# Patient Record
Sex: FEMALE | Race: WHITE | Employment: OTHER | URBAN - NONMETROPOLITAN AREA
[De-identification: names, ages, dates, MRNs, and addresses within clinical notes are randomized per-mention and may not be internally consistent; named-entity substitution may affect disease eponyms.]

---

## 2017-01-03 DIAGNOSIS — I10 ESSENTIAL HYPERTENSION: ICD-10-CM

## 2017-01-03 DIAGNOSIS — R10.13 EPIGASTRIC PAIN: ICD-10-CM

## 2017-01-03 DIAGNOSIS — K21.9 GASTROESOPHAGEAL REFLUX DISEASE WITHOUT ESOPHAGITIS: ICD-10-CM

## 2017-01-03 RX ORDER — FUROSEMIDE 20 MG/1
20 TABLET ORAL 2 TIMES DAILY
Qty: 180 TABLET | Refills: 3 | Status: SHIPPED | OUTPATIENT
Start: 2017-01-03 | End: 2017-12-15 | Stop reason: SDUPTHER

## 2017-01-03 RX ORDER — PANTOPRAZOLE SODIUM 40 MG/1
40 TABLET, DELAYED RELEASE ORAL DAILY
Qty: 90 TABLET | Refills: 3 | Status: SHIPPED | OUTPATIENT
Start: 2017-01-03 | End: 2017-11-14 | Stop reason: SDUPTHER

## 2017-01-03 RX ORDER — ISOSORBIDE DINITRATE 5 MG/1
5 TABLET ORAL 3 TIMES DAILY
Qty: 270 TABLET | Refills: 3 | Status: SHIPPED | OUTPATIENT
Start: 2017-01-03 | End: 2017-01-03

## 2017-01-03 RX ORDER — VENLAFAXINE HYDROCHLORIDE 75 MG/1
75 CAPSULE, EXTENDED RELEASE ORAL DAILY
Qty: 90 CAPSULE | Refills: 3 | Status: SHIPPED | OUTPATIENT
Start: 2017-01-03 | End: 2017-11-14 | Stop reason: SDUPTHER

## 2017-01-03 RX ORDER — LOSARTAN POTASSIUM 25 MG/1
25 TABLET ORAL DAILY
Qty: 90 TABLET | Refills: 3 | Status: SHIPPED | OUTPATIENT
Start: 2017-01-03 | End: 2017-11-14 | Stop reason: SDUPTHER

## 2017-01-03 RX ORDER — SUCRALFATE 1 G/1
1 TABLET ORAL 4 TIMES DAILY
Qty: 360 TABLET | Refills: 3 | Status: SHIPPED | OUTPATIENT
Start: 2017-01-03 | End: 2018-04-09 | Stop reason: SDUPTHER

## 2017-01-03 RX ORDER — ISOSORBIDE DINITRATE 5 MG/1
5 TABLET ORAL 3 TIMES DAILY
Qty: 90 TABLET | Refills: 3 | Status: SHIPPED | OUTPATIENT
Start: 2017-01-03 | End: 2017-11-14

## 2017-01-03 RX ORDER — DIGOXIN 125 MCG
125 TABLET ORAL DAILY
Qty: 90 TABLET | Refills: 3 | Status: SHIPPED | OUTPATIENT
Start: 2017-01-03 | End: 2017-11-14 | Stop reason: SDUPTHER

## 2017-01-09 ENCOUNTER — TELEPHONE (OUTPATIENT)
Dept: FAMILY MEDICINE CLINIC | Age: 82
End: 2017-01-09

## 2017-02-01 ENCOUNTER — OFFICE VISIT (OUTPATIENT)
Dept: CARDIOLOGY | Age: 82
End: 2017-02-01

## 2017-02-01 VITALS
HEIGHT: 62 IN | DIASTOLIC BLOOD PRESSURE: 78 MMHG | BODY MASS INDEX: 33.31 KG/M2 | SYSTOLIC BLOOD PRESSURE: 122 MMHG | WEIGHT: 181 LBS | HEART RATE: 60 BPM

## 2017-02-01 DIAGNOSIS — Z95.0 PACEMAKER: ICD-10-CM

## 2017-02-01 DIAGNOSIS — E55.9 VITAMIN D DEFICIENCY: ICD-10-CM

## 2017-02-01 DIAGNOSIS — I48.91 ATRIAL FIBRILLATION WITH RAPID VENTRICULAR RESPONSE (HCC): Primary | ICD-10-CM

## 2017-02-01 DIAGNOSIS — I48.20 CHRONIC ATRIAL FIBRILLATION (HCC): ICD-10-CM

## 2017-02-01 DIAGNOSIS — I10 ESSENTIAL HYPERTENSION: ICD-10-CM

## 2017-02-01 DIAGNOSIS — R53.83 FATIGUE, UNSPECIFIED TYPE: ICD-10-CM

## 2017-02-01 DIAGNOSIS — Z85.038 HISTORY OF COLON CANCER: ICD-10-CM

## 2017-02-01 PROCEDURE — 4040F PNEUMOC VAC/ADMIN/RCVD: CPT | Performed by: INTERNAL MEDICINE

## 2017-02-01 PROCEDURE — 1123F ACP DISCUSS/DSCN MKR DOCD: CPT | Performed by: INTERNAL MEDICINE

## 2017-02-01 PROCEDURE — 1036F TOBACCO NON-USER: CPT | Performed by: INTERNAL MEDICINE

## 2017-02-01 PROCEDURE — 99213 OFFICE O/P EST LOW 20 MIN: CPT | Performed by: INTERNAL MEDICINE

## 2017-02-01 PROCEDURE — G8484 FLU IMMUNIZE NO ADMIN: HCPCS | Performed by: INTERNAL MEDICINE

## 2017-02-01 PROCEDURE — G8427 DOCREV CUR MEDS BY ELIG CLIN: HCPCS | Performed by: INTERNAL MEDICINE

## 2017-02-01 PROCEDURE — G8419 CALC BMI OUT NRM PARAM NOF/U: HCPCS | Performed by: INTERNAL MEDICINE

## 2017-02-01 PROCEDURE — 1090F PRES/ABSN URINE INCON ASSESS: CPT | Performed by: INTERNAL MEDICINE

## 2017-03-21 ENCOUNTER — OFFICE VISIT (OUTPATIENT)
Dept: FAMILY MEDICINE CLINIC | Age: 82
End: 2017-03-21

## 2017-03-21 VITALS
WEIGHT: 181 LBS | RESPIRATION RATE: 12 BRPM | SYSTOLIC BLOOD PRESSURE: 138 MMHG | HEART RATE: 64 BPM | DIASTOLIC BLOOD PRESSURE: 78 MMHG | BODY MASS INDEX: 33.11 KG/M2

## 2017-03-21 DIAGNOSIS — M79.641 HAND PAIN, RIGHT: ICD-10-CM

## 2017-03-21 DIAGNOSIS — E53.8 VITAMIN B12 DEFICIENCY: ICD-10-CM

## 2017-03-21 DIAGNOSIS — I48.20 CHRONIC ATRIAL FIBRILLATION (HCC): ICD-10-CM

## 2017-03-21 DIAGNOSIS — K21.9 GASTROESOPHAGEAL REFLUX DISEASE WITHOUT ESOPHAGITIS: ICD-10-CM

## 2017-03-21 DIAGNOSIS — E78.00 PURE HYPERCHOLESTEROLEMIA: ICD-10-CM

## 2017-03-21 DIAGNOSIS — I10 ESSENTIAL HYPERTENSION: Primary | ICD-10-CM

## 2017-03-21 DIAGNOSIS — I49.5 SICK SINUS SYNDROME (HCC): ICD-10-CM

## 2017-03-21 DIAGNOSIS — M15.9 PRIMARY OSTEOARTHRITIS INVOLVING MULTIPLE JOINTS: ICD-10-CM

## 2017-03-21 PROCEDURE — 99214 OFFICE O/P EST MOD 30 MIN: CPT | Performed by: FAMILY MEDICINE

## 2017-03-21 PROCEDURE — 1123F ACP DISCUSS/DSCN MKR DOCD: CPT | Performed by: FAMILY MEDICINE

## 2017-03-21 PROCEDURE — G8427 DOCREV CUR MEDS BY ELIG CLIN: HCPCS | Performed by: FAMILY MEDICINE

## 2017-03-21 PROCEDURE — G8417 CALC BMI ABV UP PARAM F/U: HCPCS | Performed by: FAMILY MEDICINE

## 2017-03-21 PROCEDURE — 1090F PRES/ABSN URINE INCON ASSESS: CPT | Performed by: FAMILY MEDICINE

## 2017-03-21 PROCEDURE — 4040F PNEUMOC VAC/ADMIN/RCVD: CPT | Performed by: FAMILY MEDICINE

## 2017-03-21 PROCEDURE — 1036F TOBACCO NON-USER: CPT | Performed by: FAMILY MEDICINE

## 2017-03-21 PROCEDURE — G8484 FLU IMMUNIZE NO ADMIN: HCPCS | Performed by: FAMILY MEDICINE

## 2017-03-21 RX ORDER — CYCLOSPORINE 0.5 MG/ML
1 EMULSION OPHTHALMIC 2 TIMES DAILY
Status: ON HOLD | COMMUNITY
End: 2019-05-04 | Stop reason: ALTCHOICE

## 2017-03-21 ASSESSMENT — PATIENT HEALTH QUESTIONNAIRE - PHQ9
1. LITTLE INTEREST OR PLEASURE IN DOING THINGS: 0
SUM OF ALL RESPONSES TO PHQ9 QUESTIONS 1 & 2: 0
2. FEELING DOWN, DEPRESSED OR HOPELESS: 0
SUM OF ALL RESPONSES TO PHQ QUESTIONS 1-9: 0

## 2017-03-29 ENCOUNTER — TELEPHONE (OUTPATIENT)
Dept: FAMILY MEDICINE CLINIC | Age: 82
End: 2017-03-29

## 2017-03-29 DIAGNOSIS — M19.049 ARTHRITIS OF HAND: Primary | ICD-10-CM

## 2017-04-28 ENCOUNTER — NURSE ONLY (OUTPATIENT)
Dept: FAMILY MEDICINE CLINIC | Age: 82
End: 2017-04-28

## 2017-04-28 DIAGNOSIS — Z23 NEED FOR PNEUMOCOCCAL VACCINE: Primary | ICD-10-CM

## 2017-04-28 PROCEDURE — G0009 ADMIN PNEUMOCOCCAL VACCINE: HCPCS | Performed by: NURSE PRACTITIONER

## 2017-04-28 PROCEDURE — 90732 PPSV23 VACC 2 YRS+ SUBQ/IM: CPT | Performed by: NURSE PRACTITIONER

## 2017-05-31 ENCOUNTER — OFFICE VISIT (OUTPATIENT)
Dept: CARDIOLOGY | Age: 82
End: 2017-05-31

## 2017-05-31 VITALS
DIASTOLIC BLOOD PRESSURE: 60 MMHG | BODY MASS INDEX: 33.17 KG/M2 | HEIGHT: 62 IN | SYSTOLIC BLOOD PRESSURE: 128 MMHG | HEART RATE: 68 BPM | WEIGHT: 180.25 LBS

## 2017-05-31 DIAGNOSIS — I10 ESSENTIAL HYPERTENSION: Primary | ICD-10-CM

## 2017-05-31 DIAGNOSIS — Z95.0 PACEMAKER: ICD-10-CM

## 2017-05-31 DIAGNOSIS — I48.20 CHRONIC ATRIAL FIBRILLATION (HCC): ICD-10-CM

## 2017-05-31 DIAGNOSIS — I48.91 ATRIAL FIBRILLATION WITH RAPID VENTRICULAR RESPONSE (HCC): ICD-10-CM

## 2017-05-31 PROCEDURE — 1036F TOBACCO NON-USER: CPT | Performed by: INTERNAL MEDICINE

## 2017-05-31 PROCEDURE — 93000 ELECTROCARDIOGRAM COMPLETE: CPT | Performed by: INTERNAL MEDICINE

## 2017-05-31 PROCEDURE — G8427 DOCREV CUR MEDS BY ELIG CLIN: HCPCS | Performed by: INTERNAL MEDICINE

## 2017-05-31 PROCEDURE — G8417 CALC BMI ABV UP PARAM F/U: HCPCS | Performed by: INTERNAL MEDICINE

## 2017-05-31 PROCEDURE — 99213 OFFICE O/P EST LOW 20 MIN: CPT | Performed by: INTERNAL MEDICINE

## 2017-05-31 PROCEDURE — 4040F PNEUMOC VAC/ADMIN/RCVD: CPT | Performed by: INTERNAL MEDICINE

## 2017-05-31 PROCEDURE — 1123F ACP DISCUSS/DSCN MKR DOCD: CPT | Performed by: INTERNAL MEDICINE

## 2017-05-31 PROCEDURE — 1090F PRES/ABSN URINE INCON ASSESS: CPT | Performed by: INTERNAL MEDICINE

## 2017-08-17 ENCOUNTER — NURSE ONLY (OUTPATIENT)
Dept: CARDIOLOGY CLINIC | Age: 82
End: 2017-08-17
Payer: MEDICARE

## 2017-08-17 DIAGNOSIS — Z95.0 PACEMAKER: Primary | ICD-10-CM

## 2017-08-17 PROCEDURE — 93279 PRGRMG DEV EVAL PM/LDLS PM: CPT | Performed by: INTERNAL MEDICINE

## 2017-08-18 ENCOUNTER — TELEPHONE (OUTPATIENT)
Dept: FAMILY MEDICINE CLINIC | Age: 82
End: 2017-08-18

## 2017-09-14 ENCOUNTER — HOSPITAL ENCOUNTER (OUTPATIENT)
Age: 82
Discharge: HOME | End: 2017-09-14
Payer: MEDICARE

## 2017-09-14 DIAGNOSIS — I49.5 SICK SINUS SYNDROME (HCC): ICD-10-CM

## 2017-09-14 DIAGNOSIS — K21.9 GASTROESOPHAGEAL REFLUX DISEASE WITHOUT ESOPHAGITIS: ICD-10-CM

## 2017-09-14 DIAGNOSIS — I10 ESSENTIAL HYPERTENSION: ICD-10-CM

## 2017-09-14 DIAGNOSIS — E53.8 VITAMIN B12 DEFICIENCY: ICD-10-CM

## 2017-09-14 LAB
ALBUMIN SERPL-MCNC: 3.9 G/DL (ref 3.5–5.1)
ALP BLD-CCNC: 43 U/L (ref 38–126)
ALT SERPL-CCNC: 14 U/L (ref 11–66)
ANION GAP SERPL CALCULATED.3IONS-SCNC: 14 MEQ/L (ref 8–16)
AST SERPL-CCNC: 22 U/L (ref 5–40)
BILIRUB SERPL-MCNC: 1.9 MG/DL (ref 0.3–1.2)
BUN BLDV-MCNC: 12 MG/DL (ref 7–22)
CALCIUM SERPL-MCNC: 9.3 MG/DL (ref 8.5–10.5)
CHLORIDE BLD-SCNC: 99 MEQ/L (ref 98–111)
CO2: 29 MEQ/L (ref 23–33)
CREAT SERPL-MCNC: 0.6 MG/DL (ref 0.4–1.2)
FOLATE: > 20 NG/ML (ref 4.8–24.2)
GFR SERPL CREATININE-BSD FRML MDRD: > 90 ML/MIN/1.73M2
GLUCOSE BLD-MCNC: 121 MG/DL (ref 70–108)
HCT VFR BLD CALC: 38.9 % (ref 37–47)
HEMOGLOBIN: 12.8 GM/DL (ref 12–16)
MCH RBC QN AUTO: 31.1 PG (ref 27–31)
MCHC RBC AUTO-ENTMCNC: 32.9 GM/DL (ref 33–37)
MCV RBC AUTO: 94.4 FL (ref 81–99)
PDW BLD-RTO: 14.5 % (ref 11.5–14.5)
PLATELET # BLD: 148 THOU/MM3 (ref 130–400)
PMV BLD AUTO: 9.7 MCM (ref 7.4–10.4)
POTASSIUM SERPL-SCNC: 3.9 MEQ/L (ref 3.5–5.2)
RBC # BLD: 4.12 MILL/MM3 (ref 4.2–5.4)
SODIUM BLD-SCNC: 142 MEQ/L (ref 135–145)
TOTAL PROTEIN: 6.4 G/DL (ref 6.1–8)
TSH SERPL DL<=0.05 MIU/L-ACNC: 2.6 UIU/ML (ref 0.4–4.2)
VITAMIN B-12: 466 PG/ML (ref 211–911)
WBC # BLD: 4.7 THOU/MM3 (ref 4.8–10.8)

## 2017-09-14 PROCEDURE — 80053 COMPREHEN METABOLIC PANEL: CPT

## 2017-09-14 PROCEDURE — 85027 COMPLETE CBC AUTOMATED: CPT

## 2017-09-14 PROCEDURE — 82746 ASSAY OF FOLIC ACID SERUM: CPT

## 2017-09-14 PROCEDURE — 36415 COLL VENOUS BLD VENIPUNCTURE: CPT

## 2017-09-14 PROCEDURE — 84443 ASSAY THYROID STIM HORMONE: CPT

## 2017-09-14 PROCEDURE — 82607 VITAMIN B-12: CPT

## 2017-09-19 ENCOUNTER — OFFICE VISIT (OUTPATIENT)
Dept: FAMILY MEDICINE CLINIC | Age: 82
End: 2017-09-19
Payer: MEDICARE

## 2017-09-19 VITALS
TEMPERATURE: 97.6 F | DIASTOLIC BLOOD PRESSURE: 70 MMHG | BODY MASS INDEX: 33.91 KG/M2 | HEART RATE: 63 BPM | RESPIRATION RATE: 17 BRPM | SYSTOLIC BLOOD PRESSURE: 140 MMHG | WEIGHT: 185.4 LBS | OXYGEN SATURATION: 97 %

## 2017-09-19 DIAGNOSIS — E04.9 THYROID GOITER: Primary | ICD-10-CM

## 2017-09-19 DIAGNOSIS — E78.00 PURE HYPERCHOLESTEROLEMIA: ICD-10-CM

## 2017-09-19 DIAGNOSIS — Z23 NEED FOR INFLUENZA VACCINATION: ICD-10-CM

## 2017-09-19 DIAGNOSIS — K21.9 GASTROESOPHAGEAL REFLUX DISEASE WITHOUT ESOPHAGITIS: ICD-10-CM

## 2017-09-19 DIAGNOSIS — M15.9 PRIMARY OSTEOARTHRITIS INVOLVING MULTIPLE JOINTS: ICD-10-CM

## 2017-09-19 DIAGNOSIS — I48.91 ATRIAL FIBRILLATION WITH RAPID VENTRICULAR RESPONSE (HCC): ICD-10-CM

## 2017-09-19 DIAGNOSIS — I10 ESSENTIAL HYPERTENSION: ICD-10-CM

## 2017-09-19 PROCEDURE — G8427 DOCREV CUR MEDS BY ELIG CLIN: HCPCS | Performed by: FAMILY MEDICINE

## 2017-09-19 PROCEDURE — 99214 OFFICE O/P EST MOD 30 MIN: CPT | Performed by: FAMILY MEDICINE

## 2017-09-19 PROCEDURE — 1123F ACP DISCUSS/DSCN MKR DOCD: CPT | Performed by: FAMILY MEDICINE

## 2017-09-19 PROCEDURE — G0008 ADMIN INFLUENZA VIRUS VAC: HCPCS | Performed by: FAMILY MEDICINE

## 2017-09-19 PROCEDURE — 1090F PRES/ABSN URINE INCON ASSESS: CPT | Performed by: FAMILY MEDICINE

## 2017-09-19 PROCEDURE — 4040F PNEUMOC VAC/ADMIN/RCVD: CPT | Performed by: FAMILY MEDICINE

## 2017-09-19 PROCEDURE — G8417 CALC BMI ABV UP PARAM F/U: HCPCS | Performed by: FAMILY MEDICINE

## 2017-09-19 PROCEDURE — 1036F TOBACCO NON-USER: CPT | Performed by: FAMILY MEDICINE

## 2017-09-19 PROCEDURE — 90662 IIV NO PRSV INCREASED AG IM: CPT | Performed by: FAMILY MEDICINE

## 2017-10-04 ENCOUNTER — OFFICE VISIT (OUTPATIENT)
Dept: CARDIOLOGY CLINIC | Age: 82
End: 2017-10-04
Payer: MEDICARE

## 2017-10-04 VITALS
HEIGHT: 63 IN | BODY MASS INDEX: 32.25 KG/M2 | SYSTOLIC BLOOD PRESSURE: 146 MMHG | DIASTOLIC BLOOD PRESSURE: 78 MMHG | WEIGHT: 182 LBS | HEART RATE: 64 BPM

## 2017-10-04 DIAGNOSIS — I49.5 SICK SINUS SYNDROME (HCC): Primary | ICD-10-CM

## 2017-10-04 DIAGNOSIS — I48.91 ATRIAL FIBRILLATION WITH RAPID VENTRICULAR RESPONSE (HCC): ICD-10-CM

## 2017-10-04 DIAGNOSIS — I48.20 CHRONIC ATRIAL FIBRILLATION (HCC): ICD-10-CM

## 2017-10-04 DIAGNOSIS — E78.00 PURE HYPERCHOLESTEROLEMIA: ICD-10-CM

## 2017-10-04 DIAGNOSIS — I10 ESSENTIAL HYPERTENSION: ICD-10-CM

## 2017-10-04 PROCEDURE — 1036F TOBACCO NON-USER: CPT | Performed by: INTERNAL MEDICINE

## 2017-10-04 PROCEDURE — G8427 DOCREV CUR MEDS BY ELIG CLIN: HCPCS | Performed by: INTERNAL MEDICINE

## 2017-10-04 PROCEDURE — G8417 CALC BMI ABV UP PARAM F/U: HCPCS | Performed by: INTERNAL MEDICINE

## 2017-10-04 PROCEDURE — 1123F ACP DISCUSS/DSCN MKR DOCD: CPT | Performed by: INTERNAL MEDICINE

## 2017-10-04 PROCEDURE — 4040F PNEUMOC VAC/ADMIN/RCVD: CPT | Performed by: INTERNAL MEDICINE

## 2017-10-04 PROCEDURE — 1090F PRES/ABSN URINE INCON ASSESS: CPT | Performed by: INTERNAL MEDICINE

## 2017-10-04 PROCEDURE — 99213 OFFICE O/P EST LOW 20 MIN: CPT | Performed by: INTERNAL MEDICINE

## 2017-10-04 PROCEDURE — G8484 FLU IMMUNIZE NO ADMIN: HCPCS | Performed by: INTERNAL MEDICINE

## 2017-10-04 NOTE — PROGRESS NOTES
Pt here for 4 month check up     Denies chest pain, palpitations or MAK    Continues to have SOB with no change    C/O right arm \"quivering\" when she goes to bed at night

## 2017-10-04 NOTE — MR AVS SNAPSHOT
your BMI, the greater your risk of heart disease, high blood pressure, type 2 diabetes, stroke, gallstones, arthritis, sleep apnea, and certain cancers. BMI is not perfect. It may overestimate body fat in athletes and people who are more muscular. Even a small weight loss (between 5 and 10 percent of your current weight) by decreasing your calorie intake and becoming more physically active will help lower your risk of developing or worsening diseases associated with obesity.      Learn more at: [email or web address]             Medications and Orders      Your Current Medications Are              XARELTO 15 MG TABS tablet Take 1 tablet by mouth  daily with breakfast    cycloSPORINE (RESTASIS MULTIDOSE) 0.05 % ophthalmic emulsion Place 1 drop into both eyes 2 times daily    metoprolol tartrate (LOPRESSOR) 25 MG tablet Take 50 mg by mouth 2 times daily    furosemide (LASIX) 20 MG tablet Take 1 tablet by mouth 2 times daily    sucralfate (CARAFATE) 1 GM tablet Take 1 tablet by mouth 4 times daily    digoxin (LANOXIN) 125 MCG tablet Take 1 tablet by mouth daily    venlafaxine (EFFEXOR XR) 75 MG extended release capsule Take 1 capsule by mouth daily    pantoprazole (PROTONIX) 40 MG tablet Take 1 tablet by mouth daily    losartan (COZAAR) 25 MG tablet Take 1 tablet by mouth daily    isosorbide dinitrate (ISORDIL TITRADOSE) 5 MG tablet Take 1 tablet by mouth 3 times daily    NONFORMULARY Perils probiotic  1 daily    Cyanocobalamin (VITAMIN B 12 PO) Take by mouth    aspirin 81 MG chewable tablet Take 1 tablet by mouth daily    vitamin D (CHOLECALCIFEROL) 1000 UNIT TABS tablet Take 1,000 Units by mouth daily    Calcium-Vitamin D-Vitamin K 500-500-40 MG-UNT-MCG CHEW Take 1 tablet by mouth daily    acetaminophen (TYLENOL) 500 MG tablet   Take 1,000 mg by mouth 2 times daily     Biotin 1000 MCG TABS Take 1 tablet by mouth daily       Allergies              Methadone Shortness Of Breath For questions regarding your [employer] account call [phone]. If you have a clinical question, please call your doctor's office.

## 2017-10-04 NOTE — PROGRESS NOTES
Katherin Tomas is a 80 y.o. female is here for sick sinus and has pacer feels good and has  Some stable arthritis  and has had no chest pains . Intensity of the pain none , provocation of the pain none , what relieves the pain none  where does  the pain migrates to no where  and no nausea no fever and chills and no sob with and without activity.  No evidence of swelling in legs no palpitations and no syncopal episodes and no dizziness ,no bleeding ,or urination  Problems ,no double visions ,no speech problems and no bowel problems or diarrhea and tolerating medications     Patient Active Problem List   Diagnosis    Hypertension    Fatigue    Osteoarthritis    Stress incontinence    History of colon cancer    Hyperlipidemia    GERD (gastroesophageal reflux disease)    Vasovagal attack    Cardiac dysrhythmia    A-fib ([address]    Osteopenia    Atrial fibrillation with rapid ventricular response ([address]    Near syncope    Urinary tract infection without hematuria    Sick sinus syndrome ([address]    Bradycardia    [employer] single pacemaker 4/26/2016    Thyroid goiter     Past Medical History:   Diagnosis Date    Atrial fibrillation ([address]     Blood circulation, collateral     [employer] single pacemaker 4/26/2016 5/5/2016    Cancer ([address] 1954    HX  Colon     Carpal tunnel syndrome     Fracture dislocation of ankle 1980    GERD (gastroesophageal reflux disease)     Hyperlipidemia     Hypertension     Kidney lesion, native, right     found on 5/2016    Osteoarthritis     knees    Osteopenia     Prolonged emergence from general anesthesia     Thyroid goiter 9/6/2016    Yersiniosis 2016     Social History   Substance Use Topics    Smoking status: Never Smoker    Smokeless tobacco: Never Used    Alcohol use No     Allergies   Allergen Reactions    Methadone Shortness Of Breath    Gabapentin     Lyrica [Pregabalin]     Ultram [Tramadol]      Nausea, pedal edema, SOB 148/82 (!) 146/78   Pulse: 64    Weight: 182 lb (82.6 kg)    Height: 5' 3\" (1.6 m)        EXAMINATION   Gen.  Appearance is reflective of nutritional normal nutrition and well-groomed   Appears  stated age    Carotid the amplitude of  carotid artery  And up stroke are present or diminished and bruits are absent   Thyroid palpation appears to be  Normal    LEONOR  And evaluated and within normal limits  And size of pupils is normal  HEENT  teeth appears to be symmetrical without poor dentition and gums  and palate appears to be healthy and  head is normal cephalic  Without signs of trauma and eyes are without trauma no conjunctiva and Iids retracting and not retracted ptosis and no ptosis and without  erythematous changes and  Nose is symmetrical  without drainage  and ears are  without tinnitus  and throat is clear   JUGULAR VEINS  are not elevated and tongue is mid line no masses presence and no feldman A waves present   LYMPHATICS are without adenopathies at least on exam   CHEST  No biventricular heaves and thrills and no right ventricular heaves and examination without signs of trauma and lesions  HEART not distant and irregular rate and rhythm without   gallop signs and and no murmurs and systolic crescendo  Decrescendo  normal s1 and s2 sounds and no s3 and s4 split   Point of maximum intensity of the heartbeat  is at or displaced from the fifth intercostal space  LUNGS no   presence of intercostal retractions and no  use of the accessory muscles with a diaphragmatic movement present and not present pectus and pigeon chest and without wheezes and rhonchi and non diminished in all posterior lungs  and without rales  ABDOMEN abdominal bruit not present  And  No  Presency of  morbid obesity and with no    non distended without organomegaly and no rebound tenderness and bowel sound are present  all quadrants   NEUROLOGY all the cranial nerves are intact and no motor deficits  and no sensory deficits  MUSCULAR

## 2018-02-07 ENCOUNTER — OFFICE VISIT (OUTPATIENT)
Dept: CARDIOLOGY CLINIC | Age: 83
End: 2018-02-07
Payer: MEDICARE

## 2018-02-07 VITALS
HEIGHT: 62 IN | DIASTOLIC BLOOD PRESSURE: 70 MMHG | BODY MASS INDEX: 33.86 KG/M2 | HEART RATE: 72 BPM | WEIGHT: 184 LBS | SYSTOLIC BLOOD PRESSURE: 132 MMHG

## 2018-02-07 DIAGNOSIS — I48.91 ATRIAL FIBRILLATION WITH RAPID VENTRICULAR RESPONSE (HCC): Primary | ICD-10-CM

## 2018-02-07 DIAGNOSIS — E78.00 PURE HYPERCHOLESTEROLEMIA: ICD-10-CM

## 2018-02-07 DIAGNOSIS — I10 ESSENTIAL HYPERTENSION: ICD-10-CM

## 2018-02-07 PROCEDURE — 4040F PNEUMOC VAC/ADMIN/RCVD: CPT | Performed by: INTERNAL MEDICINE

## 2018-02-07 PROCEDURE — 99213 OFFICE O/P EST LOW 20 MIN: CPT | Performed by: INTERNAL MEDICINE

## 2018-02-07 PROCEDURE — 1123F ACP DISCUSS/DSCN MKR DOCD: CPT | Performed by: INTERNAL MEDICINE

## 2018-02-07 PROCEDURE — 1090F PRES/ABSN URINE INCON ASSESS: CPT | Performed by: INTERNAL MEDICINE

## 2018-02-07 PROCEDURE — G8417 CALC BMI ABV UP PARAM F/U: HCPCS | Performed by: INTERNAL MEDICINE

## 2018-02-07 PROCEDURE — 1036F TOBACCO NON-USER: CPT | Performed by: INTERNAL MEDICINE

## 2018-02-07 PROCEDURE — G8427 DOCREV CUR MEDS BY ELIG CLIN: HCPCS | Performed by: INTERNAL MEDICINE

## 2018-02-07 PROCEDURE — G8484 FLU IMMUNIZE NO ADMIN: HCPCS | Performed by: INTERNAL MEDICINE

## 2018-02-07 NOTE — PROGRESS NOTES
differently: TAKE 2 TABLETS BY MOUTH IN  THE MORNING AND 2 TABLETS  BY MOUTH IN THE EVENING) 450 tablet 3    furosemide (LASIX) 20 MG tablet TAKE 1 TABLET BY MOUTH TWO  TIMES DAILY 180 tablet 3    isosorbide dinitrate (ISORDIL) 5 MG tablet TAKE 1 TABLET BY MOUTH 3  TIMES DAILY 270 tablet 3    DIGOX 125 MCG tablet TAKE 1 TABLET BY MOUTH  DAILY 90 tablet 3    venlafaxine (EFFEXOR XR) 75 MG extended release capsule TAKE 1 CAPSULE BY MOUTH  DAILY 90 capsule 3    losartan (COZAAR) 25 MG tablet TAKE 1 TABLET BY MOUTH  DAILY 90 tablet 3    pantoprazole (PROTONIX) 40 MG tablet TAKE 1 TABLET BY MOUTH  DAILY 90 tablet 3    XARELTO 15 MG TABS tablet Take 1 tablet by mouth  daily with breakfast 90 tablet 3    cycloSPORINE (RESTASIS MULTIDOSE) 0.05 % ophthalmic emulsion Place 1 drop into both eyes 2 times daily      sucralfate (CARAFATE) 1 GM tablet Take 1 tablet by mouth 4 times daily 360 tablet 3    NONFORMULARY Perils probiotic  1 daily      Cyanocobalamin (VITAMIN B 12 PO) Take by mouth      aspirin 81 MG chewable tablet Take 1 tablet by mouth daily 30 tablet 3    vitamin D (CHOLECALCIFEROL) 1000 UNIT TABS tablet Take 1,000 Units by mouth daily      Calcium-Vitamin D-Vitamin K 500-500-40 MG-UNT-MCG CHEW Take 1 tablet by mouth daily      acetaminophen (TYLENOL) 500 MG tablet   Take 1,000 mg by mouth 2 times daily       Biotin 1000 MCG TABS Take 1 tablet by mouth daily        No current facility-administered medications for this visit.         REVIEW OF SYSTEM  Constitutional  symptoms such as fever chills and malaise and weakness  Are negative   HE ENT negative  HEART all negative  LUNGS all negative   ABDOMEN all negative  GENITAL URINARY all within normal limits  NEUROLOGY all negative  NECK all negative   SKIN all negative  MUSCULOSKELETAL negative  HEMATOLOGICAL negative  PSYCHIATRIC  negative    Vitals:    02/07/18 1256   BP: 132/70   Pulse: 72   Weight: 184 lb (83.5 kg)   Height: 5' 2\" (1.575 m) EXAMINATION   Gen.  Appearance is reflective of nutritional normal nutrition and well-groomed   Appears  stated age    Carotid the amplitude of  carotid artery  And up stroke are present or diminished and bruits are absent   Thyroid palpation appears to be  Normal    LEONOR  And evaluated and within normal limits  And size of pupils is normal  HEENT  teeth appears to be symmetrical without poor dentition and gums  and palate appears to be healthy and  head is normal cephalic  Without signs of trauma and eyes are without trauma no conjunctiva and Iids retracting and not retracted ptosis and no ptosis and without  erythematous changes and  Nose is symmetrical  without drainage  and ears are  without tinnitus  and throat is clear   JUGULAR VEINS  are not elevated and tongue is mid line no masses presence and no feldman A waves present   LYMPHATICS are without adenopathies at least on exam   CHEST  No biventricular heaves and thrills and no right ventricular heaves and examination without signs of trauma and lesions  HEART not distant and irregular rate and rhythm without   gallop signs and and no murmurs and systolic crescendo  Decrescendo  normal s1 and s2 sounds and no s3 and s4 split   Point of maximum intensity of the heartbeat  is at or displaced from the fifth intercostal space  LUNGS no   presence of intercostal retractions and no  use of the accessory muscles with a diaphragmatic movement present and not present pectus and pigeon chest and without wheezes and rhonchi and non diminished in all posterior lungs  and without rales  ABDOMEN abdominal bruit not present  And  No  Presency of  morbid obesity and with no    non distended without organomegaly and no rebound tenderness and bowel sound are present  all quadrants   NEUROLOGY all the cranial nerves are intact and no motor deficits  and no sensory deficits  MUSCULAR SKELETAL without signs of trauma and kyphosis and scoliosis and normal range of motion for

## 2018-04-09 ENCOUNTER — HOSPITAL ENCOUNTER (OUTPATIENT)
Dept: ULTRASOUND IMAGING | Age: 83
Discharge: HOME | End: 2018-04-09
Payer: MEDICARE

## 2018-04-09 ENCOUNTER — OFFICE VISIT (OUTPATIENT)
Dept: FAMILY MEDICINE CLINIC | Age: 83
End: 2018-04-09
Payer: MEDICARE

## 2018-04-09 VITALS
WEIGHT: 183 LBS | RESPIRATION RATE: 20 BRPM | BODY MASS INDEX: 33.68 KG/M2 | SYSTOLIC BLOOD PRESSURE: 128 MMHG | TEMPERATURE: 97.9 F | HEART RATE: 64 BPM | HEIGHT: 62 IN | DIASTOLIC BLOOD PRESSURE: 68 MMHG

## 2018-04-09 DIAGNOSIS — M85.89 OSTEOPENIA OF MULTIPLE SITES: ICD-10-CM

## 2018-04-09 DIAGNOSIS — I49.5 SICK SINUS SYNDROME (HCC): ICD-10-CM

## 2018-04-09 DIAGNOSIS — B96.89 ACUTE BACTERIAL SINUSITIS: ICD-10-CM

## 2018-04-09 DIAGNOSIS — E04.9 THYROID GOITER: ICD-10-CM

## 2018-04-09 DIAGNOSIS — M17.11 ARTHRITIS OF RIGHT KNEE: ICD-10-CM

## 2018-04-09 DIAGNOSIS — E78.00 PURE HYPERCHOLESTEROLEMIA: ICD-10-CM

## 2018-04-09 DIAGNOSIS — M15.9 PRIMARY OSTEOARTHRITIS INVOLVING MULTIPLE JOINTS: ICD-10-CM

## 2018-04-09 DIAGNOSIS — I48.91 ATRIAL FIBRILLATION WITH RAPID VENTRICULAR RESPONSE (HCC): ICD-10-CM

## 2018-04-09 DIAGNOSIS — R73.09 ELEVATED GLUCOSE: ICD-10-CM

## 2018-04-09 DIAGNOSIS — K21.9 GASTROESOPHAGEAL REFLUX DISEASE WITHOUT ESOPHAGITIS: ICD-10-CM

## 2018-04-09 DIAGNOSIS — J01.90 ACUTE BACTERIAL SINUSITIS: ICD-10-CM

## 2018-04-09 DIAGNOSIS — R61 NIGHT SWEATS: ICD-10-CM

## 2018-04-09 DIAGNOSIS — M21.061 ACQUIRED GENU VALGUM OF RIGHT KNEE: ICD-10-CM

## 2018-04-09 DIAGNOSIS — M71.30 SYNOVIAL CYST: Primary | ICD-10-CM

## 2018-04-09 DIAGNOSIS — I10 ESSENTIAL HYPERTENSION: ICD-10-CM

## 2018-04-09 DIAGNOSIS — R10.13 EPIGASTRIC PAIN: ICD-10-CM

## 2018-04-09 DIAGNOSIS — R23.8 DRY SCALP: ICD-10-CM

## 2018-04-09 PROCEDURE — G8417 CALC BMI ABV UP PARAM F/U: HCPCS | Performed by: FAMILY MEDICINE

## 2018-04-09 PROCEDURE — 99215 OFFICE O/P EST HI 40 MIN: CPT | Performed by: FAMILY MEDICINE

## 2018-04-09 PROCEDURE — 76536 US EXAM OF HEAD AND NECK: CPT

## 2018-04-09 PROCEDURE — G8427 DOCREV CUR MEDS BY ELIG CLIN: HCPCS | Performed by: FAMILY MEDICINE

## 2018-04-09 PROCEDURE — 1123F ACP DISCUSS/DSCN MKR DOCD: CPT | Performed by: FAMILY MEDICINE

## 2018-04-09 PROCEDURE — 1036F TOBACCO NON-USER: CPT | Performed by: FAMILY MEDICINE

## 2018-04-09 PROCEDURE — 4040F PNEUMOC VAC/ADMIN/RCVD: CPT | Performed by: FAMILY MEDICINE

## 2018-04-09 PROCEDURE — 1090F PRES/ABSN URINE INCON ASSESS: CPT | Performed by: FAMILY MEDICINE

## 2018-04-09 RX ORDER — SUCRALFATE 1 G/1
1 TABLET ORAL 4 TIMES DAILY
Qty: 360 TABLET | Refills: 3 | Status: SHIPPED | OUTPATIENT
Start: 2018-04-09 | End: 2019-02-04 | Stop reason: DRUGHIGH

## 2018-04-09 RX ORDER — CEPHALEXIN 500 MG/1
500 CAPSULE ORAL 3 TIMES DAILY
Qty: 21 CAPSULE | Refills: 0 | Status: SHIPPED | OUTPATIENT
Start: 2018-04-09 | End: 2018-04-16

## 2018-04-09 ASSESSMENT — PATIENT HEALTH QUESTIONNAIRE - PHQ9
SUM OF ALL RESPONSES TO PHQ9 QUESTIONS 1 & 2: 0
SUM OF ALL RESPONSES TO PHQ QUESTIONS 1-9: 0
1. LITTLE INTEREST OR PLEASURE IN DOING THINGS: 0
2. FEELING DOWN, DEPRESSED OR HOPELESS: 0

## 2018-04-11 ENCOUNTER — HOSPITAL ENCOUNTER (OUTPATIENT)
Age: 83
Discharge: HOME | End: 2018-04-11
Payer: MEDICARE

## 2018-04-11 DIAGNOSIS — E04.9 THYROID GOITER: ICD-10-CM

## 2018-04-11 DIAGNOSIS — R73.09 ELEVATED GLUCOSE: ICD-10-CM

## 2018-04-11 DIAGNOSIS — R61 NIGHT SWEATS: ICD-10-CM

## 2018-04-11 LAB
ANION GAP SERPL CALCULATED.3IONS-SCNC: 14 MEQ/L (ref 8–16)
AVERAGE GLUCOSE: 123 MG/DL (ref 70–126)
BUN BLDV-MCNC: 15 MG/DL (ref 7–22)
CALCIUM SERPL-MCNC: 9.4 MG/DL (ref 8.5–10.5)
CHLORIDE BLD-SCNC: 99 MEQ/L (ref 98–111)
CO2: 28 MEQ/L (ref 23–33)
CREAT SERPL-MCNC: 0.8 MG/DL (ref 0.4–1.2)
GFR SERPL CREATININE-BSD FRML MDRD: 67 ML/MIN/1.73M2
GLUCOSE BLD-MCNC: 143 MG/DL (ref 70–108)
HBA1C MFR BLD: 6.1 % (ref 4.4–6.4)
INSULIN, RANDOM OR FASTING: NORMAL
POTASSIUM SERPL-SCNC: 3.7 MEQ/L (ref 3.5–5.2)
SODIUM BLD-SCNC: 141 MEQ/L (ref 135–145)
TSH SERPL DL<=0.05 MIU/L-ACNC: 3.19 UIU/ML (ref 0.4–4.2)

## 2018-04-11 PROCEDURE — 83036 HEMOGLOBIN GLYCOSYLATED A1C: CPT

## 2018-04-11 PROCEDURE — 36415 COLL VENOUS BLD VENIPUNCTURE: CPT

## 2018-04-11 PROCEDURE — 84443 ASSAY THYROID STIM HORMONE: CPT

## 2018-04-11 PROCEDURE — 83525 ASSAY OF INSULIN: CPT

## 2018-04-11 PROCEDURE — 80048 BASIC METABOLIC PNL TOTAL CA: CPT

## 2018-04-12 ENCOUNTER — TELEPHONE (OUTPATIENT)
Dept: FAMILY MEDICINE CLINIC | Age: 83
End: 2018-04-12

## 2018-07-02 RX ORDER — RIVAROXABAN 15 MG/1
TABLET, FILM COATED ORAL
Qty: 90 TABLET | Refills: 3 | Status: ON HOLD | OUTPATIENT
Start: 2018-07-02 | End: 2019-01-16 | Stop reason: CLARIF

## 2018-08-29 ENCOUNTER — NURSE ONLY (OUTPATIENT)
Dept: CARDIOLOGY CLINIC | Age: 83
End: 2018-08-29
Payer: MEDICARE

## 2018-08-29 DIAGNOSIS — Z95.0 PACEMAKER: Primary | ICD-10-CM

## 2018-08-29 PROCEDURE — 93279 PRGRMG DEV EVAL PM/LDLS PM: CPT | Performed by: INTERNAL MEDICINE

## 2018-09-15 DIAGNOSIS — I10 ESSENTIAL HYPERTENSION: ICD-10-CM

## 2018-09-15 DIAGNOSIS — K21.9 GASTROESOPHAGEAL REFLUX DISEASE WITHOUT ESOPHAGITIS: ICD-10-CM

## 2018-09-17 RX ORDER — ISOSORBIDE DINITRATE 5 MG/1
5 TABLET ORAL 3 TIMES DAILY
Qty: 270 TABLET | Refills: 3 | Status: SHIPPED | OUTPATIENT
Start: 2018-09-17 | End: 2018-10-03 | Stop reason: ALTCHOICE

## 2018-09-17 RX ORDER — DIGOXIN 125 MCG
125 TABLET ORAL DAILY
Qty: 90 TABLET | Refills: 3 | Status: SHIPPED | OUTPATIENT
Start: 2018-09-17 | End: 2019-09-24 | Stop reason: SDUPTHER

## 2018-09-17 RX ORDER — PANTOPRAZOLE SODIUM 40 MG/1
40 TABLET, DELAYED RELEASE ORAL DAILY
Qty: 90 TABLET | Refills: 3 | Status: ON HOLD | OUTPATIENT
Start: 2018-09-17 | End: 2018-12-14 | Stop reason: HOSPADM

## 2018-09-17 RX ORDER — LOSARTAN POTASSIUM 25 MG/1
25 TABLET ORAL DAILY
Qty: 90 TABLET | Refills: 3 | Status: ON HOLD | OUTPATIENT
Start: 2018-09-17 | End: 2018-12-14 | Stop reason: HOSPADM

## 2018-09-17 RX ORDER — VENLAFAXINE HYDROCHLORIDE 75 MG/1
75 CAPSULE, EXTENDED RELEASE ORAL DAILY
Qty: 90 CAPSULE | Refills: 3 | Status: SHIPPED | OUTPATIENT
Start: 2018-09-17 | End: 2019-09-24 | Stop reason: SDUPTHER

## 2018-10-03 ENCOUNTER — OFFICE VISIT (OUTPATIENT)
Dept: CARDIOLOGY CLINIC | Age: 83
End: 2018-10-03
Payer: MEDICARE

## 2018-10-03 VITALS
SYSTOLIC BLOOD PRESSURE: 128 MMHG | HEART RATE: 60 BPM | WEIGHT: 183 LBS | BODY MASS INDEX: 33.68 KG/M2 | HEIGHT: 62 IN | DIASTOLIC BLOOD PRESSURE: 88 MMHG

## 2018-10-03 DIAGNOSIS — I10 ESSENTIAL HYPERTENSION: Primary | ICD-10-CM

## 2018-10-03 DIAGNOSIS — R06.02 SOB (SHORTNESS OF BREATH) ON EXERTION: ICD-10-CM

## 2018-10-03 PROCEDURE — 99214 OFFICE O/P EST MOD 30 MIN: CPT | Performed by: INTERNAL MEDICINE

## 2018-10-03 PROCEDURE — 4040F PNEUMOC VAC/ADMIN/RCVD: CPT | Performed by: INTERNAL MEDICINE

## 2018-10-03 PROCEDURE — 1101F PT FALLS ASSESS-DOCD LE1/YR: CPT | Performed by: INTERNAL MEDICINE

## 2018-10-03 PROCEDURE — 1090F PRES/ABSN URINE INCON ASSESS: CPT | Performed by: INTERNAL MEDICINE

## 2018-10-03 PROCEDURE — 1036F TOBACCO NON-USER: CPT | Performed by: INTERNAL MEDICINE

## 2018-10-03 PROCEDURE — 1123F ACP DISCUSS/DSCN MKR DOCD: CPT | Performed by: INTERNAL MEDICINE

## 2018-10-03 PROCEDURE — G8427 DOCREV CUR MEDS BY ELIG CLIN: HCPCS | Performed by: INTERNAL MEDICINE

## 2018-10-03 PROCEDURE — G8417 CALC BMI ABV UP PARAM F/U: HCPCS | Performed by: INTERNAL MEDICINE

## 2018-10-03 PROCEDURE — G8484 FLU IMMUNIZE NO ADMIN: HCPCS | Performed by: INTERNAL MEDICINE

## 2018-10-03 PROCEDURE — 93000 ELECTROCARDIOGRAM COMPLETE: CPT | Performed by: INTERNAL MEDICINE

## 2018-10-03 ASSESSMENT — ENCOUNTER SYMPTOMS
ORTHOPNEA: 0
CHANGE IN BOWEL HABIT: 0
EYE DISCHARGE: 0
HEMOPTYSIS: 0
SHORTNESS OF BREATH: 0
BACK PAIN: 0
COUGH: 0
SPUTUM PRODUCTION: 0
EYE PAIN: 0
BLURRED VISION: 0
BOWEL INCONTINENCE: 0
ABDOMINAL PAIN: 0
CONSTIPATION: 0
BLOATING: 0
HOARSE VOICE: 0
DIARRHEA: 0
DOUBLE VISION: 0

## 2018-10-03 NOTE — PROGRESS NOTES
Pt here for ov 6 mo check up     Pt concerned about night sweats,     Pt continues with fatigue, dizziness at times, swelling in bilateral legs and feet, wears compreshion hose, sob on  Any exertion ,    Pt denies heart palpitations,

## 2018-10-09 ENCOUNTER — OFFICE VISIT (OUTPATIENT)
Dept: FAMILY MEDICINE CLINIC | Age: 83
End: 2018-10-09
Payer: MEDICARE

## 2018-10-09 VITALS
BODY MASS INDEX: 33.65 KG/M2 | WEIGHT: 184 LBS | SYSTOLIC BLOOD PRESSURE: 118 MMHG | DIASTOLIC BLOOD PRESSURE: 72 MMHG | RESPIRATION RATE: 10 BRPM | HEART RATE: 72 BPM | TEMPERATURE: 97.8 F

## 2018-10-09 DIAGNOSIS — E78.00 PURE HYPERCHOLESTEROLEMIA: ICD-10-CM

## 2018-10-09 DIAGNOSIS — I10 ESSENTIAL HYPERTENSION: Primary | ICD-10-CM

## 2018-10-09 DIAGNOSIS — D17.20 LIPOMA OF AXILLA: ICD-10-CM

## 2018-10-09 DIAGNOSIS — M85.89 OSTEOPENIA OF MULTIPLE SITES: ICD-10-CM

## 2018-10-09 DIAGNOSIS — M15.9 PRIMARY OSTEOARTHRITIS INVOLVING MULTIPLE JOINTS: ICD-10-CM

## 2018-10-09 DIAGNOSIS — R53.83 FATIGUE, UNSPECIFIED TYPE: ICD-10-CM

## 2018-10-09 DIAGNOSIS — E88.81 INSULIN RESISTANCE: ICD-10-CM

## 2018-10-09 DIAGNOSIS — I48.91 ATRIAL FIBRILLATION WITH RAPID VENTRICULAR RESPONSE (HCC): ICD-10-CM

## 2018-10-09 DIAGNOSIS — I49.5 SICK SINUS SYNDROME (HCC): ICD-10-CM

## 2018-10-09 PROCEDURE — 1090F PRES/ABSN URINE INCON ASSESS: CPT | Performed by: FAMILY MEDICINE

## 2018-10-09 PROCEDURE — G8417 CALC BMI ABV UP PARAM F/U: HCPCS | Performed by: FAMILY MEDICINE

## 2018-10-09 PROCEDURE — 1123F ACP DISCUSS/DSCN MKR DOCD: CPT | Performed by: FAMILY MEDICINE

## 2018-10-09 PROCEDURE — G8484 FLU IMMUNIZE NO ADMIN: HCPCS | Performed by: FAMILY MEDICINE

## 2018-10-09 PROCEDURE — 1036F TOBACCO NON-USER: CPT | Performed by: FAMILY MEDICINE

## 2018-10-09 PROCEDURE — 99214 OFFICE O/P EST MOD 30 MIN: CPT | Performed by: FAMILY MEDICINE

## 2018-10-09 PROCEDURE — 1101F PT FALLS ASSESS-DOCD LE1/YR: CPT | Performed by: FAMILY MEDICINE

## 2018-10-09 PROCEDURE — 4040F PNEUMOC VAC/ADMIN/RCVD: CPT | Performed by: FAMILY MEDICINE

## 2018-10-09 PROCEDURE — G8427 DOCREV CUR MEDS BY ELIG CLIN: HCPCS | Performed by: FAMILY MEDICINE

## 2018-10-11 ENCOUNTER — HOSPITAL ENCOUNTER (OUTPATIENT)
Dept: ULTRASOUND IMAGING | Age: 83
Discharge: HOME | End: 2018-10-11
Payer: MEDICARE

## 2018-10-11 DIAGNOSIS — D17.20 LIPOMA OF AXILLA: ICD-10-CM

## 2018-10-11 PROCEDURE — 76604 US EXAM CHEST: CPT

## 2018-10-23 ENCOUNTER — NURSE ONLY (OUTPATIENT)
Dept: FAMILY MEDICINE CLINIC | Age: 83
End: 2018-10-23
Payer: MEDICARE

## 2018-10-23 DIAGNOSIS — Z23 FLU VACCINE NEED: Primary | ICD-10-CM

## 2018-10-23 PROCEDURE — G0008 ADMIN INFLUENZA VIRUS VAC: HCPCS | Performed by: FAMILY MEDICINE

## 2018-10-23 PROCEDURE — 90662 IIV NO PRSV INCREASED AG IM: CPT | Performed by: FAMILY MEDICINE

## 2018-11-01 ENCOUNTER — HOSPITAL ENCOUNTER (OUTPATIENT)
Age: 83
Discharge: HOME | End: 2018-11-01
Payer: MEDICARE

## 2018-11-01 ENCOUNTER — TELEPHONE (OUTPATIENT)
Dept: FAMILY MEDICINE CLINIC | Age: 83
End: 2018-11-01

## 2018-11-01 DIAGNOSIS — E78.00 PURE HYPERCHOLESTEROLEMIA: ICD-10-CM

## 2018-11-01 DIAGNOSIS — R53.83 FATIGUE, UNSPECIFIED TYPE: ICD-10-CM

## 2018-11-01 DIAGNOSIS — R73.09 ELEVATED GLUCOSE: ICD-10-CM

## 2018-11-01 DIAGNOSIS — R73.09 ELEVATED GLUCOSE: Primary | ICD-10-CM

## 2018-11-01 DIAGNOSIS — E88.81 INSULIN RESISTANCE: ICD-10-CM

## 2018-11-01 LAB
ALBUMIN SERPL-MCNC: 3.8 G/DL (ref 3.5–5.1)
ALP BLD-CCNC: 47 U/L (ref 38–126)
ALT SERPL-CCNC: 12 U/L (ref 11–66)
ANION GAP SERPL CALCULATED.3IONS-SCNC: 13 MEQ/L (ref 8–16)
AST SERPL-CCNC: 21 U/L (ref 5–40)
AVERAGE GLUCOSE: 132 MG/DL (ref 70–126)
BILIRUB SERPL-MCNC: 1.4 MG/DL (ref 0.3–1.2)
BUN BLDV-MCNC: 14 MG/DL (ref 7–22)
CALCIUM SERPL-MCNC: 9.5 MG/DL (ref 8.5–10.5)
CHLORIDE BLD-SCNC: 98 MEQ/L (ref 98–111)
CHOLESTEROL, TOTAL: 154 MG/DL (ref 100–199)
CO2: 28 MEQ/L (ref 23–33)
CREAT SERPL-MCNC: 0.7 MG/DL (ref 0.4–1.2)
CREATININE, URINE: 184.9 MG/DL
ERYTHROCYTE [DISTWIDTH] IN BLOOD BY AUTOMATED COUNT: 15.9 % (ref 11.5–14.5)
ERYTHROCYTE [DISTWIDTH] IN BLOOD BY AUTOMATED COUNT: 50.8 FL (ref 35–45)
GFR SERPL CREATININE-BSD FRML MDRD: 79 ML/MIN/1.73M2
GLUCOSE BLD-MCNC: 154 MG/DL (ref 70–108)
HBA1C MFR BLD: 6.4 % (ref 4.4–6.4)
HCT VFR BLD CALC: 37.1 % (ref 37–47)
HDLC SERPL-MCNC: 38 MG/DL
HEMOGLOBIN: 11.2 GM/DL (ref 12–16)
INSULIN, RANDOM OR FASTING: NORMAL
LDL CHOLESTEROL CALCULATED: 83 MG/DL
MCH RBC QN AUTO: 26.7 PG (ref 26–33)
MCHC RBC AUTO-ENTMCNC: 30.2 GM/DL (ref 32.2–35.5)
MCV RBC AUTO: 88.3 FL (ref 81–99)
MICROALBUMIN UR-MCNC: 1.24 MG/DL
MICROALBUMIN/CREAT UR-RTO: 7 MG/G (ref 0–30)
PLATELET # BLD: 175 THOU/MM3 (ref 130–400)
PMV BLD AUTO: 10.8 FL (ref 9.4–12.4)
POTASSIUM SERPL-SCNC: 3.8 MEQ/L (ref 3.5–5.2)
RBC # BLD: 4.2 MILL/MM3 (ref 4.2–5.4)
SODIUM BLD-SCNC: 139 MEQ/L (ref 135–145)
TOTAL PROTEIN: 6.7 G/DL (ref 6.1–8)
TRIGL SERPL-MCNC: 167 MG/DL (ref 0–199)
TSH SERPL DL<=0.05 MIU/L-ACNC: 3.3 UIU/ML (ref 0.4–4.2)
WBC # BLD: 4.5 THOU/MM3 (ref 4.8–10.8)

## 2018-11-01 PROCEDURE — 84443 ASSAY THYROID STIM HORMONE: CPT

## 2018-11-01 PROCEDURE — 36415 COLL VENOUS BLD VENIPUNCTURE: CPT

## 2018-11-01 PROCEDURE — 83525 ASSAY OF INSULIN: CPT

## 2018-11-01 PROCEDURE — 82043 UR ALBUMIN QUANTITATIVE: CPT

## 2018-11-01 PROCEDURE — 83036 HEMOGLOBIN GLYCOSYLATED A1C: CPT

## 2018-11-01 PROCEDURE — 80061 LIPID PANEL: CPT

## 2018-11-01 PROCEDURE — 85027 COMPLETE CBC AUTOMATED: CPT

## 2018-11-01 PROCEDURE — 80053 COMPREHEN METABOLIC PANEL: CPT

## 2018-11-02 ENCOUNTER — TELEPHONE (OUTPATIENT)
Dept: FAMILY MEDICINE CLINIC | Age: 83
End: 2018-11-02

## 2018-11-02 DIAGNOSIS — R53.83 FATIGUE, UNSPECIFIED TYPE: Primary | ICD-10-CM

## 2018-11-02 DIAGNOSIS — D64.9 ANEMIA, UNSPECIFIED TYPE: ICD-10-CM

## 2018-11-02 DIAGNOSIS — R55 NEAR SYNCOPE: ICD-10-CM

## 2018-11-26 ENCOUNTER — APPOINTMENT (OUTPATIENT)
Dept: CT IMAGING | Age: 83
DRG: 293 | End: 2018-11-26
Payer: MEDICARE

## 2018-11-26 ENCOUNTER — HOSPITAL ENCOUNTER (INPATIENT)
Age: 83
LOS: 2 days | Discharge: HOME | DRG: 293 | End: 2018-11-29
Attending: INTERNAL MEDICINE | Admitting: INTERNAL MEDICINE
Payer: MEDICARE

## 2018-11-26 DIAGNOSIS — R06.02 SOB (SHORTNESS OF BREATH): ICD-10-CM

## 2018-11-26 DIAGNOSIS — D50.8 OTHER IRON DEFICIENCY ANEMIA: ICD-10-CM

## 2018-11-26 DIAGNOSIS — R06.09 DOE (DYSPNEA ON EXERTION): Primary | ICD-10-CM

## 2018-11-26 DIAGNOSIS — D64.9 MILD ANEMIA: ICD-10-CM

## 2018-11-26 DIAGNOSIS — E87.6 HYPOKALEMIA: ICD-10-CM

## 2018-11-26 DIAGNOSIS — K92.2 UPPER GI BLEED: ICD-10-CM

## 2018-11-26 DIAGNOSIS — I50.9 CONGESTIVE HEART FAILURE, UNSPECIFIED HF CHRONICITY, UNSPECIFIED HEART FAILURE TYPE (HCC): ICD-10-CM

## 2018-11-26 DIAGNOSIS — J90 PLEURAL EFFUSION: ICD-10-CM

## 2018-11-26 DIAGNOSIS — I50.33 ACUTE ON CHRONIC DIASTOLIC HEART FAILURE (HCC): ICD-10-CM

## 2018-11-26 LAB
ALBUMIN SERPL-MCNC: 3.7 G/DL (ref 3.5–5.1)
ALP BLD-CCNC: 42 U/L (ref 38–126)
ALT SERPL-CCNC: 10 U/L (ref 11–66)
ANION GAP SERPL CALCULATED.3IONS-SCNC: 12 MEQ/L (ref 8–16)
APTT: 35.4 SECONDS (ref 22–38)
AST SERPL-CCNC: 18 U/L (ref 5–40)
BASOPHILS # BLD: 0.8 %
BASOPHILS ABSOLUTE: 0 THOU/MM3 (ref 0–0.1)
BILIRUB SERPL-MCNC: 2.3 MG/DL (ref 0.3–1.2)
BILIRUBIN DIRECT: 0.4 MG/DL (ref 0–0.3)
BILIRUBIN URINE: NEGATIVE
BLOOD, URINE: NEGATIVE
BUN BLDV-MCNC: 15 MG/DL (ref 7–22)
CALCIUM SERPL-MCNC: 9 MG/DL (ref 8.5–10.5)
CHARACTER, URINE: CLEAR
CHLORIDE BLD-SCNC: 100 MEQ/L (ref 98–111)
CO2: 27 MEQ/L (ref 23–33)
COLOR: YELLOW
CREAT SERPL-MCNC: 0.7 MG/DL (ref 0.4–1.2)
EKG ATRIAL RATE: 97 BPM
EKG Q-T INTERVAL: 382 MS
EKG QRS DURATION: 94 MS
EKG QTC CALCULATION (BAZETT): 397 MS
EKG R AXIS: 54 DEGREES
EKG T AXIS: 26 DEGREES
EKG VENTRICULAR RATE: 65 BPM
EOSINOPHIL # BLD: 2.3 %
EOSINOPHILS ABSOLUTE: 0.1 THOU/MM3 (ref 0–0.4)
ERYTHROCYTE [DISTWIDTH] IN BLOOD BY AUTOMATED COUNT: 15.8 % (ref 11.5–14.5)
ERYTHROCYTE [DISTWIDTH] IN BLOOD BY AUTOMATED COUNT: 48.9 FL (ref 35–45)
GFR SERPL CREATININE-BSD FRML MDRD: 79 ML/MIN/1.73M2
GLUCOSE BLD-MCNC: 137 MG/DL (ref 70–108)
GLUCOSE URINE: NEGATIVE MG/DL
HCT VFR BLD CALC: 35.4 % (ref 37–47)
HEMOGLOBIN: 10.7 GM/DL (ref 12–16)
IMMATURE GRANS (ABS): 0.02 THOU/MM3 (ref 0–0.07)
IMMATURE GRANULOCYTES: 0.4 %
INR BLD: 1.3 (ref 0.85–1.13)
KETONES, URINE: NEGATIVE
LACTIC ACID, SEPSIS: 1.8 MMOL/L (ref 0.5–1.9)
LACTIC ACID, SEPSIS: 2.6 MMOL/L (ref 0.5–1.9)
LEUKOCYTE ESTERASE, URINE: NEGATIVE
LIPASE: 15.1 U/L (ref 5.6–51.3)
LV EF: 58 %
LVEF MODALITY: NORMAL
LYMPHOCYTES # BLD: 24.1 %
LYMPHOCYTES ABSOLUTE: 1.2 THOU/MM3 (ref 1–4.8)
MAGNESIUM: 1.5 MG/DL (ref 1.6–2.4)
MCH RBC QN AUTO: 26 PG (ref 26–33)
MCHC RBC AUTO-ENTMCNC: 30.2 GM/DL (ref 32.2–35.5)
MCV RBC AUTO: 86.1 FL (ref 81–99)
MONOCYTES # BLD: 9.5 %
MONOCYTES ABSOLUTE: 0.5 THOU/MM3 (ref 0.4–1.3)
NITRITE, URINE: NEGATIVE
NUCLEATED RED BLOOD CELLS: 0 /100 WBC
OSMOLALITY CALCULATION: 280.5 MOSMOL/KG (ref 275–300)
PH UA: 6
PLATELET # BLD: 173 THOU/MM3 (ref 130–400)
PMV BLD AUTO: 10.7 FL (ref 9.4–12.4)
POTASSIUM SERPL-SCNC: 3.9 MEQ/L (ref 3.5–5.2)
PRO-BNP: 1991 PG/ML (ref 0–1800)
PROCALCITONIN: 0.08 NG/ML (ref 0.01–0.09)
PROTEIN UA: NEGATIVE
RBC # BLD: 4.11 MILL/MM3 (ref 4.2–5.4)
SEG NEUTROPHILS: 62.9 %
SEGMENTED NEUTROPHILS ABSOLUTE COUNT: 3.1 THOU/MM3 (ref 1.8–7.7)
SODIUM BLD-SCNC: 139 MEQ/L (ref 135–145)
SPECIFIC GRAVITY, URINE: 1.02 (ref 1–1.03)
TOTAL PROTEIN: 6 G/DL (ref 6.1–8)
TROPONIN T: < 0.01 NG/ML
TROPONIN T: < 0.01 NG/ML
UROBILINOGEN, URINE: 1 EU/DL
WBC # BLD: 4.9 THOU/MM3 (ref 4.8–10.8)

## 2018-11-26 PROCEDURE — G0378 HOSPITAL OBSERVATION PER HR: HCPCS

## 2018-11-26 PROCEDURE — 82248 BILIRUBIN DIRECT: CPT

## 2018-11-26 PROCEDURE — 83880 ASSAY OF NATRIURETIC PEPTIDE: CPT

## 2018-11-26 PROCEDURE — 94760 N-INVAS EAR/PLS OXIMETRY 1: CPT

## 2018-11-26 PROCEDURE — 81003 URINALYSIS AUTO W/O SCOPE: CPT

## 2018-11-26 PROCEDURE — 84145 PROCALCITONIN (PCT): CPT

## 2018-11-26 PROCEDURE — 85610 PROTHROMBIN TIME: CPT

## 2018-11-26 PROCEDURE — 93306 TTE W/DOPPLER COMPLETE: CPT

## 2018-11-26 PROCEDURE — 6360000004 HC RX CONTRAST MEDICATION: Performed by: PHYSICIAN ASSISTANT

## 2018-11-26 PROCEDURE — 80053 COMPREHEN METABOLIC PANEL: CPT

## 2018-11-26 PROCEDURE — 84484 ASSAY OF TROPONIN QUANT: CPT

## 2018-11-26 PROCEDURE — 99220 PR INITIAL OBSERVATION CARE/DAY 70 MINUTES: CPT | Performed by: INTERNAL MEDICINE

## 2018-11-26 PROCEDURE — 99285 EMERGENCY DEPT VISIT HI MDM: CPT

## 2018-11-26 PROCEDURE — 74177 CT ABD & PELVIS W/CONTRAST: CPT

## 2018-11-26 PROCEDURE — 71275 CT ANGIOGRAPHY CHEST: CPT

## 2018-11-26 PROCEDURE — 6360000002 HC RX W HCPCS: Performed by: PHYSICIAN ASSISTANT

## 2018-11-26 PROCEDURE — 83690 ASSAY OF LIPASE: CPT

## 2018-11-26 PROCEDURE — 36415 COLL VENOUS BLD VENIPUNCTURE: CPT

## 2018-11-26 PROCEDURE — 85730 THROMBOPLASTIN TIME PARTIAL: CPT

## 2018-11-26 PROCEDURE — 85025 COMPLETE CBC W/AUTO DIFF WBC: CPT

## 2018-11-26 PROCEDURE — 6370000000 HC RX 637 (ALT 250 FOR IP): Performed by: INTERNAL MEDICINE

## 2018-11-26 PROCEDURE — 83605 ASSAY OF LACTIC ACID: CPT

## 2018-11-26 PROCEDURE — 93005 ELECTROCARDIOGRAM TRACING: CPT | Performed by: PHYSICIAN ASSISTANT

## 2018-11-26 PROCEDURE — 96374 THER/PROPH/DIAG INJ IV PUSH: CPT

## 2018-11-26 PROCEDURE — 93010 ELECTROCARDIOGRAM REPORT: CPT | Performed by: INTERNAL MEDICINE

## 2018-11-26 PROCEDURE — 83735 ASSAY OF MAGNESIUM: CPT

## 2018-11-26 PROCEDURE — 2580000003 HC RX 258: Performed by: INTERNAL MEDICINE

## 2018-11-26 RX ORDER — BIOTIN 1 MG
1 TABLET ORAL DAILY
Status: DISCONTINUED | OUTPATIENT
Start: 2018-11-26 | End: 2018-11-26 | Stop reason: CLARIF

## 2018-11-26 RX ORDER — SODIUM CHLORIDE 0.9 % (FLUSH) 0.9 %
10 SYRINGE (ML) INJECTION PRN
Status: DISCONTINUED | OUTPATIENT
Start: 2018-11-26 | End: 2018-11-29 | Stop reason: HOSPADM

## 2018-11-26 RX ORDER — VENLAFAXINE HYDROCHLORIDE 75 MG/1
75 CAPSULE, EXTENDED RELEASE ORAL DAILY
Status: DISCONTINUED | OUTPATIENT
Start: 2018-11-26 | End: 2018-11-29 | Stop reason: HOSPADM

## 2018-11-26 RX ORDER — DIGOXIN 125 MCG
125 TABLET ORAL DAILY
Status: DISCONTINUED | OUTPATIENT
Start: 2018-11-26 | End: 2018-11-29 | Stop reason: HOSPADM

## 2018-11-26 RX ORDER — SUCRALFATE 1 G/1
1 TABLET ORAL
Status: DISCONTINUED | OUTPATIENT
Start: 2018-11-26 | End: 2018-11-29 | Stop reason: HOSPADM

## 2018-11-26 RX ORDER — OYSTER SHELL CALCIUM WITH VITAMIN D 500; 200 MG/1; [IU]/1
1 TABLET, FILM COATED ORAL DAILY
Status: DISCONTINUED | OUTPATIENT
Start: 2018-11-26 | End: 2018-11-29 | Stop reason: HOSPADM

## 2018-11-26 RX ORDER — MECLIZINE HCL 12.5 MG/1
12.5 TABLET ORAL 3 TIMES DAILY PRN
Status: DISCONTINUED | OUTPATIENT
Start: 2018-11-26 | End: 2018-11-29 | Stop reason: HOSPADM

## 2018-11-26 RX ORDER — POLYVINYL ALCOHOL 14 MG/ML
2 SOLUTION/ DROPS OPHTHALMIC PRN
Status: DISCONTINUED | OUTPATIENT
Start: 2018-11-26 | End: 2018-11-29 | Stop reason: HOSPADM

## 2018-11-26 RX ORDER — FUROSEMIDE 20 MG/1
20 TABLET ORAL 2 TIMES DAILY
Status: DISCONTINUED | OUTPATIENT
Start: 2018-11-26 | End: 2018-11-27

## 2018-11-26 RX ORDER — MORPHINE SULFATE 2 MG/ML
2 INJECTION, SOLUTION INTRAMUSCULAR; INTRAVENOUS ONCE
Status: DISCONTINUED | OUTPATIENT
Start: 2018-11-26 | End: 2018-11-26

## 2018-11-26 RX ORDER — ONDANSETRON 2 MG/ML
4 INJECTION INTRAMUSCULAR; INTRAVENOUS ONCE
Status: COMPLETED | OUTPATIENT
Start: 2018-11-26 | End: 2018-11-26

## 2018-11-26 RX ORDER — ACETAMINOPHEN 500 MG
1000 TABLET ORAL 2 TIMES DAILY
Status: DISCONTINUED | OUTPATIENT
Start: 2018-11-26 | End: 2018-11-29 | Stop reason: HOSPADM

## 2018-11-26 RX ORDER — POTASSIUM CHLORIDE 20MEQ/15ML
40 LIQUID (ML) ORAL PRN
Status: DISCONTINUED | OUTPATIENT
Start: 2018-11-26 | End: 2018-11-29 | Stop reason: HOSPADM

## 2018-11-26 RX ORDER — METOPROLOL TARTRATE 50 MG/1
50 TABLET, FILM COATED ORAL 2 TIMES DAILY
Status: DISCONTINUED | OUTPATIENT
Start: 2018-11-26 | End: 2018-11-29 | Stop reason: HOSPADM

## 2018-11-26 RX ORDER — POTASSIUM CHLORIDE 20 MEQ/1
40 TABLET, EXTENDED RELEASE ORAL PRN
Status: DISCONTINUED | OUTPATIENT
Start: 2018-11-26 | End: 2018-11-29 | Stop reason: HOSPADM

## 2018-11-26 RX ORDER — PANTOPRAZOLE SODIUM 40 MG/1
40 TABLET, DELAYED RELEASE ORAL DAILY
Status: DISCONTINUED | OUTPATIENT
Start: 2018-11-26 | End: 2018-11-29 | Stop reason: HOSPADM

## 2018-11-26 RX ORDER — POTASSIUM CHLORIDE 7.45 MG/ML
10 INJECTION INTRAVENOUS PRN
Status: DISCONTINUED | OUTPATIENT
Start: 2018-11-26 | End: 2018-11-29 | Stop reason: HOSPADM

## 2018-11-26 RX ORDER — SODIUM CHLORIDE 0.9 % (FLUSH) 0.9 %
10 SYRINGE (ML) INJECTION EVERY 12 HOURS SCHEDULED
Status: DISCONTINUED | OUTPATIENT
Start: 2018-11-26 | End: 2018-11-29 | Stop reason: HOSPADM

## 2018-11-26 RX ORDER — ASPIRIN 81 MG/1
81 TABLET, CHEWABLE ORAL DAILY
Status: DISCONTINUED | OUTPATIENT
Start: 2018-11-26 | End: 2018-11-29 | Stop reason: HOSPADM

## 2018-11-26 RX ORDER — LOSARTAN POTASSIUM 25 MG/1
25 TABLET ORAL DAILY
Status: DISCONTINUED | OUTPATIENT
Start: 2018-11-26 | End: 2018-11-29 | Stop reason: HOSPADM

## 2018-11-26 RX ORDER — ONDANSETRON 2 MG/ML
4 INJECTION INTRAMUSCULAR; INTRAVENOUS EVERY 6 HOURS PRN
Status: DISCONTINUED | OUTPATIENT
Start: 2018-11-26 | End: 2018-11-29 | Stop reason: HOSPADM

## 2018-11-26 RX ORDER — BIOTIN 1 MG
1 TABLET ORAL DAILY
Status: DISCONTINUED | OUTPATIENT
Start: 2018-11-26 | End: 2018-11-29 | Stop reason: HOSPADM

## 2018-11-26 RX ADMIN — FUROSEMIDE 20 MG: 20 TABLET ORAL at 14:13

## 2018-11-26 RX ADMIN — VENLAFAXINE HYDROCHLORIDE 75 MG: 75 CAPSULE, EXTENDED RELEASE ORAL at 14:13

## 2018-11-26 RX ADMIN — PANTOPRAZOLE SODIUM 40 MG: 40 TABLET, DELAYED RELEASE ORAL at 21:35

## 2018-11-26 RX ADMIN — IOPAMIDOL 80 ML: 755 INJECTION, SOLUTION INTRAVENOUS at 10:30

## 2018-11-26 RX ADMIN — DIGOXIN 125 MCG: 125 TABLET ORAL at 14:13

## 2018-11-26 RX ADMIN — Medication 10 ML: at 21:35

## 2018-11-26 RX ADMIN — ASPIRIN 81 MG: 81 TABLET, CHEWABLE ORAL at 21:33

## 2018-11-26 RX ADMIN — METOPROLOL TARTRATE 50 MG: 50 TABLET, FILM COATED ORAL at 14:13

## 2018-11-26 RX ADMIN — ONDANSETRON 4 MG: 2 INJECTION INTRAMUSCULAR; INTRAVENOUS at 09:32

## 2018-11-26 RX ADMIN — RIVAROXABAN 15 MG: 15 TABLET, FILM COATED ORAL at 14:13

## 2018-11-26 RX ADMIN — Medication 1000 MG: at 21:33

## 2018-11-26 RX ADMIN — VITAMIN D, TAB 1000IU (100/BT) 1000 UNITS: 25 TAB at 14:13

## 2018-11-26 RX ADMIN — Medication 1 TABLET: at 21:34

## 2018-11-26 RX ADMIN — CALCIUM CARBONATE-VITAMIN D TAB 500 MG-200 UNIT 1 TABLET: 500-200 TAB at 14:13

## 2018-11-26 RX ADMIN — LOSARTAN POTASSIUM 25 MG: 25 TABLET ORAL at 21:34

## 2018-11-26 ASSESSMENT — ENCOUNTER SYMPTOMS
WHEEZING: 0
RHINORRHEA: 0
SORE THROAT: 0
EYE DISCHARGE: 0
SHORTNESS OF BREATH: 1
ABDOMINAL PAIN: 0
BACK PAIN: 0
EYE PAIN: 0
COUGH: 0
DIARRHEA: 0
VOMITING: 0
NAUSEA: 0

## 2018-11-26 ASSESSMENT — PAIN SCALES - GENERAL
PAINLEVEL_OUTOF10: 0

## 2018-11-26 NOTE — ED PROVIDER NOTES
35.4 (*)     MCHC 30.2 (*)     RDW-CV 15.8 (*)     RDW-SD 48.9 (*)     All other components within normal limits   BASIC METABOLIC PANEL - Abnormal; Notable for the following:     Glucose 137 (*)     All other components within normal limits   BRAIN NATRIURETIC PEPTIDE - Abnormal; Notable for the following:     Pro-BNP 1991.0 (*)     All other components within normal limits   HEPATIC FUNCTION PANEL - Abnormal; Notable for the following: Total Bilirubin 2.3 (*)     Bilirubin, Direct 0.4 (*)     ALT 10 (*)     Total Protein 6.0 (*)     All other components within normal limits   MAGNESIUM - Abnormal; Notable for the following:     Magnesium 1.5 (*)     All other components within normal limits   LACTATE, SEPSIS - Abnormal; Notable for the following:     Lactic Acid, Sepsis 2.6 (*)     All other components within normal limits   GLOMERULAR FILTRATION RATE, ESTIMATED - Abnormal; Notable for the following:     Est, Glom Filt Rate 79 (*)     All other components within normal limits   APTT   LIPASE   TROPONIN   URINE RT REFLEX TO CULTURE   LACTATE, SEPSIS   PROCALCITONIN   ANION GAP   OSMOLALITY   TROPONIN       EMERGENCY DEPARTMENT COURSE:   Vitals:    Vitals:    11/26/18 0929 11/26/18 1014 11/26/18 1128 11/26/18 1243   BP: (!) 167/67 133/62 121/68 (!) 149/68   Pulse: 62 65 60 66   Resp: 20 22 20 18   Temp:    98 °F (36.7 °C)   TempSrc:    Oral   SpO2: 96% 96% 94% 94%   Weight:    181 lb 7 oz (82.3 kg)   Height:    5' 2\" (1.575 m)       9:18 AM: The patient was seen and evaluated. MDM:  The patient was seen and evaluated in a timely manner for shortness of breath and generalized malaise. The patient was hypertensive during their stay here in the ED. During the physical exam I noted the patient had epigastric tenderness. Her lungs were clear to auscultation. I ordered appropriate labs, a CT of the abdomen and pelvis and a CTA of the chest. The patient's BNP was elevated.  Her liver function tests were elevated. Her magnesium was decreased. Her lactate level was elevated. The CTA showed No evidence of a pulmonary embolus. Small, bilateral pleural effusions are present. Linear densities are noted within the right upper lobe which may correspond to atelectasis or scarring however, infectious infiltrate is not excluded. Groundglass opacities are noted throughout the lungs which may correspond to mild edema. The CT showed no evidence of an acute process in the abdomen or pelvis. Diffuse low-attenuation of the liver which likely corresponds to hepatic steatosis. Bilateral small pleural effusions are present. Please also refer to the separately dictated CT of the chest performed on the same date. Laboratory and imaging results were reviewed and discussed with the patient. Within the department, the patient was treated with zofran. Although the patient responded well to treatment, I decided that the patient could benefit from admission to the hospital for further observation and evaluation. I discussed the case with Dr. Sowmya Argueta, hospitalist, who graciously agreed to accept the patient. I explained my proposed course of admission to the patient and available family at bedside, and they verbalized understanding and agreement with my proposed plan. All their questions were addressed at bedside. The patient was admitted in stable condition under ana lilia Fortune.      The patient did drop  Her sats down to 88 when I went back to recheck the patient on room air    CRITICAL CARE:   None     CONSULTS:  11:58 AM: I discussed the case with Dr. Sowmya Argueta, hospitalist, who graciously agreed to accept the patient. PROCEDURES:  None    FINAL IMPRESSION      1.  Congestive heart failure, unspecified HF chronicity, unspecified heart failure type [facility])          DISPOSITION/PLAN   The patient was admitted in stable condition under andrea Fortuneist.     PATIENT REFERRED TO:  Hal Person MD  [phone], [address]  [place]

## 2018-11-27 ENCOUNTER — APPOINTMENT (OUTPATIENT)
Dept: ULTRASOUND IMAGING | Age: 83
DRG: 293 | End: 2018-11-27
Payer: MEDICARE

## 2018-11-27 PROBLEM — I50.33 ACUTE ON CHRONIC DIASTOLIC HEART FAILURE (HCC): Status: ACTIVE | Noted: 2018-11-27

## 2018-11-27 PROBLEM — J90 PLEURAL EFFUSION: Status: ACTIVE | Noted: 2018-11-27

## 2018-11-27 PROBLEM — D64.9 MILD ANEMIA: Status: ACTIVE | Noted: 2018-11-27

## 2018-11-27 PROBLEM — K76.0 STEATOSIS OF LIVER: Status: ACTIVE | Noted: 2018-11-27

## 2018-11-27 LAB
ANION GAP SERPL CALCULATED.3IONS-SCNC: 13 MEQ/L (ref 8–16)
BASOPHILS # BLD: 1 %
BASOPHILS ABSOLUTE: 0 THOU/MM3 (ref 0–0.1)
BUN BLDV-MCNC: 12 MG/DL (ref 7–22)
CALCIUM SERPL-MCNC: 9.2 MG/DL (ref 8.5–10.5)
CHLORIDE BLD-SCNC: 100 MEQ/L (ref 98–111)
CO2: 26 MEQ/L (ref 23–33)
CREAT SERPL-MCNC: 0.8 MG/DL (ref 0.4–1.2)
EOSINOPHIL # BLD: 2 %
EOSINOPHILS ABSOLUTE: 0.1 THOU/MM3 (ref 0–0.4)
ERYTHROCYTE [DISTWIDTH] IN BLOOD BY AUTOMATED COUNT: 16 % (ref 11.5–14.5)
ERYTHROCYTE [DISTWIDTH] IN BLOOD BY AUTOMATED COUNT: 50.4 FL (ref 35–45)
FERRITIN: 35 NG/ML (ref 10–291)
FOLATE: 7 NG/ML (ref 4.8–24.2)
GFR SERPL CREATININE-BSD FRML MDRD: 67 ML/MIN/1.73M2
GLUCOSE BLD-MCNC: 139 MG/DL (ref 70–108)
HCT VFR BLD CALC: 34.2 % (ref 37–47)
HEMOGLOBIN: 10.3 GM/DL (ref 12–16)
IMMATURE GRANS (ABS): 0.01 THOU/MM3 (ref 0–0.07)
IMMATURE GRANULOCYTES: 0.3 %
IRON SATURATION: 9 % (ref 20–50)
IRON: 32 UG/DL (ref 50–170)
LYMPHOCYTES # BLD: 28.4 %
LYMPHOCYTES ABSOLUTE: 1.1 THOU/MM3 (ref 1–4.8)
MAGNESIUM: 1.6 MG/DL (ref 1.6–2.4)
MCH RBC QN AUTO: 26.1 PG (ref 26–33)
MCHC RBC AUTO-ENTMCNC: 30.1 GM/DL (ref 32.2–35.5)
MCV RBC AUTO: 86.6 FL (ref 81–99)
MONOCYTES # BLD: 10.7 %
MONOCYTES ABSOLUTE: 0.4 THOU/MM3 (ref 0.4–1.3)
NUCLEATED RED BLOOD CELLS: 0 /100 WBC
PLATELET # BLD: 155 THOU/MM3 (ref 130–400)
PMV BLD AUTO: 11 FL (ref 9.4–12.4)
POTASSIUM REFLEX MAGNESIUM: 3.4 MEQ/L (ref 3.5–5.2)
RBC # BLD: 3.95 MILL/MM3 (ref 4.2–5.4)
SEG NEUTROPHILS: 57.6 %
SEGMENTED NEUTROPHILS ABSOLUTE COUNT: 2.2 THOU/MM3 (ref 1.8–7.7)
SODIUM BLD-SCNC: 139 MEQ/L (ref 135–145)
TOTAL IRON BINDING CAPACITY: 362 UG/DL (ref 171–450)
TSH SERPL DL<=0.05 MIU/L-ACNC: 2.31 UIU/ML (ref 0.4–4.2)
VITAMIN B-12: 377 PG/ML (ref 211–911)
WBC # BLD: 3.9 THOU/MM3 (ref 4.8–10.8)

## 2018-11-27 PROCEDURE — 84443 ASSAY THYROID STIM HORMONE: CPT

## 2018-11-27 PROCEDURE — 2580000003 HC RX 258: Performed by: INTERNAL MEDICINE

## 2018-11-27 PROCEDURE — 97165 OT EVAL LOW COMPLEX 30 MIN: CPT

## 2018-11-27 PROCEDURE — 97110 THERAPEUTIC EXERCISES: CPT

## 2018-11-27 PROCEDURE — 99226 PR SBSQ OBSERVATION CARE/DAY 35 MINUTES: CPT | Performed by: INTERNAL MEDICINE

## 2018-11-27 PROCEDURE — 83540 ASSAY OF IRON: CPT

## 2018-11-27 PROCEDURE — 1200000003 HC TELEMETRY R&B

## 2018-11-27 PROCEDURE — 83735 ASSAY OF MAGNESIUM: CPT

## 2018-11-27 PROCEDURE — G8979 MOBILITY GOAL STATUS: HCPCS

## 2018-11-27 PROCEDURE — 82272 OCCULT BLD FECES 1-3 TESTS: CPT

## 2018-11-27 PROCEDURE — 82746 ASSAY OF FOLIC ACID SERUM: CPT

## 2018-11-27 PROCEDURE — 80048 BASIC METABOLIC PNL TOTAL CA: CPT

## 2018-11-27 PROCEDURE — 96375 TX/PRO/DX INJ NEW DRUG ADDON: CPT

## 2018-11-27 PROCEDURE — 97161 PT EVAL LOW COMPLEX 20 MIN: CPT

## 2018-11-27 PROCEDURE — G0378 HOSPITAL OBSERVATION PER HR: HCPCS

## 2018-11-27 PROCEDURE — 82728 ASSAY OF FERRITIN: CPT

## 2018-11-27 PROCEDURE — 85025 COMPLETE CBC W/AUTO DIFF WBC: CPT

## 2018-11-27 PROCEDURE — 2709999900 HC NON-CHARGEABLE SUPPLY

## 2018-11-27 PROCEDURE — 6360000002 HC RX W HCPCS: Performed by: INTERNAL MEDICINE

## 2018-11-27 PROCEDURE — 97535 SELF CARE MNGMENT TRAINING: CPT

## 2018-11-27 PROCEDURE — G8978 MOBILITY CURRENT STATUS: HCPCS

## 2018-11-27 PROCEDURE — 83550 IRON BINDING TEST: CPT

## 2018-11-27 PROCEDURE — 6370000000 HC RX 637 (ALT 250 FOR IP): Performed by: INTERNAL MEDICINE

## 2018-11-27 PROCEDURE — 82607 VITAMIN B-12: CPT

## 2018-11-27 PROCEDURE — 76705 ECHO EXAM OF ABDOMEN: CPT

## 2018-11-27 PROCEDURE — 36415 COLL VENOUS BLD VENIPUNCTURE: CPT

## 2018-11-27 RX ORDER — SODIUM CHLORIDE 450 MG/100ML
INJECTION, SOLUTION INTRAVENOUS CONTINUOUS
Status: DISCONTINUED | OUTPATIENT
Start: 2018-11-27 | End: 2018-11-27

## 2018-11-27 RX ORDER — FUROSEMIDE 10 MG/ML
20 INJECTION INTRAMUSCULAR; INTRAVENOUS 2 TIMES DAILY
Status: DISCONTINUED | OUTPATIENT
Start: 2018-11-27 | End: 2018-11-28

## 2018-11-27 RX ORDER — SODIUM CHLORIDE, SODIUM LACTATE, POTASSIUM CHLORIDE, CALCIUM CHLORIDE 600; 310; 30; 20 MG/100ML; MG/100ML; MG/100ML; MG/100ML
INJECTION, SOLUTION INTRAVENOUS CONTINUOUS
Status: DISCONTINUED | OUTPATIENT
Start: 2018-11-27 | End: 2018-11-29 | Stop reason: HOSPADM

## 2018-11-27 RX ADMIN — SUCRALFATE 1 G: 1 TABLET ORAL at 12:47

## 2018-11-27 RX ADMIN — DIGOXIN 125 MCG: 125 TABLET ORAL at 09:05

## 2018-11-27 RX ADMIN — Medication 1000 MG: at 20:06

## 2018-11-27 RX ADMIN — MAGNESIUM GLUCONATE 500 MG ORAL TABLET 400 MG: 500 TABLET ORAL at 09:07

## 2018-11-27 RX ADMIN — Medication 1000 MG: at 09:01

## 2018-11-27 RX ADMIN — METOPROLOL TARTRATE 50 MG: 50 TABLET, FILM COATED ORAL at 09:03

## 2018-11-27 RX ADMIN — FUROSEMIDE 20 MG: 10 INJECTION, SOLUTION INTRAMUSCULAR; INTRAVENOUS at 17:48

## 2018-11-27 RX ADMIN — VENLAFAXINE HYDROCHLORIDE 75 MG: 75 CAPSULE, EXTENDED RELEASE ORAL at 09:07

## 2018-11-27 RX ADMIN — ASPIRIN 81 MG: 81 TABLET, CHEWABLE ORAL at 20:07

## 2018-11-27 RX ADMIN — FUROSEMIDE 20 MG: 10 INJECTION, SOLUTION INTRAMUSCULAR; INTRAVENOUS at 12:46

## 2018-11-27 RX ADMIN — METOPROLOL TARTRATE 50 MG: 50 TABLET, FILM COATED ORAL at 20:08

## 2018-11-27 RX ADMIN — SUCRALFATE 1 G: 1 TABLET ORAL at 09:09

## 2018-11-27 RX ADMIN — Medication 10 ML: at 20:05

## 2018-11-27 RX ADMIN — RIVAROXABAN 15 MG: 15 TABLET, FILM COATED ORAL at 09:06

## 2018-11-27 RX ADMIN — SUCRALFATE 1 G: 1 TABLET ORAL at 17:51

## 2018-11-27 RX ADMIN — Medication 1 TABLET: at 09:04

## 2018-11-27 RX ADMIN — PANTOPRAZOLE SODIUM 40 MG: 40 TABLET, DELAYED RELEASE ORAL at 20:08

## 2018-11-27 RX ADMIN — LOSARTAN POTASSIUM 25 MG: 25 TABLET ORAL at 20:07

## 2018-11-27 RX ADMIN — SUCRALFATE 1 G: 1 TABLET ORAL at 20:08

## 2018-11-27 RX ADMIN — VITAMIN D, TAB 1000IU (100/BT) 1000 UNITS: 25 TAB at 09:04

## 2018-11-27 ASSESSMENT — PAIN SCALES - GENERAL
PAINLEVEL_OUTOF10: 0
PAINLEVEL_OUTOF10: 0
PAINLEVEL_OUTOF10: 7
PAINLEVEL_OUTOF10: 0

## 2018-11-27 NOTE — H&P
History & Physical      PCP: Heide Patel MD    Date of Admission: 11/26/2018    Date of Service: 11/26/18    Chief Complaint:  SOB, VACA, not feeling well    History Of Present Illness:    History obtained from chart review and the patient. 80 y.o. female who presented to [facility] with worsening fatigue and SOB. She notes occasional nighttime palpitations and malaise, but this persisted when she awoke today so she came to the ED. She didn't feel any better after taking tylenol. She has cough, nausea, abd pain - no chest pain. She otherwise complains most of VACA. She feels her legs are no more swollen than is typical and has been compliant with her BID lasix. Past Medical History:          Diagnosis Date    Atrial fibrillation ([address]     Blood circulation, collateral     [employer] single pacemaker 4/26/2016 5/5/2016    Cancer ([address] 1954    HX  Colon     Carpal tunnel syndrome     Fracture dislocation of ankle 1980    GERD (gastroesophageal reflux disease)     Hyperlipidemia     Hypertension     Kidney lesion, native, right     found on 5/2016    Osteoarthritis     knees    Osteopenia     Prolonged emergence from general anesthesia     Thyroid goiter 9/6/2016    Yersiniosis 2016       Past Surgical History:          Procedure Laterality Date    ABDOMEN SURGERY      ANKLE FRACTURE SURGERY  [id number]    reconstruction in 2003 and 2007    APPENDECTOMY      BLADDER SURGERY      support bladder repair    CARPAL TUNNEL RELEASE  7/2013    CARPAL TUNNEL RELEASE Right 08/19/2017    Revision    CHOLECYSTECTOMY  1986    COLON SURGERY  1954    COLONOSCOPY      EYE SURGERY      cataract     FRACTURE SURGERY      HYSTERECTOMY  1971    JOINT REPLACEMENT  1998    L knee       Medications Prior to Admission:      Prior to Admission medications    Medication Sig Start Date End Date Taking?  Authorizing Provider   venlafaxine (EFFEXOR XR) 75 MG extended release capsule
at 11/27/18 1246    lactated ringers infusion, , Intravenous, Continuous, Amanuel Bergeron MD    vitamin D (CHOLECALCIFEROL) tablet 1,000 Units, 1,000 Units, Oral, Daily, Silvana Guo DO, 1,000 Units at 11/27/18 0904    venlafaxine (EFFEXOR XR) extended release capsule 75 mg, 75 mg, Oral, Daily, Silvana Guo DO, 75 mg at 11/27/18 0907    sucralfate (CARAFATE) tablet 1 g, 1 g, Oral, 4x Daily AC & HS, Silvana Guo, DO, 1 g at 11/27/18 1247    pantoprazole (PROTONIX) tablet 40 mg, 40 mg, Oral, Daily, Silvana Guo DO, 40 mg at 11/26/18 2135    metoprolol tartrate (LOPRESSOR) tablet 50 mg, 50 mg, Oral, BID, Silvana Guo DO, 50 mg at 11/27/18 6707    losartan (COZAAR) tablet 25 mg, 25 mg, Oral, Daily, Silvana Guo DO, 25 mg at 11/26/18 2134    digoxin (LANOXIN) tablet 125 mcg, 125 mcg, Oral, Daily, Silvana Guo DO, 125 mcg at 11/27/18 4345    polyvinyl alcohol (LIQUIFILM TEARS) 1.4 % ophthalmic solution 2 drop, 2 drop, Both Eyes, PRN, Silvana Guo DO    calcium-vitamin D (OSCAL-500) 500-200 MG-UNIT per tablet 1 tablet, 1 tablet, Oral, Daily, Silvana Guo DO, 1 tablet at 11/26/18 1413    aspirin chewable tablet 81 mg, 81 mg, Oral, Daily, Silvana Guo DO, 81 mg at 11/26/18 2133    acetaminophen (TYLENOL) tablet 1,000 mg, 1,000 mg, Oral, BID, Silvana Guo DO, 1,000 mg at 11/27/18 0901    sodium chloride flush 0.9 % injection 10 mL, 10 mL, Intravenous, 2 times per day, Silvana Guo DO, 10 mL at 11/26/18 2135    sodium chloride flush 0.9 % injection 10 mL, 10 mL, Intravenous, PRN, Silvana Guo DO    potassium chloride (KLOR-CON M) extended release tablet 40 mEq, 40 mEq, Oral, PRN **OR** potassium chloride 20 MEQ/15ML (10%) oral solution 40 mEq, 40 mEq, Oral, PRN **OR** potassium chloride 10 mEq/100 mL IVPB (Peripheral Line), 10 mEq, Intravenous, PRN, Silvana Guo DO    magnesium hydroxide (MILK OF MAGNESIA) 400 MG/5ML suspension 30 mL, 30 mL, Oral, Daily PRN, Xander Herring

## 2018-11-27 NOTE — CONSULTS
Heart Disease in her brother, father, mother, and son; High Blood Pressure in her brother, father, and mother. REVIEW OF SYSTEMS:    GENERAL:  No fever, chills or weight loss. EYES:  No  blurred vision, double vision, glaucoma, pain on exposure to light. CARDIOVASCULAR:  No chest pain or palpitations. RESPIRATORY:  +SOB or cough. GI:  See history. MUSCULOSKELETAL:  No new painful or swollen joints or myalgias. :   No dysuria or hematuria. SKIN:  No rashes or jaundice. NEUROLOGIC:   No headaches or  seizures,  numbness or tingling of arms, or legs. PSYCH:  No anxiety or depression. ENDOCRINE:   No polyuria or polydipsia. BLOOD: +Anemia,  No bleeding disorder, blood or blood product transfusion. PHYSICAL EXAMINATION:     height is 5' 2\" (1.575 m) and weight is 181 lb 14.4 oz (82.5 kg). Her oral temperature is 97.7 °F (36.5 °C). Her blood pressure is 125/77 and her pulse is 67. Her respiration is 16 and oxygen saturation is 95%. GEN: Well nourished, well developed. LUNGS:  Clear to auscultation bilaterally. Chest rises equally on inspiration. CARDIOVASCULAR:  Irregular rhythm without murmurs, rubs or gallops. Pacemaker Left chest wall. ABDOMEN:  Soft, nontender and nondistended with normal bowel sounds. HEAD:  Normal cephalic/atraumatic. Extraoccular motions intact bilaterally. NECK:  No lymphadenopathy or bruits. UPPER EXTREMITIES:  No cyanosis, clubbing, +1 edema. DERM:  No rash or jaundice. LOWER EXTREMITIES:  No cyanosis, clubbing, or edema. NEURO:  Alert and oriented x4. Patient moves all extremities and has gross sensation in all extremities. SKIN: +palor    REVIEW OF DIAGNOSTIC TESTING AND LABS:      RADIOLOGY:    CT ABDOMEN/PELVIS W IV CONTRAST  Impression       1. No evidence of an acute process in the abdomen or pelvis.       2.  Diffuse low-attenuation of the liver which likely corresponds to hepatic steatosis.       3. Bilateral small

## 2018-11-27 NOTE — PROCEDURES
[facility] Endoscopy     EGD Report    Patient: Jocelyn Gomez  : 1928  Acct#: [de-identified]     BRIEF HISTORY AND INDICATIONS:    The patient is a 80 y.o.,  female with significant past medical history of diffuse abdominal pain and iron deficiency anemia. She has a remote history of colon cancer in 12 at the age of 32 s/p right hemicolectomy. Pacemaker, on xarelto which has been held today. PREMEDICATION:  Lidocaine gargle to the oropharynx, Fentanyl 50 mcg IV,  Versed 3 mg IV. IVCS start time:   IVCS stop time:   Transfer to recovery: 1554     INSTRUMENT:  Olympus GIF H-180 gastroscope    The risks and benefits of upper endoscopy with biopsy and dilation were  described to the patient, including but not limited to bleeding, infection, poking a hole someplace requiring surgery to fix it, having reaction to medication, and death. The patient understood these risks and provided informed consent. The patient was placed in the left lateral decubitus position. Conscious sedation was administered . The patient was continuously monitored to ensure adequate sedation and patient safety. A forward-viewing Olympus endoscope was lubricated and inserted through the mouth into the oropharynx. Under direct visualization, the upper esophagus was intubated. The scope was advanced to the esophagus and stomach to second portion of duodenum. Scope was slowly withdrawn with good views of mucosal surfaces. The scope was retroflexed in the fundus. Findings and maneuvers are listed in impression below. The patient tolerated the procedure well. The scope was removed. The patient was removed to the recovery area. There were no immediate complications. See below for findings. IMPRESSION:      1. There were at least 4 gastric polyps which have signs of recent bleeding, small amount.   A Hemoclip was placed at the base of the biggest one, 1.2cm size, in order to prevent bleeding

## 2018-11-28 LAB
ANION GAP SERPL CALCULATED.3IONS-SCNC: 13 MEQ/L (ref 8–16)
BUN BLDV-MCNC: 13 MG/DL (ref 7–22)
CALCIUM SERPL-MCNC: 9.2 MG/DL (ref 8.5–10.5)
CHLORIDE BLD-SCNC: 98 MEQ/L (ref 98–111)
CO2: 27 MEQ/L (ref 23–33)
CREAT SERPL-MCNC: 0.7 MG/DL (ref 0.4–1.2)
ERYTHROCYTE [DISTWIDTH] IN BLOOD BY AUTOMATED COUNT: 16 % (ref 11.5–14.5)
ERYTHROCYTE [DISTWIDTH] IN BLOOD BY AUTOMATED COUNT: 50.4 FL (ref 35–45)
GFR SERPL CREATININE-BSD FRML MDRD: 78 ML/MIN/1.73M2
GLUCOSE BLD-MCNC: 125 MG/DL (ref 70–108)
HCT VFR BLD CALC: 35.3 % (ref 37–47)
HEMOCCULT STL QL: NEGATIVE
HEMOGLOBIN: 10.6 GM/DL (ref 12–16)
MAGNESIUM: 1.7 MG/DL (ref 1.6–2.4)
MCH RBC QN AUTO: 26.2 PG (ref 26–33)
MCHC RBC AUTO-ENTMCNC: 30 GM/DL (ref 32.2–35.5)
MCV RBC AUTO: 87.2 FL (ref 81–99)
PLATELET # BLD: 150 THOU/MM3 (ref 130–400)
PMV BLD AUTO: 10.8 FL (ref 9.4–12.4)
POTASSIUM SERPL-SCNC: 3.4 MEQ/L (ref 3.5–5.2)
RBC # BLD: 4.05 MILL/MM3 (ref 4.2–5.4)
SODIUM BLD-SCNC: 138 MEQ/L (ref 135–145)
WBC # BLD: 4.2 THOU/MM3 (ref 4.8–10.8)

## 2018-11-28 PROCEDURE — 2709999900 HC NON-CHARGEABLE SUPPLY

## 2018-11-28 PROCEDURE — 83735 ASSAY OF MAGNESIUM: CPT

## 2018-11-28 PROCEDURE — 85027 COMPLETE CBC AUTOMATED: CPT

## 2018-11-28 PROCEDURE — 2580000003 HC RX 258: Performed by: INTERNAL MEDICINE

## 2018-11-28 PROCEDURE — 97116 GAIT TRAINING THERAPY: CPT

## 2018-11-28 PROCEDURE — 80048 BASIC METABOLIC PNL TOTAL CA: CPT

## 2018-11-28 PROCEDURE — 99233 SBSQ HOSP IP/OBS HIGH 50: CPT | Performed by: INTERNAL MEDICINE

## 2018-11-28 PROCEDURE — 3609012400 HC EGD TRANSORAL BIOPSY SINGLE/MULTIPLE: Performed by: INTERNAL MEDICINE

## 2018-11-28 PROCEDURE — 0DB68ZX EXCISION OF STOMACH, VIA NATURAL OR ARTIFICIAL OPENING ENDOSCOPIC, DIAGNOSTIC: ICD-10-PCS | Performed by: INTERNAL MEDICINE

## 2018-11-28 PROCEDURE — 2709999900 HC NON-CHARGEABLE SUPPLY: Performed by: INTERNAL MEDICINE

## 2018-11-28 PROCEDURE — 6370000000 HC RX 637 (ALT 250 FOR IP): Performed by: INTERNAL MEDICINE

## 2018-11-28 PROCEDURE — 7100000000 HC PACU RECOVERY - FIRST 15 MIN: Performed by: INTERNAL MEDICINE

## 2018-11-28 PROCEDURE — 6360000002 HC RX W HCPCS: Performed by: INTERNAL MEDICINE

## 2018-11-28 PROCEDURE — 88305 TISSUE EXAM BY PATHOLOGIST: CPT

## 2018-11-28 PROCEDURE — 0W3P8ZZ CONTROL BLEEDING IN GASTROINTESTINAL TRACT, VIA NATURAL OR ARTIFICIAL OPENING ENDOSCOPIC: ICD-10-PCS | Performed by: INTERNAL MEDICINE

## 2018-11-28 PROCEDURE — 36415 COLL VENOUS BLD VENIPUNCTURE: CPT

## 2018-11-28 PROCEDURE — 6370000000 HC RX 637 (ALT 250 FOR IP)

## 2018-11-28 PROCEDURE — 1200000003 HC TELEMETRY R&B

## 2018-11-28 PROCEDURE — 7100000001 HC PACU RECOVERY - ADDTL 15 MIN: Performed by: INTERNAL MEDICINE

## 2018-11-28 PROCEDURE — 3609013000 HC EGD TRANSORAL CONTROL BLEEDING ANY METHOD: Performed by: INTERNAL MEDICINE

## 2018-11-28 PROCEDURE — 97110 THERAPEUTIC EXERCISES: CPT

## 2018-11-28 RX ORDER — GUAIFENESIN 600 MG/1
600 TABLET, EXTENDED RELEASE ORAL 2 TIMES DAILY
Status: DISCONTINUED | OUTPATIENT
Start: 2018-11-28 | End: 2018-11-29 | Stop reason: HOSPADM

## 2018-11-28 RX ORDER — FUROSEMIDE 20 MG/1
20 TABLET ORAL 2 TIMES DAILY
Status: DISCONTINUED | OUTPATIENT
Start: 2018-11-28 | End: 2018-11-29 | Stop reason: HOSPADM

## 2018-11-28 RX ORDER — ALBUTEROL SULFATE 2.5 MG/3ML
2.5 SOLUTION RESPIRATORY (INHALATION) EVERY 6 HOURS PRN
Status: DISCONTINUED | OUTPATIENT
Start: 2018-11-28 | End: 2018-11-29 | Stop reason: HOSPADM

## 2018-11-28 RX ORDER — MIDAZOLAM HYDROCHLORIDE 1 MG/ML
INJECTION INTRAMUSCULAR; INTRAVENOUS PRN
Status: DISCONTINUED | OUTPATIENT
Start: 2018-11-28 | End: 2018-11-28 | Stop reason: HOSPADM

## 2018-11-28 RX ORDER — FENTANYL CITRATE 50 UG/ML
INJECTION, SOLUTION INTRAMUSCULAR; INTRAVENOUS PRN
Status: DISCONTINUED | OUTPATIENT
Start: 2018-11-28 | End: 2018-11-28 | Stop reason: HOSPADM

## 2018-11-28 RX ADMIN — LOSARTAN POTASSIUM 25 MG: 25 TABLET ORAL at 21:48

## 2018-11-28 RX ADMIN — SUCRALFATE 1 G: 1 TABLET ORAL at 21:48

## 2018-11-28 RX ADMIN — VENLAFAXINE HYDROCHLORIDE 75 MG: 75 CAPSULE, EXTENDED RELEASE ORAL at 08:48

## 2018-11-28 RX ADMIN — SODIUM CHLORIDE, POTASSIUM CHLORIDE, SODIUM LACTATE AND CALCIUM CHLORIDE 75 ML/HR: 600; 310; 30; 20 INJECTION, SOLUTION INTRAVENOUS at 13:24

## 2018-11-28 RX ADMIN — FUROSEMIDE 20 MG: 20 TABLET ORAL at 18:01

## 2018-11-28 RX ADMIN — FUROSEMIDE 20 MG: 10 INJECTION, SOLUTION INTRAMUSCULAR; INTRAVENOUS at 08:48

## 2018-11-28 RX ADMIN — GUAIFENESIN 600 MG: 600 TABLET, EXTENDED RELEASE ORAL at 21:48

## 2018-11-28 RX ADMIN — Medication 10 ML: at 08:48

## 2018-11-28 RX ADMIN — METOPROLOL TARTRATE 50 MG: 50 TABLET, FILM COATED ORAL at 08:48

## 2018-11-28 RX ADMIN — Medication 10 ML: at 21:48

## 2018-11-28 RX ADMIN — DIGOXIN 125 MCG: 125 TABLET ORAL at 08:48

## 2018-11-28 ASSESSMENT — PAIN SCALES - GENERAL
PAINLEVEL_OUTOF10: 0

## 2018-11-28 ASSESSMENT — PAIN - FUNCTIONAL ASSESSMENT: PAIN_FUNCTIONAL_ASSESSMENT: 0-10

## 2018-11-29 ENCOUNTER — TELEPHONE (OUTPATIENT)
Dept: CARDIOLOGY CLINIC | Age: 83
End: 2018-11-29

## 2018-11-29 VITALS
HEIGHT: 62 IN | DIASTOLIC BLOOD PRESSURE: 62 MMHG | BODY MASS INDEX: 33.47 KG/M2 | SYSTOLIC BLOOD PRESSURE: 129 MMHG | OXYGEN SATURATION: 94 % | RESPIRATION RATE: 16 BRPM | HEART RATE: 75 BPM | WEIGHT: 181.9 LBS | TEMPERATURE: 98 F

## 2018-11-29 PROBLEM — K92.2 UPPER GI BLEED: Status: ACTIVE | Noted: 2018-11-29

## 2018-11-29 PROBLEM — R06.02 SOB (SHORTNESS OF BREATH): Status: RESOLVED | Noted: 2018-11-26 | Resolved: 2018-11-29

## 2018-11-29 PROBLEM — K31.7 GASTRIC POLYPS: Status: ACTIVE | Noted: 2018-11-29

## 2018-11-29 PROBLEM — I50.33 ACUTE ON CHRONIC DIASTOLIC HEART FAILURE (HCC): Status: RESOLVED | Noted: 2018-11-27 | Resolved: 2018-11-29

## 2018-11-29 PROBLEM — K92.2 UPPER GI BLEED: Status: RESOLVED | Noted: 2018-11-29 | Resolved: 2018-11-29

## 2018-11-29 LAB
ANION GAP SERPL CALCULATED.3IONS-SCNC: 13 MEQ/L (ref 8–16)
BUN BLDV-MCNC: 10 MG/DL (ref 7–22)
CALCIUM SERPL-MCNC: 9 MG/DL (ref 8.5–10.5)
CHLORIDE BLD-SCNC: 98 MEQ/L (ref 98–111)
CO2: 27 MEQ/L (ref 23–33)
CREAT SERPL-MCNC: 0.6 MG/DL (ref 0.4–1.2)
ERYTHROCYTE [DISTWIDTH] IN BLOOD BY AUTOMATED COUNT: 16 % (ref 11.5–14.5)
ERYTHROCYTE [DISTWIDTH] IN BLOOD BY AUTOMATED COUNT: 50.6 FL (ref 35–45)
GFR SERPL CREATININE-BSD FRML MDRD: > 90 ML/MIN/1.73M2
GLUCOSE BLD-MCNC: 112 MG/DL (ref 70–108)
HCT VFR BLD CALC: 34.7 % (ref 37–47)
HEMOGLOBIN: 10.6 GM/DL (ref 12–16)
MAGNESIUM: 1.6 MG/DL (ref 1.6–2.4)
MCH RBC QN AUTO: 26.8 PG (ref 26–33)
MCHC RBC AUTO-ENTMCNC: 30.5 GM/DL (ref 32.2–35.5)
MCV RBC AUTO: 87.6 FL (ref 81–99)
PLATELET # BLD: 141 THOU/MM3 (ref 130–400)
PMV BLD AUTO: 11 FL (ref 9.4–12.4)
POTASSIUM SERPL-SCNC: 3.4 MEQ/L (ref 3.5–5.2)
RBC # BLD: 3.96 MILL/MM3 (ref 4.2–5.4)
SODIUM BLD-SCNC: 138 MEQ/L (ref 135–145)
WBC # BLD: 4.7 THOU/MM3 (ref 4.8–10.8)

## 2018-11-29 PROCEDURE — 80048 BASIC METABOLIC PNL TOTAL CA: CPT

## 2018-11-29 PROCEDURE — 83735 ASSAY OF MAGNESIUM: CPT

## 2018-11-29 PROCEDURE — 85027 COMPLETE CBC AUTOMATED: CPT

## 2018-11-29 PROCEDURE — 2709999900 HC NON-CHARGEABLE SUPPLY

## 2018-11-29 PROCEDURE — 2580000003 HC RX 258: Performed by: INTERNAL MEDICINE

## 2018-11-29 PROCEDURE — 6370000000 HC RX 637 (ALT 250 FOR IP): Performed by: INTERNAL MEDICINE

## 2018-11-29 PROCEDURE — 6360000002 HC RX W HCPCS: Performed by: INTERNAL MEDICINE

## 2018-11-29 PROCEDURE — 36415 COLL VENOUS BLD VENIPUNCTURE: CPT

## 2018-11-29 PROCEDURE — 99239 HOSP IP/OBS DSCHRG MGMT >30: CPT | Performed by: INTERNAL MEDICINE

## 2018-11-29 PROCEDURE — 97116 GAIT TRAINING THERAPY: CPT

## 2018-11-29 PROCEDURE — 97110 THERAPEUTIC EXERCISES: CPT

## 2018-11-29 PROCEDURE — 94640 AIRWAY INHALATION TREATMENT: CPT

## 2018-11-29 RX ORDER — FERROUS SULFATE 325(65) MG
325 TABLET ORAL 2 TIMES DAILY
Qty: 180 TABLET | Refills: 1 | Status: SHIPPED | OUTPATIENT
Start: 2018-11-29 | End: 2019-04-09 | Stop reason: SDUPTHER

## 2018-11-29 RX ORDER — DOCUSATE SODIUM 100 MG/1
100 CAPSULE, LIQUID FILLED ORAL 2 TIMES DAILY
Qty: 60 CAPSULE | Refills: 0 | Status: SHIPPED | OUTPATIENT
Start: 2018-11-29 | End: 2018-11-29

## 2018-11-29 RX ORDER — DOCUSATE SODIUM 100 MG/1
100 CAPSULE, LIQUID FILLED ORAL 2 TIMES DAILY
Qty: 60 CAPSULE | Refills: 0 | Status: ON HOLD | OUTPATIENT
Start: 2018-11-29 | End: 2018-12-14 | Stop reason: HOSPADM

## 2018-11-29 RX ORDER — POTASSIUM CHLORIDE 20 MEQ/1
40 TABLET, EXTENDED RELEASE ORAL ONCE
Status: COMPLETED | OUTPATIENT
Start: 2018-11-29 | End: 2018-11-29

## 2018-11-29 RX ORDER — FERROUS SULFATE 325(65) MG
325 TABLET ORAL 2 TIMES DAILY
Qty: 180 TABLET | Refills: 1 | Status: SHIPPED | OUTPATIENT
Start: 2018-11-29 | End: 2018-11-29

## 2018-11-29 RX ORDER — ALBUTEROL SULFATE 90 UG/1
2 AEROSOL, METERED RESPIRATORY (INHALATION) 4 TIMES DAILY PRN
Qty: 1 INHALER | Refills: 5 | Status: SHIPPED | OUTPATIENT
Start: 2018-11-29 | End: 2018-11-29

## 2018-11-29 RX ORDER — ALBUTEROL SULFATE 90 UG/1
2 AEROSOL, METERED RESPIRATORY (INHALATION) 4 TIMES DAILY PRN
Qty: 1 INHALER | Refills: 5 | Status: SHIPPED | OUTPATIENT
Start: 2018-11-29 | End: 2018-12-20 | Stop reason: ALTCHOICE

## 2018-11-29 RX ORDER — ALBUTEROL SULFATE 2.5 MG/3ML
2.5 SOLUTION RESPIRATORY (INHALATION) 3 TIMES DAILY
Status: DISCONTINUED | OUTPATIENT
Start: 2018-11-29 | End: 2018-11-29 | Stop reason: HOSPADM

## 2018-11-29 RX ADMIN — ALBUTEROL SULFATE 2.5 MG: 2.5 SOLUTION RESPIRATORY (INHALATION) at 09:59

## 2018-11-29 RX ADMIN — VENLAFAXINE HYDROCHLORIDE 75 MG: 75 CAPSULE, EXTENDED RELEASE ORAL at 13:07

## 2018-11-29 RX ADMIN — ALBUTEROL SULFATE 2.5 MG: 2.5 SOLUTION RESPIRATORY (INHALATION) at 12:31

## 2018-11-29 RX ADMIN — GUAIFENESIN 600 MG: 600 TABLET, EXTENDED RELEASE ORAL at 11:09

## 2018-11-29 RX ADMIN — METOPROLOL TARTRATE 50 MG: 50 TABLET, FILM COATED ORAL at 13:07

## 2018-11-29 RX ADMIN — POTASSIUM CHLORIDE 40 MEQ: 20 TABLET, EXTENDED RELEASE ORAL at 14:23

## 2018-11-29 RX ADMIN — FUROSEMIDE 20 MG: 20 TABLET ORAL at 11:10

## 2018-11-29 RX ADMIN — CALCIUM CARBONATE-VITAMIN D TAB 500 MG-200 UNIT 1 TABLET: 500-200 TAB at 11:10

## 2018-11-29 RX ADMIN — SALINE NASAL SPRAY 1 SPRAY: 1.5 SOLUTION NASAL at 11:10

## 2018-11-29 RX ADMIN — VITAMIN D, TAB 1000IU (100/BT) 1000 UNITS: 25 TAB at 11:11

## 2018-11-29 RX ADMIN — SUCRALFATE 1 G: 1 TABLET ORAL at 11:30

## 2018-11-29 RX ADMIN — SODIUM CHLORIDE, POTASSIUM CHLORIDE, SODIUM LACTATE AND CALCIUM CHLORIDE: 600; 310; 30; 20 INJECTION, SOLUTION INTRAVENOUS at 04:06

## 2018-11-29 RX ADMIN — DIGOXIN 125 MCG: 125 TABLET ORAL at 13:07

## 2018-11-29 ASSESSMENT — PAIN SCALES - GENERAL: PAINLEVEL_OUTOF10: 0

## 2018-11-29 NOTE — DISCHARGE SUMMARY
Hospital Medicine Discharge Summary      Patient Identification:   Nadege Mendez   : 1928  MRN: [id number]   Account: [de-identified]      Patient's PCP: Christina Sorto MD    Admit Date: 2018     Discharge Date: 2018      Admitting Physician: Butch Tucker DO     Discharge Physician: Wyatt Mcallister DO     Discharge Diagnoses:    Present on Admission:   (Resolved) SOB (shortness of breath)   [employer] single pacemaker 2016   Fatigue   Sick sinus syndrome ([address]   VACA (dyspnea on exertion)   Mild anemia   Steatosis of liver   Pleural effusion   (Resolved) Acute on chronic diastolic heart failure (HCC)   Iron deficiency anemia   Gastric polyps   (Resolved) Upper GI bleed        The patient was seen and examined on day of discharge and this discharge summary is in conjunction with any daily progress note from day of discharge. Hospital Course:   Nadege Mendez is a 80 y.o. female admitted to [facility] on 2018 for worsening fatigue and SOB. She has PMHx of Afib/SSS s/p PPM/ICD on Xarelto, colon cancer at age 32 s/p right hemicolectomy, HLD, HTN.      Her symptoms started around  when she noted SOB, dizziness and abdominal discomfort/cramping. She had no diarrhea or constipation. Initial workup revealed elevated Pro BNP of 1900, troponin was negative. There was mildly low potassium and magnesium. CTA chest showed no PE, but did reveal small bilateral pleural effusions, with possible pulm edema and scarring of RUL (this has been noted previously on CXR). CT abdomen showed hepatic steatosis and no other acute process. She was treated for acute on chronic diastolic heart failure with IV Lasix 20 mg b.i.d., symptoms improved however she still had mild shortness of breath which had been chronic. Per further review of her outpatient studies, she had previously completed PFTs showing minimal obstruction and reduced DLCO.   She was provided MG tablet  Commonly known as:  LASIX  TAKE 1 TABLET BY MOUTH TWO  TIMES DAILY     losartan 25 MG tablet  Commonly known as:  COZAAR  TAKE 1 TABLET BY MOUTH  DAILY     NONFORMULARY     pantoprazole 40 MG tablet  Commonly known as:  PROTONIX  TAKE 1 TABLET BY MOUTH  DAILY     RESTASIS MULTIDOSE 0.05 % ophthalmic emulsion  Generic drug:  cycloSPORINE     sucralfate 1 GM tablet  Commonly known as:  CARAFATE  Take 1 tablet by mouth 4 times daily     TYLENOL 500 MG tablet  Generic drug:  acetaminophen     venlafaxine 75 MG extended release capsule  Commonly known as:  EFFEXOR XR  TAKE 1 CAPSULE BY MOUTH  DAILY     VITAMIN B 12 PO     vitamin D 1000 UNIT Tabs tablet  Commonly known as:  CHOLECALCIFEROL     XARELTO 15 MG Tabs tablet  Generic drug:  rivaroxaban  TAKE 1 TABLET BY MOUTH  DAILY WITH BREAKFAST           Where to Get Your Medications      These medications were sent to [place] [id number]  N [phone] - F [phone]  [address] [id number]    Phone:  [phone]   · albuterol sulfate  (90 Base) MCG/ACT inhaler  · docusate sodium 100 MG capsule  · ferrous sulfate 325 (65 Fe) MG tablet  · sodium chloride 0.65 % nasal spray                 Time Spent on discharge is roughly 45 minutes in the examination, evaluation, counseling and review of medications and discharge plan. Thank you Idalmis Montelongo MD for the opportunity to be involved in this patient's care.     Signed:    Electronically signed by Darryll Dakin, DO on 11/29/2018 at 6:40 PM

## 2018-11-29 NOTE — PROGRESS NOTES
Egd completed. Clip placed to gastric body polyp. Pt tolerated well. Photos taken. Biopsies obtained and sent to lab.
Report called to 8B RN. Pt vitals stable.
beginning of session    Pain:  Denies. Social/Functional:  Lives With: Alone  Type of Home: House  Home Layout: One level  Home Access: Stairs to enter with rails  Entrance Stairs - Number of Steps: 2  Entrance Stairs - Rails: Right  Home Equipment: 4 wheeled walker, Rolling walker, Cane     Objective:  Supine to Sit: Modified independent  Sit to Supine: Modified independent    Transfers  Sit to Stand: Modified independent  Stand to sit: Modified independent       Ambulation 1  Device: Rolling Walker  Assistance: Supervision  Quality of Gait: slow rishabh and velocity. Slight forward flexed posture. Steady with no LOB. Distance: 10' x 1 ~100' x 1       Exercises:  Exercises  Comments: Pt completed supine B LE ther ex 10x each consisting of quad/glute sets, ankle pumps, hip abd/add, short arc quads, bridges, and straight leg raises all to improve strength for function     Activity Tolerance:  Activity Tolerance: Patient limited by endurance; Patient Tolerated treatment well    Assessment: Body structures, Functions, Activity limitations: Decreased endurance  Assessment: Pt. tolerated session well. Pt. pleasant throughout session. Pt. steady on feet but fatigues easily and would benefit from continued skilled PT to improve endurance.    Prognosis: Good     REQUIRES PT FOLLOW UP: Yes    Discharge Recommendations:  Discharge Recommendations: Continue to assess pending progress, Patient would benefit from continued therapy after discharge, Home with assist PRN    Patient Education:  Patient Education: POC, ther ex    Equipment Recommendations:  Equipment Needed: No    Safety:  Type of devices: Gait belt, Call light within reach, Nurse notified, Left in bed, Bed alarm in place  Restraints  Initially in place: No    Plan:  Times per week: 3-5 X GM  Times per day: Daily  Specific instructions for Next Treatment: ther ex, mobility, gait, balance   Current Treatment Recommendations: Strengthening, Functional
non-ambulatory   []Paraplegic or bed rest, able to position self   []Quadriplegic or bed rest, unable to position self  Pulmonary Function Test   [x]None available   []Normal   []Obstructive  []Restrictive   []Diffusion defect        LAB  Hematology:   Lab Results   Component Value Date    WBC 4.2 11/28/2018    RBC 4.05 11/28/2018    RBC 4.17 08/30/2011    HGB 10.6 11/28/2018    HCT 35.3 11/28/2018     11/28/2018     Chemistry:   No results found for: PH, PO2, PCO2, HCO3, BE, O2SAT  Blood Culture: na  Sputum Culture: na    Home pulmonary medications: none  Home Bipap or CPAP No  Pulmonary Diagnosis no
& Exp. wheeze []  Absent or near absent   Dyspnea and level of distress   [x]  None, respiratory rate   12-20, regular pattern []  Only on exertion or increased respiratory rate 21-25 []  Mild dyspnea at rest, irregular breathing pattern respiratory rate 26-30 []  Moderate  use of accessory muscles, prolonged expiration respiratory rate 31-35 []  Severe    use of accessory muscles, respiratory rate   > 35   Peak flow []  > 80% []  70-80% [x]  60-69% []  50-59% []  <50%  or unable to perform   Total Score []  0-1 [x]  2-5 []  6-8 []  9-10 []  11-12   Frequency  Every 4 hours PRN for wheezing or increased respiratory distress Three times a day and Q4 PRN for wheezing or increased   respiratory distress Four times a day and Q 4 PRN  for wheezing or increased   respiratory distress Q 4 hours  Contact physician for a continuous treatment and  Iprotropium Bromide if indicated   Reassess Score No need  Every day Every day Every day  After treatment     ASSESSMENT OUTCOMES   Bronchial Hygiene   [] An increased (or decreased) volume of expectorated sputum    [] Improved chest x-ray  [] Patients subjective response (easier clearance of secretions)      [] Improved breath sounds  [] Improvement in gas exchange       [] Return of patient to home regime   [] Improvement in ventilator variables    [] Improvement in patient reported symptoms, such as dyspnea  [x] Other: na.    Volume Expansion  [] Decrease respiratory rate   [] Resolution of fever    [] Normal pulse rate    [] Improved breath sounds   [x] Improved chest x-ray     [] Improved arterial oxygenation  [] Return of IC to 75% of preop/predicted goal or an increase to 15 ml/kg of ideal body weight   [] Other:     Inhaled Medication  [x] Improved assessment score. Perfect served dr to see what to order. Peak flow is a little low   [] Return of patient to home regime  [] Other:     Oxygenation  [] SpO2 greater than or equal to 92%/SpO2 goal based on physician order.    []
Recommendations:  Equipment Needed: No    Safety:  Type of devices: Gait belt, Call light within reach, Nurse notified, Left in bed, Bed alarm in place  Restraints  Initially in place: No    Plan:  Times per week: 3-5 X GM  Times per day: Daily  Specific instructions for Next Treatment: ther ex, mobility, gait, balance   Current Treatment Recommendations: Strengthening, Functional Mobility Training, Balance Training, Transfer Training, Endurance Training, Stair training, Gait Training    Goals:  Patient goals : Go home    Short term goals  Time Frame for Short term goals: 1 week  Short term goal 1: supine to sit and return with Mod i to get in and out of bed   Short term goal 2: sit to stand with Mod i to get on and off various surfaces  Short term goal 3: ambulate with  feet with S to walk safely community distances     Long term goals  Time Frame for Long term goals : NA due to short ELOS
was negative. There was mildly low potassium and magnesium. CTA chest showed no PE, but did reveal small bilateral pleural effusions, with possible pulm edema and scarring of RUL (this has been noted previously on CXR). CT abdomen showed hepatic steatosis and no other acute process. 11/27 - Will diurese with IV lasix 20mg BID, and monitor strict I/O. With hepatic steatosis, will obtain RUQ US for further characterization. Noted DIMITRIS, and this is new. Will consult to GI for further evaluation and management. Check occult stool, if positive and hgb continues to drop, consider stopping Xarelto. 11/28 - Per GI, EGD +/- cscope today (patient hesitant for cscope due to prep) - holding Xarelto x24hrs for procedure. Hgb stable, chronic SOB at baseline with intermittent worsening with exertion. Further plans pending EGD. Will switch to PO lasix as patient now appears euvolemic. Subjective (past 24 hours): Patient seen at bedside with daughter in room. Went over plan of care and results of testing extensively. Patient currently feeling intermittent SOB with exertion, and still fatigued but overall improved from arrival.  Swelling improved in legs. Some upper respiratory symptoms remain, cough and congestion. On room air. No chest pain, palpitations, abdominal discomfort, fevers, or other symptoms.         Medications:  Reviewed    Infusion Medications    lactated ringers       Scheduled Medications    furosemide  20 mg Oral BID    vitamin D  1,000 Units Oral Daily    venlafaxine  75 mg Oral Daily    sucralfate  1 g Oral 4x Daily AC & HS    pantoprazole  40 mg Oral Daily    metoprolol tartrate  50 mg Oral BID    losartan  25 mg Oral Daily    digoxin  125 mcg Oral Daily    calcium-vitamin D  1 tablet Oral Daily    aspirin  81 mg Oral Daily    acetaminophen  1,000 mg Oral BID    sodium chloride flush  10 mL Intravenous 2 times per day    Biotin  1 tablet Oral Daily     PRN Meds: polyvinyl
not excluded. Groundglass opacities are noted throughout the lungs which may correspond to mild edema. **This report has been created using voice recognition software. It may contain minor errors which are inherent in voice recognition technology. **      Final report electronically signed by Dr. Rosaura Foreman on 11/26/2018 10:57 AM      US LIVER SPLEEN    (Results Pending)       DVT prophylaxis: [] Lovenox                                 [] SCDs                                 [] SQ Heparin                                 [] Encourage ambulation           [x] Already on Anticoagulation     Code Status: Full Code    PT/OT Eval Status: Ordered     Tele:   [x] yes             [] no      Electronically signed by Darryll Dakin, DO on 11/27/2018 at 12:12 PM

## 2018-11-30 ENCOUNTER — CARE COORDINATION (OUTPATIENT)
Dept: CASE MANAGEMENT | Age: 83
End: 2018-11-30

## 2018-12-02 ENCOUNTER — CARE COORDINATION (OUTPATIENT)
Dept: CASE MANAGEMENT | Age: 83
End: 2018-12-02

## 2018-12-02 ENCOUNTER — NURSE TRIAGE (OUTPATIENT)
Dept: ADMINISTRATIVE | Age: 83
End: 2018-12-02

## 2018-12-02 DIAGNOSIS — R06.09 DOE (DYSPNEA ON EXERTION): Primary | ICD-10-CM

## 2018-12-02 PROCEDURE — 1111F DSCHRG MED/CURRENT MED MERGE: CPT | Performed by: FAMILY MEDICINE

## 2018-12-02 NOTE — TELEPHONE ENCOUNTER
Reason for Disposition   [1] Symptoms of anxiety or panic AND [2] has not been evaluated for this by physician    Answer Assessment - Initial Assessment Questions  1. CONCERN: \"What happened that made you call today? \"      Mom is restless  2. ANXIETY SYMPTOM SCREENING: \"Can you describe how you have been feeling? \"  (e.g., tense, restless, panicky, anxious, keyed up, trouble sleeping, trouble concentrating)      anxious  3. ONSET: \"How long have you been feeling this way? \"      Today andlastevening  4. RECURRENT: \"Have you felt this way before? \"  If yes: \"What happened that time? \" \"What helped these feelings go away in the past?\"       no  5. RISK OF HARM - SUICIDAL IDEATION:  \"Do you ever have thoughts of hurting or killing yourself? \"  (e.g., yes, no, no but preoccupation with thoughts about death)    - INTENT:  \"Do you have thoughts of hurting or killing yourself right NOW? \" (e.g., yes, no, N/A)    - PLAN: \"Do you have a specific plan for how you would do this? \" (e.g., gun, knife, overdose, no plan, N/A)      no  6. RISK OF HARM - HOMICIDAL IDEATION:  \"Do you ever have thoughts of hurting or killing someone else? \"  (e.g., yes, no, no but preoccupation with thoughts about death)    - INTENT:  \"Do you have thoughts of hurting or killing someone right NOW? \" (e.g., yes, no, N/A)    - PLAN: \"Do you have a specific plan for how you would do this? \" (e.g., gun, knife, no plan, N/A)       no  7. FUNCTIONAL IMPAIRMENT: \"How have things been going for you overall in your life? Have you had any more difficulties than usual doing your normal daily activities? \"  (e.g., better, same, worse; self-care, school, work, interactions)      \same to bettr  8. SUPPORT: \"Who is with you now? \" \"Who do you live with?\" \"Do you have family or friends nearby who you can talk to?\"       Daughter Patience Coppola  9. THERAPIST: \"Do you have a counselor or therapist? Name? \"      no  10.  STRESSORS: \"Has there been any new stress or recent changes in your life? \"        health  11. CAFFEINE ABUSE: \"Do you drink caffeinated beverages, and how much each day? \" (e.g., coffee, tea, gloria)        no  12. SUBSTANCE ABUSE: \"Do you use any illegal drugs or alcohol? \"        no  13. OTHER SYMPTOMS: \"Do you have any other physical symptoms right now? \" (e.g., chest pain, palpitations, difficulty breathing, fever)        no  14. PREGNANCY: \"Is there any chance you are pregnant? \" \"When was your last menstrual period? \"        no    Protocols used: ANXIETY AND PANIC ATTACK-ADULT-

## 2018-12-03 ENCOUNTER — APPOINTMENT (OUTPATIENT)
Dept: GENERAL RADIOLOGY | Age: 83
DRG: 246 | End: 2018-12-03
Payer: MEDICARE

## 2018-12-03 ENCOUNTER — HOSPITAL ENCOUNTER (INPATIENT)
Age: 83
LOS: 11 days | Discharge: HOME HEALTH | DRG: 246 | End: 2018-12-14
Attending: EMERGENCY MEDICINE | Admitting: HOSPITALIST
Payer: MEDICARE

## 2018-12-03 DIAGNOSIS — J81.0: ICD-10-CM

## 2018-12-03 DIAGNOSIS — R77.8 ELEVATED TROPONIN: ICD-10-CM

## 2018-12-03 DIAGNOSIS — D50.8 OTHER IRON DEFICIENCY ANEMIA: ICD-10-CM

## 2018-12-03 DIAGNOSIS — E83.42 HYPOMAGNESEMIA: ICD-10-CM

## 2018-12-03 DIAGNOSIS — E87.6 HYPOKALEMIA: Primary | ICD-10-CM

## 2018-12-03 LAB
ALBUMIN SERPL-MCNC: 3.8 G/DL (ref 3.5–5.1)
ALP BLD-CCNC: 47 U/L (ref 38–126)
ALT SERPL-CCNC: 11 U/L (ref 11–66)
ANION GAP SERPL CALCULATED.3IONS-SCNC: 14 MEQ/L (ref 8–16)
AST SERPL-CCNC: 23 U/L (ref 5–40)
BASOPHILS # BLD: 1 %
BASOPHILS ABSOLUTE: 0.1 THOU/MM3 (ref 0–0.1)
BILIRUB SERPL-MCNC: 1.5 MG/DL (ref 0.3–1.2)
BILIRUBIN DIRECT: < 0.2 MG/DL (ref 0–0.3)
BUN BLDV-MCNC: 13 MG/DL (ref 7–22)
CALCIUM SERPL-MCNC: 9.6 MG/DL (ref 8.5–10.5)
CHLORIDE BLD-SCNC: 100 MEQ/L (ref 98–111)
CO2: 25 MEQ/L (ref 23–33)
CREAT SERPL-MCNC: 0.7 MG/DL (ref 0.4–1.2)
DIGOXIN LEVEL: 0.6 NG/ML (ref 0.5–2)
DIGOXIN LEVEL: 0.7 NG/ML (ref 0.5–2)
EKG ATRIAL RATE: 49 BPM
EKG Q-T INTERVAL: 424 MS
EKG QRS DURATION: 152 MS
EKG QTC CALCULATION (BAZETT): 424 MS
EKG R AXIS: -67 DEGREES
EKG T AXIS: 114 DEGREES
EKG VENTRICULAR RATE: 60 BPM
EOSINOPHIL # BLD: 3.1 %
EOSINOPHILS ABSOLUTE: 0.2 THOU/MM3 (ref 0–0.4)
ERYTHROCYTE [DISTWIDTH] IN BLOOD BY AUTOMATED COUNT: 17 % (ref 11.5–14.5)
ERYTHROCYTE [DISTWIDTH] IN BLOOD BY AUTOMATED COUNT: 51.6 FL (ref 35–45)
GFR SERPL CREATININE-BSD FRML MDRD: 78 ML/MIN/1.73M2
GLUCOSE BLD-MCNC: 182 MG/DL (ref 70–108)
HCT VFR BLD CALC: 37 % (ref 37–47)
HEMOGLOBIN: 11.2 GM/DL (ref 12–16)
IMMATURE GRANS (ABS): 0.02 THOU/MM3 (ref 0–0.07)
IMMATURE GRANULOCYTES: 0.4 %
LIPASE: 26 U/L (ref 5.6–51.3)
LYMPHOCYTES # BLD: 30.9 %
LYMPHOCYTES ABSOLUTE: 1.6 THOU/MM3 (ref 1–4.8)
MCH RBC QN AUTO: 26.2 PG (ref 26–33)
MCHC RBC AUTO-ENTMCNC: 30.3 GM/DL (ref 32.2–35.5)
MCV RBC AUTO: 86.7 FL (ref 81–99)
MONOCYTES # BLD: 9.6 %
MONOCYTES ABSOLUTE: 0.5 THOU/MM3 (ref 0.4–1.3)
NUCLEATED RED BLOOD CELLS: 0 /100 WBC
OSMOLALITY CALCULATION: 282.3 MOSMOL/KG (ref 275–300)
PLATELET # BLD: 174 THOU/MM3 (ref 130–400)
PMV BLD AUTO: 10.4 FL (ref 9.4–12.4)
POTASSIUM REFLEX MAGNESIUM: 4.4 MEQ/L (ref 3.5–5.2)
PRO-BNP: 2045 PG/ML (ref 0–1800)
PROCALCITONIN: 0.08 NG/ML (ref 0.01–0.09)
RBC # BLD: 4.27 MILL/MM3 (ref 4.2–5.4)
SEG NEUTROPHILS: 55 %
SEGMENTED NEUTROPHILS ABSOLUTE COUNT: 2.8 THOU/MM3 (ref 1.8–7.7)
SODIUM BLD-SCNC: 139 MEQ/L (ref 135–145)
TOTAL PROTEIN: 6.6 G/DL (ref 6.1–8)
TROPONIN T: 0.01 NG/ML
TROPONIN T: < 0.01 NG/ML
TROPONIN T: < 0.01 NG/ML
TSH SERPL DL<=0.05 MIU/L-ACNC: 7.97 UIU/ML (ref 0.4–4.2)
WBC # BLD: 5.1 THOU/MM3 (ref 4.8–10.8)

## 2018-12-03 PROCEDURE — 99232 SBSQ HOSP IP/OBS MODERATE 35: CPT | Performed by: FAMILY MEDICINE

## 2018-12-03 PROCEDURE — 85025 COMPLETE CBC W/AUTO DIFF WBC: CPT

## 2018-12-03 PROCEDURE — 6370000000 HC RX 637 (ALT 250 FOR IP): Performed by: HOSPITALIST

## 2018-12-03 PROCEDURE — 71045 X-RAY EXAM CHEST 1 VIEW: CPT

## 2018-12-03 PROCEDURE — 80076 HEPATIC FUNCTION PANEL: CPT

## 2018-12-03 PROCEDURE — 36415 COLL VENOUS BLD VENIPUNCTURE: CPT

## 2018-12-03 PROCEDURE — 83690 ASSAY OF LIPASE: CPT

## 2018-12-03 PROCEDURE — 99223 1ST HOSP IP/OBS HIGH 75: CPT | Performed by: HOSPITALIST

## 2018-12-03 PROCEDURE — 1200000003 HC TELEMETRY R&B

## 2018-12-03 PROCEDURE — 93010 ELECTROCARDIOGRAM REPORT: CPT | Performed by: NUCLEAR MEDICINE

## 2018-12-03 PROCEDURE — 2580000003 HC RX 258: Performed by: HOSPITALIST

## 2018-12-03 PROCEDURE — 84484 ASSAY OF TROPONIN QUANT: CPT

## 2018-12-03 PROCEDURE — 99223 1ST HOSP IP/OBS HIGH 75: CPT | Performed by: INTERNAL MEDICINE

## 2018-12-03 PROCEDURE — 80162 ASSAY OF DIGOXIN TOTAL: CPT

## 2018-12-03 PROCEDURE — 6370000000 HC RX 637 (ALT 250 FOR IP): Performed by: INTERNAL MEDICINE

## 2018-12-03 PROCEDURE — 83880 ASSAY OF NATRIURETIC PEPTIDE: CPT

## 2018-12-03 PROCEDURE — 93005 ELECTROCARDIOGRAM TRACING: CPT | Performed by: EMERGENCY MEDICINE

## 2018-12-03 PROCEDURE — 84443 ASSAY THYROID STIM HORMONE: CPT

## 2018-12-03 PROCEDURE — 99285 EMERGENCY DEPT VISIT HI MDM: CPT

## 2018-12-03 PROCEDURE — 1200000000 HC SEMI PRIVATE

## 2018-12-03 PROCEDURE — 6360000002 HC RX W HCPCS: Performed by: HOSPITALIST

## 2018-12-03 PROCEDURE — 80048 BASIC METABOLIC PNL TOTAL CA: CPT

## 2018-12-03 PROCEDURE — 96374 THER/PROPH/DIAG INJ IV PUSH: CPT

## 2018-12-03 PROCEDURE — 2500000003 HC RX 250 WO HCPCS: Performed by: EMERGENCY MEDICINE

## 2018-12-03 PROCEDURE — 84145 PROCALCITONIN (PCT): CPT

## 2018-12-03 RX ORDER — FERROUS SULFATE 325(65) MG
325 TABLET ORAL 2 TIMES DAILY
Status: DISCONTINUED | OUTPATIENT
Start: 2018-12-03 | End: 2018-12-14 | Stop reason: HOSPADM

## 2018-12-03 RX ORDER — ONDANSETRON 2 MG/ML
4 INJECTION INTRAMUSCULAR; INTRAVENOUS EVERY 6 HOURS PRN
Status: DISCONTINUED | OUTPATIENT
Start: 2018-12-03 | End: 2018-12-11 | Stop reason: SDUPTHER

## 2018-12-03 RX ORDER — ASPIRIN 81 MG/1
81 TABLET, CHEWABLE ORAL DAILY
Status: DISCONTINUED | OUTPATIENT
Start: 2018-12-03 | End: 2018-12-13

## 2018-12-03 RX ORDER — METOPROLOL TARTRATE 50 MG/1
50 TABLET, FILM COATED ORAL 2 TIMES DAILY
Status: DISCONTINUED | OUTPATIENT
Start: 2018-12-03 | End: 2018-12-14 | Stop reason: HOSPADM

## 2018-12-03 RX ORDER — NITROGLYCERIN 0.4 MG/1
0.4 TABLET SUBLINGUAL EVERY 5 MIN PRN
Status: DISCONTINUED | OUTPATIENT
Start: 2018-12-03 | End: 2018-12-10 | Stop reason: SDUPTHER

## 2018-12-03 RX ORDER — BIOTIN 1 MG
1 TABLET ORAL DAILY
Status: DISCONTINUED | OUTPATIENT
Start: 2018-12-03 | End: 2018-12-03 | Stop reason: RX

## 2018-12-03 RX ORDER — SPIRONOLACTONE 25 MG/1
25 TABLET ORAL DAILY
Status: DISCONTINUED | OUTPATIENT
Start: 2018-12-03 | End: 2018-12-14 | Stop reason: HOSPADM

## 2018-12-03 RX ORDER — SUCRALFATE 1 G/1
1 TABLET ORAL 4 TIMES DAILY
Status: DISCONTINUED | OUTPATIENT
Start: 2018-12-03 | End: 2018-12-14 | Stop reason: HOSPADM

## 2018-12-03 RX ORDER — SODIUM CHLORIDE 0.9 % (FLUSH) 0.9 %
10 SYRINGE (ML) INJECTION PRN
Status: DISCONTINUED | OUTPATIENT
Start: 2018-12-03 | End: 2018-12-10 | Stop reason: SDUPTHER

## 2018-12-03 RX ORDER — ACETAMINOPHEN 325 MG/1
650 TABLET ORAL EVERY 4 HOURS PRN
Status: DISCONTINUED | OUTPATIENT
Start: 2018-12-03 | End: 2018-12-11 | Stop reason: SDUPTHER

## 2018-12-03 RX ORDER — BUMETANIDE 0.25 MG/ML
1 INJECTION, SOLUTION INTRAMUSCULAR; INTRAVENOUS ONCE
Status: COMPLETED | OUTPATIENT
Start: 2018-12-03 | End: 2018-12-03

## 2018-12-03 RX ORDER — SODIUM CHLORIDE 0.9 % (FLUSH) 0.9 %
10 SYRINGE (ML) INJECTION EVERY 12 HOURS SCHEDULED
Status: DISCONTINUED | OUTPATIENT
Start: 2018-12-03 | End: 2018-12-10 | Stop reason: SDUPTHER

## 2018-12-03 RX ORDER — PANTOPRAZOLE SODIUM 40 MG/1
40 TABLET, DELAYED RELEASE ORAL DAILY
Status: DISCONTINUED | OUTPATIENT
Start: 2018-12-03 | End: 2018-12-13

## 2018-12-03 RX ORDER — LOSARTAN POTASSIUM 25 MG/1
25 TABLET ORAL DAILY
Status: DISCONTINUED | OUTPATIENT
Start: 2018-12-03 | End: 2018-12-12

## 2018-12-03 RX ORDER — DOCUSATE SODIUM 100 MG/1
100 CAPSULE, LIQUID FILLED ORAL 2 TIMES DAILY
Status: DISCONTINUED | OUTPATIENT
Start: 2018-12-03 | End: 2018-12-14 | Stop reason: HOSPADM

## 2018-12-03 RX ORDER — FUROSEMIDE 10 MG/ML
40 INJECTION INTRAMUSCULAR; INTRAVENOUS 2 TIMES DAILY
Status: DISCONTINUED | OUTPATIENT
Start: 2018-12-03 | End: 2018-12-04

## 2018-12-03 RX ORDER — ALBUTEROL SULFATE 90 UG/1
2 AEROSOL, METERED RESPIRATORY (INHALATION) 4 TIMES DAILY PRN
Status: DISCONTINUED | OUTPATIENT
Start: 2018-12-03 | End: 2018-12-14 | Stop reason: HOSPADM

## 2018-12-03 RX ORDER — VENLAFAXINE HYDROCHLORIDE 75 MG/1
75 CAPSULE, EXTENDED RELEASE ORAL DAILY
Status: DISCONTINUED | OUTPATIENT
Start: 2018-12-03 | End: 2018-12-14 | Stop reason: HOSPADM

## 2018-12-03 RX ADMIN — RIVAROXABAN 15 MG: 15 TABLET, FILM COATED ORAL at 18:06

## 2018-12-03 RX ADMIN — LOSARTAN POTASSIUM 25 MG: 25 TABLET, FILM COATED ORAL at 10:05

## 2018-12-03 RX ADMIN — SPIRONOLACTONE 25 MG: 25 TABLET ORAL at 10:05

## 2018-12-03 RX ADMIN — VITAMIN D, TAB 1000IU (100/BT) 1000 UNITS: 25 TAB at 10:05

## 2018-12-03 RX ADMIN — DOCUSATE SODIUM 100 MG: 100 CAPSULE, LIQUID FILLED ORAL at 10:06

## 2018-12-03 RX ADMIN — SUCRALFATE 1 G: 1 TABLET ORAL at 10:05

## 2018-12-03 RX ADMIN — PANTOPRAZOLE SODIUM 40 MG: 40 TABLET, DELAYED RELEASE ORAL at 10:05

## 2018-12-03 RX ADMIN — FUROSEMIDE 40 MG: 10 INJECTION, SOLUTION INTRAMUSCULAR; INTRAVENOUS at 10:05

## 2018-12-03 RX ADMIN — FUROSEMIDE 40 MG: 10 INJECTION, SOLUTION INTRAMUSCULAR; INTRAVENOUS at 18:18

## 2018-12-03 RX ADMIN — DOCUSATE SODIUM 100 MG: 100 CAPSULE, LIQUID FILLED ORAL at 19:56

## 2018-12-03 RX ADMIN — METOPROLOL TARTRATE 50 MG: 50 TABLET, FILM COATED ORAL at 19:56

## 2018-12-03 RX ADMIN — Medication 10 ML: at 19:56

## 2018-12-03 RX ADMIN — Medication 10 ML: at 10:21

## 2018-12-03 RX ADMIN — FERROUS SULFATE TAB 325 MG (65 MG ELEMENTAL FE) 325 MG: 325 (65 FE) TAB at 10:13

## 2018-12-03 RX ADMIN — VENLAFAXINE HYDROCHLORIDE 75 MG: 75 CAPSULE, EXTENDED RELEASE ORAL at 10:05

## 2018-12-03 RX ADMIN — SUCRALFATE 1 G: 1 TABLET ORAL at 12:22

## 2018-12-03 RX ADMIN — SUCRALFATE 1 G: 1 TABLET ORAL at 19:56

## 2018-12-03 RX ADMIN — BUMETANIDE 1 MG: 0.25 INJECTION INTRAMUSCULAR; INTRAVENOUS at 03:45

## 2018-12-03 RX ADMIN — METOPROLOL TARTRATE 50 MG: 50 TABLET, FILM COATED ORAL at 10:05

## 2018-12-03 RX ADMIN — SUCRALFATE 1 G: 1 TABLET ORAL at 18:06

## 2018-12-03 RX ADMIN — FERROUS SULFATE TAB 325 MG (65 MG ELEMENTAL FE) 325 MG: 325 (65 FE) TAB at 19:56

## 2018-12-03 RX ADMIN — ASPIRIN 81 MG 81 MG: 81 TABLET ORAL at 10:05

## 2018-12-03 ASSESSMENT — PAIN SCALES - GENERAL
PAINLEVEL_OUTOF10: 3
PAINLEVEL_OUTOF10: 0
PAINLEVEL_OUTOF10: 3
PAINLEVEL_OUTOF10: 0

## 2018-12-03 ASSESSMENT — ENCOUNTER SYMPTOMS
COUGH: 0
DIARRHEA: 0
VOMITING: 0
BLOOD IN STOOL: 0
NAUSEA: 0
SORE THROAT: 0
ABDOMINAL PAIN: 1
SHORTNESS OF BREATH: 1
WHEEZING: 0
BACK PAIN: 0

## 2018-12-03 ASSESSMENT — PAIN DESCRIPTION - LOCATION: LOCATION: ABDOMEN

## 2018-12-03 ASSESSMENT — PAIN DESCRIPTION - PAIN TYPE: TYPE: ACUTE PAIN

## 2018-12-03 NOTE — ED PROVIDER NOTES
Physician who either signs or Co-signs this chart intheabsence of a cardiologist.    Shiva Fu. Rate: 60 bpm  CT interval: na   QRS duration: 152 ms  QTc: 424 ms  P-R-T axes: na, -67, 114  Ventricular paced EKG. No STEMI     RADIOLOGY: non-plain film images(s) such as CT, Ultrasound andMRIare read by the radiologist.    XR CHEST PORTABLE   Final Result      Bilateral interstitial infiltrates consistent with mild pulmonary edema versus an atypical viral-type pneumonia. Old granulomatous disease. Mild cardiomegaly. **This report has been created using voice recognition software. It may contain minor errors which are inherent in voice recognition technology. **      Final report electronically signed by Dr. María Young on 12/3/2018 3:27 AM          [x] Visualized and interpreted by me   [x] Radiologist's Wet Read Report Reviewed   [] Discussed with Radiologist.    Argentina Kelly:   Results for orders placed or performed during the hospital encounter of 12/03/18   CBC Auto Differential   Result Value Ref Range    WBC 5.1 4.8 - 10.8 thou/mm3    RBC 4.27 4.20 - 5.40 mill/mm3    Hemoglobin 11.2 (L) 12.0 - 16.0 gm/dl    Hematocrit 37.0 37.0 - 47.0 %    MCV 86.7 81.0 - 99.0 fL    MCH 26.2 26.0 - 33.0 pg    MCHC 30.3 (L) 32.2 - 35.5 gm/dl    RDW-CV 17.0 (H) 11.5 - 14.5 %    RDW-SD 51.6 (H) 35.0 - 45.0 fL    Platelets 238 735 - 115 thou/mm3    MPV 10.4 9.4 - 12.4 fL    Seg Neutrophils 55.0 %    Lymphocytes 30.9 %    Monocytes 9.6 %    Eosinophils 3.1 %    Basophils 1.0 %    Immature Granulocytes 0.4 %    Segs Absolute 2.8 1.8 - 7.7 thou/mm3    Lymphocytes # 1.6 1.0 - 4.8 thou/mm3    Monocytes # 0.5 0.4 - 1.3 thou/mm3    Eosinophils # 0.2 0.0 - 0.4 thou/mm3    Basophils # 0.1 0.0 - 0.1 thou/mm3    Immature Grans (Abs) 0.02 0.00 - 0.07 thou/mm3    nRBC 0 /100 wbc   Basic Metabolic Panel w/ Reflex to MG   Result Value Ref Range    Sodium 139 135 - 145 meq/L    Potassium reflex Magnesium 4.4 3.5 - 5.2 meq/L    Chloride 100 98 - 111

## 2018-12-03 NOTE — H&P
EVENING 12/15/17   Ab East MD   furosemide (LASIX) 20 MG tablet TAKE 1 TABLET BY MOUTH TWO  TIMES DAILY 12/15/17   Ab East MD   cycloSPORINE (RESTASIS MULTIDOSE) 0.05 % ophthalmic emulsion Place 1 drop into both eyes 2 times daily    Historical Provider, MD   NONFORMULARY Perils probiotic  1 daily    Historical Provider, MD   Cyanocobalamin (VITAMIN B 12 PO) Take by mouth every other day     Historical Provider, MD   aspirin 81 MG chewable tablet Take 1 tablet by mouth daily 3/31/16   Balbir Sargent APRN - CNP   vitamin D (CHOLECALCIFEROL) 1000 UNIT TABS tablet Take 1,000 Units by mouth daily    Historical Provider, MD   Calcium-Vitamin D-Vitamin K 500-500-40 MG-UNT-MCG CHEW Take 1 tablet by mouth daily    Historical Provider, MD   acetaminophen (TYLENOL) 500 MG tablet   Take 1,000 mg by mouth 2 times daily     Historical Provider, MD   Biotin 1000 MCG TABS Take 1 tablet by mouth daily     Historical Provider, MD       Allergies:  Methadone; Gabapentin; Lyrica [pregabalin]; and Ultram [tramadol]    Social History:      The patient currently lives at [place]    TOBACCO:   reports that she has never smoked. She has never used smokeless tobacco.  ETOH:   reports that she does not drink alcohol. Family History:       Reviewed in detail and negative for DM, Cancer, CVA. Positive as follows:    Family History   Problem Relation Age of Onset    Heart Disease Mother     High Blood Pressure Mother     Heart Disease Father     High Blood Pressure Father     Heart Disease Brother     High Blood Pressure Brother     Heart Disease Son        Diet:       REVIEW OF SYSTEMS:   Pertinent positives as noted in the HPI. All other systems reviewed and negative.     PHYSICAL EXAM:    BP (!) 142/76   Pulse 60   Temp 97.9 °F (36.6 °C) (Axillary)   Resp 18   Ht 5' 2\" (1.575 m)   Wt 180 lb (81.6 kg)   SpO2 98%   BMI 32.92 kg/m²     General appearance:  Obese, alert, no apparent distress, appears stated 12/3/2018 at 3:41 AM

## 2018-12-03 NOTE — PLAN OF CARE
Problem: Discharge Planning:  Goal: Participates in care planning  Participates in care planning   Outcome: Ongoing  Patient plans to return home at discharge. Home alone. Goal: Discharged to appropriate level of care  Discharged to appropriate level of care   Outcome: Met This Shift      Problem: Airway Clearance - Ineffective:  Goal: Ability to maintain a clear airway will improve  Ability to maintain a clear airway will improve   Outcome: Ongoing  Patient on room air. States shortness of breath is the same. Problem: Cardiac Output - Decreased:  Goal: Hemodynamic stability will improve  Hemodynamic stability will improve   Outcome: Ongoing  Monitoring vital signs. Vital signs remains stable. Problem: Fluid Volume - Imbalance:  Goal: Absence of imbalanced fluid volume signs and symptoms  Absence of imbalanced fluid volume signs and symptoms   Outcome: Ongoing  Monitoring I's and O's. On IV lasix. Problem: Gas Exchange - Impaired:  Goal: Levels of oxygenation will improve  Levels of oxygenation will improve   Outcome: Met This Shift      Problem: Falls - Risk of:  Goal: Will remain free from falls  Will remain free from falls   Outcome: Ongoing  Bed alarm on, Falling star program in place. Call light within reach. Comments: Care plan reviewed with patient. Patient verbalize understanding of the plan of care and contribute to goal setting.

## 2018-12-04 LAB
ALBUMIN SERPL-MCNC: 3.9 G/DL (ref 3.5–5.1)
ALP BLD-CCNC: 48 U/L (ref 38–126)
ALT SERPL-CCNC: 11 U/L (ref 11–66)
ANION GAP SERPL CALCULATED.3IONS-SCNC: 17 MEQ/L (ref 8–16)
AST SERPL-CCNC: 22 U/L (ref 5–40)
BASOPHILS # BLD: 0.9 %
BASOPHILS ABSOLUTE: 0.1 THOU/MM3 (ref 0–0.1)
BILIRUB SERPL-MCNC: 2.1 MG/DL (ref 0.3–1.2)
BUN BLDV-MCNC: 14 MG/DL (ref 7–22)
CALCIUM SERPL-MCNC: 9.4 MG/DL (ref 8.5–10.5)
CHLORIDE BLD-SCNC: 95 MEQ/L (ref 98–111)
CHOLESTEROL, TOTAL: 127 MG/DL (ref 100–199)
CO2: 28 MEQ/L (ref 23–33)
CREAT SERPL-MCNC: 0.8 MG/DL (ref 0.4–1.2)
EOSINOPHIL # BLD: 2.6 %
EOSINOPHILS ABSOLUTE: 0.2 THOU/MM3 (ref 0–0.4)
ERYTHROCYTE [DISTWIDTH] IN BLOOD BY AUTOMATED COUNT: 17.5 % (ref 11.5–14.5)
ERYTHROCYTE [DISTWIDTH] IN BLOOD BY AUTOMATED COUNT: 51.6 FL (ref 35–45)
GFR SERPL CREATININE-BSD FRML MDRD: 67 ML/MIN/1.73M2
GLUCOSE BLD-MCNC: 134 MG/DL (ref 70–108)
HCT VFR BLD CALC: 38.5 % (ref 37–47)
HDLC SERPL-MCNC: 36 MG/DL
HEMOGLOBIN: 11.7 GM/DL (ref 12–16)
IMMATURE GRANS (ABS): 0.02 THOU/MM3 (ref 0–0.07)
IMMATURE GRANULOCYTES: 0.3 %
INR BLD: 2.11 (ref 0.85–1.13)
LDL CHOLESTEROL CALCULATED: 62 MG/DL
LYMPHOCYTES # BLD: 24.5 %
LYMPHOCYTES ABSOLUTE: 1.4 THOU/MM3 (ref 1–4.8)
MAGNESIUM: 1.7 MG/DL (ref 1.6–2.4)
MCH RBC QN AUTO: 26.2 PG (ref 26–33)
MCHC RBC AUTO-ENTMCNC: 30.4 GM/DL (ref 32.2–35.5)
MCV RBC AUTO: 86.3 FL (ref 81–99)
MONOCYTES # BLD: 12.6 %
MONOCYTES ABSOLUTE: 0.7 THOU/MM3 (ref 0.4–1.3)
NUCLEATED RED BLOOD CELLS: 0 /100 WBC
PLATELET # BLD: 187 THOU/MM3 (ref 130–400)
PMV BLD AUTO: 10.8 FL (ref 9.4–12.4)
POTASSIUM REFLEX MAGNESIUM: 3.4 MEQ/L (ref 3.5–5.2)
RBC # BLD: 4.46 MILL/MM3 (ref 4.2–5.4)
SEG NEUTROPHILS: 59.1 %
SEGMENTED NEUTROPHILS ABSOLUTE COUNT: 3.4 THOU/MM3 (ref 1.8–7.7)
SODIUM BLD-SCNC: 140 MEQ/L (ref 135–145)
TOTAL PROTEIN: 6.8 G/DL (ref 6.1–8)
TRIGL SERPL-MCNC: 145 MG/DL (ref 0–199)
WBC # BLD: 5.8 THOU/MM3 (ref 4.8–10.8)

## 2018-12-04 PROCEDURE — 6370000000 HC RX 637 (ALT 250 FOR IP): Performed by: HOSPITALIST

## 2018-12-04 PROCEDURE — 2580000003 HC RX 258: Performed by: HOSPITALIST

## 2018-12-04 PROCEDURE — 36415 COLL VENOUS BLD VENIPUNCTURE: CPT

## 2018-12-04 PROCEDURE — 1200000000 HC SEMI PRIVATE

## 2018-12-04 PROCEDURE — 97110 THERAPEUTIC EXERCISES: CPT

## 2018-12-04 PROCEDURE — 6360000002 HC RX W HCPCS: Performed by: HOSPITALIST

## 2018-12-04 PROCEDURE — 85025 COMPLETE CBC W/AUTO DIFF WBC: CPT

## 2018-12-04 PROCEDURE — 99231 SBSQ HOSP IP/OBS SF/LOW 25: CPT | Performed by: NURSE PRACTITIONER

## 2018-12-04 PROCEDURE — 97530 THERAPEUTIC ACTIVITIES: CPT

## 2018-12-04 PROCEDURE — 80061 LIPID PANEL: CPT

## 2018-12-04 PROCEDURE — G8978 MOBILITY CURRENT STATUS: HCPCS

## 2018-12-04 PROCEDURE — 80053 COMPREHEN METABOLIC PANEL: CPT

## 2018-12-04 PROCEDURE — 99232 SBSQ HOSP IP/OBS MODERATE 35: CPT | Performed by: INTERNAL MEDICINE

## 2018-12-04 PROCEDURE — 97161 PT EVAL LOW COMPLEX 20 MIN: CPT

## 2018-12-04 PROCEDURE — G8979 MOBILITY GOAL STATUS: HCPCS

## 2018-12-04 PROCEDURE — 83735 ASSAY OF MAGNESIUM: CPT

## 2018-12-04 PROCEDURE — 6370000000 HC RX 637 (ALT 250 FOR IP): Performed by: INTERNAL MEDICINE

## 2018-12-04 PROCEDURE — 85610 PROTHROMBIN TIME: CPT

## 2018-12-04 PROCEDURE — 1200000003 HC TELEMETRY R&B

## 2018-12-04 PROCEDURE — 94760 N-INVAS EAR/PLS OXIMETRY 1: CPT

## 2018-12-04 RX ORDER — POTASSIUM CHLORIDE 20 MEQ/1
40 TABLET, EXTENDED RELEASE ORAL ONCE
Status: COMPLETED | OUTPATIENT
Start: 2018-12-04 | End: 2018-12-04

## 2018-12-04 RX ADMIN — ASPIRIN 81 MG 81 MG: 81 TABLET ORAL at 11:00

## 2018-12-04 RX ADMIN — DOCUSATE SODIUM 100 MG: 100 CAPSULE, LIQUID FILLED ORAL at 20:56

## 2018-12-04 RX ADMIN — DOCUSATE SODIUM 100 MG: 100 CAPSULE, LIQUID FILLED ORAL at 11:01

## 2018-12-04 RX ADMIN — Medication 10 ML: at 10:55

## 2018-12-04 RX ADMIN — FUROSEMIDE 40 MG: 10 INJECTION, SOLUTION INTRAMUSCULAR; INTRAVENOUS at 11:01

## 2018-12-04 RX ADMIN — LOSARTAN POTASSIUM 25 MG: 25 TABLET, FILM COATED ORAL at 10:54

## 2018-12-04 RX ADMIN — SPIRONOLACTONE 25 MG: 25 TABLET ORAL at 10:54

## 2018-12-04 RX ADMIN — METOPROLOL TARTRATE 50 MG: 50 TABLET, FILM COATED ORAL at 10:54

## 2018-12-04 RX ADMIN — SUCRALFATE 1 G: 1 TABLET ORAL at 16:27

## 2018-12-04 RX ADMIN — VITAMIN D, TAB 1000IU (100/BT) 1000 UNITS: 25 TAB at 10:54

## 2018-12-04 RX ADMIN — SUCRALFATE 1 G: 1 TABLET ORAL at 20:56

## 2018-12-04 RX ADMIN — FERROUS SULFATE TAB 325 MG (65 MG ELEMENTAL FE) 325 MG: 325 (65 FE) TAB at 10:54

## 2018-12-04 RX ADMIN — METOPROLOL TARTRATE 50 MG: 50 TABLET, FILM COATED ORAL at 20:56

## 2018-12-04 RX ADMIN — FERROUS SULFATE TAB 325 MG (65 MG ELEMENTAL FE) 325 MG: 325 (65 FE) TAB at 20:56

## 2018-12-04 RX ADMIN — RIVAROXABAN 15 MG: 15 TABLET, FILM COATED ORAL at 18:25

## 2018-12-04 RX ADMIN — VENLAFAXINE HYDROCHLORIDE 75 MG: 75 CAPSULE, EXTENDED RELEASE ORAL at 10:55

## 2018-12-04 RX ADMIN — PANTOPRAZOLE SODIUM 40 MG: 40 TABLET, DELAYED RELEASE ORAL at 11:00

## 2018-12-04 RX ADMIN — POTASSIUM CHLORIDE 40 MEQ: 20 TABLET, EXTENDED RELEASE ORAL at 16:27

## 2018-12-04 RX ADMIN — SUCRALFATE 1 G: 1 TABLET ORAL at 10:54

## 2018-12-04 ASSESSMENT — PAIN SCALES - GENERAL
PAINLEVEL_OUTOF10: 0
PAINLEVEL_OUTOF10: 0

## 2018-12-04 NOTE — PROGRESS NOTES
1  Surface: level tile  Device: Rolling Walker  Assistance: Contact guard assistance  Quality of Gait: Pt amb with slow pace, decreased speed, decreased step length, overall steady, no LOB, slight antalgic pattern with decreased R LE stance time. Distance: 20 feet to bathroom, additional 70 feet in hallway              Exercises:  Comments: Pt performs supine B LE AROM: ankle pumps,heel slides and hip abd/add x 10 reps to increase strength for improved gait. Activity Tolerance:  Activity Tolerance: Patient Tolerated treatment well    Treatment Initiated: See above exercises, balance tasks, additional gait training. Assessment: Body structures, Functions, Activity limitations: Decreased functional mobility , Decreased balance, Decreased endurance, Decreased strength  Assessment: Pt tolerates session well, limited by generalized weakness and impaired endurance. Pt would benefit from continued PT after DC, would like to do OP PT. PT to continue to progress strength and functional mobility to ensure safe DC home. Prognosis: Good    Clinical Presentation: Low - Stable and Uncomplicated: Pt tolerates session well, limited by generalized weakness and impaired endurance. Pt would benefit from continued PT after DC, would like to do OP PT. PT to continue to progress strength and functional mobility to ensure safe DC home. Decision Making: High Complexitybased on patient assessment and decision making process of determining plan of care and establishing reasonable expectations for measurable functional outcomes    REQUIRES PT FOLLOW UP: Yes    Discharge Recommendations:  Discharge Recommendations: Home with assist PRN, Outpatient PT (Pt/daughter would like to do OP PT at [facility] ( currently resides there))    Patient Education:  Patient Education: POC    Equipment Recommendations:  Equipment Needed: No    Safety:  Type of devices:  All fall risk precautions in place, Bed alarm in place, Call light within reach, Left in bed, Patient at risk for falls, Gait belt  Restraints  Initially in place: No    Plan:  Times per week: 3-5 X GM  Times per day: Daily  Current Treatment Recommendations: Strengthening, Functional Mobility Training, Transfer Training, Balance Training, Endurance Training, Stair training, Gait Training, Equipment Evaluation, Education, & procurement, Safety Education & Training, Patient/Caregiver Education & Training, Home Exercise Program    Goals:  Patient goals : To go home  Short term goals  Time Frame for Short term goals: 1 week  Short term goal 1: Pt to transfer supine <--> sit S to enable pt to get in/out of bed. Short term goal 2: Pt to transfer sit <--> stand S for increased functional mobility. Short term goal 3: Pt to ambulate >100 feet with RW SBA for household ambulation. Short term goal 4: Pt to ascend/descend 2 steps with HR SBA for home entry. Long term goals  Time Frame for Long term goals : NA due to short length of stay. Evaluation Complexity: Based on the findings of patient history, examination, clinical presentation, and decision making during this evaluation, the evaluation of Dalton Riggins  is of low complexity. PT G-Codes  Functional Limitation: Mobility: Walking and moving around  Mobility: Walking and Moving Around Current Status (): At least 40 percent but less than 60 percent impaired, limited or restricted  Mobility: Walking and Moving Around Goal Status ():  At least 40 percent but less than 60 percent impaired, limited or restricted       AM-PAC Inpatient Mobility without Stair Climbing Raw Score : 15  AM-PAC Inpatient without Stair Climbing T-Scale Score : 43.03  Mobility Inpatient CMS 0-100% Score: 47.43  Mobility Inpatient without Stair CMS G-Code Modifier : CK

## 2018-12-04 NOTE — PROGRESS NOTES
hydroxide, ondansetron, acetaminophen, albuterol sulfate HFA      Intake/Output Summary (Last 24 hours) at 12/04/18 1831  Last data filed at 12/04/18 1441   Gross per 24 hour   Intake              600 ml   Output             1050 ml   Net             -450 ml       Diet:  DIET CARDIAC; Exam:  BP (!) 122/59   Pulse 59   Temp 98 °F (36.7 °C) (Oral)   Resp 16   Ht 5' 2\" (1.575 m)   Wt 169 lb 14.4 oz (77.1 kg)   SpO2 94%   BMI 31.08 kg/m²     General appearance: No apparent distress, appears stated age and cooperative. HEENT: Pupils equal, round, and reactive to light. Conjunctivae/corneas clear. Neck: Supple, with full range of motion. No jugular venous distention. Trachea midline. Respiratory:  Normal respiratory effort. Clear to auscultation, bilaterally without Rales/Wheezes/Rhonchi. Cardiovascular: Regular rate and rhythm with normal S1/S2 without murmurs, rubs. + s3 gallop  Abdomen: Soft, non-tender, non-distended with normal bowel sounds. Musculoskeletal: passive and active ROM x 4 extremities. Skin: Skin color, texture, turgor normal.  No rashes or lesions. Neurologic:  Neurovascularly intact without any focal sensory/motor deficits.  Cranial nerves: II-XII intact, grossly non-focal.  Psychiatric: Alert and oriented, thought content appropriate, normal insight  Capillary Refill: Brisk,< 3 seconds   Peripheral Pulses: +2 palpable, equal bilaterally       Labs:   Recent Labs      12/03/18 0224 12/04/18   0625   WBC  5.1  5.8   HGB  11.2*  11.7*   HCT  37.0  38.5   PLT  174  187     Recent Labs      12/03/18 0224 12/04/18   0625   NA  139  140   K  4.4  3.4*   CL  100  95*   CO2  25  28   BUN  13  14   CREATININE  0.7  0.8   CALCIUM  9.6  9.4     Recent Labs      12/03/18 0224 12/04/18   0625   AST  23  22   ALT  11  11   BILIDIR  <0.2   --    BILITOT  1.5*  2.1*   ALKPHOS  47  48     Recent Labs      12/04/18   0625   INR  2.11*     No results for input(s): Richard Finley in the last 72

## 2018-12-04 NOTE — FLOWSHEET NOTE
12/04/18 1812   Encounter Summary   Services provided to: Patient and family together   Referral/Consult From: [facility] Family members   Place of [address] Completed   Continue Visiting Yes  (12/4/18)   Complexity of Encounter Low   Length of Encounter 15 minutes   Routine   Type Follow up   Assessment Calm; Approachable   Intervention Active listening;Nurtured hope;Prayer   Outcome Acceptance;Comfort;Expressed gratitude;Engaged in conversation; Shared life review   Spiritual/Quaker   Type Spiritual support   Advance Directives (For Healthcare)   Healthcare Directive Yes, patient has an advance directive for healthcare treatment   S- During my contact with the patient and the family, I wanted to assess what their       spiritual and emotional needs were. And to verify if the pt had a Advance Directive. O-  The pt was in bed and no family was present. The pt stated that he finds comfort and           the will to go on because of his wife and daughter. The pt was very upbeat and shared some life stories. A- The pt was receptive when I offered emotional support   P-  Continued support would be helpful in order to meet the future Spiritual needs of the         patient.

## 2018-12-04 NOTE — PROGRESS NOTES
Cardiology Progress Note      Patient:  Lashanda Kilgore  YOB: 1928  MRN: [id number]   Acct: [de-identified]  Admit Date:  12/3/2018  Primary Cardiologist: Willa  Seen by Dr. Keith Mae     Per prior cardiology consult note-  CHIEF COMPLAINT: SOB        HPI: This is a pleasant 80 y.o. female presents with sob. Hx of Afib s/p PPM on Xarelto, HTN, HLD, diastolic heart failure. 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 She was recently admitted for CHF. CXR with pulmonary edema. BNP 2045. TSH 7.97  She was getting worse at home and was nauseated as well. She phoned an RN and was instructed to take Benadryl. Cr 0.7. Trop 0.011--<0.010. She was told to come in if she continued to be sob. No ches pain. WBC 5.  Hb 11.2. She also used inhalers but this did not help. She feels the same as she did at home. No left arm or jaw pain. No abdominal pain.      Subjective (Events in last 24 hours):     Diuresed well   She is tired today     No chest pains noted   Progressive SOB over the past few weeks per daughter    Looking good today   Daughter doesn't want her to go home today       Objective:   BP (!) 117/56   Pulse 62   Temp 98.3 °F (36.8 °C) (Oral)   Resp 16   Ht 5' 2\" (1.575 m)   Wt 169 lb 14.4 oz (77.1 kg)   SpO2 94%   BMI 31.08 kg/m²        TELEMETRY: Paced / AFB    Physical Exam:  General Appearance: alert and oriented to person, place and time, in no acute distress  Cardiovascular: irregular rhythm, normal S1 and S2, no murmurs, rubs, clicks, or gallops, distal pulses intact,   Pulmonary/Chest: clear to auscultation bilaterally- no wheezes, rales or rhonchi, normal air movement, no respiratory distress  Abdomen: soft, non-tender, non-distended, normal bowel sounds, no masses Extremities: no cyanosis, clubbing or edema, pulses present   Skin: warm and dry, no rash or erythema  Head: normocephalic and atraumatic  Musculoskeletal: normal range of motion, no joint swelling, deformity or thickness.   Ejection fraction is visually estimated at 55-60%.     Right Atrium   The right atrium is of normal size.      Right Ventricle   Normal right ventricular size and function.      Pericardial Effusion   No evidence of any pericardial effusion.      Aorta / Great Vessels   Aorta was not clearly visualized. Gris Saas is normal in size with normal respiratory phasic changes       Stress:   Summary   The LVEF is calculated to be 67 % with mild inferior wall hypokinesis %.   There were no significant perfusion abnormalities.   No evidence of ischemia is noted on this study.      Signatures      ----------------------------------------------------------------   Electronically signed by Ryder Atkinson DO (Interpreting   Cardiologist) on 03/29/2016          Lab Data:    Cardiac Enzymes:  No results for input(s): CKTOTAL, CKMB, CKMBINDEX, TROPONINI in the last 72 hours.     CBC:   Lab Results   Component Value Date    WBC 5.8 12/04/2018    RBC 4.46 12/04/2018    RBC 4.17 08/30/2011    HGB 11.7 12/04/2018    HCT 38.5 12/04/2018     12/04/2018       CMP:  Lab Results   Component Value Date     12/04/2018    K 3.4 12/04/2018    CL 95 12/04/2018    CO2 28 12/04/2018    BUN 14 12/04/2018    CREATININE 0.8 12/04/2018    LABGLOM 67 12/04/2018    GLUCOSE 134 12/04/2018    GLUCOSE 116 08/30/2011    CALCIUM 9.4 12/04/2018       Hepatic Function Panel:  Lab Results   Component Value Date    ALKPHOS 48 12/04/2018    ALT 11 12/04/2018    AST 22 12/04/2018    PROT 6.8 12/04/2018    BILITOT 2.1 12/04/2018    BILIDIR <0.2 12/03/2018    LABALBU 3.9 12/04/2018    LABALBU 4.2 08/30/2011       Magnesium:    Lab Results   Component Value Date    MG 1.7 12/04/2018       PT/INR:    Lab Results   Component Value Date    INR 2.11 12/04/2018       HgBA1c:    Lab Results   Component Value Date    LABA1C 6.4 11/01/2018       FLP:  Lab Results   Component Value Date    TRIG 145 12/04/2018    HDL 36 12/04/2018    LDLCALC 62 12/04/2018

## 2018-12-05 LAB
ANION GAP SERPL CALCULATED.3IONS-SCNC: 13 MEQ/L (ref 8–16)
BUN BLDV-MCNC: 14 MG/DL (ref 7–22)
CALCIUM SERPL-MCNC: 9.5 MG/DL (ref 8.5–10.5)
CHLORIDE BLD-SCNC: 97 MEQ/L (ref 98–111)
CO2: 30 MEQ/L (ref 23–33)
CREAT SERPL-MCNC: 0.8 MG/DL (ref 0.4–1.2)
GFR SERPL CREATININE-BSD FRML MDRD: 67 ML/MIN/1.73M2
GLUCOSE BLD-MCNC: 122 MG/DL (ref 70–108)
POTASSIUM SERPL-SCNC: 3.8 MEQ/L (ref 3.5–5.2)
SODIUM BLD-SCNC: 140 MEQ/L (ref 135–145)

## 2018-12-05 PROCEDURE — 80048 BASIC METABOLIC PNL TOTAL CA: CPT

## 2018-12-05 PROCEDURE — 6370000000 HC RX 637 (ALT 250 FOR IP): Performed by: INTERNAL MEDICINE

## 2018-12-05 PROCEDURE — 97165 OT EVAL LOW COMPLEX 30 MIN: CPT

## 2018-12-05 PROCEDURE — G8987 SELF CARE CURRENT STATUS: HCPCS

## 2018-12-05 PROCEDURE — 6370000000 HC RX 637 (ALT 250 FOR IP): Performed by: HOSPITALIST

## 2018-12-05 PROCEDURE — 36415 COLL VENOUS BLD VENIPUNCTURE: CPT

## 2018-12-05 PROCEDURE — G8988 SELF CARE GOAL STATUS: HCPCS

## 2018-12-05 PROCEDURE — 1200000000 HC SEMI PRIVATE

## 2018-12-05 PROCEDURE — 99232 SBSQ HOSP IP/OBS MODERATE 35: CPT | Performed by: INTERNAL MEDICINE

## 2018-12-05 PROCEDURE — 2580000003 HC RX 258: Performed by: HOSPITALIST

## 2018-12-05 PROCEDURE — 97110 THERAPEUTIC EXERCISES: CPT

## 2018-12-05 RX ORDER — BUMETANIDE 1 MG/1
1 TABLET ORAL DAILY
Status: DISCONTINUED | OUTPATIENT
Start: 2018-12-05 | End: 2018-12-05

## 2018-12-05 RX ORDER — BUMETANIDE 1 MG/1
1 TABLET ORAL DAILY
Status: DISCONTINUED | OUTPATIENT
Start: 2018-12-06 | End: 2018-12-09

## 2018-12-05 RX ADMIN — METOPROLOL TARTRATE 50 MG: 50 TABLET, FILM COATED ORAL at 08:47

## 2018-12-05 RX ADMIN — VENLAFAXINE HYDROCHLORIDE 75 MG: 75 CAPSULE, EXTENDED RELEASE ORAL at 08:47

## 2018-12-05 RX ADMIN — SUCRALFATE 1 G: 1 TABLET ORAL at 08:47

## 2018-12-05 RX ADMIN — DOCUSATE SODIUM 100 MG: 100 CAPSULE, LIQUID FILLED ORAL at 08:47

## 2018-12-05 RX ADMIN — SUCRALFATE 1 G: 1 TABLET ORAL at 22:28

## 2018-12-05 RX ADMIN — METOPROLOL TARTRATE 50 MG: 50 TABLET, FILM COATED ORAL at 22:27

## 2018-12-05 RX ADMIN — SUCRALFATE 1 G: 1 TABLET ORAL at 12:39

## 2018-12-05 RX ADMIN — RIVAROXABAN 15 MG: 15 TABLET, FILM COATED ORAL at 17:29

## 2018-12-05 RX ADMIN — DOCUSATE SODIUM 100 MG: 100 CAPSULE, LIQUID FILLED ORAL at 22:26

## 2018-12-05 RX ADMIN — LOSARTAN POTASSIUM 25 MG: 25 TABLET, FILM COATED ORAL at 08:47

## 2018-12-05 RX ADMIN — SPIRONOLACTONE 25 MG: 25 TABLET ORAL at 08:47

## 2018-12-05 RX ADMIN — SUCRALFATE 1 G: 1 TABLET ORAL at 17:29

## 2018-12-05 RX ADMIN — VITAMIN D, TAB 1000IU (100/BT) 1000 UNITS: 25 TAB at 08:47

## 2018-12-05 RX ADMIN — FERROUS SULFATE TAB 325 MG (65 MG ELEMENTAL FE) 325 MG: 325 (65 FE) TAB at 08:47

## 2018-12-05 RX ADMIN — FERROUS SULFATE TAB 325 MG (65 MG ELEMENTAL FE) 325 MG: 325 (65 FE) TAB at 22:27

## 2018-12-05 RX ADMIN — PANTOPRAZOLE SODIUM 40 MG: 40 TABLET, DELAYED RELEASE ORAL at 08:47

## 2018-12-05 RX ADMIN — Medication 10 ML: at 08:47

## 2018-12-05 RX ADMIN — ASPIRIN 81 MG 81 MG: 81 TABLET ORAL at 08:47

## 2018-12-05 RX ADMIN — Medication 10 ML: at 22:28

## 2018-12-05 ASSESSMENT — PAIN SCALES - GENERAL
PAINLEVEL_OUTOF10: 3
PAINLEVEL_OUTOF10: 0

## 2018-12-05 ASSESSMENT — PAIN DESCRIPTION - LOCATION: LOCATION: ARM

## 2018-12-05 ASSESSMENT — PAIN DESCRIPTION - PAIN TYPE: TYPE: ACUTE PAIN

## 2018-12-05 ASSESSMENT — PAIN DESCRIPTION - ORIENTATION: ORIENTATION: RIGHT;LEFT

## 2018-12-05 ASSESSMENT — PAIN DESCRIPTION - PROGRESSION: CLINICAL_PROGRESSION: NOT CHANGED

## 2018-12-05 ASSESSMENT — PAIN DESCRIPTION - DESCRIPTORS: DESCRIPTORS: SORE

## 2018-12-05 ASSESSMENT — PAIN DESCRIPTION - FREQUENCY: FREQUENCY: CONTINUOUS

## 2018-12-05 NOTE — CARE COORDINATION
IV Lasix discontinued, plan to transition to oral diuretics today. PT/OT continues. Plan is home with help from daughter at discharge.   Electronically signed by Janell Mceke RN on 12/5/2018 at 11:57 AM
Zone management tool for CHF Diagnosis given to the patient as an education reference. Patient verbalizes understanding that the care team will be referencing this tool throughout their hospital stay and again on discharge. Nurse Eula Moody notified of the reference tool being received by the patient.
Residence  Living Arrangements:  Alone   Support Systems:  Spouse/Significant Other, Children  Current Services PTA:     Potential Assistance Needed:  N/A  Potential Assistance Purchasing Medications:  No  Does patient want to participate in local refill/ meds to beds program?  No  Type of Home Care Services:  None  Patient expects to be discharged to:  home  Expected Discharge date:  12/06/18  Follow Up Appointment: Best Day/ Time: Tuesday PM  Discharge Plan: Met with Roberto Padron and her daughter [facility], present at bedside. They reside in a duplex. Roberto Padron states her  is now in an SNF. Roberto Padron lives on one side and [facility] lives on the other side of the duplex. Sulema manages Roberto Padron' medications as well as her medical needs. Roberto Padron and [facility] deny a need for services or DME at discharge.     Evaluation: no
 VACA (dyspnea on exertion)    Mild anemia    Steatosis of liver    Pleural effusion    Iron deficiency anemia    Gastric polyps    Acute lung edema (HCC)    Acute on chronic congestive heart failure with left ventricular diastolic dysfunction (HCC)    Elevated troponin       Inpatient Assessment  Care Transitions Summary    Care Transitions Inpatient Review  Medication Review  Do you have all of your prescriptions and are they filled?:  Yes   Are you able to afford your medications?:  Yes  How often do you have difficulty taking your medications?:  I always take them as prescribed.   Housing Review  Who do you live with?:  Alone  Social Support  Do you have a Home Health Coordinator?:  No  Durable Medical Equipment  Patient DME:  [employer]  Functional Review  Ability to seek help/take action for Emergent/Urgent situations i.e. fire, crime, inclement weather or health crisis.:  Needs Assistance  Ability handle personal hygiene needs (bathing/dressing/grooming):  Needs Assistance  Ability to manage medications:  Needs Assistance  Ability to prepare food:  Needs Assistance  Ability to maintain home (clean home, laundry):  Needs Assistance  Ability to drive and/or has transportation:  Needs Assistance  Ability to do shopping:  Needs Assistance  Ability to manage finances:  Needs Assistance  Is patient able to live independently?:  Yes  Hearing and Vision  Visual Impairment:  Visual impairment (Glasses/contacts)  Hearing Impairment:  Wears hearing aids  Care Transitions Interventions  No Identified Needs         Follow Up  Future Appointments  Date Time Provider [facility]   12/7/2018 1:00 PM NOELLE Guzman - CHERELLE Burch  [address]   12/10/2018 1:30 PM STR PULMONARY FUNCTION ROOM 1 STRZ PFT None   1/7/2019 9:00 AM Juan Diego Vora MD [facility] [address]   1/30/2019 11:00 AM Mishel Aguero   4/9/2019 1:00 PM [facility] [place] [address]   4/10/2019 2:00 PM

## 2018-12-06 PROCEDURE — 6370000000 HC RX 637 (ALT 250 FOR IP): Performed by: HOSPITALIST

## 2018-12-06 PROCEDURE — 6370000000 HC RX 637 (ALT 250 FOR IP): Performed by: INTERNAL MEDICINE

## 2018-12-06 PROCEDURE — 99232 SBSQ HOSP IP/OBS MODERATE 35: CPT | Performed by: PHYSICIAN ASSISTANT

## 2018-12-06 PROCEDURE — 2580000003 HC RX 258: Performed by: HOSPITALIST

## 2018-12-06 PROCEDURE — 97116 GAIT TRAINING THERAPY: CPT

## 2018-12-06 PROCEDURE — 97110 THERAPEUTIC EXERCISES: CPT

## 2018-12-06 PROCEDURE — 1200000000 HC SEMI PRIVATE

## 2018-12-06 PROCEDURE — 99233 SBSQ HOSP IP/OBS HIGH 50: CPT | Performed by: INTERNAL MEDICINE

## 2018-12-06 RX ADMIN — FERROUS SULFATE TAB 325 MG (65 MG ELEMENTAL FE) 325 MG: 325 (65 FE) TAB at 19:31

## 2018-12-06 RX ADMIN — VITAMIN D, TAB 1000IU (100/BT) 1000 UNITS: 25 TAB at 09:48

## 2018-12-06 RX ADMIN — SPIRONOLACTONE 25 MG: 25 TABLET ORAL at 09:48

## 2018-12-06 RX ADMIN — VENLAFAXINE HYDROCHLORIDE 75 MG: 75 CAPSULE, EXTENDED RELEASE ORAL at 09:48

## 2018-12-06 RX ADMIN — METOPROLOL TARTRATE 50 MG: 50 TABLET, FILM COATED ORAL at 09:48

## 2018-12-06 RX ADMIN — DOCUSATE SODIUM 100 MG: 100 CAPSULE, LIQUID FILLED ORAL at 09:48

## 2018-12-06 RX ADMIN — PANTOPRAZOLE SODIUM 40 MG: 40 TABLET, DELAYED RELEASE ORAL at 09:48

## 2018-12-06 RX ADMIN — METOPROLOL TARTRATE 50 MG: 50 TABLET, FILM COATED ORAL at 19:31

## 2018-12-06 RX ADMIN — FERROUS SULFATE TAB 325 MG (65 MG ELEMENTAL FE) 325 MG: 325 (65 FE) TAB at 09:48

## 2018-12-06 RX ADMIN — ASPIRIN 81 MG 81 MG: 81 TABLET ORAL at 09:48

## 2018-12-06 RX ADMIN — SUCRALFATE 1 G: 1 TABLET ORAL at 17:31

## 2018-12-06 RX ADMIN — BUMETANIDE 1 MG: 1 TABLET ORAL at 09:48

## 2018-12-06 RX ADMIN — SUCRALFATE 1 G: 1 TABLET ORAL at 19:31

## 2018-12-06 RX ADMIN — Medication 10 ML: at 14:44

## 2018-12-06 RX ADMIN — Medication 10 ML: at 19:32

## 2018-12-06 RX ADMIN — SUCRALFATE 1 G: 1 TABLET ORAL at 09:48

## 2018-12-06 RX ADMIN — LOSARTAN POTASSIUM 25 MG: 25 TABLET, FILM COATED ORAL at 09:48

## 2018-12-06 RX ADMIN — SUCRALFATE 1 G: 1 TABLET ORAL at 14:41

## 2018-12-06 ASSESSMENT — PAIN SCALES - GENERAL
PAINLEVEL_OUTOF10: 0

## 2018-12-06 ASSESSMENT — PAIN DESCRIPTION - PAIN TYPE: TYPE: ACUTE PAIN

## 2018-12-06 ASSESSMENT — PAIN DESCRIPTION - ORIENTATION: ORIENTATION: RIGHT

## 2018-12-06 ASSESSMENT — PAIN DESCRIPTION - LOCATION: LOCATION: ARM

## 2018-12-06 NOTE — PROGRESS NOTES
Component Value Date    TRIG 145 12/04/2018    HDL 36 12/04/2018    LDLCALC 62 12/04/2018       TSH:    Lab Results   Component Value Date    TSH 7.970 12/03/2018         Assessment:    Acute on chronic diastolic CHF, NYHA III-IV  Ef 55-60 per echo 11/26/18  Mild AI, mild MR  Chronic afib  S/p PPM for tachy-mariola syndrome  Hx multiple falls in past, hx upper GIB    Plan:  · Daily I/o and weights  · 2 liter fluid restriction and 2gm sodium diet  · Per dr. Lizzy Ramos note- \"Xarelto - consider discussion regarding continuing therapy with primary cardiologist due to falls in the past and upper GIB\"  · Cont bumex/aldactone/bb/arb/asa  · Pt and family agreeable for ischemic w/up - stress vs cath - will discuss with dr Blayne Santizo and dr priest and decide  · Npo after midnight  · Hold xarelto at this time         Electronically signed by Laurie Camilo PA-C on 12/6/2018 at 2:01 PM      Addendum -   Discussed with dr priest about left and right cath  - will schedule for Saturday and hold xarelto

## 2018-12-06 NOTE — PROGRESS NOTES
[address] RENAL TELEMETRY 6K - 6K-21/021-A    Time In: 5473  Time Out: 1240  Timed Code Treatment Minutes: 25 Minutes  Minutes: 25          Date: 2018  Patient Name: Chitra Craft,  Gender:  female        MRN: [id number]  : 1928  (80 y.o.)     Referring Practitioner: Jak De Oliveira MD  Diagnosis: Acute lung edema  Additional Pertinent Hx: Patient is a 80 y.o. female, with a hx of A-fib s/p PPM/ICD on xarelto, HLD, HTN, chronic diastolic heart failure, who presented to [facility] with SOB aggravated by activity that started a few days ago. She used home albuterol inhaler that was given during her last admission in the hospital, however no relief noted. She was discharged from here 2018 following mgt of acute on chronic diastolic heart failure and was sent home with her usual dose of PO furosemide. 2decho 2018 with LVEF of 55-60%. proBNP 2045, troponin 0.011, glucose 182, TSH 7.97 hemoglobin 11.2. CXR suggestive of bilateral interstitial pulmonary edema. Was given IV Bumex.      Past Medical History:   Diagnosis Date    Atrial fibrillation ([address]     Blood circulation, collateral     [employer] single pacemaker 2016    Cancer ([address]     HX  Colon     Carpal tunnel syndrome     Fracture dislocation of ankle     GERD (gastroesophageal reflux disease)     Hyperlipidemia     Hypertension     Kidney lesion, native, right     found on 2016    Osteoarthritis     knees    Osteopenia     Prolonged emergence from general anesthesia     Thyroid goiter 2016    Yersiniosis 2016     Past Surgical History:   Procedure Laterality Date    ABDOMEN SURGERY      ANKLE FRACTURE SURGERY  6097--1937    reconstruction in  and     APPENDECTOMY      BLADDER SURGERY      support bladder repair    CARPAL TUNNEL RELEASE  2013    CARPAL TUNNEL RELEASE Right 2017    Revision feet with RW SBA for household ambulation. Short term goal 4: Pt to ascend/descend 2 steps with HR SBA for home entry. Long term goals  Time Frame for Long term goals : NA due to short length of stay.

## 2018-12-06 NOTE — PROGRESS NOTES
diabetes mellitus. Monitor accucheck.      5. A-Fib on xarelto.         6. GERD. Resume PPI, sucralfate     7. Essential HTN. Resume losartan, metoprolol.     8. Anemia secondary to DIMITRIS. Based on Iron studies on previous admission. No overt bleed. Resume ferrous sulfate. Monitor Hgb.     9. Obesity. BMI 32.9.  Diet and lifestyle modification.              Electronically signed by Fadumo Brady MD on 12/5/2018 at 8:55 PM

## 2018-12-06 NOTE — PLAN OF CARE
Problem: Discharge Planning:  Goal: Participates in care planning  Participates in care planning   Outcome: Ongoing  Pt plans to return home    Problem: Airway Clearance - Ineffective:  Goal: Ability to maintain a clear airway will improve  Ability to maintain a clear airway will improve   Outcome: Ongoing  Pt off O2, Lungs clear to diminished    Problem: Cardiac Output - Decreased:  Goal: Hemodynamic stability will improve  Hemodynamic stability will improve   Outcome: Ongoing  Vitals:    12/05/18 2226 12/06/18 0456 12/06/18 0845 12/06/18 1120   BP: 131/60 134/67 (!) 119/56 126/60   Pulse: 60 62 61 64   Resp:  16 16 16   Temp:  97.6 °F (36.4 °C) 97.9 °F (36.6 °C) 97.9 °F (36.6 °C)   TempSrc:  Oral Oral Oral   SpO2:  95% 93% 94%   Weight:  173 lb 8 oz (78.7 kg)     Height:           Problem: Falls - Risk of:  Goal: Will remain free from falls  Will remain free from falls   Outcome: Ongoing  Pt free from falls, fall interventions in place    Problem: Pain:  Goal: Pain level will decrease  Pain level will decrease   Outcome: Ongoing  No c/o pain this shift    Comments: Care plan reviewed with patient and daugther. Patient and daughter verbalize understanding of the plan of care and contribute to goal setting.

## 2018-12-07 LAB
ANION GAP SERPL CALCULATED.3IONS-SCNC: 17 MEQ/L (ref 8–16)
BUN BLDV-MCNC: 13 MG/DL (ref 7–22)
CALCIUM SERPL-MCNC: 9.5 MG/DL (ref 8.5–10.5)
CHLORIDE BLD-SCNC: 95 MEQ/L (ref 98–111)
CO2: 26 MEQ/L (ref 23–33)
CREAT SERPL-MCNC: 0.8 MG/DL (ref 0.4–1.2)
ERYTHROCYTE [DISTWIDTH] IN BLOOD BY AUTOMATED COUNT: 18.9 % (ref 11.5–14.5)
ERYTHROCYTE [DISTWIDTH] IN BLOOD BY AUTOMATED COUNT: 57.7 FL (ref 35–45)
GFR SERPL CREATININE-BSD FRML MDRD: 67 ML/MIN/1.73M2
GLUCOSE BLD-MCNC: 129 MG/DL (ref 70–108)
HCT VFR BLD CALC: 41.8 % (ref 37–47)
HEMOGLOBIN: 12.5 GM/DL (ref 12–16)
MCH RBC QN AUTO: 26.7 PG (ref 26–33)
MCHC RBC AUTO-ENTMCNC: 29.9 GM/DL (ref 32.2–35.5)
MCV RBC AUTO: 89.3 FL (ref 81–99)
PLATELET # BLD: 210 THOU/MM3 (ref 130–400)
PMV BLD AUTO: 10.9 FL (ref 9.4–12.4)
POTASSIUM SERPL-SCNC: 3.8 MEQ/L (ref 3.5–5.2)
RBC # BLD: 4.68 MILL/MM3 (ref 4.2–5.4)
SODIUM BLD-SCNC: 138 MEQ/L (ref 135–145)
WBC # BLD: 5.8 THOU/MM3 (ref 4.8–10.8)

## 2018-12-07 PROCEDURE — 36415 COLL VENOUS BLD VENIPUNCTURE: CPT

## 2018-12-07 PROCEDURE — 99231 SBSQ HOSP IP/OBS SF/LOW 25: CPT | Performed by: PHYSICIAN ASSISTANT

## 2018-12-07 PROCEDURE — 2580000003 HC RX 258: Performed by: HOSPITALIST

## 2018-12-07 PROCEDURE — 6370000000 HC RX 637 (ALT 250 FOR IP): Performed by: HOSPITALIST

## 2018-12-07 PROCEDURE — 85027 COMPLETE CBC AUTOMATED: CPT

## 2018-12-07 PROCEDURE — 80048 BASIC METABOLIC PNL TOTAL CA: CPT

## 2018-12-07 PROCEDURE — 97110 THERAPEUTIC EXERCISES: CPT

## 2018-12-07 PROCEDURE — 97116 GAIT TRAINING THERAPY: CPT

## 2018-12-07 PROCEDURE — 99232 SBSQ HOSP IP/OBS MODERATE 35: CPT | Performed by: INTERNAL MEDICINE

## 2018-12-07 PROCEDURE — 6370000000 HC RX 637 (ALT 250 FOR IP): Performed by: INTERNAL MEDICINE

## 2018-12-07 PROCEDURE — 1200000000 HC SEMI PRIVATE

## 2018-12-07 RX ADMIN — DOCUSATE SODIUM 100 MG: 100 CAPSULE, LIQUID FILLED ORAL at 22:34

## 2018-12-07 RX ADMIN — Medication 10 ML: at 22:34

## 2018-12-07 RX ADMIN — LOSARTAN POTASSIUM 25 MG: 25 TABLET, FILM COATED ORAL at 09:37

## 2018-12-07 RX ADMIN — PANTOPRAZOLE SODIUM 40 MG: 40 TABLET, DELAYED RELEASE ORAL at 09:37

## 2018-12-07 RX ADMIN — Medication 10 ML: at 09:39

## 2018-12-07 RX ADMIN — VENLAFAXINE HYDROCHLORIDE 75 MG: 75 CAPSULE, EXTENDED RELEASE ORAL at 09:37

## 2018-12-07 RX ADMIN — SUCRALFATE 1 G: 1 TABLET ORAL at 22:34

## 2018-12-07 RX ADMIN — SPIRONOLACTONE 25 MG: 25 TABLET ORAL at 09:37

## 2018-12-07 RX ADMIN — FERROUS SULFATE TAB 325 MG (65 MG ELEMENTAL FE) 325 MG: 325 (65 FE) TAB at 09:37

## 2018-12-07 RX ADMIN — METOPROLOL TARTRATE 50 MG: 50 TABLET, FILM COATED ORAL at 22:34

## 2018-12-07 RX ADMIN — BUMETANIDE 1 MG: 1 TABLET ORAL at 09:37

## 2018-12-07 RX ADMIN — ASPIRIN 81 MG 81 MG: 81 TABLET ORAL at 09:37

## 2018-12-07 RX ADMIN — METOPROLOL TARTRATE 50 MG: 50 TABLET, FILM COATED ORAL at 09:37

## 2018-12-07 RX ADMIN — FERROUS SULFATE TAB 325 MG (65 MG ELEMENTAL FE) 325 MG: 325 (65 FE) TAB at 22:34

## 2018-12-07 RX ADMIN — DOCUSATE SODIUM 100 MG: 100 CAPSULE, LIQUID FILLED ORAL at 09:37

## 2018-12-07 RX ADMIN — SUCRALFATE 1 G: 1 TABLET ORAL at 09:38

## 2018-12-07 RX ADMIN — VITAMIN D, TAB 1000IU (100/BT) 1000 UNITS: 25 TAB at 09:38

## 2018-12-07 ASSESSMENT — PAIN SCALES - GENERAL
PAINLEVEL_OUTOF10: 0

## 2018-12-07 NOTE — CONSULTS
Inpatient consult to GI  Consult performed by: Mortimer Bakes ordered by: Grupo Barraza        Pt Name: Daniel Gomez  MRN: [id number]  [id number]  YOB: 1928  Admit Date: 12/3/2018  2:05 AM  Date of evaluation: 12/7/2018  Primary Care Physician: Sonia Castaneda MD   5T-70/215-H   Dictating for Dr Peng Araya    Requesting Provider:  Haritha Rabago PA  JESUS Consult    Indication:  need clearance for DAPT if stent needed - cath is tomorrow morning - pt also on xarelto so will need triple therapy for first month     History:  The patient is a 80 y.o.  female admitted 12-3-18 for SOB. She has a h/o a-fib s/p PPM/ICD on xarelto, HLD, HTN, chronic diastolic heart failure. She was recently admitted for fatigue and SOB. JESUS was consulted for DIMITRIS. She had recent EGD 11-28-18 with gastric polyps which had signs of recent bleeding and clip was placed in order to prevent bleeding after biopsy, biopsy showed inflamed HP polyp. She has a PMH of colon cancer in 1954, and her last colonoscopy in 1998 showed a polyp and hemorrhoidal tags. She presented to [facility] 12-3-18 due to SOB. She is being evaluated by cardiology and they want to do heart cath and consulted GI due to recent DIMITRIS and EGD. Pt reports SOB with activity and fatigue. She has a generalized abdominal discomfort intermittently that occurs with SOB episodes. She state it is not a pain. She denies GERD, nausea, vomiting, weight loss, poor appetite, constipation, diarrhea, melena, and hematochezia. Her hgb is currently 12.5 on ferrous sulfate BID. She is on ASA and xeralto. Last EGD:  11-28-18    IMPRESSION:      1. There were at least 4 gastric polyps which have signs of recent bleeding, small amount. A Hemoclip was placed at the base of the biggest one, 1.2cm size, in order to prevent bleeding after biopsy, as patient is on Xarelto. 2.  Biopsies taken of the gastric body, biggest polyp  .  Found to be inflamed HP, no mg, 100 mg, Oral, BID, Muriel Eisenmenger, MD, 100 mg at 12/07/18 8436    aspirin chewable tablet 81 mg, 81 mg, Oral, Daily, Muriel Eisenmenger, MD, 81 mg at 12/07/18 0937    albuterol sulfate  (90 Base) MCG/ACT inhaler 2 puff, 2 puff, Inhalation, 4x Daily PRN, Muriel Eisenmenger, MD    spironolactone (ALDACTONE) tablet 25 mg, 25 mg, Oral, Daily, Allyn Schmitt MD, 25 mg at 12/07/18 6782   Social History:   Social History     Social History    Marital status:      Spouse name: N/A    Number of children: N/A    Years of education: N/A     Occupational History    Not on file. Social History Main Topics    Smoking status: Never Smoker    Smokeless tobacco: Never Used    Alcohol use No    Drug use: No    Sexual activity: Not on file     Other Topics Concern    Not on file     Social History Narrative    No narrative on file     Family History:   No GI malignancies   Family History   Problem Relation Age of Onset    Heart Disease Mother     High Blood Pressure Mother     Heart Disease Father     High Blood Pressure Father     Heart Disease Brother     High Blood Pressure Brother     Heart Disease Son        ROS:  General : no fever chills or weight loss   Eyes: No loss of vision  ENT: No vocal hoarseness   Cardiovascular: No chest pain. Respiratory: No cough, +SOB  GI:+intermittnet abd \"discomfort\"  No melena, hematochezia, constipation, or diarrhea. : No dysuria   GYN: No abnormal menstrual bleeding    Musculoskeletal: No new painful or swollen joints. .   Skin: No rashes, jaundice  Neurologic: no frequent headaches  Emotional: No anxiety or depression  Endocrine:  No polyuria,polydipsia, polyphagia  Blood: +DIMITRIS improved on iron.   Allergic/Immunologic: No lupus or HIV  Cancer: +hx colon cancer    Physical Exam:  BP (!) 112/58   Pulse 65   Temp 97.4 °F (36.3 °C) (Oral)   Resp 16   Ht 5' 2\" (1.575 m)   Wt 172 lb 8 oz (78.2 kg)   SpO2 95%   BMI 31.55 kg/m²     The

## 2018-12-07 NOTE — PROGRESS NOTES
Hospitalist Progress Note    Patient:  Geremias Carter      Unit/Bed:6K-21/021-A    YOB: 1928    MRN: [id number]       Acct: [de-identified]     PCP: Hal Person MD    Date of Admission: 12/3/2018    Chief Complaint:   SOB    Hospital Course:   Patient is a 80 y.o. female, with a hx of A-fib s/p PPM/ICD on xarelto, HLD, HTN, chronic diastolic heart failure, who presented to [address] [place] [address] with SOB aggravated by activity that started a few days ago. She used home albuterol inhaler that was given during her last admission in the hospital, however no relief noted. She was discharged from here 11/29/2018 following mgt of acute on chronic diastolic heart failure and was sent home with her usual dose of PO furosemide. 2decho 11/26/2018 with LVEF of 55-60%. She denies chest pain, dizziness, diaphoresis, cough, wheezing, orthopnea, PND, pedal edema. No chills, fever, dysuria, diarrhea, nausea, vomiting. proBNP 2045, troponin 0.011, glucose 182, TSH 7.97 hemoglobin 11.2. CXR suggestive of bilateral interstitial pulmonary edema. Was given IV Bumex. Subjective:   Feels ok but develops significant VACA with ambulation over short distances. Has some light headedness as well with standing. Review of Systems    All other systems reviewed and are negative; except for the pertinent findings previously mentioned in the history of present illness.        Medications:  Reviewed    Infusion Medications   Scheduled Medications    bumetanide  1 mg Oral Daily    sodium chloride flush  10 mL Intravenous 2 times per day    vitamin D  1,000 Units Oral Daily    venlafaxine  75 mg Oral Daily    sucralfate  1 g Oral 4x Daily    pantoprazole  40 mg Oral Daily    metoprolol tartrate  50 mg Oral BID    losartan  25 mg Oral Daily    ferrous sulfate  325 mg Oral BID    docusate sodium  100 mg Oral BID    aspirin  81 mg Oral Daily    spironolactone  25 mg Oral Daily     PRN Meds: nitroGLYCERIN, NITRU NEGATIVE 11/26/2018    WBCUA 2-4 03/31/2016    BACTERIA NONE 03/31/2016    RBCUA 3-5 03/31/2016    BLOODU NEGATIVE 11/26/2018    SPECGRAV 1.020 04/08/2016    SPECGRAV 1.014 03/31/2016    GLUCOSEU NEGATIVE 11/26/2018       Radiology:  XR CHEST PORTABLE   Final Result      Bilateral interstitial infiltrates consistent with mild pulmonary edema versus an atypical viral-type pneumonia. Old granulomatous disease. Mild cardiomegaly. **This report has been created using voice recognition software. It may contain minor errors which are inherent in voice recognition technology. **      Final report electronically signed by Dr. Chas Zelaya on 12/3/2018 3:27 AM          Diet: DIET CARDIAC;    DVT prophylaxis: [] Lovenox                                 [] SCDs                                 [] SQ Heparin                                 [] Encourage ambulation           [x] Already on Anticoagulation     Disposition:    [x] Home       [] TCU       [] Rehab       [] Psych       [] SNF       [] [place]       [] Other-    Code Status: DNR-CCA      Assessment/Plan:    Anticipated Discharge in : pending    Active Hospital Problems    Diagnosis Date Noted    Acute lung edema ([facility] [J81.0] 12/03/2018    Acute on chronic congestive heart failure with left ventricular diastolic dysfunction (HCC) [I50.33]     Elevated troponin [R74.8]        1. Acute on Chronic diastolic CHF. BNP elevated. Furosemide 40mg IV BID, transition to bumex. Resume metoprolol. Normal digoxin level. Continue digoxin. Daily weight and I&O's. Spirinolactone added. Patients symptoms not improving. Still significant VACA. Suspect symptomatic CAD vs. PH. Structurally heart has good squeeze with mild valvulopathies. Last ischemic workup was years ago. - Considering stress test vs. L/RHC. 2. Acute pulmonary edema due to #1,stable. 3. Elevated troponin x 1. Likely due to demand of CHF. Trend. V-paced rhythm in EKG. No Chest pain.

## 2018-12-07 NOTE — PROGRESS NOTES
Physical Therapy   [address] [place] [address]  INPATIENT PHYSICAL THERAPY  DAILYNOTE  [facility] RENAL TELEMETRY 6K - 6K-21/021-A    Time In: 0830  Time Out: 9664  Timed Code Treatment Minutes: 26 Minutes  Minutes: 26          Date: 2018  Patient Name: Geremias Carter,  Gender:  female        MRN: [id number]  : 1928  (80 y.o.)     Referring Practitioner: Sue Saucedo MD  Diagnosis: Acute lung edema  Additional Pertinent Hx: Patient is a 80 y.o. female, with a hx of A-fib s/p PPM/ICD on xarelto, HLD, HTN, chronic diastolic heart failure, who presented to [address] [place] [address] with SOB aggravated by activity that started a few days ago. She used home albuterol inhaler that was given during her last admission in the hospital, however no relief noted. She was discharged from here 2018 following mgt of acute on chronic diastolic heart failure and was sent home with her usual dose of PO furosemide. 2decho 2018 with LVEF of 55-60%. proBNP 2045, troponin 0.011, glucose 182, TSH 7.97 hemoglobin 11.2. CXR suggestive of bilateral interstitial pulmonary edema. Was given IV Bumex.      Past Medical History:   Diagnosis Date    Atrial fibrillation ([address]     Blood circulation, collateral     [employer] single pacemaker 2016    Cancer ([address] 1954    HX  Colon     Carpal tunnel syndrome     Fracture dislocation of ankle     GERD (gastroesophageal reflux disease)     Hyperlipidemia     Hypertension     Kidney lesion, native, right     found on 2016    Osteoarthritis     knees    Osteopenia     Prolonged emergence from general anesthesia     Thyroid goiter 2016    Yersiniosis      Past Surgical History:   Procedure Laterality Date    ABDOMEN SURGERY      ANKLE FRACTURE SURGERY  [id number]    reconstruction in  and     APPENDECTOMY      BLADDER SURGERY      support bladder repair    CARPAL TUNNEL RELEASE  2013    CARPAL TUNNEL RELEASE Right

## 2018-12-07 NOTE — PROGRESS NOTES
Monitor accucheck.      5. A-Fib on xarelto. Holding for possible cath.      6. GERD. Resume PPI, sucralfate     7. Essential HTN. Resume losartan, metoprolol.     8. Anemia secondary to DIMITRIS. Based on Iron studies on previous admission. No overt bleed. Resume ferrous sulfate. Monitor Hgb.     9. Obesity. BMI 32.9.  Diet and lifestyle modification.              Electronically signed by Abbey Gee MD on 12/7/2018 at 10:28 AM

## 2018-12-08 LAB
ANION GAP SERPL CALCULATED.3IONS-SCNC: 15 MEQ/L (ref 8–16)
BUN BLDV-MCNC: 17 MG/DL (ref 7–22)
CALCIUM SERPL-MCNC: 9.3 MG/DL (ref 8.5–10.5)
CHLORIDE BLD-SCNC: 97 MEQ/L (ref 98–111)
CO2: 26 MEQ/L (ref 23–33)
CREAT SERPL-MCNC: 0.7 MG/DL (ref 0.4–1.2)
ERYTHROCYTE [DISTWIDTH] IN BLOOD BY AUTOMATED COUNT: 18.7 % (ref 11.5–14.5)
ERYTHROCYTE [DISTWIDTH] IN BLOOD BY AUTOMATED COUNT: 56.7 FL (ref 35–45)
GFR SERPL CREATININE-BSD FRML MDRD: 78 ML/MIN/1.73M2
GLUCOSE BLD-MCNC: 130 MG/DL (ref 70–108)
HCT VFR BLD CALC: 40.6 % (ref 37–47)
HEMOGLOBIN: 12.3 GM/DL (ref 12–16)
MCH RBC QN AUTO: 26.8 PG (ref 26–33)
MCHC RBC AUTO-ENTMCNC: 30.3 GM/DL (ref 32.2–35.5)
MCV RBC AUTO: 88.5 FL (ref 81–99)
PLATELET # BLD: 205 THOU/MM3 (ref 130–400)
PMV BLD AUTO: 11.2 FL (ref 9.4–12.4)
POTASSIUM SERPL-SCNC: 3.9 MEQ/L (ref 3.5–5.2)
RBC # BLD: 4.59 MILL/MM3 (ref 4.2–5.4)
SODIUM BLD-SCNC: 138 MEQ/L (ref 135–145)
WBC # BLD: 6.5 THOU/MM3 (ref 4.8–10.8)

## 2018-12-08 PROCEDURE — 6370000000 HC RX 637 (ALT 250 FOR IP): Performed by: INTERNAL MEDICINE

## 2018-12-08 PROCEDURE — 2580000003 HC RX 258: Performed by: HOSPITALIST

## 2018-12-08 PROCEDURE — 85027 COMPLETE CBC AUTOMATED: CPT

## 2018-12-08 PROCEDURE — 6360000002 HC RX W HCPCS

## 2018-12-08 PROCEDURE — 1200000000 HC SEMI PRIVATE

## 2018-12-08 PROCEDURE — 93017 CV STRESS TEST TRACING ONLY: CPT | Performed by: INTERNAL MEDICINE

## 2018-12-08 PROCEDURE — 6370000000 HC RX 637 (ALT 250 FOR IP): Performed by: HOSPITALIST

## 2018-12-08 PROCEDURE — 3430000000 HC RX DIAGNOSTIC RADIOPHARMACEUTICAL: Performed by: INTERNAL MEDICINE

## 2018-12-08 PROCEDURE — A9500 TC99M SESTAMIBI: HCPCS | Performed by: INTERNAL MEDICINE

## 2018-12-08 PROCEDURE — 99232 SBSQ HOSP IP/OBS MODERATE 35: CPT | Performed by: INTERNAL MEDICINE

## 2018-12-08 PROCEDURE — 80048 BASIC METABOLIC PNL TOTAL CA: CPT

## 2018-12-08 PROCEDURE — 36415 COLL VENOUS BLD VENIPUNCTURE: CPT

## 2018-12-08 PROCEDURE — 2709999900 HC NON-CHARGEABLE SUPPLY

## 2018-12-08 PROCEDURE — 78452 HT MUSCLE IMAGE SPECT MULT: CPT

## 2018-12-08 RX ADMIN — VITAMIN D, TAB 1000IU (100/BT) 1000 UNITS: 25 TAB at 13:52

## 2018-12-08 RX ADMIN — ASPIRIN 81 MG 81 MG: 81 TABLET ORAL at 13:57

## 2018-12-08 RX ADMIN — METOPROLOL TARTRATE 50 MG: 50 TABLET, FILM COATED ORAL at 19:47

## 2018-12-08 RX ADMIN — SUCRALFATE 1 G: 1 TABLET ORAL at 17:26

## 2018-12-08 RX ADMIN — PANTOPRAZOLE SODIUM 40 MG: 40 TABLET, DELAYED RELEASE ORAL at 13:57

## 2018-12-08 RX ADMIN — VENLAFAXINE HYDROCHLORIDE 75 MG: 75 CAPSULE, EXTENDED RELEASE ORAL at 13:52

## 2018-12-08 RX ADMIN — DOCUSATE SODIUM 100 MG: 100 CAPSULE, LIQUID FILLED ORAL at 19:47

## 2018-12-08 RX ADMIN — FERROUS SULFATE TAB 325 MG (65 MG ELEMENTAL FE) 325 MG: 325 (65 FE) TAB at 19:47

## 2018-12-08 RX ADMIN — BUMETANIDE 1 MG: 1 TABLET ORAL at 13:52

## 2018-12-08 RX ADMIN — ACETAMINOPHEN 650 MG: 325 TABLET ORAL at 19:47

## 2018-12-08 RX ADMIN — SPIRONOLACTONE 25 MG: 25 TABLET ORAL at 13:52

## 2018-12-08 RX ADMIN — Medication 10 ML: at 19:47

## 2018-12-08 RX ADMIN — LOSARTAN POTASSIUM 25 MG: 25 TABLET, FILM COATED ORAL at 13:52

## 2018-12-08 RX ADMIN — SUCRALFATE 1 G: 1 TABLET ORAL at 19:47

## 2018-12-08 RX ADMIN — Medication 33.3 MILLICURIE: at 12:34

## 2018-12-08 RX ADMIN — Medication 9.6 MILLICURIE: at 11:50

## 2018-12-08 RX ADMIN — Medication 10 ML: at 08:00

## 2018-12-08 ASSESSMENT — PAIN SCALES - GENERAL
PAINLEVEL_OUTOF10: 0
PAINLEVEL_OUTOF10: 3

## 2018-12-09 ENCOUNTER — PREP FOR PROCEDURE (OUTPATIENT)
Dept: CARDIOLOGY | Age: 83
End: 2018-12-09

## 2018-12-09 LAB
ANION GAP SERPL CALCULATED.3IONS-SCNC: 15 MEQ/L (ref 8–16)
BUN BLDV-MCNC: 16 MG/DL (ref 7–22)
CALCIUM SERPL-MCNC: 9.4 MG/DL (ref 8.5–10.5)
CHLORIDE BLD-SCNC: 95 MEQ/L (ref 98–111)
CO2: 26 MEQ/L (ref 23–33)
CREAT SERPL-MCNC: 1 MG/DL (ref 0.4–1.2)
ERYTHROCYTE [DISTWIDTH] IN BLOOD BY AUTOMATED COUNT: 18.9 % (ref 11.5–14.5)
ERYTHROCYTE [DISTWIDTH] IN BLOOD BY AUTOMATED COUNT: 57.8 FL (ref 35–45)
GFR SERPL CREATININE-BSD FRML MDRD: 52 ML/MIN/1.73M2
GLUCOSE BLD-MCNC: 119 MG/DL (ref 70–108)
HCT VFR BLD CALC: 41.8 % (ref 37–47)
HEMOGLOBIN: 12.7 GM/DL (ref 12–16)
MCH RBC QN AUTO: 27.2 PG (ref 26–33)
MCHC RBC AUTO-ENTMCNC: 30.4 GM/DL (ref 32.2–35.5)
MCV RBC AUTO: 89.5 FL (ref 81–99)
PLATELET # BLD: 173 THOU/MM3 (ref 130–400)
PMV BLD AUTO: 11.4 FL (ref 9.4–12.4)
POTASSIUM SERPL-SCNC: 4.1 MEQ/L (ref 3.5–5.2)
RBC # BLD: 4.67 MILL/MM3 (ref 4.2–5.4)
SODIUM BLD-SCNC: 136 MEQ/L (ref 135–145)
WBC # BLD: 5.8 THOU/MM3 (ref 4.8–10.8)

## 2018-12-09 PROCEDURE — 6370000000 HC RX 637 (ALT 250 FOR IP): Performed by: INTERNAL MEDICINE

## 2018-12-09 PROCEDURE — 2580000003 HC RX 258: Performed by: HOSPITALIST

## 2018-12-09 PROCEDURE — 99231 SBSQ HOSP IP/OBS SF/LOW 25: CPT | Performed by: NURSE PRACTITIONER

## 2018-12-09 PROCEDURE — 6370000000 HC RX 637 (ALT 250 FOR IP): Performed by: HOSPITALIST

## 2018-12-09 PROCEDURE — 85027 COMPLETE CBC AUTOMATED: CPT

## 2018-12-09 PROCEDURE — 80048 BASIC METABOLIC PNL TOTAL CA: CPT

## 2018-12-09 PROCEDURE — 36415 COLL VENOUS BLD VENIPUNCTURE: CPT

## 2018-12-09 PROCEDURE — 1200000000 HC SEMI PRIVATE

## 2018-12-09 PROCEDURE — 99232 SBSQ HOSP IP/OBS MODERATE 35: CPT | Performed by: INTERNAL MEDICINE

## 2018-12-09 RX ORDER — ASPIRIN 325 MG
325 TABLET ORAL ONCE
Status: CANCELLED | OUTPATIENT
Start: 2018-12-09 | End: 2018-12-09

## 2018-12-09 RX ORDER — SODIUM CHLORIDE 9 MG/ML
INJECTION, SOLUTION INTRAVENOUS CONTINUOUS
Status: CANCELLED | OUTPATIENT
Start: 2018-12-09

## 2018-12-09 RX ORDER — BUMETANIDE 1 MG/1
0.5 TABLET ORAL DAILY
Status: DISCONTINUED | OUTPATIENT
Start: 2018-12-09 | End: 2018-12-14 | Stop reason: HOSPADM

## 2018-12-09 RX ORDER — SODIUM CHLORIDE 0.9 % (FLUSH) 0.9 %
10 SYRINGE (ML) INJECTION EVERY 12 HOURS SCHEDULED
Status: CANCELLED | OUTPATIENT
Start: 2018-12-09

## 2018-12-09 RX ORDER — NITROGLYCERIN 0.4 MG/1
0.4 TABLET SUBLINGUAL EVERY 5 MIN PRN
Status: CANCELLED | OUTPATIENT
Start: 2018-12-09

## 2018-12-09 RX ORDER — SODIUM CHLORIDE 0.9 % (FLUSH) 0.9 %
10 SYRINGE (ML) INJECTION PRN
Status: CANCELLED | OUTPATIENT
Start: 2018-12-09

## 2018-12-09 RX ORDER — DIPHENHYDRAMINE HYDROCHLORIDE 50 MG/ML
50 INJECTION INTRAMUSCULAR; INTRAVENOUS ONCE
Status: CANCELLED | OUTPATIENT
Start: 2018-12-09 | End: 2018-12-09

## 2018-12-09 RX ADMIN — Medication 10 ML: at 08:36

## 2018-12-09 RX ADMIN — SUCRALFATE 1 G: 1 TABLET ORAL at 19:42

## 2018-12-09 RX ADMIN — METOPROLOL TARTRATE 50 MG: 50 TABLET, FILM COATED ORAL at 19:42

## 2018-12-09 RX ADMIN — DOCUSATE SODIUM 100 MG: 100 CAPSULE, LIQUID FILLED ORAL at 19:42

## 2018-12-09 RX ADMIN — FERROUS SULFATE TAB 325 MG (65 MG ELEMENTAL FE) 325 MG: 325 (65 FE) TAB at 19:42

## 2018-12-09 RX ADMIN — BUMETANIDE 0.5 MG: 1 TABLET ORAL at 08:35

## 2018-12-09 RX ADMIN — DOCUSATE SODIUM 100 MG: 100 CAPSULE, LIQUID FILLED ORAL at 08:34

## 2018-12-09 RX ADMIN — PANTOPRAZOLE SODIUM 40 MG: 40 TABLET, DELAYED RELEASE ORAL at 08:34

## 2018-12-09 RX ADMIN — FERROUS SULFATE TAB 325 MG (65 MG ELEMENTAL FE) 325 MG: 325 (65 FE) TAB at 08:35

## 2018-12-09 RX ADMIN — VITAMIN D, TAB 1000IU (100/BT) 1000 UNITS: 25 TAB at 08:35

## 2018-12-09 RX ADMIN — ACETAMINOPHEN 650 MG: 325 TABLET ORAL at 19:42

## 2018-12-09 RX ADMIN — METOPROLOL TARTRATE 50 MG: 50 TABLET, FILM COATED ORAL at 08:35

## 2018-12-09 RX ADMIN — LOSARTAN POTASSIUM 25 MG: 25 TABLET, FILM COATED ORAL at 08:35

## 2018-12-09 RX ADMIN — SUCRALFATE 1 G: 1 TABLET ORAL at 12:17

## 2018-12-09 RX ADMIN — SUCRALFATE 1 G: 1 TABLET ORAL at 16:57

## 2018-12-09 RX ADMIN — VENLAFAXINE HYDROCHLORIDE 75 MG: 75 CAPSULE, EXTENDED RELEASE ORAL at 08:35

## 2018-12-09 RX ADMIN — SUCRALFATE 1 G: 1 TABLET ORAL at 08:35

## 2018-12-09 RX ADMIN — SPIRONOLACTONE 25 MG: 25 TABLET ORAL at 08:35

## 2018-12-09 RX ADMIN — Medication 10 ML: at 19:51

## 2018-12-09 RX ADMIN — ASPIRIN 81 MG 81 MG: 81 TABLET ORAL at 08:34

## 2018-12-09 ASSESSMENT — PAIN SCALES - GENERAL
PAINLEVEL_OUTOF10: 3
PAINLEVEL_OUTOF10: 0

## 2018-12-09 NOTE — PLAN OF CARE
Problem: Discharge Planning:  Goal: Participates in care planning  Participates in care planning   Outcome: Ongoing  Pt kept up to date on plan of care  Goal: Discharged to appropriate level of care  Discharged to appropriate level of care   Outcome: Ongoing  Plans on returning home at discharge    Problem: Airway Clearance - Ineffective:  Goal: Ability to maintain a clear airway will improve  Ability to maintain a clear airway will improve   Outcome: Completed Date Met: 12/09/18      Problem: Cardiac Output - Decreased:  Goal: Hemodynamic stability will improve  Hemodynamic stability will improve   Outcome: Ongoing  Vital signs stable, telemetry monitor on. Heart cath planned for tomorrow    Problem: Falls - Risk of:  Goal: Will remain free from falls  Will remain free from falls   Outcome: Ongoing  No falls this shift, call light in reach. Bed alarm on. Up with 1 assist and walker    Problem: Pain:  Goal: Pain level will decrease  Pain level will decrease   Outcome: Ongoing  Denies any pain this shift    Comments: Care plan reviewed with patient and daughter. Patient and daughter verbalize understanding of the plan of care and contribute to goal setting.

## 2018-12-09 NOTE — PROGRESS NOTES
Cardiology Progress Note      Patient:  Marcus Bain  YOB: 1928  MRN: [id number]   Acct: [de-identified]  Admit Date:  12/3/2018  Primary Cardiologist: Claude Blanchard MD    Per Dr. Rozell Alpers note:     CHIEF COMPLAINT: SOB        HPI: This is a pleasant 80 y.o. female presents with sob. Hx of Afib s/p PPM on Xarelto, HTN, HLD, diastolic heart failure. 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 She was recently admitted for CHF. CXR with pulmonary edema. BNP 2045. TSH 7.97  She was getting worse at home and was nauseated as well. She phoned an RN and was instructed to take Benadryl. Cr 0.7. Trop 0.011--<0.010. She was told to come in if she continued to be sob. No ches pain. WBC 5.  Hb 11.2. She also used inhalers but this did not help. She feels the same as she did at home. No left arm or jaw pain. No abdominal pain.      Subjective (Events in last 24 hours): Had stress test negative for ischemia yesterday. Felt hot and lightheaded today while bathing. Denies chest pain but continues with severe dyspnea with exertion. Daughter, Morales Hylton states over the lat two weeks she has not been able to walk 10-20 feet without being totally winded and having to sit down. She also has noted her mom is sob at rest. She and Gisselle Brooks are requesting further testing to evaluate VACA. VSS.       Objective:   BP (!) 108/51   Pulse 60   Temp 97.8 °F (36.6 °C) (Oral)   Resp 18   Ht 5' 2\" (1.575 m)   Wt 172 lb 9.6 oz (78.3 kg)   SpO2 95%   BMI 31.57 kg/m²        TELEMETRY: Paced    Physical Exam:  General Appearance: alert and oriented to person, place and time, in no acute distress at rest  Cardiovascular: normal rate, regular rhythm, normal S1 and S2, no murmurs, rubs, clicks, or gallops, distal pulses intact, no carotid bruits, no JVD  Pulmonary/Chest: clear to auscultation bilaterally- no wheezes, rales or rhonchi, normal air movement, no respiratory distress  Abdomen: soft, non-tender, non-distended, normal bowel [facility] tomorrow.    · Continue to hold Xarelto     Electronically signed by NOELLE Werner CNP on 12/9/2018 at 12:49 PM

## 2018-12-09 NOTE — PROGRESS NOTES
17  16   CREATININE  0.8  0.7  1.0   CALCIUM  9.5  9.3  9.4     No results for input(s): AST, ALT, BILIDIR, BILITOT, ALKPHOS in the last 72 hours. No results for input(s): INR in the last 72 hours. No results for input(s): [place] Hosteller in the last 72 hours. Urinalysis:      Lab Results   Component Value Date    NITRU NEGATIVE 11/26/2018    WBCUA 2-4 03/31/2016    BACTERIA NONE 03/31/2016    RBCUA 3-5 03/31/2016    BLOODU NEGATIVE 11/26/2018    SPECGRAV 1.020 04/08/2016    SPECGRAV 1.014 03/31/2016    GLUCOSEU NEGATIVE 11/26/2018       Radiology:  XR CHEST PORTABLE   Final Result      Bilateral interstitial infiltrates consistent with mild pulmonary edema versus an atypical viral-type pneumonia. Old granulomatous disease. Mild cardiomegaly. **This report has been created using voice recognition software. It may contain minor errors which are inherent in voice recognition technology. **      Final report electronically signed by Dr. Fabrizio Fair on 12/3/2018 3:27 AM          Diet: DIET CARDIAC; Low Sodium (2 GM); Daily Fluid Restriction: 2000 ml    DVT prophylaxis: [] Lovenox                                 [] SCDs                                 [] SQ Heparin                                 [] Encourage ambulation           [x] Already on Anticoagulation     Disposition:    [x] Home       [] TCU       [] Rehab       [] Psych       [] SNF       [] [place]       [] Other-    Code Status: DNR-CCA      Assessment/Plan:    Anticipated Discharge in : pending    Active Hospital Problems    Diagnosis Date Noted    Acute lung edema ([facility] [J81.0] 12/03/2018    Acute on chronic congestive heart failure with left ventricular diastolic dysfunction (HCC) [I50.33]     Elevated troponin [R74.8]        1. Acute on Chronic diastolic CHF. BNP elevated. Furosemide 40mg IV BID, transition to bumex. Resume metoprolol. Normal digoxin level. Continue digoxin. Daily weight and I&O's.  Spirinolactone

## 2018-12-10 LAB
ABO: NORMAL
ANION GAP SERPL CALCULATED.3IONS-SCNC: 13 MEQ/L (ref 8–16)
ANTIBODY SCREEN: NORMAL
APTT: 29.9 SECONDS (ref 22–38)
BUN BLDV-MCNC: 18 MG/DL (ref 7–22)
CALCIUM SERPL-MCNC: 9.8 MG/DL (ref 8.5–10.5)
CHLORIDE BLD-SCNC: 99 MEQ/L (ref 98–111)
CO2: 27 MEQ/L (ref 23–33)
CREAT SERPL-MCNC: 0.9 MG/DL (ref 0.4–1.2)
EKG ATRIAL RATE: 70 BPM
EKG Q-T INTERVAL: 426 MS
EKG QRS DURATION: 100 MS
EKG QTC CALCULATION (BAZETT): 460 MS
EKG R AXIS: 32 DEGREES
EKG T AXIS: -79 DEGREES
EKG VENTRICULAR RATE: 70 BPM
ERYTHROCYTE [DISTWIDTH] IN BLOOD BY AUTOMATED COUNT: 19.4 % (ref 11.5–14.5)
ERYTHROCYTE [DISTWIDTH] IN BLOOD BY AUTOMATED COUNT: 59.7 FL (ref 35–45)
GFR SERPL CREATININE-BSD FRML MDRD: 59 ML/MIN/1.73M2
GLUCOSE BLD-MCNC: 129 MG/DL (ref 70–108)
HCT VFR BLD CALC: 41.9 % (ref 37–47)
HEMOGLOBIN: 12.9 GM/DL (ref 12–16)
INR BLD: 1.04 (ref 0.85–1.13)
MCH RBC QN AUTO: 27.4 PG (ref 26–33)
MCHC RBC AUTO-ENTMCNC: 30.8 GM/DL (ref 32.2–35.5)
MCV RBC AUTO: 89 FL (ref 81–99)
PLATELET # BLD: 192 THOU/MM3 (ref 130–400)
PMV BLD AUTO: 11.1 FL (ref 9.4–12.4)
POTASSIUM REFLEX MAGNESIUM: 4.2 MEQ/L (ref 3.5–5.2)
RBC # BLD: 4.71 MILL/MM3 (ref 4.2–5.4)
RH FACTOR: NORMAL
SODIUM BLD-SCNC: 139 MEQ/L (ref 135–145)
WBC # BLD: 5.9 THOU/MM3 (ref 4.8–10.8)

## 2018-12-10 PROCEDURE — 86901 BLOOD TYPING SEROLOGIC RH(D): CPT

## 2018-12-10 PROCEDURE — 85027 COMPLETE CBC AUTOMATED: CPT

## 2018-12-10 PROCEDURE — 6370000000 HC RX 637 (ALT 250 FOR IP): Performed by: HOSPITALIST

## 2018-12-10 PROCEDURE — 97116 GAIT TRAINING THERAPY: CPT

## 2018-12-10 PROCEDURE — 99232 SBSQ HOSP IP/OBS MODERATE 35: CPT | Performed by: INTERNAL MEDICINE

## 2018-12-10 PROCEDURE — 2500000003 HC RX 250 WO HCPCS: Performed by: NURSE PRACTITIONER

## 2018-12-10 PROCEDURE — 6360000002 HC RX W HCPCS: Performed by: NURSE PRACTITIONER

## 2018-12-10 PROCEDURE — 86900 BLOOD TYPING SEROLOGIC ABO: CPT

## 2018-12-10 PROCEDURE — 6370000000 HC RX 637 (ALT 250 FOR IP): Performed by: INTERNAL MEDICINE

## 2018-12-10 PROCEDURE — 1200000000 HC SEMI PRIVATE

## 2018-12-10 PROCEDURE — 85730 THROMBOPLASTIN TIME PARTIAL: CPT

## 2018-12-10 PROCEDURE — 99231 SBSQ HOSP IP/OBS SF/LOW 25: CPT | Performed by: PHYSICIAN ASSISTANT

## 2018-12-10 PROCEDURE — 85610 PROTHROMBIN TIME: CPT

## 2018-12-10 PROCEDURE — 97110 THERAPEUTIC EXERCISES: CPT

## 2018-12-10 PROCEDURE — 93005 ELECTROCARDIOGRAM TRACING: CPT | Performed by: NURSE PRACTITIONER

## 2018-12-10 PROCEDURE — S0028 INJECTION, FAMOTIDINE, 20 MG: HCPCS | Performed by: NURSE PRACTITIONER

## 2018-12-10 PROCEDURE — 6370000000 HC RX 637 (ALT 250 FOR IP): Performed by: NURSE PRACTITIONER

## 2018-12-10 PROCEDURE — 36415 COLL VENOUS BLD VENIPUNCTURE: CPT

## 2018-12-10 PROCEDURE — 2580000003 HC RX 258: Performed by: NURSE PRACTITIONER

## 2018-12-10 PROCEDURE — 93010 ELECTROCARDIOGRAM REPORT: CPT | Performed by: INTERNAL MEDICINE

## 2018-12-10 PROCEDURE — 86850 RBC ANTIBODY SCREEN: CPT

## 2018-12-10 PROCEDURE — 80048 BASIC METABOLIC PNL TOTAL CA: CPT

## 2018-12-10 RX ORDER — ASPIRIN 325 MG
325 TABLET ORAL ONCE
Status: COMPLETED | OUTPATIENT
Start: 2018-12-10 | End: 2018-12-10

## 2018-12-10 RX ORDER — DIPHENHYDRAMINE HYDROCHLORIDE 50 MG/ML
50 INJECTION INTRAMUSCULAR; INTRAVENOUS ONCE
Status: COMPLETED | OUTPATIENT
Start: 2018-12-10 | End: 2018-12-10

## 2018-12-10 RX ORDER — SODIUM CHLORIDE 0.9 % (FLUSH) 0.9 %
10 SYRINGE (ML) INJECTION PRN
Status: DISCONTINUED | OUTPATIENT
Start: 2018-12-10 | End: 2018-12-11 | Stop reason: SDUPTHER

## 2018-12-10 RX ORDER — NITROGLYCERIN 0.4 MG/1
0.4 TABLET SUBLINGUAL EVERY 5 MIN PRN
Status: DISCONTINUED | OUTPATIENT
Start: 2018-12-10 | End: 2018-12-14 | Stop reason: HOSPADM

## 2018-12-10 RX ORDER — SODIUM CHLORIDE 9 MG/ML
INJECTION, SOLUTION INTRAVENOUS CONTINUOUS
Status: DISCONTINUED | OUTPATIENT
Start: 2018-12-10 | End: 2018-12-10

## 2018-12-10 RX ORDER — SODIUM CHLORIDE 0.9 % (FLUSH) 0.9 %
10 SYRINGE (ML) INJECTION EVERY 12 HOURS SCHEDULED
Status: DISCONTINUED | OUTPATIENT
Start: 2018-12-10 | End: 2018-12-11 | Stop reason: SDUPTHER

## 2018-12-10 RX ADMIN — DOCUSATE SODIUM 100 MG: 100 CAPSULE, LIQUID FILLED ORAL at 08:59

## 2018-12-10 RX ADMIN — HYDROCORTISONE SODIUM SUCCINATE 200 MG: 100 INJECTION, POWDER, FOR SOLUTION INTRAMUSCULAR; INTRAVENOUS at 06:39

## 2018-12-10 RX ADMIN — Medication 10 ML: at 20:05

## 2018-12-10 RX ADMIN — VENLAFAXINE HYDROCHLORIDE 75 MG: 75 CAPSULE, EXTENDED RELEASE ORAL at 08:59

## 2018-12-10 RX ADMIN — ASPIRIN 325 MG: 325 TABLET ORAL at 05:30

## 2018-12-10 RX ADMIN — FAMOTIDINE 20 MG: 10 INJECTION, SOLUTION INTRAVENOUS at 05:30

## 2018-12-10 RX ADMIN — METOPROLOL TARTRATE 50 MG: 50 TABLET, FILM COATED ORAL at 08:59

## 2018-12-10 RX ADMIN — PANTOPRAZOLE SODIUM 40 MG: 40 TABLET, DELAYED RELEASE ORAL at 08:59

## 2018-12-10 RX ADMIN — BUMETANIDE 0.5 MG: 1 TABLET ORAL at 08:59

## 2018-12-10 RX ADMIN — SUCRALFATE 1 G: 1 TABLET ORAL at 16:39

## 2018-12-10 RX ADMIN — SUCRALFATE 1 G: 1 TABLET ORAL at 08:59

## 2018-12-10 RX ADMIN — FERROUS SULFATE TAB 325 MG (65 MG ELEMENTAL FE) 325 MG: 325 (65 FE) TAB at 20:04

## 2018-12-10 RX ADMIN — SPIRONOLACTONE 25 MG: 25 TABLET ORAL at 08:59

## 2018-12-10 RX ADMIN — DIPHENHYDRAMINE HYDROCHLORIDE 50 MG: 50 INJECTION, SOLUTION INTRAMUSCULAR; INTRAVENOUS at 05:29

## 2018-12-10 RX ADMIN — SODIUM CHLORIDE: 9 INJECTION, SOLUTION INTRAVENOUS at 05:29

## 2018-12-10 RX ADMIN — SUCRALFATE 1 G: 1 TABLET ORAL at 12:22

## 2018-12-10 RX ADMIN — ASPIRIN 81 MG 81 MG: 81 TABLET ORAL at 08:59

## 2018-12-10 RX ADMIN — SUCRALFATE 1 G: 1 TABLET ORAL at 20:04

## 2018-12-10 RX ADMIN — VITAMIN D, TAB 1000IU (100/BT) 1000 UNITS: 25 TAB at 08:59

## 2018-12-10 RX ADMIN — METOPROLOL TARTRATE 50 MG: 50 TABLET, FILM COATED ORAL at 20:04

## 2018-12-10 RX ADMIN — FERROUS SULFATE TAB 325 MG (65 MG ELEMENTAL FE) 325 MG: 325 (65 FE) TAB at 08:59

## 2018-12-10 RX ADMIN — LOSARTAN POTASSIUM 25 MG: 25 TABLET, FILM COATED ORAL at 08:59

## 2018-12-10 ASSESSMENT — PAIN SCALES - GENERAL
PAINLEVEL_OUTOF10: 0
PAINLEVEL_OUTOF10: 0

## 2018-12-10 NOTE — FLOWSHEET NOTE
Patient is a 25-year-old wife and mother of two children who is experiencing acute lung edema. She was approachable and freely engaged in open conversation during the encounter sharing her story. She shared being admitted, then going home, only to return again after 3 days due to not feeling well. They have done a stress test, and today postponed a heart cath. She is waiting on some answers and ability to feel better. She is sustained through support from her 2 children (son and daughter), as well as her  (in an ECF at [place]). There are other family members to assist as needed. She is woman of homer in God and prayer. She grew up in and is a member of [employer]. She is coping well at this time, but desires to experience progress toward home and recovery.  provided an empathic listening presence for the patient and daughter to share their story during the encounter.  had prayer for her healing and recovery, as well as for her . Chaplains remain available for further emotional and spiritual support as needed. 12/10/18 1905   Encounter Summary   Services provided to: Patient and family together   Referral/Consult From: [facility];Family members   Place of [place] [address] Visiting Yes  (12/10/2018)   Complexity of Encounter Moderate   Length of Encounter 30 minutes   Spiritual Assessment Completed Yes   Spiritual/Zoroastrianism   Type Spiritual support   Assessment Approachable;Coping; Hopeful   Intervention Active listening;Explored feelings, thoughts, concerns; Discussed illness/injury and it's impact; Discussed belief system/Gnosticism practices/homer;Sustaining presence/ Ministry of presence;Nurtured hope;Prayer   Outcome Receptive; Expressed feelings/needs/concerns;Engaged in conversation;Expressed gratitude

## 2018-12-10 NOTE — PROGRESS NOTES
Cardiology Progress Note      Patient:  Radha Paez  YOB: 1928  MRN: [id number]   Acct: [de-identified]  Admit Date:  12/3/2018  Primary Cardiologist: Tere Caraballo MD  Seen by dr Hilaria Peralta: sob  hpi per dr Thomas Safer \"This is a pleasant 80 y.o. female presents with sob. Hx of Afib s/p PPM on Xarelto, HTN, HLD, diastolic heart failure. 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 She was recently admitted for CHF. CXR with pulmonary edema. BNP 2045. TSH 7.97  She was getting worse at home and was nauseated as well. She phoned an RN and was instructed to take Benadryl. Cr 0.7. Trop 0.011--<0.010. She was told to come in if she continued to be sob. No ches pain. WBC 5.  Hb 11.2. She also used inhalers but this did not help. She feels the same as she did at home. No left arm or jaw pain. No abdominal pain. \"    Subjective (Events in last 24 hours): pt awake and alert. NAD. No cp or sob today.   Pt admits to weakness and VACA    Objective:   BP (!) 112/56   Pulse 60   Temp 96.8 °F (36 °C) (Axillary)   Resp 17   Ht 5' 2\" (1.575 m)   Wt 171 lb 4.8 oz (77.7 kg)   SpO2 95%   BMI 31.33 kg/m²        TELEMETRY: paced    Physical Exam:  General Appearance: alert and oriented to person, place and time, in no acute distress  Cardiovascular: normal rate, regular rhythm, normal S1 and S2, no murmurs, rubs, clicks, or gallops, distal pulses intact, no carotid bruits, no JVD  Pulmonary/Chest: clear to auscultation bilaterally- no wheezes, rales or rhonchi, normal air movement, no respiratory distress  Abdomen: soft, non-tender, non-distended, normal bowel sounds, no masses Extremities: no cyanosis, clubbing or edema, pulse   Skin: warm and dry, no rash or erythema  Head: normocephalic and atraumatic  Eyes: pupils equal, round, and reactive to light  Neck: supple and non-tender without mass, no thyromegaly   Musculoskeletal: normal range of motion, no joint swelling, deformity or

## 2018-12-11 ENCOUNTER — TELEPHONE (OUTPATIENT)
Dept: CARDIOLOGY CLINIC | Age: 83
End: 2018-12-11

## 2018-12-11 ENCOUNTER — APPOINTMENT (OUTPATIENT)
Dept: CARDIAC CATH/INVASIVE PROCEDURES | Age: 83
DRG: 246 | End: 2018-12-11
Payer: MEDICARE

## 2018-12-11 LAB
ACTIVATED CLOTTING TIME: 252 SECONDS (ref 1–150)
ANION GAP SERPL CALCULATED.3IONS-SCNC: 12 MEQ/L (ref 8–16)
APTT: 31 SECONDS (ref 22–38)
BUN BLDV-MCNC: 18 MG/DL (ref 7–22)
CALCIUM SERPL-MCNC: 9.4 MG/DL (ref 8.5–10.5)
CHLORIDE BLD-SCNC: 97 MEQ/L (ref 98–111)
CO2: 26 MEQ/L (ref 23–33)
COLLECTED BY:: ABNORMAL
COLLECTED BY:: NORMAL
CREAT SERPL-MCNC: 0.9 MG/DL (ref 0.4–1.2)
EKG ATRIAL RATE: 326 BPM
EKG Q-T INTERVAL: 490 MS
EKG QRS DURATION: 156 MS
EKG QTC CALCULATION (BAZETT): 490 MS
EKG R AXIS: -75 DEGREES
EKG T AXIS: 96 DEGREES
EKG VENTRICULAR RATE: 60 BPM
ERYTHROCYTE [DISTWIDTH] IN BLOOD BY AUTOMATED COUNT: 19.4 % (ref 11.5–14.5)
ERYTHROCYTE [DISTWIDTH] IN BLOOD BY AUTOMATED COUNT: 19.6 % (ref 11.5–14.5)
ERYTHROCYTE [DISTWIDTH] IN BLOOD BY AUTOMATED COUNT: 61.6 FL (ref 35–45)
ERYTHROCYTE [DISTWIDTH] IN BLOOD BY AUTOMATED COUNT: 62 FL (ref 35–45)
GFR SERPL CREATININE-BSD FRML MDRD: 59 ML/MIN/1.73M2
GLUCOSE BLD-MCNC: 109 MG/DL (ref 70–108)
HCT VFR BLD CALC: 37.2 % (ref 37–47)
HCT VFR BLD CALC: 39.4 % (ref 37–47)
HEMOGLOBIN: 11.2 GM/DL (ref 12–16)
HEMOGLOBIN: 12 GM/DL (ref 12–16)
INR BLD: 1.06 (ref 0.85–1.13)
MCH RBC QN AUTO: 27.2 PG (ref 26–33)
MCH RBC QN AUTO: 27.2 PG (ref 26–33)
MCHC RBC AUTO-ENTMCNC: 30.1 GM/DL (ref 32.2–35.5)
MCHC RBC AUTO-ENTMCNC: 30.5 GM/DL (ref 32.2–35.5)
MCV RBC AUTO: 89.3 FL (ref 81–99)
MCV RBC AUTO: 90.3 FL (ref 81–99)
PLATELET # BLD: 172 THOU/MM3 (ref 130–400)
PLATELET # BLD: 173 THOU/MM3 (ref 130–400)
PMV BLD AUTO: 11.3 FL (ref 9.4–12.4)
PMV BLD AUTO: 11.4 FL (ref 9.4–12.4)
POC ACTIVATED CLOTTING TIME KAOLIN: 158 SECONDS (ref 1–150)
POC ACTIVATED CLOTTING TIME KAOLIN: 175 SECONDS (ref 1–150)
POC O2 SATURATION: 66 % (ref 94–97)
POC O2 SATURATION: 97 % (ref 94–97)
POTASSIUM SERPL-SCNC: 3.8 MEQ/L (ref 3.5–5.2)
RBC # BLD: 4.12 MILL/MM3 (ref 4.2–5.4)
RBC # BLD: 4.41 MILL/MM3 (ref 4.2–5.4)
SODIUM BLD-SCNC: 135 MEQ/L (ref 135–145)
SOURCE, BLOOD GAS: ABNORMAL
SOURCE, BLOOD GAS: NORMAL
WBC # BLD: 5 THOU/MM3 (ref 4.8–10.8)
WBC # BLD: 5.7 THOU/MM3 (ref 4.8–10.8)

## 2018-12-11 PROCEDURE — 2709999900 HC NON-CHARGEABLE SUPPLY

## 2018-12-11 PROCEDURE — 4A023N7 MEASUREMENT OF CARDIAC SAMPLING AND PRESSURE, LEFT HEART, PERCUTANEOUS APPROACH: ICD-10-PCS | Performed by: INTERNAL MEDICINE

## 2018-12-11 PROCEDURE — 2500000003 HC RX 250 WO HCPCS

## 2018-12-11 PROCEDURE — 93460 R&L HRT ART/VENTRICLE ANGIO: CPT | Performed by: INTERNAL MEDICINE

## 2018-12-11 PROCEDURE — 80048 BASIC METABOLIC PNL TOTAL CA: CPT

## 2018-12-11 PROCEDURE — 6360000004 HC RX CONTRAST MEDICATION: Performed by: INTERNAL MEDICINE

## 2018-12-11 PROCEDURE — 85730 THROMBOPLASTIN TIME PARTIAL: CPT

## 2018-12-11 PROCEDURE — 6370000000 HC RX 637 (ALT 250 FOR IP): Performed by: HOSPITALIST

## 2018-12-11 PROCEDURE — C1874 STENT, COATED/COV W/DEL SYS: HCPCS

## 2018-12-11 PROCEDURE — 2500000003 HC RX 250 WO HCPCS: Performed by: INTERNAL MEDICINE

## 2018-12-11 PROCEDURE — B2111ZZ FLUOROSCOPY OF MULTIPLE CORONARY ARTERIES USING LOW OSMOLAR CONTRAST: ICD-10-PCS | Performed by: INTERNAL MEDICINE

## 2018-12-11 PROCEDURE — C1894 INTRO/SHEATH, NON-LASER: HCPCS

## 2018-12-11 PROCEDURE — 93460 R&L HRT ART/VENTRICLE ANGIO: CPT

## 2018-12-11 PROCEDURE — 36415 COLL VENOUS BLD VENIPUNCTURE: CPT

## 2018-12-11 PROCEDURE — 6360000002 HC RX W HCPCS

## 2018-12-11 PROCEDURE — 6360000002 HC RX W HCPCS: Performed by: HOSPITALIST

## 2018-12-11 PROCEDURE — C9600 PERC DRUG-EL COR STENT SING: HCPCS

## 2018-12-11 PROCEDURE — 85347 COAGULATION TIME ACTIVATED: CPT

## 2018-12-11 PROCEDURE — 6370000000 HC RX 637 (ALT 250 FOR IP): Performed by: INTERNAL MEDICINE

## 2018-12-11 PROCEDURE — 82810 BLOOD GASES O2 SAT ONLY: CPT

## 2018-12-11 PROCEDURE — 2580000003 HC RX 258: Performed by: PHYSICIAN ASSISTANT

## 2018-12-11 PROCEDURE — 6370000000 HC RX 637 (ALT 250 FOR IP)

## 2018-12-11 PROCEDURE — 92928 PRQ TCAT PLMT NTRAC ST 1 LES: CPT | Performed by: INTERNAL MEDICINE

## 2018-12-11 PROCEDURE — C1769 GUIDE WIRE: HCPCS

## 2018-12-11 PROCEDURE — 85610 PROTHROMBIN TIME: CPT

## 2018-12-11 PROCEDURE — C1725 CATH, TRANSLUMIN NON-LASER: HCPCS

## 2018-12-11 PROCEDURE — 2140000000 HC CCU INTERMEDIATE R&B

## 2018-12-11 PROCEDURE — 027034Z DILATION OF CORONARY ARTERY, ONE ARTERY WITH DRUG-ELUTING INTRALUMINAL DEVICE, PERCUTANEOUS APPROACH: ICD-10-PCS | Performed by: INTERNAL MEDICINE

## 2018-12-11 PROCEDURE — 93010 ELECTROCARDIOGRAM REPORT: CPT | Performed by: INTERNAL MEDICINE

## 2018-12-11 PROCEDURE — 85027 COMPLETE CBC AUTOMATED: CPT

## 2018-12-11 PROCEDURE — C1887 CATHETER, GUIDING: HCPCS

## 2018-12-11 PROCEDURE — 99232 SBSQ HOSP IP/OBS MODERATE 35: CPT | Performed by: FAMILY MEDICINE

## 2018-12-11 PROCEDURE — C1760 CLOSURE DEV, VASC: HCPCS

## 2018-12-11 PROCEDURE — 2580000003 HC RX 258: Performed by: INTERNAL MEDICINE

## 2018-12-11 PROCEDURE — 93005 ELECTROCARDIOGRAM TRACING: CPT | Performed by: INTERNAL MEDICINE

## 2018-12-11 RX ORDER — SODIUM CHLORIDE 9 MG/ML
75 INJECTION, SOLUTION INTRAVENOUS CONTINUOUS
Status: ACTIVE | OUTPATIENT
Start: 2018-12-11 | End: 2018-12-12

## 2018-12-11 RX ORDER — SODIUM CHLORIDE 9 MG/ML
INJECTION, SOLUTION INTRAVENOUS CONTINUOUS
Status: DISCONTINUED | OUTPATIENT
Start: 2018-12-11 | End: 2018-12-11 | Stop reason: ALTCHOICE

## 2018-12-11 RX ORDER — PHENYLEPHRINE HCL IN 0.9% NACL 0.5 MG/5ML
50 SYRINGE (ML) INTRAVENOUS ONCE
Status: COMPLETED | OUTPATIENT
Start: 2018-12-11 | End: 2018-12-11

## 2018-12-11 RX ORDER — SODIUM CHLORIDE 0.9 % (FLUSH) 0.9 %
10 SYRINGE (ML) INJECTION PRN
Status: DISCONTINUED | OUTPATIENT
Start: 2018-12-11 | End: 2018-12-14 | Stop reason: HOSPADM

## 2018-12-11 RX ORDER — PHENYLEPHRINE HCL IN 0.9% NACL 0.5 MG/5ML
50 SYRINGE (ML) INTRAVENOUS
Status: COMPLETED | OUTPATIENT
Start: 2018-12-11 | End: 2018-12-11

## 2018-12-11 RX ORDER — CLOPIDOGREL BISULFATE 75 MG/1
75 TABLET ORAL DAILY
Status: DISCONTINUED | OUTPATIENT
Start: 2018-12-12 | End: 2018-12-14 | Stop reason: HOSPADM

## 2018-12-11 RX ORDER — SODIUM CHLORIDE 0.9 % (FLUSH) 0.9 %
10 SYRINGE (ML) INJECTION EVERY 12 HOURS SCHEDULED
Status: DISCONTINUED | OUTPATIENT
Start: 2018-12-11 | End: 2018-12-14 | Stop reason: HOSPADM

## 2018-12-11 RX ORDER — SODIUM CHLORIDE 0.9 % (FLUSH) 0.9 %
10 SYRINGE (ML) INJECTION PRN
Status: DISCONTINUED | OUTPATIENT
Start: 2018-12-11 | End: 2018-12-11 | Stop reason: ALTCHOICE

## 2018-12-11 RX ORDER — SODIUM CHLORIDE 0.9 % (FLUSH) 0.9 %
10 SYRINGE (ML) INJECTION EVERY 12 HOURS SCHEDULED
Status: DISCONTINUED | OUTPATIENT
Start: 2018-12-11 | End: 2018-12-11 | Stop reason: ALTCHOICE

## 2018-12-11 RX ORDER — ACETAMINOPHEN 325 MG/1
650 TABLET ORAL EVERY 4 HOURS PRN
Status: DISCONTINUED | OUTPATIENT
Start: 2018-12-11 | End: 2018-12-14 | Stop reason: HOSPADM

## 2018-12-11 RX ORDER — ONDANSETRON 2 MG/ML
4 INJECTION INTRAMUSCULAR; INTRAVENOUS EVERY 6 HOURS PRN
Status: DISCONTINUED | OUTPATIENT
Start: 2018-12-11 | End: 2018-12-14 | Stop reason: HOSPADM

## 2018-12-11 RX ADMIN — LOSARTAN POTASSIUM 25 MG: 25 TABLET, FILM COATED ORAL at 08:39

## 2018-12-11 RX ADMIN — ACETAMINOPHEN 650 MG: 325 TABLET ORAL at 20:27

## 2018-12-11 RX ADMIN — Medication 50 MCG: at 17:20

## 2018-12-11 RX ADMIN — SODIUM CHLORIDE: 9 INJECTION, SOLUTION INTRAVENOUS at 08:39

## 2018-12-11 RX ADMIN — Medication 10 ML: at 08:45

## 2018-12-11 RX ADMIN — ONDANSETRON 4 MG: 2 INJECTION INTRAMUSCULAR; INTRAVENOUS at 13:10

## 2018-12-11 RX ADMIN — SUCRALFATE 1 G: 1 TABLET ORAL at 22:31

## 2018-12-11 RX ADMIN — IOPAMIDOL 150 ML: 755 INJECTION, SOLUTION INTRAVENOUS at 12:45

## 2018-12-11 RX ADMIN — FERROUS SULFATE TAB 325 MG (65 MG ELEMENTAL FE) 325 MG: 325 (65 FE) TAB at 22:31

## 2018-12-11 RX ADMIN — ASPIRIN 81 MG 81 MG: 81 TABLET ORAL at 08:39

## 2018-12-11 RX ADMIN — Medication 10 ML: at 22:31

## 2018-12-11 RX ADMIN — METOPROLOL TARTRATE 50 MG: 50 TABLET, FILM COATED ORAL at 08:39

## 2018-12-11 RX ADMIN — Medication 50 MCG: at 17:30

## 2018-12-11 ASSESSMENT — PAIN DESCRIPTION - LOCATION: LOCATION: GROIN

## 2018-12-11 ASSESSMENT — PAIN DESCRIPTION - ORIENTATION: ORIENTATION: RIGHT

## 2018-12-11 ASSESSMENT — PAIN DESCRIPTION - DESCRIPTORS: DESCRIPTORS: BURNING

## 2018-12-11 ASSESSMENT — PAIN SCALES - GENERAL
PAINLEVEL_OUTOF10: 3
PAINLEVEL_OUTOF10: 0
PAINLEVEL_OUTOF10: 0

## 2018-12-11 ASSESSMENT — PAIN DESCRIPTION - PAIN TYPE: TYPE: SURGICAL PAIN

## 2018-12-11 ASSESSMENT — PAIN DESCRIPTION - ONSET: ONSET: ON-GOING

## 2018-12-11 ASSESSMENT — PAIN DESCRIPTION - FREQUENCY: FREQUENCY: CONTINUOUS

## 2018-12-11 ASSESSMENT — PAIN DESCRIPTION - PROGRESSION: CLINICAL_PROGRESSION: NOT CHANGED

## 2018-12-11 NOTE — PROGRESS NOTES
TABLET BY MOUTH  DAILY 90 tablet 3    pantoprazole (PROTONIX) 40 MG tablet TAKE 1 TABLET BY MOUTH  DAILY 90 tablet 3    XARELTO 15 MG TABS tablet TAKE 1 TABLET BY MOUTH  DAILY WITH BREAKFAST 90 tablet 3    sucralfate (CARAFATE) 1 GM tablet Take 1 tablet by mouth 4 times daily 360 tablet 3    metoprolol tartrate (LOPRESSOR) 25 MG tablet TAKE 2 TABLETS BY MOUTH IN  THE MORNING AND 3 TABLETS  BY MOUTH IN THE EVENING (Patient taking differently: TAKE 2 TABLETS BY MOUTH IN  THE MORNING AND 2 TABLETS  BY MOUTH IN THE EVENING) 450 tablet 3    furosemide (LASIX) 20 MG tablet TAKE 1 TABLET BY MOUTH TWO  TIMES DAILY 180 tablet 3    cycloSPORINE (RESTASIS MULTIDOSE) 0.05 % ophthalmic emulsion Place 1 drop into both eyes 2 times daily      NONFORMULARY Perils probiotic  1 daily      Cyanocobalamin (VITAMIN B 12 PO) Take by mouth every other day       aspirin 81 MG chewable tablet Take 1 tablet by mouth daily 30 tablet 3    vitamin D (CHOLECALCIFEROL) 1000 UNIT TABS tablet Take 1,000 Units by mouth daily      Calcium-Vitamin D-Vitamin K 500-500-40 MG-UNT-MCG CHEW Take 1 tablet by mouth daily      acetaminophen (TYLENOL) 500 MG tablet   Take 1,000 mg by mouth 2 times daily       Biotin 1000 MCG TABS Take 1 tablet by mouth daily          Current Facility-Administered Medications   Medication Dose Route Frequency Provider Last Rate Last Dose    0.9 % sodium chloride infusion   Intravenous Continuous Isabela Mtz PA-C 75 mL/hr at 12/11/18 0839      sodium chloride flush 0.9 % injection 10 mL  10 mL Intravenous 2 times per day Isabela Mtz PA-C   10 mL at 12/11/18 0845    sodium chloride flush 0.9 % injection 10 mL  10 mL Intravenous PRN Isabela Mtz PA-C        nitroGLYCERIN (NITROSTAT) SL tablet 0.4 mg  0.4 mg Sublingual Q5 Min PRN Joellen Hernandez APRN - CNP        bumetanide (BUMEX) tablet 0.5 mg  0.5 mg Oral Daily Jerry Morin MD   0.5 mg at 12/10/18 0859    magnesium hydroxide (MILK OF MAGNESIA) 400 MG/5ML suspension 30 mL  30 mL Oral Daily PRN Jono Taveras MD        ondansetron [facility]) injection 4 mg  4 mg Intravenous Q6H PRN Jono Taveras MD        acetaminophen (TYLENOL) tablet 650 mg  650 mg Oral Q4H PRN Jono Taveras MD   650 mg at 12/09/18 1942    vitamin D (CHOLECALCIFEROL) tablet 1,000 Units  1,000 Units Oral Daily Jono Taveras MD   1,000 Units at 12/10/18 0859    venlafaxine (EFFEXOR XR) extended release capsule 75 mg  75 mg Oral Daily Jono Taveras MD   75 mg at 12/10/18 0859    sucralfate (CARAFATE) tablet 1 g  1 g Oral 4x Daily Jono Taveras MD   1 g at 12/10/18 2004    pantoprazole (PROTONIX) tablet 40 mg  40 mg Oral Daily Jono Taveras MD   40 mg at 12/10/18 0859    metoprolol tartrate (LOPRESSOR) tablet 50 mg  50 mg Oral BID Jono Taveras MD   50 mg at 12/11/18 3039    losartan (COZAAR) tablet 25 mg  25 mg Oral Daily Jono Taveras MD   25 mg at 12/11/18 9915    ferrous sulfate tablet 325 mg  325 mg Oral BID Jono Taveras MD   325 mg at 12/10/18 2004    docusate sodium (COLACE) capsule 100 mg  100 mg Oral BID Jono Taveras MD   100 mg at 12/10/18 5349    aspirin chewable tablet 81 mg  81 mg Oral Daily Jono Taveras MD   81 mg at 12/11/18 0839    albuterol sulfate  (90 Base) MCG/ACT inhaler 2 puff  2 puff Inhalation 4x Daily PRN Jono Taveras MD        spironolactone (ALDACTONE) tablet 25 mg  25 mg Oral Daily Cheo Nunez MD   25 mg at 12/10/18 3406             PHYSICAL:     BP (!) 142/62   Pulse 65   Temp 97.8 °F (36.6 °C) (Oral)   Resp 18   Ht 5' 2\" (1.575 m)   Wt 171 lb 3.2 oz (77.7 kg)   SpO2 95%   BMI 31.31 kg/m²     Heart:  [x]Regular rate and rhythm  []Other:    Lungs:  [x]Clear    []Other:    Abdomen: [x]Soft    []Other:    Mental Status: [x]Alert & Oriented  []Other:   Ext:                [x]No edema       []Other:         No results for input(s): CKTOTAL, CKMB, CKMBINDEX, TROPONINI in the last 72 hours. Lab Results   Component Value Date    WBC 5.7 12/11/2018    RBC 4.41 12/11/2018    RBC 4.17 08/30/2011    HGB 12.0 12/11/2018    HCT 39.4 12/11/2018    MCV 89.3 12/11/2018    MCH 27.2 12/11/2018    MCHC 30.5 12/11/2018    RDW 14.5 09/14/2017     12/11/2018    MPV 11.4 12/11/2018       Lab Results   Component Value Date     12/11/2018    K 3.8 12/11/2018    K 4.2 12/10/2018    CL 97 12/11/2018    CO2 26 12/11/2018    BUN 18 12/11/2018    LABALBU 3.9 12/04/2018    LABALBU 4.2 08/30/2011    CREATININE 0.9 12/11/2018    CALCIUM 9.4 12/11/2018    LABGLOM 59 12/11/2018    GLUCOSE 109 12/11/2018    GLUCOSE 116 08/30/2011       Lab Results   Component Value Date    ALKPHOS 48 12/04/2018    ALT 11 12/04/2018    AST 22 12/04/2018    PROT 6.8 12/04/2018    BILITOT 2.1 12/04/2018    BILIDIR <0.2 12/03/2018    LABALBU 3.9 12/04/2018    LABALBU 4.2 08/30/2011       Lab Results   Component Value Date    MG 1.7 12/04/2018       No components found for: Mango Rivera    Lab Results   Component Value Date    LABA1C 6.4 11/01/2018       Lab Results   Component Value Date    TRIG 145 12/04/2018    HDL 36 12/04/2018    LDLCALC 62 12/04/2018       Lab Results   Component Value Date    TSH 7.970 12/03/2018            SEDATION  Planned agent:[x]Midazolam []Meperidine [x]Sublimaze []Morphine []Diazepam  []Other:         ASA Classification:  []1 [x]2 []3 []4 []5  Class 1: A normal healthy patient  Class 2: Pt with mild to moderate systemic disease  Class 3: Severe systemic disease or disturbance  Class 4: Severe systemic disorders that are already life threatening. Class 5: Moribund pt with little chances of survival, for more than 24 hours. Mallampati I Airway Classification:   []1 [x]2 []3 []4      [x]Pre-procedure diagnostic studies complete and results available.    Comment:    [x]Previous sedation/anesthesia experiences

## 2018-12-11 NOTE — PROGRESS NOTES
Hospitalist Progress Note    Patient:  Dalton Riggins      Unit/Bed:3B-24/024-A    YOB: 1928    MRN: [id number]       Acct: [de-identified]     PCP: Cuauhtemoc Ortiz MD    Date of Admission: 12/3/2018    Chief Complaint: SOB    Hospital Course:   Patient is a 80 y. o. female, with a hx of A-fib s/p PPM/ICD on xarelto, HLD, HTN, chronic diastolic heart failure, who presented to [address] [place] [address] with SOB aggravated by activity that started a few days ago. She used home albuterol inhaler that was given during her last admission in the hospital, however no relief noted. She was discharged from here 11/29/2018 following mgt of acute on chronic diastolic heart failure and was sent home with her usual dose of PO furosemide. 2decho 11/26/2018 with LVEF of 55-60%.    She denies chest pain, dizziness, diaphoresis, cough, wheezing, orthopnea, PND, pedal edema.  No chills, fever, dysuria, diarrhea, nausea, vomiting.     proBNP 2045, troponin 0.011, glucose 182, TSH 7.97 hemoglobin 11.2. CXR suggestive of bilateral interstitial pulmonary edema.  She was diuresed for a few days and was had good output, however, remain dyspneic on exertion, and felt like she was going to pass out. Underwent stress test on 12/8/18, normal. Cardiac cath done in 12/11, Lesions in the RPDA and LAD noted, s/p PCI of RPDA. She will need FFR guided staged PCI of LAD scheduled on 12/20 per Cardiology. Subjective:   Patient feels that her breathing has improved after the cath/PCI, although still feels tired. Patient denies chest pain, palpitations, or groin pain. Review of Systems    All other systems reviewed and are negative; except for the pertinent findings previously mentioned in the history of present illness.     Medications:  Reviewed    Infusion Medications    sodium chloride       Scheduled Medications    sodium chloride flush  10 mL Intravenous 2 times per day    [START ON 12/12/2018] clopidogrel  75 mg Oral

## 2018-12-11 NOTE — PROGRESS NOTES
1310- Attempted to call report to 3B, nurse busy in patient's room and will call back later for report. 1335- Attempted to call report, nurse still busy with patient and will call back. 1352- Janeen from 3B called to get report on patient.

## 2018-12-11 NOTE — PROGRESS NOTES
Nutrition Education    Type and Reason for Visit: Reassess, Consult, Patient Education    Patient stated stated appetite is good, had a cardiac cath today. CAD noted. Pt. educated on Low sodium 2000ml fluid restriction diet on 12/6/18 & has not further questions. She declines diet review. BMI=34. Intake previously % prior to NPO for procedure. Wt. Loss most likely related to edema/fluid shifts. On Bumex. · Written educational materials provided on 12/6/18  · Contact name and number provided. · Refer to Patient Education activity for more details.     Electronically signed by Felecia Leary RD, LD on 12/11/18 at 1:50 PM    Contact Number:     [phone]

## 2018-12-11 NOTE — OP NOTE
[address] [place] [address]  Sedation/Analgesia Post Sedation Record        Pt Name: Ivone Crump  MRN: [id number]  YOB: 1928  Procedure Performed By: Hayley Faria MD, [facility]  Primary Care Physician: Emerita Parker MD        POST-PROCEDURE    Physicians/Assistants: Hayley Faria MD, [facility]    Procedure Performed:  Left heart cath and PCI to RPDA                                  Sedation/Anesthesia:  Local Anesthesia and IV Conscious Sedation with continuous O2 monitoring    Estimated Blood Loss: 10 cc     Specimens Removed:  [x]None []Other:      Disposition of Specimen:  []Pathology []Other        Complications:   [x]None Immediate []Other:       Post Procedure Diagnosis/Findings:  Coronary Artery Disease            Recommendations:  Medical treatment and review films.    Normal Right heart pressure  Normal LVEDP  Lesions in the RPDA and LAD  S/p PCI of RPDA after discussing with family  DAPT for 1 year  Aggressive risk factor modification  Lipid lowering therapy  Needs FFR guided staged PCI of LAD            Hayley Faria MD, [facility]  Electronically signed 12/11/2018 at 12:45 PM

## 2018-12-12 ENCOUNTER — CARE COORDINATION (OUTPATIENT)
Dept: CASE MANAGEMENT | Age: 83
End: 2018-12-12

## 2018-12-12 LAB
ANION GAP SERPL CALCULATED.3IONS-SCNC: 14 MEQ/L (ref 8–16)
BUN BLDV-MCNC: 15 MG/DL (ref 7–22)
CALCIUM SERPL-MCNC: 8.3 MG/DL (ref 8.5–10.5)
CHLORIDE BLD-SCNC: 101 MEQ/L (ref 98–111)
CO2: 18 MEQ/L (ref 23–33)
CREAT SERPL-MCNC: 0.7 MG/DL (ref 0.4–1.2)
ERYTHROCYTE [DISTWIDTH] IN BLOOD BY AUTOMATED COUNT: 19.7 % (ref 11.5–14.5)
ERYTHROCYTE [DISTWIDTH] IN BLOOD BY AUTOMATED COUNT: 64.9 FL (ref 35–45)
GFR SERPL CREATININE-BSD FRML MDRD: 78 ML/MIN/1.73M2
GLUCOSE BLD-MCNC: 122 MG/DL (ref 70–108)
HCT VFR BLD CALC: 32.9 % (ref 37–47)
HCT VFR BLD CALC: 36.2 % (ref 37–47)
HEMOGLOBIN: 10 GM/DL (ref 12–16)
HEMOGLOBIN: 10.7 GM/DL (ref 12–16)
MCH RBC QN AUTO: 27.2 PG (ref 26–33)
MCHC RBC AUTO-ENTMCNC: 29.6 GM/DL (ref 32.2–35.5)
MCV RBC AUTO: 92.1 FL (ref 81–99)
PLATELET # BLD: 151 THOU/MM3 (ref 130–400)
PMV BLD AUTO: 11.1 FL (ref 9.4–12.4)
POTASSIUM SERPL-SCNC: 3.8 MEQ/L (ref 3.5–5.2)
RBC # BLD: 3.93 MILL/MM3 (ref 4.2–5.4)
SODIUM BLD-SCNC: 133 MEQ/L (ref 135–145)
WBC # BLD: 7.4 THOU/MM3 (ref 4.8–10.8)

## 2018-12-12 PROCEDURE — 6370000000 HC RX 637 (ALT 250 FOR IP): Performed by: HOSPITALIST

## 2018-12-12 PROCEDURE — 2709999900 HC NON-CHARGEABLE SUPPLY

## 2018-12-12 PROCEDURE — 2580000003 HC RX 258: Performed by: FAMILY MEDICINE

## 2018-12-12 PROCEDURE — 85014 HEMATOCRIT: CPT

## 2018-12-12 PROCEDURE — 85018 HEMOGLOBIN: CPT

## 2018-12-12 PROCEDURE — 80048 BASIC METABOLIC PNL TOTAL CA: CPT

## 2018-12-12 PROCEDURE — 36415 COLL VENOUS BLD VENIPUNCTURE: CPT

## 2018-12-12 PROCEDURE — 6370000000 HC RX 637 (ALT 250 FOR IP): Performed by: INTERNAL MEDICINE

## 2018-12-12 PROCEDURE — 6370000000 HC RX 637 (ALT 250 FOR IP): Performed by: PHYSICIAN ASSISTANT

## 2018-12-12 PROCEDURE — 99232 SBSQ HOSP IP/OBS MODERATE 35: CPT | Performed by: FAMILY MEDICINE

## 2018-12-12 PROCEDURE — 99231 SBSQ HOSP IP/OBS SF/LOW 25: CPT | Performed by: NURSE PRACTITIONER

## 2018-12-12 PROCEDURE — 2140000000 HC CCU INTERMEDIATE R&B

## 2018-12-12 PROCEDURE — 85027 COMPLETE CBC AUTOMATED: CPT

## 2018-12-12 PROCEDURE — 2580000003 HC RX 258: Performed by: INTERNAL MEDICINE

## 2018-12-12 RX ORDER — LOSARTAN POTASSIUM 25 MG/1
12.5 TABLET ORAL DAILY
Status: DISCONTINUED | OUTPATIENT
Start: 2018-12-13 | End: 2018-12-14 | Stop reason: HOSPADM

## 2018-12-12 RX ORDER — 0.9 % SODIUM CHLORIDE 0.9 %
500 INTRAVENOUS SOLUTION INTRAVENOUS ONCE
Status: COMPLETED | OUTPATIENT
Start: 2018-12-12 | End: 2018-12-12

## 2018-12-12 RX ADMIN — METOPROLOL TARTRATE 50 MG: 50 TABLET, FILM COATED ORAL at 22:33

## 2018-12-12 RX ADMIN — LOSARTAN POTASSIUM 25 MG: 25 TABLET, FILM COATED ORAL at 09:29

## 2018-12-12 RX ADMIN — DOCUSATE SODIUM 100 MG: 100 CAPSULE, LIQUID FILLED ORAL at 22:33

## 2018-12-12 RX ADMIN — CLOPIDOGREL BISULFATE 75 MG: 75 TABLET ORAL at 09:29

## 2018-12-12 RX ADMIN — VITAMIN D, TAB 1000IU (100/BT) 1000 UNITS: 25 TAB at 09:29

## 2018-12-12 RX ADMIN — PANTOPRAZOLE SODIUM 40 MG: 40 TABLET, DELAYED RELEASE ORAL at 09:29

## 2018-12-12 RX ADMIN — RIVAROXABAN 15 MG: 15 TABLET, FILM COATED ORAL at 18:00

## 2018-12-12 RX ADMIN — FERROUS SULFATE TAB 325 MG (65 MG ELEMENTAL FE) 325 MG: 325 (65 FE) TAB at 22:33

## 2018-12-12 RX ADMIN — DOCUSATE SODIUM 100 MG: 100 CAPSULE, LIQUID FILLED ORAL at 09:29

## 2018-12-12 RX ADMIN — METOPROLOL TARTRATE 50 MG: 50 TABLET, FILM COATED ORAL at 13:34

## 2018-12-12 RX ADMIN — SUCRALFATE 1 G: 1 TABLET ORAL at 22:32

## 2018-12-12 RX ADMIN — SODIUM CHLORIDE 75 ML/HR: 9 INJECTION, SOLUTION INTRAVENOUS at 00:43

## 2018-12-12 RX ADMIN — SPIRONOLACTONE 25 MG: 25 TABLET ORAL at 09:29

## 2018-12-12 RX ADMIN — BUMETANIDE 0.5 MG: 1 TABLET ORAL at 09:29

## 2018-12-12 RX ADMIN — SUCRALFATE 1 G: 1 TABLET ORAL at 18:00

## 2018-12-12 RX ADMIN — Medication 10 ML: at 22:33

## 2018-12-12 RX ADMIN — SUCRALFATE 1 G: 1 TABLET ORAL at 13:34

## 2018-12-12 RX ADMIN — FERROUS SULFATE TAB 325 MG (65 MG ELEMENTAL FE) 325 MG: 325 (65 FE) TAB at 09:29

## 2018-12-12 RX ADMIN — SODIUM CHLORIDE 500 ML: 9 INJECTION, SOLUTION INTRAVENOUS at 00:20

## 2018-12-12 RX ADMIN — SUCRALFATE 1 G: 1 TABLET ORAL at 09:29

## 2018-12-12 RX ADMIN — ASPIRIN 81 MG 81 MG: 81 TABLET ORAL at 09:29

## 2018-12-12 RX ADMIN — VENLAFAXINE HYDROCHLORIDE 75 MG: 75 CAPSULE, EXTENDED RELEASE ORAL at 09:29

## 2018-12-12 ASSESSMENT — PAIN SCALES - GENERAL: PAINLEVEL_OUTOF10: 0

## 2018-12-12 NOTE — PROGRESS NOTES
Hospitalist Progress Note    Patient:  Chris Hoff      Unit/Bed:3B-24/024-A    YOB: 1928    MRN: [id number]       Acct: [de-identified]     PCP: Darilyn Saint, MD    Date of Admission: 12/3/2018    Chief Complaint: SOB    Hospital Course:   Patient is a 80 y. o. female, with a hx of A-fib s/p PPM/ICD on xarelto, HLD, HTN, chronic diastolic heart failure, who presented to [facility] with SOB aggravated by activity that started a few days ago. She used home albuterol inhaler that was given during her last admission in the hospital, however no relief noted. She was discharged from here 11/29/2018 following mgt of acute on chronic diastolic heart failure and was sent home with her usual dose of PO furosemide. 2decho 11/26/2018 with LVEF of 55-60%.    She denies chest pain, dizziness, diaphoresis, cough, wheezing, orthopnea, PND, pedal edema.  No chills, fever, dysuria, diarrhea, nausea, vomiting.     proBNP 2045, troponin 0.011, glucose 182, TSH 7.97 hemoglobin 11.2. CXR suggestive of bilateral interstitial pulmonary edema.  She was diuresed for a few days and was had good output, however, remain dyspneic on exertion, and felt like she was going to pass out. Underwent stress test on 12/8/18, normal. Cardiac cath done in 12/11, Lesions in the RPDA and LAD noted, s/p PCI of RPDA. She will need FFR guided staged PCI of LAD scheduled on 12/20 per Cardiology. 12/12  Patient was having low Bps. Responded with a bolus last night. Today BP is at 90/50 after receiving metoprolol. Subjective:   Patient feels tired. She wants to go home, but BP was on the low side. Patient denies chest pain, palpitations, or groin pain. Review of Systems    All other systems reviewed and are negative; except for the pertinent findings previously mentioned in the history of present illness.     Medications:  Reviewed    Infusion Medications     Scheduled Medications    sodium chloride flush  10 mL insight  Capillary Refill: Brisk,< 3 seconds   Peripheral Pulses: +2 palpable, equal bilaterally       Labs:   Recent Labs      12/11/18   0445  12/11/18   1715  12/12/18   0048  12/12/18   0337   WBC  5.7  5.0   --   7.4   HGB  12.0  11.2*  10.0*  10.7*   HCT  39.4  37.2  32.9*  36.2*   PLT  173  172   --   151     Recent Labs      12/10/18   0532  12/11/18   0445  12/12/18   0337   NA  139  135  133*   K  4.2  3.8  3.8   CL  99  97*  101   CO2  27  26  18*   BUN  18  18  15   CREATININE  0.9  0.9  0.7   CALCIUM  9.8  9.4  8.3*     No results for input(s): AST, ALT, BILIDIR, BILITOT, ALKPHOS in the last 72 hours. Recent Labs      12/10/18   0532  12/11/18   0720   INR  1.04  1.06     No results for input(s): Pecolia Cruel in the last 72 hours. Urinalysis:      Lab Results   Component Value Date    NITRU NEGATIVE 11/26/2018    WBCUA 2-4 03/31/2016    BACTERIA NONE 03/31/2016    RBCUA 3-5 03/31/2016    BLOODU NEGATIVE 11/26/2018    SPECGRAV 1.020 04/08/2016    SPECGRAV 1.014 03/31/2016    GLUCOSEU NEGATIVE 11/26/2018       Radiology:  XR CHEST PORTABLE   Final Result      Bilateral interstitial infiltrates consistent with mild pulmonary edema versus an atypical viral-type pneumonia. Old granulomatous disease. Mild cardiomegaly. **This report has been created using voice recognition software. It may contain minor errors which are inherent in voice recognition technology. **      Final report electronically signed by Dr. Leeann Clifton on 12/3/2018 3:27 AM          Diet: DIET CARDIAC; Low Sodium (2 GM);  Daily Fluid Restriction: 2000 ml    DVT prophylaxis: [] Lovenox                                 [] SCDs                                 [] SQ Heparin                                 [] Encourage ambulation           [x] Already on Anticoagulation     Disposition:    [x] Home       [] TCU       [] Rehab       [] Psych       [] SNF       [] [place]       [] Other-    Code Status: Full

## 2018-12-12 NOTE — PROGRESS NOTES
Inpatient Cardiac Rehabilitation Consult    Received consult for Phase II Cardiac Rehabilitation. Cardiac Rehab education completed with patient. Pt is scheduled for another PCI on the 20th. We will see her then too. Brochure given.

## 2018-12-12 NOTE — PROGRESS NOTES
Date    TRIG 145 12/04/2018    HDL 36 12/04/2018    LDLCALC 62 12/04/2018       TSH:    Lab Results   Component Value Date    TSH 7.970 12/03/2018         Assessment:  · Acute on Chronic diastolic CHF: resolved  · EF 55-60 per echo 11/26/18  · Mild MR, Mild AI per echo  · CAD: Stress test negative for ischemia 12/8/18   LHC 12/11/18 PCI / NIKKO to mid PDA  · Chronic AFB: cvr  · HTN  · Obesity  · S/p PPM for tachy mariola syndrome  · H/o multiple falls in past, hx upper GIB       Plan:  · Continue asa/plavix/BB/spironolactone  · Decrease losartan to 12.5 mg daily  · Daily weight/I&O  · 2 gm sodium restriction, 2 L fluid restriction  · Per dr. Urrutia Bloodgood note- \"Xarelto - consider discussion regarding continuing therapy with primary cardiologist due to falls in the past and upper GIB\"   · Up out of bed, ambulate  · Plan FFR guided staged PCI of LAD 12/20/18    Electronically signed by NOELLE Wheat CNP on 12/12/2018 at 1:00 PM

## 2018-12-13 LAB
ANION GAP SERPL CALCULATED.3IONS-SCNC: 11 MEQ/L (ref 8–16)
BUN BLDV-MCNC: 10 MG/DL (ref 7–22)
CALCIUM SERPL-MCNC: 8.4 MG/DL (ref 8.5–10.5)
CHLORIDE BLD-SCNC: 103 MEQ/L (ref 98–111)
CO2: 23 MEQ/L (ref 23–33)
CREAT SERPL-MCNC: 0.7 MG/DL (ref 0.4–1.2)
ERYTHROCYTE [DISTWIDTH] IN BLOOD BY AUTOMATED COUNT: 20.1 % (ref 11.5–14.5)
ERYTHROCYTE [DISTWIDTH] IN BLOOD BY AUTOMATED COUNT: 67.2 FL (ref 35–45)
GFR SERPL CREATININE-BSD FRML MDRD: 78 ML/MIN/1.73M2
GLUCOSE BLD-MCNC: 117 MG/DL (ref 70–108)
HCT VFR BLD CALC: 31.3 % (ref 37–47)
HEMOGLOBIN: 9.4 GM/DL (ref 12–16)
MCH RBC QN AUTO: 27.7 PG (ref 26–33)
MCHC RBC AUTO-ENTMCNC: 30 GM/DL (ref 32.2–35.5)
MCV RBC AUTO: 92.3 FL (ref 81–99)
PLATELET # BLD: 126 THOU/MM3 (ref 130–400)
PMV BLD AUTO: 11.4 FL (ref 9.4–12.4)
POTASSIUM SERPL-SCNC: 3.8 MEQ/L (ref 3.5–5.2)
RBC # BLD: 3.39 MILL/MM3 (ref 4.2–5.4)
SODIUM BLD-SCNC: 137 MEQ/L (ref 135–145)
WBC # BLD: 6.1 THOU/MM3 (ref 4.8–10.8)

## 2018-12-13 PROCEDURE — 6370000000 HC RX 637 (ALT 250 FOR IP): Performed by: NURSE PRACTITIONER

## 2018-12-13 PROCEDURE — 80048 BASIC METABOLIC PNL TOTAL CA: CPT

## 2018-12-13 PROCEDURE — 6370000000 HC RX 637 (ALT 250 FOR IP): Performed by: HOSPITALIST

## 2018-12-13 PROCEDURE — 85027 COMPLETE CBC AUTOMATED: CPT

## 2018-12-13 PROCEDURE — 36415 COLL VENOUS BLD VENIPUNCTURE: CPT

## 2018-12-13 PROCEDURE — 2140000000 HC CCU INTERMEDIATE R&B

## 2018-12-13 PROCEDURE — 99232 SBSQ HOSP IP/OBS MODERATE 35: CPT | Performed by: FAMILY MEDICINE

## 2018-12-13 PROCEDURE — 6370000000 HC RX 637 (ALT 250 FOR IP): Performed by: INTERNAL MEDICINE

## 2018-12-13 PROCEDURE — 6370000000 HC RX 637 (ALT 250 FOR IP): Performed by: PHYSICIAN ASSISTANT

## 2018-12-13 PROCEDURE — 2580000003 HC RX 258: Performed by: INTERNAL MEDICINE

## 2018-12-13 PROCEDURE — 99231 SBSQ HOSP IP/OBS SF/LOW 25: CPT | Performed by: PHYSICIAN ASSISTANT

## 2018-12-13 RX ORDER — PANTOPRAZOLE SODIUM 40 MG/1
40 TABLET, DELAYED RELEASE ORAL
Status: DISCONTINUED | OUTPATIENT
Start: 2018-12-13 | End: 2018-12-14 | Stop reason: HOSPADM

## 2018-12-13 RX ORDER — ATORVASTATIN CALCIUM 40 MG/1
40 TABLET, FILM COATED ORAL NIGHTLY
Status: DISCONTINUED | OUTPATIENT
Start: 2018-12-13 | End: 2018-12-14 | Stop reason: HOSPADM

## 2018-12-13 RX ADMIN — RIVAROXABAN 15 MG: 15 TABLET, FILM COATED ORAL at 18:11

## 2018-12-13 RX ADMIN — LOSARTAN POTASSIUM 12.5 MG: 25 TABLET, FILM COATED ORAL at 08:45

## 2018-12-13 RX ADMIN — ASPIRIN 81 MG 81 MG: 81 TABLET ORAL at 08:45

## 2018-12-13 RX ADMIN — VITAMIN D, TAB 1000IU (100/BT) 1000 UNITS: 25 TAB at 08:45

## 2018-12-13 RX ADMIN — METOPROLOL TARTRATE 50 MG: 50 TABLET, FILM COATED ORAL at 21:36

## 2018-12-13 RX ADMIN — PANTOPRAZOLE SODIUM 40 MG: 40 TABLET, DELAYED RELEASE ORAL at 21:37

## 2018-12-13 RX ADMIN — VENLAFAXINE HYDROCHLORIDE 75 MG: 75 CAPSULE, EXTENDED RELEASE ORAL at 08:45

## 2018-12-13 RX ADMIN — SUCRALFATE 1 G: 1 TABLET ORAL at 18:11

## 2018-12-13 RX ADMIN — SUCRALFATE 1 G: 1 TABLET ORAL at 08:46

## 2018-12-13 RX ADMIN — SPIRONOLACTONE 25 MG: 25 TABLET ORAL at 08:45

## 2018-12-13 RX ADMIN — BUMETANIDE 0.5 MG: 1 TABLET ORAL at 08:45

## 2018-12-13 RX ADMIN — METOPROLOL TARTRATE 50 MG: 50 TABLET, FILM COATED ORAL at 08:45

## 2018-12-13 RX ADMIN — FERROUS SULFATE TAB 325 MG (65 MG ELEMENTAL FE) 325 MG: 325 (65 FE) TAB at 08:45

## 2018-12-13 RX ADMIN — SUCRALFATE 1 G: 1 TABLET ORAL at 21:36

## 2018-12-13 RX ADMIN — DOCUSATE SODIUM 100 MG: 100 CAPSULE, LIQUID FILLED ORAL at 08:45

## 2018-12-13 RX ADMIN — FERROUS SULFATE TAB 325 MG (65 MG ELEMENTAL FE) 325 MG: 325 (65 FE) TAB at 21:37

## 2018-12-13 RX ADMIN — Medication 10 ML: at 21:37

## 2018-12-13 RX ADMIN — SUCRALFATE 1 G: 1 TABLET ORAL at 14:44

## 2018-12-13 RX ADMIN — ATORVASTATIN CALCIUM 40 MG: 40 TABLET, FILM COATED ORAL at 21:36

## 2018-12-13 RX ADMIN — Medication 10 ML: at 08:46

## 2018-12-13 RX ADMIN — PANTOPRAZOLE SODIUM 40 MG: 40 TABLET, DELAYED RELEASE ORAL at 08:46

## 2018-12-13 RX ADMIN — CLOPIDOGREL BISULFATE 75 MG: 75 TABLET ORAL at 08:46

## 2018-12-13 RX ADMIN — DOCUSATE SODIUM 100 MG: 100 CAPSULE, LIQUID FILLED ORAL at 21:37

## 2018-12-13 NOTE — PROGRESS NOTES
12/04/2018    LDLCALC 62 12/04/2018       TSH:    Lab Results   Component Value Date    TSH 7.970 12/03/2018         Assessment:    S/p LHC/RHC 12/11/18 - PDA 95-99% , LAD 80% - s/p NIKKO mid PDA, normal Right pressures  Acute on chronic diastolic CHF, NYHA III-IV - resolved  Ef 55-60 per echo 11/26/18  Negative stress test 12/8/18  Mild AI, mild MR  Chronic afib cvr   On xarelto  S/p PPM for tachy-mariola syndrome  Hx multiple falls in past, hx upper GIB    Discussed with dr priest  Plan:  · Daily I/o and weights  · 2 liter fluid restriction and 2gm sodium diet  · Per dr. Stanley Ramirez note- \"Xarelto - consider discussion regarding continuing therapy with primary cardiologist due to falls in the past and upper GIB\"  · Cont plavix/BB/arb/aldactone/bumex  · Stop asa  · Add statin  · FFR guided staged PCI of LAD 12/20/18 -   · Repeat cbc in am - monitor H/H, staged PCI may need to be delayed           Electronically signed by Angelica Archibald PA-C on 12/13/2018 at 9:43 AM

## 2018-12-14 ENCOUNTER — TELEPHONE (OUTPATIENT)
Dept: FAMILY MEDICINE CLINIC | Age: 83
End: 2018-12-14

## 2018-12-14 VITALS
WEIGHT: 176.8 LBS | HEIGHT: 62 IN | SYSTOLIC BLOOD PRESSURE: 115 MMHG | DIASTOLIC BLOOD PRESSURE: 53 MMHG | BODY MASS INDEX: 32.54 KG/M2 | OXYGEN SATURATION: 98 % | HEART RATE: 61 BPM | RESPIRATION RATE: 16 BRPM | TEMPERATURE: 97.8 F

## 2018-12-14 LAB
ANION GAP SERPL CALCULATED.3IONS-SCNC: 12 MEQ/L (ref 8–16)
BUN BLDV-MCNC: 8 MG/DL (ref 7–22)
CALCIUM SERPL-MCNC: 8.7 MG/DL (ref 8.5–10.5)
CHLORIDE BLD-SCNC: 102 MEQ/L (ref 98–111)
CO2: 23 MEQ/L (ref 23–33)
CREAT SERPL-MCNC: 0.7 MG/DL (ref 0.4–1.2)
ERYTHROCYTE [DISTWIDTH] IN BLOOD BY AUTOMATED COUNT: 20.5 % (ref 11.5–14.5)
ERYTHROCYTE [DISTWIDTH] IN BLOOD BY AUTOMATED COUNT: 66.8 FL (ref 35–45)
GFR SERPL CREATININE-BSD FRML MDRD: 78 ML/MIN/1.73M2
GLUCOSE BLD-MCNC: 116 MG/DL (ref 70–108)
HCT VFR BLD CALC: 31 % (ref 37–47)
HEMOGLOBIN: 9.5 GM/DL (ref 12–16)
MAGNESIUM: 1.6 MG/DL (ref 1.6–2.4)
MCH RBC QN AUTO: 28.1 PG (ref 26–33)
MCHC RBC AUTO-ENTMCNC: 30.6 GM/DL (ref 32.2–35.5)
MCV RBC AUTO: 91.7 FL (ref 81–99)
PLATELET # BLD: 121 THOU/MM3 (ref 130–400)
PMV BLD AUTO: 11.4 FL (ref 9.4–12.4)
POTASSIUM SERPL-SCNC: 3.4 MEQ/L (ref 3.5–5.2)
RBC # BLD: 3.38 MILL/MM3 (ref 4.2–5.4)
SODIUM BLD-SCNC: 137 MEQ/L (ref 135–145)
WBC # BLD: 5.9 THOU/MM3 (ref 4.8–10.8)

## 2018-12-14 PROCEDURE — 6370000000 HC RX 637 (ALT 250 FOR IP): Performed by: NURSE PRACTITIONER

## 2018-12-14 PROCEDURE — 99238 HOSP IP/OBS DSCHRG MGMT 30/<: CPT | Performed by: FAMILY MEDICINE

## 2018-12-14 PROCEDURE — 36415 COLL VENOUS BLD VENIPUNCTURE: CPT

## 2018-12-14 PROCEDURE — 6370000000 HC RX 637 (ALT 250 FOR IP): Performed by: PHYSICIAN ASSISTANT

## 2018-12-14 PROCEDURE — 6370000000 HC RX 637 (ALT 250 FOR IP): Performed by: INTERNAL MEDICINE

## 2018-12-14 PROCEDURE — 6360000002 HC RX W HCPCS: Performed by: FAMILY MEDICINE

## 2018-12-14 PROCEDURE — 99231 SBSQ HOSP IP/OBS SF/LOW 25: CPT | Performed by: PHYSICIAN ASSISTANT

## 2018-12-14 PROCEDURE — 85027 COMPLETE CBC AUTOMATED: CPT

## 2018-12-14 PROCEDURE — 2709999900 HC NON-CHARGEABLE SUPPLY

## 2018-12-14 PROCEDURE — 80048 BASIC METABOLIC PNL TOTAL CA: CPT

## 2018-12-14 PROCEDURE — 6370000000 HC RX 637 (ALT 250 FOR IP): Performed by: FAMILY MEDICINE

## 2018-12-14 PROCEDURE — 83735 ASSAY OF MAGNESIUM: CPT

## 2018-12-14 PROCEDURE — 6370000000 HC RX 637 (ALT 250 FOR IP): Performed by: HOSPITALIST

## 2018-12-14 PROCEDURE — 2580000003 HC RX 258: Performed by: INTERNAL MEDICINE

## 2018-12-14 RX ORDER — ATORVASTATIN CALCIUM 40 MG/1
40 TABLET, FILM COATED ORAL NIGHTLY
Qty: 30 TABLET | Refills: 3 | Status: SHIPPED | OUTPATIENT
Start: 2018-12-14 | End: 2019-02-04 | Stop reason: SDUPTHER

## 2018-12-14 RX ORDER — METOPROLOL TARTRATE 50 MG/1
50 TABLET, FILM COATED ORAL 2 TIMES DAILY
Qty: 60 TABLET | Refills: 3 | Status: SHIPPED | OUTPATIENT
Start: 2018-12-14 | End: 2019-05-14 | Stop reason: SDUPTHER

## 2018-12-14 RX ORDER — POTASSIUM CHLORIDE 750 MG/1
40 TABLET, FILM COATED, EXTENDED RELEASE ORAL ONCE
Status: COMPLETED | OUTPATIENT
Start: 2018-12-14 | End: 2018-12-14

## 2018-12-14 RX ORDER — CLOPIDOGREL BISULFATE 75 MG/1
75 TABLET ORAL DAILY
Qty: 30 TABLET | Refills: 3 | Status: SHIPPED | OUTPATIENT
Start: 2018-12-15 | End: 2019-02-04 | Stop reason: SDUPTHER

## 2018-12-14 RX ORDER — BUMETANIDE 0.5 MG/1
0.5 TABLET ORAL DAILY
Qty: 30 TABLET | Refills: 3 | Status: SHIPPED | OUTPATIENT
Start: 2018-12-15 | End: 2019-02-04 | Stop reason: SDUPTHER

## 2018-12-14 RX ORDER — SPIRONOLACTONE 25 MG/1
25 TABLET ORAL DAILY
Qty: 30 TABLET | Refills: 3 | Status: ON HOLD | OUTPATIENT
Start: 2018-12-15 | End: 2018-12-22 | Stop reason: HOSPADM

## 2018-12-14 RX ORDER — PANTOPRAZOLE SODIUM 40 MG/1
40 TABLET, DELAYED RELEASE ORAL
Qty: 30 TABLET | Refills: 0 | Status: ON HOLD | OUTPATIENT
Start: 2018-12-14 | End: 2018-12-22 | Stop reason: HOSPADM

## 2018-12-14 RX ORDER — NITROGLYCERIN 0.4 MG/1
TABLET SUBLINGUAL
Qty: 25 TABLET | Refills: 3 | Status: ON HOLD | OUTPATIENT
Start: 2018-12-14 | End: 2021-01-29 | Stop reason: HOSPADM

## 2018-12-14 RX ORDER — PSEUDOEPHEDRINE HCL 30 MG
100 TABLET ORAL 2 TIMES DAILY PRN
Qty: 30 CAPSULE | Refills: 2 | Status: SHIPPED | OUTPATIENT
Start: 2018-12-14 | End: 2019-04-09 | Stop reason: SDUPTHER

## 2018-12-14 RX ORDER — POTASSIUM CHLORIDE 20 MEQ/1
20 TABLET, EXTENDED RELEASE ORAL DAILY
Qty: 180 TABLET | Refills: 1 | Status: SHIPPED | OUTPATIENT
Start: 2018-12-14 | End: 2019-04-09 | Stop reason: SDUPTHER

## 2018-12-14 RX ORDER — LOSARTAN POTASSIUM 25 MG/1
12.5 TABLET ORAL DAILY
Qty: 30 TABLET | Refills: 3 | Status: ON HOLD | OUTPATIENT
Start: 2018-12-15 | End: 2018-12-22 | Stop reason: HOSPADM

## 2018-12-14 RX ORDER — MULTIVITAMIN/IRON/FOLIC ACID 18MG-0.4MG
250 TABLET ORAL DAILY
Qty: 30 TABLET | Refills: 1 | Status: ON HOLD | OUTPATIENT
Start: 2018-12-14 | End: 2021-01-29 | Stop reason: HOSPADM

## 2018-12-14 RX ORDER — MAGNESIUM SULFATE IN WATER 40 MG/ML
2 INJECTION, SOLUTION INTRAVENOUS ONCE
Status: COMPLETED | OUTPATIENT
Start: 2018-12-14 | End: 2018-12-14

## 2018-12-14 RX ADMIN — FERROUS SULFATE TAB 325 MG (65 MG ELEMENTAL FE) 325 MG: 325 (65 FE) TAB at 10:08

## 2018-12-14 RX ADMIN — SPIRONOLACTONE 25 MG: 25 TABLET ORAL at 10:06

## 2018-12-14 RX ADMIN — VITAMIN D, TAB 1000IU (100/BT) 1000 UNITS: 25 TAB at 10:07

## 2018-12-14 RX ADMIN — METOPROLOL TARTRATE 50 MG: 50 TABLET, FILM COATED ORAL at 10:07

## 2018-12-14 RX ADMIN — CLOPIDOGREL BISULFATE 75 MG: 75 TABLET ORAL at 10:09

## 2018-12-14 RX ADMIN — SUCRALFATE 1 G: 1 TABLET ORAL at 10:06

## 2018-12-14 RX ADMIN — DOCUSATE SODIUM 100 MG: 100 CAPSULE, LIQUID FILLED ORAL at 10:08

## 2018-12-14 RX ADMIN — LOSARTAN POTASSIUM 12.5 MG: 25 TABLET, FILM COATED ORAL at 10:07

## 2018-12-14 RX ADMIN — BUMETANIDE 0.5 MG: 1 TABLET ORAL at 10:06

## 2018-12-14 RX ADMIN — Medication 10 ML: at 10:08

## 2018-12-14 RX ADMIN — SUCRALFATE 1 G: 1 TABLET ORAL at 14:38

## 2018-12-14 RX ADMIN — VENLAFAXINE HYDROCHLORIDE 75 MG: 75 CAPSULE, EXTENDED RELEASE ORAL at 10:06

## 2018-12-14 RX ADMIN — MAGNESIUM SULFATE HEPTAHYDRATE 2 G: 40 INJECTION, SOLUTION INTRAVENOUS at 10:43

## 2018-12-14 RX ADMIN — POTASSIUM CHLORIDE 40 MEQ: 750 TABLET, EXTENDED RELEASE ORAL at 10:13

## 2018-12-14 RX ADMIN — PANTOPRAZOLE SODIUM 40 MG: 40 TABLET, DELAYED RELEASE ORAL at 06:40

## 2018-12-14 NOTE — PROGRESS NOTES
caliber, with mild diffuse disease, gives rise to LAD and  LCX  Left Circumflex: Mild diffuse disease proximally, distal segment tapers down  to a small caliber vessel. Left Anterior Descending: Proximal segment with calcified focal stenosis of  80%, mid and distal segment has moderate diffuse disease and it is a small  caliber vessel. LVEDP was measured at 5 mmHg. Procedure Summary   Successful PCI / Drug Eluting Stent of the mid Posterior Descending   Coronary Artery.      Recommendations   -DAPT for atleast one year   -Lipid lowering therapy   -Aggressive risk factor and lifestyle modifications.   -May consider FFR guided PCI of LAD if symptoms persist   -Follow up with cardiology in 1-2 weeks after discharge.      Estimated Blood FZVP:77 ml.      Complications:No complications.      Signatures      ----------------------------------------------------------------   Electronically signed by Dexter Barbour MD (Performing   Physician) on 12/11/2018 at 20:09    Cardiac Enzymes:  No results for input(s): CKTOTAL, CKMB, CKMBINDEX, TROPONINI in the last 72 hours.     CBC:   Lab Results   Component Value Date    WBC 5.9 12/14/2018    RBC 3.38 12/14/2018    RBC 4.17 08/30/2011    HGB 9.5 12/14/2018    HCT 31.0 12/14/2018     12/14/2018       CMP:    Lab Results   Component Value Date     12/14/2018    K 3.4 12/14/2018    K 4.2 12/10/2018     12/14/2018    CO2 23 12/14/2018    BUN 8 12/14/2018    CREATININE 0.7 12/14/2018    LABGLOM 78 12/14/2018    GLUCOSE 116 12/14/2018    GLUCOSE 116 08/30/2011    CALCIUM 8.7 12/14/2018       Hepatic Function Panel:    Lab Results   Component Value Date    ALKPHOS 48 12/04/2018    ALT 11 12/04/2018    AST 22 12/04/2018    PROT 6.8 12/04/2018    BILITOT 2.1 12/04/2018    BILIDIR <0.2 12/03/2018    LABALBU 3.9 12/04/2018    LABALBU 4.2 08/30/2011       Magnesium:    Lab Results   Component Value Date    MG 1.6 12/14/2018       PT/INR:    Lab Results   Component Value Date    INR 1.06 12/11/2018       HgBA1c:    Lab Results   Component Value Date    LABA1C 6.4 11/01/2018       FLP:    Lab Results   Component Value Date    TRIG 145 12/04/2018    HDL 36 12/04/2018    LDLCALC 62 12/04/2018       TSH:    Lab Results   Component Value Date    TSH 7.970 12/03/2018         Assessment:    S/p LHC/RHC 12/11/18 - PDA 95-99% , LAD 80% - s/p NIKKO mid PDA, normal Right pressures  Acute on chronic diastolic CHF, NYHA III-IV - resolved  Ef 55-60 per echo 11/26/18  Negative stress test 12/8/18  Mild AI, mild MR  Chronic afib cvr   On xarelto  S/p PPM for tachy-mariola syndrome  Hx multiple falls in past, hx upper GIB      Plan:  · Daily I/o and weights  · 2 liter fluid restriction and 2gm sodium diet  · Per dr. Josr Lawson note- \"Xarelto - consider discussion regarding continuing therapy with primary cardiologist due to falls in the past and upper GIB\"  Discussed with primary cardiologist dr priest - wants to continue and pt agrees and accepts risk of bleeding to reduce risk of embolic phenomenon  · Cont statin/plavix/BB/arb/aldactone/bumex  · Will need staged PCI, but will see in office first and then reschedule as long as anemia stable and GI ok to proceed  · Discharge when ok with primary and GI  · Will see prn           Electronically signed by Dhruv Corcoran PA-C on 12/14/2018 at 9:53 AM

## 2018-12-14 NOTE — PROGRESS NOTES
Hospitalist Progress Note    Patient:  Jocelyn Gomez      Unit/Bed:3B-24/024-A    YOB: 1928    MRN: [id number]       Acct: [de-identified]     PCP: Idalmis Montelongo MD    Date of Admission: 12/3/2018    Chief Complaint: SOB    Hospital Course:   Patient is a 80 y. o. female, with a hx of A-fib s/p PPM/ICD on xarelto, HLD, HTN, chronic diastolic heart failure, who presented to [facility] with SOB aggravated by activity that started a few days ago. She used home albuterol inhaler that was given during her last admission in the hospital, however no relief noted. She was discharged from here 11/29/2018 following mgt of acute on chronic diastolic heart failure and was sent home with her usual dose of PO furosemide. 2decho 11/26/2018 with LVEF of 55-60%.    She denies chest pain, dizziness, diaphoresis, cough, wheezing, orthopnea, PND, pedal edema.  No chills, fever, dysuria, diarrhea, nausea, vomiting.     proBNP 2045, troponin 0.011, glucose 182, TSH 7.97 hemoglobin 11.2. CXR suggestive of bilateral interstitial pulmonary edema.  She was diuresed for a few days and was had good output, however, remain dyspneic on exertion, and felt like she was going to pass out. Underwent stress test on 12/8/18, normal. Cardiac cath done in 12/11, Lesions in the RPDA and LAD noted, s/p PCI of RPDA. She will need FFR guided staged PCI of LAD scheduled on 12/20 per Cardiology. 12/12  Patient was having low Bps. Responded with a bolus last night. Today BP is at 90/50 after receiving metoprolol. 12/13  Hgb lower today. 12 prior to Cath, 9.4 today. She received fluid bolus for low BP 2 nights ago. FOBT ordered. Subjective:   Patient feels better, was able to ambulate to the bathroom. She wants to go home, VS stable. Patient denies chest pain, palpitations, or groin pain.     Review of Systems    All other systems reviewed and are negative; except for the pertinent findings previously mentioned in inguinal area non tender, covered with dressing, no obvious hematoma noted  Neurologic:  Neurovascularly intact without any focal sensory/motor deficits. Cranial nerves: II-XII intact, grossly non-focal.  Psychiatric: Alert and oriented, thought content appropriate, normal insight  Capillary Refill: Brisk,< 3 seconds   Peripheral Pulses: +2 palpable, equal bilaterally       Labs:   Recent Labs      12/12/18 0337  12/13/18 0410  12/14/18 0413   WBC  7.4  6.1  5.9   HGB  10.7*  9.4*  9.5*   HCT  36.2*  31.3*  31.0*   PLT  151  126*  121*     Recent Labs      12/12/18 0337 12/13/18   0410  12/14/18   0413   NA  133*  137  137   K  3.8  3.8  3.4*   CL  101  103  102   CO2  18*  23  23   BUN  15  10  8   CREATININE  0.7  0.7  0.7   CALCIUM  8.3*  8.4*  8.7     No results for input(s): AST, ALT, BILIDIR, BILITOT, ALKPHOS in the last 72 hours. No results for input(s): INR in the last 72 hours. No results for input(s): Placer Gu in the last 72 hours. Urinalysis:      Lab Results   Component Value Date    NITRU NEGATIVE 11/26/2018    WBCUA 2-4 03/31/2016    BACTERIA NONE 03/31/2016    RBCUA 3-5 03/31/2016    BLOODU NEGATIVE 11/26/2018    SPECGRAV 1.020 04/08/2016    SPECGRAV 1.014 03/31/2016    GLUCOSEU NEGATIVE 11/26/2018       Radiology:  XR CHEST PORTABLE   Final Result      Bilateral interstitial infiltrates consistent with mild pulmonary edema versus an atypical viral-type pneumonia. Old granulomatous disease. Mild cardiomegaly. **This report has been created using voice recognition software. It may contain minor errors which are inherent in voice recognition technology. **      Final report electronically signed by Dr. Fredy Martini on 12/3/2018 3:27 AM          Diet: DIET CARDIAC; Low Sodium (2 GM);  Daily Fluid Restriction: 2000 ml    DVT prophylaxis: [] Lovenox                                 [] SCDs                                 [] SQ Heparin                                 []

## 2018-12-14 NOTE — DISCHARGE INSTR - COC
Continuity of Care Form    Patient Name: Gui Choe   :  1928  MRN:  [id number]    Admit date:  12/3/2018  Discharge date:  ***    Code Status Order: DNR-CCA   Advance Directives:   Advance Care Flowsheet Documentation     Date/Time Healthcare Directive Type of Healthcare Directive Copy in [address] Agent's Name Healthcare Agent's Phone Number    18 1340  Yes, patient has an advance directive for healthcare treatment  --  --  --  --  --    18 1812  Yes, patient has an advance directive for healthcare treatment  --  --  --  --  --    18 0520  Yes, patient has an advance directive for healthcare treatment  --  --  --  --  --    18 0454  --  Durable power of  for health care;Living will  Yes, copy in chart  Healthcare power of   Stephanie Holly  [phone] Physician:  Gladys Chavez MD  PCP: Varinder Feliz MD    Discharging Nurse: [facility] Unit/Room#: 3B-24/024-A  Discharging Unit Phone Number: ***    Emergency Contact:   Extended Emergency Contact Information  Primary Emergency Contact: Anastasiya Nelson  Address: [address] [place] [address] Phone: [phone]  Relation: Child  Secondary Emergency Contact: [address] Phone: [phone]  Mobile Phone: [phone]  Relation: Child    Past Surgical History:  Past Surgical History:   Procedure Laterality Date    ABDOMEN SURGERY      ANKLE FRACTURE SURGERY  4531--8022    reconstruction in  and     APPENDECTOMY      BLADDER SURGERY      support bladder repair    CARPAL TUNNEL RELEASE  2013    CARPAL TUNNEL RELEASE Right 2017    Revision    [address]    COLONOSCOPY      EYE SURGERY      cataract    [address] HYSTERECTOMY  1971   

## 2018-12-14 NOTE — TELEPHONE ENCOUNTER
Per doug,  cancel heart cath scheduled 12-20-18 at this time  Cath lab notified  3B  Janeen notified cath has been cancelled  Cancelled from dr priest schedule

## 2018-12-14 NOTE — PROGRESS NOTES
Pt Name: Laura Ellison  MRN: [id number]  [id number]  YOB: 1928  Admit Date: 12/3/2018  2:05 AM  Date of evaluation: 12/14/2018  Primary Care Physician: Shelby Dill MD   3B-24/024-A   Dictating for Dr Hong Siu denies abd pain nausea, and vomiting. No BM today or yesterday. Tolerating diet. O  BP (!) 115/53   Pulse 61   Temp 97.8 °F (36.6 °C) (Axillary)   Resp 16   Ht 5' 2\" (1.575 m)   Wt 176 lb 12.8 oz (80.2 kg)   SpO2 98%   BMI 32.34 kg/m²   VSS, afebrile  Alert and oriented  Resp:CTAB  Chest; RRR  Abd:  Soft, non-tender, non-distended with active BS's  Ext: No edema       Assessment:  1. DIMITRIS, hgb 9.5 was 9.4 yesterday. Was 12.0 prior to heart cath. Iron sat was 9% 11-27-18. Pt has a dark stool that may have been black 2 days ago, however no BM since then. .  But stools have been darker since started on iron 2 weeks ago. 2. CAD, S/p heart cath 12-11-18, PDA 95-99% , LAD 80% - s/p NIKKO mid PDA. Pt still needs another stent. 3. Gastric body polyps noted on EGD 11-28-18 that had signs of recent bleeding, clip was placed on largest one so it would not bleed when biopsied. Biopsy showed inflamed HP polyp. 4. Fatigue  5. SOB, improving  6. Pacemaker   7. Chronic A-fib, on xeralto  8. Hx colon cancer in 12, at age 32, s/p right hemicolectomy  9. CHF  10. ASA, plavix, and xeralto use. ASA stopped 12-13-18      Plan:  1. PPI  BID. Script provided  2. Carafate 4 times daily. 3. Ferrous sulfate BID  4. Avoid all NSAIDS since on ASA/xeralto/plavix. [place] was stopped 12-13-18  5. Per cardiology note \"Xarelto - consider discussion regarding continuing therapy with primary cardiologist due to falls in the past and upper GIB. \"  6. Per cardiology note. \"Will need staged PCI, but will see in office first and then reschedule as long as anemia stable and GI ok to proceed\"  7. Pt still needs another stent. Timing per cardiology  8. Signing off. Call if needed. CBC on Monday.   Keep follow

## 2018-12-15 ENCOUNTER — CARE COORDINATION (OUTPATIENT)
Dept: CASE MANAGEMENT | Age: 83
End: 2018-12-15

## 2018-12-17 ENCOUNTER — TELEPHONE (OUTPATIENT)
Dept: PHARMACY | Facility: CLINIC | Age: 83
End: 2018-12-17

## 2018-12-17 ENCOUNTER — HOSPITAL ENCOUNTER (OUTPATIENT)
Age: 83
Discharge: HOME | DRG: 312 | End: 2018-12-17
Payer: MEDICARE

## 2018-12-17 DIAGNOSIS — E83.42 HYPOMAGNESEMIA: ICD-10-CM

## 2018-12-17 DIAGNOSIS — D50.8 OTHER IRON DEFICIENCY ANEMIA: ICD-10-CM

## 2018-12-17 DIAGNOSIS — E87.6 HYPOKALEMIA: ICD-10-CM

## 2018-12-17 LAB
ANION GAP SERPL CALCULATED.3IONS-SCNC: 13 MEQ/L (ref 8–16)
BUN BLDV-MCNC: 15 MG/DL (ref 7–22)
CALCIUM SERPL-MCNC: 9.8 MG/DL (ref 8.5–10.5)
CHLORIDE BLD-SCNC: 95 MEQ/L (ref 98–111)
CO2: 29 MEQ/L (ref 23–33)
CREAT SERPL-MCNC: 0.9 MG/DL (ref 0.4–1.2)
ERYTHROCYTE [DISTWIDTH] IN BLOOD BY AUTOMATED COUNT: 21.5 % (ref 11.5–14.5)
ERYTHROCYTE [DISTWIDTH] IN BLOOD BY AUTOMATED COUNT: 72.2 FL (ref 35–45)
GFR SERPL CREATININE-BSD FRML MDRD: 59 ML/MIN/1.73M2
GLUCOSE BLD-MCNC: 148 MG/DL (ref 70–108)
HCT VFR BLD CALC: 36.4 % (ref 37–47)
HEMOGLOBIN: 11 GM/DL (ref 12–16)
MCH RBC QN AUTO: 28.5 PG (ref 26–33)
MCHC RBC AUTO-ENTMCNC: 30.2 GM/DL (ref 32.2–35.5)
MCV RBC AUTO: 94.3 FL (ref 81–99)
PLATELET # BLD: 240 THOU/MM3 (ref 130–400)
PMV BLD AUTO: 11.6 FL (ref 9.4–12.4)
POTASSIUM SERPL-SCNC: 4.4 MEQ/L (ref 3.5–5.2)
RBC # BLD: 3.86 MILL/MM3 (ref 4.2–5.4)
SODIUM BLD-SCNC: 137 MEQ/L (ref 135–145)
WBC # BLD: 6 THOU/MM3 (ref 4.8–10.8)

## 2018-12-17 PROCEDURE — 36415 COLL VENOUS BLD VENIPUNCTURE: CPT

## 2018-12-17 PROCEDURE — 85027 COMPLETE CBC AUTOMATED: CPT

## 2018-12-17 PROCEDURE — 80048 BASIC METABOLIC PNL TOTAL CA: CPT

## 2018-12-17 NOTE — TELEPHONE ENCOUNTER
CLINICAL PHARMACY NOTE  Post-Discharge Transitions of Care (CUBA)    Subjective/Objective:  Chitra Craft is a 80 y.o. female. Patient was discharged from [address] [place] [address] on 12/14/18 with a diagnosis of acute lung edema. Patient outreach to review discharge medications and provide medication review and management. Spoke with daughter, Ty Jasso. Sulema declines medication review. She states that she takes care of Oscar Zoraida' medications for her and has no questions at this time. No further outreach planned by PharmD at this time.     Jewell Neville, PharmD, [address] Pharmacist  C: [phone]  O: [phone]  Department, toll free: [phone], option 7   =========================================  For Pharmacy Admin Tracking Only  [facility] Select Patient?: Yes  Outreach Status: Patient Refused  Care Coordinator Outreach to Patient?: Yes  Provider Contacted?: No  Waiting on response from: n/a  Time Spent (min): 5

## 2018-12-20 ENCOUNTER — HOSPITAL ENCOUNTER (INPATIENT)
Age: 83
LOS: 2 days | Discharge: HOME | DRG: 312 | End: 2018-12-22
Attending: EMERGENCY MEDICINE | Admitting: FAMILY MEDICINE
Payer: MEDICARE

## 2018-12-20 ENCOUNTER — APPOINTMENT (OUTPATIENT)
Dept: GENERAL RADIOLOGY | Age: 83
DRG: 312 | End: 2018-12-20
Payer: MEDICARE

## 2018-12-20 ENCOUNTER — CARE COORDINATION (OUTPATIENT)
Dept: CASE MANAGEMENT | Age: 83
End: 2018-12-20

## 2018-12-20 DIAGNOSIS — K31.7 GASTRIC POLYPS: ICD-10-CM

## 2018-12-20 DIAGNOSIS — R55 SYNCOPE AND COLLAPSE: Primary | ICD-10-CM

## 2018-12-20 DIAGNOSIS — D50.0 IRON DEFICIENCY ANEMIA DUE TO CHRONIC BLOOD LOSS: ICD-10-CM

## 2018-12-20 DIAGNOSIS — I25.119 CORONARY ARTERY DISEASE INVOLVING NATIVE HEART WITH ANGINA PECTORIS, UNSPECIFIED VESSEL OR LESION TYPE (HCC): ICD-10-CM

## 2018-12-20 LAB
ABO: NORMAL
ALBUMIN SERPL-MCNC: 3.7 G/DL (ref 3.5–5.1)
ALP BLD-CCNC: 60 U/L (ref 38–126)
ALT SERPL-CCNC: 17 U/L (ref 11–66)
ANION GAP SERPL CALCULATED.3IONS-SCNC: 15 MEQ/L (ref 8–16)
ANISOCYTOSIS: PRESENT
ANTIBODY SCREEN: NORMAL
APTT: 33.5 SECONDS (ref 22–38)
AST SERPL-CCNC: 36 U/L (ref 5–40)
ATYPICAL LYMPHOCYTES: ABNORMAL %
BASOPHILS # BLD: 0.7 %
BASOPHILS ABSOLUTE: 0.1 THOU/MM3 (ref 0–0.1)
BILIRUB SERPL-MCNC: 3.4 MG/DL (ref 0.3–1.2)
BILIRUBIN DIRECT: < 0.2 MG/DL (ref 0–0.3)
BUN BLDV-MCNC: 17 MG/DL (ref 7–22)
CALCIUM SERPL-MCNC: 9.7 MG/DL (ref 8.5–10.5)
CHLORIDE BLD-SCNC: 94 MEQ/L (ref 98–111)
CO2: 26 MEQ/L (ref 23–33)
CREAT SERPL-MCNC: 0.7 MG/DL (ref 0.4–1.2)
EKG ATRIAL RATE: 35 BPM
EKG Q-T INTERVAL: 392 MS
EKG QRS DURATION: 86 MS
EKG QTC CALCULATION (BAZETT): 394 MS
EKG R AXIS: 37 DEGREES
EKG T AXIS: 78 DEGREES
EKG VENTRICULAR RATE: 61 BPM
EOSINOPHIL # BLD: 2.2 %
EOSINOPHILS ABSOLUTE: 0.2 THOU/MM3 (ref 0–0.4)
ERYTHROCYTE [DISTWIDTH] IN BLOOD BY AUTOMATED COUNT: 22.5 % (ref 11.5–14.5)
ERYTHROCYTE [DISTWIDTH] IN BLOOD BY AUTOMATED COUNT: 73.1 FL (ref 35–45)
GFR SERPL CREATININE-BSD FRML MDRD: 78 ML/MIN/1.73M2
GLUCOSE BLD-MCNC: 142 MG/DL (ref 70–108)
HCT VFR BLD CALC: 39.3 % (ref 37–47)
HEMOGLOBIN: 12.2 GM/DL (ref 12–16)
IMMATURE GRANS (ABS): 0.07 THOU/MM3 (ref 0–0.07)
IMMATURE GRANULOCYTES: 0.9 %
INR BLD: 1.15 (ref 0.85–1.13)
LIPASE: 23.8 U/L (ref 5.6–51.3)
LYMPHOCYTES # BLD: 24.9 %
LYMPHOCYTES ABSOLUTE: 1.9 THOU/MM3 (ref 1–4.8)
MAGNESIUM: 1.9 MG/DL (ref 1.6–2.4)
MCH RBC QN AUTO: 29.1 PG (ref 26–33)
MCHC RBC AUTO-ENTMCNC: 31 GM/DL (ref 32.2–35.5)
MCV RBC AUTO: 93.8 FL (ref 81–99)
MONOCYTES # BLD: 10.2 %
MONOCYTES ABSOLUTE: 0.8 THOU/MM3 (ref 0.4–1.3)
NUCLEATED RED BLOOD CELLS: 0 /100 WBC
OSMOLALITY CALCULATION: 274.1 MOSMOL/KG (ref 275–300)
PLATELET # BLD: 253 THOU/MM3 (ref 130–400)
PMV BLD AUTO: 11.1 FL (ref 9.4–12.4)
POTASSIUM SERPL-SCNC: 4.9 MEQ/L (ref 3.5–5.2)
PRO-BNP: 532.8 PG/ML (ref 0–1800)
RBC # BLD: 4.19 MILL/MM3 (ref 4.2–5.4)
RH FACTOR: NORMAL
SCAN OF BLOOD SMEAR: NORMAL
SEG NEUTROPHILS: 61.1 %
SEGMENTED NEUTROPHILS ABSOLUTE COUNT: 4.6 THOU/MM3 (ref 1.8–7.7)
SODIUM BLD-SCNC: 135 MEQ/L (ref 135–145)
TOTAL PROTEIN: 6.5 G/DL (ref 6.1–8)
TROPONIN T: < 0.01 NG/ML
WBC # BLD: 7.6 THOU/MM3 (ref 4.8–10.8)

## 2018-12-20 PROCEDURE — 85025 COMPLETE CBC W/AUTO DIFF WBC: CPT

## 2018-12-20 PROCEDURE — 85730 THROMBOPLASTIN TIME PARTIAL: CPT

## 2018-12-20 PROCEDURE — 83690 ASSAY OF LIPASE: CPT

## 2018-12-20 PROCEDURE — 2580000003 HC RX 258: Performed by: EMERGENCY MEDICINE

## 2018-12-20 PROCEDURE — 93005 ELECTROCARDIOGRAM TRACING: CPT

## 2018-12-20 PROCEDURE — 2140000000 HC CCU INTERMEDIATE R&B

## 2018-12-20 PROCEDURE — 99285 EMERGENCY DEPT VISIT HI MDM: CPT

## 2018-12-20 PROCEDURE — 36415 COLL VENOUS BLD VENIPUNCTURE: CPT

## 2018-12-20 PROCEDURE — 80053 COMPREHEN METABOLIC PANEL: CPT

## 2018-12-20 PROCEDURE — 71045 X-RAY EXAM CHEST 1 VIEW: CPT

## 2018-12-20 PROCEDURE — 84484 ASSAY OF TROPONIN QUANT: CPT

## 2018-12-20 PROCEDURE — 2580000003 HC RX 258: Performed by: FAMILY MEDICINE

## 2018-12-20 PROCEDURE — 83735 ASSAY OF MAGNESIUM: CPT

## 2018-12-20 PROCEDURE — 83880 ASSAY OF NATRIURETIC PEPTIDE: CPT

## 2018-12-20 PROCEDURE — 99223 1ST HOSP IP/OBS HIGH 75: CPT | Performed by: FAMILY MEDICINE

## 2018-12-20 PROCEDURE — 86901 BLOOD TYPING SEROLOGIC RH(D): CPT

## 2018-12-20 PROCEDURE — 86900 BLOOD TYPING SEROLOGIC ABO: CPT

## 2018-12-20 PROCEDURE — 86850 RBC ANTIBODY SCREEN: CPT

## 2018-12-20 PROCEDURE — 6370000000 HC RX 637 (ALT 250 FOR IP): Performed by: FAMILY MEDICINE

## 2018-12-20 PROCEDURE — 85610 PROTHROMBIN TIME: CPT

## 2018-12-20 PROCEDURE — 82248 BILIRUBIN DIRECT: CPT

## 2018-12-20 RX ORDER — POLYVINYL ALCOHOL 14 MG/ML
1 SOLUTION/ DROPS OPHTHALMIC EVERY EVENING
Status: DISCONTINUED | OUTPATIENT
Start: 2018-12-20 | End: 2018-12-22 | Stop reason: HOSPADM

## 2018-12-20 RX ORDER — BUMETANIDE 1 MG/1
0.5 TABLET ORAL DAILY
Status: DISCONTINUED | OUTPATIENT
Start: 2018-12-20 | End: 2018-12-22 | Stop reason: HOSPADM

## 2018-12-20 RX ORDER — SPIRONOLACTONE 25 MG/1
25 TABLET ORAL DAILY
Status: DISCONTINUED | OUTPATIENT
Start: 2018-12-20 | End: 2018-12-21

## 2018-12-20 RX ORDER — POTASSIUM CHLORIDE 20 MEQ/1
20 TABLET, EXTENDED RELEASE ORAL DAILY
Status: DISCONTINUED | OUTPATIENT
Start: 2018-12-21 | End: 2018-12-22 | Stop reason: HOSPADM

## 2018-12-20 RX ORDER — SUCRALFATE 1 G/1
1 TABLET ORAL
Status: DISCONTINUED | OUTPATIENT
Start: 2018-12-20 | End: 2018-12-22 | Stop reason: HOSPADM

## 2018-12-20 RX ORDER — PANTOPRAZOLE SODIUM 40 MG/1
40 TABLET, DELAYED RELEASE ORAL
Status: DISCONTINUED | OUTPATIENT
Start: 2018-12-20 | End: 2018-12-22 | Stop reason: HOSPADM

## 2018-12-20 RX ORDER — SODIUM CHLORIDE 0.9 % (FLUSH) 0.9 %
10 SYRINGE (ML) INJECTION EVERY 12 HOURS SCHEDULED
Status: DISCONTINUED | OUTPATIENT
Start: 2018-12-20 | End: 2018-12-22 | Stop reason: HOSPADM

## 2018-12-20 RX ORDER — ASPIRIN 81 MG/1
81 TABLET, CHEWABLE ORAL DAILY
Status: DISCONTINUED | OUTPATIENT
Start: 2018-12-20 | End: 2018-12-22 | Stop reason: HOSPADM

## 2018-12-20 RX ORDER — LOSARTAN POTASSIUM 25 MG/1
12.5 TABLET ORAL DAILY
Status: DISCONTINUED | OUTPATIENT
Start: 2018-12-20 | End: 2018-12-21

## 2018-12-20 RX ORDER — METOPROLOL TARTRATE 50 MG/1
50 TABLET, FILM COATED ORAL 2 TIMES DAILY
Status: DISCONTINUED | OUTPATIENT
Start: 2018-12-20 | End: 2018-12-22 | Stop reason: HOSPADM

## 2018-12-20 RX ORDER — ACETAMINOPHEN 500 MG
1000 TABLET ORAL 2 TIMES DAILY
Status: DISCONTINUED | OUTPATIENT
Start: 2018-12-20 | End: 2018-12-22 | Stop reason: HOSPADM

## 2018-12-20 RX ORDER — FERROUS SULFATE 325(65) MG
325 TABLET ORAL 2 TIMES DAILY WITH MEALS
Status: DISCONTINUED | OUTPATIENT
Start: 2018-12-20 | End: 2018-12-22 | Stop reason: HOSPADM

## 2018-12-20 RX ORDER — VENLAFAXINE HYDROCHLORIDE 75 MG/1
75 CAPSULE, EXTENDED RELEASE ORAL DAILY
Status: DISCONTINUED | OUTPATIENT
Start: 2018-12-20 | End: 2018-12-22 | Stop reason: HOSPADM

## 2018-12-20 RX ORDER — ATORVASTATIN CALCIUM 40 MG/1
40 TABLET, FILM COATED ORAL NIGHTLY
Status: DISCONTINUED | OUTPATIENT
Start: 2018-12-20 | End: 2018-12-22 | Stop reason: HOSPADM

## 2018-12-20 RX ORDER — 0.9 % SODIUM CHLORIDE 0.9 %
1000 INTRAVENOUS SOLUTION INTRAVENOUS ONCE
Status: COMPLETED | OUTPATIENT
Start: 2018-12-20 | End: 2018-12-20

## 2018-12-20 RX ORDER — NITROGLYCERIN 0.4 MG/1
0.4 TABLET SUBLINGUAL EVERY 5 MIN PRN
Status: DISCONTINUED | OUTPATIENT
Start: 2018-12-20 | End: 2018-12-22 | Stop reason: HOSPADM

## 2018-12-20 RX ORDER — CLOPIDOGREL BISULFATE 75 MG/1
75 TABLET ORAL DAILY
Status: DISCONTINUED | OUTPATIENT
Start: 2018-12-20 | End: 2018-12-22 | Stop reason: HOSPADM

## 2018-12-20 RX ORDER — ONDANSETRON 2 MG/ML
4 INJECTION INTRAMUSCULAR; INTRAVENOUS EVERY 6 HOURS PRN
Status: DISCONTINUED | OUTPATIENT
Start: 2018-12-20 | End: 2018-12-22 | Stop reason: HOSPADM

## 2018-12-20 RX ORDER — DIGOXIN 125 MCG
125 TABLET ORAL DAILY
Status: DISCONTINUED | OUTPATIENT
Start: 2018-12-20 | End: 2018-12-22 | Stop reason: HOSPADM

## 2018-12-20 RX ORDER — SODIUM CHLORIDE 0.9 % (FLUSH) 0.9 %
10 SYRINGE (ML) INJECTION PRN
Status: DISCONTINUED | OUTPATIENT
Start: 2018-12-20 | End: 2018-12-22 | Stop reason: HOSPADM

## 2018-12-20 RX ADMIN — SODIUM CHLORIDE 1000 ML: 9 INJECTION, SOLUTION INTRAVENOUS at 12:15

## 2018-12-20 RX ADMIN — BUMETANIDE 0.5 MG: 1 TABLET ORAL at 17:09

## 2018-12-20 RX ADMIN — VENLAFAXINE HYDROCHLORIDE 75 MG: 75 CAPSULE, EXTENDED RELEASE ORAL at 17:10

## 2018-12-20 RX ADMIN — RIVAROXABAN 15 MG: 15 TABLET, FILM COATED ORAL at 17:10

## 2018-12-20 RX ADMIN — SPIRONOLACTONE 25 MG: 25 TABLET ORAL at 20:13

## 2018-12-20 RX ADMIN — ASPIRIN 81 MG 81 MG: 81 TABLET ORAL at 17:09

## 2018-12-20 RX ADMIN — PANTOPRAZOLE SODIUM 40 MG: 40 TABLET, DELAYED RELEASE ORAL at 17:40

## 2018-12-20 RX ADMIN — SUCRALFATE 1 G: 1 TABLET ORAL at 17:10

## 2018-12-20 RX ADMIN — DIGOXIN 125 MCG: 125 TABLET ORAL at 17:09

## 2018-12-20 RX ADMIN — ATORVASTATIN CALCIUM 40 MG: 40 TABLET, FILM COATED ORAL at 20:13

## 2018-12-20 RX ADMIN — Medication 10 ML: at 20:18

## 2018-12-20 RX ADMIN — FERROUS SULFATE TAB 325 MG (65 MG ELEMENTAL FE) 325 MG: 325 (65 FE) TAB at 17:10

## 2018-12-20 RX ADMIN — ACETAMINOPHEN 1000 MG: 500 TABLET, FILM COATED ORAL at 20:13

## 2018-12-20 RX ADMIN — SUCRALFATE 1 G: 1 TABLET ORAL at 20:13

## 2018-12-20 RX ADMIN — LOSARTAN POTASSIUM 12.5 MG: 25 TABLET, FILM COATED ORAL at 20:13

## 2018-12-20 RX ADMIN — CLOPIDOGREL BISULFATE 75 MG: 75 TABLET ORAL at 17:10

## 2018-12-20 ASSESSMENT — ENCOUNTER SYMPTOMS
SORE THROAT: 0
EYE REDNESS: 0
RHINORRHEA: 0
ABDOMINAL PAIN: 0
TROUBLE SWALLOWING: 0
VOICE CHANGE: 0
ABDOMINAL DISTENTION: 0
VOMITING: 0
NAUSEA: 0
EYE DISCHARGE: 0
SINUS PRESSURE: 0
CHOKING: 0
EYE PAIN: 0
SHORTNESS OF BREATH: 0
DIARRHEA: 0
CONSTIPATION: 0
COUGH: 1
EYE ITCHING: 0
PHOTOPHOBIA: 0
CHEST TIGHTNESS: 0
BACK PAIN: 0
BLOOD IN STOOL: 0
WHEEZING: 0

## 2018-12-20 ASSESSMENT — PAIN SCALES - GENERAL
PAINLEVEL_OUTOF10: 0

## 2018-12-21 LAB
ANION GAP SERPL CALCULATED.3IONS-SCNC: 13 MEQ/L (ref 8–16)
BUN BLDV-MCNC: 17 MG/DL (ref 7–22)
CALCIUM SERPL-MCNC: 8.8 MG/DL (ref 8.5–10.5)
CHLORIDE BLD-SCNC: 97 MEQ/L (ref 98–111)
CO2: 25 MEQ/L (ref 23–33)
CREAT SERPL-MCNC: 0.7 MG/DL (ref 0.4–1.2)
GFR SERPL CREATININE-BSD FRML MDRD: 78 ML/MIN/1.73M2
GLUCOSE BLD-MCNC: 117 MG/DL (ref 70–108)
GLUCOSE BLD-MCNC: 124 MG/DL (ref 70–108)
GLUCOSE BLD-MCNC: 150 MG/DL (ref 70–108)
MAGNESIUM: 1.7 MG/DL (ref 1.6–2.4)
POTASSIUM REFLEX MAGNESIUM: 3.6 MEQ/L (ref 3.5–5.2)
SODIUM BLD-SCNC: 135 MEQ/L (ref 135–145)

## 2018-12-21 PROCEDURE — G8978 MOBILITY CURRENT STATUS: HCPCS

## 2018-12-21 PROCEDURE — 2580000003 HC RX 258: Performed by: INTERNAL MEDICINE

## 2018-12-21 PROCEDURE — 83735 ASSAY OF MAGNESIUM: CPT

## 2018-12-21 PROCEDURE — 2580000003 HC RX 258: Performed by: FAMILY MEDICINE

## 2018-12-21 PROCEDURE — 80048 BASIC METABOLIC PNL TOTAL CA: CPT

## 2018-12-21 PROCEDURE — 6370000000 HC RX 637 (ALT 250 FOR IP): Performed by: FAMILY MEDICINE

## 2018-12-21 PROCEDURE — 99232 SBSQ HOSP IP/OBS MODERATE 35: CPT | Performed by: FAMILY MEDICINE

## 2018-12-21 PROCEDURE — 2709999900 HC NON-CHARGEABLE SUPPLY

## 2018-12-21 PROCEDURE — 97110 THERAPEUTIC EXERCISES: CPT

## 2018-12-21 PROCEDURE — 2140000000 HC CCU INTERMEDIATE R&B

## 2018-12-21 PROCEDURE — 82948 REAGENT STRIP/BLOOD GLUCOSE: CPT

## 2018-12-21 PROCEDURE — 99223 1ST HOSP IP/OBS HIGH 75: CPT | Performed by: INTERNAL MEDICINE

## 2018-12-21 PROCEDURE — 97162 PT EVAL MOD COMPLEX 30 MIN: CPT

## 2018-12-21 PROCEDURE — G8979 MOBILITY GOAL STATUS: HCPCS

## 2018-12-21 PROCEDURE — 36415 COLL VENOUS BLD VENIPUNCTURE: CPT

## 2018-12-21 RX ORDER — 0.9 % SODIUM CHLORIDE 0.9 %
250 INTRAVENOUS SOLUTION INTRAVENOUS ONCE
Status: COMPLETED | OUTPATIENT
Start: 2018-12-21 | End: 2018-12-21

## 2018-12-21 RX ADMIN — RIVAROXABAN 15 MG: 15 TABLET, FILM COATED ORAL at 18:42

## 2018-12-21 RX ADMIN — ATORVASTATIN CALCIUM 40 MG: 40 TABLET, FILM COATED ORAL at 21:38

## 2018-12-21 RX ADMIN — LOSARTAN POTASSIUM 12.5 MG: 25 TABLET, FILM COATED ORAL at 08:44

## 2018-12-21 RX ADMIN — FERROUS SULFATE TAB 325 MG (65 MG ELEMENTAL FE) 325 MG: 325 (65 FE) TAB at 08:51

## 2018-12-21 RX ADMIN — ACETAMINOPHEN 500 MG: 500 TABLET, FILM COATED ORAL at 08:50

## 2018-12-21 RX ADMIN — SPIRONOLACTONE 25 MG: 25 TABLET ORAL at 08:50

## 2018-12-21 RX ADMIN — PANTOPRAZOLE SODIUM 40 MG: 40 TABLET, DELAYED RELEASE ORAL at 06:14

## 2018-12-21 RX ADMIN — FERROUS SULFATE TAB 325 MG (65 MG ELEMENTAL FE) 325 MG: 325 (65 FE) TAB at 18:07

## 2018-12-21 RX ADMIN — SUCRALFATE 1 G: 1 TABLET ORAL at 21:38

## 2018-12-21 RX ADMIN — VENLAFAXINE HYDROCHLORIDE 75 MG: 75 CAPSULE, EXTENDED RELEASE ORAL at 08:50

## 2018-12-21 RX ADMIN — CLOPIDOGREL BISULFATE 75 MG: 75 TABLET ORAL at 08:51

## 2018-12-21 RX ADMIN — ASPIRIN 81 MG 81 MG: 81 TABLET ORAL at 08:51

## 2018-12-21 RX ADMIN — POTASSIUM CHLORIDE 20 MEQ: 1500 TABLET, EXTENDED RELEASE ORAL at 08:51

## 2018-12-21 RX ADMIN — DIGOXIN 125 MCG: 125 TABLET ORAL at 08:51

## 2018-12-21 RX ADMIN — SODIUM CHLORIDE 250 ML: 9 INJECTION, SOLUTION INTRAVENOUS at 10:50

## 2018-12-21 RX ADMIN — SUCRALFATE 1 G: 1 TABLET ORAL at 18:08

## 2018-12-21 RX ADMIN — SUCRALFATE 1 G: 1 TABLET ORAL at 06:14

## 2018-12-21 RX ADMIN — Medication 10 ML: at 21:42

## 2018-12-21 RX ADMIN — PANTOPRAZOLE SODIUM 40 MG: 40 TABLET, DELAYED RELEASE ORAL at 17:18

## 2018-12-21 RX ADMIN — Medication 10 ML: at 08:50

## 2018-12-21 RX ADMIN — SUCRALFATE 1 G: 1 TABLET ORAL at 12:24

## 2018-12-21 RX ADMIN — METOPROLOL TARTRATE 50 MG: 50 TABLET, FILM COATED ORAL at 21:38

## 2018-12-21 ASSESSMENT — PAIN SCALES - GENERAL
PAINLEVEL_OUTOF10: 0

## 2018-12-22 VITALS
DIASTOLIC BLOOD PRESSURE: 65 MMHG | TEMPERATURE: 97.6 F | HEIGHT: 62 IN | OXYGEN SATURATION: 97 % | HEART RATE: 67 BPM | WEIGHT: 170.3 LBS | RESPIRATION RATE: 14 BRPM | BODY MASS INDEX: 31.34 KG/M2 | SYSTOLIC BLOOD PRESSURE: 140 MMHG

## 2018-12-22 LAB — GLUCOSE BLD-MCNC: 123 MG/DL (ref 70–108)

## 2018-12-22 PROCEDURE — 6370000000 HC RX 637 (ALT 250 FOR IP): Performed by: FAMILY MEDICINE

## 2018-12-22 PROCEDURE — 97165 OT EVAL LOW COMPLEX 30 MIN: CPT

## 2018-12-22 PROCEDURE — 2580000003 HC RX 258: Performed by: FAMILY MEDICINE

## 2018-12-22 PROCEDURE — G8987 SELF CARE CURRENT STATUS: HCPCS

## 2018-12-22 PROCEDURE — 97535 SELF CARE MNGMENT TRAINING: CPT

## 2018-12-22 PROCEDURE — G8988 SELF CARE GOAL STATUS: HCPCS

## 2018-12-22 PROCEDURE — 99239 HOSP IP/OBS DSCHRG MGMT >30: CPT | Performed by: FAMILY MEDICINE

## 2018-12-22 PROCEDURE — 82948 REAGENT STRIP/BLOOD GLUCOSE: CPT

## 2018-12-22 PROCEDURE — 99232 SBSQ HOSP IP/OBS MODERATE 35: CPT | Performed by: PHYSICIAN ASSISTANT

## 2018-12-22 RX ADMIN — FERROUS SULFATE TAB 325 MG (65 MG ELEMENTAL FE) 325 MG: 325 (65 FE) TAB at 09:05

## 2018-12-22 RX ADMIN — POTASSIUM CHLORIDE 20 MEQ: 1500 TABLET, EXTENDED RELEASE ORAL at 09:06

## 2018-12-22 RX ADMIN — CLOPIDOGREL BISULFATE 75 MG: 75 TABLET ORAL at 09:06

## 2018-12-22 RX ADMIN — BUMETANIDE 0.5 MG: 1 TABLET ORAL at 09:05

## 2018-12-22 RX ADMIN — PANTOPRAZOLE SODIUM 40 MG: 40 TABLET, DELAYED RELEASE ORAL at 06:26

## 2018-12-22 RX ADMIN — SUCRALFATE 1 G: 1 TABLET ORAL at 12:20

## 2018-12-22 RX ADMIN — ASPIRIN 81 MG 81 MG: 81 TABLET ORAL at 09:06

## 2018-12-22 RX ADMIN — DIGOXIN 125 MCG: 125 TABLET ORAL at 09:05

## 2018-12-22 RX ADMIN — VENLAFAXINE HYDROCHLORIDE 75 MG: 75 CAPSULE, EXTENDED RELEASE ORAL at 09:05

## 2018-12-22 RX ADMIN — Medication 10 ML: at 09:07

## 2018-12-22 RX ADMIN — ACETAMINOPHEN 1000 MG: 500 TABLET, FILM COATED ORAL at 09:06

## 2018-12-22 RX ADMIN — SUCRALFATE 1 G: 1 TABLET ORAL at 06:26

## 2018-12-22 ASSESSMENT — PAIN SCALES - GENERAL
PAINLEVEL_OUTOF10: 0

## 2018-12-23 ENCOUNTER — CARE COORDINATION (OUTPATIENT)
Dept: CASE MANAGEMENT | Age: 83
End: 2018-12-23

## 2018-12-24 ENCOUNTER — TELEPHONE (OUTPATIENT)
Dept: FAMILY MEDICINE CLINIC | Age: 83
End: 2018-12-24

## 2018-12-24 ENCOUNTER — CARE COORDINATION (OUTPATIENT)
Dept: CASE MANAGEMENT | Age: 83
End: 2018-12-24

## 2018-12-24 NOTE — CARE COORDINATION
[facility] Transitions Follow Up Call    2018    Patient: Dominique Gaming  Patient : 1928   MRN: [id number]  Reason for Admission: There are no discharge diagnoses documented for the most recent discharge. Discharge Date: 18 RARS: Readmission Risk Score: 25       Spoke with: Shi Hurtado, patient's daughter, for sub Care Transition follow up. Identified self/role. Daughter states patient is doing better than yesterday. Daughter reports one episode of patient feeling light headed when returning from the restroom. Daughter reports no falls. Daughter states her brother has been with the patient and assists patient to make slow position changes and monitor blood pressure. Daughter states pressures have been \"better\" today but can not recall the reading. Instructed daughter to continue monitoring blood pressure, paying close attention to blood pressure during position changes, daughter verbalized understanding. Daughter states she is ensuring the patient is drinking adequate amount of fluid. Reviewed upcoming appointments, transition of care appointment scheduled 1.5 weeks after discharge. Contacted Pre-Service to schedule earlier appointment according to daughter's availability for transport. Daughter states she would feel \"at ease\" with a sooner appointment due to patient's blood pressure and light-headed episodes. Daughter denies additional needs or concerns at this time. Daughter instructed to notify Pre-Service or CTC for any new or worsening symptoms, contact information and hours provided. Daughter verbalized understanding. CTC will continue to follow.       Care Transitions Subsequent and Final Call    Subsequent and Final Calls  Do you have any ongoing symptoms?:  Yes  Patient-reported symptoms:  Other  Interventions for patient-reported symptoms:  Other  Have your medications changed?:  No  Do you have any questions related to your medications?:  No  Do you currently have any active

## 2018-12-26 ENCOUNTER — CARE COORDINATION (OUTPATIENT)
Dept: CASE MANAGEMENT | Age: 83
End: 2018-12-26

## 2018-12-26 ENCOUNTER — TELEPHONE (OUTPATIENT)
Dept: FAMILY MEDICINE CLINIC | Age: 83
End: 2018-12-26

## 2018-12-31 ENCOUNTER — CARE COORDINATION (OUTPATIENT)
Dept: CASE MANAGEMENT | Age: 83
End: 2018-12-31

## 2018-12-31 NOTE — CARE COORDINATION
1/2/2019 1:30 PM MD Marcin Sierra [facility] [place]   1/7/2019 9:00 AM Rosa Ayon MD [address] [facility] [place] [address]   4/9/2019 1:00 PM MD Marcin Sierra [facility] [place]   4/10/2019 2:00 PM Rosa Ayon MD [address] [facility]  Floor   9/4/2019 10:30 AM SCHEDULE, SRPS PACER NURSE SRPX PACER [facility] Toby Sahu RN  Care Transition Coordinator  (T) [phone]  21

## 2019-01-02 ENCOUNTER — OFFICE VISIT (OUTPATIENT)
Dept: CARDIOLOGY CLINIC | Age: 84
End: 2019-01-02
Payer: MEDICARE

## 2019-01-02 ENCOUNTER — TELEPHONE (OUTPATIENT)
Dept: CARDIOLOGY CLINIC | Age: 84
End: 2019-01-02

## 2019-01-02 ENCOUNTER — OFFICE VISIT (OUTPATIENT)
Dept: FAMILY MEDICINE CLINIC | Age: 84
End: 2019-01-02
Payer: MEDICARE

## 2019-01-02 VITALS
HEART RATE: 66 BPM | DIASTOLIC BLOOD PRESSURE: 60 MMHG | WEIGHT: 173 LBS | HEIGHT: 62 IN | OXYGEN SATURATION: 97 % | BODY MASS INDEX: 31.83 KG/M2 | SYSTOLIC BLOOD PRESSURE: 122 MMHG

## 2019-01-02 VITALS
DIASTOLIC BLOOD PRESSURE: 78 MMHG | SYSTOLIC BLOOD PRESSURE: 128 MMHG | HEART RATE: 60 BPM | BODY MASS INDEX: 31.64 KG/M2 | RESPIRATION RATE: 12 BRPM | TEMPERATURE: 97.5 F | WEIGHT: 173 LBS

## 2019-01-02 DIAGNOSIS — I50.33 ACUTE ON CHRONIC CONGESTIVE HEART FAILURE WITH LEFT VENTRICULAR DIASTOLIC DYSFUNCTION (HCC): ICD-10-CM

## 2019-01-02 DIAGNOSIS — I49.5 SICK SINUS SYNDROME (HCC): Primary | ICD-10-CM

## 2019-01-02 DIAGNOSIS — I50.32 CHF (CONGESTIVE HEART FAILURE), NYHA CLASS III, CHRONIC, DIASTOLIC (HCC): Primary | ICD-10-CM

## 2019-01-02 DIAGNOSIS — I48.91 ATRIAL FIBRILLATION, UNSPECIFIED TYPE (HCC): ICD-10-CM

## 2019-01-02 DIAGNOSIS — K21.9 GASTROESOPHAGEAL REFLUX DISEASE WITHOUT ESOPHAGITIS: ICD-10-CM

## 2019-01-02 DIAGNOSIS — D50.0 IRON DEFICIENCY ANEMIA DUE TO CHRONIC BLOOD LOSS: ICD-10-CM

## 2019-01-02 DIAGNOSIS — I10 ESSENTIAL HYPERTENSION: ICD-10-CM

## 2019-01-02 DIAGNOSIS — E78.00 PURE HYPERCHOLESTEROLEMIA: ICD-10-CM

## 2019-01-02 DIAGNOSIS — I25.10 CORONARY ARTERY DISEASE INVOLVING NATIVE CORONARY ARTERY OF NATIVE HEART WITHOUT ANGINA PECTORIS: ICD-10-CM

## 2019-01-02 LAB
ANION GAP SERPL CALCULATED.3IONS-SCNC: 15 MEQ/L (ref 8–16)
BUN BLDV-MCNC: 12 MG/DL (ref 7–22)
CALCIUM SERPL-MCNC: 9.6 MG/DL (ref 8.5–10.5)
CHLORIDE BLD-SCNC: 96 MEQ/L (ref 98–111)
CO2: 27 MEQ/L (ref 23–33)
CREAT SERPL-MCNC: 0.7 MG/DL (ref 0.4–1.2)
GFR SERPL CREATININE-BSD FRML MDRD: 78 ML/MIN/1.73M2
GLUCOSE BLD-MCNC: 153 MG/DL (ref 70–108)
HCT VFR BLD CALC: 45.4 % (ref 37–47)
HEMOGLOBIN: 14.3 GM/DL (ref 12–16)
POTASSIUM SERPL-SCNC: 4.1 MEQ/L (ref 3.5–5.2)
SODIUM BLD-SCNC: 138 MEQ/L (ref 135–145)
TSH SERPL DL<=0.05 MIU/L-ACNC: 2.02 UIU/ML (ref 0.4–4.2)

## 2019-01-02 PROCEDURE — G8417 CALC BMI ABV UP PARAM F/U: HCPCS | Performed by: NURSE PRACTITIONER

## 2019-01-02 PROCEDURE — 4040F PNEUMOC VAC/ADMIN/RCVD: CPT | Performed by: NURSE PRACTITIONER

## 2019-01-02 PROCEDURE — 1111F DSCHRG MED/CURRENT MED MERGE: CPT | Performed by: NURSE PRACTITIONER

## 2019-01-02 PROCEDURE — 36415 COLL VENOUS BLD VENIPUNCTURE: CPT | Performed by: NURSE PRACTITIONER

## 2019-01-02 PROCEDURE — G8598 ASA/ANTIPLAT THER USED: HCPCS | Performed by: NURSE PRACTITIONER

## 2019-01-02 PROCEDURE — 1090F PRES/ABSN URINE INCON ASSESS: CPT | Performed by: NURSE PRACTITIONER

## 2019-01-02 PROCEDURE — 1101F PT FALLS ASSESS-DOCD LE1/YR: CPT | Performed by: NURSE PRACTITIONER

## 2019-01-02 PROCEDURE — 1123F ACP DISCUSS/DSCN MKR DOCD: CPT | Performed by: NURSE PRACTITIONER

## 2019-01-02 PROCEDURE — 1036F TOBACCO NON-USER: CPT | Performed by: NURSE PRACTITIONER

## 2019-01-02 PROCEDURE — 99213 OFFICE O/P EST LOW 20 MIN: CPT | Performed by: NURSE PRACTITIONER

## 2019-01-02 PROCEDURE — G8427 DOCREV CUR MEDS BY ELIG CLIN: HCPCS | Performed by: NURSE PRACTITIONER

## 2019-01-02 PROCEDURE — G8482 FLU IMMUNIZE ORDER/ADMIN: HCPCS | Performed by: NURSE PRACTITIONER

## 2019-01-02 PROCEDURE — 99495 TRANSJ CARE MGMT MOD F2F 14D: CPT | Performed by: FAMILY MEDICINE

## 2019-01-02 RX ORDER — PANTOPRAZOLE SODIUM 40 MG/1
40 TABLET, DELAYED RELEASE ORAL 2 TIMES DAILY
Qty: 180 TABLET | Refills: 3 | Status: SHIPPED | OUTPATIENT
Start: 2019-01-02 | End: 2019-11-21

## 2019-01-02 ASSESSMENT — ENCOUNTER SYMPTOMS
COLOR CHANGE: 0
CHEST TIGHTNESS: 0
WHEEZING: 0
NAUSEA: 0
COUGH: 0
ABDOMINAL DISTENTION: 0
SHORTNESS OF BREATH: 1
ABDOMINAL PAIN: 0
APNEA: 0

## 2019-01-04 ENCOUNTER — CARE COORDINATION (OUTPATIENT)
Dept: CASE MANAGEMENT | Age: 84
End: 2019-01-04

## 2019-01-07 ENCOUNTER — OFFICE VISIT (OUTPATIENT)
Dept: CARDIOLOGY CLINIC | Age: 84
End: 2019-01-07
Payer: MEDICARE

## 2019-01-07 VITALS
WEIGHT: 173.06 LBS | DIASTOLIC BLOOD PRESSURE: 63 MMHG | HEIGHT: 62 IN | HEART RATE: 59 BPM | SYSTOLIC BLOOD PRESSURE: 119 MMHG | BODY MASS INDEX: 31.85 KG/M2

## 2019-01-07 DIAGNOSIS — I25.83 CORONARY ARTERY DISEASE DUE TO LIPID RICH PLAQUE: Primary | ICD-10-CM

## 2019-01-07 DIAGNOSIS — I25.10 CORONARY ARTERY DISEASE DUE TO LIPID RICH PLAQUE: Primary | ICD-10-CM

## 2019-01-07 PROCEDURE — 1101F PT FALLS ASSESS-DOCD LE1/YR: CPT | Performed by: INTERNAL MEDICINE

## 2019-01-07 PROCEDURE — 1036F TOBACCO NON-USER: CPT | Performed by: INTERNAL MEDICINE

## 2019-01-07 PROCEDURE — G8427 DOCREV CUR MEDS BY ELIG CLIN: HCPCS | Performed by: INTERNAL MEDICINE

## 2019-01-07 PROCEDURE — G8598 ASA/ANTIPLAT THER USED: HCPCS | Performed by: INTERNAL MEDICINE

## 2019-01-07 PROCEDURE — 1123F ACP DISCUSS/DSCN MKR DOCD: CPT | Performed by: INTERNAL MEDICINE

## 2019-01-07 PROCEDURE — 1111F DSCHRG MED/CURRENT MED MERGE: CPT | Performed by: INTERNAL MEDICINE

## 2019-01-07 PROCEDURE — G8482 FLU IMMUNIZE ORDER/ADMIN: HCPCS | Performed by: INTERNAL MEDICINE

## 2019-01-07 PROCEDURE — 99214 OFFICE O/P EST MOD 30 MIN: CPT | Performed by: INTERNAL MEDICINE

## 2019-01-07 PROCEDURE — 4040F PNEUMOC VAC/ADMIN/RCVD: CPT | Performed by: INTERNAL MEDICINE

## 2019-01-07 PROCEDURE — 1090F PRES/ABSN URINE INCON ASSESS: CPT | Performed by: INTERNAL MEDICINE

## 2019-01-07 PROCEDURE — G8417 CALC BMI ABV UP PARAM F/U: HCPCS | Performed by: INTERNAL MEDICINE

## 2019-01-07 ASSESSMENT — ENCOUNTER SYMPTOMS
SPUTUM PRODUCTION: 0
ORTHOPNEA: 0
COUGH: 0
HOARSE VOICE: 0
DIARRHEA: 0
DOUBLE VISION: 0
EYE PAIN: 0
BACK PAIN: 0
CONSTIPATION: 0
CHANGE IN BOWEL HABIT: 0
EYE DISCHARGE: 0
SHORTNESS OF BREATH: 0
HEMOPTYSIS: 0
BLOATING: 0
BOWEL INCONTINENCE: 0
ABDOMINAL PAIN: 0
BLURRED VISION: 0

## 2019-01-08 ENCOUNTER — CARE COORDINATION (OUTPATIENT)
Dept: CASE MANAGEMENT | Age: 84
End: 2019-01-08

## 2019-01-14 ENCOUNTER — PREP FOR PROCEDURE (OUTPATIENT)
Dept: CARDIOLOGY | Age: 84
End: 2019-01-14

## 2019-01-14 RX ORDER — NITROGLYCERIN 0.4 MG/1
0.4 TABLET SUBLINGUAL EVERY 5 MIN PRN
Status: CANCELLED | OUTPATIENT
Start: 2019-01-14

## 2019-01-14 RX ORDER — SODIUM CHLORIDE 0.9 % (FLUSH) 0.9 %
10 SYRINGE (ML) INJECTION PRN
Status: CANCELLED | OUTPATIENT
Start: 2019-01-14

## 2019-01-14 RX ORDER — ASPIRIN 325 MG
325 TABLET ORAL ONCE
Status: CANCELLED | OUTPATIENT
Start: 2019-01-14 | End: 2019-01-14

## 2019-01-14 RX ORDER — SODIUM CHLORIDE 0.9 % (FLUSH) 0.9 %
10 SYRINGE (ML) INJECTION EVERY 12 HOURS SCHEDULED
Status: CANCELLED | OUTPATIENT
Start: 2019-01-14

## 2019-01-14 RX ORDER — SODIUM CHLORIDE 9 MG/ML
INJECTION, SOLUTION INTRAVENOUS CONTINUOUS
Status: CANCELLED | OUTPATIENT
Start: 2019-01-14

## 2019-01-15 ENCOUNTER — HOSPITAL ENCOUNTER (OUTPATIENT)
Dept: INPATIENT UNIT | Age: 84
Discharge: HOME | End: 2019-01-16
Attending: INTERNAL MEDICINE | Admitting: INTERNAL MEDICINE
Payer: MEDICARE

## 2019-01-15 ENCOUNTER — APPOINTMENT (OUTPATIENT)
Dept: CARDIAC CATH/INVASIVE PROCEDURES | Age: 84
End: 2019-01-15
Attending: INTERNAL MEDICINE
Payer: MEDICARE

## 2019-01-15 LAB
ABO: NORMAL
ACTIVATED CLOTTING TIME: 263 SECONDS (ref 1–150)
ACTIVATED CLOTTING TIME: 268 SECONDS (ref 1–150)
ANION GAP SERPL CALCULATED.3IONS-SCNC: 11 MEQ/L (ref 8–16)
ANTIBODY SCREEN: NORMAL
APTT: 31.9 SECONDS (ref 22–38)
BUN BLDV-MCNC: 14 MG/DL (ref 7–22)
CALCIUM SERPL-MCNC: 9.8 MG/DL (ref 8.5–10.5)
CHLORIDE BLD-SCNC: 97 MEQ/L (ref 98–111)
CO2: 30 MEQ/L (ref 23–33)
CREAT SERPL-MCNC: 0.8 MG/DL (ref 0.4–1.2)
EKG ATRIAL RATE: 60 BPM
EKG Q-T INTERVAL: 462 MS
EKG QRS DURATION: 146 MS
EKG QTC CALCULATION (BAZETT): 465 MS
EKG R AXIS: -77 DEGREES
EKG T AXIS: 101 DEGREES
EKG VENTRICULAR RATE: 61 BPM
ERYTHROCYTE [DISTWIDTH] IN BLOOD BY AUTOMATED COUNT: 20.2 % (ref 11.5–14.5)
ERYTHROCYTE [DISTWIDTH] IN BLOOD BY AUTOMATED COUNT: 73.5 FL (ref 35–45)
GFR SERPL CREATININE-BSD FRML MDRD: 67 ML/MIN/1.73M2
GLUCOSE BLD-MCNC: 161 MG/DL (ref 70–108)
HCT VFR BLD CALC: 43.7 % (ref 37–47)
HEMOGLOBIN: 14.3 GM/DL (ref 12–16)
INR BLD: 0.98 (ref 0.85–1.13)
MCH RBC QN AUTO: 32.6 PG (ref 26–33)
MCHC RBC AUTO-ENTMCNC: 32.7 GM/DL (ref 32.2–35.5)
MCV RBC AUTO: 99.8 FL (ref 81–99)
PLATELET # BLD: 165 THOU/MM3 (ref 130–400)
PMV BLD AUTO: 10.7 FL (ref 9.4–12.4)
POTASSIUM REFLEX MAGNESIUM: 4.3 MEQ/L (ref 3.5–5.2)
RBC # BLD: 4.38 MILL/MM3 (ref 4.2–5.4)
RH FACTOR: NORMAL
SODIUM BLD-SCNC: 138 MEQ/L (ref 135–145)
WBC # BLD: 6.2 THOU/MM3 (ref 4.8–10.8)

## 2019-01-15 PROCEDURE — C1769 GUIDE WIRE: HCPCS

## 2019-01-15 PROCEDURE — 93005 ELECTROCARDIOGRAM TRACING: CPT | Performed by: PHYSICIAN ASSISTANT

## 2019-01-15 PROCEDURE — 86900 BLOOD TYPING SEROLOGIC ABO: CPT

## 2019-01-15 PROCEDURE — 6370000000 HC RX 637 (ALT 250 FOR IP): Performed by: INTERNAL MEDICINE

## 2019-01-15 PROCEDURE — 2709999900 HC NON-CHARGEABLE SUPPLY

## 2019-01-15 PROCEDURE — 86850 RBC ANTIBODY SCREEN: CPT

## 2019-01-15 PROCEDURE — C9600 PERC DRUG-EL COR STENT SING: HCPCS

## 2019-01-15 PROCEDURE — C1887 CATHETER, GUIDING: HCPCS

## 2019-01-15 PROCEDURE — 6360000002 HC RX W HCPCS

## 2019-01-15 PROCEDURE — C1874 STENT, COATED/COV W/DEL SYS: HCPCS

## 2019-01-15 PROCEDURE — 86901 BLOOD TYPING SEROLOGIC RH(D): CPT

## 2019-01-15 PROCEDURE — 36415 COLL VENOUS BLD VENIPUNCTURE: CPT

## 2019-01-15 PROCEDURE — 80048 BASIC METABOLIC PNL TOTAL CA: CPT

## 2019-01-15 PROCEDURE — C1725 CATH, TRANSLUMIN NON-LASER: HCPCS

## 2019-01-15 PROCEDURE — 85610 PROTHROMBIN TIME: CPT

## 2019-01-15 PROCEDURE — 2500000003 HC RX 250 WO HCPCS

## 2019-01-15 PROCEDURE — 85347 COAGULATION TIME ACTIVATED: CPT

## 2019-01-15 PROCEDURE — 93571 IV DOP VEL&/PRESS C FLO 1ST: CPT

## 2019-01-15 PROCEDURE — 93010 ELECTROCARDIOGRAM REPORT: CPT | Performed by: INTERNAL MEDICINE

## 2019-01-15 PROCEDURE — 92928 PRQ TCAT PLMT NTRAC ST 1 LES: CPT | Performed by: INTERNAL MEDICINE

## 2019-01-15 PROCEDURE — 6360000004 HC RX CONTRAST MEDICATION: Performed by: INTERNAL MEDICINE

## 2019-01-15 PROCEDURE — C1894 INTRO/SHEATH, NON-LASER: HCPCS

## 2019-01-15 PROCEDURE — 85730 THROMBOPLASTIN TIME PARTIAL: CPT

## 2019-01-15 PROCEDURE — C1760 CLOSURE DEV, VASC: HCPCS

## 2019-01-15 PROCEDURE — 2580000003 HC RX 258: Performed by: PHYSICIAN ASSISTANT

## 2019-01-15 PROCEDURE — 93571 IV DOP VEL&/PRESS C FLO 1ST: CPT | Performed by: INTERNAL MEDICINE

## 2019-01-15 PROCEDURE — 85027 COMPLETE CBC AUTOMATED: CPT

## 2019-01-15 RX ORDER — SODIUM CHLORIDE 9 MG/ML
INJECTION, SOLUTION INTRAVENOUS CONTINUOUS
Status: DISCONTINUED | OUTPATIENT
Start: 2019-01-15 | End: 2019-01-15 | Stop reason: SDUPTHER

## 2019-01-15 RX ORDER — SODIUM CHLORIDE 9 MG/ML
75 INJECTION, SOLUTION INTRAVENOUS CONTINUOUS
Status: ACTIVE | OUTPATIENT
Start: 2019-01-15 | End: 2019-01-15

## 2019-01-15 RX ORDER — SODIUM CHLORIDE 0.9 % (FLUSH) 0.9 %
10 SYRINGE (ML) INJECTION PRN
Status: DISCONTINUED | OUTPATIENT
Start: 2019-01-15 | End: 2019-01-16 | Stop reason: HOSPADM

## 2019-01-15 RX ORDER — ASPIRIN 325 MG
325 TABLET ORAL ONCE
Status: DISCONTINUED | OUTPATIENT
Start: 2019-01-15 | End: 2019-01-16 | Stop reason: HOSPADM

## 2019-01-15 RX ORDER — SODIUM CHLORIDE 0.9 % (FLUSH) 0.9 %
10 SYRINGE (ML) INJECTION EVERY 12 HOURS SCHEDULED
Status: DISCONTINUED | OUTPATIENT
Start: 2019-01-15 | End: 2019-01-16 | Stop reason: HOSPADM

## 2019-01-15 RX ORDER — ATORVASTATIN CALCIUM 40 MG/1
40 TABLET, FILM COATED ORAL NIGHTLY
Status: DISCONTINUED | OUTPATIENT
Start: 2019-01-15 | End: 2019-01-16 | Stop reason: HOSPADM

## 2019-01-15 RX ORDER — CLOPIDOGREL BISULFATE 75 MG/1
75 TABLET ORAL DAILY
Status: DISCONTINUED | OUTPATIENT
Start: 2019-01-16 | End: 2019-01-16 | Stop reason: HOSPADM

## 2019-01-15 RX ORDER — METOPROLOL TARTRATE 50 MG/1
50 TABLET, FILM COATED ORAL 2 TIMES DAILY
Status: DISCONTINUED | OUTPATIENT
Start: 2019-01-15 | End: 2019-01-16 | Stop reason: HOSPADM

## 2019-01-15 RX ORDER — ASPIRIN 81 MG/1
81 TABLET, CHEWABLE ORAL DAILY
Status: DISCONTINUED | OUTPATIENT
Start: 2019-01-16 | End: 2019-01-16 | Stop reason: HOSPADM

## 2019-01-15 RX ORDER — ONDANSETRON 2 MG/ML
4 INJECTION INTRAMUSCULAR; INTRAVENOUS EVERY 6 HOURS PRN
Status: DISCONTINUED | OUTPATIENT
Start: 2019-01-15 | End: 2019-01-16 | Stop reason: HOSPADM

## 2019-01-15 RX ORDER — ACETAMINOPHEN 325 MG/1
650 TABLET ORAL EVERY 4 HOURS PRN
Status: DISCONTINUED | OUTPATIENT
Start: 2019-01-15 | End: 2019-01-16 | Stop reason: HOSPADM

## 2019-01-15 RX ORDER — BUMETANIDE 1 MG/1
0.5 TABLET ORAL DAILY
Status: DISCONTINUED | OUTPATIENT
Start: 2019-01-15 | End: 2019-01-16 | Stop reason: HOSPADM

## 2019-01-15 RX ORDER — NITROGLYCERIN 0.4 MG/1
0.4 TABLET SUBLINGUAL EVERY 5 MIN PRN
Status: DISCONTINUED | OUTPATIENT
Start: 2019-01-15 | End: 2019-01-16 | Stop reason: HOSPADM

## 2019-01-15 RX ORDER — SODIUM CHLORIDE 0.9 % (FLUSH) 0.9 %
10 SYRINGE (ML) INJECTION PRN
Status: DISCONTINUED | OUTPATIENT
Start: 2019-01-15 | End: 2019-01-15 | Stop reason: SDUPTHER

## 2019-01-15 RX ADMIN — SODIUM CHLORIDE: 9 INJECTION, SOLUTION INTRAVENOUS at 06:56

## 2019-01-15 RX ADMIN — IOPAMIDOL 100 ML: 755 INJECTION, SOLUTION INTRAVENOUS at 09:38

## 2019-01-15 RX ADMIN — METOPROLOL TARTRATE 50 MG: 50 TABLET, FILM COATED ORAL at 21:20

## 2019-01-15 RX ADMIN — ATORVASTATIN CALCIUM 40 MG: 40 TABLET, FILM COATED ORAL at 21:19

## 2019-01-15 RX ADMIN — BUMETANIDE 0.5 MG: 1 TABLET ORAL at 18:19

## 2019-01-15 RX ADMIN — ACETAMINOPHEN 500 MG: 325 TABLET ORAL at 15:28

## 2019-01-15 ASSESSMENT — PAIN SCALES - GENERAL
PAINLEVEL_OUTOF10: 5
PAINLEVEL_OUTOF10: 0
PAINLEVEL_OUTOF10: 0

## 2019-01-16 VITALS
WEIGHT: 172.7 LBS | OXYGEN SATURATION: 97 % | TEMPERATURE: 97.4 F | HEART RATE: 61 BPM | SYSTOLIC BLOOD PRESSURE: 144 MMHG | DIASTOLIC BLOOD PRESSURE: 51 MMHG | BODY MASS INDEX: 31.78 KG/M2 | RESPIRATION RATE: 16 BRPM | HEIGHT: 62 IN

## 2019-01-16 PROCEDURE — 99212 OFFICE O/P EST SF 10 MIN: CPT | Performed by: NURSE PRACTITIONER

## 2019-01-16 RX ADMIN — BUMETANIDE 0.5 MG: 1 TABLET ORAL at 10:07

## 2019-01-16 RX ADMIN — CLOPIDOGREL BISULFATE 75 MG: 75 TABLET ORAL at 10:06

## 2019-01-16 RX ADMIN — ASPIRIN 81 MG: 81 TABLET, CHEWABLE ORAL at 10:04

## 2019-01-16 RX ADMIN — METOPROLOL TARTRATE 50 MG: 50 TABLET, FILM COATED ORAL at 10:05

## 2019-01-16 ASSESSMENT — PAIN SCALES - GENERAL
PAINLEVEL_OUTOF10: 0
PAINLEVEL_OUTOF10: 0

## 2019-01-30 ENCOUNTER — OFFICE VISIT (OUTPATIENT)
Dept: CARDIOLOGY CLINIC | Age: 84
End: 2019-01-30
Payer: MEDICARE

## 2019-01-30 VITALS
HEIGHT: 62 IN | HEART RATE: 72 BPM | DIASTOLIC BLOOD PRESSURE: 68 MMHG | WEIGHT: 174 LBS | SYSTOLIC BLOOD PRESSURE: 138 MMHG | BODY MASS INDEX: 32.02 KG/M2

## 2019-01-30 DIAGNOSIS — I25.10 CORONARY ARTERY DISEASE DUE TO LIPID RICH PLAQUE: Primary | ICD-10-CM

## 2019-01-30 DIAGNOSIS — I25.83 CORONARY ARTERY DISEASE DUE TO LIPID RICH PLAQUE: Primary | ICD-10-CM

## 2019-01-30 PROCEDURE — 1036F TOBACCO NON-USER: CPT | Performed by: INTERNAL MEDICINE

## 2019-01-30 PROCEDURE — G8417 CALC BMI ABV UP PARAM F/U: HCPCS | Performed by: INTERNAL MEDICINE

## 2019-01-30 PROCEDURE — 4040F PNEUMOC VAC/ADMIN/RCVD: CPT | Performed by: INTERNAL MEDICINE

## 2019-01-30 PROCEDURE — G8482 FLU IMMUNIZE ORDER/ADMIN: HCPCS | Performed by: INTERNAL MEDICINE

## 2019-01-30 PROCEDURE — G8598 ASA/ANTIPLAT THER USED: HCPCS | Performed by: INTERNAL MEDICINE

## 2019-01-30 PROCEDURE — 99213 OFFICE O/P EST LOW 20 MIN: CPT | Performed by: INTERNAL MEDICINE

## 2019-01-30 PROCEDURE — 1123F ACP DISCUSS/DSCN MKR DOCD: CPT | Performed by: INTERNAL MEDICINE

## 2019-01-30 PROCEDURE — 1090F PRES/ABSN URINE INCON ASSESS: CPT | Performed by: INTERNAL MEDICINE

## 2019-01-30 PROCEDURE — G8428 CUR MEDS NOT DOCUMENT: HCPCS | Performed by: INTERNAL MEDICINE

## 2019-01-30 PROCEDURE — 1101F PT FALLS ASSESS-DOCD LE1/YR: CPT | Performed by: INTERNAL MEDICINE

## 2019-01-30 ASSESSMENT — ENCOUNTER SYMPTOMS
EYE PAIN: 0
SPUTUM PRODUCTION: 0
BOWEL INCONTINENCE: 0
ORTHOPNEA: 0
BLOATING: 0
BACK PAIN: 0
DOUBLE VISION: 0
BLURRED VISION: 0
COUGH: 0
EYE DISCHARGE: 0
HOARSE VOICE: 0
CHANGE IN BOWEL HABIT: 0
SHORTNESS OF BREATH: 0
ABDOMINAL PAIN: 0
HEMOPTYSIS: 0
DIARRHEA: 0
CONSTIPATION: 0

## 2019-02-04 ENCOUNTER — OFFICE VISIT (OUTPATIENT)
Dept: FAMILY MEDICINE CLINIC | Age: 84
End: 2019-02-04
Payer: MEDICARE

## 2019-02-04 VITALS
RESPIRATION RATE: 10 BRPM | HEART RATE: 58 BPM | SYSTOLIC BLOOD PRESSURE: 128 MMHG | DIASTOLIC BLOOD PRESSURE: 68 MMHG | WEIGHT: 171.8 LBS | BODY MASS INDEX: 31.62 KG/M2 | HEIGHT: 62 IN

## 2019-02-04 DIAGNOSIS — E04.2 MULTIPLE THYROID NODULES: ICD-10-CM

## 2019-02-04 DIAGNOSIS — I25.119 CORONARY ARTERY DISEASE INVOLVING NATIVE HEART WITH ANGINA PECTORIS, UNSPECIFIED VESSEL OR LESION TYPE (HCC): ICD-10-CM

## 2019-02-04 DIAGNOSIS — M15.9 PRIMARY OSTEOARTHRITIS INVOLVING MULTIPLE JOINTS: ICD-10-CM

## 2019-02-04 DIAGNOSIS — R53.1 GENERAL WEAKNESS: ICD-10-CM

## 2019-02-04 DIAGNOSIS — E78.00 PURE HYPERCHOLESTEROLEMIA: ICD-10-CM

## 2019-02-04 DIAGNOSIS — K21.9 GASTROESOPHAGEAL REFLUX DISEASE WITHOUT ESOPHAGITIS: ICD-10-CM

## 2019-02-04 DIAGNOSIS — M85.89 OSTEOPENIA OF MULTIPLE SITES: ICD-10-CM

## 2019-02-04 DIAGNOSIS — R10.13 EPIGASTRIC PAIN: ICD-10-CM

## 2019-02-04 DIAGNOSIS — I10 ESSENTIAL HYPERTENSION: ICD-10-CM

## 2019-02-04 DIAGNOSIS — I49.5 SICK SINUS SYNDROME (HCC): ICD-10-CM

## 2019-02-04 DIAGNOSIS — I50.33 ACUTE ON CHRONIC CONGESTIVE HEART FAILURE WITH LEFT VENTRICULAR DIASTOLIC DYSFUNCTION (HCC): ICD-10-CM

## 2019-02-04 DIAGNOSIS — I48.91 ATRIAL FIBRILLATION WITH RAPID VENTRICULAR RESPONSE (HCC): Primary | ICD-10-CM

## 2019-02-04 PROBLEM — J81.0 ACUTE LUNG EDEMA (HCC): Status: RESOLVED | Noted: 2018-12-03 | Resolved: 2019-02-04

## 2019-02-04 PROCEDURE — 1101F PT FALLS ASSESS-DOCD LE1/YR: CPT | Performed by: FAMILY MEDICINE

## 2019-02-04 PROCEDURE — 1036F TOBACCO NON-USER: CPT | Performed by: FAMILY MEDICINE

## 2019-02-04 PROCEDURE — 4040F PNEUMOC VAC/ADMIN/RCVD: CPT | Performed by: FAMILY MEDICINE

## 2019-02-04 PROCEDURE — G8427 DOCREV CUR MEDS BY ELIG CLIN: HCPCS | Performed by: FAMILY MEDICINE

## 2019-02-04 PROCEDURE — G8482 FLU IMMUNIZE ORDER/ADMIN: HCPCS | Performed by: FAMILY MEDICINE

## 2019-02-04 PROCEDURE — 1123F ACP DISCUSS/DSCN MKR DOCD: CPT | Performed by: FAMILY MEDICINE

## 2019-02-04 PROCEDURE — 99214 OFFICE O/P EST MOD 30 MIN: CPT | Performed by: FAMILY MEDICINE

## 2019-02-04 PROCEDURE — G8598 ASA/ANTIPLAT THER USED: HCPCS | Performed by: FAMILY MEDICINE

## 2019-02-04 PROCEDURE — 1090F PRES/ABSN URINE INCON ASSESS: CPT | Performed by: FAMILY MEDICINE

## 2019-02-04 PROCEDURE — G8417 CALC BMI ABV UP PARAM F/U: HCPCS | Performed by: FAMILY MEDICINE

## 2019-02-04 RX ORDER — CLOPIDOGREL BISULFATE 75 MG/1
75 TABLET ORAL DAILY
Qty: 90 TABLET | Refills: 3 | Status: SHIPPED | OUTPATIENT
Start: 2019-02-04 | End: 2019-08-14 | Stop reason: ALTCHOICE

## 2019-02-04 RX ORDER — ATORVASTATIN CALCIUM 40 MG/1
40 TABLET, FILM COATED ORAL NIGHTLY
Qty: 90 TABLET | Refills: 3 | Status: SHIPPED | OUTPATIENT
Start: 2019-02-04 | End: 2019-11-12 | Stop reason: SDUPTHER

## 2019-02-04 RX ORDER — SUCRALFATE 1 G/1
1 TABLET ORAL 2 TIMES DAILY
Qty: 360 TABLET | Refills: 3 | Status: SHIPPED | OUTPATIENT
Start: 2019-02-04 | End: 2019-05-14 | Stop reason: SDUPTHER

## 2019-02-04 RX ORDER — BUMETANIDE 0.5 MG/1
0.5 TABLET ORAL DAILY
Qty: 90 TABLET | Refills: 3 | Status: SHIPPED | OUTPATIENT
Start: 2019-02-04 | End: 2019-11-12 | Stop reason: SDUPTHER

## 2019-02-09 ENCOUNTER — HOSPITAL ENCOUNTER (OUTPATIENT)
Age: 84
Discharge: HOME | End: 2019-02-09
Payer: MEDICARE

## 2019-02-09 LAB
CREAT SERPL-MCNC: 0.6 MG/DL (ref 0.4–1.2)
FERRITIN: 53 NG/ML (ref 10–291)
GFR SERPL CREATININE-BSD FRML MDRD: > 90 ML/MIN/1.73M2
IRON: 49 UG/DL (ref 50–170)
MAGNESIUM: 1.7 MG/DL (ref 1.6–2.4)
TOTAL IRON BINDING CAPACITY: 351 UG/DL (ref 171–450)

## 2019-02-09 PROCEDURE — 83540 ASSAY OF IRON: CPT

## 2019-02-09 PROCEDURE — 82565 ASSAY OF CREATININE: CPT

## 2019-02-09 PROCEDURE — 83735 ASSAY OF MAGNESIUM: CPT

## 2019-02-09 PROCEDURE — 82728 ASSAY OF FERRITIN: CPT

## 2019-02-09 PROCEDURE — 36415 COLL VENOUS BLD VENIPUNCTURE: CPT

## 2019-02-09 PROCEDURE — 83550 IRON BINDING TEST: CPT

## 2019-02-09 PROCEDURE — 84238 ASSAY NONENDOCRINE RECEPTOR: CPT

## 2019-02-11 ENCOUNTER — HOSPITAL ENCOUNTER (OUTPATIENT)
Age: 84
Discharge: HOME | End: 2019-02-11
Payer: MEDICARE

## 2019-02-11 LAB
HEMOCCULT STL QL: POSITIVE
VITAMIN D 25-HYDROXY: 73 NG/ML (ref 30–100)

## 2019-02-11 PROCEDURE — 82306 VITAMIN D 25 HYDROXY: CPT

## 2019-02-11 PROCEDURE — 82272 OCCULT BLD FECES 1-3 TESTS: CPT

## 2019-02-11 PROCEDURE — 36415 COLL VENOUS BLD VENIPUNCTURE: CPT

## 2019-02-13 LAB — SOLUBLE TRANSFERRIN RECEPT: 4 MG/L (ref 1.9–4.4)

## 2019-02-19 ENCOUNTER — HOSPITAL ENCOUNTER (OUTPATIENT)
Age: 84
Discharge: HOME | End: 2019-02-19
Payer: MEDICARE

## 2019-02-19 LAB
HCT VFR BLD CALC: 41.6 % (ref 37–47)
HEMOGLOBIN: 13.5 GM/DL (ref 12–16)

## 2019-02-19 PROCEDURE — 85014 HEMATOCRIT: CPT

## 2019-02-19 PROCEDURE — 36415 COLL VENOUS BLD VENIPUNCTURE: CPT

## 2019-02-19 PROCEDURE — 85018 HEMOGLOBIN: CPT

## 2019-03-25 ENCOUNTER — OFFICE VISIT (OUTPATIENT)
Dept: CARDIOLOGY CLINIC | Age: 84
End: 2019-03-25
Payer: MEDICARE

## 2019-03-25 VITALS
HEART RATE: 62 BPM | OXYGEN SATURATION: 96 % | BODY MASS INDEX: 32.13 KG/M2 | SYSTOLIC BLOOD PRESSURE: 136 MMHG | WEIGHT: 174.6 LBS | HEIGHT: 62 IN | DIASTOLIC BLOOD PRESSURE: 74 MMHG

## 2019-03-25 DIAGNOSIS — I50.32 CHF (CONGESTIVE HEART FAILURE), NYHA CLASS II, CHRONIC, DIASTOLIC (HCC): Primary | ICD-10-CM

## 2019-03-25 PROCEDURE — 1036F TOBACCO NON-USER: CPT | Performed by: NURSE PRACTITIONER

## 2019-03-25 PROCEDURE — 99213 OFFICE O/P EST LOW 20 MIN: CPT | Performed by: NURSE PRACTITIONER

## 2019-03-25 PROCEDURE — G8482 FLU IMMUNIZE ORDER/ADMIN: HCPCS | Performed by: NURSE PRACTITIONER

## 2019-03-25 PROCEDURE — 4040F PNEUMOC VAC/ADMIN/RCVD: CPT | Performed by: NURSE PRACTITIONER

## 2019-03-25 PROCEDURE — 1090F PRES/ABSN URINE INCON ASSESS: CPT | Performed by: NURSE PRACTITIONER

## 2019-03-25 PROCEDURE — G8417 CALC BMI ABV UP PARAM F/U: HCPCS | Performed by: NURSE PRACTITIONER

## 2019-03-25 PROCEDURE — 1123F ACP DISCUSS/DSCN MKR DOCD: CPT | Performed by: NURSE PRACTITIONER

## 2019-03-25 PROCEDURE — G8427 DOCREV CUR MEDS BY ELIG CLIN: HCPCS | Performed by: NURSE PRACTITIONER

## 2019-03-25 PROCEDURE — 1101F PT FALLS ASSESS-DOCD LE1/YR: CPT | Performed by: NURSE PRACTITIONER

## 2019-03-25 PROCEDURE — G8598 ASA/ANTIPLAT THER USED: HCPCS | Performed by: NURSE PRACTITIONER

## 2019-03-25 ASSESSMENT — ENCOUNTER SYMPTOMS
COLOR CHANGE: 0
ABDOMINAL DISTENTION: 0
SHORTNESS OF BREATH: 0
CHEST TIGHTNESS: 0
APNEA: 0
WHEEZING: 0
ABDOMINAL PAIN: 0
COUGH: 0
NAUSEA: 0

## 2019-04-03 ENCOUNTER — HOSPITAL ENCOUNTER (OUTPATIENT)
Dept: ULTRASOUND IMAGING | Age: 84
Discharge: HOME | End: 2019-04-03
Payer: MEDICARE

## 2019-04-03 DIAGNOSIS — E04.2 MULTIPLE THYROID NODULES: ICD-10-CM

## 2019-04-03 PROCEDURE — 76536 US EXAM OF HEAD AND NECK: CPT

## 2019-04-09 ENCOUNTER — OFFICE VISIT (OUTPATIENT)
Dept: FAMILY MEDICINE CLINIC | Age: 84
End: 2019-04-09
Payer: MEDICARE

## 2019-04-09 VITALS
DIASTOLIC BLOOD PRESSURE: 56 MMHG | BODY MASS INDEX: 31.28 KG/M2 | TEMPERATURE: 97.3 F | RESPIRATION RATE: 10 BRPM | WEIGHT: 171 LBS | SYSTOLIC BLOOD PRESSURE: 128 MMHG | HEART RATE: 84 BPM

## 2019-04-09 DIAGNOSIS — K59.04 CHRONIC IDIOPATHIC CONSTIPATION: ICD-10-CM

## 2019-04-09 DIAGNOSIS — E04.2 MULTIPLE THYROID NODULES: Primary | ICD-10-CM

## 2019-04-09 DIAGNOSIS — K21.9 GASTROESOPHAGEAL REFLUX DISEASE WITHOUT ESOPHAGITIS: ICD-10-CM

## 2019-04-09 DIAGNOSIS — E78.00 PURE HYPERCHOLESTEROLEMIA: ICD-10-CM

## 2019-04-09 DIAGNOSIS — J01.90 ACUTE BACTERIAL SINUSITIS: ICD-10-CM

## 2019-04-09 DIAGNOSIS — I48.91 ATRIAL FIBRILLATION WITH RAPID VENTRICULAR RESPONSE (HCC): ICD-10-CM

## 2019-04-09 DIAGNOSIS — D50.0 IRON DEFICIENCY ANEMIA DUE TO CHRONIC BLOOD LOSS: ICD-10-CM

## 2019-04-09 DIAGNOSIS — B96.89 ACUTE BACTERIAL SINUSITIS: ICD-10-CM

## 2019-04-09 DIAGNOSIS — I10 ESSENTIAL HYPERTENSION: ICD-10-CM

## 2019-04-09 DIAGNOSIS — M15.9 PRIMARY OSTEOARTHRITIS INVOLVING MULTIPLE JOINTS: ICD-10-CM

## 2019-04-09 DIAGNOSIS — I49.5 SICK SINUS SYNDROME (HCC): ICD-10-CM

## 2019-04-09 PROCEDURE — 99215 OFFICE O/P EST HI 40 MIN: CPT | Performed by: FAMILY MEDICINE

## 2019-04-09 PROCEDURE — 1090F PRES/ABSN URINE INCON ASSESS: CPT | Performed by: FAMILY MEDICINE

## 2019-04-09 PROCEDURE — G8427 DOCREV CUR MEDS BY ELIG CLIN: HCPCS | Performed by: FAMILY MEDICINE

## 2019-04-09 PROCEDURE — G8598 ASA/ANTIPLAT THER USED: HCPCS | Performed by: FAMILY MEDICINE

## 2019-04-09 PROCEDURE — 4040F PNEUMOC VAC/ADMIN/RCVD: CPT | Performed by: FAMILY MEDICINE

## 2019-04-09 PROCEDURE — 1036F TOBACCO NON-USER: CPT | Performed by: FAMILY MEDICINE

## 2019-04-09 PROCEDURE — G8417 CALC BMI ABV UP PARAM F/U: HCPCS | Performed by: FAMILY MEDICINE

## 2019-04-09 PROCEDURE — 1123F ACP DISCUSS/DSCN MKR DOCD: CPT | Performed by: FAMILY MEDICINE

## 2019-04-09 RX ORDER — FERROUS SULFATE 325(65) MG
325 TABLET ORAL 2 TIMES DAILY
Qty: 180 TABLET | Refills: 3 | Status: SHIPPED | OUTPATIENT
Start: 2019-04-09 | End: 2019-11-21

## 2019-04-09 RX ORDER — PSEUDOEPHEDRINE HCL 30 MG
100 TABLET ORAL 2 TIMES DAILY PRN
Qty: 180 CAPSULE | Refills: 3 | Status: SHIPPED | OUTPATIENT
Start: 2019-04-09 | End: 2019-11-21

## 2019-04-09 RX ORDER — CEFDINIR 300 MG/1
300 CAPSULE ORAL 2 TIMES DAILY
Qty: 20 CAPSULE | Refills: 0 | Status: SHIPPED | OUTPATIENT
Start: 2019-04-09 | End: 2019-04-19

## 2019-04-09 RX ORDER — POTASSIUM CHLORIDE 20 MEQ/1
20 TABLET, EXTENDED RELEASE ORAL DAILY
Qty: 90 TABLET | Refills: 3 | Status: SHIPPED | OUTPATIENT
Start: 2019-04-09 | End: 2020-05-26

## 2019-04-09 ASSESSMENT — PATIENT HEALTH QUESTIONNAIRE - PHQ9
1. LITTLE INTEREST OR PLEASURE IN DOING THINGS: 0
SUM OF ALL RESPONSES TO PHQ9 QUESTIONS 1 & 2: 0
2. FEELING DOWN, DEPRESSED OR HOPELESS: 0
SUM OF ALL RESPONSES TO PHQ QUESTIONS 1-9: 0
SUM OF ALL RESPONSES TO PHQ QUESTIONS 1-9: 0

## 2019-04-10 NOTE — PROGRESS NOTES
melena, hematochezia, hematemesis, and coffee ground emesis. Medical therapy in the past has included proton pump inhibitors. Depression: Patient complains of depression. She complains of depressed mood, difficulty concentrating, psychomotor retardation and recurrent thoughts of death. Onset was approximately 5 years ago, gradually worsening since that time. She denies current suicidal and homicidal plan or intent. Family history significant for no psychiatric illness. Possible organic causes contributing are: none. Risk factors: negative life event aging and becoming less active and previous episode of depression Previous treatment includes no medication and none and psych therapy. She complains of the following side effects from the treatment: none. Osteoarthritis primary in multiple joints is controlled on current medications. A fib is rate controlled and taking xarelto. She had recent stent placement and is attending PT at the [facility] where her  lives. Vitamin b12 deficiency needs rechecked. Night sweats are unchanged. thyroid goiter in the past needs recheck of ultrasound last done in 4/2019 shows stable nodules and 2 new. One is recommended to have a biopsy. Cough for the past 2-3 weeks got better then worse again. mucinex is some help. Associated with runny nose. Reviewed chart forpast medical history , surgical history , allergies, social history , family history and medications. Health Maintenance   Topic Date Due    Shingles Vaccine (1 of 2) 04/04/1978    DTaP/Tdap/Td vaccine (1 - Tdap) 09/06/2021 (Originally 4/4/1947)    Potassium monitoring  01/15/2020    Creatinine monitoring  02/09/2020    Flu vaccine  Completed    Pneumococcal 65+ years Vaccine  Completed       Subjective:      Constitutional:Negative for fever, chills, diaphoresis, activity change, appetite change and fatigue.    HENT: Negative for hearing loss, ear pain, congestion, sore throat, rhinorrhea, postnasal drip and ear discharge. Eyes: Negative for photophobia and visual disturbance. Respiratory: Negative for cough, chest tightness, shortness of breath and wheezing. Cardiovascular: Negative for chest pain and leg swelling. Gastrointestinal: Negative for nausea, vomiting, abdominal pain, diarrhea and constipation. Genitourinary: Negative for dysuria, urgency and frequency. Neurological: Negative for weakness, light-headedness and headaches. Psychiatric/Behavioral: Negative for sleep disturbance. Objective:     Vitals:    04/09/19 1305   BP: (!) 128/56   Site: Right Upper Arm   Position: Sitting   Cuff Size: Large Adult   Pulse: 84   Resp: 10   Temp: 97.3 °F (36.3 °C)   TempSrc: Temporal   Weight: 171 lb (77.6 kg)     Wt Readings from Last 3 Encounters:   04/09/19 171 lb (77.6 kg)   03/25/19 174 lb 9.6 oz (79.2 kg)   02/04/19 171 lb 12.8 oz (77.9 kg)       Physical Exam  Constitutional: Vital signs are normal. She appears well-developed and well-nourished. She is active. HENT:   Head: Normocephalic and atraumatic. Right Ear: Tympanic membrane, external ear and ear canal normal. No drainage or tenderness. Left Ear: Tympanic membrane, external ear and ear canal normal. No drainage or tenderness. Nose: Nose normal. No mucosal edema or rhinorrhea. Mouth/Throat: Uvula is midline, oropharynx is clear and moist and mucous membranes are normal. Mucous membranes are not pale. Normal dentition. No posterior oropharyngeal edema or posterior oropharyngeal erythema. Eyes: Lids are normal. Right eye exhibits no chemosis and no discharge. Left eye exhibits no chemosis and no drainage. Right conjunctiva has no hemorrhage. Left conjunctiva has no hemorrhage. Right eye exhibits normal extraocular motion. Left eye exhibits normal extraocular motion. Right pupil is round and reactive. Left pupil is round and reactive.  Pupils are equal.   Cardiovascular: Normal rate, regular rhythm, S1 normal, S2 normal and normal heart sounds. Exam reveals no gallop. No murmur heard. Pulmonary/Chest: Effort normal and breath sounds normal. No respiratory distress. She has no wheezes. She has no rhonchi. She has no rales. Abdominal: Soft. Normal appearance and bowel sounds are normal. She exhibits no distension and no mass. There is no hepatosplenomegaly. No tenderness. She has no rigidity, no rebound and no guarding. No hernia. Musculoskeletal:        Right lower leg: She exhibits no edema. Left lower leg: She exhibits no edema. Neurological: She is alert. Assessment/Plan   Rigo Hooper was seen today for 6 month follow-up and fall. Diagnoses and all orders for this visit:    Multiple thyroid nodules  -     CT SONO GUIDE NEEDLE BIOPSY    Atrial fibrillation with rapid ventricular response (HCC)    Sick sinus syndrome (HCC)    Essential hypertension  -     potassium chloride (KLOR-CON M) 20 MEQ extended release tablet; Take 1 tablet by mouth daily    Primary osteoarthritis involving multiple joints    Pure hypercholesterolemia    Gastroesophageal reflux disease without esophagitis    Iron deficiency anemia due to chronic blood loss  -     ferrous sulfate 325 (65 Fe) MG tablet; Take 1 tablet by mouth 2 times daily    Chronic idiopathic constipation  -     docusate (COLACE, DULCOLAX) 100 MG CAPS; Take 100 mg by mouth 2 times daily as needed (PRN)    Acute bacterial sinusitis  -     cefdinir (OMNICEF) 300 MG capsule; Take 1 capsule by mouth 2 times daily for 10 days      Continue healthy diet and exercise  Aspirin daily    Push fluids  Tylenol or ibuprofen prn fever  Cool mist Humidifier in the bedroom  Follow up if not better in 1 week or if symptoms get worse. Discussed use, benefit, and side effectsof prescribed medications. All patient questions answered. Pt voiced understanding. Reviewed health maintenance. Instructed to continue current medications, diet and exercise. Patient agreed with treatment plan. Followup as directed.      Over 45 minutes spent with patient with >50% spent in counseling and coordination of care    Electronically signed by Jalyn Silva MD

## 2019-04-16 ENCOUNTER — HOSPITAL ENCOUNTER (OUTPATIENT)
Dept: ULTRASOUND IMAGING | Age: 84
Discharge: HOME | End: 2019-04-16
Payer: MEDICARE

## 2019-04-16 DIAGNOSIS — E04.2 MULTIPLE THYROID NODULES: ICD-10-CM

## 2019-05-03 ENCOUNTER — HOSPITAL ENCOUNTER (OUTPATIENT)
Age: 84
Discharge: HOME | End: 2019-05-03
Payer: MEDICARE

## 2019-05-03 LAB
CREAT SERPL-MCNC: 0.7 MG/DL (ref 0.4–1.2)
FERRITIN: 71 NG/ML (ref 10–291)
GFR SERPL CREATININE-BSD FRML MDRD: 78 ML/MIN/1.73M2
IRON: 82 UG/DL (ref 50–170)
MAGNESIUM: 1.7 MG/DL (ref 1.6–2.4)
TOTAL IRON BINDING CAPACITY: 307 UG/DL (ref 171–450)
VITAMIN D 25-HYDROXY: 70 NG/ML (ref 30–100)

## 2019-05-03 PROCEDURE — 84238 ASSAY NONENDOCRINE RECEPTOR: CPT

## 2019-05-03 PROCEDURE — 83540 ASSAY OF IRON: CPT

## 2019-05-03 PROCEDURE — 83550 IRON BINDING TEST: CPT

## 2019-05-03 PROCEDURE — 82306 VITAMIN D 25 HYDROXY: CPT

## 2019-05-03 PROCEDURE — 83735 ASSAY OF MAGNESIUM: CPT

## 2019-05-03 PROCEDURE — 82728 ASSAY OF FERRITIN: CPT

## 2019-05-03 PROCEDURE — 82565 ASSAY OF CREATININE: CPT

## 2019-05-03 PROCEDURE — 36415 COLL VENOUS BLD VENIPUNCTURE: CPT

## 2019-05-04 ENCOUNTER — HOSPITAL ENCOUNTER (OUTPATIENT)
Age: 84
Setting detail: OBSERVATION
Discharge: HOME | End: 2019-05-06
Attending: EMERGENCY MEDICINE | Admitting: OPHTHALMOLOGY
Payer: MEDICARE

## 2019-05-04 DIAGNOSIS — R19.7 NAUSEA VOMITING AND DIARRHEA: Primary | ICD-10-CM

## 2019-05-04 DIAGNOSIS — E87.6 HYPOKALEMIA: ICD-10-CM

## 2019-05-04 DIAGNOSIS — R11.2 NAUSEA VOMITING AND DIARRHEA: Primary | ICD-10-CM

## 2019-05-04 DIAGNOSIS — K92.2 LOWER GI BLEEDING: ICD-10-CM

## 2019-05-04 PROBLEM — R53.81 PHYSICAL DECONDITIONING: Status: ACTIVE | Noted: 2019-05-04

## 2019-05-04 PROBLEM — R11.14 BILIOUS VOMITING WITH NAUSEA: Status: ACTIVE | Noted: 2019-05-04

## 2019-05-04 PROBLEM — R19.5 OCCULT BLOOD IN STOOLS: Status: ACTIVE | Noted: 2019-05-04

## 2019-05-04 PROBLEM — K52.9 GASTROENTERITIS: Status: ACTIVE | Noted: 2019-05-04

## 2019-05-04 LAB
ALBUMIN SERPL-MCNC: 3.4 G/DL (ref 3.5–5.1)
ALP BLD-CCNC: 45 U/L (ref 38–126)
ALT SERPL-CCNC: 12 U/L (ref 11–66)
ANION GAP SERPL CALCULATED.3IONS-SCNC: 16 MEQ/L (ref 8–16)
AST SERPL-CCNC: 18 U/L (ref 5–40)
BILIRUB SERPL-MCNC: 3.7 MG/DL (ref 0.3–1.2)
BILIRUBIN DIRECT: 0.3 MG/DL (ref 0–0.3)
BUN BLDV-MCNC: 19 MG/DL (ref 7–22)
CALCIUM SERPL-MCNC: 9 MG/DL (ref 8.5–10.5)
CHLORIDE BLD-SCNC: 99 MEQ/L (ref 98–111)
CO2: 25 MEQ/L (ref 23–33)
CREAT SERPL-MCNC: 0.5 MG/DL (ref 0.4–1.2)
DIGOXIN LEVEL: 0.6 NG/ML (ref 0.5–2)
EKG ATRIAL RATE: 277 BPM
EKG Q-T INTERVAL: 550 MS
EKG QRS DURATION: 150 MS
EKG QTC CALCULATION (BAZETT): 550 MS
EKG R AXIS: -77 DEGREES
EKG T AXIS: 97 DEGREES
EKG VENTRICULAR RATE: 60 BPM
ERYTHROCYTE [DISTWIDTH] IN BLOOD BY AUTOMATED COUNT: 14.3 % (ref 11.5–14.5)
ERYTHROCYTE [DISTWIDTH] IN BLOOD BY AUTOMATED COUNT: 55.1 FL (ref 35–45)
FLU A ANTIGEN: NEGATIVE
FLU B ANTIGEN: NEGATIVE
GFR SERPL CREATININE-BSD FRML MDRD: > 90 ML/MIN/1.73M2
GLUCOSE BLD-MCNC: 175 MG/DL (ref 70–108)
HCT VFR BLD CALC: 41.7 % (ref 37–47)
HEMOCCULT STL QL: POSITIVE
HEMOGLOBIN: 14 GM/DL (ref 12–16)
LIPASE: 7.6 U/L (ref 5.6–51.3)
MCH RBC QN AUTO: 35.4 PG (ref 26–33)
MCHC RBC AUTO-ENTMCNC: 33.6 GM/DL (ref 32.2–35.5)
MCV RBC AUTO: 105.3 FL (ref 81–99)
OSMOLALITY CALCULATION: 285.9 MOSMOL/KG (ref 275–300)
PLATELET # BLD: 140 THOU/MM3 (ref 130–400)
PMV BLD AUTO: 10.4 FL (ref 9.4–12.4)
POTASSIUM REFLEX MAGNESIUM: 3.7 MEQ/L (ref 3.5–5.2)
PRO-BNP: 1482 PG/ML (ref 0–1800)
RBC # BLD: 3.96 MILL/MM3 (ref 4.2–5.4)
SODIUM BLD-SCNC: 140 MEQ/L (ref 135–145)
TOTAL PROTEIN: 6.2 G/DL (ref 6.1–8)
TROPONIN T: < 0.01 NG/ML
WBC # BLD: 6.4 THOU/MM3 (ref 4.8–10.8)

## 2019-05-04 PROCEDURE — 99285 EMERGENCY DEPT VISIT HI MDM: CPT

## 2019-05-04 PROCEDURE — 2580000003 HC RX 258: Performed by: OPHTHALMOLOGY

## 2019-05-04 PROCEDURE — 2580000003 HC RX 258: Performed by: EMERGENCY MEDICINE

## 2019-05-04 PROCEDURE — 80162 ASSAY OF DIGOXIN TOTAL: CPT

## 2019-05-04 PROCEDURE — 93005 ELECTROCARDIOGRAM TRACING: CPT | Performed by: EMERGENCY MEDICINE

## 2019-05-04 PROCEDURE — 80048 BASIC METABOLIC PNL TOTAL CA: CPT

## 2019-05-04 PROCEDURE — 96374 THER/PROPH/DIAG INJ IV PUSH: CPT

## 2019-05-04 PROCEDURE — 2709999900 HC NON-CHARGEABLE SUPPLY

## 2019-05-04 PROCEDURE — 99219 PR INITIAL OBSERVATION CARE/DAY 50 MINUTES: CPT | Performed by: OPHTHALMOLOGY

## 2019-05-04 PROCEDURE — 83690 ASSAY OF LIPASE: CPT

## 2019-05-04 PROCEDURE — 96361 HYDRATE IV INFUSION ADD-ON: CPT

## 2019-05-04 PROCEDURE — 6360000002 HC RX W HCPCS: Performed by: EMERGENCY MEDICINE

## 2019-05-04 PROCEDURE — G0378 HOSPITAL OBSERVATION PER HR: HCPCS

## 2019-05-04 PROCEDURE — 36415 COLL VENOUS BLD VENIPUNCTURE: CPT

## 2019-05-04 PROCEDURE — 82272 OCCULT BLD FECES 1-3 TESTS: CPT

## 2019-05-04 PROCEDURE — 83880 ASSAY OF NATRIURETIC PEPTIDE: CPT

## 2019-05-04 PROCEDURE — 85027 COMPLETE CBC AUTOMATED: CPT

## 2019-05-04 PROCEDURE — 87804 INFLUENZA ASSAY W/OPTIC: CPT

## 2019-05-04 PROCEDURE — 84484 ASSAY OF TROPONIN QUANT: CPT

## 2019-05-04 PROCEDURE — 80076 HEPATIC FUNCTION PANEL: CPT

## 2019-05-04 RX ORDER — SPIRONOLACTONE 25 MG/1
25 TABLET ORAL DAILY
COMMUNITY
End: 2019-05-14

## 2019-05-04 RX ORDER — BIOTIN 1 MG
1 TABLET ORAL DAILY
Status: DISCONTINUED | OUTPATIENT
Start: 2019-05-05 | End: 2019-05-06 | Stop reason: HOSPADM

## 2019-05-04 RX ORDER — ONDANSETRON 2 MG/ML
4 INJECTION INTRAMUSCULAR; INTRAVENOUS ONCE
Status: COMPLETED | OUTPATIENT
Start: 2019-05-04 | End: 2019-05-04

## 2019-05-04 RX ORDER — LOSARTAN POTASSIUM 25 MG/1
12.5 TABLET ORAL DAILY
Status: DISCONTINUED | OUTPATIENT
Start: 2019-05-05 | End: 2019-05-06 | Stop reason: HOSPADM

## 2019-05-04 RX ORDER — SODIUM CHLORIDE 9 MG/ML
INJECTION, SOLUTION INTRAVENOUS CONTINUOUS
Status: DISCONTINUED | OUTPATIENT
Start: 2019-05-04 | End: 2019-05-06 | Stop reason: HOSPADM

## 2019-05-04 RX ORDER — POTASSIUM CHLORIDE 20 MEQ/1
20 TABLET, EXTENDED RELEASE ORAL DAILY
Status: DISCONTINUED | OUTPATIENT
Start: 2019-05-04 | End: 2019-05-06 | Stop reason: HOSPADM

## 2019-05-04 RX ORDER — DIGOXIN 125 MCG
125 TABLET ORAL DAILY
Status: DISCONTINUED | OUTPATIENT
Start: 2019-05-04 | End: 2019-05-06 | Stop reason: HOSPADM

## 2019-05-04 RX ORDER — PANTOPRAZOLE SODIUM 40 MG/10ML
40 INJECTION, POWDER, LYOPHILIZED, FOR SOLUTION INTRAVENOUS DAILY
Status: DISCONTINUED | OUTPATIENT
Start: 2019-05-04 | End: 2019-05-06

## 2019-05-04 RX ORDER — ACETAMINOPHEN 325 MG/1
650 TABLET ORAL EVERY 4 HOURS PRN
Status: DISCONTINUED | OUTPATIENT
Start: 2019-05-04 | End: 2019-05-06 | Stop reason: HOSPADM

## 2019-05-04 RX ORDER — SPIRONOLACTONE 25 MG/1
25 TABLET ORAL DAILY
Status: DISCONTINUED | OUTPATIENT
Start: 2019-05-05 | End: 2019-05-06 | Stop reason: HOSPADM

## 2019-05-04 RX ORDER — CLOPIDOGREL BISULFATE 75 MG/1
75 TABLET ORAL DAILY
Status: DISCONTINUED | OUTPATIENT
Start: 2019-05-04 | End: 2019-05-06 | Stop reason: HOSPADM

## 2019-05-04 RX ORDER — ASPIRIN 81 MG/1
81 TABLET, CHEWABLE ORAL DAILY
Status: DISCONTINUED | OUTPATIENT
Start: 2019-05-04 | End: 2019-05-06 | Stop reason: HOSPADM

## 2019-05-04 RX ORDER — METOPROLOL TARTRATE 50 MG/1
50 TABLET, FILM COATED ORAL 2 TIMES DAILY
Status: DISCONTINUED | OUTPATIENT
Start: 2019-05-04 | End: 2019-05-06 | Stop reason: HOSPADM

## 2019-05-04 RX ORDER — SUCRALFATE 1 G/1
1 TABLET ORAL 2 TIMES DAILY
Status: DISCONTINUED | OUTPATIENT
Start: 2019-05-04 | End: 2019-05-06 | Stop reason: HOSPADM

## 2019-05-04 RX ORDER — ONDANSETRON 2 MG/ML
4 INJECTION INTRAMUSCULAR; INTRAVENOUS EVERY 6 HOURS PRN
Status: DISCONTINUED | OUTPATIENT
Start: 2019-05-04 | End: 2019-05-06 | Stop reason: HOSPADM

## 2019-05-04 RX ORDER — ATORVASTATIN CALCIUM 40 MG/1
40 TABLET, FILM COATED ORAL NIGHTLY
Status: DISCONTINUED | OUTPATIENT
Start: 2019-05-04 | End: 2019-05-06 | Stop reason: HOSPADM

## 2019-05-04 RX ORDER — LOSARTAN POTASSIUM 25 MG/1
12.5 TABLET ORAL DAILY
COMMUNITY
End: 2019-05-14 | Stop reason: ALTCHOICE

## 2019-05-04 RX ORDER — BIOTIN 1 MG
1 TABLET ORAL DAILY
Status: DISCONTINUED | OUTPATIENT
Start: 2019-05-04 | End: 2019-05-04 | Stop reason: RX

## 2019-05-04 RX ORDER — ACETAMINOPHEN 500 MG
1000 TABLET ORAL 2 TIMES DAILY
Status: DISCONTINUED | OUTPATIENT
Start: 2019-05-04 | End: 2019-05-06 | Stop reason: HOSPADM

## 2019-05-04 RX ORDER — 0.9 % SODIUM CHLORIDE 0.9 %
1000 INTRAVENOUS SOLUTION INTRAVENOUS ONCE
Status: COMPLETED | OUTPATIENT
Start: 2019-05-04 | End: 2019-05-04

## 2019-05-04 RX ORDER — VENLAFAXINE HYDROCHLORIDE 75 MG/1
75 CAPSULE, EXTENDED RELEASE ORAL DAILY
Status: DISCONTINUED | OUTPATIENT
Start: 2019-05-04 | End: 2019-05-06 | Stop reason: HOSPADM

## 2019-05-04 RX ADMIN — ONDANSETRON 4 MG: 2 INJECTION INTRAMUSCULAR; INTRAVENOUS at 16:16

## 2019-05-04 RX ADMIN — SODIUM CHLORIDE: 9 INJECTION, SOLUTION INTRAVENOUS at 21:46

## 2019-05-04 RX ADMIN — SODIUM CHLORIDE 1000 ML: 9 INJECTION, SOLUTION INTRAVENOUS at 16:10

## 2019-05-04 ASSESSMENT — PAIN SCALES - GENERAL: PAINLEVEL_OUTOF10: 0

## 2019-05-04 NOTE — H&P
History & Physical        Patient:  Irene Cannon  YOB: 1928    MRN: [id number]   Acct:  [de-identified]   Primary Care Physician: Angelica Brown MD       Chief Complaint:  N/V/D     History of Present Illness:   History obtained from chart review and the patient. The patient is a 80 y.o. female who presented to [facility] with history of Afib and CAD S/P  PCI to LAD by Dr. Sue Osei 1/19. She is on plavix and Xarelto. She lives alone. Daughter is her neighbor. Patient had cold last month and was treated with abx for 10 days: off 4/19/19. She has been doing well except some fatigue. She had outpatient physical therapy which was stopped 2 weeks ago. She was on iron supp which was stopped last week. She was doing fairly well until last night. She had [place] food yesterday and some carrot/dip in the evening. Around 1:30 am she started to have bilious vomiting/watery diarrhea. She called her daughter who witnessed multiple episodes. No melena, no bleeding. Patient was getting very weak so EMS was called.    + sick contact:  in NH: one of the resident has the \"flu\"      Past Medical History:        Diagnosis Date    Atrial fibrillation ([facility] [address]     Blood circulation, collateral     [employer] single pacemaker 4/26/2016 5/5/2016    CAD (coronary artery disease)     Cancer ([facility] [address] 1954    HX  Colon     Carpal tunnel syndrome     Fracture dislocation of ankle 1980    GERD (gastroesophageal reflux disease)     Hyperlipidemia     Hypertension     Kidney lesion, native, right     found on 5/2016    Osteoarthritis     knees    Osteopenia     Prolonged emergence from general anesthesia     Thyroid goiter 9/6/2016    Yersiniosis 2016       Past Surgical History:        Procedure Laterality Date    ABDOMEN SURGERY      ANKLE FRACTURE SURGERY  [id number]    reconstruction in 2003 and 2007    APPENDECTOMY      BLADDER SURGERY      support bladder repair   [facility] CARDIAC SURGERY      heart stent 12-11-18, [place]    CARPAL TUNNEL RELEASE  7/2013    CARPAL TUNNEL RELEASE Right 08/19/2017    Revision    CHOLECYSTECTOMY  1986    COLON SURGERY  1954    COLONOSCOPY      ENDOSCOPY, COLON, DIAGNOSTIC      EYE SURGERY      cataract     FRACTURE SURGERY      HYSTERECTOMY  1971    JOINT REPLACEMENT  1998    L knee    PACEMAKER PLACEMENT      PTCA  01/15/2019    Successful PCI / Drug Eluting Stent of the proximal Left Anterior Descending Coronary Artery.  UPPER GASTROINTESTINAL ENDOSCOPY Left 11/28/2018    EGD BIOPSY performed by Joanna Fields MD at [facility] ENDOSCOPY  11/28/2018    EGD CONTROL HEMORRHAGE performed by Joanna Fields MD at [facility]       Home Medications:    Prior to Admission medications    Medication Sig Start Date End Date Taking? Authorizing Provider   clopidogrel (PLAVIX) 75 MG tablet Take 1 tablet by mouth daily 2/4/19  Yes Eugene Schmidt MD   rivaroxaban (XARELTO) 15 MG TABS tablet Take 15 mg by mouth Daily with supper   Yes Historical Provider, MD   ferrous sulfate 325 (65 Fe) MG tablet Take 1 tablet by mouth 2 times daily 4/9/19   Eugene Schmidt MD   docusate (COLACE, DULCOLAX) 100 MG CAPS Take 100 mg by mouth 2 times daily as needed (PRN) 4/9/19   Eugene Schmidt MD   potassium chloride (KLOR-CON M) 20 MEQ extended release tablet Take 1 tablet by mouth daily 4/9/19   Eugene Schmidt MD   bumetanide (BUMEX) 0.5 MG tablet Take 1 tablet by mouth daily 2/4/19   Eugene Schmidt MD   atorvastatin (LIPITOR) 40 MG tablet Take 1 tablet by mouth nightly 2/4/19   Eugene Schmidt MD   sucralfate (CARAFATE) 1 GM tablet Take 1 tablet by mouth 2 times daily 2/4/19   Eugene Schmidt MD   pantoprazole (PROTONIX) 40 MG tablet Take 1 tablet by mouth 2 times daily  Patient taking differently: Take 40 mg by mouth daily  1/2/19   Eugene Schmidt MD   nitroGLYCERIN (NITROSTAT) 0.4 MG SL tablet up to max of 3 total doses.  If no relief after 1 dose, call 911. 12/14/18   Neema Box MD   metoprolol tartrate (LOPRESSOR) 50 MG tablet Take 1 tablet by mouth 2 times daily 12/14/18   Neema Box MD   Magnesium Oxide 250 MG TABS Take 1 tablet by mouth daily 12/14/18   Neema Box MD   sodium chloride (OCEAN, BABY AYR) 0.65 % nasal spray 1 spray by Nasal route every 4 hours 11/29/18   Lyn Palma DO   venlafaxine (EFFEXOR XR) 75 MG extended release capsule TAKE 1 CAPSULE BY MOUTH  DAILY 9/17/18   Abhinav Prasad MD   digoxin (LANOXIN) 125 MCG tablet TAKE 1 TABLET BY MOUTH  DAILY 9/17/18   Abhinav Prasad MD   cycloSPORINE (RESTASIS MULTIDOSE) 0.05 % ophthalmic emulsion Place 1 drop into both eyes 2 times daily    Historical Provider, MD   NONFORMULARY Perils probiotic  1 daily    Historical Provider, MD   Cyanocobalamin (VITAMIN B 12 PO) Take by mouth every other day     Historical Provider, MD   aspirin 81 MG chewable tablet Take 1 tablet by mouth daily 3/31/16   NOELLE Underwood - CNP   vitamin D (CHOLECALCIFEROL) 1000 UNIT TABS tablet Take 1,000 Units by mouth daily    Historical Provider, MD   Calcium-Vitamin D-Vitamin K 500-500-40 MG-UNT-MCG CHEW Take 1 tablet by mouth daily    Historical Provider, MD   acetaminophen (TYLENOL) 500 MG tablet   Take 1,000 mg by mouth 2 times daily     Historical Provider, MD   Biotin 1000 MCG TABS Take 1 tablet by mouth daily     Historical Provider, MD     Allergies:  Methadone; Gabapentin; Lyrica [pregabalin]; and Ultram [tramadol]    Social History:    reports that she has never smoked. She has never used smokeless tobacco. She reports that she does not drink alcohol or use drugs.     Family History:   Reviewed:       Problem Relation Age of Onset    Heart Disease Mother     High Blood Pressure Mother     Heart Disease Father     High Blood Pressure Father     Heart Disease Brother     High Blood Pressure Brother     Heart Disease Son        Review of Systems:    Pertinent items are noted in HPI. Comprehensive review of systems was obtained . Review of Systems   Constitutional: Negative for fever and chills. + Fatigue. HENT: Negative for ear pain, sore throat, rhinorrhea and neck pain. Eyes: Negative for pain, discharge and visual disturbance. Respiratory: Negative for cough, shortness of breath. no  wheezing. Cardiovascular: Negative for chest pain, palpitations and leg swelling. Gastrointestinal: Negative forabdominal pain. Genitourinary: Negative for dysuria,  difficulty urinating and pelvic pain. Musculoskeletal: . Negative for back pain, joint swelling and arthralgias. Skin: Negative for rash. Neurological: Negative for dizziness, seizures, syncope and headaches. Hematological: Negative for adenopathy. Psychiatric/Behavioral: Negative for suicidal ideas and dysphoric mood. The patient is not nervous/anxious. Physical Exam:  BP (!) 152/53   Pulse 61   Temp 98 °F (36.7 °C) (Oral)   Resp 22   Wt 170 lb (77.1 kg)   SpO2 92%   BMI 31.09 kg/m²   General appearance:  No apparent distress, appears stated age and cooperative. HEENT:  Normal cephalic, atraumatic without obvious deformity. Pupils equal, round, and reactive to light. Extra ocular muscles intact. Conjunctivae/corneas clear. Neck: Supple, with full range of motion. No jugular venous distention. Trachea midline. Respiratory:  Normal respiratory effort. Decreased BS at bases bilaterally  without Rales/Wheezes/Rhonchi. Cardiovascular:  irregular rate and regular regular rhythm with normal S1/S2 without murmurs, rubs or gallops. Abdomen: Soft, non-tender, non-distended with normal bowel sounds. Musculoskeletal:  No clubbing, cyanosis. No edema bilaterally. Full range of motion without deformity. Skin: Skin color, texture, turgor normal.  No rashes or lesions. Neurologic:  Neurovascularly intact without any focal sensory/motor deficits.   grossly non-focal.  Psychiatric:  Alert and oriented, thought content appropriate, normal insight  Capillary Refill: Brisk,< 3 seconds   Peripheral Pulses: +2 palpable, equal bilaterally     Review of Labs and Diagnostic Testing:  Labs:     Recent Labs     05/04/19  1602   WBC 6.4   HGB 14.0   HCT 41.7        Recent Labs     05/03/19  1517 05/04/19  1602   NA  --  140   K  --  3.7   CL  --  99   CO2  --  25   BUN  --  19   CREATININE 0.7 0.5   CALCIUM  --  9.0     Recent Labs     05/04/19  1602   AST 18   ALT 12   BILIDIR 0.3   BILITOT 3.7*   ALKPHOS 45     Lab Results   Component Value Date    AMYLASE 44 05/17/2016     No results for input(s): INR in the last 72 hours. Recent Labs     05/04/19  1602   TROPONINT < 0.010     No results for input(s): BNP in the last 72 hours. No results for input(s): LACTA in the last 72 hours. Lab Results   Component Value Date    PROCAL 0.08 12/03/2018    PROCAL 0.08 11/26/2018     Lab Results   Component Value Date    LABA1C 6.4 11/01/2018     Lab Results   Component Value Date    TSH 2.020 01/02/2019       Urinalysis:   Lab Results   Component Value Date    NITRU NEGATIVE 11/26/2018    WBCUA 2-4 03/31/2016    BACTERIA NONE 03/31/2016    RBCUA 3-5 03/31/2016    BLOODU NEGATIVE 11/26/2018    SPECGRAV 1.020 04/08/2016    SPECGRAV 1.014 03/31/2016    GLUCOSEU NEGATIVE 11/26/2018     Radiology:   Reviewed: see report for details  CXR: I have reviewed the report  EKG:  No acute changes:   No orders to display       Code Status: Prior    Assessment/Plan:  Principal Problem:    Gastroenteritis  Active Problems:    Diarrhea of presumed infectious origin    Bilious vomiting with nausea    Physical deconditioning    Occult blood in stools  Resolved Problems:    * No resolved hospital problems. *     will admit patient and start IVF: will hold Bumex today and put parameters on metoprolol. Will do protonix IV for now and consult GI. Will check stool studies to r/o infectious agents/c.diff (recent abx exposure).   Continue plavix/ASA given her recent PCI. Check H/H frequently but will hold Xarelto for now. Get PT/OT.   Check influenza A/B, digoxin level  Patient is DNR ARREST    PT/OT Eval Status: will get consult  DVT prophylaxis: [] Lovenox                                 [] SCDs                                 [] SQ Heparin                                 [] Encourage ambulation           [] Already on Anticoagulation    Amarilis Walker MD on 5/4/2019 at 6:47 PM  Admitting Hospitalist

## 2019-05-04 NOTE — ED PROVIDER NOTES
[facility]  eMERGENCY dEPARTMENT eNCOUnter          [address] [facility]       Chief Complaint   Patient presents with    Nausea    Emesis    Diarrhea       Nurses Notes reviewed and I agree except as noted in the HPI. HISTORY OF PRESENT ILLNESS    Bobby Guajardo is a 80 y.o. female with a past medical history of CAD, GERD, HTN, HLD, OA, and atrial fibrillation. Patient presents to the ED via EMS for evaluation of nausea, vomiting, and diarrhea. The patient states that her symptoms started this morning around 0130 and have been persistent since onset. Patient denies any abdominal pain associated with the vomiting and diarrhea. No known fevers. Patient has a history of anemia and rectal bleeding. She is followed by Dr. Myrna Maurer (GI). Patient is on Xarelto and Plavix. No additional complaints or concerns at the time of initial evaluation. REVIEW OF SYSTEMS     Constitutional: no fever or chills  Eyes: no visual changes  ENT: no sore throat  Respiratory: no dyspnea  Cardiovascular: no chest pain  Gastrointestinal : no abdominal pain. Positive for nausea, vomiting, diarrhea   : no dysuria  Integument: no rash  Musculoskeletal: no body aches  Neurological: no focal weakness or numbness  Psychiatric: No depression    Remainder of review of systems is otherwise reviewed as negative. PAST MEDICAL HISTORY    has a past medical history of Atrial fibrillation ([address] Blood circulation, collateral, [employer] single pacemaker 4/26/2016, CAD (coronary artery disease), Cancer ([address] Carpal tunnel syndrome, Fracture dislocation of ankle, GERD (gastroesophageal reflux disease), Hyperlipidemia, Hypertension, Kidney lesion, native, right, Osteoarthritis, Osteopenia, Prolonged emergence from general anesthesia, Thyroid goiter, and Yersiniosis. SURGICAL HISTORY      has a past surgical history that includes Cholecystectomy (1986); Ankle fracture surgery (3257--6988);  Hysterectomy (1971); Colon surgery (1954); Bladder surgery; eye surgery; Abdomen surgery; fracture surgery; Appendectomy; Colonoscopy; Carpal tunnel release (7/2013); joint replacement (1998); Carpal tunnel release (Right, 08/19/2017); Upper gastrointestinal endoscopy (Left, 11/28/2018); Upper gastrointestinal endoscopy (11/28/2018); Endoscopy, colon, diagnostic; Cardiac surgery; pacemaker placement; and Percutaneous Transluminal Coronary Angio (01/15/2019). CURRENT MEDICATIONS       Previous Medications    ACETAMINOPHEN (TYLENOL) 500 MG TABLET      Take 1,000 mg by mouth 2 times daily     ASPIRIN 81 MG CHEWABLE TABLET    Take 1 tablet by mouth daily    ATORVASTATIN (LIPITOR) 40 MG TABLET    Take 1 tablet by mouth nightly    BIOTIN 1000 MCG TABS    Take 1 tablet by mouth daily     BUMETANIDE (BUMEX) 0.5 MG TABLET    Take 1 tablet by mouth daily    CALCIUM-VITAMIN D-VITAMIN K 500-500-40 MG-UNT-MCG CHEW    Take 1 tablet by mouth daily    CLOPIDOGREL (PLAVIX) 75 MG TABLET    Take 1 tablet by mouth daily    CYANOCOBALAMIN (VITAMIN B 12 PO)    Take by mouth every other day     CYCLOSPORINE (RESTASIS MULTIDOSE) 0.05 % OPHTHALMIC EMULSION    Place 1 drop into both eyes 2 times daily    DIGOXIN (LANOXIN) 125 MCG TABLET    TAKE 1 TABLET BY MOUTH  DAILY    DOCUSATE (COLACE, DULCOLAX) 100 MG CAPS    Take 100 mg by mouth 2 times daily as needed (PRN)    FERROUS SULFATE 325 (65 FE) MG TABLET    Take 1 tablet by mouth 2 times daily    MAGNESIUM OXIDE 250 MG TABS    Take 1 tablet by mouth daily    METOPROLOL TARTRATE (LOPRESSOR) 50 MG TABLET    Take 1 tablet by mouth 2 times daily    NITROGLYCERIN (NITROSTAT) 0.4 MG SL TABLET    up to max of 3 total doses. If no relief after 1 dose, call 911.     NONFORMULARY    Perils probiotic  1 daily    PANTOPRAZOLE (PROTONIX) 40 MG TABLET    Take 1 tablet by mouth 2 times daily    POTASSIUM CHLORIDE (KLOR-CON M) 20 MEQ EXTENDED RELEASE TABLET    Take 1 tablet by mouth daily    RIVAROXABAN (XARELTO) 15 MG TABS TABLET    Take 15 mg by mouth Daily with supper    SODIUM CHLORIDE (OCEAN, BABY AYR) 0.65 % NASAL SPRAY    1 spray by Nasal route every 4 hours    SUCRALFATE (CARAFATE) 1 GM TABLET    Take 1 tablet by mouth 2 times daily    VENLAFAXINE (EFFEXOR XR) 75 MG EXTENDED RELEASE CAPSULE    TAKE 1 CAPSULE BY MOUTH  DAILY    VITAMIN D (CHOLECALCIFEROL) 1000 UNIT TABS TABLET    Take 1,000 Units by mouth daily       ALLERGIES     is allergic to methadone; gabapentin; lyrica [pregabalin]; and ultram [tramadol]. FAMILY HISTORY     indicated that her mother is . She indicated that her father is . She indicated that both of her sisters are . She indicated that her brother is . She indicated that her son is alive. family history includes Heart Disease in her brother, father, mother, and son; High Blood Pressure in her brother, father, and mother. SOCIAL HISTORY      reports that she has never smoked. She has never used smokeless tobacco. She reports that she does not drink alcohol or use drugs. PHYSICAL EXAM     INITIAL VITALS:  weight is 170 lb (77.1 kg). Her oral temperature is 98 °F (36.7 °C). Her blood pressure is 133/51 (abnormal) and her pulse is 64. Her respiration is 18 and oxygen saturation is 95%.     Constitutional: ill appearing  Eyes:  Pupils are equal and reactive, extraocular muscles intact   HENT:  Atraumatic appearing  oropharynx - dry mucous membranes  Neck- normal range of motion,  supple   Respiratory:  No wheezing, rhonchi or rales  Cardiovascular: regular   GI:  Non tender, no rigidity, rebound or guarding  Musculoskeletal:  2/4 distal pulses, no pitting edema  Integument: warm and dry  Neurologic:  Alert & oriented x 3  Psychiatric:  Speech and behavior appropriate        DIAGNOSTIC RESULTS     EKG: All EKG's are interpreted by the Emergency Department Physician who either signs or Co-signs this chart in the absence of a cardiologist.   EKG interpreted by me showing paced rhythm at a rate of 60, QRS of 150, QTC of 550, axis of -77. LABS:   Labs Reviewed   CBC - Abnormal; Notable for the following components:       Result Value    RBC 3.96 (*)     .3 (*)     MCH 35.4 (*)     RDW-SD 55.1 (*)     All other components within normal limits   BASIC METABOLIC PANEL W/ REFLEX TO MG FOR LOW K - Abnormal; Notable for the following components:    Glucose 175 (*)     All other components within normal limits   HEPATIC FUNCTION PANEL - Abnormal; Notable for the following components:    Alb 3.4 (*)     Total Bilirubin 3.7 (*)     All other components within normal limits   CULTURE STOOL   LIPASE   TROPONIN   BRAIN NATRIURETIC PEPTIDE   BLOOD OCCULT STOOL SCREEN #1   ANION GAP   GLOMERULAR FILTRATION RATE, ESTIMATED   OSMOLALITY       EMERGENCY DEPARTMENT COURSE:   Vitals:    Vitals:    05/04/19 1544 05/04/19 1710 05/04/19 1755 05/04/19 1840   BP:  (!) 158/56 (!) 152/53 (!) 133/51   Pulse:  62 61 64   Resp:  22 22 18   Temp: 98 °F (36.7 °C)      TempSrc: Oral      SpO2:  96% 92% 95%   Weight: This patient is somewhat ill-appearing and has been too weak to get up. She is likely somewhat dehydrated. From the looks of her eye thought she was could have a low hemoglobin but actually she does not. But did prompt me to check the stool which is Hemoccult positive. I discussed case with the hospitalist to arrange for admission    CRITICAL CARE:    5 min    CONSULTS:  Hospitalist    PROCEDURES:  None    FINAL IMPRESSION      1. Nausea vomiting and diarrhea    2.  Lower GI bleeding          DISPOSITION/PLAN   Admitted    DISCHARGE MEDICATIONS:  New Prescriptions    No medications on file       (Please note that portions of this note were completed with a voice recognition program.  Efforts were made to edit the dictations but occasionally words are mis-transcribed.)    This document serves as a record of the services and decisions personally performed and made by Royal Wilson

## 2019-05-04 NOTE — ED TRIAGE NOTES
Pt presents to ER via squad with nausea, vomiting, and diarrhea since 0130 this morning. Pt resided alone. She has history of pacemaker, cardiac stents, and afib, therefore takes coumadin and plavix.

## 2019-05-04 NOTE — ED NOTES
Pt transported to [facility] on cart in stable condition. Floor contacted before transport. Spoke with [place].      Mando Carpio  05/04/19 4168

## 2019-05-05 LAB
ADENOVIRUS F 40 41 PCR: NOT DETECTED
ANION GAP SERPL CALCULATED.3IONS-SCNC: 12 MEQ/L (ref 8–16)
ASTROVIRUS PCR: NOT DETECTED
BUN BLDV-MCNC: 22 MG/DL (ref 7–22)
CALCIUM SERPL-MCNC: 8.1 MG/DL (ref 8.5–10.5)
CAMPYLOBACTER PCR: NOT DETECTED
CHLORIDE BLD-SCNC: 103 MEQ/L (ref 98–111)
CLOSTRIDIUM DIFFICILE, PCR: NOT DETECTED
CO2: 24 MEQ/L (ref 23–33)
CREAT SERPL-MCNC: 0.6 MG/DL (ref 0.4–1.2)
CRYPTOSPORIDIUM PCR: NOT DETECTED
CYCLOSPORA CAYETANENSIS PCR: NOT DETECTED
E COLI 0157 PCR: ABNORMAL
E COLI ENTEROAGGREGATIVE PCR: NOT DETECTED
E COLI ENTEROPATHOGENIC PCR: NOT DETECTED
E COLI ENTEROTOXIGENIC PCR: NOT DETECTED
E COLI SHIGA LIKE TOXIN PCR: NOT DETECTED
E COLI SHIGELLA/ENTEROINVASIVE PCR: NOT DETECTED
E HISTOLYTICA GI FILM ARRAY: NOT DETECTED
ERYTHROCYTE [DISTWIDTH] IN BLOOD BY AUTOMATED COUNT: 14.7 % (ref 11.5–14.5)
ERYTHROCYTE [DISTWIDTH] IN BLOOD BY AUTOMATED COUNT: 57.6 FL (ref 35–45)
FECAL LEUKOCYTES: NORMAL
GFR SERPL CREATININE-BSD FRML MDRD: > 90 ML/MIN/1.73M2
GIARDIA LAMBLIA PCR: NOT DETECTED
GLUCOSE BLD-MCNC: 113 MG/DL (ref 70–108)
HCT VFR BLD CALC: 34.5 % (ref 37–47)
HCT VFR BLD CALC: 36 % (ref 37–47)
HCT VFR BLD CALC: 36.1 % (ref 37–47)
HEMOGLOBIN: 11.4 GM/DL (ref 12–16)
HEMOGLOBIN: 12 GM/DL (ref 12–16)
HEMOGLOBIN: 12 GM/DL (ref 12–16)
LIPASE: 8.1 U/L (ref 5.6–51.3)
MCH RBC QN AUTO: 35.5 PG (ref 26–33)
MCHC RBC AUTO-ENTMCNC: 33.2 GM/DL (ref 32.2–35.5)
MCV RBC AUTO: 106.8 FL (ref 81–99)
NOROVIRUS GI GII PCR: DETECTED
PLATELET # BLD: 112 THOU/MM3 (ref 130–400)
PLESIOMONAS SHIGELLOIDES PCR: NOT DETECTED
PMV BLD AUTO: 10.6 FL (ref 9.4–12.4)
POTASSIUM SERPL-SCNC: 3.4 MEQ/L (ref 3.5–5.2)
RBC # BLD: 3.38 MILL/MM3 (ref 4.2–5.4)
ROTAVIRUS A PCR: NOT DETECTED
SALMONELLA PCR: NOT DETECTED
SAPOVIRUS PCR: NOT DETECTED
SODIUM BLD-SCNC: 139 MEQ/L (ref 135–145)
SOLUBLE TRANSFERRIN RECEPT: 3.6 MG/L (ref 1.9–4.4)
VIBRIO CHOLERAE PCR: NOT DETECTED
VIBRIO PCR: NOT DETECTED
WBC # BLD: 4.7 THOU/MM3 (ref 4.8–10.8)
YERSINIA ENTEROCOLITICA PCR: NOT DETECTED

## 2019-05-05 PROCEDURE — 87045 FECES CULTURE AEROBIC BACT: CPT

## 2019-05-05 PROCEDURE — 85014 HEMATOCRIT: CPT

## 2019-05-05 PROCEDURE — 80048 BASIC METABOLIC PNL TOTAL CA: CPT

## 2019-05-05 PROCEDURE — C9113 INJ PANTOPRAZOLE SODIUM, VIA: HCPCS | Performed by: OPHTHALMOLOGY

## 2019-05-05 PROCEDURE — 99225 PR SBSQ OBSERVATION CARE/DAY 25 MINUTES: CPT | Performed by: OPHTHALMOLOGY

## 2019-05-05 PROCEDURE — 6360000002 HC RX W HCPCS: Performed by: OPHTHALMOLOGY

## 2019-05-05 PROCEDURE — 89055 LEUKOCYTE ASSESSMENT FECAL: CPT

## 2019-05-05 PROCEDURE — 85027 COMPLETE CBC AUTOMATED: CPT

## 2019-05-05 PROCEDURE — 6370000000 HC RX 637 (ALT 250 FOR IP): Performed by: PHYSICIAN ASSISTANT

## 2019-05-05 PROCEDURE — 87507 IADNA-DNA/RNA PROBE TQ 12-25: CPT

## 2019-05-05 PROCEDURE — 87427 SHIGA-LIKE TOXIN AG IA: CPT

## 2019-05-05 PROCEDURE — 93010 ELECTROCARDIOGRAM REPORT: CPT | Performed by: NUCLEAR MEDICINE

## 2019-05-05 PROCEDURE — G0378 HOSPITAL OBSERVATION PER HR: HCPCS

## 2019-05-05 PROCEDURE — 2580000003 HC RX 258: Performed by: OPHTHALMOLOGY

## 2019-05-05 PROCEDURE — 83690 ASSAY OF LIPASE: CPT

## 2019-05-05 PROCEDURE — 96375 TX/PRO/DX INJ NEW DRUG ADDON: CPT

## 2019-05-05 PROCEDURE — 96361 HYDRATE IV INFUSION ADD-ON: CPT

## 2019-05-05 PROCEDURE — 6370000000 HC RX 637 (ALT 250 FOR IP): Performed by: OPHTHALMOLOGY

## 2019-05-05 PROCEDURE — 36415 COLL VENOUS BLD VENIPUNCTURE: CPT

## 2019-05-05 PROCEDURE — 85018 HEMOGLOBIN: CPT

## 2019-05-05 PROCEDURE — 2709999900 HC NON-CHARGEABLE SUPPLY

## 2019-05-05 RX ORDER — POTASSIUM CHLORIDE 7.45 MG/ML
10 INJECTION INTRAVENOUS PRN
Status: DISCONTINUED | OUTPATIENT
Start: 2019-05-05 | End: 2019-05-06 | Stop reason: HOSPADM

## 2019-05-05 RX ORDER — POTASSIUM CHLORIDE 20 MEQ/1
40 TABLET, EXTENDED RELEASE ORAL ONCE
Status: COMPLETED | OUTPATIENT
Start: 2019-05-05 | End: 2019-05-05

## 2019-05-05 RX ADMIN — ASPIRIN 81 MG: 81 TABLET, CHEWABLE ORAL at 10:27

## 2019-05-05 RX ADMIN — RIVAROXABAN 15 MG: 15 TABLET, FILM COATED ORAL at 20:14

## 2019-05-05 RX ADMIN — VITAMIN D, TAB 1000IU (100/BT) 1000 UNITS: 25 TAB at 10:27

## 2019-05-05 RX ADMIN — METOPROLOL TARTRATE 50 MG: 50 TABLET, FILM COATED ORAL at 21:04

## 2019-05-05 RX ADMIN — ATORVASTATIN CALCIUM 40 MG: 40 TABLET, FILM COATED ORAL at 21:04

## 2019-05-05 RX ADMIN — Medication 1 TABLET: at 10:27

## 2019-05-05 RX ADMIN — VENLAFAXINE HYDROCHLORIDE 75 MG: 75 CAPSULE, EXTENDED RELEASE ORAL at 10:27

## 2019-05-05 RX ADMIN — SUCRALFATE 1 G: 1 TABLET ORAL at 21:04

## 2019-05-05 RX ADMIN — Medication 125 MCG: at 10:27

## 2019-05-05 RX ADMIN — CLOPIDOGREL BISULFATE 75 MG: 75 TABLET, FILM COATED ORAL at 10:27

## 2019-05-05 RX ADMIN — PANTOPRAZOLE SODIUM 40 MG: 40 INJECTION, POWDER, FOR SOLUTION INTRAVENOUS at 05:41

## 2019-05-05 RX ADMIN — SODIUM CHLORIDE: 9 INJECTION, SOLUTION INTRAVENOUS at 10:06

## 2019-05-05 RX ADMIN — POTASSIUM CHLORIDE 40 MEQ: 20 TABLET, EXTENDED RELEASE ORAL at 20:14

## 2019-05-05 RX ADMIN — SUCRALFATE 1 G: 1 TABLET ORAL at 10:27

## 2019-05-05 RX ADMIN — POTASSIUM CHLORIDE 20 MEQ: 20 TABLET, EXTENDED RELEASE ORAL at 10:27

## 2019-05-05 RX ADMIN — Medication 1000 MG: at 10:27

## 2019-05-05 ASSESSMENT — PAIN SCALES - GENERAL
PAINLEVEL_OUTOF10: 0
PAINLEVEL_OUTOF10: 0
PAINLEVEL_OUTOF10: 2
PAINLEVEL_OUTOF10: 0

## 2019-05-05 NOTE — FLOWSHEET NOTE
05/04/19 2143   Provider Notification   Reason for Communication Evaluate   Provider Name [facility]   Provider Notification Physician Assistant   Method of Communication Secure Message   Response Waiting for response     8b31 Patient's home med list was not correct in computer, it is complete now. New medications that were not on there are Aldactone  25 mg tab daily and Losartan Potassium 12.5 tab daily. Thank you.

## 2019-05-05 NOTE — CONSULTS
[address] [place] [address]                                  CONSULTATION    PATIENT NAME: Fidel Pride                 :        1928  MED REC NO:   [id number]                           ROOM:       0031  ACCOUNT NO:   [de-identified]                           ADMIT DATE: 2019  PROVIDER:     Mahsa Mclaughlin M.D.    CONSULT DATE:  2019    REASON FOR CONSULTATION:  Nausea, vomiting, diarrhea. HISTORY OF PRESENT ILLNESS:  The patient is a 63-year-old pleasant white  female known to our GI service, follows up Dr. Duane La. The patient had  history of upper GI bleeding from peptic ulcer disease, iron deficiency  secondary to that and had been on PPI and Colace and iron replacement  with improvement in symptoms over the time. The patient unfortunately  got sick yesterday. The patient had eaten some food at [id number] places and soon after that she started having abdominal cramps and  profuse vomiting and watery diarrhea and multiple episodes and started  feeling fainting and came to the hospital.  The patient recently is  feeling better, still drained out, but symptoms are resolving and the  patient had no fever. Daughter present during meeting and she provided  most of the history. Some blood was noticed at the time of diarrhea,  but it was very scanty and the patient had no hematemesis, melena and  the patient is having no fever. PAST MEDICAL HISTORY:  Positive for atrial fibrillation, carpal tunnel  syndrome, coronary artery disease, GERD, hypertension, hyperlipidemia,  kidney lesions, osteoporosis, thyroid goiter, _____ in the past also. PAST SURGICAL HISTORY:  Positive for abdominal surgery, ankle surgery,  appendectomy, bladder surgery, cardiac surgery, carpal tunnel release,  cholecystectomy, colon surgery, and had eye surgery, fracture,  hysterectomy, joint replacements.     HOME MEDICATIONS:  List chloride 103, bicarb is 24, BUN is 22, creatinine 0.6. Liver  panel is normal with AST and ALT 12 and 18 respectively. Blood sugar is  113. White cell is 6.4, repeat one is 4.7; hemoglobin is around 11 to  12 area; hematocrit 35; platelet count is 432, and iron is fine. Stool  test shows norovirus detected. ASSESSMENT AND PLAN:  1. The patient with history of acute gastroenteritis from acute viral  syndrome, norovirus. The patient is going to be getting supportive care  and there is no need for any intervention at the moment and we will  follow the patient. I had a chance to the patient and daughter in  detail. 2.  Previous history of peptic ulcer disease and iron deficiency anemia. After the discharge, we will setup an office visit with Dr. Jud Andrade. Hopefully, the patient will have a short hospitalization. Tristian Calderon M.D.    D: 05/05/2019 14:03:51       T: 05/05/2019 18:54:47     [id number]  Job#: [id number]     Doc#: [id number]    CC:   Hospitalist Service

## 2019-05-05 NOTE — PROGRESS NOTES
Pt admitted to  8b room 32  Transported by cart and ED staff  Complaints at arrival to room: nausea/vomiting    IV site free of s/s of infection or infiltration  Vital signs obtained  Assessment and data collection initiated   Oriented to room  Policies and procedures for 8b explained   All questions answered with no further questions at this time  Fall prevention and safety brochure given and discussed with patient

## 2019-05-05 NOTE — PROGRESS NOTES
deformity or swelling   Extremities - peripheral pulses normal, no pedal edema, no clubbing or cyanosis  24 hour intake/output:    Intake/Output Summary (Last 24 hours) at 5/5/2019 1574  Last data filed at 5/5/2019 4613  Gross per 24 hour   Intake 849.9 ml   Output --   Net 849.9 ml     Assessment/Plan:    Principal Problem:    Gastroenteritis  Active Problems:    Diarrhea of presumed infectious origin    Bilious vomiting with nausea    Physical deconditioning    Occult blood in stools  Resolved Problems:    * No resolved hospital problems. *    Appears that gastroenteritis is resolving  Stable H/H around 12 (first one was 14 but probably sec to hemoconcentration): resume  xarelto  Very weak:    Will advance diet: may consider TCU: (lives alone but with daughter very close)  Replace K     PT/OT Eval Status: ?TCU  DVT prophylaxis: [] Lovenox                                 [] SCDs                                 [] SQ Heparin                                 [] Encourage ambulation           [x] Already on Anticoagulation  Code Status: DNR-CCA    Pantera Whitlock MD on 5/5/2019 at 8:33 AM  Rounding Hospitalist

## 2019-05-06 VITALS
HEART RATE: 62 BPM | RESPIRATION RATE: 16 BRPM | OXYGEN SATURATION: 95 % | SYSTOLIC BLOOD PRESSURE: 157 MMHG | TEMPERATURE: 98 F | HEIGHT: 62 IN | DIASTOLIC BLOOD PRESSURE: 70 MMHG | WEIGHT: 172.9 LBS | BODY MASS INDEX: 31.82 KG/M2

## 2019-05-06 PROCEDURE — 6360000002 HC RX W HCPCS: Performed by: OPHTHALMOLOGY

## 2019-05-06 PROCEDURE — 96376 TX/PRO/DX INJ SAME DRUG ADON: CPT

## 2019-05-06 PROCEDURE — G0378 HOSPITAL OBSERVATION PER HR: HCPCS

## 2019-05-06 PROCEDURE — 6370000000 HC RX 637 (ALT 250 FOR IP): Performed by: OPHTHALMOLOGY

## 2019-05-06 PROCEDURE — 96366 THER/PROPH/DIAG IV INF ADDON: CPT

## 2019-05-06 PROCEDURE — 99217 PR OBSERVATION CARE DISCHARGE MANAGEMENT: CPT | Performed by: FAMILY MEDICINE

## 2019-05-06 PROCEDURE — C9113 INJ PANTOPRAZOLE SODIUM, VIA: HCPCS | Performed by: OPHTHALMOLOGY

## 2019-05-06 RX ORDER — PANTOPRAZOLE SODIUM 40 MG/1
40 TABLET, DELAYED RELEASE ORAL
Status: DISCONTINUED | OUTPATIENT
Start: 2019-05-07 | End: 2019-05-06 | Stop reason: HOSPADM

## 2019-05-06 RX ADMIN — ASPIRIN 81 MG: 81 TABLET, CHEWABLE ORAL at 09:28

## 2019-05-06 RX ADMIN — Medication 1000 MG: at 09:27

## 2019-05-06 RX ADMIN — Medication 1 TABLET: at 09:29

## 2019-05-06 RX ADMIN — SUCRALFATE 1 G: 1 TABLET ORAL at 09:35

## 2019-05-06 RX ADMIN — POTASSIUM CHLORIDE 20 MEQ: 20 TABLET, EXTENDED RELEASE ORAL at 09:34

## 2019-05-06 RX ADMIN — Medication 125 MCG: at 09:31

## 2019-05-06 RX ADMIN — CLOPIDOGREL BISULFATE 75 MG: 75 TABLET, FILM COATED ORAL at 09:30

## 2019-05-06 RX ADMIN — VITAMIN D, TAB 1000IU (100/BT) 1000 UNITS: 25 TAB at 09:36

## 2019-05-06 RX ADMIN — ONDANSETRON 4 MG: 2 INJECTION INTRAMUSCULAR; INTRAVENOUS at 03:28

## 2019-05-06 RX ADMIN — PANTOPRAZOLE SODIUM 40 MG: 40 INJECTION, POWDER, FOR SOLUTION INTRAVENOUS at 09:33

## 2019-05-06 RX ADMIN — METOPROLOL TARTRATE 50 MG: 50 TABLET, FILM COATED ORAL at 09:32

## 2019-05-06 RX ADMIN — VENLAFAXINE HYDROCHLORIDE 75 MG: 75 CAPSULE, EXTENDED RELEASE ORAL at 09:36

## 2019-05-06 ASSESSMENT — PAIN SCALES - GENERAL: PAINLEVEL_OUTOF10: 2

## 2019-05-06 NOTE — DISCHARGE SUMMARY
Hospital Medicine Discharge Summary      Patient Identification:   Haroldo Zaldivar   : 1928  MRN: [id number]   Account: [de-identified]      Patient's PCP: Chuy Quan MD    Admit Date: 2019     Discharge Date:   2019     Admitting Physician: Franck Mancuso MD     Discharge Physician: Jenna Giron MD     Discharge Diagnoses:    1. Acute gastroenteritis due to norovirus, improving   2. FOBT positive   3. Physical deconditioning likely from problem # 1   4. CAD, s/p PCI  5. Hx of afib, now in sinus rhythm and rate controlled    6. Chronic Diastolic CHF, Ejection fraction 55-60% on echo 2018   7. Mild hypokalemia, likely from diarrhea on top of on bumex   8. Mild macrocytic anemia, suspect component of hemodilution due to IVF        The patient was seen and examined on day of discharge and this discharge summary is in conjunction with any daily progress note from day of discharge. Hospital Course:     Please see H/P for details. In summary, Haroldo Zaldivar is a 80 y.o. Female, with PMH CAD, s/p PCI 2018, afib, HTN, HLD, who presented to [facility] on 2019 due to diarrhea, N/V. Per H/P note, Patient had cold last month and was treated with abx for 10 days: off 19. She has been doing well except some fatigue. She had outpatient physical therapy which was stopped 2 weeks ago. She was on iron supp which was stopped last week. She was doing fairly well until last night. She had [place] food yesterday and some carrot/dip in the evening. Around 1:30 am she started to have bilious vomiting/watery diarrhea. She called her daughter who witnessed multiple episodes. No melena, no bleeding.    Patient was getting very weak so EMS was called. + sick contact:  in NH: one of the resident has the \"flu\"     Patient was placed under observation under Hospital Medicine service. GI was consulted. GI panel PCR came back (+) norovirus.  Stool culture (-)    Please see below for details of hospital course:       1. Acute gastroenteritis due to norovirus, improving        -GI PCR panel (+) norovirus  -N/V resolved. Diarrhea looks like improving as well, now has soft stool from watery stool 2 days ago. -GI on-board: recommend supportive measures     2. FOBT positive     -GI on-board. Recommend f/u with Dr. Ragini Vu after DC. Per daughter, they have appointment with Dr. Ragini Vu already     3. Physical deconditioning likely from problem # 1     -PT/OT consulted, however, missed due to patient was sleeping  -per daughter, she lives next door to her Mom, both patient and daughter agreeable for DC on 5/6/19. Follow-up with PCP in 1 week. May need PT/OT in OP.      4. CAD, s/p PCI     - cont asa, plavix, BB, losartan     5. Hx of afib, now in sinus rhythm and rate controlled      -cont BB, digoxin and xarelto      6. Chronic Diastolic CHF, Ejection fraction 55-60% on echo 11/2018     -cont BB, losartan, aldactone      7. Mild hypokalemia, likely from diarrhea on top of on bumex     -potassium 3.4  -potassium replacement protocol  -pt already on K-dur  -BMP in 3 days in OP and f/u with PCP. BMP ordered to be done in OP     8. Mild macrocytic anemia, suspect component of hemodilution due to IVF     -baseline Hgb nl, now 11.4  -suspect due to IVF. Denies symptoms of GI bleeding, although has (+) FOBT. Patient has f/u appointment with GI.        Patient was discharged home in stable condition on 5/6/2019. Patient was advised to follow-up with PCP in 1 week and GI as scheduled.            Exam:     Vitals:  Vitals:    05/06/19 0320 05/06/19 0923 05/06/19 1142 05/06/19 1644   BP: 139/73 (!) 110/98 (!) 107/54 (!) 157/70   Pulse: 68 87 86 62   Resp: 17 18 18 16   Temp: 98 °F (36.7 °C)  98.7 °F (37.1 °C) 98 °F (36.7 °C)   TempSrc: Oral  Oral    SpO2: 94% 96% 95% 95%   Weight:       Height:         Weight: Weight: 172 lb 14.4 oz (78.4 kg)     24 hour intake/output:    Intake/Output Summary (Last 24 hours) at 5/6/2019 1908  Last data filed at 5/6/2019 1043  Gross per 24 hour   Intake 1254.02 ml   Output --   Net 1254.02 ml       General appearance: alert, not in acute distress. HEENT: clear oral mucosa. Neck: Supple, with full range of motion. Respiratory:  Normal respiratory effort. Clear to auscultation, bilaterally without Rales/Wheezes/Rhonchi. Cardiovascular: normal rate, regular rhythm with normal S1/S2 without murmurs, rubs or gallops. Abdomen: Soft, non-tender, non-distended with normal bowel sounds. Musculoskeletal: passive and active ROM x 4 extremities. Exam of extremities: no pedal or leg edema, no clubbing or cyanosis          Labs: For convenience and continuity at follow-up the following most recent labs are provided:      CBC:    Lab Results   Component Value Date    WBC 4.7 05/05/2019    HGB 11.4 05/05/2019    HCT 34.5 05/05/2019     05/05/2019       Renal:    Lab Results   Component Value Date     05/05/2019    K 3.4 05/05/2019    K 3.7 05/04/2019     05/05/2019    CO2 24 05/05/2019    BUN 22 05/05/2019    CREATININE 0.6 05/05/2019    CALCIUM 8.1 05/05/2019         Significant Diagnostic Studies    Radiology:   No orders to display          Consults:     IP CONSULT TO GI  IP CONSULT TO REHAB/TCU ADMISSION COORDINATOR    Disposition:    [x] Home       [] TCU       [] Rehab       [] Psych       [] SNF       [] [place]       [] Other-    Condition at Discharge: Stable    Code Status:  DNR-CCA     Patient Instructions:    Discharge lab work: BMP in 3 days   Activity: activity as tolerated  Diet: DIET CARDIAC;       Follow-up visits:   Franklin Hunt MD  [address] [place] [address], [address] [place] [address]  [phone]               Discharge Medications:      Lenore Jones   Home Medication Instructions [id number]    Printed on:05/06/19 1908   Medication Information                      acetaminophen (TYLENOL) 500 MG tablet    Take 1,000 mg by mouth 2 times daily aspirin 81 MG chewable tablet  Take 1 tablet by mouth daily             atorvastatin (LIPITOR) 40 MG tablet  Take 1 tablet by mouth nightly             Biotin 1000 MCG TABS  Take 1 tablet by mouth daily              bumetanide (BUMEX) 0.5 MG tablet  Take 1 tablet by mouth daily             Calcium-Vitamin D-Vitamin K 500-500-40 MG-UNT-MCG CHEW  Take 1 tablet by mouth daily             clopidogrel (PLAVIX) 75 MG tablet  Take 1 tablet by mouth daily             Cyanocobalamin (VITAMIN B 12 PO)  Take by mouth every other day              digoxin (LANOXIN) 125 MCG tablet  TAKE 1 TABLET BY MOUTH  DAILY             docusate (COLACE, DULCOLAX) 100 MG CAPS  Take 100 mg by mouth 2 times daily as needed (PRN)             ferrous sulfate 325 (65 Fe) MG tablet  Take 1 tablet by mouth 2 times daily             losartan (COZAAR) 25 MG tablet  Take 12.5 mg by mouth daily             Magnesium Oxide 250 MG TABS  Take 1 tablet by mouth daily             metoprolol tartrate (LOPRESSOR) 50 MG tablet  Take 1 tablet by mouth 2 times daily             nitroGLYCERIN (NITROSTAT) 0.4 MG SL tablet  up to max of 3 total doses. If no relief after 1 dose, call 911.              NONFORMULARY  Perils probiotic  1 daily             pantoprazole (PROTONIX) 40 MG tablet  Take 1 tablet by mouth 2 times daily             potassium chloride (KLOR-CON M) 20 MEQ extended release tablet  Take 1 tablet by mouth daily             rivaroxaban (XARELTO) 15 MG TABS tablet  Take 15 mg by mouth Daily with supper             spironolactone (ALDACTONE) 25 MG tablet  Take 25 mg by mouth daily             sucralfate (CARAFATE) 1 GM tablet  Take 1 tablet by mouth 2 times daily             venlafaxine (EFFEXOR XR) 75 MG extended release capsule  TAKE 1 CAPSULE BY MOUTH  DAILY             vitamin D (CHOLECALCIFEROL) 1000 UNIT TABS tablet  Take 1,000 Units by mouth daily                 Time Spent on discharge is more than 30 minutes in the examination,

## 2019-05-06 NOTE — FLOWSHEET NOTE
[facility]  PHYSICAL THERAPY MISSED TREATMENT NOTE  ACUTE CARE  [facility] SURG 8B              Missed Treatment  Pt politely declined therapy at this time as she currently is in observation status. Pt is concerned regarding paying for the therapy evaluation as she is not considered an inpatient status. PT will re-attempt as time allows and pt is agreeable.

## 2019-05-06 NOTE — PROGRESS NOTES
Hospitalist Progress Note    Patient:  Dennis Baker      Unit/Bed:8B-31/031-A    YOB: 1928    MRN: [id number]       Acct: [de-identified]     PCP: Claudene Sis, MD    Date of Admission: 5/4/2019    Chief Complaint:   Chief Complaint   Patient presents with    Nausea    Emesis    Diarrhea       Hospital Course:     Please see H/P for details. In summary, this is a 80 y.o. Female, with PMH CAD, s/p PCI 12/2018, afib, HTN, HLD, who presented to [facility] on 5/4/2019 due to diarrhea, N/V. Per H/P note, Patient had cold last month and was treated with abx for 10 days: off 4/19/19. She has been doing well except some fatigue. She had outpatient physical therapy which was stopped 2 weeks ago. She was on iron supp which was stopped last week. She was doing fairly well until last night. She had [place] food yesterday and some carrot/dip in the evening. Around 1:30 am she started to have bilious vomiting/watery diarrhea. She called her daughter who witnessed multiple episodes. No melena, no bleeding.    Patient was getting very weak so EMS was called. + sick contact:  in NH: one of the resident has the \"flu\"    Patient was placed under observation under Hospital Medicine service. GI was consulted. GI panel PCR came back (+) norovirus. Stool culture (-)      Subjective:     Patient seen and examined. Daughter at bedside. Pt  states that N/V resolved. Last BM per patient was around 5pm today, already has soft stool, no blood. 2 days ago had watery stools. She denies abd pain, fever and chills. Tolerating oral intake well.          Medications:  Reviewed    Infusion Medications    sodium chloride 75 mL/hr at 05/05/19 1006     Scheduled Medications    [START ON 5/7/2019] pantoprazole  40 mg Oral QAM AC    rivaroxaban  15 mg Oral Daily    acetaminophen  1,000 mg Oral BID    vitamin D  1,000 Units Oral Daily    aspirin  81 mg Oral Daily    venlafaxine  75 mg Oral Daily    digoxin  125 mcg Oral Daily    metoprolol tartrate  50 mg Oral BID    clopidogrel  75 mg Oral Daily    atorvastatin  40 mg Oral Nightly    sucralfate  1 g Oral BID    potassium chloride  20 mEq Oral Daily    spironolactone  25 mg Oral Daily    losartan  12.5 mg Oral Daily    Biotin  1 tablet Oral Daily     PRN Meds: potassium chloride, ondansetron, acetaminophen      Intake/Output Summary (Last 24 hours) at 5/6/2019 1904  Last data filed at 5/6/2019 1043  Gross per 24 hour   Intake 1254.02 ml   Output --   Net 1254.02 ml       Diet:  DIET CARDIAC; Exam:  BP (!) 157/70   Pulse 62   Temp 98 °F (36.7 °C)   Resp 16   Ht 5' 2\" (1.575 m)   Wt 172 lb 14.4 oz (78.4 kg)   SpO2 95%   BMI 31.62 kg/m²     General appearance: alert, not in acute distress. HEENT: clear oral mucosa. Neck: Supple, with full range of motion. Respiratory:  Normal respiratory effort. Clear to auscultation, bilaterally without Rales/Wheezes/Rhonchi. Cardiovascular: normal rate, regular rhythm with normal S1/S2 without murmurs, rubs or gallops. Abdomen: Soft, non-tender, non-distended with normal bowel sounds. Musculoskeletal: passive and active ROM x 4 extremities. Exam of extremities: no pedal or leg edema, no clubbing or cyanosis        Labs:   Recent Labs     05/04/19  1602 05/05/19  0001 05/05/19  0526 05/05/19  1206   WBC 6.4  --  4.7*  --    HGB 14.0 12.0 12.0 11.4*   HCT 41.7 36.0* 36.1* 34.5*     --  112*  --      Recent Labs     05/04/19  1602 05/05/19  0526    139   K 3.7 3.4*   CL 99 103   CO2 25 24   BUN 19 22   CREATININE 0.5 0.6   CALCIUM 9.0 8.1*     Recent Labs     05/04/19  1602   AST 18   ALT 12   BILIDIR 0.3   BILITOT 3.7*   ALKPHOS 45     No results for input(s): INR in the last 72 hours. No results for input(s): Tamara Marianna in the last 72 hours.     Urinalysis:      Lab Results   Component Value Date    NITRU NEGATIVE 11/26/2018    WBCUA 2-4 03/31/2016    BACTERIA NONE 03/31/2016    RBCUA 3-5 03/31/2016    BLOODU NEGATIVE 11/26/2018    SPECGRAV 1.020 04/08/2016    SPECGRAV 1.014 03/31/2016    GLUCOSEU NEGATIVE 11/26/2018       Radiology:  No orders to display           Assessment/Plan: This is a 80 y.o. Female      1. Acute gastroenteritis due to norovirus, improving      -GI PCR panel (+) norovirus  -N/V resolved. Diarrhea looks like improving as well, now has soft stool from watery stool 2 days ago. -GI on-board: recommend supportive measures    2. FOBT positive    -GI on-board. Recommend f/u with Dr. Myrna Maurer after DC. Per daughter, they have appointment with Dr. Myrna Maurer already    3. Physical deconditioning likely from problem # 1    -PT/OT consulted, however, missed due to patient was sleeping  -per daughter, she lives next door to her Mom, both patient and daughter agreeable for DC today. Follow-up with PCP in 1 week. May need PT/OT in OP. 4. CAD, s/p PCI    - cont asa, plavix, BB, losartan    5. Hx of afib, now in sinus rhythm and rate controlled     -cont BB, digoxin and xarelto     6. Chronic Diastolic CHF, Ejection fraction 55-60% on echo 11/2018    -cont BB, losartan, aldactone     7. Mild hypokalemia, likely from diarrhea on top of on bumex    -potassium 3.4  -potassium replacement protocol  -pt already on K-dur  -BMP in 1 week in OP and f/u with PCP     8. Mild macrocytic anemia, suspect component of hemodilution due to IVF    -baseline Hgb nl, now 11.4  -suspect due to IVF. Denies symptoms of GI bleeding, although has (+) FOBT. Patient has f/u appointment with GI.          Diet: DIET CARDIAC;    DVT prophylaxis: [] Lovenox                                 [] SCDs                                 [] SQ Heparin                                 [] Encourage ambulation           [] Already on Anticoagulation     Disposition:    [x] Home       [] TCU       [] Rehab       [] Psych       [] SNF       [] [place]       [x] Other-Plan as above       Code Status:

## 2019-05-06 NOTE — PLAN OF CARE
Nisa [facility] 60  OCCUPATIONAL THERAPY MISSED TREATMENT NOTE  STR MED SURG 8B  8B-31/031-A      Date: 2019  Patient Name: Antonia French        CSN: [id number]   : 1928  (80 y.o.)  Gender: female   Referring Practitioner: Dr. Georgina Augustin  Diagnosis: gastroenteritis         REASON FOR MISSED TREATMENT:  Pt sound asleep this pm with RN reporting pt did not sleep much last night.  Will check back on 19 to complete OT eval.

## 2019-05-06 NOTE — FLOWSHEET NOTE
[address] [id number]  PHYSICAL THERAPY MISSED TREATMENT NOTE  ACUTE CARE  STRZ MED SURG 8B              Missed Treatment  PT attempted evaluation 2x this afternoon, pt is soundly sleeping on each attempt. RN noted that pt hasn't had much sleep last night. PT will re-attempt 5/7, if pt is agreeable.

## 2019-05-06 NOTE — PLAN OF CARE
Problem: Falls - Risk of:  Goal: Will remain free from falls  Description  Will remain free from falls  5/6/2019 0040 by Jen Fu RN  Outcome: Ongoing  Note:   Patient absent of falls this shift, fall band intact, bed alarm set, falling star magnet in place. 5/5/2019 1224 by Reyes Raker, RN  Outcome: Met This Shift  Note:   No falls this shift. Bed alarm in use. Family at bedside for support. Enc pt to get up to chair and plan for TCU admission if able. Goal: Absence of physical injury  Description  Absence of physical injury  5/6/2019 0040 by Jen Fu RN  Outcome: Ongoing  Note:   Patient up with walker and assistance. 5/5/2019 1224 by Reyes Raker, RN  Outcome: Met This Shift  Note:   Wheels on bed locked. Nancy Tutu available for pt to use. Problem: Risk for Impaired Skin Integrity  Goal: Tissue integrity - skin and mucous membranes  Description  Structural intactness and normal physiological function of skin and  mucous membranes. 5/6/2019 0040 by Jen Fu RN  Outcome: Ongoing  Note:   Skin integrity intact. Patient educated to frequently turn in bed to reduce pressure ulcers. 5/5/2019 1224 by Reyes Raker, RN  Outcome: Met This Shift  Note:   Skin assessment this shift. No new skin issues. Problem: Pain:  Goal: Pain level will decrease  Description  Pain level will decrease  5/6/2019 0040 by Jen Fu RN  Outcome: Ongoing  Note:   Patient voices pain 0/10. Patients pain goal is 0/10. PRN pain medications given as ordered. 5/5/2019 1224 by Reyes Raker, RN  Outcome: Met This Shift  Note:   Robert any pain. Goal: Control of acute pain  Description  Control of acute pain  5/6/2019 0040 by Jen Fu RN  Outcome: Ongoing  5/5/2019 1224 by Reyes Raker, RN  Outcome: Met This Shift  Note:   Missael Venegas.   Goal: Control of chronic pain  Description  Control of chronic pain  5/6/2019 0040 by Jen Fu RN  Outcome: Ongoing  5/5/2019 1224 by Jose James

## 2019-05-07 ENCOUNTER — TELEPHONE (OUTPATIENT)
Dept: FAMILY MEDICINE CLINIC | Age: 84
End: 2019-05-07

## 2019-05-07 NOTE — TELEPHONE ENCOUNTER
[facility] Transitions Initial Follow Up Call    Outreach made within 2 business days of discharge: Yes    Patient: Zhanna Lyons Patient : 1928   MRN: [id number]  Reason for Admission: There are no discharge diagnoses documented for the most recent discharge. Discharge Date: 19       Spoke with: DenishaSulema    Discharge department/facility: [facility]    TCM Interactive Patient Contact:  Was patient able to fill all prescriptions: Yes  Was patient instructed to bring all medications to the follow-up visit: Yes  Is patient taking all medications as directed in the discharge summary?  Yes  Does patient understand their discharge instructions: Yes  Does patient have questions or concerns that need addressed prior to 7-14 day follow up office visit: No    Scheduled appointment with PCP within 7-14 days    Follow Up  Future Appointments   Date Time Provider Radha Hicks   2019  1:30 PM MD Marcin Sales  [facility]   2019  1:30 PM MD Marcin Sales [facility]   2019 11:00 AM NOELLE Gould - CNP [facility]PX CHF [facility]   2019 10:30 AM SCHEDULE, CAROLS PACER NURSE SRPX PACER [facility] - [address] [facility]

## 2019-05-07 NOTE — CARE COORDINATION
5/7/19, 2:13 PM    Discharge plan discussed by  and . Discharge plan reviewed with patient/ family. Patient/ family verbalize understanding of discharge plan and are in agreement with plan. Understanding was demonstrated using the teach back method. Pt discharged home on 5-7-19 with daughter living next door. No new services.
Yes  Type of Home Care Services:  None  Patient expects to be discharged to:  home with family  Expected Discharge date:  05/06/19  Follow Up Appointment: Best Day/ Time: Monday AM(up to children)    Discharge Plan: Met with pt and daughter today. Pt is from home where she lives in a duplex with her daughter on the other side. Daughter is in and out of pt home all day assisting with pt needs. No current HH services. Pt does has a walker. She has a PCP, transportation and no issues obtaining medications. Daughter and pt state that pt just recently completed PT as an OP. They do not feel further services are needed at this time.       Evaluation: no

## 2019-05-08 LAB — CULTURE, STOOL: NORMAL

## 2019-05-11 ENCOUNTER — HOSPITAL ENCOUNTER (OUTPATIENT)
Age: 84
Discharge: HOME | End: 2019-05-11
Payer: MEDICARE

## 2019-05-11 LAB
ANION GAP SERPL CALCULATED.3IONS-SCNC: 15 MEQ/L (ref 8–16)
BUN BLDV-MCNC: 10 MG/DL (ref 7–22)
CALCIUM SERPL-MCNC: 9.5 MG/DL (ref 8.5–10.5)
CHLORIDE BLD-SCNC: 99 MEQ/L (ref 98–111)
CO2: 30 MEQ/L (ref 23–33)
CREAT SERPL-MCNC: 0.6 MG/DL (ref 0.4–1.2)
GFR SERPL CREATININE-BSD FRML MDRD: > 90 ML/MIN/1.73M2
GLUCOSE BLD-MCNC: 142 MG/DL (ref 70–108)
POTASSIUM SERPL-SCNC: 3.7 MEQ/L (ref 3.5–5.2)
SODIUM BLD-SCNC: 144 MEQ/L (ref 135–145)

## 2019-05-11 PROCEDURE — 80048 BASIC METABOLIC PNL TOTAL CA: CPT

## 2019-05-11 PROCEDURE — 36415 COLL VENOUS BLD VENIPUNCTURE: CPT

## 2019-05-14 ENCOUNTER — OFFICE VISIT (OUTPATIENT)
Dept: FAMILY MEDICINE CLINIC | Age: 84
End: 2019-05-14
Payer: MEDICARE

## 2019-05-14 ENCOUNTER — CARE COORDINATION (OUTPATIENT)
Dept: CARE COORDINATION | Age: 84
End: 2019-05-14

## 2019-05-14 VITALS
WEIGHT: 169 LBS | BODY MASS INDEX: 30.91 KG/M2 | TEMPERATURE: 98.4 F | HEART RATE: 76 BPM | DIASTOLIC BLOOD PRESSURE: 62 MMHG | RESPIRATION RATE: 12 BRPM | SYSTOLIC BLOOD PRESSURE: 122 MMHG

## 2019-05-14 DIAGNOSIS — R10.13 EPIGASTRIC PAIN: ICD-10-CM

## 2019-05-14 DIAGNOSIS — I49.5 SICK SINUS SYNDROME (HCC): ICD-10-CM

## 2019-05-14 DIAGNOSIS — K52.9 ACUTE GASTROENTERITIS: Primary | ICD-10-CM

## 2019-05-14 DIAGNOSIS — M85.89 OSTEOPENIA OF MULTIPLE SITES: ICD-10-CM

## 2019-05-14 DIAGNOSIS — E78.00 PURE HYPERCHOLESTEROLEMIA: ICD-10-CM

## 2019-05-14 DIAGNOSIS — I10 ESSENTIAL HYPERTENSION: ICD-10-CM

## 2019-05-14 DIAGNOSIS — D50.0 IRON DEFICIENCY ANEMIA DUE TO CHRONIC BLOOD LOSS: ICD-10-CM

## 2019-05-14 DIAGNOSIS — M15.9 PRIMARY OSTEOARTHRITIS INVOLVING MULTIPLE JOINTS: ICD-10-CM

## 2019-05-14 DIAGNOSIS — I25.119 CORONARY ARTERY DISEASE INVOLVING NATIVE HEART WITH ANGINA PECTORIS, UNSPECIFIED VESSEL OR LESION TYPE (HCC): ICD-10-CM

## 2019-05-14 DIAGNOSIS — K21.9 GASTROESOPHAGEAL REFLUX DISEASE WITHOUT ESOPHAGITIS: ICD-10-CM

## 2019-05-14 DIAGNOSIS — E04.2 MULTIPLE THYROID NODULES: ICD-10-CM

## 2019-05-14 DIAGNOSIS — I48.91 ATRIAL FIBRILLATION WITH RAPID VENTRICULAR RESPONSE (HCC): ICD-10-CM

## 2019-05-14 PROCEDURE — 99496 TRANSJ CARE MGMT HIGH F2F 7D: CPT | Performed by: FAMILY MEDICINE

## 2019-05-14 RX ORDER — METOPROLOL TARTRATE 50 MG/1
50 TABLET, FILM COATED ORAL 2 TIMES DAILY
Qty: 180 TABLET | Refills: 3 | Status: SHIPPED | OUTPATIENT
Start: 2019-05-14 | End: 2020-05-15

## 2019-05-14 RX ORDER — SUCRALFATE 1 G/1
1 TABLET ORAL 2 TIMES DAILY
Qty: 180 TABLET | Refills: 3 | Status: SHIPPED | OUTPATIENT
Start: 2019-05-14 | End: 2020-05-15

## 2019-05-14 NOTE — CARE COORDINATION
RNCC met with patient and her daughter/HIPPA contact, Sulema. Care Coordination program was reviewed in detail with both and patient wishes to decline service at this time. Patient feels all needs are currently being met and daughter is very involved in her care. Patient also follows with CHF clinic. Daughter did accept [facility] contact information and will call if they change their minds or have any further questions.       Will not enroll in Care Coordination program at this time per patient's request.    Divya Davis, RN Care Coordinator

## 2019-05-14 NOTE — PROGRESS NOTES
[address] [facility] FAMILY MEDICINE  [address]  Dept: [phone]  Dept Fax: [phone]  [facility] is a 80 y.o. female who presents today for:  Chief Complaint   Patient presents with    Follow-Up from Hospital     hypokalemia, GI bleed    Other     need for handicap placard           HPI:     HPI      Reviewed chart forpast medical history , surgical history , allergies, social history , family history and medications. Health Maintenance   Topic Date Due    Shingles Vaccine (1 of 2) 04/04/1978    DTaP/Tdap/Td vaccine (1 - Tdap) 09/06/2021 (Originally 4/4/1947)    Potassium monitoring  05/11/2020    Creatinine monitoring  05/11/2020    Flu vaccine  Completed    Pneumococcal 65+ years Vaccine  Completed       Subjective:      Constitutional:Negative for fever, chills, diaphoresis, activity change, appetite change and fatigue. HENT: Negative for hearing loss, ear pain, congestion, sore throat, rhinorrhea, postnasal drip and ear discharge. Eyes: Negative for photophobia and visual disturbance. Respiratory: Negative for cough, chest tightness, shortness of breath and wheezing. Cardiovascular: Negative for chest pain and leg swelling. Gastrointestinal: Negative for nausea, vomiting, abdominal pain, diarrhea and constipation. Genitourinary: Negative for dysuria, urgency and frequency. Neurological: Negative for weakness, light-headedness and headaches. Psychiatric/Behavioral: Negative for sleep disturbance.       Objective:     Vitals:    05/14/19 1319   BP: 122/62   Site: Left Upper Arm   Position: Sitting   Cuff Size: Large Adult   Pulse: 76   Resp: 12   Temp: 98.4 °F (36.9 °C)   TempSrc: Temporal   Weight: 169 lb (76.7 kg)     Wt Readings from Last 3 Encounters:   05/14/19 169 lb (76.7 kg)   05/04/19 172 lb 14.4 oz (78.4 kg)   04/09/19 171 lb (77.6 kg)       Physical Exam        Assessment/Plan   Manoj Colorado was seen today for follow-up from hospital and other. Diagnoses and all orders for this visit:    Epigastric pain          Reccommended tobacco cessation options including pharmacologicmethods, counseled great than 3 minutes during this visit:  Yes  []  No  []    Patient given educational materials - see patient instructions. Discussed use, benefit, and side effectsof prescribed medications. All patient questions answered. Pt voiced understanding. Reviewed health maintenance. Instructed to continue current medications, diet and exercise. Patient agreed with treatment plan. Followup as directed.      Electronically signed by Abhinav Prasad MD

## 2019-05-15 NOTE — PROGRESS NOTES
Post-Discharge Transitional Care Management [address] [place] [address]   YOB: 1928    Date of Visit:  2019    HPI  Here for recheck after hospital discharge for dehydration after gastroenteritis from norovirus. She is slowly regaining strength and appetite. However, she arrived home 19 and her   early the next morning in the nursing home on hospice. Allergies   Allergen Reactions    Methadone Shortness Of Breath    Gabapentin     Lyrica [Pregabalin]     Ultram [Tramadol]      Nausea, pedal edema, SOB     Outpatient Medications Marked as Taking for the 19 encounter (Office Visit) with Juan Forman MD   Medication Sig Dispense Refill    sucralfate (CARAFATE) 1 GM tablet Take 1 tablet by mouth 2 times daily 180 tablet 3    metoprolol tartrate (LOPRESSOR) 50 MG tablet Take 1 tablet by mouth 2 times daily 180 tablet 3    ferrous sulfate 325 (65 Fe) MG tablet Take 1 tablet by mouth 2 times daily 180 tablet 3    docusate (COLACE, DULCOLAX) 100 MG CAPS Take 100 mg by mouth 2 times daily as needed (PRN) 180 capsule 3    potassium chloride (KLOR-CON M) 20 MEQ extended release tablet Take 1 tablet by mouth daily 90 tablet 3    bumetanide (BUMEX) 0.5 MG tablet Take 1 tablet by mouth daily 90 tablet 3    clopidogrel (PLAVIX) 75 MG tablet Take 1 tablet by mouth daily 90 tablet 3    atorvastatin (LIPITOR) 40 MG tablet Take 1 tablet by mouth nightly 90 tablet 3    rivaroxaban (XARELTO) 15 MG TABS tablet Take 15 mg by mouth Daily with supper      pantoprazole (PROTONIX) 40 MG tablet Take 1 tablet by mouth 2 times daily (Patient taking differently: Take 40 mg by mouth daily ) 180 tablet 3    nitroGLYCERIN (NITROSTAT) 0.4 MG SL tablet up to max of 3 total doses.  If no relief after 1 dose, call 911. 25 tablet 3    Magnesium Oxide 250 MG TABS Take 1 tablet by mouth daily 30 tablet 1    venlafaxine (EFFEXOR XR) 75 MG extended release capsule TAKE 1 CAPSULE BY MOUTH DAILY 90 capsule 3    digoxin (LANOXIN) 125 MCG tablet TAKE 1 TABLET BY MOUTH  DAILY 90 tablet 3    NONFORMULARY Perils probiotic  1 daily      Cyanocobalamin (VITAMIN B 12 PO) Take by mouth every other day       aspirin 81 MG chewable tablet Take 1 tablet by mouth daily 30 tablet 3    vitamin D (CHOLECALCIFEROL) 1000 UNIT TABS tablet Take 1,000 Units by mouth daily      Calcium-Vitamin D-Vitamin K 500-500-40 MG-UNT-MCG CHEW Take 1 tablet by mouth daily      acetaminophen (TYLENOL) 500 MG tablet   Take 1,000 mg by mouth 2 times daily       Biotin 1000 MCG TABS Take 1 tablet by mouth daily            Vitals:    05/14/19 1319   BP: 122/62   Site: Left Upper Arm   Position: Sitting   Cuff Size: Large Adult   Pulse: 76   Resp: 12   Temp: 98.4 °F (36.9 °C)   TempSrc: Temporal   Weight: 169 lb (76.7 kg)     Body mass index is 30.91 kg/m². Wt Readings from Last 3 Encounters:   05/14/19 169 lb (76.7 kg)   05/04/19 172 lb 14.4 oz (78.4 kg)   04/09/19 171 lb (77.6 kg)     BP Readings from Last 3 Encounters:   05/14/19 122/62   05/06/19 (!) 157/70   04/09/19 (!) 128/56        Patient was admitted to [facility] from 5-4-19 to 5-6-19 for dehydration and GE. Inpatient course: Discharge summary reviewed- see chart. Current status: good    Review of Systems:  Review of Systems  Constitutional: Negative for fever, chills, diaphoresis, activity change, appetite change and fatigue. HENT: Negative for hearing loss, ear pain, congestion, sore throat, rhinorrhea, postnasal drip and ear discharge. Eyes: Negative for photophobia and visual disturbance. Respiratory: Negative for cough, chest tightness, shortness of breath and wheezing. Cardiovascular: Negative for chest pain and leg swelling. Gastrointestinal: Negative for nausea, vomiting, abdominal pain, diarrhea and constipation. Genitourinary: Negative for dysuria, urgency and frequency.    Neurological: Negative for weakness, light-headedness and hospital and other. Diagnoses and all orders for this visit:    Acute gastroenteritis    Epigastric pain  -     sucralfate (CARAFATE) 1 GM tablet; Take 1 tablet by mouth 2 times daily    Multiple thyroid nodules    Atrial fibrillation with rapid ventricular response (HCC)  -     metoprolol tartrate (LOPRESSOR) 50 MG tablet; Take 1 tablet by mouth 2 times daily    Sick sinus syndrome (HCC)    Essential hypertension  -     metoprolol tartrate (LOPRESSOR) 50 MG tablet;  Take 1 tablet by mouth 2 times daily    Primary osteoarthritis involving multiple joints  -     Handicap placard    Pure hypercholesterolemia    Gastroesophageal reflux disease without esophagitis    Iron deficiency anemia due to chronic blood loss    Coronary artery disease involving native heart with angina pectoris, unspecified vessel or lesion type (HCC)    Osteopenia of multiple sites    No change to medications   Continue healthy diet and exercise  Aspirin daily        Diagnostic testresults reviewed: inpatient labs    Patient risk of morbidity and mortality: high    Medical Decision Making:high complexity

## 2019-06-18 ENCOUNTER — CARE COORDINATION (OUTPATIENT)
Dept: CASE MANAGEMENT | Age: 84
End: 2019-06-18

## 2019-06-25 ENCOUNTER — CARE COORDINATION (OUTPATIENT)
Dept: CASE MANAGEMENT | Age: 84
End: 2019-06-25

## 2019-06-25 NOTE — CARE COORDINATION
Name: Ric Lange    ### Patient Details  YOB: 1928  MRN: [id number]    ### Encounter Details  Encounter ID: [id number]  Arrival Date: N/A  Discharge Date: N/A    ### Related interaction  CHF High Touch UA (Welcome Call) ([email or web address])    ### Questions     Question 1   Consent   If you are interested in starting this program today, please press 1.. If you have questions or would like to talk to our , please press 2 and we will call you right back. If youd like to opt out of the program, please press 3   Opt Out (Issue Panel: Opt Out)    ### Required Interventions and Feedback     CarePATH Update         *Patient Status changed in CarePATH to[de-identified]     Patient Declined (selected by W on 06/25/2019 02:26 PM EDT)     Call Status         *Call Status:     Patient Reached (edited by W on 06/25/2019 02:25 PM EDT)    Additional Call Status Details[de-identified]     Spoke with patient regarding the [facility].  Patient did not have any questions about the program.  Patient declined program at this time.   Phone calls will be discontinued and patient removed from the program.   (edited by W on 06/25/2019 02:26 PM EDT)    Héctor Miller RN  Tele-Health Coordinator

## 2019-08-01 ENCOUNTER — HOSPITAL ENCOUNTER (OUTPATIENT)
Age: 84
Discharge: HOME | End: 2019-08-01
Payer: MEDICARE

## 2019-08-01 LAB
BASOPHILS # BLD: 0.6 %
BASOPHILS ABSOLUTE: 0 THOU/MM3 (ref 0–0.1)
EOSINOPHIL # BLD: 2.2 %
EOSINOPHILS ABSOLUTE: 0.1 THOU/MM3 (ref 0–0.4)
ERYTHROCYTE [DISTWIDTH] IN BLOOD BY AUTOMATED COUNT: 13.2 % (ref 11.5–14.5)
ERYTHROCYTE [DISTWIDTH] IN BLOOD BY AUTOMATED COUNT: 49.2 FL (ref 35–45)
HCT VFR BLD CALC: 37.7 % (ref 37–47)
HEMOGLOBIN: 11.6 GM/DL (ref 12–16)
IMMATURE GRANS (ABS): 0.02 THOU/MM3 (ref 0–0.07)
IMMATURE GRANULOCYTES: 0.3 %
IRON: 51 UG/DL (ref 50–170)
LYMPHOCYTES # BLD: 25.5 %
LYMPHOCYTES ABSOLUTE: 1.6 THOU/MM3 (ref 1–4.8)
MCH RBC QN AUTO: 31.2 PG (ref 26–33)
MCHC RBC AUTO-ENTMCNC: 30.8 GM/DL (ref 32.2–35.5)
MCV RBC AUTO: 101.3 FL (ref 81–99)
MONOCYTES # BLD: 10.1 %
MONOCYTES ABSOLUTE: 0.6 THOU/MM3 (ref 0.4–1.3)
NUCLEATED RED BLOOD CELLS: 0 /100 WBC
PLATELET # BLD: 176 THOU/MM3 (ref 130–400)
PMV BLD AUTO: 10.9 FL (ref 9.4–12.4)
RBC # BLD: 3.72 MILL/MM3 (ref 4.2–5.4)
SEG NEUTROPHILS: 61.3 %
SEGMENTED NEUTROPHILS ABSOLUTE COUNT: 3.9 THOU/MM3 (ref 1.8–7.7)
TOTAL IRON BINDING CAPACITY: 344 UG/DL (ref 171–450)
WBC # BLD: 6.3 THOU/MM3 (ref 4.8–10.8)

## 2019-08-01 PROCEDURE — 84238 ASSAY NONENDOCRINE RECEPTOR: CPT

## 2019-08-01 PROCEDURE — 83540 ASSAY OF IRON: CPT

## 2019-08-01 PROCEDURE — 85025 COMPLETE CBC W/AUTO DIFF WBC: CPT

## 2019-08-01 PROCEDURE — 36415 COLL VENOUS BLD VENIPUNCTURE: CPT

## 2019-08-01 PROCEDURE — 83550 IRON BINDING TEST: CPT

## 2019-08-04 LAB — SOLUBLE TRANSFERRIN RECEPT: 5.8 MG/L (ref 1.9–4.4)

## 2019-08-06 ENCOUNTER — OFFICE VISIT (OUTPATIENT)
Dept: FAMILY MEDICINE CLINIC | Age: 84
End: 2019-08-06
Payer: MEDICARE

## 2019-08-06 VITALS
WEIGHT: 174.6 LBS | HEIGHT: 62 IN | BODY MASS INDEX: 32.13 KG/M2 | SYSTOLIC BLOOD PRESSURE: 126 MMHG | DIASTOLIC BLOOD PRESSURE: 64 MMHG | HEART RATE: 64 BPM | TEMPERATURE: 98.2 F | RESPIRATION RATE: 14 BRPM

## 2019-08-06 DIAGNOSIS — F33.42 RECURRENT MAJOR DEPRESSIVE DISORDER, IN FULL REMISSION (HCC): ICD-10-CM

## 2019-08-06 DIAGNOSIS — I25.119 CORONARY ARTERY DISEASE INVOLVING NATIVE HEART WITH ANGINA PECTORIS, UNSPECIFIED VESSEL OR LESION TYPE (HCC): ICD-10-CM

## 2019-08-06 DIAGNOSIS — E04.2 MULTIPLE THYROID NODULES: Primary | ICD-10-CM

## 2019-08-06 DIAGNOSIS — I49.5 SICK SINUS SYNDROME (HCC): ICD-10-CM

## 2019-08-06 DIAGNOSIS — I10 ESSENTIAL HYPERTENSION: ICD-10-CM

## 2019-08-06 DIAGNOSIS — D50.0 IRON DEFICIENCY ANEMIA DUE TO CHRONIC BLOOD LOSS: ICD-10-CM

## 2019-08-06 DIAGNOSIS — K21.9 GASTROESOPHAGEAL REFLUX DISEASE WITHOUT ESOPHAGITIS: ICD-10-CM

## 2019-08-06 DIAGNOSIS — E78.00 PURE HYPERCHOLESTEROLEMIA: ICD-10-CM

## 2019-08-06 DIAGNOSIS — I48.91 ATRIAL FIBRILLATION WITH RAPID VENTRICULAR RESPONSE (HCC): ICD-10-CM

## 2019-08-06 PROCEDURE — G8427 DOCREV CUR MEDS BY ELIG CLIN: HCPCS | Performed by: FAMILY MEDICINE

## 2019-08-06 PROCEDURE — G8417 CALC BMI ABV UP PARAM F/U: HCPCS | Performed by: FAMILY MEDICINE

## 2019-08-06 PROCEDURE — 4040F PNEUMOC VAC/ADMIN/RCVD: CPT | Performed by: FAMILY MEDICINE

## 2019-08-06 PROCEDURE — 1123F ACP DISCUSS/DSCN MKR DOCD: CPT | Performed by: FAMILY MEDICINE

## 2019-08-06 PROCEDURE — 1090F PRES/ABSN URINE INCON ASSESS: CPT | Performed by: FAMILY MEDICINE

## 2019-08-06 PROCEDURE — G8598 ASA/ANTIPLAT THER USED: HCPCS | Performed by: FAMILY MEDICINE

## 2019-08-06 PROCEDURE — 1036F TOBACCO NON-USER: CPT | Performed by: FAMILY MEDICINE

## 2019-08-06 PROCEDURE — 99214 OFFICE O/P EST MOD 30 MIN: CPT | Performed by: FAMILY MEDICINE

## 2019-08-07 NOTE — PROGRESS NOTES
Hernan De will talk to patient---patient will likely decline but she will call our office if she decides she wants to

## 2019-08-14 ENCOUNTER — OFFICE VISIT (OUTPATIENT)
Dept: CARDIOLOGY CLINIC | Age: 84
End: 2019-08-14
Payer: MEDICARE

## 2019-08-14 VITALS
SYSTOLIC BLOOD PRESSURE: 122 MMHG | HEIGHT: 62 IN | DIASTOLIC BLOOD PRESSURE: 62 MMHG | BODY MASS INDEX: 31.74 KG/M2 | HEART RATE: 68 BPM | WEIGHT: 172.5 LBS

## 2019-08-14 DIAGNOSIS — I25.10 CAD IN NATIVE ARTERY: Primary | ICD-10-CM

## 2019-08-14 PROCEDURE — 1090F PRES/ABSN URINE INCON ASSESS: CPT | Performed by: INTERNAL MEDICINE

## 2019-08-14 PROCEDURE — 4040F PNEUMOC VAC/ADMIN/RCVD: CPT | Performed by: INTERNAL MEDICINE

## 2019-08-14 PROCEDURE — 1123F ACP DISCUSS/DSCN MKR DOCD: CPT | Performed by: INTERNAL MEDICINE

## 2019-08-14 PROCEDURE — G8427 DOCREV CUR MEDS BY ELIG CLIN: HCPCS | Performed by: INTERNAL MEDICINE

## 2019-08-14 PROCEDURE — G8598 ASA/ANTIPLAT THER USED: HCPCS | Performed by: INTERNAL MEDICINE

## 2019-08-14 PROCEDURE — 99214 OFFICE O/P EST MOD 30 MIN: CPT | Performed by: INTERNAL MEDICINE

## 2019-08-14 PROCEDURE — G8417 CALC BMI ABV UP PARAM F/U: HCPCS | Performed by: INTERNAL MEDICINE

## 2019-08-14 PROCEDURE — 1036F TOBACCO NON-USER: CPT | Performed by: INTERNAL MEDICINE

## 2019-08-14 NOTE — PROGRESS NOTES
Pt here for 6 mo check up     Pt did not bring list or bottles states list is correct from 8/6/19 appt       Pt denies chest pain, dizziness, peripheral edema, heart palpitations     Pt continues with sob and no energy

## 2019-08-27 ENCOUNTER — OFFICE VISIT (OUTPATIENT)
Dept: CARDIOLOGY CLINIC | Age: 84
End: 2019-08-27
Payer: MEDICARE

## 2019-08-27 VITALS
HEIGHT: 62 IN | OXYGEN SATURATION: 97 % | SYSTOLIC BLOOD PRESSURE: 124 MMHG | BODY MASS INDEX: 32.54 KG/M2 | DIASTOLIC BLOOD PRESSURE: 68 MMHG | WEIGHT: 176.8 LBS | HEART RATE: 66 BPM

## 2019-08-27 DIAGNOSIS — I50.32 CHF (CONGESTIVE HEART FAILURE), NYHA CLASS II, CHRONIC, DIASTOLIC (HCC): Primary | ICD-10-CM

## 2019-08-27 PROCEDURE — 99213 OFFICE O/P EST LOW 20 MIN: CPT | Performed by: NURSE PRACTITIONER

## 2019-08-27 PROCEDURE — 1090F PRES/ABSN URINE INCON ASSESS: CPT | Performed by: NURSE PRACTITIONER

## 2019-08-27 PROCEDURE — 4040F PNEUMOC VAC/ADMIN/RCVD: CPT | Performed by: NURSE PRACTITIONER

## 2019-08-27 PROCEDURE — G8427 DOCREV CUR MEDS BY ELIG CLIN: HCPCS | Performed by: NURSE PRACTITIONER

## 2019-08-27 PROCEDURE — 1036F TOBACCO NON-USER: CPT | Performed by: NURSE PRACTITIONER

## 2019-08-27 PROCEDURE — G8598 ASA/ANTIPLAT THER USED: HCPCS | Performed by: NURSE PRACTITIONER

## 2019-08-27 PROCEDURE — G8417 CALC BMI ABV UP PARAM F/U: HCPCS | Performed by: NURSE PRACTITIONER

## 2019-08-27 PROCEDURE — 1123F ACP DISCUSS/DSCN MKR DOCD: CPT | Performed by: NURSE PRACTITIONER

## 2019-08-27 ASSESSMENT — ENCOUNTER SYMPTOMS
COLOR CHANGE: 0
NAUSEA: 0
ABDOMINAL PAIN: 0
SHORTNESS OF BREATH: 1
CHEST TIGHTNESS: 0
COUGH: 0
WHEEZING: 0
APNEA: 0
ABDOMINAL DISTENTION: 0

## 2019-08-27 NOTE — PROGRESS NOTES
ALT 12 05/04/2019    AST 18 05/04/2019    PROT 6.2 05/04/2019    BILITOT 3.7 05/04/2019    BILIDIR 0.3 05/04/2019    LABALBU 3.4 05/04/2019    LABALBU 4.2 08/30/2011     Magnesium:    Lab Results   Component Value Date    MG 1.7 05/03/2019     PT/INR:    Lab Results   Component Value Date    INR 0.98 01/15/2019     Lipids:    Lab Results   Component Value Date    TRIG 145 12/04/2018    HDL 36 12/04/2018    LDLCALC 62 12/04/2018       ASSESSMENT AND PLAN:   The patient's condition/symptoms are Stable: No clinical evidence of fluid overload today. Continue current medical regimen without changes at present time.      Diagnosis Orders   1. CHF (congestive heart failure), NYHA class II, chronic, diastolic (HCC)         Plan:  · Continue Metoprolol 50 mg bid, Potassium 20 mEq daily, Bumex 0.5 mg daily, Digoxin 125 mcg daily, Xarelto off Plavix per Dr Maciel Espino. HF Zones reviewed. · Daily weights  · Fluid restriction of 2 Liters per day  · Limit sodium in diet to around 4241-8682 mg/day  · Monitor BP  · Activity as tolerated     Patient was instructed to call the [employer] for changes in the following symptoms:   Weight gain of 3 pounds in 1 day or 5 pounds in 1 week  Increased shortness of breath  Shortness of breath while laying down  Cough  Chest pain  Swelling in feet, ankles or legs  Tenderness or bloating in the abdomen  Fatigue   Decreased appetite or nausea   Confusion      Return in about 3 months (around 11/27/2019). or sooner if needed     Patient given educational materials - see patient instructions. We discussed the importance of weighing oneself and recording daily. We also discussed the importance of a lowsodium diet, higher sodium foods to avoid and better low sodium food options. Discussed use, benefit, and side effects of prescribed medications. All patient questions answered.   Patient verbalizes understanding of plan of care using teach back method, and is agreeable to the treatment

## 2019-09-04 ENCOUNTER — NURSE ONLY (OUTPATIENT)
Dept: CARDIOLOGY CLINIC | Age: 84
End: 2019-09-04
Payer: MEDICARE

## 2019-09-04 DIAGNOSIS — Z95.0 PACEMAKER: Primary | ICD-10-CM

## 2019-09-04 PROCEDURE — 93279 PRGRMG DEV EVAL PM/LDLS PM: CPT | Performed by: INTERNAL MEDICINE

## 2019-09-24 RX ORDER — DIGOXIN 125 MCG
125 TABLET ORAL DAILY
Qty: 90 TABLET | Refills: 3 | Status: SHIPPED | OUTPATIENT
Start: 2019-09-24 | End: 2020-06-17 | Stop reason: SDUPTHER

## 2019-09-24 RX ORDER — VENLAFAXINE HYDROCHLORIDE 75 MG/1
75 CAPSULE, EXTENDED RELEASE ORAL DAILY
Qty: 90 CAPSULE | Refills: 3 | Status: SHIPPED | OUTPATIENT
Start: 2019-09-24 | End: 2020-06-17 | Stop reason: SDUPTHER

## 2019-11-12 DIAGNOSIS — I50.33 ACUTE ON CHRONIC CONGESTIVE HEART FAILURE WITH LEFT VENTRICULAR DIASTOLIC DYSFUNCTION (HCC): ICD-10-CM

## 2019-11-12 DIAGNOSIS — I48.91 ATRIAL FIBRILLATION WITH RAPID VENTRICULAR RESPONSE (HCC): ICD-10-CM

## 2019-11-12 DIAGNOSIS — E78.00 PURE HYPERCHOLESTEROLEMIA: ICD-10-CM

## 2019-11-12 RX ORDER — CLOPIDOGREL BISULFATE 75 MG/1
75 TABLET ORAL DAILY
Qty: 90 TABLET | Refills: 3 | Status: SHIPPED | OUTPATIENT
Start: 2019-11-12 | End: 2019-11-21

## 2019-11-12 RX ORDER — BUMETANIDE 0.5 MG/1
0.5 TABLET ORAL DAILY
Qty: 90 TABLET | Refills: 3 | Status: SHIPPED | OUTPATIENT
Start: 2019-11-12 | End: 2020-06-17 | Stop reason: SDUPTHER

## 2019-11-12 RX ORDER — ATORVASTATIN CALCIUM 40 MG/1
40 TABLET, FILM COATED ORAL NIGHTLY
Qty: 90 TABLET | Refills: 3 | Status: SHIPPED | OUTPATIENT
Start: 2019-11-12 | End: 2020-06-17 | Stop reason: SDUPTHER

## 2019-11-21 ENCOUNTER — OFFICE VISIT (OUTPATIENT)
Dept: CARDIOLOGY CLINIC | Age: 84
End: 2019-11-21
Payer: MEDICARE

## 2019-11-21 VITALS
SYSTOLIC BLOOD PRESSURE: 118 MMHG | OXYGEN SATURATION: 96 % | DIASTOLIC BLOOD PRESSURE: 64 MMHG | HEART RATE: 63 BPM | BODY MASS INDEX: 32.39 KG/M2 | WEIGHT: 176 LBS | HEIGHT: 62 IN

## 2019-11-21 DIAGNOSIS — I48.20 CHRONIC ATRIAL FIBRILLATION (HCC): ICD-10-CM

## 2019-11-21 DIAGNOSIS — I50.32 CHF (CONGESTIVE HEART FAILURE), NYHA CLASS II, CHRONIC, DIASTOLIC (HCC): Primary | ICD-10-CM

## 2019-11-21 DIAGNOSIS — Z95.0 PACEMAKER: ICD-10-CM

## 2019-11-21 PROCEDURE — G8484 FLU IMMUNIZE NO ADMIN: HCPCS | Performed by: NURSE PRACTITIONER

## 2019-11-21 PROCEDURE — G8417 CALC BMI ABV UP PARAM F/U: HCPCS | Performed by: NURSE PRACTITIONER

## 2019-11-21 PROCEDURE — 99213 OFFICE O/P EST LOW 20 MIN: CPT | Performed by: NURSE PRACTITIONER

## 2019-11-21 PROCEDURE — 1036F TOBACCO NON-USER: CPT | Performed by: NURSE PRACTITIONER

## 2019-11-21 PROCEDURE — G8598 ASA/ANTIPLAT THER USED: HCPCS | Performed by: NURSE PRACTITIONER

## 2019-11-21 PROCEDURE — 4040F PNEUMOC VAC/ADMIN/RCVD: CPT | Performed by: NURSE PRACTITIONER

## 2019-11-21 PROCEDURE — G8427 DOCREV CUR MEDS BY ELIG CLIN: HCPCS | Performed by: NURSE PRACTITIONER

## 2019-11-21 PROCEDURE — 1123F ACP DISCUSS/DSCN MKR DOCD: CPT | Performed by: NURSE PRACTITIONER

## 2019-11-21 PROCEDURE — 1090F PRES/ABSN URINE INCON ASSESS: CPT | Performed by: NURSE PRACTITIONER

## 2019-11-21 RX ORDER — FERROUS SULFATE 325(65) MG
325 TABLET ORAL EVERY OTHER DAY
COMMUNITY
End: 2020-10-12 | Stop reason: SDUPTHER

## 2019-11-21 RX ORDER — PANTOPRAZOLE SODIUM 40 MG/1
40 TABLET, DELAYED RELEASE ORAL DAILY
COMMUNITY
End: 2020-01-22

## 2019-11-21 RX ORDER — DOCUSATE SODIUM 100 MG/1
100 CAPSULE, LIQUID FILLED ORAL DAILY
COMMUNITY
End: 2020-10-12 | Stop reason: SDUPTHER

## 2019-12-02 ENCOUNTER — NURSE ONLY (OUTPATIENT)
Dept: LAB | Age: 84
End: 2019-12-02

## 2019-12-02 DIAGNOSIS — D50.0 IRON DEFICIENCY ANEMIA DUE TO CHRONIC BLOOD LOSS: ICD-10-CM

## 2019-12-02 DIAGNOSIS — I25.119 CORONARY ARTERY DISEASE INVOLVING NATIVE HEART WITH ANGINA PECTORIS, UNSPECIFIED VESSEL OR LESION TYPE (HCC): ICD-10-CM

## 2019-12-02 DIAGNOSIS — I50.32 CHF (CONGESTIVE HEART FAILURE), NYHA CLASS II, CHRONIC, DIASTOLIC (HCC): ICD-10-CM

## 2019-12-02 DIAGNOSIS — E04.2 MULTIPLE THYROID NODULES: ICD-10-CM

## 2019-12-02 DIAGNOSIS — I48.91 ATRIAL FIBRILLATION WITH RAPID VENTRICULAR RESPONSE (HCC): ICD-10-CM

## 2019-12-02 LAB
ALBUMIN SERPL-MCNC: 4.1 G/DL (ref 3.5–5.1)
ALP BLD-CCNC: 49 U/L (ref 38–126)
ALT SERPL-CCNC: 15 U/L (ref 11–66)
ANION GAP SERPL CALCULATED.3IONS-SCNC: 17 MEQ/L (ref 8–16)
AST SERPL-CCNC: 21 U/L (ref 5–40)
BILIRUB SERPL-MCNC: 3.2 MG/DL (ref 0.3–1.2)
BUN BLDV-MCNC: 14 MG/DL (ref 7–22)
CALCIUM SERPL-MCNC: 9.7 MG/DL (ref 8.5–10.5)
CHLORIDE BLD-SCNC: 97 MEQ/L (ref 98–111)
CO2: 28 MEQ/L (ref 23–33)
CREAT SERPL-MCNC: 0.6 MG/DL (ref 0.4–1.2)
DIGOXIN LEVEL: 0.9 NG/ML (ref 0.5–2)
ERYTHROCYTE [DISTWIDTH] IN BLOOD BY AUTOMATED COUNT: 13.8 % (ref 11.5–14.5)
ERYTHROCYTE [DISTWIDTH] IN BLOOD BY AUTOMATED COUNT: 51.8 FL (ref 35–45)
FERRITIN: 94 NG/ML (ref 10–291)
GFR SERPL CREATININE-BSD FRML MDRD: > 90 ML/MIN/1.73M2
GLUCOSE BLD-MCNC: 169 MG/DL (ref 70–108)
HCT VFR BLD CALC: 44.4 % (ref 37–47)
HEMOGLOBIN: 14.8 GM/DL (ref 12–16)
IRON: 100 UG/DL (ref 50–170)
MCH RBC QN AUTO: 34.7 PG (ref 26–33)
MCHC RBC AUTO-ENTMCNC: 33.3 GM/DL (ref 32.2–35.5)
MCV RBC AUTO: 104.2 FL (ref 81–99)
PLATELET # BLD: 149 THOU/MM3 (ref 130–400)
PMV BLD AUTO: 11.2 FL (ref 9.4–12.4)
POTASSIUM SERPL-SCNC: 4 MEQ/L (ref 3.5–5.2)
RBC # BLD: 4.26 MILL/MM3 (ref 4.2–5.4)
SODIUM BLD-SCNC: 142 MEQ/L (ref 135–145)
TOTAL IRON BINDING CAPACITY: 319 UG/DL (ref 171–450)
TOTAL PROTEIN: 6.9 G/DL (ref 6.1–8)
TSH SERPL DL<=0.05 MIU/L-ACNC: 3.52 UIU/ML (ref 0.4–4.2)
WBC # BLD: 5.7 THOU/MM3 (ref 4.8–10.8)

## 2019-12-10 ENCOUNTER — OFFICE VISIT (OUTPATIENT)
Dept: FAMILY MEDICINE CLINIC | Age: 84
End: 2019-12-10
Payer: MEDICARE

## 2019-12-10 VITALS
RESPIRATION RATE: 16 BRPM | HEART RATE: 63 BPM | DIASTOLIC BLOOD PRESSURE: 74 MMHG | WEIGHT: 175 LBS | BODY MASS INDEX: 32.01 KG/M2 | TEMPERATURE: 98.1 F | SYSTOLIC BLOOD PRESSURE: 128 MMHG

## 2019-12-10 DIAGNOSIS — K21.9 GASTROESOPHAGEAL REFLUX DISEASE WITHOUT ESOPHAGITIS: ICD-10-CM

## 2019-12-10 DIAGNOSIS — I48.91 ATRIAL FIBRILLATION WITH RAPID VENTRICULAR RESPONSE (HCC): ICD-10-CM

## 2019-12-10 DIAGNOSIS — I25.119 CORONARY ARTERY DISEASE INVOLVING NATIVE HEART WITH ANGINA PECTORIS, UNSPECIFIED VESSEL OR LESION TYPE (HCC): ICD-10-CM

## 2019-12-10 DIAGNOSIS — I50.33 ACUTE ON CHRONIC CONGESTIVE HEART FAILURE WITH LEFT VENTRICULAR DIASTOLIC DYSFUNCTION (HCC): ICD-10-CM

## 2019-12-10 DIAGNOSIS — I49.5 SICK SINUS SYNDROME (HCC): ICD-10-CM

## 2019-12-10 DIAGNOSIS — F33.42 RECURRENT MAJOR DEPRESSIVE DISORDER, IN FULL REMISSION (HCC): ICD-10-CM

## 2019-12-10 DIAGNOSIS — R20.2 PARESTHESIA OF BOTH LOWER EXTREMITIES: ICD-10-CM

## 2019-12-10 DIAGNOSIS — E04.2 MULTIPLE THYROID NODULES: ICD-10-CM

## 2019-12-10 DIAGNOSIS — E78.00 PURE HYPERCHOLESTEROLEMIA: ICD-10-CM

## 2019-12-10 DIAGNOSIS — R53.1 GENERAL WEAKNESS: ICD-10-CM

## 2019-12-10 DIAGNOSIS — J31.0 CHRONIC RHINITIS: ICD-10-CM

## 2019-12-10 DIAGNOSIS — I73.9 PVD (PERIPHERAL VASCULAR DISEASE) (HCC): ICD-10-CM

## 2019-12-10 DIAGNOSIS — D50.0 IRON DEFICIENCY ANEMIA DUE TO CHRONIC BLOOD LOSS: ICD-10-CM

## 2019-12-10 DIAGNOSIS — I10 ESSENTIAL HYPERTENSION: Primary | ICD-10-CM

## 2019-12-10 DIAGNOSIS — M15.9 PRIMARY OSTEOARTHRITIS INVOLVING MULTIPLE JOINTS: ICD-10-CM

## 2019-12-10 PROCEDURE — 1123F ACP DISCUSS/DSCN MKR DOCD: CPT | Performed by: FAMILY MEDICINE

## 2019-12-10 PROCEDURE — 4040F PNEUMOC VAC/ADMIN/RCVD: CPT | Performed by: FAMILY MEDICINE

## 2019-12-10 PROCEDURE — 99214 OFFICE O/P EST MOD 30 MIN: CPT | Performed by: FAMILY MEDICINE

## 2019-12-10 PROCEDURE — G8427 DOCREV CUR MEDS BY ELIG CLIN: HCPCS | Performed by: FAMILY MEDICINE

## 2019-12-10 PROCEDURE — G8598 ASA/ANTIPLAT THER USED: HCPCS | Performed by: FAMILY MEDICINE

## 2019-12-10 PROCEDURE — G8417 CALC BMI ABV UP PARAM F/U: HCPCS | Performed by: FAMILY MEDICINE

## 2019-12-10 PROCEDURE — G8484 FLU IMMUNIZE NO ADMIN: HCPCS | Performed by: FAMILY MEDICINE

## 2019-12-10 PROCEDURE — 1090F PRES/ABSN URINE INCON ASSESS: CPT | Performed by: FAMILY MEDICINE

## 2019-12-10 PROCEDURE — 1036F TOBACCO NON-USER: CPT | Performed by: FAMILY MEDICINE

## 2020-01-02 ENCOUNTER — NURSE ONLY (OUTPATIENT)
Dept: LAB | Age: 85
End: 2020-01-02

## 2020-01-02 LAB
FOLATE: 9.3 NG/ML (ref 4.8–24.2)
VITAMIN B-12: 514 PG/ML (ref 211–911)

## 2020-02-05 ENCOUNTER — OFFICE VISIT (OUTPATIENT)
Dept: CARDIOLOGY CLINIC | Age: 85
End: 2020-02-05
Payer: MEDICARE

## 2020-02-05 VITALS
DIASTOLIC BLOOD PRESSURE: 78 MMHG | HEART RATE: 64 BPM | BODY MASS INDEX: 32.46 KG/M2 | HEIGHT: 62 IN | SYSTOLIC BLOOD PRESSURE: 138 MMHG | WEIGHT: 176.4 LBS

## 2020-02-05 PROCEDURE — 4040F PNEUMOC VAC/ADMIN/RCVD: CPT | Performed by: INTERNAL MEDICINE

## 2020-02-05 PROCEDURE — 1036F TOBACCO NON-USER: CPT | Performed by: INTERNAL MEDICINE

## 2020-02-05 PROCEDURE — G8427 DOCREV CUR MEDS BY ELIG CLIN: HCPCS | Performed by: INTERNAL MEDICINE

## 2020-02-05 PROCEDURE — 99213 OFFICE O/P EST LOW 20 MIN: CPT | Performed by: INTERNAL MEDICINE

## 2020-02-05 PROCEDURE — G8482 FLU IMMUNIZE ORDER/ADMIN: HCPCS | Performed by: INTERNAL MEDICINE

## 2020-02-05 PROCEDURE — G8417 CALC BMI ABV UP PARAM F/U: HCPCS | Performed by: INTERNAL MEDICINE

## 2020-02-05 PROCEDURE — 1123F ACP DISCUSS/DSCN MKR DOCD: CPT | Performed by: INTERNAL MEDICINE

## 2020-02-05 PROCEDURE — 1090F PRES/ABSN URINE INCON ASSESS: CPT | Performed by: INTERNAL MEDICINE

## 2020-02-05 NOTE — PROGRESS NOTES
Pt here for 6 month follow up. Pt c/o SOB. Pt had a nosebleed on her way to today's appointment. Pt denies chest pain, lightheadedness, dizziness, palpitations.

## 2020-02-05 NOTE — PROGRESS NOTES
[address] [place] [address] [facility] [address]  Dept: [phone]  Dept Fax: [phone]  Loc: [phone]    Visit Date: 2/5/2020    Ms. Danielle Tapia is a 80 y.o. female  who presented for:  Chief Complaint   Patient presents with    6 Month Follow-Up       HPI:   79 yo F c hx of Afib on Xarelto, HTN, HLD, CAD s/p PCI is here for a follow up.  She underwent staged PCI of LAD and is here for a follow up.   Denies any chest pain, sob, palpitations, lightheadedness, dizziness, orthopnea, PND or pedal edema. Current Outpatient Medications:     pantoprazole (PROTONIX) 40 MG tablet, TAKE 1 TABLET BY MOUTH TWO  TIMES DAILY (Patient taking differently: daily Do not crush or break.), Disp: 180 tablet, Rfl: 3    rivaroxaban (XARELTO) 15 MG TABS tablet, Take 1 tablet by mouth Daily with supper, Disp: 90 tablet, Rfl: 3    ferrous sulfate 325 (65 Fe) MG tablet, Take 325 mg by mouth daily (with breakfast), Disp: , Rfl:     docusate sodium (COLACE) 100 MG capsule, Take 100 mg by mouth daily, Disp: , Rfl:     bumetanide (BUMEX) 0.5 MG tablet, TAKE 1 TABLET BY MOUTH  DAILY, Disp: 90 tablet, Rfl: 3    atorvastatin (LIPITOR) 40 MG tablet, TAKE 1 TABLET BY MOUTH  NIGHTLY, Disp: 90 tablet, Rfl: 3    venlafaxine (EFFEXOR XR) 75 MG extended release capsule, TAKE 1 CAPSULE BY MOUTH  DAILY, Disp: 90 capsule, Rfl: 3    digoxin (LANOXIN) 125 MCG tablet, TAKE 1 TABLET BY MOUTH  DAILY, Disp: 90 tablet, Rfl: 3    sucralfate (CARAFATE) 1 GM tablet, Take 1 tablet by mouth 2 times daily, Disp: 180 tablet, Rfl: 3    metoprolol tartrate (LOPRESSOR) 50 MG tablet, Take 1 tablet by mouth 2 times daily, Disp: 180 tablet, Rfl: 3    potassium chloride (KLOR-CON M) 20 MEQ extended release tablet, Take 1 tablet by mouth daily, Disp: 90 tablet, Rfl: 3    nitroGLYCERIN (NITROSTAT) 0.4 MG SL tablet, up to max of 3 total doses.  If no relief after 1 dose, call 911., Disp: 25 tablet, Rfl: 3    Magnesium Oxide 250 MG TABS, Take 1 tablet by mouth daily, Disp: 30 tablet, Rfl: 1    NONFORMULARY, Perils probiotic  1 daily, Disp: , Rfl:     Cyanocobalamin (VITAMIN B 12 PO), Take by mouth every other day , Disp: , Rfl:     aspirin 81 MG chewable tablet, Take 1 tablet by mouth daily, Disp: 30 tablet, Rfl: 3    vitamin D (CHOLECALCIFEROL) 1000 UNIT TABS tablet, Take 1,000 Units by mouth daily, Disp: , Rfl:     Calcium-Vitamin D-Vitamin K 500-500-40 MG-UNT-MCG CHEW, Take 1 tablet by mouth daily, Disp: , Rfl:     acetaminophen (TYLENOL) 500 MG tablet,  Take 1,000 mg by mouth 2 times daily , Disp: , Rfl:     Biotin 1000 MCG TABS, Take 1 tablet by mouth daily , Disp: , Rfl:     Past Medical History  Erick Naidu  has a past medical history of Atrial fibrillation ([address] Blood circulation, collateral, [employer] single pacemaker 4/26/2016, CAD (coronary artery disease), Cancer ([facility] [address] Carpal tunnel syndrome, Fracture dislocation of ankle, GERD (gastroesophageal reflux disease), Hyperlipidemia, Hypertension, Kidney lesion, native, right, Osteoarthritis, Osteopenia, Prolonged emergence from general anesthesia, Thyroid goiter, and Yersiniosis. Social History  Erick Naidu  reports that she has never smoked. She has never used smokeless tobacco. She reports that she does not drink alcohol or use drugs. Family History  Erick Naidu family history includes Heart Disease in her brother, father, mother, and son; High Blood Pressure in her brother, father, and mother.     Past Surgical History   Past Surgical History:   Procedure Laterality Date    ABDOMEN SURGERY      ANKLE FRACTURE SURGERY  9115--1657    reconstruction in 2003 and 2007    APPENDECTOMY      BLADDER SURGERY      support bladder repair    CARDIAC SURGERY      heart stent 12-11-18, [place]    CARPAL TUNNEL RELEASE  7/2013    CARPAL TUNNEL RELEASE Right 08/19/2017    Revision    CATARACT REMOVAL Bilateral     CHOLECYSTECTOMY  1986    COLON SURGERY  1954    COLONOSCOPY      ENDOSCOPY, COLON, DIAGNOSTIC      EYE SURGERY      cataract     FRACTURE SURGERY      HYSTERECTOMY  1971    JOINT REPLACEMENT  1998    L knee    KNEE SURGERY Left     total replacement    PACEMAKER PLACEMENT      PTCA  01/15/2019    Successful PCI / Drug Eluting Stent of the proximal Left Anterior Descending Coronary Artery.  UPPER GASTROINTESTINAL ENDOSCOPY Left 11/28/2018    EGD BIOPSY performed by Vitaly Hackett MD at [facility] ENDOSCOPY  11/28/2018    EGD CONTROL HEMORRHAGE performed by Vitaly Hackett MD at [facility]       Subjective:     REVIEW OF SYSTEMS  Constitutional: denies sweats, chills and fever  HENT: denies  congestion, sinus pressure, sneezing and sore throat. Eyes: denies  pain, discharge, redness and itching. Respiratory: denies apnea, cough  Gastrointestinal: denies blood in stool, constipation, diarrhea   Endocrine: denies cold intolerance, heat intolerance, polydipsia. Genitourinary: denies dysuria, enuresis, flank pain and hematuria. Musculoskeletal: denies arthralgias, joint swelling and neck pain. Neurological: denies numbness and headaches. Psychiatric/Behavioral: denies agitation, confusion, decreased concentration and dysphoric mood    All others reviewed and are negative. Objective:     BP (!) 152/78   Pulse 64   Ht 5' 2\" (1.575 m)   Wt 176 lb 6.4 oz (80 kg)   BMI 32.26 kg/m²     Wt Readings from Last 3 Encounters:   02/05/20 176 lb 6.4 oz (80 kg)   12/10/19 175 lb (79.4 kg)   11/21/19 176 lb (79.8 kg)     BP Readings from Last 3 Encounters:   02/05/20 (!) 152/78   12/10/19 128/74   11/21/19 118/64       PHYSICAL EXAM  Constitutional: Oriented to person, place, and time. Appears well-developed and well-nourished. HENT:   Head: Normocephalic and atraumatic. Eyes: EOM are normal. Pupils are equal, round, and reactive to light. Neck: Normal range of motion. Neck supple.  No JVD present. Cardiovascular: Normal rate , normal heart sounds and intact distal pulses. Pulmonary/Chest: Effort normal and breath sounds normal. No respiratory distress. No wheezes. No rales. Abdominal: Soft. Bowel sounds are normal. No distension. There is no tenderness. Musculoskeletal: Normal range of motion. No edema. Neurological: Alert and oriented to person, place, and time. No cranial nerve deficit. Coordination normal.   Skin: Skin is warm and dry. Psychiatric: Normal mood and affect.        No results found for: CKTOTAL, CKMB, CKMBINDEX    Lab Results   Component Value Date    WBC 5.7 12/02/2019    RBC 4.26 12/02/2019    RBC 4.17 08/30/2011    HGB 14.8 12/02/2019    HCT 44.4 12/02/2019    .2 12/02/2019    MCH 34.7 12/02/2019    MCHC 33.3 12/02/2019    RDW 14.5 09/14/2017     12/02/2019    MPV 11.2 12/02/2019       Lab Results   Component Value Date     12/02/2019    K 4.0 12/02/2019    K 3.7 05/04/2019    CL 97 12/02/2019    CO2 28 12/02/2019    BUN 14 12/02/2019    LABALBU 4.1 12/02/2019    LABALBU 4.2 08/30/2011    CREATININE 0.6 12/02/2019    CALCIUM 9.7 12/02/2019    LABGLOM >90 12/02/2019    GLUCOSE 169 12/02/2019    GLUCOSE 116 08/30/2011       Lab Results   Component Value Date    ALKPHOS 49 12/02/2019    ALT 15 12/02/2019    AST 21 12/02/2019    PROT 6.9 12/02/2019    BILITOT 3.2 12/02/2019    BILIDIR 0.3 05/04/2019    LABALBU 4.1 12/02/2019    LABALBU 4.2 08/30/2011       Lab Results   Component Value Date    MG 1.7 05/03/2019       Lab Results   Component Value Date    INR 0.98 01/15/2019    INR 1.15 (H) 12/20/2018    INR 1.06 12/11/2018         Lab Results   Component Value Date    LABA1C 6.4 11/01/2018       Lab Results   Component Value Date    TRIG 145 12/04/2018    HDL 36 12/04/2018    LDLCALC 62 12/04/2018       Lab Results   Component Value Date    TSH 3.520 12/02/2019         Testing Reviewed:      I haveindividually reviewed the below cardiac Shady Rubin MD (Interpreting   physician) on 11/26/2018 at 04:17 PM   ----------------------------------------------------------------      Findings      Mitral Valve   Mild calcification of the posterior leaflet of the mitral valve. Mild mitral regurgitation is present. Aortic Valve   The aortic valve appears to be trileaflet with good leaflet separation. Aortic valve leaflets are mildly calcified. Mild aortic regurgitation is noted. Tricuspid Valve   Tricuspid valve is structurally normal.   Mild tricuspid regurgitation. Pulmonic Valve   The pulmonic valve was not well visualized . Left Atrium   Mildly dilated left atrium. Left Ventricle   Left ventricle size and systolic function is normal.   Normal left ventricular wall thickness. Ejection fraction is visually estimated at 55-60%. Right Atrium   The right atrium is of normal size. Right Ventricle   Normal right ventricular size and function. Pericardial Effusion   No evidence of any pericardial effusion. Aorta / Great Vessels   Aorta was not clearly visualized.    IVC is normal in size with normal respiratory phasic changes     M-Mode/2D Measurements & Calculations      LV Diastolic    LV Systolic Dimension: 3  AV Cusp Separation: 2.1 cmLA   Dimension: 4.2  cm                        Dimension: 3 cmAO Root   cm              LV Volume Diastolic: 05.2 Dimension: 3.3 cmLA Area: 20.7   LV FS:28.6 %    ml                        cm^2   LV PW           LV Volume Systolic: 35 ml   Diastolic: 0.8  LV EDV/LV EDV Index: 78.6   cm              ml/41 m^2LV ESV/LV ESV   Septum          Index: 35 ml/18 m^2       RV Diastolic Dimension: 2.2 cm   Diastolic: 0.9  EF Calculated: 55.5 %   cm                                        LA/Aorta: 0.91                                                LA volume/Index: 57.9 ml /30m^2     Doppler Measurements & Calculations      MV Peak E-Wave: 116 cm/s AV Peak Velocity: 163   LVOT Peak

## 2020-03-30 ENCOUNTER — VIRTUAL VISIT (OUTPATIENT)
Dept: FAMILY MEDICINE CLINIC | Age: 85
End: 2020-03-30
Payer: MEDICARE

## 2020-03-30 VITALS — DIASTOLIC BLOOD PRESSURE: 64 MMHG | SYSTOLIC BLOOD PRESSURE: 121 MMHG | HEART RATE: 61 BPM

## 2020-03-30 PROCEDURE — 1123F ACP DISCUSS/DSCN MKR DOCD: CPT | Performed by: FAMILY MEDICINE

## 2020-03-30 PROCEDURE — 4040F PNEUMOC VAC/ADMIN/RCVD: CPT | Performed by: FAMILY MEDICINE

## 2020-03-30 PROCEDURE — G0438 PPPS, INITIAL VISIT: HCPCS | Performed by: FAMILY MEDICINE

## 2020-03-30 RX ORDER — ROPINIROLE 0.5 MG/1
0.5 TABLET, FILM COATED ORAL NIGHTLY
Qty: 30 TABLET | Refills: 3 | Status: SHIPPED | OUTPATIENT
Start: 2020-03-30 | End: 2020-05-21

## 2020-03-30 ASSESSMENT — LIFESTYLE VARIABLES: HOW OFTEN DO YOU HAVE A DRINK CONTAINING ALCOHOL: 0

## 2020-03-30 ASSESSMENT — PATIENT HEALTH QUESTIONNAIRE - PHQ9
SUM OF ALL RESPONSES TO PHQ QUESTIONS 1-9: 0
SUM OF ALL RESPONSES TO PHQ QUESTIONS 1-9: 0

## 2020-03-30 NOTE — PROGRESS NOTES
Kierra Tello) 12/30/2014    Pneumococcal Polysaccharide ([id number]) 04/28/2017    Td vaccine (adult) 10/27/2005    Zoster Live (Zostavax) 08/06/2019    Zoster Recombinant (Shingrix) 05/22/2019, 08/06/2019        Health Maintenance   Topic Date Due    Annual Wellness Visit (AWV)  05/29/2019    Lipid screen  12/04/2019    Shingles Vaccine (2 of 2) 12/10/2020 (Originally 10/1/2019)    DTaP/Tdap/Td vaccine (1 - Tdap) 09/06/2021 (Originally 4/4/1947)    Potassium monitoring  12/02/2020    Creatinine monitoring  12/02/2020    Flu vaccine  Completed    Pneumococcal 65+ years Vaccine  Completed    Hepatitis A vaccine  Aged Out    Hepatitis B vaccine  Aged Out    Hib vaccine  Aged Out    Meningococcal (ACWY) vaccine  Aged Out     Recommendations for [place] Due: see orders and patient instructions/AVS.  . Recommended screening schedule for the next 5-10 years is provided to the patient in written form: see Patient Instructions/AVS.    Kristyn Arreaga was seen today for medicare awv. Diagnoses and all orders for this visit:    Essential hypertension    PVD (peripheral vascular disease) ([address]    Paresthesia of both lower extremities    Pure hypercholesterolemia    Atrial fibrillation with rapid ventricular response (HCC)    Multiple thyroid nodules    Sick sinus syndrome (HCC)    Iron deficiency anemia due to chronic blood loss    Gastroesophageal reflux disease without esophagitis    Night sweats  -     rOPINIRole (REQUIP) 0.5 MG tablet; Take 1 tablet by mouth nightly    Coronary artery disease involving native heart with angina pectoris, unspecified vessel or lesion type (HCC)    Recurrent major depressive disorder, in full remission ([address]    Primary osteoarthritis involving multiple joints    Routine general medical examination at a health care facility          Kristyn Arreaga was seen today for medicare awv.     Diagnoses and all orders for this visit:    Essential hypertension    PVD (peripheral vascular

## 2020-04-06 ENCOUNTER — TELEPHONE (OUTPATIENT)
Dept: ADMINISTRATIVE | Age: 85
End: 2020-04-06

## 2020-04-06 NOTE — TELEPHONE ENCOUNTER
Pts daughter [facility] called and she refilled her moms scripts and when she got them she put pills together in one bottle. She does not have the Xarelto. She threw it away with the bottles and trash was picked up last week. She cannot refill this until May. Please advise her.

## 2020-05-15 RX ORDER — SUCRALFATE 1 G/1
TABLET ORAL
Qty: 180 TABLET | Refills: 3 | Status: ON HOLD | OUTPATIENT
Start: 2020-05-15 | End: 2021-01-29 | Stop reason: HOSPADM

## 2020-05-15 RX ORDER — METOPROLOL TARTRATE 50 MG/1
TABLET, FILM COATED ORAL
Qty: 180 TABLET | Refills: 3 | Status: SHIPPED | OUTPATIENT
Start: 2020-05-15 | End: 2020-06-17 | Stop reason: SDUPTHER

## 2020-05-21 ENCOUNTER — OFFICE VISIT (OUTPATIENT)
Dept: CARDIOLOGY CLINIC | Age: 85
End: 2020-05-21
Payer: MEDICARE

## 2020-05-21 VITALS
OXYGEN SATURATION: 95 % | BODY MASS INDEX: 32.2 KG/M2 | HEART RATE: 66 BPM | WEIGHT: 175 LBS | SYSTOLIC BLOOD PRESSURE: 118 MMHG | DIASTOLIC BLOOD PRESSURE: 72 MMHG | HEIGHT: 62 IN

## 2020-05-21 PROCEDURE — G8417 CALC BMI ABV UP PARAM F/U: HCPCS | Performed by: NURSE PRACTITIONER

## 2020-05-21 PROCEDURE — 1123F ACP DISCUSS/DSCN MKR DOCD: CPT | Performed by: NURSE PRACTITIONER

## 2020-05-21 PROCEDURE — 1036F TOBACCO NON-USER: CPT | Performed by: NURSE PRACTITIONER

## 2020-05-21 PROCEDURE — 99213 OFFICE O/P EST LOW 20 MIN: CPT | Performed by: NURSE PRACTITIONER

## 2020-05-21 PROCEDURE — G8427 DOCREV CUR MEDS BY ELIG CLIN: HCPCS | Performed by: NURSE PRACTITIONER

## 2020-05-21 PROCEDURE — 4040F PNEUMOC VAC/ADMIN/RCVD: CPT | Performed by: NURSE PRACTITIONER

## 2020-05-21 PROCEDURE — 1090F PRES/ABSN URINE INCON ASSESS: CPT | Performed by: NURSE PRACTITIONER

## 2020-05-21 ASSESSMENT — ENCOUNTER SYMPTOMS
NAUSEA: 0
CHEST TIGHTNESS: 0
COUGH: 0
COLOR CHANGE: 0
WHEEZING: 0
SHORTNESS OF BREATH: 1
ABDOMINAL DISTENTION: 0
ABDOMINAL PAIN: 0
APNEA: 0

## 2020-05-21 NOTE — PROGRESS NOTES
[place]       Visit Date: 5/21/2020  Cardiologist:  Dr. Neelima Herring  Primary Care Physician: Dr. Michelle Fam MD    Bijal Diaz is a 80 y.o. female who presents today for:  Chief Complaint   Patient presents with    Congestive Heart Failure       HPI: Obtained from patient and chart    Bijal Diaz is a 80 y.o. female who presents to the office for a follow up visit in the heart failure clinic. Hx A fib, PPM, CAD PCI stent, HTN, HFpEF 55-60%. She denies any swelling, bloating. Her main compliant is worsening SOB with ADLs, any activity although her daughter states Joyce Sharma does not do much. \"        Accompanied by: daughter  Last hospital admission related to Heart Failure:  Dec 2018  Chest Pain: no  Worsening SOB: yes  Worsening Orthopnea/PND: no  Edema: no  Any extra diuretic use: no  Weight gain: no  Fatigue: yes  Abdominal bloating: no  Appetite: good  PIPPA: no  Cough: no  Compliant checking home weight: yes  Compliant checking blood pressure: yes      Past Medical History:   Diagnosis Date    Atrial fibrillation ([address]     Blood circulation, collateral     [employer] single pacemaker 4/26/2016 5/5/2016    CAD (coronary artery disease)     cath and stent in right artery    Cancer [facility]) 1954    HX  Colon     Carpal tunnel syndrome     Fracture dislocation of ankle 1980    GERD (gastroesophageal reflux disease)     Hyperlipidemia     Hypertension     Kidney lesion, native, right     found on 5/2016    Osteoarthritis     knees    Osteopenia     Prolonged emergence from general anesthesia     Thyroid goiter 9/6/2016    Yersiniosis 2016     Past Surgical History:   Procedure Laterality Date    ABDOMEN SURGERY      ANKLE FRACTURE SURGERY  8745--0194    reconstruction in 2003 and 2007    APPENDECTOMY      BLADDER SURGERY      support bladder repair    CARDIAC SURGERY      heart stent 12-11-18, [place]    CARPAL TUNNEL RELEASE  7/2013    CARPAL TUNNEL present. Mild tricuspid regurgitation. Signature      ----------------------------------------------------------------   Electronically signed by Kaitlin Chase MD (Interpreting   physician) on 11/26/2018 at 04:17 PM   ----------------------------------------------------------------      Findings      Mitral Valve   Mild calcification of the posterior leaflet of the mitral valve. Mild mitral regurgitation is present. Aortic Valve   The aortic valve appears to be trileaflet with good leaflet separation. Aortic valve leaflets are mildly calcified. Mild aortic regurgitation is noted. Tricuspid Valve   Tricuspid valve is structurally normal.   Mild tricuspid regurgitation. Pulmonic Valve   The pulmonic valve was not well visualized . Left Atrium   Mildly dilated left atrium. Left Ventricle   Left ventricle size and systolic function is normal.   Normal left ventricular wall thickness. Ejection fraction is visually estimated at 55-60%. Right Atrium   The right atrium is of normal size. Right Ventricle   Normal right ventricular size and function. Pericardial Effusion   No evidence of any pericardial effusion. Aorta / Great Vessels   Aorta was not clearly visualized.    IVC is normal in size with normal respiratory phasic changes         Results reviewed:  BNP:   Lab Results   Component Value Date    PROBNP 1482.0 05/04/2019     CBC:   Lab Results   Component Value Date    WBC 5.7 12/02/2019    RBC 4.26 12/02/2019    RBC 4.17 08/30/2011    HGB 14.8 12/02/2019    HCT 44.4 12/02/2019     12/02/2019     CMP:    Lab Results   Component Value Date     12/02/2019    K 4.0 12/02/2019    K 3.7 05/04/2019    CL 97 12/02/2019    CO2 28 12/02/2019    BUN 14 12/02/2019    CREATININE 0.6 12/02/2019    LABGLOM >90 12/02/2019    GLUCOSE 169 12/02/2019    GLUCOSE 116 08/30/2011    CALCIUM 9.7 12/02/2019     Hepatic Function Panel:    Lab Results   Component Value abdomen   Fatigue    Decreased appetite or feeling \"full\"   Nausea    Confusion      Return in about 6 months (around 11/21/2020). or sooner if needed     Patient given educational materials - see patient instructions. We discussed the importance of weighing oneself and recording daily. We also discussed the importance of a low sodium diet, higher sodium foods to avoid and appropriate low sodium food choices. Discussed use, benefit, and side effects of prescribed medications. All patient questions answered. Patient verbalizes understanding of plan of care using teach back method, and is agreeable to the treatment plan.        Electronicallysigned by NOELLE Weaver CNP on 5/21/2020 at 1:24 PM

## 2020-06-10 ENCOUNTER — HOSPITAL ENCOUNTER (OUTPATIENT)
Age: 85
Discharge: HOME | End: 2020-06-10
Payer: MEDICARE

## 2020-06-10 DIAGNOSIS — D50.0 IRON DEFICIENCY ANEMIA DUE TO CHRONIC BLOOD LOSS: ICD-10-CM

## 2020-06-10 DIAGNOSIS — I50.32 CHF (CONGESTIVE HEART FAILURE), NYHA CLASS II, CHRONIC, DIASTOLIC (HCC): ICD-10-CM

## 2020-06-10 LAB
ANION GAP SERPL CALCULATED.3IONS-SCNC: 10 MEQ/L (ref 8–16)
BUN BLDV-MCNC: 16 MG/DL (ref 7–22)
CALCIUM SERPL-MCNC: 9.6 MG/DL (ref 8.5–10.5)
CHLORIDE BLD-SCNC: 100 MEQ/L (ref 98–111)
CO2: 30 MEQ/L (ref 23–33)
CREAT SERPL-MCNC: 0.7 MG/DL (ref 0.4–1.2)
ERYTHROCYTE [DISTWIDTH] IN BLOOD BY AUTOMATED COUNT: 13.2 % (ref 11.5–14.5)
ERYTHROCYTE [DISTWIDTH] IN BLOOD BY AUTOMATED COUNT: 53.1 FL (ref 35–45)
GFR SERPL CREATININE-BSD FRML MDRD: 78 ML/MIN/1.73M2
GLUCOSE BLD-MCNC: 208 MG/DL (ref 70–108)
HCT VFR BLD CALC: 43.8 % (ref 37–47)
HEMOGLOBIN: 14.3 GM/DL (ref 12–16)
MCH RBC QN AUTO: 35.6 PG (ref 26–33)
MCHC RBC AUTO-ENTMCNC: 32.6 GM/DL (ref 32.2–35.5)
MCV RBC AUTO: 109 FL (ref 81–99)
PLATELET # BLD: 136 THOU/MM3 (ref 130–400)
PMV BLD AUTO: 11.5 FL (ref 9.4–12.4)
POTASSIUM SERPL-SCNC: 4.2 MEQ/L (ref 3.5–5.2)
RBC # BLD: 4.02 MILL/MM3 (ref 4.2–5.4)
SODIUM BLD-SCNC: 140 MEQ/L (ref 135–145)
WBC # BLD: 5.9 THOU/MM3 (ref 4.8–10.8)

## 2020-06-10 PROCEDURE — 36415 COLL VENOUS BLD VENIPUNCTURE: CPT

## 2020-06-10 PROCEDURE — 83550 IRON BINDING TEST: CPT

## 2020-06-10 PROCEDURE — 85027 COMPLETE CBC AUTOMATED: CPT

## 2020-06-10 PROCEDURE — 83540 ASSAY OF IRON: CPT

## 2020-06-10 PROCEDURE — 82728 ASSAY OF FERRITIN: CPT

## 2020-06-10 PROCEDURE — 80048 BASIC METABOLIC PNL TOTAL CA: CPT

## 2020-06-11 ENCOUNTER — TELEPHONE (OUTPATIENT)
Dept: CARDIOLOGY CLINIC | Age: 85
End: 2020-06-11

## 2020-06-11 LAB
FERRITIN: 93 NG/ML (ref 10–291)
IRON: 151 UG/DL (ref 50–170)
TOTAL IRON BINDING CAPACITY: 304 UG/DL (ref 171–450)

## 2020-06-17 ENCOUNTER — OFFICE VISIT (OUTPATIENT)
Dept: FAMILY MEDICINE CLINIC | Age: 85
End: 2020-06-17
Payer: MEDICARE

## 2020-06-17 VITALS
DIASTOLIC BLOOD PRESSURE: 56 MMHG | SYSTOLIC BLOOD PRESSURE: 110 MMHG | BODY MASS INDEX: 31.46 KG/M2 | HEART RATE: 88 BPM | RESPIRATION RATE: 14 BRPM | WEIGHT: 172 LBS | TEMPERATURE: 97.7 F

## 2020-06-17 PROCEDURE — 1036F TOBACCO NON-USER: CPT | Performed by: FAMILY MEDICINE

## 2020-06-17 PROCEDURE — 1123F ACP DISCUSS/DSCN MKR DOCD: CPT | Performed by: FAMILY MEDICINE

## 2020-06-17 PROCEDURE — G8417 CALC BMI ABV UP PARAM F/U: HCPCS | Performed by: FAMILY MEDICINE

## 2020-06-17 PROCEDURE — 4040F PNEUMOC VAC/ADMIN/RCVD: CPT | Performed by: FAMILY MEDICINE

## 2020-06-17 PROCEDURE — G8427 DOCREV CUR MEDS BY ELIG CLIN: HCPCS | Performed by: FAMILY MEDICINE

## 2020-06-17 PROCEDURE — 99214 OFFICE O/P EST MOD 30 MIN: CPT | Performed by: FAMILY MEDICINE

## 2020-06-17 PROCEDURE — 1090F PRES/ABSN URINE INCON ASSESS: CPT | Performed by: FAMILY MEDICINE

## 2020-06-17 RX ORDER — ATORVASTATIN CALCIUM 40 MG/1
40 TABLET, FILM COATED ORAL NIGHTLY
Qty: 90 TABLET | Refills: 3 | Status: SHIPPED | OUTPATIENT
Start: 2020-06-17 | End: 2021-06-02

## 2020-06-17 RX ORDER — DIGOXIN 125 MCG
125 TABLET ORAL DAILY
Qty: 90 TABLET | Refills: 3 | Status: SHIPPED | OUTPATIENT
Start: 2020-06-17 | End: 2021-03-11 | Stop reason: SDUPTHER

## 2020-06-17 RX ORDER — VENLAFAXINE HYDROCHLORIDE 75 MG/1
75 CAPSULE, EXTENDED RELEASE ORAL DAILY
Qty: 90 CAPSULE | Refills: 3 | Status: SHIPPED | OUTPATIENT
Start: 2020-06-17 | End: 2021-06-16 | Stop reason: SDUPTHER

## 2020-06-17 RX ORDER — BUMETANIDE 0.5 MG/1
0.5 TABLET ORAL DAILY
Qty: 90 TABLET | Refills: 3 | Status: SHIPPED | OUTPATIENT
Start: 2020-06-17 | End: 2021-06-16 | Stop reason: SDUPTHER

## 2020-06-17 NOTE — PROGRESS NOTES
hematochezia, hematemesis, and coffee ground emesis. Medical therapy in the past has included proton pump inhibitors. Depression: Patient complains of depression. She complains of depressed mood, difficulty concentrating, psychomotor retardation and recurrent thoughts of death. Onset was approximately 5 years ago, gradually worsening since that time. She denies current suicidal and homicidal plan or intent. Family history significant for no psychiatric illness. Possible organic causes contributing are: none. Risk factors: negative life event aging and becoming less active and previous episode of depression Previous treatment includes no medication and none and psych therapy. She complains of the following side effects from the treatment: none. Osteoarthritis primary in multiple joints is not controlled on current medications. However, she does very little exercise at home and refuses PT. This is leading to unsteady gait and slowly limiting her ability to do ADLs. She had SOB with exertion. She is talking to her family about AL living but her daughter is helping and lives in the same [facility] building. A fib is rate controlled and taking xarelto. Vitamin b12 deficiency needs rechecked. Night sweats are unchanged. thyroid goiter in the past needs recheck of ultrasound last done in 4/2019 shows stable nodules and 2 new. One is recommended to have a biopsy. The night sweats are leading to her not feeling rested in the morning. Constipation comes and goes. Better off of iron but still needing colace. Not exercising or moving much at home. She is having right arm pain that radiates to the right neck and shoulder on and off for the past year. She is ging to the chiropractor and having massages and using tylenol but she is afraid to take this daily. Reviewed chart forpast medical history , surgical history , allergies, social history , family history and medications.     Health Maintenance   Topic Date Due    Lipid screen  12/04/2019    Shingles Vaccine (2 of 2) 12/10/2020 (Originally 10/1/2019)    DTaP/Tdap/Td vaccine (1 - Tdap) 09/06/2021 (Originally 4/4/1947)    Annual Wellness Visit (AWV)  04/09/2021    Potassium monitoring  06/10/2021    Creatinine monitoring  06/10/2021    Flu vaccine  Completed    Pneumococcal 65+ years Vaccine  Completed    Hepatitis A vaccine  Aged Out    Hepatitis B vaccine  Aged Out    Hib vaccine  Aged Out    Meningococcal (ACWY) vaccine  Aged Out       Subjective:      Constitutional:Negative for fever, chills, diaphoresis, activity change, appetite change and fatigue. HENT: Negative for hearing loss, ear pain, congestion, sore throat, rhinorrhea, postnasal drip and ear discharge. Eyes: Negative for photophobia and visual disturbance. Respiratory: Negative for cough, chest tightness, shortness of breath and wheezing. Cardiovascular: Negative for chest pain and leg swelling. Gastrointestinal: Negative for nausea, vomiting, abdominal pain, diarrhea and constipation. Genitourinary: Negative for dysuria, urgency and frequency. Neurological: Negative for weakness, light-headedness and headaches. Psychiatric/Behavioral: Negative for sleep disturbance. Objective:     Vitals:    06/17/20 1307   BP: (!) 110/56   Site: Left Lower Arm   Position: Sitting   Cuff Size: Large Adult   Pulse: 88   Resp: 14   Temp: 97.7 °F (36.5 °C)   TempSrc: Temporal   Weight: 172 lb (78 kg)     Wt Readings from Last 3 Encounters:   06/17/20 172 lb (78 kg)   05/21/20 175 lb (79.4 kg)   02/05/20 176 lb 6.4 oz (80 kg)       Physical Exam  Physical Exam   Constitutional: Vital signs are normal. She appears well-developed and well-nourished. She is active. HENT:   Head: Normocephalic and atraumatic. Right Ear: Tympanic membrane, external ear and ear canal normal. No drainage or tenderness.    Left Ear: Tympanic membrane, external ear and ear canal normal. No drainage or tenderness. Nose: Nose normal. No mucosal edema or rhinorrhea. Mouth/Throat: Uvula is midline, oropharynx is clear and moist and mucous membranes are normal. Mucous membranes are not pale. Normal dentition. No posterior oropharyngeal edema or posterior oropharyngeal erythema. Eyes: Lids are normal. Right eye exhibits no chemosis and no discharge. Left eye exhibits no chemosis and no drainage. Right conjunctiva has no hemorrhage. Left conjunctiva has no hemorrhage. Right eye exhibits normal extraocular motion. Left eye exhibits normal extraocular motion. Right pupil is round and reactive. Left pupil is round and reactive. Pupils are equal.   Cardiovascular: Normal rate, regular rhythm, S1 normal, S2 normal and normal heart sounds. Exam reveals no gallop. No murmur heard. Pulmonary/Chest: Effort normal and breath sounds normal. No respiratory distress. She has no wheezes. She has no rhonchi. She has no rales. Abdominal: Soft. Normal appearance and bowel sounds are normal. She exhibits no distension and no mass. There is no hepatosplenomegaly. No tenderness. She has no rigidity, no rebound and no guarding. No hernia. Musculoskeletal:        Right lower leg: She exhibits no edema. Left lower leg: She exhibits no edema. Neurological: She is alert. Assessment/Plan   Charity Garcia was seen today for follow-up and arm pain. Diagnoses and all orders for this visit:    Unsteady gait  -     Amb External Referral To Home Health    Pure hypercholesterolemia  -     atorvastatin (LIPITOR) 40 MG tablet; Take 1 tablet by mouth nightly    Acute on chronic congestive heart failure with left ventricular diastolic dysfunction (HCC)  -     bumetanide (BUMEX) 0.5 MG tablet; Take 1 tablet by mouth daily  -     Comprehensive Metabolic Panel; Future    Atrial fibrillation with rapid ventricular response (HCC)  -     digoxin (LANOXIN) 125 MCG tablet;  Take 1 tablet by mouth daily  -     rivaroxaban

## 2020-06-18 NOTE — PROGRESS NOTES
Daughter Giovanni Naranjo returned call and would like to hold off on rechecking thyroid ultrasound at this time

## 2020-08-05 ENCOUNTER — OFFICE VISIT (OUTPATIENT)
Dept: CARDIOLOGY CLINIC | Age: 85
End: 2020-08-05
Payer: MEDICARE

## 2020-08-05 VITALS — HEART RATE: 60 BPM | DIASTOLIC BLOOD PRESSURE: 71 MMHG | SYSTOLIC BLOOD PRESSURE: 145 MMHG

## 2020-08-05 PROCEDURE — G8427 DOCREV CUR MEDS BY ELIG CLIN: HCPCS | Performed by: INTERNAL MEDICINE

## 2020-08-05 PROCEDURE — 99214 OFFICE O/P EST MOD 30 MIN: CPT | Performed by: INTERNAL MEDICINE

## 2020-08-05 PROCEDURE — 1036F TOBACCO NON-USER: CPT | Performed by: INTERNAL MEDICINE

## 2020-08-05 PROCEDURE — 1090F PRES/ABSN URINE INCON ASSESS: CPT | Performed by: INTERNAL MEDICINE

## 2020-08-05 PROCEDURE — 4040F PNEUMOC VAC/ADMIN/RCVD: CPT | Performed by: INTERNAL MEDICINE

## 2020-08-05 PROCEDURE — 93000 ELECTROCARDIOGRAM COMPLETE: CPT | Performed by: INTERNAL MEDICINE

## 2020-08-05 PROCEDURE — 1123F ACP DISCUSS/DSCN MKR DOCD: CPT | Performed by: INTERNAL MEDICINE

## 2020-08-05 PROCEDURE — G8417 CALC BMI ABV UP PARAM F/U: HCPCS | Performed by: INTERNAL MEDICINE

## 2020-08-05 RX ORDER — PANTOPRAZOLE SODIUM 40 MG/1
40 TABLET, DELAYED RELEASE ORAL DAILY
COMMUNITY
End: 2021-06-16 | Stop reason: SDUPTHER

## 2020-08-05 NOTE — PROGRESS NOTES
[address] [place] [address] [facility] [address]  Dept: [phone]  Dept Fax: [phone]  Loc: [phone]    Visit Date: 8/5/2020    Ms. Bonnie Ellison is a 80 y.o. female  who presented for:  Chief Complaint   Patient presents with    Follow-up     6 month CAD due to lipid rich plaque       HPI:   79 yo F c hx of Afib on Xarelto, HTN, HLD, CAD s/p PCI is here for a follow up.    Denies any chest pain, sob, palpitations, lightheadedness, dizziness, orthopnea, PND or pedal edema. Current Outpatient Medications:     pantoprazole (PROTONIX) 40 MG tablet, Take 40 mg by mouth daily, Disp: , Rfl:     digoxin (LANOXIN) 125 MCG tablet, Take 1 tablet by mouth daily, Disp: 90 tablet, Rfl: 3    venlafaxine (EFFEXOR XR) 75 MG extended release capsule, Take 1 capsule by mouth daily, Disp: 90 capsule, Rfl: 3    atorvastatin (LIPITOR) 40 MG tablet, Take 1 tablet by mouth nightly, Disp: 90 tablet, Rfl: 3    bumetanide (BUMEX) 0.5 MG tablet, Take 1 tablet by mouth daily, Disp: 90 tablet, Rfl: 3    rivaroxaban (XARELTO) 15 MG TABS tablet, Take 1 tablet by mouth Daily with supper, Disp: 90 tablet, Rfl: 3    potassium chloride (KLOR-CON M) 20 MEQ extended release tablet, Take 1 tablet by mouth once daily, Disp: 90 tablet, Rfl: 3    sucralfate (CARAFATE) 1 GM tablet, TAKE 1 TABLET BY MOUTH TWO  TIMES DAILY, Disp: 180 tablet, Rfl: 3    ferrous sulfate 325 (65 Fe) MG tablet, Take 325 mg by mouth every other day , Disp: , Rfl:     docusate sodium (COLACE) 100 MG capsule, Take 100 mg by mouth daily, Disp: , Rfl:     nitroGLYCERIN (NITROSTAT) 0.4 MG SL tablet, up to max of 3 total doses.  If no relief after 1 dose, call 911., Disp: 25 tablet, Rfl: 3    Magnesium Oxide 250 MG TABS, Take 1 tablet by mouth daily, Disp: 30 tablet, Rfl: 1    NONFORMULARY, Perils probiotic  1 daily, Disp: , Rfl:     Cyanocobalamin (VITAMIN B 12 PO), Take by mouth every other day , Disp: , Rfl:     aspirin 81 MG chewable tablet, Take 1 tablet by mouth daily, Disp: 30 tablet, Rfl: 3    vitamin D (CHOLECALCIFEROL) 1000 UNIT TABS tablet, Take 1,000 Units by mouth daily, Disp: , Rfl:     Calcium-Vitamin D-Vitamin K 500-500-40 MG-UNT-MCG CHEW, Take 1 tablet by mouth daily, Disp: , Rfl:     acetaminophen (TYLENOL) 500 MG tablet, Take 1,000 mg by mouth 3 times daily , Disp: , Rfl:     Biotin 1000 MCG TABS, Take 1 tablet by mouth daily , Disp: , Rfl:     Past Medical History  Maik Butler  has a past medical history of Atrial fibrillation ([facility] [address] Blood circulation, collateral, [employer] single pacemaker 4/26/2016, CAD (coronary artery disease), Cancer ([facility] [address] Carpal tunnel syndrome, Fracture dislocation of ankle, GERD (gastroesophageal reflux disease), Hyperlipidemia, Hypertension, Kidney lesion, native, right, Osteoarthritis, Osteopenia, Prolonged emergence from general anesthesia, Thyroid goiter, and Yersiniosis. Social History  Maik Butler  reports that she has never smoked. She has never used smokeless tobacco. She reports that she does not drink alcohol or use drugs. Family History  Maik Butler family history includes Heart Disease in her brother, father, mother, and son; High Blood Pressure in her brother, father, and mother.     Past Surgical History   Past Surgical History:   Procedure Laterality Date    ABDOMEN SURGERY      ANKLE FRACTURE SURGERY  [id number]    reconstruction in 2003 and 2007    APPENDECTOMY      BLADDER SURGERY      support bladder repair    CARDIAC SURGERY      heart stent 12-11-18, [place]    CARPAL TUNNEL RELEASE  7/2013    CARPAL TUNNEL RELEASE Right 08/19/2017    Revision    CATARACT REMOVAL Bilateral     CHOLECYSTECTOMY  1986    COLON SURGERY  1954    COLONOSCOPY      ENDOSCOPY, COLON, DIAGNOSTIC      EYE SURGERY      cataract     FRACTURE SURGERY      HYSTERECTOMY  1971    JOINT REPLACEMENT  1998    L knee    KNEE SURGERY Left     total replacement    MR #           [id number]        Race                                                       Ethnicity      Account #      [de-identified]        Room Number          8104      Accession      [id number]        Date of Study        11/26/2018   Number      Date of Birth  04/04/1928       Referring Physician  Gracie Chin MD      Age            80 year(s)       Sonographer          [facility]                                      Interpreting         Rosaline Chin MD                                   Physician     Procedure    Type of Study      TTE procedure:ECHOCARDIOGRAM COMPLETE 2D W DOPPLER W COLOR. Procedure Date  Date: 11/26/2018 Start: 02:58 PM    Study Location: Bedside  Technical Quality: Adequate visualization    Indications:Shortness of breath. Additional Medical History:Pedal edema, hypertension, colon cancer,  hyperlipidemia, atrial fibrillation, GERD, pacemaker    Patient Status: Routine    Height: 66.14 inches Weight: 180.78 pounds BSA: 1.92 m^2 BMI: 29.05 kg/m^2    BP: 149/68 mmHg    Allergies    - Other allergy:(Methadone, Gabapentin, lyrica, ultram). Conclusions      Summary   Left ventricle size and systolic function is normal.   Normal left ventricular wall thickness. Ejection fraction is visually estimated at 55-60%. Mildly dilated left atrium. Aortic valve leaflets are mildly calcified. Mild aortic regurgitation is noted. Mild mitral regurgitation is present. Mild tricuspid regurgitation. Signature      ----------------------------------------------------------------   Electronically signed by Rosaline Chin MD (Interpreting   physician) on 11/26/2018 at 04:17 PM   ----------------------------------------------------------------      Findings      Mitral Valve   Mild calcification of the posterior leaflet of the mitral valve. Mild mitral regurgitation is present.       Aortic Valve The aortic valve appears to be trileaflet with good leaflet separation. Aortic valve leaflets are mildly calcified. Mild aortic regurgitation is noted. Tricuspid Valve   Tricuspid valve is structurally normal.   Mild tricuspid regurgitation. Pulmonic Valve   The pulmonic valve was not well visualized . Left Atrium   Mildly dilated left atrium. Left Ventricle   Left ventricle size and systolic function is normal.   Normal left ventricular wall thickness. Ejection fraction is visually estimated at 55-60%. Right Atrium   The right atrium is of normal size. Right Ventricle   Normal right ventricular size and function. Pericardial Effusion   No evidence of any pericardial effusion. Aorta / Great Vessels   Aorta was not clearly visualized.    IVC is normal in size with normal respiratory phasic changes     M-Mode/2D Measurements & Calculations      LV Diastolic    LV Systolic Dimension: 3  AV Cusp Separation: 2.1 cmLA   Dimension: 4.2  cm                        Dimension: 3 cmAO Root   cm              LV Volume Diastolic: 92.4 Dimension: 3.3 cmLA Area: 20.7   LV FS:28.6 %    ml                        cm^2   LV PW           LV Volume Systolic: 35 ml   Diastolic: 0.8  LV EDV/LV EDV Index: 78.6   cm              ml/41 m^2LV ESV/LV ESV   Septum          Index: 35 ml/18 m^2       RV Diastolic Dimension: 2.2 cm   Diastolic: 0.9  EF Calculated: 55.5 %   cm                                        LA/Aorta: 0.91                                                LA volume/Index: 57.9 ml /30m^2     Doppler Measurements & Calculations      MV Peak E-Wave: 116 cm/s AV Peak Velocity: 163   LVOT Peak Velocity: 134                            cm/s                    cm/s   MV Peak Gradient: 5.38   AV Peak Gradient: 10.63 LVOT Peak Gradient: 7   mmHg                     mmHg                    mmHg      MV Deceleration Time:                            TV Peak E-Wave: 73.7 cm/s   254 msec

## 2020-09-10 ENCOUNTER — NURSE ONLY (OUTPATIENT)
Dept: CARDIOLOGY CLINIC | Age: 85
End: 2020-09-10
Payer: MEDICARE

## 2020-09-10 PROCEDURE — 93279 PRGRMG DEV EVAL PM/LDLS PM: CPT | Performed by: INTERNAL MEDICINE

## 2020-09-10 NOTE — PROGRESS NOTES
[employer] single pacemaker    6.5 years on device   RV imped 572  R waves 6.5  Threshold 0.7 @ 0.4  90% paced

## 2020-10-13 RX ORDER — FERROUS SULFATE 325(65) MG
325 TABLET ORAL EVERY OTHER DAY
Qty: 30 TABLET | Refills: 11 | Status: ON HOLD | OUTPATIENT
Start: 2020-10-13 | End: 2021-01-29 | Stop reason: HOSPADM

## 2020-10-13 RX ORDER — DOCUSATE SODIUM 100 MG/1
100 CAPSULE, LIQUID FILLED ORAL DAILY
Qty: 90 CAPSULE | Refills: 3 | Status: ON HOLD | OUTPATIENT
Start: 2020-10-13 | End: 2021-01-29 | Stop reason: HOSPADM

## 2020-10-15 ENCOUNTER — OFFICE VISIT (OUTPATIENT)
Dept: FAMILY MEDICINE CLINIC | Age: 85
End: 2020-10-15
Payer: MEDICARE

## 2020-10-15 VITALS
WEIGHT: 171 LBS | BODY MASS INDEX: 31.47 KG/M2 | DIASTOLIC BLOOD PRESSURE: 68 MMHG | OXYGEN SATURATION: 98 % | RESPIRATION RATE: 16 BRPM | TEMPERATURE: 97.2 F | HEIGHT: 62 IN | SYSTOLIC BLOOD PRESSURE: 138 MMHG | HEART RATE: 64 BPM

## 2020-10-15 LAB
BILIRUBIN URINE: NEGATIVE
BLOOD URINE, POC: ABNORMAL
CHARACTER, URINE: ABNORMAL
COLOR, URINE: ABNORMAL
GLUCOSE URINE: NEGATIVE MG/DL
KETONES, URINE: NEGATIVE
LEUKOCYTE CLUMPS, URINE: ABNORMAL
NITRITE, URINE: NEGATIVE
PH, URINE: 5.5 (ref 5–9)
PROTEIN, URINE: NEGATIVE MG/DL
SPECIFIC GRAVITY, URINE: 1.02 (ref 1–1.03)
UROBILINOGEN, URINE: 0.2 EU/DL (ref 0–1)

## 2020-10-15 PROCEDURE — 4040F PNEUMOC VAC/ADMIN/RCVD: CPT | Performed by: FAMILY MEDICINE

## 2020-10-15 PROCEDURE — G8427 DOCREV CUR MEDS BY ELIG CLIN: HCPCS | Performed by: FAMILY MEDICINE

## 2020-10-15 PROCEDURE — 93000 ELECTROCARDIOGRAM COMPLETE: CPT | Performed by: FAMILY MEDICINE

## 2020-10-15 PROCEDURE — 1123F ACP DISCUSS/DSCN MKR DOCD: CPT | Performed by: FAMILY MEDICINE

## 2020-10-15 PROCEDURE — 99215 OFFICE O/P EST HI 40 MIN: CPT | Performed by: FAMILY MEDICINE

## 2020-10-15 PROCEDURE — 1090F PRES/ABSN URINE INCON ASSESS: CPT | Performed by: FAMILY MEDICINE

## 2020-10-15 PROCEDURE — 1036F TOBACCO NON-USER: CPT | Performed by: FAMILY MEDICINE

## 2020-10-15 PROCEDURE — 81003 URINALYSIS AUTO W/O SCOPE: CPT | Performed by: FAMILY MEDICINE

## 2020-10-15 PROCEDURE — G8417 CALC BMI ABV UP PARAM F/U: HCPCS | Performed by: FAMILY MEDICINE

## 2020-10-15 PROCEDURE — 36415 COLL VENOUS BLD VENIPUNCTURE: CPT | Performed by: FAMILY MEDICINE

## 2020-10-15 PROCEDURE — G8482 FLU IMMUNIZE ORDER/ADMIN: HCPCS | Performed by: FAMILY MEDICINE

## 2020-10-15 NOTE — PROGRESS NOTES
photophobia and visual disturbance. Respiratory: Negative for cough, chest tightness, shortness of breath and wheezing. Cardiovascular: Negative for chest pain and leg swelling. Gastrointestinal: Negative for nausea, vomiting, abdominal pain, diarrhea and constipation. Genitourinary: Negative for dysuria, urgency and frequency. Neurological: Negative for weakness, light-headedness and headaches. Psychiatric/Behavioral: Negative for sleep disturbance.      :     Vitals:    10/15/20 1623   BP: 138/68   Site: Left Lower Arm   Position: Sitting   Cuff Size: Large Adult   Pulse: 64   Resp: 16   Temp: 97.2 °F (36.2 °C)   TempSrc: Temporal   SpO2: 98%   Weight: 171 lb (77.6 kg)   Height: 5' 2.01\" (1.575 m)     Wt Readings from Last 3 Encounters:   10/15/20 171 lb (77.6 kg)   06/17/20 172 lb (78 kg)   05/21/20 175 lb (79.4 kg)       Physical Exam  Physical Exam   Constitutional: Vital signs are normal. She appears well-developed and well-nourished. She is active. HENT:   Head: Normocephalic and atraumatic. Right Ear: Tympanic membrane, external ear and ear canal normal. No drainage or tenderness. Left Ear: Tympanic membrane, external ear and ear canal normal. No drainage or tenderness. Nose: Nose normal. No mucosal edema or rhinorrhea. Mouth/Throat: Uvula is midline, oropharynx is clear and moist and mucous membranes are normal. Mucous membranes are not pale. Normal dentition. No posterior oropharyngeal edema or posterior oropharyngeal erythema. Eyes: Lids are normal. Right eye exhibits no chemosis and no discharge. Left eye exhibits no chemosis and no drainage. Right conjunctiva has no hemorrhage. Left conjunctiva has no hemorrhage. Right eye exhibits normal extraocular motion. Left eye exhibits normal extraocular motion. Right pupil is round and reactive. Left pupil is round and reactive. Pupils are equal.   Cardiovascular: Normal rate, regular rhythm, S1 normal, S2 normal and normal heart sounds. Panel, Fasting  -     TSH With Reflex Ft4    Tremor  -     EKG 12 Lead  -     CBC; Future  -     Comprehensive Metabolic Panel, Fasting; Future  -     TSH With Reflex Ft4; Future  -     XR CHEST STANDARD (2 VW); Future  -     CT HEAD WO CONTRAST; Future  -     POCT Urinalysis No Micro (Auto)  -     CBC  -     Comprehensive Metabolic Panel, Fasting  -     TSH With Reflex Ft4    Urinary tract infection with hematuria, site unspecified  -     Culture, Urine    Other orders  -     Anion Gap  -     Glomerular Filtration Rate, Estimated    push water and attempt to get on the stationary bike 10-20 minutes daily    Also had a discussion about if her weakness gets worse, her daughter will no longer be able to care for her at home. Discussed use, benefit, and side effectsof prescribed medications. All patient questions answered. Pt voiced understanding. Reviewed health maintenance. Instructed to continue current medications, diet and exercise. Patient agreed with treatment plan. Followup as directed.      Over 50 minutes spent with patient with >50% spent in counseling and coordination of care    Electronically signed by Nimisha Guaman MD

## 2020-10-16 ENCOUNTER — HOSPITAL ENCOUNTER (OUTPATIENT)
Dept: CT IMAGING | Age: 85
Discharge: HOME | End: 2020-10-16
Payer: MEDICARE

## 2020-10-16 ENCOUNTER — HOSPITAL ENCOUNTER (OUTPATIENT)
Age: 85
Discharge: HOME | End: 2020-10-16
Payer: MEDICARE

## 2020-10-16 ENCOUNTER — HOSPITAL ENCOUNTER (OUTPATIENT)
Dept: GENERAL RADIOLOGY | Age: 85
Discharge: HOME | End: 2020-10-16
Payer: MEDICARE

## 2020-10-16 LAB
ALBUMIN SERPL-MCNC: 4.6 G/DL (ref 3.5–5.1)
ALP BLD-CCNC: 65 U/L (ref 38–126)
ALT SERPL-CCNC: 16 U/L (ref 11–66)
ANION GAP SERPL CALCULATED.3IONS-SCNC: 15 MEQ/L (ref 8–16)
AST SERPL-CCNC: 20 U/L (ref 5–40)
BILIRUB SERPL-MCNC: 3 MG/DL (ref 0.3–1.2)
BUN BLDV-MCNC: 14 MG/DL (ref 7–22)
CALCIUM SERPL-MCNC: 9.8 MG/DL (ref 8.5–10.5)
CHLORIDE BLD-SCNC: 99 MEQ/L (ref 98–111)
CO2: 27 MEQ/L (ref 23–33)
CREAT SERPL-MCNC: 0.6 MG/DL (ref 0.4–1.2)
ERYTHROCYTE [DISTWIDTH] IN BLOOD BY AUTOMATED COUNT: 13.2 % (ref 11.5–14.5)
ERYTHROCYTE [DISTWIDTH] IN BLOOD BY AUTOMATED COUNT: 52.2 FL (ref 35–45)
GFR SERPL CREATININE-BSD FRML MDRD: > 90 ML/MIN/1.73M2
GLUCOSE FASTING: 183 MG/DL (ref 70–108)
HCT VFR BLD CALC: 48.1 % (ref 37–47)
HEMOGLOBIN: 15.8 GM/DL (ref 12–16)
MCH RBC QN AUTO: 35 PG (ref 26–33)
MCHC RBC AUTO-ENTMCNC: 32.8 GM/DL (ref 32.2–35.5)
MCV RBC AUTO: 106.4 FL (ref 81–99)
PLATELET # BLD: 180 THOU/MM3 (ref 130–400)
PMV BLD AUTO: 12 FL (ref 9.4–12.4)
POTASSIUM SERPL-SCNC: 3.9 MEQ/L (ref 3.5–5.2)
RBC # BLD: 4.52 MILL/MM3 (ref 4.2–5.4)
SODIUM BLD-SCNC: 141 MEQ/L (ref 135–145)
TOTAL PROTEIN: 7.3 G/DL (ref 6.1–8)
TSH SERPL DL<=0.05 MIU/L-ACNC: 2.49 UIU/ML (ref 0.4–4.2)
WBC # BLD: 8.4 THOU/MM3 (ref 4.8–10.8)

## 2020-10-16 PROCEDURE — 70450 CT HEAD/BRAIN W/O DYE: CPT

## 2020-10-16 PROCEDURE — 71046 X-RAY EXAM CHEST 2 VIEWS: CPT

## 2020-10-17 LAB
ORGANISM: ABNORMAL
URINE CULTURE, ROUTINE: ABNORMAL

## 2020-11-02 ENCOUNTER — NURSE ONLY (OUTPATIENT)
Dept: FAMILY MEDICINE CLINIC | Age: 85
End: 2020-11-02
Payer: MEDICARE

## 2020-11-02 LAB
BILIRUBIN URINE: NEGATIVE
BLOOD URINE, POC: ABNORMAL
CHARACTER, URINE: CLEAR
COLOR, URINE: ABNORMAL
GLUCOSE URINE: NEGATIVE MG/DL
KETONES, URINE: NEGATIVE
LEUKOCYTE CLUMPS, URINE: ABNORMAL
NITRITE, URINE: NEGATIVE
PH, URINE: 6.5 (ref 5–9)
PROTEIN, URINE: NEGATIVE MG/DL
SPECIFIC GRAVITY, URINE: 1.02 (ref 1–1.03)
UROBILINOGEN, URINE: 0.2 EU/DL (ref 0–1)

## 2020-11-02 PROCEDURE — 81003 URINALYSIS AUTO W/O SCOPE: CPT | Performed by: FAMILY MEDICINE

## 2020-11-02 NOTE — PROGRESS NOTES
Chief Complaint   Patient presents with    Urinary Tract Infection       Results for POC orders placed in visit on 11/02/20   POCT Urinalysis No Micro (Auto)   Result Value Ref Range    Glucose, Ur Negative NEGATIVE mg/dl    Bilirubin Urine Negative     Ketones, Urine Negative NEGATIVE    Specific Gravity, Urine 1.020 1.002 - 1.030    Blood, UA POC Trace-intact NEGATIVE    pH, Urine 6.50 5.0 - 9.0    Protein, Urine Negative NEGATIVE mg/dl    Urobilinogen, Urine 0.20 0.0 - 1.0 eu/dl    Nitrite, Urine Negative NEGATIVE    Leukocyte Clumps, Urine Trace (A) NEGATIVE    Color, Urine Dark yellow (A) YELLOW-STRAW    Character, Urine Clear [email or web address]       Send for culture  Will treat based on results    Call patient

## 2020-11-03 PROBLEM — R55 SYNCOPE AND COLLAPSE: Status: RESOLVED | Noted: 2018-12-20 | Resolved: 2020-11-03

## 2020-11-03 PROBLEM — I48.91 ATRIAL FIBRILLATION (HCC): Status: RESOLVED | Noted: 2020-11-03 | Resolved: 2020-11-03

## 2020-11-04 LAB
ORGANISM: ABNORMAL
URINE CULTURE, ROUTINE: ABNORMAL

## 2020-11-23 ENCOUNTER — OFFICE VISIT (OUTPATIENT)
Dept: CARDIOLOGY CLINIC | Age: 85
End: 2020-11-23
Payer: MEDICARE

## 2020-11-23 VITALS
OXYGEN SATURATION: 95 % | HEIGHT: 62 IN | BODY MASS INDEX: 31.47 KG/M2 | HEART RATE: 61 BPM | WEIGHT: 171 LBS | DIASTOLIC BLOOD PRESSURE: 84 MMHG | SYSTOLIC BLOOD PRESSURE: 132 MMHG

## 2020-11-23 PROCEDURE — G8417 CALC BMI ABV UP PARAM F/U: HCPCS | Performed by: NURSE PRACTITIONER

## 2020-11-23 PROCEDURE — G8427 DOCREV CUR MEDS BY ELIG CLIN: HCPCS | Performed by: NURSE PRACTITIONER

## 2020-11-23 PROCEDURE — 4040F PNEUMOC VAC/ADMIN/RCVD: CPT | Performed by: NURSE PRACTITIONER

## 2020-11-23 PROCEDURE — 1036F TOBACCO NON-USER: CPT | Performed by: NURSE PRACTITIONER

## 2020-11-23 PROCEDURE — G8482 FLU IMMUNIZE ORDER/ADMIN: HCPCS | Performed by: NURSE PRACTITIONER

## 2020-11-23 PROCEDURE — 1123F ACP DISCUSS/DSCN MKR DOCD: CPT | Performed by: NURSE PRACTITIONER

## 2020-11-23 PROCEDURE — 99213 OFFICE O/P EST LOW 20 MIN: CPT | Performed by: NURSE PRACTITIONER

## 2020-11-23 PROCEDURE — 1090F PRES/ABSN URINE INCON ASSESS: CPT | Performed by: NURSE PRACTITIONER

## 2020-11-23 ASSESSMENT — ENCOUNTER SYMPTOMS
ABDOMINAL DISTENTION: 0
WHEEZING: 0
ABDOMINAL PAIN: 0
COLOR CHANGE: 0
CHEST TIGHTNESS: 0
SHORTNESS OF BREATH: 0
NAUSEA: 0
COUGH: 0
APNEA: 0

## 2020-11-23 NOTE — PATIENT INSTRUCTIONS
You may receive a survey regarding the care you received during your visit. Your input is valuable to us. We encourage you to complete and return your survey. We hope you will choose us in the future for your healthcare needs. NEW ORDERS FROM TODAY:      Continue:  · Take all medications as prescribed   · Daily weights and record - please try to weigh yourself upon awakening before eating or drinking   · Fluid restriction of 2 Liters per day (64 oz)  · Limit sodium in diet to around 3987-9875 mg/day  · Monitor BP - take BP 1 hour after meds  · Increase activity as tolerated     Call the [facility] for any of the following symptoms: [phone]   Weight gain of 3 pounds in 1 day or 5 pounds in 1 week   Increased shortness of breath   Shortness of breath while laying down   Cough   Chest pain   Swelling in feet, ankles or legs   Tenderness or bloating in the abdomen   Fatigue    Decreased appetite or feeling \"full\"    Nausea    Confusion     **PLEASE bring all medications in original bottles with you to your next appointment**    Educational websites:    http://www.HacemeUnRegalo.com.Alltuition/. org (American Heart Association)    [employer].nl. com ([employer])    Whyd HF.com ([employer])    Too much sodium causes your body to hold on to extra water. This can raise your blood pressure and force your heart and kidneys to work harder. In very serious cases, this could cause you to be put in the hospital. It might even be life-threatening. By limiting sodium, you will feel better and lower your risk of serious problems. The most common source of sodium is salt. People get most of the salt in their diet from canned, prepared, and packaged foods. Fast food and restaurant meals also are very high in sodium. Your doctor will probably limit your sodium to less than 2,000 milligrams (mg) a day.  This limit counts all the sodium in prepared and packaged foods and any salt you add to your salt.  · Use less salt (or none) when recipes call for it. You can often use half the salt a recipe calls for without losing flavor. Other foods such as rice, pasta, and grains do not need added salt. · Rinse canned vegetables, and cook them in fresh water. This removes some--but not all--of the salt. · Avoid water that is naturally high in sodium or that has been treated with water softeners, which add sodium. Call your local water company to find out the sodium content of your water supply. If you buy bottled water, read the label and choose a sodium-free brand. Avoid high-sodium foods  · Avoid eating:  ? Smoked, cured, salted, and canned meat, fish, and poultry. ? Ham, foster, hot dogs, and luncheon meats. ? Regular, hard, and processed cheese and regular peanut butter. ? Crackers with salted tops, and other salted snack foods such as pretzels, chips, and salted popcorn. ? Frozen prepared meals, unless labeled low-sodium. ? Canned and dried soups, broths, and bouillon, unless labeled sodium-free or low-sodium. ? Canned vegetables, unless labeled sodium-free or low-sodium. ? [employer] fries, pizza, tacos, and other fast foods. ? Pickles, olives, ketchup, and other condiments, especially soy sauce, unless labeled sodium-free or low-sodium.

## 2020-11-23 NOTE — PROGRESS NOTES
[place]       Visit Date: 11/23/2020  Cardiologist:  Dr. Kelly Sampson  Primary Care Physician: Dr. Bella Penn MD    Kuldeep Desai is a 80 y.o. female who presents today for:  Chief Complaint   Patient presents with    Congestive Heart Failure       HPI: Obtained from patient and chart    Kuldeep Desai is a 80 y.o. female who presents to the office for a follow up visit in the heart failure clinic. Hx A fib, PPM, CAD PCI stent, HTN, HFpEF 55-60%. She  lives alone, daughter is next door. She does not do much in the way of activity. She can perform ADLs without SOB or fatigue. She sleeps in a bed. No swelling or bloating. No CP.       Accompanied by: daughter  Last hospital admission related to Heart Failure:  Dec 2018  Chest Pain: no  Worsening SOB: no  Worsening Orthopnea/PND: no  Edema: no  Any extra diuretic use: no  Weight gain: no  Fatigue: yes  Abdominal bloating: no  Appetite: good  PIPPA: no  Cough: no  Compliant checking home weight: yes   Compliant checking blood pressure: yes      Past Medical History:   Diagnosis Date    Atrial fibrillation ([address]     Blood circulation, collateral     [employer] single pacemaker 4/26/2016 5/5/2016    CAD (coronary artery disease)     cath and stent in right artery    [facility]    HX  Colon     Carpal tunnel syndrome     Fracture dislocation of ankle 1980    GERD (gastroesophageal reflux disease)     Hyperlipidemia     Hypertension     Kidney lesion, native, right     found on 5/2016    Osteoarthritis     knees    Osteopenia     Prolonged emergence from general anesthesia     Thyroid goiter 9/6/2016    Yersiniosis 2016     Past Surgical History:   Procedure Laterality Date    ABDOMEN SURGERY      ANKLE FRACTURE SURGERY  [id number]    reconstruction in 2003 and 2007    APPENDECTOMY      BLADDER SURGERY      support bladder repair    CARDIAC SURGERY      heart stent 12-11-18, [place]    CARPAL TUNNEL RELEASE  7/2013    CARPAL TUNNEL RELEASE Right 08/19/2017    Revision    CATARACT REMOVAL Bilateral     CHOLECYSTECTOMY  1986    COLON SURGERY  1954    COLONOSCOPY      ENDOSCOPY, COLON, DIAGNOSTIC      EYE SURGERY      cataract     FRACTURE SURGERY      HYSTERECTOMY  1971    JOINT REPLACEMENT  1998    L knee    KNEE SURGERY Left     total replacement    PACEMAKER PLACEMENT      PTCA  01/15/2019    Successful PCI / Drug Eluting Stent of the proximal Left Anterior Descending Coronary Artery.     UPPER GASTROINTESTINAL ENDOSCOPY Left 11/28/2018    EGD BIOPSY performed by Tamiko Mane MD [facility]    UPPER GASTROINTESTINAL ENDOSCOPY  11/28/2018    EGD CONTROL HEMORRHAGE performed by Tamiko Mane MD at [facility]     Family History   Problem Relation Age of Onset    Heart Disease Mother     High Blood Pressure Mother     Heart Disease Father     High Blood Pressure Father     Heart Disease Brother     High Blood Pressure Brother     Heart Disease Son      Social History     Tobacco Use    Smoking status: Never Smoker    Smokeless tobacco: Never Used   Substance Use Topics    Alcohol use: No     Current Outpatient Medications   Medication Sig Dispense Refill    docusate sodium (COLACE) 100 MG capsule Take 1 capsule by mouth daily (Patient taking differently: Take 100 mg by mouth every other day ) 90 capsule 3    ferrous sulfate (IRON 325) 325 (65 Fe) MG tablet Take 1 tablet by mouth every other day 30 tablet 11    pantoprazole (PROTONIX) 40 MG tablet Take 40 mg by mouth daily      digoxin (LANOXIN) 125 MCG tablet Take 1 tablet by mouth daily 90 tablet 3    venlafaxine (EFFEXOR XR) 75 MG extended release capsule Take 1 capsule by mouth daily 90 capsule 3    atorvastatin (LIPITOR) 40 MG tablet Take 1 tablet by mouth nightly 90 tablet 3    bumetanide (BUMEX) 0.5 MG tablet Take 1 tablet by mouth daily 90 tablet 3    rivaroxaban (XARELTO) 15 MG TABS tablet Take 1 tablet by mouth Daily with supper 90 tablet 3    potassium chloride (KLOR-CON M) 20 MEQ extended release tablet Take 1 tablet by mouth once daily 90 tablet 3    sucralfate (CARAFATE) 1 GM tablet TAKE 1 TABLET BY MOUTH TWO  TIMES DAILY 180 tablet 3    nitroGLYCERIN (NITROSTAT) 0.4 MG SL tablet up to max of 3 total doses. If no relief after 1 dose, call 911. 25 tablet 3    Magnesium Oxide 250 MG TABS Take 1 tablet by mouth daily 30 tablet 1    NONFORMULARY Perils probiotic  1 daily      Cyanocobalamin (VITAMIN B 12 PO) Take by mouth every other day       aspirin 81 MG chewable tablet Take 1 tablet by mouth daily 30 tablet 3    vitamin D (CHOLECALCIFEROL) 1000 UNIT TABS tablet Take 1,000 Units by mouth daily      Calcium-Vitamin D-Vitamin K 500-500-40 MG-UNT-MCG CHEW Take 1 tablet by mouth daily      acetaminophen (TYLENOL) 500 MG tablet Take 1,000 mg by mouth 3 times daily       Biotin 1000 MCG TABS Take 1 tablet by mouth daily        No current facility-administered medications for this visit. Allergies   Allergen Reactions    Methadone Shortness Of Breath     Shakes and nausea    Gabapentin     Lyrica [Pregabalin]     Ultram [Tramadol]      Nausea, pedal edema, SOB       SUBJECTIVE:   Review of Systems   Constitutional: Negative for activity change, appetite change, fatigue and fever. HENT: Negative for congestion. Respiratory: Negative for apnea, cough, chest tightness, shortness of breath and wheezing. Cardiovascular: Negative for chest pain, palpitations and leg swelling. Gastrointestinal: Negative for abdominal distention, abdominal pain and nausea. Genitourinary: Negative for difficulty urinating and dysuria. Musculoskeletal: Positive for gait problem (WC). Negative for arthralgias. Skin: Negative for color change. Neurological: Negative for dizziness, numbness and headaches. Psychiatric/Behavioral: Negative for agitation, confusion and sleep disturbance.  The patient is not nervous/anxious. OBJECTIVE:   Today's Vitals:  /84   Pulse 61   Ht 5' 2\" (1.575 m)   Wt 171 lb (77.6 kg)   LMP  (LMP Unknown)   SpO2 95%   BMI 31.28 kg/m²     Physical Exam  Vitals signs reviewed. Constitutional:       Appearance: She is well-developed. HENT:      Head: Normocephalic and atraumatic. Eyes:      Pupils: Pupils are equal, round, and reactive to light. Neck:      Musculoskeletal: Normal range of motion. Vascular: No JVD. Cardiovascular:      Rate and Rhythm: Normal rate and regular rhythm. Heart sounds: Normal heart sounds. No murmur. Comments: PPM  Pulmonary:      Effort: Pulmonary effort is normal. No respiratory distress. Breath sounds: No rales. Abdominal:      General: There is no distension. Palpations: Abdomen is soft. Tenderness: There is no abdominal tenderness. Musculoskeletal:         General: No tenderness. Right lower leg: No edema. Left lower leg: No edema. Skin:     General: Skin is warm and dry. Capillary Refill: Capillary refill takes less than 2 seconds. Neurological:      Mental Status: She is alert and oriented to person, place, and time. Psychiatric:         Mood and Affect: Mood normal.         Behavior: Behavior normal.         Wt Readings from Last 3 Encounters:   11/23/20 171 lb (77.6 kg)   10/15/20 171 lb (77.6 kg)   06/17/20 172 lb (78 kg)     BP Readings from Last 3 Encounters:   11/23/20 132/84   10/15/20 138/68   08/05/20 (!) 145/71     Pulse Readings from Last 3 Encounters:   11/23/20 61   10/15/20 64   08/05/20 60     Body mass index is 31.28 kg/m². ECHO:   11/26/18  Summary   Left ventricle size and systolic function is normal.   Normal left ventricular wall thickness. Ejection fraction is visually estimated at 55-60%. Mildly dilated left atrium. Aortic valve leaflets are mildly calcified. Mild aortic regurgitation is noted. Mild mitral regurgitation is present.    Mild 10/15/2020    ALT 16 10/15/2020    AST 20 10/15/2020    PROT 7.3 10/15/2020    BILITOT 3.0 10/15/2020    BILIDIR 0.3 05/04/2019    LABALBU 4.6 10/15/2020    LABALBU 4.2 08/30/2011     Magnesium:    Lab Results   Component Value Date    MG 1.7 05/03/2019     PT/INR:    Lab Results   Component Value Date    INR 0.98 01/15/2019     Lipids:    Lab Results   Component Value Date    TRIG 145 12/04/2018    HDL 36 12/04/2018    LDLCALC 62 12/04/2018       ASSESSMENT AND PLAN:   The patient's condition/symptoms are Stable      Diagnosis Orders   1. CHF (congestive heart failure), NYHA class II, chronic, diastolic ([facility] [address]     2. Chronic atrial fibrillation (HCC)     3. CAD in native artery     4. [employer] single pacemaker 4/26/2016         Plan:  · GDMT   · ACE/ARB/ARNi: no  · Beta Blocker: no  · Aldosterone antagonist: no  · Diuretic/Potassium: Bumex 0.5 mg daily, Potassium 20 mEq daily  · Hydralazine/Nitrate: no  · Other: ASA 81 mg, Xarelto, Digoxin, Lipitor, Mag ox  · No signs of fluid overload. She was taken off the Metoprolol by Dr Giovany Looney. She takes low dose Bumex daily, labs are monitored by her PCP. We discussed HF Zones in detail. I will see prn. · HF Zones reviewed. · Daily weights and record  · Fluid restriction of 2 Liters per day (64 oz)   · Limit sodium in diet to 5378-4257 mg/day  · Healthier food options were discussed   · Monitor BP daily 1 hour after meds are taken  · Increase activity as tolerated     Patient was instructed to call the [facility] for changes in the following symptoms:    Weight gain of 3 pounds in 1 day or 5 pounds in 1 week   Increased shortness of breath   Shortness of breath while laying down   Cough   Chest pain   Swelling in feet, ankles or legs   Tenderness or bloating in the abdomen   Fatigue    Decreased appetite or feeling \"full\"   Nausea    Confusion      Return if symptoms worsen or fail to improve.  or sooner if needed     Patient given educational

## 2020-12-10 ENCOUNTER — NURSE ONLY (OUTPATIENT)
Dept: LAB | Age: 85
End: 2020-12-10

## 2020-12-10 LAB
ALBUMIN SERPL-MCNC: 4 G/DL (ref 3.5–5.1)
ALP BLD-CCNC: 50 U/L (ref 38–126)
ALT SERPL-CCNC: 12 U/L (ref 11–66)
ANION GAP SERPL CALCULATED.3IONS-SCNC: 15 MEQ/L (ref 8–16)
AST SERPL-CCNC: 19 U/L (ref 5–40)
BILIRUB SERPL-MCNC: 2.7 MG/DL (ref 0.3–1.2)
BUN BLDV-MCNC: 15 MG/DL (ref 7–22)
CALCIUM SERPL-MCNC: 9.8 MG/DL (ref 8.5–10.5)
CHLORIDE BLD-SCNC: 99 MEQ/L (ref 98–111)
CO2: 26 MEQ/L (ref 23–33)
CREAT SERPL-MCNC: 0.6 MG/DL (ref 0.4–1.2)
DIGOXIN LEVEL: 0.9 NG/ML (ref 0.5–2)
ERYTHROCYTE [DISTWIDTH] IN BLOOD BY AUTOMATED COUNT: 13.2 % (ref 11.5–14.5)
ERYTHROCYTE [DISTWIDTH] IN BLOOD BY AUTOMATED COUNT: 51.1 FL (ref 35–45)
FERRITIN: 80 NG/ML (ref 10–291)
GFR SERPL CREATININE-BSD FRML MDRD: > 90 ML/MIN/1.73M2
GLUCOSE BLD-MCNC: 185 MG/DL (ref 70–108)
HCT VFR BLD CALC: 42 % (ref 37–47)
HEMOGLOBIN: 13.9 GM/DL (ref 12–16)
IRON: 101 UG/DL (ref 50–170)
MCH RBC QN AUTO: 34.6 PG (ref 26–33)
MCHC RBC AUTO-ENTMCNC: 33.1 GM/DL (ref 32.2–35.5)
MCV RBC AUTO: 104.5 FL (ref 81–99)
PLATELET # BLD: 150 THOU/MM3 (ref 130–400)
PMV BLD AUTO: 11.7 FL (ref 9.4–12.4)
POTASSIUM SERPL-SCNC: 3.9 MEQ/L (ref 3.5–5.2)
RBC # BLD: 4.02 MILL/MM3 (ref 4.2–5.4)
SODIUM BLD-SCNC: 140 MEQ/L (ref 135–145)
TOTAL IRON BINDING CAPACITY: 306 UG/DL (ref 171–450)
TOTAL PROTEIN: 6.5 G/DL (ref 6.1–8)
TSH SERPL DL<=0.05 MIU/L-ACNC: 2.97 UIU/ML (ref 0.4–4.2)
WBC # BLD: 5.8 THOU/MM3 (ref 4.8–10.8)

## 2020-12-16 ENCOUNTER — OFFICE VISIT (OUTPATIENT)
Dept: FAMILY MEDICINE CLINIC | Age: 85
End: 2020-12-16
Payer: MEDICARE

## 2020-12-16 VITALS
DIASTOLIC BLOOD PRESSURE: 76 MMHG | SYSTOLIC BLOOD PRESSURE: 132 MMHG | TEMPERATURE: 96.7 F | HEART RATE: 63 BPM | BODY MASS INDEX: 31.47 KG/M2 | RESPIRATION RATE: 14 BRPM | WEIGHT: 171 LBS | OXYGEN SATURATION: 95 % | HEIGHT: 62 IN

## 2020-12-16 PROCEDURE — 1123F ACP DISCUSS/DSCN MKR DOCD: CPT | Performed by: FAMILY MEDICINE

## 2020-12-16 PROCEDURE — 99214 OFFICE O/P EST MOD 30 MIN: CPT | Performed by: FAMILY MEDICINE

## 2020-12-16 PROCEDURE — G8417 CALC BMI ABV UP PARAM F/U: HCPCS | Performed by: FAMILY MEDICINE

## 2020-12-16 PROCEDURE — 4040F PNEUMOC VAC/ADMIN/RCVD: CPT | Performed by: FAMILY MEDICINE

## 2020-12-16 PROCEDURE — 1036F TOBACCO NON-USER: CPT | Performed by: FAMILY MEDICINE

## 2020-12-16 PROCEDURE — 1090F PRES/ABSN URINE INCON ASSESS: CPT | Performed by: FAMILY MEDICINE

## 2020-12-16 PROCEDURE — G8427 DOCREV CUR MEDS BY ELIG CLIN: HCPCS | Performed by: FAMILY MEDICINE

## 2020-12-16 PROCEDURE — G8482 FLU IMMUNIZE ORDER/ADMIN: HCPCS | Performed by: FAMILY MEDICINE

## 2020-12-16 RX ORDER — CEPHALEXIN 500 MG/1
500 CAPSULE ORAL 3 TIMES DAILY
Qty: 30 CAPSULE | Refills: 0 | Status: SHIPPED | OUTPATIENT
Start: 2020-12-16 | End: 2020-12-26

## 2020-12-16 NOTE — PROGRESS NOTES
colace. Not exercising or moving much at home. She has noticed a lump in the right axilla. First noticed this 3 weeks ago. Not painful but not getting any smaller and she thinks it may be bigger. Reviewed chart forpast medical history , surgical history , allergies, social history , family history and medications. Health Maintenance   Topic Date Due    Shingles Vaccine (2 of 2) 10/01/2019    Lipid screen  12/04/2019    DTaP/Tdap/Td vaccine (1 - Tdap) 09/06/2021 (Originally 4/4/1947)    Annual Wellness Visit (AWV)  04/09/2021    Potassium monitoring  12/10/2021    Creatinine monitoring  12/10/2021    Flu vaccine  Completed    Pneumococcal 65+ years Vaccine  Completed    Hepatitis A vaccine  Aged Out    Hepatitis B vaccine  Aged Out    Hib vaccine  Aged Out    Meningococcal (ACWY) vaccine  Aged Out       Subjective:      Constitutional:Negative for fever, chills, diaphoresis, activity change, appetite change and fatigue. HENT: Negative for hearing loss, ear pain, congestion, sore throat, rhinorrhea, postnasal drip and ear discharge. Eyes: Negative for photophobia and visual disturbance. Respiratory: Negative for cough, chest tightness, shortness of breath and wheezing. Cardiovascular: Negative for chest pain and leg swelling. Gastrointestinal: Negative for nausea, vomiting, abdominal pain, diarrhea and constipation. Genitourinary: Negative for dysuria, urgency and frequency. Neurological: Negative for weakness, light-headedness and headaches.    Psychiatric/Behavioral: Negative for sleep disturbance.      :     Vitals:    12/16/20 1354   BP: 132/76   Site: Left Lower Arm   Position: Sitting   Cuff Size: Small Adult   Pulse: 63   Resp: 14   Temp: 96.7 °F (35.9 °C)   TempSrc: Temporal   SpO2: 95%   Weight: 171 lb (77.6 kg)   Height: 5' 2\" (1.575 m)     Wt Readings from Last 3 Encounters:   12/16/20 171 lb (77.6 kg)   11/23/20 171 lb (77.6 kg)   10/15/20 171 lb (77.6 kg) Physical Exam  Physical Exam   Constitutional: Vital signs are normal. She appears well-developed and well-nourished. She is active. HENT:   Head: Normocephalic and atraumatic. Right Ear: Tympanic membrane, external ear and ear canal normal. No drainage or tenderness. Left Ear: Tympanic membrane, external ear and ear canal normal. No drainage or tenderness. Nose: Nose normal. No mucosal edema or rhinorrhea. Mouth/Throat: Uvula is midline, oropharynx is clear and moist and mucous membranes are normal. Mucous membranes are not pale. Normal dentition. No posterior oropharyngeal edema or posterior oropharyngeal erythema. Eyes: Lids are normal. Right eye exhibits no chemosis and no discharge. Left eye exhibits no chemosis and no drainage. Right conjunctiva has no hemorrhage. Left conjunctiva has no hemorrhage. Right eye exhibits normal extraocular motion. Left eye exhibits normal extraocular motion. Right pupil is round and reactive. Left pupil is round and reactive. Pupils are equal.   Cardiovascular: Normal rate, regular rhythm, S1 normal, S2 normal and normal heart sounds. Exam reveals no gallop. No murmur heard. Pulmonary/Chest: Effort normal and breath sounds normal. No respiratory distress. She has no wheezes. She has no rhonchi. She has no rales. Abdominal: Soft. Normal appearance and bowel sounds are normal. She exhibits no distension and no mass. There is no hepatosplenomegaly. No tenderness. She has no rigidity, no rebound and no guarding. No hernia. Musculoskeletal:        Right lower leg: She exhibits no edema. Left lower leg: She exhibits no edema. Neurological: She is alert. Skin:  Right axilla with lump that is 1x2 cm.  nontender and mobile        Assessment/Plan   De Manzanares was seen today for 6 month follow-up. Diagnoses and all orders for this visit:    Pure hypercholesterolemia    Elevated fasting glucose  -     Glucose, Fasting; Future  -     Insulin, Fasting;  Future  - Hemoglobin A1C; Future    Enlarged lymph nodes in armpit  -     cephALEXin (KEFLEX) 500 MG capsule; Take 1 capsule by mouth 3 times daily for 10 days    Essential hypertension    Atrial fibrillation with rapid ventricular response (HCC)    Sick sinus syndrome (HCC)    Coronary artery disease involving native heart with angina pectoris, unspecified vessel or lesion type (HCC)    Gastroesophageal reflux disease without esophagitis    Recurrent major depressive disorder, in full remission ([facility] [address]    Primary osteoarthritis involving multiple joints    Night sweats      Continue healthy diet and exercise  Aspirin daily    If the axillary lump is not better in 3-4 weeks then will need referral for excisional biopsy. Discussed use, benefit, and side effectsof prescribed medications. All patient questions answered. Pt voiced understanding. Reviewed health maintenance. Instructed to continue current medications, diet and exercise. Patient agreed with treatment plan. Followup as directed.      Electronically signed by Bella Penn MD

## 2021-01-19 ENCOUNTER — HOSPITAL ENCOUNTER (EMERGENCY)
Age: 86
Discharge: HOME | End: 2021-01-19
Payer: MEDICARE

## 2021-01-19 ENCOUNTER — APPOINTMENT (OUTPATIENT)
Dept: CT IMAGING | Age: 86
End: 2021-01-19
Payer: MEDICARE

## 2021-01-19 ENCOUNTER — APPOINTMENT (OUTPATIENT)
Dept: ULTRASOUND IMAGING | Age: 86
End: 2021-01-19
Payer: MEDICARE

## 2021-01-19 VITALS
OXYGEN SATURATION: 98 % | HEART RATE: 64 BPM | WEIGHT: 171 LBS | RESPIRATION RATE: 16 BRPM | TEMPERATURE: 98 F | DIASTOLIC BLOOD PRESSURE: 70 MMHG | BODY MASS INDEX: 31.28 KG/M2 | SYSTOLIC BLOOD PRESSURE: 150 MMHG

## 2021-01-19 DIAGNOSIS — R10.11 RIGHT UPPER QUADRANT ABDOMINAL PAIN: Primary | ICD-10-CM

## 2021-01-19 DIAGNOSIS — N28.1 RENAL CYST, RIGHT: ICD-10-CM

## 2021-01-19 LAB
ALBUMIN SERPL-MCNC: 4.1 G/DL (ref 3.5–5.1)
ALP BLD-CCNC: 56 U/L (ref 38–126)
ALT SERPL-CCNC: 11 U/L (ref 11–66)
ANION GAP SERPL CALCULATED.3IONS-SCNC: 11 MEQ/L (ref 8–16)
APTT: 36.1 SECONDS (ref 22–38)
AST SERPL-CCNC: 17 U/L (ref 5–40)
BASOPHILS # BLD: 0.5 %
BASOPHILS ABSOLUTE: 0 THOU/MM3 (ref 0–0.1)
BILIRUB SERPL-MCNC: 2.4 MG/DL (ref 0.3–1.2)
BILIRUBIN DIRECT: < 0.2 MG/DL (ref 0–0.3)
BILIRUBIN URINE: NEGATIVE
BLOOD, URINE: NEGATIVE
BUN BLDV-MCNC: 16 MG/DL (ref 7–22)
C-REACTIVE PROTEIN: 0.04 MG/DL (ref 0–1)
CALCIUM SERPL-MCNC: 9.7 MG/DL (ref 8.5–10.5)
CHARACTER, URINE: CLEAR
CHLORIDE BLD-SCNC: 99 MEQ/L (ref 98–111)
CO2: 29 MEQ/L (ref 23–33)
COLOR: YELLOW
CREAT SERPL-MCNC: 0.4 MG/DL (ref 0.4–1.2)
D-DIMER QUANTITATIVE: 351 NG/ML FEU (ref 0–500)
DIGOXIN LEVEL: 0.5 NG/ML (ref 0.5–2)
EKG ATRIAL RATE: 43 BPM
EKG Q-T INTERVAL: 384 MS
EKG QRS DURATION: 94 MS
EKG QTC CALCULATION (BAZETT): 389 MS
EKG R AXIS: 25 DEGREES
EKG T AXIS: -52 DEGREES
EKG VENTRICULAR RATE: 62 BPM
EOSINOPHIL # BLD: 2 %
EOSINOPHILS ABSOLUTE: 0.1 THOU/MM3 (ref 0–0.4)
ERYTHROCYTE [DISTWIDTH] IN BLOOD BY AUTOMATED COUNT: 13.2 % (ref 11.5–14.5)
ERYTHROCYTE [DISTWIDTH] IN BLOOD BY AUTOMATED COUNT: 50.1 FL (ref 35–45)
GFR SERPL CREATININE-BSD FRML MDRD: > 90 ML/MIN/1.73M2
GLUCOSE BLD-MCNC: 166 MG/DL (ref 70–108)
GLUCOSE URINE: NEGATIVE MG/DL
HCT VFR BLD CALC: 40.8 % (ref 37–47)
HEMOGLOBIN: 13.5 GM/DL (ref 12–16)
IMMATURE GRANS (ABS): 0.03 THOU/MM3 (ref 0–0.07)
IMMATURE GRANULOCYTES: 0.5 %
INR BLD: 1.49 (ref 0.85–1.13)
KETONES, URINE: NEGATIVE
LEUKOCYTE ESTERASE, URINE: NEGATIVE
LIPASE: 17.4 U/L (ref 5.6–51.3)
LYMPHOCYTES # BLD: 25.1 %
LYMPHOCYTES ABSOLUTE: 1.4 THOU/MM3 (ref 1–4.8)
MCH RBC QN AUTO: 33.9 PG (ref 26–33)
MCHC RBC AUTO-ENTMCNC: 33.1 GM/DL (ref 32.2–35.5)
MCV RBC AUTO: 102.5 FL (ref 81–99)
MONOCYTES # BLD: 8 %
MONOCYTES ABSOLUTE: 0.4 THOU/MM3 (ref 0.4–1.3)
NITRITE, URINE: NEGATIVE
NUCLEATED RED BLOOD CELLS: 0 /100 WBC
OSMOLALITY CALCULATION: 282.5 MOSMOL/KG (ref 275–300)
PH UA: 6.5 (ref 5–9)
PLATELET # BLD: 126 THOU/MM3 (ref 130–400)
PMV BLD AUTO: 10.3 FL (ref 9.4–12.4)
POTASSIUM REFLEX MAGNESIUM: 3.9 MEQ/L (ref 3.5–5.2)
PRO-BNP: 200.3 PG/ML (ref 0–1800)
PROCALCITONIN: 0.08 NG/ML (ref 0.01–0.09)
PROTEIN UA: NEGATIVE
RBC # BLD: 3.98 MILL/MM3 (ref 4.2–5.4)
SEG NEUTROPHILS: 63.9 %
SEGMENTED NEUTROPHILS ABSOLUTE COUNT: 3.5 THOU/MM3 (ref 1.8–7.7)
SODIUM BLD-SCNC: 139 MEQ/L (ref 135–145)
SPECIFIC GRAVITY, URINE: > 1.03 (ref 1–1.03)
TOTAL PROTEIN: 6.7 G/DL (ref 6.1–8)
TROPONIN T: < 0.01 NG/ML
UROBILINOGEN, URINE: 0.2 EU/DL (ref 0–1)
WBC # BLD: 5.5 THOU/MM3 (ref 4.8–10.8)

## 2021-01-19 PROCEDURE — 85025 COMPLETE CBC W/AUTO DIFF WBC: CPT

## 2021-01-19 PROCEDURE — 93005 ELECTROCARDIOGRAM TRACING: CPT | Performed by: NURSE PRACTITIONER

## 2021-01-19 PROCEDURE — 85379 FIBRIN DEGRADATION QUANT: CPT

## 2021-01-19 PROCEDURE — 81003 URINALYSIS AUTO W/O SCOPE: CPT

## 2021-01-19 PROCEDURE — 74177 CT ABD & PELVIS W/CONTRAST: CPT

## 2021-01-19 PROCEDURE — 85730 THROMBOPLASTIN TIME PARTIAL: CPT

## 2021-01-19 PROCEDURE — 76770 US EXAM ABDO BACK WALL COMP: CPT

## 2021-01-19 PROCEDURE — 84145 PROCALCITONIN (PCT): CPT

## 2021-01-19 PROCEDURE — 99284 EMERGENCY DEPT VISIT MOD MDM: CPT

## 2021-01-19 PROCEDURE — 6370000000 HC RX 637 (ALT 250 FOR IP): Performed by: NURSE PRACTITIONER

## 2021-01-19 PROCEDURE — 80162 ASSAY OF DIGOXIN TOTAL: CPT

## 2021-01-19 PROCEDURE — 85610 PROTHROMBIN TIME: CPT

## 2021-01-19 PROCEDURE — 86140 C-REACTIVE PROTEIN: CPT

## 2021-01-19 PROCEDURE — 6360000004 HC RX CONTRAST MEDICATION: Performed by: NURSE PRACTITIONER

## 2021-01-19 PROCEDURE — 83690 ASSAY OF LIPASE: CPT

## 2021-01-19 PROCEDURE — 36415 COLL VENOUS BLD VENIPUNCTURE: CPT

## 2021-01-19 PROCEDURE — 80048 BASIC METABOLIC PNL TOTAL CA: CPT

## 2021-01-19 PROCEDURE — 2580000003 HC RX 258: Performed by: NURSE PRACTITIONER

## 2021-01-19 PROCEDURE — 80076 HEPATIC FUNCTION PANEL: CPT

## 2021-01-19 PROCEDURE — 84484 ASSAY OF TROPONIN QUANT: CPT

## 2021-01-19 PROCEDURE — 96374 THER/PROPH/DIAG INJ IV PUSH: CPT

## 2021-01-19 PROCEDURE — 6360000002 HC RX W HCPCS: Performed by: NURSE PRACTITIONER

## 2021-01-19 PROCEDURE — 83880 ASSAY OF NATRIURETIC PEPTIDE: CPT

## 2021-01-19 RX ORDER — KETOROLAC TROMETHAMINE 30 MG/ML
15 INJECTION, SOLUTION INTRAMUSCULAR; INTRAVENOUS ONCE
Status: COMPLETED | OUTPATIENT
Start: 2021-01-19 | End: 2021-01-19

## 2021-01-19 RX ORDER — ETODOLAC 200 MG/1
200 CAPSULE ORAL EVERY 8 HOURS
Qty: 90 CAPSULE | Refills: 3 | Status: SHIPPED | OUTPATIENT
Start: 2021-01-19 | End: 2021-01-25

## 2021-01-19 RX ORDER — SODIUM CHLORIDE 9 MG/ML
1000 INJECTION, SOLUTION INTRAVENOUS CONTINUOUS
Status: DISCONTINUED | OUTPATIENT
Start: 2021-01-19 | End: 2021-01-19 | Stop reason: HOSPADM

## 2021-01-19 RX ORDER — HYDROCODONE BITARTRATE AND ACETAMINOPHEN 5; 325 MG/1; MG/1
0.5 TABLET ORAL ONCE
Status: COMPLETED | OUTPATIENT
Start: 2021-01-19 | End: 2021-01-19

## 2021-01-19 RX ADMIN — KETOROLAC TROMETHAMINE 15 MG: 30 INJECTION, SOLUTION INTRAMUSCULAR at 11:56

## 2021-01-19 RX ADMIN — HYDROCODONE BITARTRATE AND ACETAMINOPHEN 0.5 TABLET: 5; 325 TABLET ORAL at 12:52

## 2021-01-19 RX ADMIN — IOPAMIDOL 80 ML: 755 INJECTION, SOLUTION INTRAVENOUS at 09:08

## 2021-01-19 RX ADMIN — SODIUM CHLORIDE 1000 ML: 9 INJECTION, SOLUTION INTRAVENOUS at 08:04

## 2021-01-19 ASSESSMENT — PAIN DESCRIPTION - LOCATION: LOCATION: ABDOMEN

## 2021-01-19 ASSESSMENT — PAIN SCALES - GENERAL: PAINLEVEL_OUTOF10: 6

## 2021-01-19 ASSESSMENT — PAIN DESCRIPTION - PAIN TYPE: TYPE: ACUTE PAIN

## 2021-01-19 ASSESSMENT — PAIN DESCRIPTION - DESCRIPTORS: DESCRIPTORS: ACHING

## 2021-01-19 NOTE — ED NOTES
Presents with complaints of right sided abdominal pain that started during the night. Denies any nausea, vomiting, or diarrhea. States that she has not had this pain before. States that the pain is better.      Cassandra Guerrero RN  01/19/21 [id number]

## 2021-01-19 NOTE — ED PROVIDER NOTES
Claire [facility] [address] COMPLAINT       Chief Complaint   Patient presents with    Abdominal Pain       Nurses Notes reviewed and I agree except as noted in the HPI. HISTORY OF PRESENT ILLNESS    Dat Leyva is a 80 y.o. female who presents to the Emergency Department for the evaluation of right upper quadrant abdominal pain radiating through to her back. Patient was woken with pain at approximately 2 AM.  Reported pain as severe, radiating to her upper abdomen. States the pain has significantly improved since onset however still present. Has significant cardiac history for multiple stents with pacemaker. History of gallbladder removal, unsure of appendix. She denies exacerbating or alleviating factors, reports nausea without emesis. The HPI was provided by the patient. REVIEW OF SYSTEMS     Review of Systems   Constitutional: Positive for appetite change. HENT: Negative for congestion, rhinorrhea, sinus pressure, sinus pain, sore throat and trouble swallowing. Respiratory: Negative for cough and shortness of breath. Cardiovascular: Negative for chest pain, palpitations and leg swelling. Gastrointestinal: Positive for abdominal pain and nausea. Negative for diarrhea and vomiting. Endocrine: Negative for polydipsia, polyphagia and polyuria. Genitourinary: Negative for dysuria and flank pain. Musculoskeletal: Positive for back pain. Negative for arthralgias, myalgias, neck pain and neck stiffness. Skin: Negative for pallor, rash and wound. Neurological: Negative for dizziness, seizures, weakness, light-headedness, numbness and headaches.        PAST MEDICAL HISTORY has a past medical history of Atrial fibrillation ([facility] [address] Blood circulation, collateral, [employer] single pacemaker 4/26/2016, CAD (coronary artery disease), Cancer ([facility] [address] Carpal tunnel syndrome, Fracture dislocation of ankle, GERD (gastroesophageal reflux disease), Hyperlipidemia, Hypertension, Kidney lesion, native, right, Osteoarthritis, Osteopenia, Prolonged emergence from general anesthesia, Thyroid goiter, and Yersiniosis. SURGICAL HISTORY      has a past surgical history that includes Cholecystectomy (1986); Ankle fracture surgery (3381--2403); Hysterectomy (1971); Colon surgery (1954); Bladder surgery; eye surgery; Abdomen surgery; fracture surgery; Appendectomy; Colonoscopy; Carpal tunnel release (7/2013); joint replacement (1998); Carpal tunnel release (Right, 08/19/2017); Upper gastrointestinal endoscopy (Left, 11/28/2018); Upper gastrointestinal endoscopy (11/28/2018); Endoscopy, colon, diagnostic; Cardiac surgery; pacemaker placement; Percutaneous Transluminal Coronary Angio (01/15/2019); knee surgery (Left); and Cataract removal (Bilateral).     CURRENT MEDICATIONS       Discharge Medication List as of 1/19/2021 12:15 PM      CONTINUE these medications which have NOT CHANGED    Details   docusate sodium (COLACE) 100 MG capsule Take 1 capsule by mouth daily, Disp-90 capsule,R-3Normal      ferrous sulfate (IRON 325) 325 (65 Fe) MG tablet Take 1 tablet by mouth every other day, Disp-30 tablet,R-11Normal      pantoprazole (PROTONIX) 40 MG tablet Take 40 mg by mouth dailyHistorical Med      digoxin (LANOXIN) 125 MCG tablet Take 1 tablet by mouth daily, Disp-90 tablet, R-3Normal      venlafaxine (EFFEXOR XR) 75 MG extended release capsule Take 1 capsule by mouth daily, Disp-90 capsule, R-3Normal      atorvastatin (LIPITOR) 40 MG tablet Take 1 tablet by mouth nightly, Disp-90 tablet, R-3Normal      bumetanide (BUMEX) 0.5 MG tablet Take 1 tablet by mouth daily, Disp-90 tablet, R-3Normal rivaroxaban (XARELTO) 15 MG TABS tablet Take 1 tablet by mouth Daily with supper, Disp-90 tablet, R-3Normal      potassium chloride (KLOR-CON M) 20 MEQ extended release tablet Take 1 tablet by mouth once daily, Disp-90 tablet, R-3Normal      sucralfate (CARAFATE) 1 GM tablet TAKE 1 TABLET BY MOUTH TWO  TIMES DAILY, Disp-180 tablet, R-3Normal      Magnesium Oxide 250 MG TABS Take 1 tablet by mouth daily, Disp-30 tablet, R-1Print      NONFORMULARY Perils probiotic  1 daily      Cyanocobalamin (VITAMIN B 12 PO) Take by mouth every other day Historical Med      aspirin 81 MG chewable tablet Take 1 tablet by mouth daily, Disp-30 tablet, R-3      vitamin D (CHOLECALCIFEROL) 1000 UNIT TABS tablet Take 1,000 Units by mouth daily      Calcium-Vitamin D-Vitamin K 500-500-40 MG-UNT-MCG CHEW Take 1 tablet by mouth daily      acetaminophen (TYLENOL) 500 MG tablet Take 1,000 mg by mouth 3 times daily Historical Med      Biotin 1000 MCG TABS Take 1 tablet by mouth daily       nitroGLYCERIN (NITROSTAT) 0.4 MG SL tablet up to max of 3 total doses. If no relief after 1 dose, call 911., Disp-25 tablet, R-3Print             ALLERGIES     is allergic to methadone; gabapentin; lyrica [pregabalin]; and ultram [tramadol]. FAMILY HISTORY     She indicated that her mother is . She indicated that her father is . She indicated that both of her sisters are . She indicated that her brother is . She indicated that her son is alive. family history includes Heart Disease in her brother, father, mother, and son; High Blood Pressure in her brother, father, and mother. SOCIAL HISTORY      reports that she has never smoked. She has never used smokeless tobacco. She reports that she does not drink alcohol or use drugs.     PHYSICAL EXAM General: No swelling, tenderness, deformity or signs of injury. Right lower leg: No edema. Left lower leg: No edema. Lymphadenopathy:      Cervical: No cervical adenopathy. Skin:     General: Skin is warm and dry. Capillary Refill: Capillary refill takes less than 2 seconds. Neurological:      General: No focal deficit present. Mental Status: She is alert and oriented to person, place, and time. Mental status is at baseline. GCS: GCS eye subscore is 4. GCS verbal subscore is 5. GCS motor subscore is 6. Cranial Nerves: Cranial nerves are intact. Psychiatric:         Attention and Perception: Attention normal.         Mood and Affect: Mood normal.         Speech: Speech normal.         Behavior: Behavior normal. Behavior is cooperative. Thought Content: Thought content normal.         Cognition and Memory: Cognition and memory normal.         Judgment: Judgment normal.          DIFFERENTIAL DIAGNOSIS:   Kidney stone, pyelonephritis, PE    DIAGNOSTIC RESULTS     EKG: All EKG's are interpreted by the Emergency Department Physician who either signs or Co-signs this chart in the absence of a cardiologist.    Ventricular paced rhythm with rate of 62, QRS of 94, QTc of 389. Compared with EKG from May 4, 2019 with no significant changes    EKG interpreted by ED physician    RADIOLOGY: non-plainfilm images(s) such as CT, Ultrasound and MRI are read by the radiologist.    US RENAL COMPLETE   Final Result   1. The right renal lesion of concern is a 1.8 cm Bosniak 2 cyst.   2. There is a Bosniak 1 cyst involving the right kidney as well. 3. Bilateral elevated resistive indices may indicate medical renal disease. **This report has been created using voice recognition software. It may contain minor errors which are inherent in voice recognition technology. **      Final report electronically signed by Dr. Andrea Slaughter on 1/19/2021 11:17 AM CT ABDOMEN PELVIS W IV CONTRAST Additional Contrast? None   Final Result   1. Stable low-density mass lower pole right kidney. Differential would include a hemorrhagic cyst proteinaceous cyst or solid mass. Ultrasound may be beneficial for differentiation. 2. Uncomplicated colonic diverticulosis with mild constipation. 3. No acute abdominal or pelvic abnormalities. **This report has been created using voice recognition software. It may contain minor errors which are inherent in voice recognition technology. **      Final report electronically signed by Dr. Evita Bran on 1/19/2021 9:25 AM          LABS:     Labs Reviewed   CBC WITH AUTO DIFFERENTIAL - Abnormal; Notable for the following components:       Result Value    RBC 3.98 (*)     .5 (*)     MCH 33.9 (*)     RDW-SD 50.1 (*)     Platelets 669 (*)     All other components within normal limits   BASIC METABOLIC PANEL W/ REFLEX TO MG FOR LOW K - Abnormal; Notable for the following components:    Glucose 166 (*)     All other components within normal limits   HEPATIC FUNCTION PANEL - Abnormal; Notable for the following components:     Total Bilirubin 2.4 (*)     All other components within normal limits   PROTIME-INR - Abnormal; Notable for the following components:    INR 1.49 (*)     All other components within normal limits   URINE RT REFLEX TO CULTURE - Abnormal; Notable for the following components:    Specific Gravity, Urine > 1.030 (*)     All other components within normal limits   LIPASE   TROPONIN   BRAIN NATRIURETIC PEPTIDE   APTT   DIGOXIN LEVEL   PROCALCITONIN   C-REACTIVE PROTEIN   D-DIMER, QUANTITATIVE   ANION GAP   GLOMERULAR FILTRATION RATE, ESTIMATED   OSMOLALITY       EMERGENCY DEPARTMENT COURSE:   Vitals:    Vitals:    01/19/21 0841 01/19/21 0941 01/19/21 1120 01/19/21 1245   BP: (!) 186/63 (!) 160/67 (!) 156/65 (!) 150/70   Pulse: 68 67 65 64   Resp: 16 16 16 16   Temp:       TempSrc:       SpO2: 100% 98% 98% 98% Weight:           7:42 AM EST: The patient was seen and evaluated. MDM:  Patient seen and evaluated today for right upper quadrant abdominal pain radiating through to her back. Pain woke her up from sleeping in the night, on exam she was in no acute distress, dates that pain has improved significantly since onset. She was hydrated with IV fluids, appropriate labs and imaging as well as EKG were completed. Patient has significant cardiac history. No changes on EKG, troponin found to be negative. Initial CT of the abdomen suggestive of possible renal mass versus cyst.  Radiologist recommended ultrasound. Ultrasound was completed showing Bosniak cyst.  Patient's pain was refractory to NSAIDs. She was treated with a half of Norco.  Daughter at bedside states patient has very low tolerance for pain medications. I discussed all findings with patient and daughter. Recommended follow-up with PCP in 1 week for reevaluation return to the emergency department if symptoms become more severe. I discussed my work-up, findings, plan with my attending Dr. Fabrizio Sparks and she agreed with my plan. Patient's condition observed remained stable throughout ED stay vital signs in acceptable limits. Patient part of the ED in stable condition    CRITICAL CARE:   None    CONSULTS:  Dr. Fabrizio Sparks: ED attending    PROCEDURES:  none    FINAL IMPRESSION      1. Right upper quadrant abdominal pain    2.  Renal cyst, right          DISPOSITION/PLAN   Discharge    PATIENT REFERRED TO:  L[address], [address]  [address] [place] [address]  [phone]    Schedule an appointment as soon as possible for a visit in 1 week      [facility] [address] [address] EMERGENCY DEPT  [address]  Go to   If symptoms worsen      DISCHARGE MEDICATIONS:  Discharge Medication List as of 1/19/2021 12:15 PM      START taking these medications    Details etodolac (LODINE) 200 MG capsule Take 1 capsule by mouth every 8 hours, Disp-90 capsule, R-3Print             (Please note that portions of this note were completed with a voice recognition program.  Efforts were made to edit the dictations but occasionally words are mis-transcribed.)    The patient was given an opportunity to see the Emergency Attending. The patient voiced understanding that I was a Mid-LevelProvider and was in agreement with being seen independently by myself.      NOELLE Mayo CNP, 1/19/21, 9:28 PM       NOELLE Mayo CNP  01/20/21 6305

## 2021-01-20 ASSESSMENT — ENCOUNTER SYMPTOMS
DIARRHEA: 0
SORE THROAT: 0
NAUSEA: 1
SINUS PRESSURE: 0
COUGH: 0
SINUS PAIN: 0
SHORTNESS OF BREATH: 0
VOMITING: 0
ABDOMINAL PAIN: 1
BACK PAIN: 1
RHINORRHEA: 0
TROUBLE SWALLOWING: 0

## 2021-01-25 ENCOUNTER — TELEPHONE (OUTPATIENT)
Dept: FAMILY MEDICINE CLINIC | Age: 86
End: 2021-01-25

## 2021-01-25 ENCOUNTER — APPOINTMENT (OUTPATIENT)
Dept: GENERAL RADIOLOGY | Age: 86
DRG: 866 | End: 2021-01-25
Payer: MEDICARE

## 2021-01-25 ENCOUNTER — OFFICE VISIT (OUTPATIENT)
Dept: FAMILY MEDICINE CLINIC | Age: 86
End: 2021-01-25
Payer: MEDICARE

## 2021-01-25 ENCOUNTER — HOSPITAL ENCOUNTER (INPATIENT)
Age: 86
LOS: 4 days | Discharge: NURSING, REHAB OR LONG-TERM CARE | DRG: 866 | End: 2021-01-29
Attending: EMERGENCY MEDICINE | Admitting: INTERNAL MEDICINE
Payer: MEDICARE

## 2021-01-25 VITALS
DIASTOLIC BLOOD PRESSURE: 82 MMHG | SYSTOLIC BLOOD PRESSURE: 128 MMHG | TEMPERATURE: 97.6 F | RESPIRATION RATE: 14 BRPM | HEART RATE: 75 BPM | OXYGEN SATURATION: 96 %

## 2021-01-25 DIAGNOSIS — R53.1 WEAKNESS GENERALIZED: Primary | ICD-10-CM

## 2021-01-25 DIAGNOSIS — N28.1 RENAL CYST, RIGHT: ICD-10-CM

## 2021-01-25 DIAGNOSIS — R79.89 ELEVATED LACTIC ACID LEVEL: ICD-10-CM

## 2021-01-25 DIAGNOSIS — Z78.9 DEFICIT IN ACTIVITIES OF DAILY LIVING (ADL): ICD-10-CM

## 2021-01-25 DIAGNOSIS — R53.1 GENERALIZED WEAKNESS: Primary | ICD-10-CM

## 2021-01-25 DIAGNOSIS — R23.1 PALE: ICD-10-CM

## 2021-01-25 DIAGNOSIS — E80.6 HYPERBILIRUBINEMIA: ICD-10-CM

## 2021-01-25 LAB
ALBUMIN SERPL-MCNC: 3.7 GM/DL (ref 3.4–5)
ALP BLD-CCNC: 61 U/L (ref 46–116)
ALT SERPL-CCNC: 15 U/L (ref 14–63)
AMORPHOUS: ABNORMAL
ANION GAP: 11 MEQ/L (ref 8–16)
AST SERPL-CCNC: 18 U/L (ref 15–37)
BACTERIA: ABNORMAL
BASOPHILS # BLD: 0.4 % (ref 0–3)
BILIRUB SERPL-MCNC: 2.8 MG/DL (ref 0.2–1)
BILIRUBIN URINE: NEGATIVE
BLOOD, URINE: ABNORMAL
BUN BLDV-MCNC: 14 MG/DL (ref 7–18)
CASTS UA: ABNORMAL /LPF
CHARACTER, URINE: CLEAR
CHLORIDE BLD-SCNC: 96 MEQ/L (ref 98–107)
CO2: 31 MEQ/L (ref 21–32)
COLOR: ABNORMAL
CREAT SERPL-MCNC: 0.8 MG/DL (ref 0.6–1.3)
CRYSTALS, UA: ABNORMAL
DATE LAST DOSE: NORMAL
DIGOXIN LEVEL: 1 NG/ML (ref 0.9–2)
DOSE TIME: NORMAL
EKG ATRIAL RATE: 78 BPM
EKG Q-T INTERVAL: 410 MS
EKG QRS DURATION: 92 MS
EKG QTC CALCULATION (BAZETT): 457 MS
EKG R AXIS: 13 DEGREES
EKG T AXIS: -67 DEGREES
EKG VENTRICULAR RATE: 75 BPM
EOSINOPHILS RELATIVE PERCENT: 1.3 % (ref 0–4)
EPITHELIAL CELLS, UA: ABNORMAL /HPF
GFR, ESTIMATED: 71 ML/MIN/1.73M2
GLUCOSE BLD-MCNC: 168 MG/DL (ref 74–106)
GLUCOSE, URINE: NEGATIVE MG/DL
HCT VFR BLD CALC: 42.7 % (ref 37–47)
HEMOGLOBIN: 14.1 GM/DL (ref 12–16)
KETONES, URINE: NEGATIVE
LACTATE: 2.2 MMOL/L (ref 0.9–1.7)
LEUKOCYTE ESTERASE, URINE: NEGATIVE
LIPASE: 39 U/L (ref 73–393)
LYMPHOCYTES # BLD: 14.7 % (ref 15–47)
MAGNESIUM: 1.7 MG/DL (ref 1.8–2.4)
MCH RBC QN AUTO: 34.2 PG (ref 27–31)
MCHC RBC AUTO-ENTMCNC: 33 GM/DL (ref 33–37)
MCV RBC AUTO: 103.8 FL (ref 81–99)
MONOCYTES: 8.6 % (ref 0–12)
MUCUS: ABNORMAL
NITRITE, URINE: NEGATIVE
NT PRO BNP: 718 PG/ML (ref 0–1800)
PDW BLD-RTO: 12 % (ref 11.5–14.5)
PH UA: 6 (ref 5–9)
PLATELET # BLD: 148 THOU/MM3 (ref 130–400)
PMV BLD AUTO: 7.9 FL (ref 7.4–10.4)
POC CALCIUM: 9.4 MG/DL (ref 8.5–10.1)
POTASSIUM SERPL-SCNC: 3.5 MEQ/L (ref 3.5–5.1)
PROTEIN UA: NEGATIVE MG/DL
RBC # BLD: 4.11 MILL/MM3 (ref 4.2–5.4)
RBC UA: ABNORMAL /HPF
REFLEX TO URINE C & S: ABNORMAL
SARS-COV-2, NAAT: NOT DETECTED
SEGS: 75 % (ref 43–75)
SODIUM BLD-SCNC: 138 MEQ/L (ref 136–145)
SPECIFIC GRAVITY UA: 1.01 (ref 1–1.03)
TOTAL PROTEIN: 7.3 GM/DL (ref 6.4–8.2)
TROPONIN I: 0.06 NG/ML (ref 0.02–0.06)
UROBILINOGEN, URINE: 0.2 EU/DL (ref 0–1)
WBC # BLD: 6.9 THOU/MM3 (ref 4.8–10.8)
WBC UA: ABNORMAL /HPF

## 2021-01-25 PROCEDURE — 4040F PNEUMOC VAC/ADMIN/RCVD: CPT | Performed by: FAMILY MEDICINE

## 2021-01-25 PROCEDURE — 2580000003 HC RX 258: Performed by: EMERGENCY MEDICINE

## 2021-01-25 PROCEDURE — 6370000000 HC RX 637 (ALT 250 FOR IP): Performed by: PHYSICIAN ASSISTANT

## 2021-01-25 PROCEDURE — 81001 URINALYSIS AUTO W/SCOPE: CPT

## 2021-01-25 PROCEDURE — 1200000000 HC SEMI PRIVATE

## 2021-01-25 PROCEDURE — 96374 THER/PROPH/DIAG INJ IV PUSH: CPT

## 2021-01-25 PROCEDURE — 1123F ACP DISCUSS/DSCN MKR DOCD: CPT | Performed by: FAMILY MEDICINE

## 2021-01-25 PROCEDURE — 1090F PRES/ABSN URINE INCON ASSESS: CPT | Performed by: FAMILY MEDICINE

## 2021-01-25 PROCEDURE — 74022 RADEX COMPL AQT ABD SERIES: CPT

## 2021-01-25 PROCEDURE — 83735 ASSAY OF MAGNESIUM: CPT

## 2021-01-25 PROCEDURE — 93005 ELECTROCARDIOGRAM TRACING: CPT | Performed by: EMERGENCY MEDICINE

## 2021-01-25 PROCEDURE — 99222 1ST HOSP IP/OBS MODERATE 55: CPT | Performed by: PHYSICIAN ASSISTANT

## 2021-01-25 PROCEDURE — U0002 COVID-19 LAB TEST NON-CDC: HCPCS

## 2021-01-25 PROCEDURE — 2709999900 HC NON-CHARGEABLE SUPPLY

## 2021-01-25 PROCEDURE — 83690 ASSAY OF LIPASE: CPT

## 2021-01-25 PROCEDURE — G8417 CALC BMI ABV UP PARAM F/U: HCPCS | Performed by: FAMILY MEDICINE

## 2021-01-25 PROCEDURE — 80053 COMPREHEN METABOLIC PANEL: CPT

## 2021-01-25 PROCEDURE — 80162 ASSAY OF DIGOXIN TOTAL: CPT

## 2021-01-25 PROCEDURE — 84484 ASSAY OF TROPONIN QUANT: CPT

## 2021-01-25 PROCEDURE — 83880 ASSAY OF NATRIURETIC PEPTIDE: CPT

## 2021-01-25 PROCEDURE — 6360000002 HC RX W HCPCS: Performed by: EMERGENCY MEDICINE

## 2021-01-25 PROCEDURE — 99214 OFFICE O/P EST MOD 30 MIN: CPT | Performed by: FAMILY MEDICINE

## 2021-01-25 PROCEDURE — G8482 FLU IMMUNIZE ORDER/ADMIN: HCPCS | Performed by: FAMILY MEDICINE

## 2021-01-25 PROCEDURE — 36415 COLL VENOUS BLD VENIPUNCTURE: CPT

## 2021-01-25 PROCEDURE — 96361 HYDRATE IV INFUSION ADD-ON: CPT

## 2021-01-25 PROCEDURE — 99283 EMERGENCY DEPT VISIT LOW MDM: CPT

## 2021-01-25 PROCEDURE — 1036F TOBACCO NON-USER: CPT | Performed by: FAMILY MEDICINE

## 2021-01-25 PROCEDURE — G8427 DOCREV CUR MEDS BY ELIG CLIN: HCPCS | Performed by: FAMILY MEDICINE

## 2021-01-25 PROCEDURE — 83605 ASSAY OF LACTIC ACID: CPT

## 2021-01-25 PROCEDURE — 85025 COMPLETE CBC W/AUTO DIFF WBC: CPT

## 2021-01-25 RX ORDER — SODIUM CHLORIDE 0.9 % (FLUSH) 0.9 %
10 SYRINGE (ML) INJECTION PRN
Status: DISCONTINUED | OUTPATIENT
Start: 2021-01-25 | End: 2021-01-29 | Stop reason: HOSPADM

## 2021-01-25 RX ORDER — SODIUM CHLORIDE 9 MG/ML
INJECTION, SOLUTION INTRAVENOUS CONTINUOUS
Status: DISCONTINUED | OUTPATIENT
Start: 2021-01-25 | End: 2021-01-26

## 2021-01-25 RX ORDER — MULTIVIT WITH MINERALS/LUTEIN
250 TABLET ORAL DAILY
Status: ON HOLD | COMMUNITY
End: 2021-01-29 | Stop reason: HOSPADM

## 2021-01-25 RX ORDER — SODIUM CHLORIDE 0.9 % (FLUSH) 0.9 %
10 SYRINGE (ML) INJECTION EVERY 12 HOURS SCHEDULED
Status: DISCONTINUED | OUTPATIENT
Start: 2021-01-25 | End: 2021-01-29 | Stop reason: HOSPADM

## 2021-01-25 RX ORDER — GUAIFENESIN 400 MG/1
400 TABLET ORAL 4 TIMES DAILY PRN
Status: ON HOLD | COMMUNITY
End: 2021-01-29 | Stop reason: HOSPADM

## 2021-01-25 RX ORDER — BUMETANIDE 1 MG/1
0.5 TABLET ORAL DAILY
Status: DISCONTINUED | OUTPATIENT
Start: 2021-01-26 | End: 2021-01-27

## 2021-01-25 RX ORDER — ATORVASTATIN CALCIUM 40 MG/1
40 TABLET, FILM COATED ORAL NIGHTLY
Status: DISCONTINUED | OUTPATIENT
Start: 2021-01-25 | End: 2021-01-29 | Stop reason: HOSPADM

## 2021-01-25 RX ORDER — ASPIRIN 81 MG/1
81 TABLET, CHEWABLE ORAL DAILY
Status: DISCONTINUED | OUTPATIENT
Start: 2021-01-26 | End: 2021-01-29 | Stop reason: HOSPADM

## 2021-01-25 RX ORDER — VENLAFAXINE HYDROCHLORIDE 75 MG/1
75 CAPSULE, EXTENDED RELEASE ORAL DAILY
Status: DISCONTINUED | OUTPATIENT
Start: 2021-01-26 | End: 2021-01-29 | Stop reason: HOSPADM

## 2021-01-25 RX ORDER — 0.9 % SODIUM CHLORIDE 0.9 %
500 INTRAVENOUS SOLUTION INTRAVENOUS ONCE
Status: COMPLETED | OUTPATIENT
Start: 2021-01-25 | End: 2021-01-25

## 2021-01-25 RX ORDER — ONDANSETRON 2 MG/ML
4 INJECTION INTRAMUSCULAR; INTRAVENOUS ONCE
Status: COMPLETED | OUTPATIENT
Start: 2021-01-25 | End: 2021-01-25

## 2021-01-25 RX ORDER — ACETAMINOPHEN 325 MG/1
650 TABLET ORAL EVERY 6 HOURS PRN
Status: DISCONTINUED | OUTPATIENT
Start: 2021-01-25 | End: 2021-01-29 | Stop reason: HOSPADM

## 2021-01-25 RX ORDER — PROMETHAZINE HYDROCHLORIDE 25 MG/1
12.5 TABLET ORAL EVERY 6 HOURS PRN
Status: DISCONTINUED | OUTPATIENT
Start: 2021-01-25 | End: 2021-01-29 | Stop reason: HOSPADM

## 2021-01-25 RX ORDER — PANTOPRAZOLE SODIUM 40 MG/1
40 TABLET, DELAYED RELEASE ORAL DAILY
Status: DISCONTINUED | OUTPATIENT
Start: 2021-01-26 | End: 2021-01-29 | Stop reason: HOSPADM

## 2021-01-25 RX ORDER — DIGOXIN 125 MCG
125 TABLET ORAL DAILY
Status: DISCONTINUED | OUTPATIENT
Start: 2021-01-26 | End: 2021-01-29 | Stop reason: HOSPADM

## 2021-01-25 RX ORDER — POTASSIUM CHLORIDE 7.45 MG/ML
10 INJECTION INTRAVENOUS PRN
Status: DISCONTINUED | OUTPATIENT
Start: 2021-01-25 | End: 2021-01-29 | Stop reason: HOSPADM

## 2021-01-25 RX ORDER — MAGNESIUM SULFATE IN WATER 40 MG/ML
2000 INJECTION, SOLUTION INTRAVENOUS PRN
Status: DISCONTINUED | OUTPATIENT
Start: 2021-01-25 | End: 2021-01-29 | Stop reason: HOSPADM

## 2021-01-25 RX ORDER — ACETAMINOPHEN 650 MG/1
650 SUPPOSITORY RECTAL EVERY 6 HOURS PRN
Status: DISCONTINUED | OUTPATIENT
Start: 2021-01-25 | End: 2021-01-29 | Stop reason: HOSPADM

## 2021-01-25 RX ORDER — POLYETHYLENE GLYCOL 3350 17 G/17G
17 POWDER, FOR SOLUTION ORAL DAILY PRN
Status: DISCONTINUED | OUTPATIENT
Start: 2021-01-25 | End: 2021-01-29 | Stop reason: HOSPADM

## 2021-01-25 RX ORDER — ONDANSETRON 2 MG/ML
4 INJECTION INTRAMUSCULAR; INTRAVENOUS EVERY 6 HOURS PRN
Status: DISCONTINUED | OUTPATIENT
Start: 2021-01-25 | End: 2021-01-29 | Stop reason: HOSPADM

## 2021-01-25 RX ORDER — ZINC SULFATE 50(220)MG
50 CAPSULE ORAL DAILY
Status: ON HOLD | COMMUNITY
End: 2021-01-29 | Stop reason: HOSPADM

## 2021-01-25 RX ORDER — POTASSIUM CHLORIDE 20 MEQ/1
40 TABLET, EXTENDED RELEASE ORAL PRN
Status: DISCONTINUED | OUTPATIENT
Start: 2021-01-25 | End: 2021-01-29 | Stop reason: HOSPADM

## 2021-01-25 RX ADMIN — RIVAROXABAN 15 MG: 15 TABLET, FILM COATED ORAL at 23:49

## 2021-01-25 RX ADMIN — ONDANSETRON 4 MG: 2 INJECTION INTRAMUSCULAR; INTRAVENOUS at 17:27

## 2021-01-25 RX ADMIN — ATORVASTATIN CALCIUM 40 MG: 40 TABLET, FILM COATED ORAL at 23:49

## 2021-01-25 RX ADMIN — SODIUM CHLORIDE 500 ML: 9 INJECTION, SOLUTION INTRAVENOUS at 17:27

## 2021-01-25 ASSESSMENT — ENCOUNTER SYMPTOMS
ALLERGIC/IMMUNOLOGIC NEGATIVE: 1
NAUSEA: 1
COUGH: 1
VOMITING: 0
SORE THROAT: 0
DIARRHEA: 0
ABDOMINAL PAIN: 1
WHEEZING: 0
SHORTNESS OF BREATH: 1
EYES NEGATIVE: 1

## 2021-01-25 ASSESSMENT — PATIENT HEALTH QUESTIONNAIRE - PHQ9
SUM OF ALL RESPONSES TO PHQ QUESTIONS 1-9: 1
SUM OF ALL RESPONSES TO PHQ9 QUESTIONS 1 & 2: 1
1. LITTLE INTEREST OR PLEASURE IN DOING THINGS: 1

## 2021-01-25 NOTE — PROGRESS NOTES
Post-Discharge Transitional Care Management [address] [place] [address]   YOB: 1928    Date of Visit:  1/25/2021     HPI  Here for follow up from the ER on 1-19-21. She was having right upper quadrant and flank pain that radiated to the right lower abdomen. In the ER they only found a right renal cyst.  She was sent home with lodine which made her hot and shaky and spacy so she switched to tylenol alone 2 days ago. She is now weaker, more shaky, but not spacy. This is associated with headache, earache, chills, sweats and lower appetite. She is getting so weak that her daughter Xochitl Groves who has been staying with her is no longer able to help her walk to the bathroom or to do any other activities.       Allergies   Allergen Reactions    Methadone Shortness Of Breath     Shakes and nausea    Gabapentin     Lyrica [Pregabalin]     Ultram [Tramadol]      Nausea, pedal edema, SOB     Outpatient Medications Marked as Taking for the 1/25/21 encounter (Office Visit) with Kyle Velasco MD   Medication Sig Dispense Refill    Ascorbic Acid (VITAMIN C) 250 MG tablet Take 250 mg by mouth daily      zinc sulfate (ZINCATE) 220 (50 Zn) MG capsule Take 50 mg by mouth daily      guaiFENesin 400 MG tablet Take 400 mg by mouth 4 times daily as needed for Cough      docusate sodium (COLACE) 100 MG capsule Take 1 capsule by mouth daily (Patient taking differently: Take 100 mg by mouth every other day ) 90 capsule 3    ferrous sulfate (IRON 325) 325 (65 Fe) MG tablet Take 1 tablet by mouth every other day 30 tablet 11    pantoprazole (PROTONIX) 40 MG tablet Take 40 mg by mouth daily      digoxin (LANOXIN) 125 MCG tablet Take 1 tablet by mouth daily 90 tablet 3    venlafaxine (EFFEXOR XR) 75 MG extended release capsule Take 1 capsule by mouth daily 90 capsule 3    atorvastatin (LIPITOR) 40 MG tablet Take 1 tablet by mouth nightly 90 tablet 3    bumetanide (BUMEX) 0.5 MG tablet Take 1 tablet by mouth daily 90 tablet 3    rivaroxaban (XARELTO) 15 MG TABS tablet Take 1 tablet by mouth Daily with supper 90 tablet 3    potassium chloride (KLOR-CON M) 20 MEQ extended release tablet Take 1 tablet by mouth once daily 90 tablet 3    sucralfate (CARAFATE) 1 GM tablet TAKE 1 TABLET BY MOUTH TWO  TIMES DAILY 180 tablet 3    nitroGLYCERIN (NITROSTAT) 0.4 MG SL tablet up to max of 3 total doses. If no relief after 1 dose, call 911. 25 tablet 3    Magnesium Oxide 250 MG TABS Take 1 tablet by mouth daily 30 tablet 1    NONFORMULARY Perils probiotic  1 daily      Cyanocobalamin (VITAMIN B 12 PO) Take by mouth every other day       aspirin 81 MG chewable tablet Take 1 tablet by mouth daily 30 tablet 3    vitamin D (CHOLECALCIFEROL) 1000 UNIT TABS tablet Take 1,000 Units by mouth daily      Calcium-Vitamin D-Vitamin K 500-500-40 MG-UNT-MCG CHEW Take 1 tablet by mouth daily      acetaminophen (TYLENOL) 500 MG tablet Take 1,000 mg by mouth 4 times daily       Biotin 1000 MCG TABS Take 1 tablet by mouth daily            Vitals:    01/25/21 1527   BP: 128/82   Site: Left Lower Arm   Position: Sitting   Cuff Size: Medium Adult   Pulse: 75   Resp: 14   Temp: 97.6 °F (36.4 °C)   TempSrc: Temporal   SpO2: 96%     There is no height or weight on file to calculate BMI. Wt Readings from Last 3 Encounters:   01/25/21 171 lb (77.6 kg)   01/19/21 171 lb (77.6 kg)   12/16/20 171 lb (77.6 kg)     BP Readings from Last 3 Encounters:   01/25/21 (!) 144/72   01/25/21 128/82   01/19/21 (!) 150/70        was admitted to the ER for 1 day from 1-19-21 to 1-19-21 for right renal cyst.    course: Discharge summary reviewed- see chart. Current status: poor    Review of Systems:  Review of Systems  Constitutional: Negative for fever, chills, diaphoresis, activity change, appetite change and fatigue. HENT: Negative for hearing loss, ear pain, congestion, sore throat, rhinorrhea, postnasal drip and ear discharge.     Eyes: Negative for photophobia and visual disturbance. Respiratory: Negative for cough, chest tightness, shortness of breath and wheezing. Cardiovascular: Negative for chest pain and leg swelling. Gastrointestinal: Negative for nausea, vomiting, abdominal pain, diarrhea and constipation. Genitourinary: Negative for dysuria, urgency and frequency. Neurological: Positive for weakness, light-headedness and headaches. Psychiatric/Behavioral: Negative for sleep disturbance. Physical Exam:  Physical Exam   Constitutional: Vital signs are normal. She appears well-developed and well-nourished. She is pale and very frail. Dependent on a wheelchair for mobility and can not stand up on her own. When I lean her forward to listen to her lungs she can hardly sit back again in the chair. HENT:   Head: Normocephalic and atraumatic. Right Ear: Tympanic membrane, external ear and ear canal normal. No drainage or tenderness. Left Ear: Tympanic membrane, external ear and ear canal normal. No drainage or tenderness. Nose: Nose normal. No mucosal edema or rhinorrhea. Mouth/Throat: Uvula is midline, oropharynx is clear and moist and mucous membranes are normal. Mucous membranes are not pale. Normal dentition. No posterior oropharyngeal edema or posterior oropharyngeal erythema. Eyes: Lids are normal. Right eye exhibits no chemosis and no discharge. Left eye exhibits no chemosis and no drainage. Right conjunctiva has no hemorrhage. Left conjunctiva has no hemorrhage. Right eye exhibits normal extraocular motion. Left eye exhibits normal extraocular motion. Right pupil is round and reactive. Left pupil is round and reactive. Pupils are equal.   Cardiovascular: Normal rate, regular rhythm, S1 normal, S2 normal and normal heart sounds. Exam reveals no gallop. No murmur heard. Pulmonary/Chest: Effort normal and breath sounds normal. No respiratory distress. She has no wheezes. She has no rhonchi. She has no rales.    Abdominal: Soft. Normal appearance and bowel sounds are normal. She exhibits no distension and no mass. There is no hepatosplenomegaly. No tenderness. She has no rigidity, no rebound and no guarding. No hernia. Musculoskeletal:        Right lower leg: She exhibits no edema. Left lower leg: She exhibits no edema. Neurological: She is alert. Initial post-discharge communication did not occur. Assessment/Plan:  Gomez Alcantar was seen today for follow-up. Diagnoses and all orders for this visit:    Weakness generalized    Renal cyst, right    Pale        Go To ER for eval and treat.     Diagnostic testresults reviewed: inpatient labs, EKG, CT-abdomen and US-kidney    Patient risk of morbidity and mortality: high    Medical Decision Making:high complexity

## 2021-01-25 NOTE — ED PROVIDER NOTES
[facility]  eMERGENCY dEPARTMENT eNCOUnter             Dm Hope 19 COMPLAINT    Chief Complaint   Patient presents with    Fatigue       Nurses Notes reviewed and I agree except as noted in the HPI. HPI    Marilyn Hernandez is a 80 y.o. female who presents with a 1-2-week history of generalized malaise, much worse in the last week. She was originally having some right upper quadrant pain. She went to the main emergency department at [place] [address] on 1/19/2021. No specific cause for her pain was found, but she was found to have some renal cysts. She was sent home on symptomatic care, but is continuing to feel worse. She feels short of breath with exertion. She still has occasional pain in her right side of her abdomen, but this seems to be improved with Tylenol. The main complaint is that she feels so tired and so weak. Her daughter, who lives next door to her, states that she pretty much has to do everything for her now. Prior to onset of this illness, the patient could do simple things like take herself to the bathroom using a walker, dress herself, feed herself. Now, she is pretty totally dependent upon the daughter for all activities of daily living. No known exposure to COVID. REVIEW OF SYSTEMS      Review of Systems   Constitutional: Positive for chills and malaise/fatigue. Negative for diaphoresis and fever. HENT: Negative for congestion and sore throat. Respiratory: Positive for cough (Nonproductive) and shortness of breath (With any exertion). Negative for wheezing. Cardiovascular: Positive for leg swelling (chronic). Negative for chest pain and palpitations. Gastrointestinal: Positive for abdominal pain (Improving) and nausea. Negative for diarrhea and vomiting. Genitourinary: Negative for dysuria. Musculoskeletal: Positive for myalgias. Negative for falls. DIGOXIN (LANOXIN) 125 MCG TABLET    Take 1 tablet by mouth daily    DOCUSATE SODIUM (COLACE) 100 MG CAPSULE    Take 1 capsule by mouth daily    FERROUS SULFATE (IRON 325) 325 (65 FE) MG TABLET    Take 1 tablet by mouth every other day    GUAIFENESIN 400 MG TABLET    Take 400 mg by mouth 4 times daily as needed for Cough    MAGNESIUM OXIDE 250 MG TABS    Take 1 tablet by mouth daily    NITROGLYCERIN (NITROSTAT) 0.4 MG SL TABLET    up to max of 3 total doses. If no relief after 1 dose, call 911. NONFORMULARY    Perils probiotic  1 daily    PANTOPRAZOLE (PROTONIX) 40 MG TABLET    Take 40 mg by mouth daily    POTASSIUM CHLORIDE (KLOR-CON M) 20 MEQ EXTENDED RELEASE TABLET    Take 1 tablet by mouth once daily    RIVAROXABAN (XARELTO) 15 MG TABS TABLET    Take 1 tablet by mouth Daily with supper    SUCRALFATE (CARAFATE) 1 GM TABLET    TAKE 1 TABLET BY MOUTH TWO  TIMES DAILY    VENLAFAXINE (EFFEXOR XR) 75 MG EXTENDED RELEASE CAPSULE    Take 1 capsule by mouth daily    VITAMIN D (CHOLECALCIFEROL) 1000 UNIT TABS TABLET    Take 1,000 Units by mouth daily    ZINC SULFATE (ZINCATE) 220 (50 ZN) MG CAPSULE    Take 50 mg by mouth daily       ALLERGIES    is allergic to methadone; gabapentin; lyrica [pregabalin]; and ultram [tramadol]. FAMILY HISTORY    She indicated that her mother is . She indicated that her father is . She indicated that both of her sisters are . She indicated that her brother is . She indicated that her son is alive. family history includes Heart Disease in her brother, father, mother, and son; High Blood Pressure in her brother, father, and mother. SOCIAL HISTORY     reports that she has never smoked. She has never used smokeless tobacco. She reports that she does not drink alcohol or use drugs.     PHYSICAL EXAM INITIAL VITALS: BP (!) 155/77   Pulse 78   Temp 98.6 °F (37 °C) (Tympanic)   Resp 24   Ht 5' 2\" (1.575 m)   Wt 171 lb (77.6 kg)   LMP  (LMP Unknown)   SpO2 96%   BMI 31.28 kg/m²    Physical Exam  Vitals signs and nursing note reviewed. Exam conducted with a chaperone present. Constitutional:       Appearance: She is ill-appearing. HENT:      Right Ear: Tympanic membrane and ear canal normal.      Left Ear: Tympanic membrane and ear canal normal.      Nose: No congestion. Mouth/Throat:      Comments: Decreased oral moisture  Eyes:      Pupils: Pupils are equal, round, and reactive to light. Comments: Conjunctivae pale   Neck:      Musculoskeletal: Neck supple. Cardiovascular:      Rate and Rhythm: Normal rate and regular rhythm. Heart sounds: Murmur present. Pulmonary:      Comments: Decreased breath sounds, no distress, no wheezing noted. She coughs with forced exhalation. Abdominal:      General: Bowel sounds are normal.      Palpations: Abdomen is soft. Tenderness: There is abdominal tenderness (Lower quadrants, no rebound, guarding, mass. ). There is no right CVA tenderness or left CVA tenderness. Musculoskeletal:         General: Swelling (1+ edema in both lower legs. No calf tenderness.) present. Lymphadenopathy:      Cervical: No cervical adenopathy. Skin:     General: Skin is warm and dry. Coloration: Skin is pale. Neurological:      General: No focal deficit present. Mental Status: She is alert. Comments: Oriented to person, place, interacting with her daughter. Psychiatric:         Behavior: Behavior normal.       DIAGNOSTIC RESULTS    EKG     Junctional rhythm, diffuse nonspecific T wave changes, similar to 1/19/2021, except previous EKG showed some paced beats, not seen on this current EKG. RADIOLOGY:    XR ACUTE ABD SERIES CHEST 1 VW   Final Result   No discrete lobar consolidation. Nonspecific bowel gas pattern. **This report has been created using voice recognition software. It may contain minor errors which are inherent in voice recognition technology. **      Final report electronically signed by Dr. Merlin Aures on 1/25/2021 6:56 PM         LABS:     Labs Reviewed   CBC WITH AUTO DIFFERENTIAL - Abnormal; Notable for the following components:       Result Value    RBC 4.11 (*)     .8 (*)     MCH 34.2 (*)     Lymphocytes 14.7 (*)     All other components within normal limits   COMPREHENSIVE METABOLIC PANEL - Abnormal; Notable for the following components:    Glucose 168 (*)     Chloride 96 (*)     Total Bilirubin 2.8 (*)     All other components within normal limits   MAGNESIUM - Abnormal; Notable for the following components:    Magnesium 1.7 (*)     All other components within normal limits   LACTIC ACID - Abnormal; Notable for the following components:    Lactate 2.20 (*)     All other components within normal limits   URINE RT REFLEX TO CULTURE - Abnormal; Notable for the following components:    Blood, Urine TRACE (*)     All other components within normal limits   LIPASE - Abnormal; Notable for the following components:    Lipase 39.0 (*)     All other components within normal limits   GLOMERULAR FILTRATION RATE, ESTIMATED - Abnormal; Notable for the following components:    GFR, Estimated 71 (*)     All other components within normal limits   COVID-19   DIGOXIN   BRAIN NATRIURETIC PEPTIDE   TROPONIN   ANION GAP       Vitals:    Vitals:    01/25/21 1714 01/25/21 1730 01/25/21 1800 01/25/21 1921   BP: (!) 182/68   (!) 155/77   Pulse: 74 76 74 78   Resp: 25 23 19 24   Temp:       TempSrc:       SpO2: 96% 97% 96%    Weight:       Height:           EMERGENCY DEPARTMENT COURSE: IV hydration, IV Zofran given. She requires 3 person assist to get up from the bed to get into a wheelchair. She is noted to be very weak. She remains in junctional rhythm. She denies any chest pain or resting dyspnea. She continues to say \"I just feel so terrible \". I contacted the [place] at [facility], who arranged for admission to Dr. Ame Sanford. FINAL IMPRESSION      1. Generalized weakness    2. Deficit in activities of daily living (ADL)    3. Elevated lactic acid level    4. Hyperbilirubinemia        DISPOSITION/PLAN    DISPOSITION Admitted 01/25/2021 07:21:01 PM To [facility], ambulance transport.        (Please note that portions of this note were completed with a voice recognition program.  Efforts were made to edit the dictations but occasionally words are mis-transcribed.)      Ai Pruett MD  01/25/21 1936

## 2021-01-25 NOTE — ED NOTES
Pt minicathed for clear, yellow urine, urine given to lab, pt tolerated well.      Syed Krishnan RN  01/25/21 0789

## 2021-01-25 NOTE — TELEPHONE ENCOUNTER
[facility] left message asking for appt sooner then Thursday for ER follow up    Patient was seen in ER 1 week ago for a cyst on kidney. [facility] states patient is weak and shaky. Only same day appointments available.       Ok to use a same day

## 2021-01-25 NOTE — ED NOTES
Pt complains of feeling fatigue and weak for the last couple days. Pt also complains of feeling SOB, especially with exertion. Pt denies fever, nausea, vomiting or diarrhea. Pt alert, resp even and unlabored, skin pale, warm and dry. COVID swab obtained and given to lab.      Lord Neftali RN  01/25/21 [id number]

## 2021-01-26 ENCOUNTER — APPOINTMENT (OUTPATIENT)
Dept: GENERAL RADIOLOGY | Age: 86
DRG: 866 | End: 2021-01-26
Payer: MEDICARE

## 2021-01-26 ENCOUNTER — APPOINTMENT (OUTPATIENT)
Dept: ULTRASOUND IMAGING | Age: 86
DRG: 866 | End: 2021-01-26
Payer: MEDICARE

## 2021-01-26 PROBLEM — I48.20 CHRONIC ATRIAL FIBRILLATION WITH RAPID VENTRICULAR RESPONSE (HCC): Status: ACTIVE | Noted: 2020-11-03

## 2021-01-26 PROBLEM — I50.32 CHRONIC DIASTOLIC CHF (CONGESTIVE HEART FAILURE) (HCC): Status: ACTIVE | Noted: 2018-11-27

## 2021-01-26 LAB
ANION GAP SERPL CALCULATED.3IONS-SCNC: 13 MEQ/L (ref 8–16)
AVERAGE GLUCOSE: 123 MG/DL (ref 70–126)
BASOPHILS # BLD: 0.6 %
BASOPHILS ABSOLUTE: 0 THOU/MM3 (ref 0–0.1)
BUN BLDV-MCNC: 16 MG/DL (ref 7–22)
CALCIUM SERPL-MCNC: 8.7 MG/DL (ref 8.5–10.5)
CHLORIDE BLD-SCNC: 100 MEQ/L (ref 98–111)
CO2: 24 MEQ/L (ref 23–33)
CREAT SERPL-MCNC: 0.4 MG/DL (ref 0.4–1.2)
EOSINOPHIL # BLD: 0.4 %
EOSINOPHILS ABSOLUTE: 0 THOU/MM3 (ref 0–0.4)
ERYTHROCYTE [DISTWIDTH] IN BLOOD BY AUTOMATED COUNT: 12.8 % (ref 11.5–14.5)
ERYTHROCYTE [DISTWIDTH] IN BLOOD BY AUTOMATED COUNT: 49.3 FL (ref 35–45)
GFR SERPL CREATININE-BSD FRML MDRD: > 90 ML/MIN/1.73M2
GLUCOSE BLD-MCNC: 134 MG/DL (ref 70–108)
HBA1C MFR BLD: 6.1 % (ref 4.4–6.4)
HCT VFR BLD CALC: 39.4 % (ref 37–47)
HEMOGLOBIN: 12.5 GM/DL (ref 12–16)
IMMATURE GRANS (ABS): 0.03 THOU/MM3 (ref 0–0.07)
IMMATURE GRANULOCYTES: 0.4 %
LACTIC ACID: 1.5 MMOL/L (ref 0.5–2.2)
LV EF: 58 %
LVEF MODALITY: NORMAL
LYMPHOCYTES # BLD: 18.1 %
LYMPHOCYTES ABSOLUTE: 1.3 THOU/MM3 (ref 1–4.8)
MCH RBC QN AUTO: 32.8 PG (ref 26–33)
MCHC RBC AUTO-ENTMCNC: 31.7 GM/DL (ref 32.2–35.5)
MCV RBC AUTO: 103.4 FL (ref 81–99)
MONOCYTES # BLD: 10.1 %
MONOCYTES ABSOLUTE: 0.7 THOU/MM3 (ref 0.4–1.3)
NUCLEATED RED BLOOD CELLS: 0 /100 WBC
PLATELET # BLD: 147 THOU/MM3 (ref 130–400)
PMV BLD AUTO: 10.5 FL (ref 9.4–12.4)
POTASSIUM REFLEX MAGNESIUM: 3.8 MEQ/L (ref 3.5–5.2)
RBC # BLD: 3.81 MILL/MM3 (ref 4.2–5.4)
SEG NEUTROPHILS: 70.4 %
SEGMENTED NEUTROPHILS ABSOLUTE COUNT: 5 THOU/MM3 (ref 1.8–7.7)
SODIUM BLD-SCNC: 137 MEQ/L (ref 135–145)
TROPONIN T: 0.01 NG/ML
TROPONIN T: 0.01 NG/ML
TROPONIN T: 0.02 NG/ML
TSH SERPL DL<=0.05 MIU/L-ACNC: 1 UIU/ML (ref 0.4–4.2)
WBC # BLD: 7.1 THOU/MM3 (ref 4.8–10.8)

## 2021-01-26 PROCEDURE — 80048 BASIC METABOLIC PNL TOTAL CA: CPT

## 2021-01-26 PROCEDURE — 83605 ASSAY OF LACTIC ACID: CPT

## 2021-01-26 PROCEDURE — 83036 HEMOGLOBIN GLYCOSYLATED A1C: CPT

## 2021-01-26 PROCEDURE — 97535 SELF CARE MNGMENT TRAINING: CPT

## 2021-01-26 PROCEDURE — 99232 SBSQ HOSP IP/OBS MODERATE 35: CPT | Performed by: INTERNAL MEDICINE

## 2021-01-26 PROCEDURE — 6370000000 HC RX 637 (ALT 250 FOR IP): Performed by: INTERNAL MEDICINE

## 2021-01-26 PROCEDURE — 36415 COLL VENOUS BLD VENIPUNCTURE: CPT

## 2021-01-26 PROCEDURE — 97166 OT EVAL MOD COMPLEX 45 MIN: CPT

## 2021-01-26 PROCEDURE — 84443 ASSAY THYROID STIM HORMONE: CPT

## 2021-01-26 PROCEDURE — 2580000003 HC RX 258: Performed by: PHYSICIAN ASSISTANT

## 2021-01-26 PROCEDURE — 71045 X-RAY EXAM CHEST 1 VIEW: CPT

## 2021-01-26 PROCEDURE — 93306 TTE W/DOPPLER COMPLETE: CPT

## 2021-01-26 PROCEDURE — 97530 THERAPEUTIC ACTIVITIES: CPT

## 2021-01-26 PROCEDURE — 1200000000 HC SEMI PRIVATE

## 2021-01-26 PROCEDURE — 97162 PT EVAL MOD COMPLEX 30 MIN: CPT

## 2021-01-26 PROCEDURE — 85025 COMPLETE CBC W/AUTO DIFF WBC: CPT

## 2021-01-26 PROCEDURE — 6370000000 HC RX 637 (ALT 250 FOR IP): Performed by: PHYSICIAN ASSISTANT

## 2021-01-26 PROCEDURE — 84484 ASSAY OF TROPONIN QUANT: CPT

## 2021-01-26 RX ORDER — BISACODYL 10 MG
10 SUPPOSITORY, RECTAL RECTAL ONCE
Status: COMPLETED | OUTPATIENT
Start: 2021-01-26 | End: 2021-01-26

## 2021-01-26 RX ADMIN — BISACODYL 10 MG: 10 SUPPOSITORY RECTAL at 10:03

## 2021-01-26 RX ADMIN — MAGNESIUM GLUCONATE 500 MG ORAL TABLET 400 MG: 500 TABLET ORAL at 20:19

## 2021-01-26 RX ADMIN — DIGOXIN 125 MCG: 125 TABLET ORAL at 10:04

## 2021-01-26 RX ADMIN — MAGNESIUM GLUCONATE 500 MG ORAL TABLET 400 MG: 500 TABLET ORAL at 10:51

## 2021-01-26 RX ADMIN — PANTOPRAZOLE SODIUM 40 MG: 40 TABLET, DELAYED RELEASE ORAL at 10:07

## 2021-01-26 RX ADMIN — BUMETANIDE 0.5 MG: 1 TABLET ORAL at 10:10

## 2021-01-26 RX ADMIN — SODIUM CHLORIDE: 9 INJECTION, SOLUTION INTRAVENOUS at 04:20

## 2021-01-26 RX ADMIN — ASPIRIN 81 MG: 81 TABLET, CHEWABLE ORAL at 10:01

## 2021-01-26 RX ADMIN — RIVAROXABAN 15 MG: 15 TABLET, FILM COATED ORAL at 17:15

## 2021-01-26 RX ADMIN — ATORVASTATIN CALCIUM 40 MG: 40 TABLET, FILM COATED ORAL at 20:19

## 2021-01-26 RX ADMIN — VENLAFAXINE HYDROCHLORIDE 75 MG: 75 CAPSULE, EXTENDED RELEASE ORAL at 10:06

## 2021-01-26 ASSESSMENT — PAIN SCALES - GENERAL
PAINLEVEL_OUTOF10: 0

## 2021-01-26 ASSESSMENT — ENCOUNTER SYMPTOMS
EYES NEGATIVE: 1
RESPIRATORY NEGATIVE: 1
ALLERGIC/IMMUNOLOGIC NEGATIVE: 1
ABDOMINAL PAIN: 1

## 2021-01-26 NOTE — ED NOTES
Pt is pink, warm and dry, breathing with ease, denies pain at this time. No edema. IV continues without redness or edema. Pt transported to [facility] per EMS in stable condition.       Steven Santana RN  01/25/21 [id number]

## 2021-01-26 NOTE — PROGRESS NOTES
Hospitalist Progress Note    Patient:  Rolan Yost  YOB: 1928  MRN: [id number]   PCP: Ru Orellana MD                 Acct: [de-identified]  Unit/Bed: [facility]21/021-    Date of Admission: 1/25/2021    ASSESSMENT/ PLAN   1. Generalized weakness: Unclear etiology. Patient received the COVID-19 vaccine on 1/20 and this may be contributing to her symptoms, however her symptoms seem to have started on 1/19. Low grade fevers likely as a result of the vaccine as well. No evidence for UTI. Patient does not appear to be dehydrated on examination. She does have evidence of macrocytic anemia so will check Vitamin B12, folate, TSH. Troponin unremarkable. EKG concerning for accelerated junctional rhythm. Will check Echo and consult Cardiology. 2. Chronic diastolic CHF: Question of pulmonary edema on CXR- but I believed that this is a chronic findings as patient appears euvolemic on examination. Continue Bumex. Fluids discontinued. 3. Elevated troponin, possibly atypical presentation of ACS: Slightly elevated troponin previously unremarkable. EKG shows accelerated junctional  rhythm. Cardiology to be consulted. Will check Echo  4. RUQ abdominal pain: LFTs unremarkable. CT scan of abdomen/pelvis reviewed with no evidence for liver pathology. Dulcolax suppository ordered for possible constipation  5. Hyperglycemia: Check HgbA1C  6. Hypomagnesemia: Replace prn  7. Chronic atrial fibrillation with rapid ventricular response on digoxin, rivaroxaban  8. GERD: Protonix  9. S/p pacemaker  10. Incidental kidney cysts  11. Chronic depression: Venlafaxine  12.  LIMITED CODE: Patient seen by Palliative Care and opted for LIMITED CODE on 1/26/2021 Anticipated Discharge in : 1-2 days. I attempted to contact both POAs at approximately 1:48 p.m. to provide them with an update on the patient's plan. There was no answer and no message was left. Eventually got in contact with Kori Kevin and updated her about the patient's plan. She states that she will be coming into the hospital at about 7 am so that she would be able to meet with the doctor tomorrow. Code Status: Limited    Electronically signed by Yojana Rojas MD on 1/26/2021 at 12:47 PM      Chief Complaint     Generalized weakness    SUBJECTIVE     The patient is a 80 y.o. female w/ PMH of chronic atrial fibrillation who presented to the ED on 1/25/2021  with symptoms of RUQ pain and generalized weakness. Since admission her abdominal pain is resolved. She was recently seen in the ED on 01/19 for similar complaints of RUQ abdominal pain and a CT scan of the abdomen/pelvis showed mild constipation and colonic diverticulosis with a right kidney mass that was later identified via US as a cyst.   Patient states she has been feeling weak since her ED visit on 1/19 and that she is needing assistance with her DLS which is unusual. She otherwise denied CP, SOB. In the ED her troponin was elevated to 0.015 and EKG showed an accelerated junctional rhyth. Magnesium was slightly low at 1.7. Hgb was 12.5. Hyperglycemia was noted. 1/26/2021: Patient still feel generally weak. Denies CP or SOB. Received the COVID-19 vaccine on 1/20.       OBJECTIVE     Medications:  Reviewed    Infusion Medications   Scheduled Medications    magnesium oxide  400 mg Oral BID    aspirin  81 mg Oral Daily    atorvastatin  40 mg Oral Nightly    bumetanide  0.5 mg Oral Daily    digoxin  125 mcg Oral Daily    pantoprazole  40 mg Oral Daily    rivaroxaban  15 mg Oral Dinner    venlafaxine  75 mg Oral Daily    sodium chloride flush  10 mL Intravenous 2 times per day PRN Meds: sodium chloride flush, promethazine **OR** ondansetron, polyethylene glycol, acetaminophen **OR** acetaminophen, potassium chloride **OR** potassium alternative oral replacement **OR** potassium chloride, magnesium sulfate    Ins and outs:      Intake/Output Summary (Last 24 hours) at 1/26/2021 1247  Last data filed at 1/26/2021 0617  Gross per 24 hour   Intake 815.73 ml   Output 250 ml   Net 565.73 ml       Physical Examination     BP (!) 140/64   Pulse 67   Temp 98.1 °F (36.7 °C) (Oral)   Resp 16   Ht 5' 2\" (1.575 m)   Wt 171 lb (77.6 kg)   LMP  (LMP Unknown)   SpO2 93%   BMI 31.28 kg/m²     General appearance: No apparent distress. HEENT: Extraocular motion intact. Trachea midline. Neck: Supple. Respiratory:  CTA bilaterally without rales/wheezes/rhonchi. Cardiovascular: RRR with normal S1/S2 without murmurs, rubs or gallops. Abdomen: Soft, non-tender, non-distended with normal bowel sounds. Musculoskeletal: Patient is moving extremities x 4 spontaneously  Neurologic: Grossly non focal. CN: II-XII intact  Psychiatric: Alert and oriented  Vascular: Dorsalis pedis palpable bilaterally. Radial pulses palpable bilaterally. Skin:  No visible rashes or lesions. Labs     Recent Labs     01/25/21  1728 01/26/21  0459   WBC 6.9 7.1   HGB 14.1 12.5   HCT 42.7 39.4    147     Recent Labs     01/25/21  1728 01/26/21  0459    137   K 3.5 3.8   CL 96* 100   CO2 31 24   BUN 14 16   CREATININE 0.8 0.4   CALCIUM  --  8.7     Recent Labs     01/25/21  1728   AST 18   ALT 15   BILITOT 2.8*   ALKPHOS 61     No results for input(s): INR in the last 72 hours.   Recent Labs     01/25/21  1728   TROPONINI 0.055       Urinalysis:      Lab Results   Component Value Date    NITRU NEGATIVE 01/25/2021    WBCUA NONE 01/25/2021    BACTERIA FEW 01/25/2021    RBCUA 0-2 01/25/2021    RBCUA 3-5 03/31/2016    BLOODU TRACE 01/25/2021    SPECGRAV 1.015 01/25/2021    GLUCOSEU NEGATIVE 01/19/2021 Diagnostic imaging/procedures           Xr Acute Abd Series Chest 1 Vw    Result Date: 1/25/2021  PROCEDURE: XR ACUTE ABD SERIES CHEST 1 VW CLINICAL INFORMATION: abd pain, dyspnea on exertion . COMPARISON: October 16, 2020 TECHNIQUE: A PA upright view of the chest was obtained. AP and supine views of the abdomen were obtained. FINDINGS: Calcified right hilar lymph node. Permanent left chest wall pacer. Atherosclerotic changes aortic arch. Ectatic thoracic aorta. Elevated right hemidiaphragm. No lobar consolidation. Costophrenic recesses are preserved. No acute osseous findings. Few gas-filled bowel loops in the midabdomen. Phleboliths in the pelvis. Stool in the visualized colon. Degenerative changes thoracolumbar spine and pelvis. No discrete lobar consolidation. Nonspecific bowel gas pattern. **This report has been created using voice recognition software. It may contain minor errors which are inherent in voice recognition technology. ** Final report electronically signed by Dr. Ni Del Cid on 1/25/2021 6:56 PM    Us Renal Complete    Result Date: 1/19/2021 PROCEDURE: US RENAL COMPLETE CLINICAL INFORMATION: Further evaluation of CT findings: Renal mass . COMPARISON: No prior study. TECHNIQUE: Grayscale and color Doppler ultrasound FINDINGS: Right: Right kidney is normal in size and echogenicity. There is no hydronephrosis. There is no solid mass identified. There is a 1.4 cm cyst interpolar portion. This is a Bosniak 1 cyst. There is a 1.7 x 1.8 cm cyst lower pole. Contour is slightly irregular along the inferior edge of the kidney. Consistent with a Bosniak 2 cyst. Resistive index is elevated. Left: Left kidney is normal in size and echogenicity. There is no solid or cystic mass identified. There is no hydronephrosis. There is no renal cortical thinning. Resistive index is elevated. RIGHT KIDNEY - 9.9 x 4.0 x 5.1 cm Resistive Index - 0.86 Cortical Thickness - 1.3 cm LEFT KIDNEY - 10.6 x 5.2 x 4.8 cm Resistive Index - 0.84 Cortical Thickness - 1.0 cm URINARY BLADDER Pre-Void - 85 mL     1. The right renal lesion of concern is a 1.8 cm Bosniak 2 cyst. 2. There is a Bosniak 1 cyst involving the right kidney as well. 3. Bilateral elevated resistive indices may indicate medical renal disease. **This report has been created using voice recognition software. It may contain minor errors which are inherent in voice recognition technology. ** Final report electronically signed by Dr. Rizwana Ley on 1/19/2021 11:17 AM    Ct Abdomen Pelvis W Iv Contrast Additional Contrast? None    Result Date: 1/19/2021 PROCEDURE: CT ABDOMEN PELVIS W IV CONTRAST CLINICAL INFORMATION: RUQ abdominal pain. . COMPARISON: 11/26/2018 TECHNIQUE: 2-D multiplanar postcontrast images of the abdomen and pelvis Isovue-370 IV contrast. All CT scans at this facility use dose modulation, iterative reconstruction, and/or weight-based dosing when appropriate to reduce radiation dose to as low as reasonably achievable. FINDINGS: Lung bases The aorta is ectatic. No infiltrates or effusions seen. Moderate hiatal hernia. Cardiomegaly. Coronary artery atherosclerosis. AICD with leads in the ventricle and atrium. Abdomen pelvis Liver and spleen are within normal limits. Prior cholecystectomy. The pancreas is normal. Adrenal glands are normal. Kidneys enhance symmetrically. The left kidney is unremarkable. Right kidney demonstrates a 1.6 x 1.7 cm low-density lesion inferior pole this is stable in size compared to prior exam. Hounsfield units are not cystic. There is also a small stable exophytic cyst interpolar portion right kidney. Aorta is heavily atherosclerotic. Pelvis There is no bowel obstruction. There is mild constipation. Urinary bladder is distended. Uterus is surgically absent. No abnormal fluid collections are seen. Right-sided uncomplicated diverticulosis is noted. Bones There are no suspicious bone lesions. 1. Stable low-density mass lower pole right kidney. Differential would include a hemorrhagic cyst proteinaceous cyst or solid mass. Ultrasound may be beneficial for differentiation. 2. Uncomplicated colonic diverticulosis with mild constipation. 3. No acute abdominal or pelvic abnormalities. **This report has been created using voice recognition software. It may contain minor errors which are inherent in voice recognition technology. ** Final report electronically signed by Dr. Rizwana Ley on 1/19/2021 9:25 AM    Xr Chest Portable    Result Date: 1/26/2021 PROCEDURE: XR CHEST PORTABLE CLINICAL INFORMATION: Fever. COMPARISON: 1/25/2021. TECHNIQUE: AP portable chest radiograph performed. FINDINGS: POSTSURGICAL CHANGES: None. LINES/TUBES/MECHANICAL DEVICES: 1. The pacemaker is unchanged in position with the single lead overlying the right ventricle. TRACHEA/HEART/MEDIASTINUM/HILUM: 1. The cardiac silhouette is upper limits normal size and stable. 2. There is a stable calcified right azygos lymph node related to old granulomatous disease. LUNG FIELDS: 1. There is prominence of the pulmonary vasculature with cephalization suggesting pulmonary vascular congestion. There is stable mild elevation of the right hemidiaphragm. There is no focal consolidation. Given the clinical history of fever clinical correlation recommended to exclude a superimposed infectious etiology. There is no pleural effusion. Clinical management is recommended. Follow-up chest radiographs are also recommended to confirm complete resolution. 2. There is a stable calcified granuloma within the right mid chest. OTHER: None. PNEUMOTHORAX:  None. OSSEOUS STRUCTURES: 1. No acute osseous abnormality. 1. There is prominence of the pulmonary vasculature with cephalization suggesting pulmonary vascular congestion. There is stable mild elevation of the right hemidiaphragm. There is no focal consolidation. Given the clinical history of fever clinical correlation recommended to exclude a superimposed infectious etiology. There is no pleural effusion. Clinical management is recommended. Follow-up chest radiographs are also recommended to confirm complete resolution. **This report has been created using voice recognition software. It may contain minor errors which are inherent in voice recognition technology. ** Final report electronically signed by Dr. Juan Mitchell on 1/26/2021 8:03 AM      DVT prophylaxis: [x] Xarelto                         Disposition:    [x] Home

## 2021-01-26 NOTE — PROGRESS NOTES
Pt admitted to  5K21 via via cart/stretcher from direct admit: [address]. Complaints: weakness and fatigue. IV normal saline infusing into the antecubital left, condition patent and no redness at a rate of 100 mls/ hour with about 400 mls in the bag still. IV site free of s/s of infection or infiltration. Vital signs obtained. Assessment and data collection initiated. Two nurse skin assessment performed by Jason Hamilton and [facility] RN. Oriented to room. Policies and procedures for 5K explained. All questions answered with no further questions at this time. Fall prevention and safety brochure discussed with patient. Bed alarm on. Call light in reach. Oriented to room. Sarah Quintero RN 1/25/2021 11:58 PM     Explained patients right to have family, representative or physician notified of their admission. Patient has Declined for physician to be notified. Patient has Declined for family/representative to be notified.

## 2021-01-26 NOTE — H&P
Hospitalist - History & Physical      Patient: Corona Mayfield    Unit/Bed:5K-21/021-A  YOB: 1928  MRN: [id number]   Acct: [de-identified]   PCP: Stephanie Quiroz MD      Assessment and Plan:        1. Generalized weakness: PT/OT, palliative care consult for discharge planning  2. Physical deconditioning: PT/OT  3. Hypertension: continue bumetanide  4. Atrial fibrillation with rapid ventricular response: continue rivaroxaban and digoxin for rate control    CC:  Generalized weakness  HPI: \"Patient is a 80year old female who presented to the ED on 1/19 with a chief complaint of right upper quadrant pain and is now presenting to the ED with a chief complaint of generalized weakness and states that her abdominal pain has since resolved. Patient states she has been feeling weak since her ED visit on 1/19 and her symptoms were greatly exacerbated today. Patient also notes that she usually lives at home but is currently requiring care for all of her activities by her daughter who lives next door. Patient does note that her abdominal pain is aggravated by a cough, but seemed to be resting comfortably during the interview. Patient denies dizziness or LOC. \"     I was present during the collection of this HPI and agree with all elements. ROS: Review of Systems   Constitutional: Positive for fatigue. HENT: Negative. Eyes: Negative. Respiratory: Negative. Cardiovascular: Negative. Gastrointestinal: Positive for abdominal pain. Endocrine: Negative. Genitourinary: Negative. Musculoskeletal: Positive for arthralgias and myalgias. Skin: Negative. Allergic/Immunologic: Negative. Neurological: Positive for weakness. Hematological: Negative. Psychiatric/Behavioral: Negative.       PMH:    Past Medical History:   Diagnosis Date    Atrial fibrillation ([address]     Blood circulation, [facility] single pacemaker 4/26/2016 5/5/2016  CAD (coronary artery disease)     cath and stent in right artery    Cancer [facility]) 1954    HX  Colon     Carpal tunnel syndrome     Fracture dislocation of ankle 1980    GERD (gastroesophageal reflux disease)     Hyperlipidemia     Hypertension     Kidney lesion, native, right     found on 5/2016    Osteoarthritis     knees    Osteopenia     Prolonged emergence from general anesthesia     Thyroid goiter 9/6/2016    Yersiniosis 2016     SHX:    Social History     Socioeconomic History    Marital status:       Spouse name: Lu Bell Number of children: 2    Years of education: Not on file    Highest education level: Not on file   Occupational History    Not on file   Social Needs    Financial resource strain: Not on file    Food insecurity     Worry: Not on file     Inability: Not on file    Transportation needs     Medical: Not on file     Non-medical: Not on file   Tobacco Use    Smoking status: Never Smoker    Smokeless tobacco: Never Used   Substance and Sexual Activity    Alcohol use: No    Drug use: No    Sexual activity: Not on file   Lifestyle    Physical activity     Days per week: Not on file     Minutes per session: Not on file    Stress: Not on file   Relationships    Social connections     Talks on phone: Not on file     Gets together: Not on file     Attends Mandaeism service: Not on file     Active member of club or organization: Not on file     Attends meetings of clubs or organizations: Not on file     Relationship status: Not on file    Intimate partner violence     Fear of current or ex partner: Not on file     Emotionally abused: Not on file     Physically abused: Not on file     Forced sexual activity: Not on file   Other Topics Concern    Not on file   Social History Narrative    Not on file     FHX:   Family History   Problem Relation Age of Onset    Heart Disease Mother     High Blood Pressure Mother     Heart Disease Father Eosinophils % 1.3 0.0 - 4.0 %    Basophils 0.4 0.0 - 3.0 %   Comprehensive metabolic panel    Collection Time: 01/25/21  5:28 PM   Result Value Ref Range    Glucose 168 (H) 74 - 106 mg/dl    CREATININE 0.8 0.6 - 1.3 mg/dl    BUN 14 7 - 18 mg/dl    Sodium 138 136 - 145 meq/l    Potassium 3.5 3.5 - 5.1 meq/l    Chloride 96 (L) 98 - 107 meq/l    CO2 31 21 - 32 meq/l    POC CALCIUM 9.4 8.5 - 10.1 mg/dl    AST 18 15 - 37 U/L    Alkaline Phosphatase 61 46 - 116 U/L    Total Protein 7.3 6.4 - 8.2 gm/dl    Alb 3.7 3.4 - 5.0 gm/dl    Total Bilirubin 2.8 (H) 0.2 - 1.0 mg/dl    ALT 15 14 - 63 U/L   Magnesium    Collection Time: 01/25/21  5:28 PM   Result Value Ref Range    Magnesium 1.7 (L) 1.8 - 2.4 mg/dl   Digoxin ([facility] Only)    Collection Time: 01/25/21  5:28 PM   Result Value Ref Range    Digoxin Lvl 1.0 0.9 - 2.0 ng/ml    DOSE TIME UNK     Date Last Dose UNK    Brain Natriuretic Peptide    Collection Time: 01/25/21  5:28 PM   Result Value Ref Range    NT Pro-.0 0.0 - 1800.0 pg/mL   Troponin    Collection Time: 01/25/21  5:28 PM   Result Value Ref Range    Troponin I 0.055 0.017 - 0.056 ng/ml   Lactic Acid [facility] Only)    Collection Time: 01/25/21  5:28 PM   Result Value Ref Range    Lactate 2.20 (H) 0.90 - 1.70 mmol/L   Lipase    Collection Time: 01/25/21  5:28 PM   Result Value Ref Range    Lipase 39.0 (L) 73.0 - 393.0 U/L   Glomerular Filtration Rate, Estimated    Collection Time: 01/25/21  5:28 PM   Result Value Ref Range    GFR, Estimated 71 (A) ml/min/1.73m2   ANION GAP    Collection Time: 01/25/21  5:28 PM   Result Value Ref Range    Anion Gap 11.0 8.0 - 16.0 meq/l   Urine reflex to culture    Collection Time: 01/25/21  5:40 PM    Specimen: Urine, clean catch   Result Value Ref Range    Glucose, Urine NEGATIVE NEGATIVE mg/dl    Bilirubin Urine NEGATIVE     Ketones, Urine NEGATIVE NEGATIVE    Specific Gravity, UA 1.015 1.002 - 1.030    Blood, Urine TRACE (A) NEGATIVE    pH, UA 6.0 5.0 - 9.0 Protein, UA NEGATIVE NEGATIVE mg/dl    Urobilinogen, Urine 0.2 0.0 - 1.0 eu/dl    Nitrite, Urine NEGATIVE NEGATIVE    Leukocyte Esterase, Urine NEGATIVE NEGATIVE    Color, UA STRAW STRAW-YELLOW    Character, Urine CLEAR CLEAR-SL CLOUD    RBC, UA 0-2 0-2/hpf /hpf    WBC, UA NONE 0-4/hpf /hpf    Epithelial Cells, UA NONE 3-5/hpf /hpf    Amorphous, UA NONE SEEN none seen    Mucus, UA NONE SEEN none seen    Bacteria, UA FEW few/none seen    Casts UA NONE SEEN none seen /lpf    Crystals, UA NONE SEEN none seen    REFLEX TO URINE C & S NOT INDICATED    EKG Altered Mental Status    Collection Time: 01/25/21  5:56 PM   Result Value Ref Range    Ventricular Rate 75 BPM    Atrial Rate 78 BPM    QRS Duration 92 ms    Q-T Interval 410 ms    QTc Calculation (Bazett) 457 ms    R Axis 13 degrees    T Axis -67 degrees         Vital Signs: T: 99F P: 62 RR: 18 B/P: 171/72: FiO2: RA: O2 Sat:92%: I/O:     Intake/Output Summary (Last 24 hours) at 1/25/2021 2306  Last data filed at 1/25/2021 2302  Gross per 24 hour   Intake    Output 100 ml   Net -100 ml         General:   Well appearing, non toxic  HEENT:  normocephalic and atraumatic. No scleral icterus. PEARLA, mucous membranes moist  Neck: supple. Trachea midline. No JVD. Full ROM, no meningismus. Lungs: clear to auscultation. No retractions, no accessory muscle use. Cardiac: RRR, no murmur, 2+ pulses  Abdomen: soft. Nontender. Bowel sounds active  Extremities:  No clubbing, cyanosis x 4, no edema    Vasculature: capillary refill < 3 seconds. Skin:  warm and dry. no visible rashes  Psych:  Alert and oriented x3. Affect appropriate  Lymph:  No supraclavicular adenopathy. Neurologic:  CN II-XII grossly intact. No focal deficit. Data: (All radiographs, tracings, PFTs, and imaging are personally viewed and interpreted unless otherwise noted).    ? EKG: rhythm: junctional rhythm, rate=75 bpm, pr=. ms, qrs=92 ms, jp=744 ms, axis=13 degrees Electronically signed by  Alison Zendejas PA-C     Patients clinical record, labs and radiological imaging reviewed. I agree with clinical findings , provisional diagnosis and management.

## 2021-01-26 NOTE — CARE COORDINATION
DISASTER CHARTING    1/26/21, 11:56 AM EST    DISCHARGE ONGOING EVALUATION:     Kevan Gary day: 1  Location: [facility]21/021-A Reason for admit: Weakness generalized [R53.1]   Barriers to Discharge: Presented to [facility] for right upper quad pain and weakness. Patient is normally independent by requiring daughter to care for her. PT/OT. PCP: Tan Morel MD  Readmission Risk Score: 14%  Patient Goals/Plan/Treatment Preferences: Spoke with patient and daughter. Plan ideally would be for patient to return home with daughter living next door if she is able to safely ambulate. They are open to [facility] and have used [facility] in past. She has been to [facility] in past and open to this again if needed; prefers to avoid ecf if possible r/t covid and isolation/visitor policy. Will consult SW and ask to follow-up with patient and daughter tomorrow. Await therapy evals. Patient does use a walker at home.

## 2021-01-26 NOTE — PROGRESS NOTES
[address] [address]  INPATIENT PHYSICAL THERAPY  EVALUATION  [facility] ONC MED 5K - 5K-21/021-A    Time In: 8591  Time Out: 6960  Timed Code Treatment Minutes: 12 Minutes  Minutes: 23          Date: 2021  Patient Name: Kodi Busch,  Gender:  female        MRN: [id number]  : 1928  (80 y.o.)      Referring Practitioner: Sana Quiñones PA-C  Diagnosis: weakness generalized  Additional Pertinent Hx: Patient is a 80year old female who presented to the ED on  with a chief complaint of right upper quadrant pain and is now presenting to the ED with a chief complaint of generalized weakness and states that her abdominal pain has since resolved. Patient states she has been feeling weak since her ED visit on  and her symptoms were greatly exacerbated today. Patient also notes that she usually lives at home but is currently requiring care for all of her activities by her daughter who lives next door. Patient does note that her abdominal pain is aggravated by a cough, but seemed to be resting comfortably during the interview. Patient denies dizziness or LOC. Restrictions/Precautions:  Restrictions/Precautions: General Precautions, Fall Risk    Subjective:  Chart Reviewed: Yes  Patient assessed for rehabilitation services?: Yes  Family / Caregiver Present: No  Subjective: RN approved therapy evaluation. Pt up in chair upon PT arrival, appearing very fatigued. Pt is agreeable to evaluation and requests to use commode and transfer to bed. Pt attempted to transfer to commode, but stated she was urinating in her depends before making it to the commode. Pleasant throughout session, but very fatigued. Her daughter is present and supportive.     General:  Follows Commands: Within Functional Limits    Vision: Impaired  Vision Exceptions: Wears glasses at all times    Hearing: Exceptions to [facility]  Hearing Exceptions: Hard of hearing/hearing concerns    Pain: none stated    Social/Functional History: Lives With: (daughter lives in other side of duplex and has been staying on pt's side for the past week to help her with transfers and ADLs)  Type of Home: (Duplex, pt's daughter lives in other side)  Home Layout: One level  Home Equipment: (two wheeled walker)     Bathroom Shower/Tub: Tub/Shower unit, Shower chair with back  Bathroom Toilet: Handicap height  Bathroom Equipment: Grab bars in shower, Grab bars around toilet  Bathroom Accessibility: Accessible    Receives Help From: Family  ADL Assistance: Needs assistance  [address]: (daughter has been doing almost all homemaking & ADLs for the pt)  Ambulation Assistance: Independent  Transfer Assistance: Independent    Active : No    OBJECTIVE:  Range of Motion:  Bilateral Lower Extremity: WFL    Strength:  Bilateral Lower Extremity: Impaired - functionally weak for transfers, though pt demonstrates 4+/5 with GMT    Balance:  Static Sitting Balance:  Stand By Assistance  Dynamic Sitting Balance: Stand By Assistance  Static Standing Balance: Min x 1 + CGA x 1 -- nursing student changing depends  Dynamic Standing Balance: Min x 1 + CGA x 1 -- nursing student changing depends   Pt stood x 4 minutes for doffing/donning of depends    Bed Mobility:  Sit to Supine: Minimal Assistance, X 1, with head of bed raised, with verbal cues , with increased time for completion   Scooting: SBA to scoot forward while seated; Min A to scoot laterally while supine in bed; hercules used to scoot pt towards HOB    Transfers:  Sit to Stand: Minimal Assistance, Moderate Assistance, X 1, with increased time for completion, cues for hand placement, with verbal cues  Stand to Sit:Minimal Assistance, X 1, with increased time for completion, cues for hand placement, with verbal cues, several cues to continue walking until she feels the bed behind her    Ambulation:  Min A x 1 + CGA x 1  Distance: Steps from chair to commode, then commode to bed ~2-3' total  Surface: Level Tile Device:2 wheeled walker  Gait Deviations: Very Forward Flexed Posture, Slow Lauryn, Decreased Step Length Bilaterally and Decreased Gait Speed    Exercise:  Exercises deferred due to high level of fatigue with transfer. Functional Outcome Measures: Completed  -PAC Inpatient Mobility Raw Score : 14  AM-PAC Inpatient T-Scale Score : 38.1    ASSESSMENT:  Activity Tolerance:  Patient tolerance of  treatment: poor. Pt very fatigued. O2 saturation 92% on room air. Treatment Initiated: Treatment and education initiated within context of evaluation. Evaluation time included review of current medical information, gathering information related to past medical, social and functional history, completion of standardized testing, formal and informal observation of tasks, assessment of data and development of plan of care and goals. Treatment time included skilled education and facilitation of tasks to increase safety and independence with functional mobility for improved independence and quality of life. Assessment: Body structures, Functions, Activity limitations: Decreased functional mobility , Decreased strength, Decreased endurance, Decreased posture, Decreased balance, Decreased ADL status  Assessment: Pt is below PLOF by way of bed mobility, transfers, and ambulation. Her daughter is unable to provide level of assist that pt requires at this time. Pt will benefit from continued physical therapy to reach goals and return to PLOF.   Prognosis: Fair    REQUIRES PT FOLLOW UP: Yes    Discharge Recommendations:  Discharge Recommendations: (pt will likely require SNF based on functional deficits noted on date of initial evaluation; her daughter states she is hopeful pt can improve enough in hospital to return home with [facility] PT, as she does not want to be unable to visit her mom in the SNF.)    Patient Education:

## 2021-01-26 NOTE — H&P
**This is a Medical/ PA/ APRN Student Note and is charted for educational purposes. The non-physician staff attested note is not to be used for billing purposes or to guide patient care. Please see the physician modifications/ attestation for treatment plan/suggestions. This note has been reviewed and feedback has been provided to the student. *    Hospitalist - History & Physical      Patient: Daniel Lawrence    Unit[facility]Bed[facility]21/021-A  YOB: 1928  MRN: [id number]   Acct: [de-identified]   PCP: Parisa Cobb MD      Assessment and Plan:        1. Generalized weakness: PT/OT, palliative care consult for discharge planning  2. Physical deconditioning: PT/OT  3. Hypertension: continue bumetanide  4. Atrial fibrillation with rapid ventricular response: continue rivaroxaban and digoxin for rate control    CC:  Generalized weakness  HPI: Patient is a 80year old female who presented to the ED on 1/19 with a chief complaint of right upper quadrant pain and is now presenting to the ED with a chief complaint of generalized weakness and states that her abdominal pain has since resolved. Patient states she has been feeling weak since her ED visit on 1/19 and her symptoms were greatly exacerbated today. Patient also notes that she usually lives at home but is currently requiring care for all of her activities by her daughter who lives next door. Patient does note that her abdominal pain is aggravated by a cough, but seemed to be resting comfortably during the interview. Patient denies dizziness or LOC. ROS: Review of Systems   Constitutional: Positive for activity change and fatigue. Negative for chills. HENT: Negative. Eyes: Negative. Respiratory: Positive for cough. Cardiovascular: Negative. Gastrointestinal: Positive for abdominal pain. Accompanied with cough   Endocrine: Negative. Genitourinary: Negative. Musculoskeletal: Negative. Skin: Negative. Hematocrit 42.7 37.0 - 47.0 %    .8 (H) 81.0 - 99.0 fL    MCH 34.2 (H) 27.0 - 31.0 pg    MCHC 33.0 33.0 - 37.0 gm/dl    RDW 12.0 11.5 - 14.5 %    Platelets 014 568 - 602 thou/mm3    MPV 7.9 7.4 - 10.4 fL    SEGS 75.0 43.0 - 75.0 %    Lymphocytes 14.7 (L) 15.0 - 47.0 %    Monocytes 8.6 0.0 - 12.0 %    Eosinophils % 1.3 0.0 - 4.0 %    Basophils 0.4 0.0 - 3.0 %   Comprehensive metabolic panel    Collection Time: 01/25/21  5:28 PM   Result Value Ref Range    Glucose 168 (H) 74 - 106 mg/dl    CREATININE 0.8 0.6 - 1.3 mg/dl    BUN 14 7 - 18 mg/dl    Sodium 138 136 - 145 meq/l    Potassium 3.5 3.5 - 5.1 meq/l    Chloride 96 (L) 98 - 107 meq/l    CO2 31 21 - 32 meq/l    POC CALCIUM 9.4 8.5 - 10.1 mg/dl    AST 18 15 - 37 U/L    Alkaline Phosphatase 61 46 - 116 U/L    Total Protein 7.3 6.4 - 8.2 gm/dl    Alb 3.7 3.4 - 5.0 gm/dl    Total Bilirubin 2.8 (H) 0.2 - 1.0 mg/dl    ALT 15 14 - 63 U/L   Magnesium    Collection Time: 01/25/21  5:28 PM   Result Value Ref Range    Magnesium 1.7 (L) 1.8 - 2.4 mg/dl   Digoxin ([facility] Only)    Collection Time: 01/25/21  5:28 PM   Result Value Ref Range    Digoxin Lvl 1.0 0.9 - 2.0 ng/ml    DOSE TIME UNK     Date Last Dose UNK    Brain Natriuretic Peptide    Collection Time: 01/25/21  5:28 PM   Result Value Ref Range    NT Pro-.0 0.0 - 1800.0 pg/mL   Troponin    Collection Time: 01/25/21  5:28 PM   Result Value Ref Range    Troponin I 0.055 0.017 - 0.056 ng/ml   Lactic Acid [facility] Only)    Collection Time: 01/25/21  5:28 PM   Result Value Ref Range    Lactate 2.20 (H) 0.90 - 1.70 mmol/L   Lipase    Collection Time: 01/25/21  5:28 PM   Result Value Ref Range    Lipase 39.0 (L) 73.0 - 393.0 U/L   Glomerular Filtration Rate, Estimated    Collection Time: 01/25/21  5:28 PM   Result Value Ref Range    GFR, Estimated 71 (A) ml/min/1.73m2   ANION GAP    Collection Time: 01/25/21  5:28 PM   Result Value Ref Range    Anion Gap 11.0 8.0 - 16.0 meq/l   Urine reflex to culture Collection Time: 01/25/21  5:40 PM    Specimen: Urine, clean catch   Result Value Ref Range    Glucose, Urine NEGATIVE NEGATIVE mg/dl    Bilirubin Urine NEGATIVE     Ketones, Urine NEGATIVE NEGATIVE    Specific Gravity, UA 1.015 1.002 - 1.030    Blood, Urine TRACE (A) NEGATIVE    pH, UA 6.0 5.0 - 9.0    Protein, UA NEGATIVE NEGATIVE mg/dl    Urobilinogen, Urine 0.2 0.0 - 1.0 eu/dl    Nitrite, Urine NEGATIVE NEGATIVE    Leukocyte Esterase, Urine NEGATIVE NEGATIVE    Color, UA STRAW STRAW-YELLOW    Character, Urine CLEAR CLEAR-SL CLOUD    RBC, UA 0-2 0-2/hpf /hpf    WBC, UA NONE 0-4/hpf /hpf    Epithelial Cells, UA NONE 3-5/hpf /hpf    Amorphous, UA NONE SEEN none seen    Mucus, UA NONE SEEN none seen    Bacteria, UA FEW few/none seen    Casts UA NONE SEEN none seen /lpf    Crystals, UA NONE SEEN none seen    REFLEX TO URINE C & S NOT INDICATED    EKG Altered Mental Status    Collection Time: 01/25/21  5:56 PM   Result Value Ref Range    Ventricular Rate 75 BPM    Atrial Rate 78 BPM    QRS Duration 92 ms    Q-T Interval 410 ms    QTc Calculation (Bazett) 457 ms    R Axis 13 degrees    T Axis -67 degrees         Vital Signs: T: 99F P: 62 RR: 18 B/P: 171/72: FiO2: Ra: O2 Sat:92%: I/O:     Intake/Output Summary (Last 24 hours) at 1/25/2021 2311  Last data filed at 1/25/2021 2302  Gross per 24 hour   Intake    Output 100 ml   Net -100 ml         General:   Patient appears to be in mild distress, appears her stated age, generalized weakness noted. HEENT:  normocephalic and atraumatic. No scleral icterus. PEARLA, mucous membranes dry  Neck: supple. Trachea midline. No JVD. Full ROM, no meningismus. Lungs: clear to auscultation. No retractions, no accessory muscle use. Cardiac: RRR, no murmur, 2+ pulses  Abdomen: soft. Nontender. Bowel sounds active  Extremities:  No clubbing, cyanosis x 4, no edema    Vasculature: capillary refill < 3 seconds. Skin:  warm and dry.  no visible rashes Psych:  Alert and oriented x3. Affect appropriate  Lymph:  No supraclavicular adenopathy. Neurologic:  CN II-XII grossly intact. No focal deficit. Data: (All radiographs, tracings, PFTs, and imaging are personally viewed and interpreted unless otherwise noted). ? EKG: rhythm: normal sinus rhythm, rate=75 bpm, pr=. ms, qrs=92 ms, sq=253 ms, axis=13 degrees    Electronically signed by  Timi Shafer                                       **This is a Medical/ PA/ APRN Student Note and is charted for educational purposes. The non-physician staff attested note is not to be used for billing purposes or to guide patient care. Please see the physician modifications/ attestation for treatment plan/suggestions. This note has been reviewed and feedback has been provided to the student.  *

## 2021-01-26 NOTE — PROGRESS NOTES
Nisa Whitehead 60  INPATIENT OCCUPATIONAL THERAPY  [facility] ONC MED 5K  EVALUATION    Time In: 2273  Time Out: 1216  Timed Code Treatment Minutes: 20 Minutes  Minutes: 31          Date: 2021  Patient Name: Angel Hernandez,   Gender: female      MRN: [id number]  : 1928  (80 y.o.)  Referring Practitioner: Tomas Haji PA-C  Diagnosis: Weakness Generalized  Additional Pertinent Hx: Patient is a 80year old female who presented to the ED on  with a chief complaint of right upper quadrant pain and is now presenting to the ED with a chief complaint of generalized weakness and states that her abdominal pain has since resolved. Patient states she has been feeling weak since her ED visit on  and her symptoms were greatly exacerbated today. Patient also notes that she usually lives at home but is currently requiring care for all of her activities by her daughter who lives next door. Patient does note that her abdominal pain is aggravated by a cough, but seemed to be resting comfortably during the interview. Patient denies dizziness or LOC. \"    Restrictions/Precautions:  Restrictions/Precautions: General Precautions, Fall Risk    Subjective  Chart Reviewed: Yes, Orders, Progress Notes  Patient assessed for rehabilitation services?: Yes  Family / Caregiver Present: Yes(daughter Sulema)    Subjective: RN okayed session.  Pt was supine in bed upon arrival. Pt stated she had to urinate, agreeable to getting up to [facility]    Pain:  Pain Assessment  Patient Currently in Pain: Denies    Social/Functional History:  Lives With: Alone  Type of Home: (Duplex, pt's daughter lives in other side)  Home Layout: One level  Home Equipment: Rolling walker   Bathroom Shower/Tub: Tub/Shower unit, Shower chair with back  Bathroom Toilet: Handicap height  Bathroom Equipment: Grab bars in shower, Grab bars around toilet  Bathroom Accessibility: Accessible    Receives Help From: Family  ADL Assistance: Needs assistance Homemaking Assistance: Needs assistance  Ambulation Assistance: Independent  Transfer Assistance: Independent    Active : No          Cognition/Orientation:  Overall Orientation Status: Within Functional Limits  Overall Cognitive Status: Exceptions  Cognition Comment: Decreased problem solving    ADL's:  LE Dressing: Dependent/Total(for donning new brief)  Toileting: Moderate assistance(for thoroughness with cleaning bottom)       Functional Mobility:  Bed mobility  Supine to Sit: Moderate assistance  Scooting: Minimal assistance    Functional Mobility  Functional - Mobility Device: Rolling Walker  Activity: Other(EOB -> few steps to BSC -> few steps to recliner)  Assist Level: Minimal assistance  Functional Mobility Comments: Pt became very fatigued very quickly. Pt required consistent VC for safety. Pt required 2 lengthy standing rest breaks during mobility. Slight posterior lean. No LOB     Balance:  Balance  Sitting Balance: Stand by assistance  Standing Balance: Minimal assistance(with RW for support)  Standing Balance  Time: 1 min  Activity: toileting hygiene  Comment: Pt fatigued very quickly during standing    Transfers:  Sit to stand: Minimal assistance, Moderate assistance(from EOB)  Stand to sit: Minimal assistance(to recliner)  Toilet Transfers  Toilet - Technique: Ambulating  Equipment Used: Standard bedside commode  Toilet Transfer:  Moderate assistance  Toilet Transfers Comments: Pt required moderate VC for safe hand placement    Upper Extremity Assessment:   LUE AROM : WFL  RUE AROM : WFL    LUE Strength  Gross LUE Strength: Exceptions to [facility]  LUE Strength Comment: gross deconditioning  RUE Strength  Gross RUE Strength: Exceptions to [facility]  RUE Strength Comment: gross deconditioning    Sensation  Overall Sensation Status: WFL  RUE Tone: Normotonic  LUE Tone: Normotonic       Activity Tolerance: Patient limited by fatigue       Assessment: Assessment: Pt presented with the listed deficits. Pt with significantly increased weakness leading to a decrease in indep with ADLs and IADLs. Pt would benefit from continued OT to further increase strength and endurance with ADLs and IADLs in prep for safe return home. Performance deficits / Impairments: Decreased functional mobility , Decreased endurance, Decreased posture, Decreased balance, Decreased strength, Decreased high-level IADLs  Prognosis: Good  REQUIRES OT FOLLOW UP: Yes  Decision Making: Medium Complexity  Safety Devices in place: Yes    Treatment Initiated: Treatment and education initiated within context of evaluation. Evaluation time included review of current medical information, gathering information related to past medical, social and functional history, completion of standardized testing, formal and informal observation of tasks, assessment of data and development of plan of care and goals. Treatment time included skilled education and facilitation of tasks to increase safety and independence with ADL's for improved functional independence and quality of life.     Discharge Recommendations:  Continue to assess pending progress, Patient would benefit from continued therapy after discharge    Patient Education:  OT Education: OT Role, Plan of Care, ADL Adaptive Strategies, Transfer Training, Energy Conservation    Equipment Recommendations:  Equipment Needed: No    Plan:  Times per week: 5x  Current Treatment Recommendations: Functional Mobility Training, Endurance Training, Patient/Caregiver Education & Training, Safety Education & Training, Self-Care / ADL    Goals:  Patient goals : get stronger, go home  Short term goals  Time Frame for Short term goals: by d/c  Short term goal 1: Pt will complete functional mobility short distances with no > than CGA and min VC for safety to increase indep with ADLs Short term goal 2: Pt collin complete various t/fs with CGA and min VC for safety to increase indep with toileting  Short term goal 3: Pt will tolerate dynamic standing x3 min with unilateral hand release to increase indep with grooming  Short term goal 4: Pt will complete BADL task with no > than min A to increase indep with dressing  Short term goal 5: Pt will demo Indep with BUE strengthening HEP to increase indep with ADLs  Long term goals  Time Frame for Long term goals : no LTG s/t short ELOS  See long-term goal time frame for expected duration of plan of care. If no long-term goals established, a short length of stay is anticipated. Following session, patient left in safe position with all fall risk precautions in place.

## 2021-01-26 NOTE — PROGRESS NOTES
[facility]           Progress Note    Patient Name:  Onel Ross  Medical Record Number:  [id number]  [place]:  4/4/1928    Date:  1/26/2021  Time:  12:29 PM  Completed By:  Adan Clifton RN    Reason for Palliative Care Evaluation:  Goals of care/code status    Current Issues:  []  Pain  []  Fatigue  []  Nausea  []  Anxiety  []  Depression  []  Shortness of Breath  []  Constipation  []  Appetite  [x]  Other:weakness    Advance Directives  [x] [place] DNR Form  [x] Living Will  [x] Medical POA    Current Code Status:changed to limited no x 4  [] Full Resuscitation  [x] DNR-Comfort Care-Arrest  [] DNR-Comfort Care  [] Limited   [] No CPR   [] No shock   [] No ET intubation/reintubation   [] No resuscitative medications   [] Other limitation:    Performance Status: 60    Family/Patient Discussion:  Patient sitting up in the chair. Daughter Dwaine Ayala at the bedside. Palliative care introduced. Discussed code status with the patient/daughter and they confirm that they would not want any resuscitative measures performed. Discussed intubation with respiratory failure and patient would not want this. Patient's daughter, Dwaine Ayala does indicate that the patient had her first covid vaccine last Wednesday and she is wondering if this would be enough to make the patient feel so weak. If the patient is able to return home, Dwaine Ayala would like for her to return home but if she is unable to ambulate and be self sufficient, they are open to an ECF. Emotional support provided. Plan/Follow-Up:  [facility] DNR form/prescription to indicate DO NOT INTUBATE completed and placed in [facility] for MD signature. Text message sent to Dr. Ana Rodriguez and updated her and order received to change code status to reflect the patient's wishes. Please call palliative care if further needs arise.     Adan Clifton RN  1/26/2021,  12:29 PM

## 2021-01-26 NOTE — PROGRESS NOTES
Physician Progress Note      PATIENT:               Samy Lopez  CSN #:                  [id number]  :                       1928  ADMIT DATE:       2021 4:44 PM  DISCH DATE:  RESPONDING  PROVIDER #:        Mireya Granados MD          QUERY TEXT:    Pt admitted with Weakness and noted (documented functional decline, reported   reduced physical activity/mobility). If possible, please respond below and   document in your progress notes and discharge summary if you are evaluating   and /or treating any of the following: The medical record reflects the following:  Risk Factors: advanced age  Clinical Indicators:  80 Yr Old Fatigue and weakness, she usually lives at   home but is currently requiring care for all of her activities by her daughter   who lives next door. Musculoskeletal: Positive for arthralgias and myalgias. Treatment: PT/OT, palliative care consult for discharge planning    Thank you. Please call if you have any questions. P) [phone]. Signed   by Nya Marrufo RN Clinical , CRCR  Options provided:  -- Age Related Physical Debility  -- Frailty  -- weakness/fatigue? reason for admission) due to other, Please document other   cause. -- Other - I will add my own diagnosis  -- Disagree - Not applicable / Not valid  -- Disagree - Clinically unable to determine / Unknown  -- Refer to Clinical Documentation Reviewer    PROVIDER RESPONSE TEXT:    weakness/fatigue? reason for admission) due to possibly secondary to vaccine   administration    Query created by: Leo Schroeder on 2021 8:17 AM      Electronically signed by:  Mireya Granados MD 2021 12:47 PM

## 2021-01-26 NOTE — PROGRESS NOTES
Patient accepts all routine medications without difficulty at this time. Patient denies pain and voices no concerns at present. Patient resting quietly in bed. Call light and water pitcher remain within reach.

## 2021-01-26 NOTE — PROGRESS NOTES
Vital signs obtained and charted. Patient alert and oriented to person, place, time, and situation. Speech is clear and appropriate. Pupils are equal, round, and reactive to light bilaterally. Bilateral pupils are size 3mm, down to size 2mm when reacting to light. Mucous membranes are pink, moist and intact. Bilateral upper extremities are pink, warm and dry. +2 non-pitting edema in right upper extremity. Capillary refill and skin turgor less than three seconds bilaterally in upper extremities. Hand grasp performed and weak, equal bilaterally. Respirations easy and unlabored. Lung sounds clear throughout on posterior, anterior and lateral lobes. Patient on Room Air. Heart sounds are strong and regular upon auscultation. Bowel sounds active in all four quadrants. Abdomen flat soft, non-tender and non-distending. Bilateral lower extremities pink, warm and dry.  +2 edema present to bilateral lower extremities from bilateral feet up to bilateral knees. Skin over bony prominences intact. Patient able to move all extremities without difficulty. Patient denies numbness, tingling or discomfort in all extremities. Patient remains resting quietly in bed at this time. Patients daughter at bedside. Bed alarm in place and functioning properly. Call light and water pitcher remain within reach.

## 2021-01-26 NOTE — PROGRESS NOTES
Patient pushed call light. Patient states she is cold and would like a warm blanket. Placed warm blanket on patient while resting in bed. Bed alarm in place and functioning properly. Call light and water pitcher remain within reach.

## 2021-01-27 LAB
ANION GAP SERPL CALCULATED.3IONS-SCNC: 11 MEQ/L (ref 8–16)
BUN BLDV-MCNC: 17 MG/DL (ref 7–22)
CALCIUM SERPL-MCNC: 8.8 MG/DL (ref 8.5–10.5)
CHLORIDE BLD-SCNC: 98 MEQ/L (ref 98–111)
CO2: 27 MEQ/L (ref 23–33)
CREAT SERPL-MCNC: 0.5 MG/DL (ref 0.4–1.2)
DIGOXIN LEVEL: 0.9 NG/ML (ref 0.5–2)
ERYTHROCYTE [DISTWIDTH] IN BLOOD BY AUTOMATED COUNT: 12.7 % (ref 11.5–14.5)
ERYTHROCYTE [DISTWIDTH] IN BLOOD BY AUTOMATED COUNT: 47.7 FL (ref 35–45)
FOLATE: 7.6 NG/ML (ref 4.8–24.2)
GFR SERPL CREATININE-BSD FRML MDRD: > 90 ML/MIN/1.73M2
GLUCOSE BLD-MCNC: 122 MG/DL (ref 70–108)
HCT VFR BLD CALC: 37.9 % (ref 37–47)
HEMOGLOBIN: 12.5 GM/DL (ref 12–16)
MAGNESIUM: 1.7 MG/DL (ref 1.6–2.4)
MCH RBC QN AUTO: 33.4 PG (ref 26–33)
MCHC RBC AUTO-ENTMCNC: 33 GM/DL (ref 32.2–35.5)
MCV RBC AUTO: 101.3 FL (ref 81–99)
PLATELET # BLD: 131 THOU/MM3 (ref 130–400)
PMV BLD AUTO: 10.3 FL (ref 9.4–12.4)
POTASSIUM SERPL-SCNC: 3.5 MEQ/L (ref 3.5–5.2)
RBC # BLD: 3.74 MILL/MM3 (ref 4.2–5.4)
SODIUM BLD-SCNC: 136 MEQ/L (ref 135–145)
VITAMIN B-12: 435 PG/ML (ref 211–911)
WBC # BLD: 5.9 THOU/MM3 (ref 4.8–10.8)

## 2021-01-27 PROCEDURE — 36415 COLL VENOUS BLD VENIPUNCTURE: CPT

## 2021-01-27 PROCEDURE — 6370000000 HC RX 637 (ALT 250 FOR IP): Performed by: PHYSICIAN ASSISTANT

## 2021-01-27 PROCEDURE — 82746 ASSAY OF FOLIC ACID SERUM: CPT

## 2021-01-27 PROCEDURE — 99223 1ST HOSP IP/OBS HIGH 75: CPT | Performed by: INTERNAL MEDICINE

## 2021-01-27 PROCEDURE — 85027 COMPLETE CBC AUTOMATED: CPT

## 2021-01-27 PROCEDURE — 82607 VITAMIN B-12: CPT

## 2021-01-27 PROCEDURE — 1200000000 HC SEMI PRIVATE

## 2021-01-27 PROCEDURE — 97110 THERAPEUTIC EXERCISES: CPT

## 2021-01-27 PROCEDURE — 97530 THERAPEUTIC ACTIVITIES: CPT

## 2021-01-27 PROCEDURE — 97535 SELF CARE MNGMENT TRAINING: CPT

## 2021-01-27 PROCEDURE — 80162 ASSAY OF DIGOXIN TOTAL: CPT

## 2021-01-27 PROCEDURE — 83735 ASSAY OF MAGNESIUM: CPT

## 2021-01-27 PROCEDURE — 99232 SBSQ HOSP IP/OBS MODERATE 35: CPT | Performed by: INTERNAL MEDICINE

## 2021-01-27 PROCEDURE — 80048 BASIC METABOLIC PNL TOTAL CA: CPT

## 2021-01-27 PROCEDURE — 2580000003 HC RX 258: Performed by: PHYSICIAN ASSISTANT

## 2021-01-27 PROCEDURE — 6370000000 HC RX 637 (ALT 250 FOR IP): Performed by: INTERNAL MEDICINE

## 2021-01-27 RX ADMIN — DIGOXIN 125 MCG: 125 TABLET ORAL at 08:32

## 2021-01-27 RX ADMIN — VENLAFAXINE HYDROCHLORIDE 75 MG: 75 CAPSULE, EXTENDED RELEASE ORAL at 08:32

## 2021-01-27 RX ADMIN — ATORVASTATIN CALCIUM 40 MG: 40 TABLET, FILM COATED ORAL at 20:45

## 2021-01-27 RX ADMIN — Medication 10 ML: at 08:33

## 2021-01-27 RX ADMIN — PANTOPRAZOLE SODIUM 40 MG: 40 TABLET, DELAYED RELEASE ORAL at 08:33

## 2021-01-27 RX ADMIN — RIVAROXABAN 15 MG: 15 TABLET, FILM COATED ORAL at 17:21

## 2021-01-27 RX ADMIN — MAGNESIUM GLUCONATE 500 MG ORAL TABLET 400 MG: 500 TABLET ORAL at 08:32

## 2021-01-27 RX ADMIN — ASPIRIN 81 MG: 81 TABLET, CHEWABLE ORAL at 08:32

## 2021-01-27 RX ADMIN — ACETAMINOPHEN 650 MG: 325 TABLET ORAL at 12:55

## 2021-01-27 RX ADMIN — MAGNESIUM GLUCONATE 500 MG ORAL TABLET 400 MG: 500 TABLET ORAL at 20:45

## 2021-01-27 NOTE — CONSULTS
[facility] [address] [place] [address]  Cardiology Consult        Patient:  Gorge Rivera  YOB: 1928  MRN: [id number]     Acct: [de-identified]    PCP: Krissy Chi MD    Date of Admission: 1/25/2021      Reason for Consultation: Generalized weakness, accelerated junctional rhythm, elevated troponins      History Of Present Illness:    80 y.o. pleasant female with history of CAD status post PCI, A. fib on Xarelto, hypertension, hyperlipidemia status post PPM who presented to the hospital with complaints of generalized weakness. Patient's daughter was in the room who provided most of the information. Patient apparently got the first dose of COVID-19 vaccine on last Wednesday. Since that days she has been progressively getting more more fatigued. She waited for few days and after that and decided to come to the hospital.  At present she does not have any chest pain shortness of breath. She has been having poor oral intake since last Wednesday. Patient does have mildly elevated troponins but they have been stable. EKGs shows to be sinus rhythm with first-degree AV block and nonspecific ST-T wave changes. Echo done yesterday showed ejection fraction of 55 to 60% with mild AI, ascending aorta was 4.2 cm.       Past Medical History:          Diagnosis Date    Atrial fibrillation ([address]     Blood circulation, collateral     [employer] single pacemaker 4/26/2016 5/5/2016    CAD (coronary artery disease)     cath and stent in right artery    Cancer [facility]) 1954    HX  Colon     Carpal tunnel syndrome     Fracture dislocation of ankle 1980    GERD (gastroesophageal reflux disease)     Hyperlipidemia     Hypertension     Kidney lesion, native, right     found on 5/2016    Osteoarthritis     knees    Osteopenia     Prolonged emergence from general anesthesia     Thyroid goiter 9/6/2016    Yersiniosis 2016       Past Surgical History:          Procedure Laterality Date  ABDOMEN SURGERY      ANKLE FRACTURE SURGERY  [id number]    reconstruction in 2003 and 2007    APPENDECTOMY      BLADDER SURGERY      support bladder repair    CARDIAC SURGERY      heart stent 12-11-18, [place]    CARPAL TUNNEL RELEASE  7/2013    CARPAL TUNNEL RELEASE Right 08/19/2017    Revision    CATARACT REMOVAL Bilateral     CHOLECYSTECTOMY  1986    COLON SURGERY  1954    COLONOSCOPY      ENDOSCOPY, COLON, DIAGNOSTIC      EYE SURGERY      cataract     FRACTURE SURGERY      HYSTERECTOMY  1971    JOINT REPLACEMENT  1998    L knee    KNEE SURGERY Left     total replacement    PACEMAKER PLACEMENT      PTCA  01/15/2019    Successful PCI / Drug Eluting Stent of the proximal Left Anterior Descending Coronary Artery.  UPPER GASTROINTESTINAL ENDOSCOPY Left 11/28/2018    EGD BIOPSY performed by Uma Miranda MD at [address] [facility] ENDOSCOPY  11/28/2018    EGD CONTROL HEMORRHAGE performed by Uma Miranda MD at [facility]       Medications Prior to Admission:      Prior to Admission medications    Medication Sig Start Date End Date Taking?  Authorizing Provider   Ascorbic Acid (VITAMIN C) 250 MG tablet Take 250 mg by mouth daily    Historical Provider, MD   zinc sulfate (ZINCATE) 220 (50 Zn) MG capsule Take 50 mg by mouth daily    Historical Provider, MD   guaiFENesin 400 MG tablet Take 400 mg by mouth 4 times daily as needed for Cough    Historical Provider, MD   docusate sodium (COLACE) 100 MG capsule Take 1 capsule by mouth daily  Patient taking differently: Take 100 mg by mouth every other day  10/13/20   Cynthia Ortega MD   ferrous sulfate (IRON 325) 325 (65 Fe) MG tablet Take 1 tablet by mouth every other day 10/13/20   Cynthia Ortega MD   pantoprazole (PROTONIX) 40 MG tablet Take 40 mg by mouth daily    Historical Provider, MD   digoxin (LANOXIN) 125 MCG tablet Take 1 tablet by mouth daily 6/17/20   Cynthia Ortega MD  atorvastatin (LIPITOR) tablet 40 mg  40 mg Oral Nightly [employer], PA-C   40 mg at 01/26/21 2019    bumetanide (BUMEX) tablet 0.5 mg  0.5 mg Oral Daily [employer], PA-C   0.5 mg at 01/26/21 1010    digoxin (LANOXIN) tablet 125 mcg  125 mcg Oral Daily [employer], PA-C   125 mcg at 01/26/21 1004    pantoprazole (PROTONIX) tablet 40 mg  40 mg Oral Daily [employer], PA-C   40 mg at 01/26/21 1007    rivaroxaban (XARELTO) tablet 15 mg  15 mg Oral Dinner [employer], PA-C   15 mg at 01/26/21 1715    venlafaxine (EFFEXOR XR) extended release capsule 75 mg  75 mg Oral Daily [employer], PA-C   75 mg at 01/26/21 1006    sodium chloride flush 0.9 % injection 10 mL  10 mL Intravenous 2 times per day [employer], PA-C        sodium chloride flush 0.9 % injection 10 mL  10 mL Intravenous PRN [employer], PA-C        promethazine (PHENERGAN) tablet 12.5 mg  12.5 mg Oral Q6H PRN [employer], PA-C        Or    ondansetron (ZOFRAN) injection 4 mg  4 mg Intravenous Q6H PRN Celeste R Hiral [place], PA-C        polyethylene glycol (GLYCOLAX) packet 17 g  17 g Oral Daily PRN [employer], PA-C        acetaminophen (TYLENOL) tablet 650 mg  650 mg Oral Q6H PRN [employer], PA-C        Or    acetaminophen (TYLENOL) suppository 650 mg  650 mg Rectal Q6H PRN [employer], PA-C        potassium chloride (KLOR-CON M) extended release tablet 40 mEq  40 mEq Oral PRN [employer], PA-C        Or    potassium bicarb-citric acid (EFFER-K) effervescent tablet 40 mEq  40 mEq Oral PRN [employer], PA-C        Or    potassium chloride 10 mEq/100 mL IVPB (Peripheral Line)  10 mEq Intravenous PRN [employer], PA-C        magnesium sulfate 2000 mg in 50 mL IVPB premix  2,000 mg Intravenous PRN [employer], PA-C           Allergies:  Methadone, Gabapentin, Lodine [etodolac], Lyrica [pregabalin], and Ultram [tramadol]    Social History: TOBACCO:   reports that she has never smoked. She has never used smokeless tobacco.  ETOH:   reports no history of alcohol use. Family History:        Problem Relation Age of Onset    Heart Disease Mother     High Blood Pressure Mother     Heart Disease Father     High Blood Pressure Father     Heart Disease Brother     High Blood Pressure Brother     Heart Disease Son          Review of Systems -   General ROS: negative  Psychological ROS: negative  Hematological and Lymphatic ROS: No history of blood clots or bleeding disorder. Respiratory ROS: no cough, shortness of breath, or wheezing  Cardiovascular ROS: As per HPI  Gastrointestinal ROS: negative  Genito-Urinary ROS: no dysuria, trouble voiding, or hematuria  Musculoskeletal ROS: negative  Neurological ROS: no TIA or stroke symptoms  Dermatological ROS: negative    All others reviewed and are negative.        BP (!) 164/71   Pulse 72   Temp 98.8 °F (37.1 °C) (Oral)   Resp 18   Ht 5' 2\" (1.575 m)   Wt 171 lb (77.6 kg)   LMP  (LMP Unknown)   SpO2 91%   BMI 31.28 kg/m²       Physical Examination:   General appearance - alert,weak  Mental status - alert, oriented to person, place, and time  Neck - supple, no significant adenopathy, no JVD, or carotid bruits  Chest - clear to auscultation, no wheezes, rales or rhonchi, symmetric air entry  Heart - normal rate, regular rhythm, normal S1, S2, no murmurs, rubs, clicks or gallops  Abdomen - soft, nontender, nondistended, no masses or organomegaly  Neurological - alert, oriented, normal speech, no focal findings or movement disorder noted  Musculoskeletal - no joint tenderness, deformity or swelling  Extremities - peripheral pulses normal, no pedal edema, no clubbing or cyanosis  Skin - normal coloration and turgor, no rashes, no suspicious skin lesions noted      LABS:    Recent Labs     01/25/21  2345 01/26/21  0459 01/26/21  1054   TROPONINT 0.015* 0.016* 0.015*     CBC:   Lab Results Component Value Date    WBC 5.9 01/27/2021    RBC 3.74 01/27/2021    RBC 4.17 08/30/2011    HGB 12.5 01/27/2021    HCT 37.9 01/27/2021    .3 01/27/2021    MCH 33.4 01/27/2021    MCHC 33.0 01/27/2021    RDW 12.0 01/25/2021     01/27/2021    MPV 10.3 01/27/2021     BMP:    Lab Results   Component Value Date     01/27/2021    K 3.5 01/27/2021    K 3.8 01/26/2021    CL 98 01/27/2021    CO2 27 01/27/2021    BUN 17 01/27/2021    LABALBU 3.7 01/25/2021    LABALBU 4.2 08/30/2011    CREATININE 0.5 01/27/2021    CALCIUM 8.8 01/27/2021    LABGLOM >90 01/27/2021    GLUCOSE 122 01/27/2021    GLUCOSE 116 08/30/2011     Hepatic Function Panel:    Lab Results   Component Value Date    ALKPHOS 61 01/25/2021    ALT 15 01/25/2021    AST 18 01/25/2021    PROT 7.3 01/25/2021    BILITOT 2.8 01/25/2021    BILIDIR <0.2 01/19/2021    LABALBU 3.7 01/25/2021    LABALBU 4.2 08/30/2011     Magnesium:    Lab Results   Component Value Date    MG 1.7 01/27/2021     Warfarin PT/INR:  No components found for: PTPATWAR, PTINRWAR  HgBA1c:    Lab Results   Component Value Date    LABA1C 6.1 01/26/2021     FLP:    Lab Results   Component Value Date    TRIG 145 12/04/2018    HDL 36 12/04/2018    LDLCALC 62 12/04/2018     TSH:    Lab Results   Component Value Date    TSH 1.000 01/26/2021     BNP: No components found for: PRO-BNP      Assessment/Plan:    Patient Active Problem List   Diagnosis    Hypertension    Fatigue    Osteoarthritis    Stress incontinence    History of colon cancer    Hyperlipidemia    Osteopenia    Atrial fibrillation with rapid ventricular response (HCC)    Near syncope    Urinary tract infection without hematuria    Sick sinus syndrome [facility]    [employer] single pacemaker 4/26/2016    VACA (dyspnea on exertion)    Mild anemia    Steatosis of liver    Pleural effusion    Chronic diastolic CHF (congestive heart failure) (HCC)    Iron deficiency anemia    Gastric polyps  Acute on chronic congestive heart failure with left ventricular diastolic dysfunction (HCC)    Elevated troponin    Coronary artery disease involving native heart with angina pectoris (HCC)    Chronic atrial fibrillation with rapid ventricular response (HCC)    CAD (coronary artery disease)    GERD (gastroesophageal reflux disease)    Angina, class III (HCC)    Gastroenteritis    Diarrhea of presumed infectious origin    Bilious vomiting with nausea    Physical deconditioning    Occult blood in stools    Generalized weakness    RUQ abdominal pain    Hyperglycemia    Hypomagnesemia    Kidney cysts    Chronic depression     Generalized weakness and fatigue likely secondary to COVID-19 vaccine. Chronic diastolic heart failure, appears to be more dehydrated  Mildly elevated troponin likely multifactorial  Chronic atrial fibrillation on Xarelto    Suggest to hold diuretics for today, resume from tomorrow  Would give 250 bolus of normal saline  Please get PT/OT  Reviewed electrocardiogram labs and prior cardiac testing  Please place patient on telemetry  No further cardiac work-up at present is needed  Supportive care    Please do note hesitate to contact me for any further questions. Thank you for the opportunity to be involved in this patient's care.     Code Status: Limited    Electronically signed by Lilly Stein MD on 1/27/2021 at 8:17 AM

## 2021-01-27 NOTE — PROGRESS NOTES
Reported to primary nurse Ayah Gallagher RN patient's IV had blood around IV site. Primary nurse removed old dressing and placed new dressing on IV site.

## 2021-01-27 NOTE — CARE COORDINATION
DISASTER CHARTING    1/27/21, 2:50 PM EST    DISCHARGE ONGOING EVALUATION:     Yong Dsouza day: 2  Location: Count includes the [facility]21/021-A Reason for admit: Weakness generalized [R53.1]   Barriers to Discharge: Weakness possibly r/t to covid 19 vaccine per hospitalist and cardio. Telemetry ordered. 250ml saline bolus per cardio. Diuretics to be resumed tomorrow. No further work-up planned. PT/OT. PCP: Alie Peña MD  Readmission Risk Score: 14%  Patient Goals/Plan/Treatment Preferences: Plans pending progress. Home with assist from family vs ECF for rehab.  Await PT eval.

## 2021-01-27 NOTE — PROGRESS NOTES
Patient is alert and oriented x4, no complains of pain, nor no stated fears or concerns or worries. Speech is clear and appropriate. PERRL is equal bilaterally, with eyes going from a 4 to a 3. Patient has impaired vision and wears glasses. Patient has impaired hearing, wears hearing aids. Capillary refill and skin tugor is <3. Upper extremities are cool, pink, and dry to touch. Hand grasp is moderate bilaterally. IV in antecubital of left arm is clean, dry, intact, and capped. The site has no appearance of edema, heat ness, pallor, or swelling. Heart sounds are clear, Lung sounds are clear as well. Bowel sounds active x4. Abdomen is round, non distended, with no masses or tenderness. Lower extremities are cool, pink, and dry to touch. Edema is present bilaterally +2 pitting. Pedal, push, and pull is weak bilaterally. Both dorsalis pedis and tibialis anterior are present. Patient moves freely in bed with minimal assistance. Skin posteriorly is intact especially over bony prominences.  Patient returned to semi fowlers position, call light in hand, bed in lowest position, bed alarm on, personal belongings in reach

## 2021-01-27 NOTE — PROGRESS NOTES
[facility] 5  Occupational Therapy  Daily Note  Time In: 8480  Time Out: 1055  Timed Code Treatment Minutes: 64 Minutes  Minutes: 56          Date: 2021  Patient Name: Queta Robbins,   Gender: female      [facility]  MRN: [id number]  : 1928  (80 y.o.)  Referring Practitioner: Aileen Cote PA-C  Diagnosis: Weakness Generalized  Additional Pertinent Hx: Patient is a 80year old female who presented to the ED on  with a chief complaint of right upper quadrant pain and is now presenting to the ED with a chief complaint of generalized weakness and states that her abdominal pain has since resolved. Patient states she has been feeling weak since her ED visit on  and her symptoms were greatly exacerbated today. Patient also notes that she usually lives at home but is currently requiring care for all of her activities by her daughter who lives next door. Patient does note that her abdominal pain is aggravated by a cough, but seemed to be resting comfortably during the interview. Patient denies dizziness or LOC. \"    Restrictions/Precautions:  Restrictions/Precautions: General Precautions, Fall Risk      SUBJECTIVE: RN okayed session. Pt was supine in bed upon arrival. Pt stated she was not feeling great, agreeable to OT and getting cleaned up. Significant time required for completion of session this date d/t significant fatigue and increased assist required on this date. RN notified of activity changes between yesterday and today. PAIN: Denies, c/o upper right abdominal pain in the middle of the session. COGNITION: WFL    ADL:   Grooming: Minimal Assistance. for putting lotion on body. Pt able to get arms and chest, fatigued very quickly requiring assist.   Bathing: Moderate Assistance. Pt able to wash face, neck, arms.  Assist required for the rest as pt became increasingly fatigued and began to lean posteriorly Upper Extremity Dressing: Minimal Assistance. for gown management  Lower Extremity Dressing: Moderate Assistance. donning depends while laying in bed. Pt able to complete bedmobility to assist with pulling them up around waist  Toileting: Maximum Assistance. for cleaning bottom and patrick area after use of bedpan. BALANCE:  Sitting Balance:  Stand By Assistance, Moderate Assistance. Pt fluctuated between SBA and Mod A. Pt initially SBA, able to assist with bathing tasks. Pt progressed to requiring increased assist d/t posterior and slight L sided lean. Mod A and VC required for improved posture while seated. Standing Balance: Not Tested. BED MOBILITY:  Rolling to Left: Minimal Assistance Pt able to sustain position for cleaning bottom and donning depends  Rolling to Right: Minimal Assistance Pt able to sustain position  Supine to Sit: Moderate Assistance with increased time for completion  Sit to Supine: Moderate Assistance to guide BLE onto bed  Scooting: Minimal Assistance Pt completed some scooting to EOB, became fatigued, requiring Min A to get BLE onto floor    TRANSFERS:  Sit to Stand:  Pt with short attempt to stand to bathe bottom and patrick area, pt was very fatigued and unable to stand with Max A. Unsafe to attempt again. FUNCTIONAL MOBILITY:  Pt declined, pt too fatigued and unsafe for mobility this session. ADDITIONAL ACTIVITIES:  No further activities completed this date. ASSESSMENT:     Activity Tolerance:  Patient tolerance of  treatment: fair.  Pt very fatigued and not feeling well this date      Discharge Recommendations: Continue to assess pending progress, Patient would benefit from continued therapy after discharge    Equipment Recommendations: Equipment Needed: No  Plan: Times per week: 5x  Current Treatment Recommendations: Functional Mobility Training, Endurance Training, Patient/Caregiver Education & Training, Safety Education & Training, Self-Care / ADL

## 2021-01-27 NOTE — PROGRESS NOTES
 Yersiniosis 2016     SHX:        Procedure Laterality Date    ABDOMEN SURGERY      ANKLE FRACTURE SURGERY  [id number]--[id number]    reconstruction in 2003 and 2007    APPENDECTOMY      BLADDER SURGERY      support bladder repair    CARDIAC SURGERY      heart stent 12-11-18, [place]    CARPAL TUNNEL RELEASE  7/2013    CARPAL TUNNEL RELEASE Right 08/19/2017    Revision    CATARACT REMOVAL Bilateral     CHOLECYSTECTOMY  1986    COLON SURGERY  1954    COLONOSCOPY      ENDOSCOPY, COLON, DIAGNOSTIC      EYE SURGERY      cataract     FRACTURE SURGERY      HYSTERECTOMY  1971    JOINT REPLACEMENT  1998    L knee    KNEE SURGERY Left     total replacement    PACEMAKER PLACEMENT      PTCA  01/15/2019    Successful PCI / Drug Eluting Stent of the proximal Left Anterior Descending Coronary Artery.  UPPER GASTROINTESTINAL ENDOSCOPY Left 11/28/2018    EGD BIOPSY performed by Wade Finn MD at [facility] ENDOSCOPY  11/28/2018    EGD CONTROL HEMORRHAGE performed by Wade Finn MD at [facility]     FHX:       Problem Relation Age of Onset    Heart Disease Mother     High Blood Pressure Mother     Heart Disease Father     High Blood Pressure Father     Heart Disease Brother     High Blood Pressure Brother     Heart Disease Son      formerly [facility]:   Social History     Socioeconomic History    Marital status:       Spouse name: Tasia Cote Number of children: 2    Years of education: None    Highest education level: None   Occupational History    None   Social Needs    Financial resource strain: None    Food insecurity     Worry: None     Inability: None    Transportation needs     Medical: None     Non-medical: None   Tobacco Use    Smoking status: Never Smoker    Smokeless tobacco: Never Used   Substance and Sexual Activity    Alcohol use: No    Drug use: No    Sexual activity: None   Lifestyle    Physical activity     Days per week: None atorvastatin (LIPITOR) 40 MG tablet Take 1 tablet by mouth nightly 6/17/20   Mai Pathak MD   bumetanide (BUMEX) 0.5 MG tablet Take 1 tablet by mouth daily 6/17/20   Mai Pathak MD   rivaroxaban (XARELTO) 15 MG TABS tablet Take 1 tablet by mouth Daily with supper 6/17/20   Mai Pathak MD   potassium chloride (KLOR-CON M) 20 MEQ extended release tablet Take 1 tablet by mouth once daily 5/26/20   Mai Pathak MD   sucralfate (CARAFATE) 1 GM tablet TAKE 1 TABLET BY MOUTH TWO  TIMES DAILY 5/15/20   Mai Pathak MD   nitroGLYCERIN (NITROSTAT) 0.4 MG SL tablet up to max of 3 total doses. If no relief after 1 dose, call 911. 12/14/18   Adrian Watson MD   Magnesium Oxide 250 MG TABS Take 1 tablet by mouth daily 12/14/18   Adrian Watson MD   NONFORMULARY Perils probiotic  1 daily    Historical Provider, MD   Cyanocobalamin (VITAMIN B 12 PO) Take by mouth every other day     Historical Provider, MD   aspirin 81 MG chewable tablet Take 1 tablet by mouth daily 3/31/16   Katy Padgett APRN - CNP   vitamin D (CHOLECALCIFEROL) 1000 UNIT TABS tablet Take 1,000 Units by mouth daily    Historical Provider, MD   Calcium-Vitamin D-Vitamin K 500-500-40 MG-UNT-MCG CHEW Take 1 tablet by mouth daily    Historical Provider, MD   acetaminophen (TYLENOL) 500 MG tablet Take 1,000 mg by mouth 4 times daily     Historical Provider, MD   Biotin 1000 MCG TABS Take 1 tablet by mouth daily     Historical Provider, MD      PHYSICAL EXAM:    BP (!) 147/66   Pulse 63   Temp 98.1 °F (36.7 °C) (Oral)   Resp 18   Ht 5' 2\" (1.575 m)   Wt 171 lb (77.6 kg)   LMP  (LMP Unknown)   SpO2 92%   BMI 31.28 kg/m²     General appearance:  No apparent distress, appears stated age and cooperative. HEENT:  Normal cephalic, atraumatic without obvious deformity. Pupils equal, round, and reactive to light. Extra ocular muscles intact. Conjunctivae/corneas clear. Neck: Supple, with full range of motion. no jugular venous distention. Trachea midline. no carotid bruits  Respiratory:  Normal respiratory effort. Clear to auscultation, bilaterally without Rales/Wheezes/Rhonchi. Breath sounds equal bilaterally  Cardiovascular:  Regular rate and rhythm with normal S1/S2 without murmurs, rubs or gallops. PMI non displaced  Abdomen: Soft, non-tender, non-distended with normal bowel sounds. No guarding, rebound. Musculoskeletal:  No clubbing, cyanosis or edema bilaterally. Full range of motion without deformity. Skin: Skin color, texture, turgor normal.  No rashes or lesions, or suspicious lesions. Neurologic:  Neurovascularly intact without any focal sensory/motor deficits. Cranial nerves: II-XII intact, grossly non-focal.  Psychiatric:  Alert and oriented, thought content appropriate, normal insight  Capillary Refill: Brisk,< 2 seconds   Peripheral Pulses: +2 palpable, equal bilaterally upper and lower extremities  Lymphatics: no lymphadenopathy    Data: (All radiographs, tracings, PFTs, and imaging are personally viewed and interpreted unless otherwise noted). ? Hemoglobin 12.5  ? Platelets 618  ? Potassium 3.5  ? Recent Labs     01/25/21  1728 01/26/21  0459 01/27/21  0430   WBC 6.9 7.1 5.9   HGB 14.1 12.5 12.5   HCT 42.7 39.4 37.9    147 131   ? Recent Labs     01/25/21  1728 01/26/21  0459 01/27/21  0430    137 136   K 3.5 3.8 3.5   CL 96* 100 98   CO2 31 24 27   BUN 14 16 17   CREATININE 0.8 0.4 0.5   CALCIUM  --  8.7 8.8   ? Recent Labs     01/25/21  1728   AST 18   ALT 15   BILITOT 2.8*   ALKPHOS 61   ?   ? No results for input(s): INR in the last 72 hours. Recent Labs     01/25/21  1728   TROPONINI 0.055   ?      Radiology reports-images reviewed in PACS  Echo Complete 2d W Doppler W Color    Result Date: 1/26/2021 Transthoracic Echocardiography Report (TTE)  Demographics   Patient Name   Juju Toro Gender              Female                 L   MR #           [id number]        Race                                                   Ethnicity   Account #      [de-identified]        Room Number         0021   Accession      [id number]       Date of Study       01/26/2021  Number   Date of Birth  04/04/1928       Referring Physician Chanda Desir MD   Age            80 year(s)       Chuck Sport,                                                      [facility]                                   Interpreting        Norman Meade MD                                  Physician  Procedure Type of Study   TTE procedure:ECHOCARDIOGRAM COMPLETE 2D W DOPPLER W COLOR. Procedure Date Date: 01/26/2021 Start: 02:34 PM Study Location: Bedside Technical Quality: Adequate visualization Indications:Fatigue . Additional Medical History:Hypertension, hyperlipidemia, sick sinus syndrome, pacemaker, anemia, GERD, history of colon cancer Patient Status: Routine Height: 62.2 inches Weight: 169.77 pounds BSA: 1.79 m^2 BMI: 30.85 kg/m^2 BP: 127/59 mmHg Allergies   - Other allergy:(Methadone, Gabapentin, lyrica, ultram). - See Epic. - Other allergy:(methadone, gabapentin, lyrica). Conclusions   Summary  Left ventricular size and systolic function is normal. Ejection fraction  was estimated at 55-60%. LV wall thickness is within normal limits. Normal right ventricular size and function. Mild aortic regurgitation is noted. Mildly dilated left atrium. Mild mitral regurgitation is present.   Asc aorta 4.2 cm   Signature   ----------------------------------------------------------------  Electronically signed by Norman Meade MD (Interpreting  physician) on 01/26/2021 at 04:41 PM  ---------------------------------------------------------------- Findings   Mitral Valve  Mild calcification of the posterior leaflet of the mitral valve. Mild mitral regurgitation is present. Aortic Valve  The aortic valve appears to be trileaflet with good leaflet separation. Aortic valve leaflets are mildly calcified. Mild aortic regurgitation is noted. Tricuspid Valve  Tricuspid valve is structurally normal.  Mild tricuspid regurgitation. Pulmonic Valve  The pulmonic valve was not well visualized . Left Atrium  Mildly dilated left atrium. Left Ventricle  Left ventricular size and systolic function is normal. Ejection fraction  was estimated at 55-60%. LV wall thickness is within normal limits. Right Atrium  The right atrium is of normal size. Right Ventricle  Normal right ventricular size and function. Pericardial Effusion  No evidence of any pericardial effusion. Pleural Effusion  No evidence of pleural effusion.    Aorta / Great Vessels  Asc aorta 4.2 cm  IVC is normal in size with normal respiratory phasic changes  M-Mode/2D Measurements & Calculations   LV Diastolic    LV Systolic Dimension: 3  AV Cusp Separation: 2 cmLA  Dimension: 4.2  cm                        Dimension: 3.3 cmAO Root  cm              LV Volume Diastolic: 62.5 Dimension: 3.5 cmLA Area: 19.4  LV FS:28.6 %    ml                        cm^2  LV PW           LV Volume Systolic: 35 ml  Diastolic: 1.1  LV EDV/LV EDV Index: 78.6  cm              ml/44 m^2LV ESV/LV ESV  Septum          Index: 35 ml/20 m^2       RV Diastolic Dimension: 2.6 cm  Diastolic: 1.4  EF Calculated: 55.5 %  cm                                        LA/Aorta: 0.94                                            Ascending Aorta: 4.2 cm                                            LA volume/Index: 52.5 ml /29m^2  Doppler Measurements & Calculations   MV Peak E-Wave: 134  AV Peak Velocity: 151 LVOT Peak Velocity: 122 cm/s  cm/s                 cm/s                  LVOT Mean Velocity: 87.9 cm/s  MV Peak A-Wave: 75.5 AV Peak No discrete lobar consolidation. Nonspecific bowel gas pattern. **This report has been created using voice recognition software. It may contain minor errors which are inherent in voice recognition technology. ** Final report electronically signed by Dr. Tru Tolliver on 1/25/2021 6:56 PM    Us Renal Complete    Result Date: 1/19/2021  PROCEDURE: US RENAL COMPLETE CLINICAL INFORMATION: Further evaluation of CT findings: Renal mass . COMPARISON: No prior study. TECHNIQUE: Grayscale and color Doppler ultrasound FINDINGS: Right: Right kidney is normal in size and echogenicity. There is no hydronephrosis. There is no solid mass identified. There is a 1.4 cm cyst interpolar portion. This is a Bosniak 1 cyst. There is a 1.7 x 1.8 cm cyst lower pole. Contour is slightly irregular along the inferior edge of the kidney. Consistent with a Bosniak 2 cyst. Resistive index is elevated. Left: Left kidney is normal in size and echogenicity. There is no solid or cystic mass identified. There is no hydronephrosis. There is no renal cortical thinning. Resistive index is elevated. RIGHT KIDNEY - 9.9 x 4.0 x 5.1 cm Resistive Index - 0.86 Cortical Thickness - 1.3 cm LEFT KIDNEY - 10.6 x 5.2 x 4.8 cm Resistive Index - 0.84 Cortical Thickness - 1.0 cm URINARY BLADDER Pre-Void - 85 mL     1. The right renal lesion of concern is a 1.8 cm Bosniak 2 cyst. 2. There is a Bosniak 1 cyst involving the right kidney as well. 3. Bilateral elevated resistive indices may indicate medical renal disease. **This report has been created using voice recognition software. It may contain minor errors which are inherent in voice recognition technology. ** Final report electronically signed by Dr. Devan Sanders on 1/19/2021 11:17 AM    Ct Abdomen Pelvis W Iv Contrast Additional Contrast? None    Result Date: 1/19/2021 PROCEDURE: CT ABDOMEN PELVIS W IV CONTRAST CLINICAL INFORMATION: RUQ abdominal pain. . COMPARISON: 11/26/2018 TECHNIQUE: 2-D multiplanar postcontrast images of the abdomen and pelvis Isovue-370 IV contrast. All CT scans at this facility use dose modulation, iterative reconstruction, and/or weight-based dosing when appropriate to reduce radiation dose to as low as reasonably achievable. FINDINGS: Lung bases The aorta is ectatic. No infiltrates or effusions seen. Moderate hiatal hernia. Cardiomegaly. Coronary artery atherosclerosis. AICD with leads in the ventricle and atrium. Abdomen pelvis Liver and spleen are within normal limits. Prior cholecystectomy. The pancreas is normal. Adrenal glands are normal. Kidneys enhance symmetrically. The left kidney is unremarkable. Right kidney demonstrates a 1.6 x 1.7 cm low-density lesion inferior pole this is stable in size compared to prior exam. Hounsfield units are not cystic. There is also a small stable exophytic cyst interpolar portion right kidney. Aorta is heavily atherosclerotic. Pelvis There is no bowel obstruction. There is mild constipation. Urinary bladder is distended. Uterus is surgically absent. No abnormal fluid collections are seen. Right-sided uncomplicated diverticulosis is noted. Bones There are no suspicious bone lesions. 1. Stable low-density mass lower pole right kidney. Differential would include a hemorrhagic cyst proteinaceous cyst or solid mass. Ultrasound may be beneficial for differentiation. 2. Uncomplicated colonic diverticulosis with mild constipation. 3. No acute abdominal or pelvic abnormalities. **This report has been created using voice recognition software. It may contain minor errors which are inherent in voice recognition technology. ** Final report electronically signed by Dr. Andrea Slaughter on 1/19/2021 9:25 AM    Xr Chest Portable    Result Date: 1/26/2021 PROCEDURE: XR CHEST PORTABLE CLINICAL INFORMATION: Fever. COMPARISON: 1/25/2021. TECHNIQUE: AP portable chest radiograph performed. FINDINGS: POSTSURGICAL CHANGES: None. LINES/TUBES/MECHANICAL DEVICES: 1. The pacemaker is unchanged in position with the single lead overlying the right ventricle. TRACHEA/HEART/MEDIASTINUM/HILUM: 1. The cardiac silhouette is upper limits normal size and stable. 2. There is a stable calcified right azygos lymph node related to old granulomatous disease. LUNG FIELDS: 1. There is prominence of the pulmonary vasculature with cephalization suggesting pulmonary vascular congestion. There is stable mild elevation of the right hemidiaphragm. There is no focal consolidation. Given the clinical history of fever clinical correlation recommended to exclude a superimposed infectious etiology. There is no pleural effusion. Clinical management is recommended. Follow-up chest radiographs are also recommended to confirm complete resolution. 2. There is a stable calcified granuloma within the right mid chest. OTHER: None. PNEUMOTHORAX:  None. OSSEOUS STRUCTURES: 1. No acute osseous abnormality. 1. There is prominence of the pulmonary vasculature with cephalization suggesting pulmonary vascular congestion. There is stable mild elevation of the right hemidiaphragm. There is no focal consolidation. Given the clinical history of fever clinical correlation recommended to exclude a superimposed infectious etiology. There is no pleural effusion. Clinical management is recommended. Follow-up chest radiographs are also recommended to confirm complete resolution. **This report has been created using voice recognition software. It may contain minor errors which are inherent in voice recognition technology. ** Final report electronically signed by Dr. Grace Canavan on 1/26/2021 8:03 AM      Electronically signed by Radha Rubio DO on 1/27/2021 at 9:35 AM

## 2021-01-27 NOTE — PROGRESS NOTES
Patient accepts all routine medications without difficulty at this time. Patient asked for more ice water. Ice water filled up and placed on bedside table. Call light and water pitcher remain within reach. Continuing to monitor.

## 2021-01-27 NOTE — PROGRESS NOTES
[address] [address]  INPATIENT PHYSICAL THERAPY  DAILY NOTE  [facility] ONC MED 5K - 8T-88/473-S  Time In: 8706  Time Out: 1521  Timed Code Treatment Minutes: 25 Minutes  Minutes: 25          Date: 2021  Patient Name: Daniel Lawrence,  Gender:  female        MRN: [id number]  : 1928  (80 y.o.)     Referring Practitioner: Lance Homans, PA-C  Diagnosis: weakness generalized  Additional Pertinent Hx: Patient is a 80year old female who presented to the ED on  with a chief complaint of right upper quadrant pain and is now presenting to the ED with a chief complaint of generalized weakness and states that her abdominal pain has since resolved. Patient states she has been feeling weak since her ED visit on  and her symptoms were greatly exacerbated today. Patient also notes that she usually lives at home but is currently requiring care for all of her activities by her daughter who lives next door. Patient does note that her abdominal pain is aggravated by a cough, but seemed to be resting comfortably during the interview. Patient denies dizziness or LOC.      Prior Level of Function:  Lives With: (daughter lives in other side of duplex and has been staying on pt's side for the past week to help her with transfers and ADLs)  Type of Home: (Duplex, pt's daughter lives in other side)  Home Layout: One level  Home Equipment: (two wheeled walker)   Bathroom Shower/Tub: Tub/Shower unit, Shower chair with back  [employer]: Handicap height  Bathroom Equipment: Grab bars in shower, Grab bars around toilet  Bathroom Accessibility: Accessible    Receives Help From: Family  ADL Assistance: Needs assistance  [address]: (daughter has been doing almost all homemaking & ADLs for the pt)  Ambulation Assistance: Independent  Transfer Assistance: Independent  Active : No    Restrictions/Precautions:  Restrictions/Precautions: General Precautions, Fall Risk SUBJECTIVE: RN approved PT tx session. Pt states she is feeling about the same as yesterday, and that she got up with OT for a bath earlier today, stating that was fatiguing for her. Pt agreeable to getting up to chair and exercising. She requests to use commode.      PAIN: none stated    OBJECTIVE:  Bed Mobility:  Supine to Sit: Moderate Assistance, X 1, with head of bed raised, with verbal cues , with increased time for completion  Scooting: Minimal Assistance, X 1, with verbal cues , with increased time for completion  *pt took multiple attempts to scoot forward, using UEs     Transfers:  Multiple trials of sit to stand, from bed and commode  Sit to Stand: Minimal Assistance, Moderate Assistance, X 1, with increased time for completion, cues for hand placement, with verbal cues  Stand to Sit:Minimal Assistance, X 1, with increased time for completion, cues for hand placement   Several cues to \"stand tall\"--pt only followed this command once briefly, very kyphotic posture in standing     Ambulation:  Minimal Assistance, X 1, with cues for safety, with verbal cues , with increased time for completion  Distance: ~4' total multiple short distances from bed to commode, from commode to chair  Surface: Level Tile  Device:2 [employer]  Gait Deviations:  Decreased Step Length Bilaterally, Decreased Gait Speed, Narrow Base of Support, Unsteady Gait and decreased foot clearance bilaterally   Cues needed to turn walker with body, maintain feet within walker frame    Balance:  Static Sitting Balance:  Stand By Assistance  Dynamic Sitting Balance: Stand By Assistance  Static Standing Balance: Minimal Assistance, Moderate Assistance, X 1, with cues for safety, with increased time for completion, assist level variable with fatigue  Dynamic Standing Balance: Minimal Assistance, Moderate Assistance, X 1, with cues for safety, with verbal cues , with increased time for completion *standing with assist for patrick-care. Pt able to wipe clean, but required assist to don pull-ups    Exercise:  Patient was guided in 1 set(s) 10 reps of exercise to both lower extremities. Seated marches, Seated heel/toe raises and Long arc quads. Exercises were completed for increased independence with functional mobility. Functional Outcome Measures: Completed  AM-PAC Inpatient Mobility Raw Score : 12  AM-PAC Inpatient T-Scale Score : 35.33    ASSESSMENT:  Assessment: Pt presents with increased fatigue today compared to yesterday, requiring higher assist level with activity. She requires continued physical therapy to improve functional strength and endurance. Activity Tolerance:  Patient tolerance of  treatment: fair. Very fatigued. Equipment Recommendations:Equipment Needed: No  Discharge Recommendations: SNF (pt will likely require SNF based on functional deficits noted on date of initial evaluation; her daughter states she is hopeful pt can improve enough in hospital to return home with [facility], as she does not want to be unable to visit her mom in the SNF.)    Plan: Times per week: 5x GM  Times per day: Daily  Current Treatment Recommendations: Strengthening, Functional Mobility Training, Neuromuscular Re-education, Home Exercise Program, Balance Training, Endurance Training, Patient/Caregiver Education & Training, Transfer Training, Gait Training, Safety Education & Training    Patient Education  Patient Education: Plan of Care, [employer] Mobility, Transfers, Reviewed Prior Education, Gait, Verbal Exercise Instruction,  - Patient Verbalized Understanding, - Patient Requires Continued Education    Goals:  Patient goals : get stronger  Short term goals  Time Frame for Short term goals: by hospital discharge  Short term goal 1: Bed mobility with modified independence for ease of bed mobility at discharge site. Short term goal 2: Supine to/from sit with modified independence for ease of transfers at discharge site. Short term goal 3: Sit to/from stand with supervision for ease of transfers at discharge site. Short term goal 4: Ambulate 21' with supervision and RW for home-distance ambulation. Long term goals  Time Frame for Long term goals : N/A due to short ELOS. Following session, patient left in safe position with all fall risk precautions in place, call light in reach, chair alarm on. Daughter in room.     Catherine Knight, PT, DPT

## 2021-01-27 NOTE — PROGRESS NOTES
Reported off to primary nurse Eris Abbott RN. Patient resting quietly in bed watching television at this time. Bed alarm in place and functioning properly. Call light and water pitcher remain within reach.

## 2021-01-27 NOTE — PROGRESS NOTES
Vitals obtained and charted. Assessment completed at this time. No changes noted from previous assessment. Patient continues to deny pain at present time. Patient resting quietly in bed watching television. Bed alarm in place and functioning properly. Call light and water pitcher remain within reach.

## 2021-01-28 LAB
ANION GAP SERPL CALCULATED.3IONS-SCNC: 12 MEQ/L (ref 8–16)
BASOPHILS # BLD: 0.7 %
BASOPHILS ABSOLUTE: 0.1 THOU/MM3 (ref 0–0.1)
BUN BLDV-MCNC: 14 MG/DL (ref 7–22)
CALCIUM SERPL-MCNC: 9 MG/DL (ref 8.5–10.5)
CHLORIDE BLD-SCNC: 100 MEQ/L (ref 98–111)
CO2: 26 MEQ/L (ref 23–33)
CREAT SERPL-MCNC: 0.5 MG/DL (ref 0.4–1.2)
EOSINOPHIL # BLD: 2.5 %
EOSINOPHILS ABSOLUTE: 0.2 THOU/MM3 (ref 0–0.4)
ERYTHROCYTE [DISTWIDTH] IN BLOOD BY AUTOMATED COUNT: 13 % (ref 11.5–14.5)
ERYTHROCYTE [DISTWIDTH] IN BLOOD BY AUTOMATED COUNT: 48.4 FL (ref 35–45)
GFR SERPL CREATININE-BSD FRML MDRD: > 90 ML/MIN/1.73M2
GLUCOSE BLD-MCNC: 208 MG/DL (ref 70–108)
HCT VFR BLD CALC: 39.2 % (ref 37–47)
HEMOGLOBIN: 12.9 GM/DL (ref 12–16)
IMMATURE GRANS (ABS): 0.04 THOU/MM3 (ref 0–0.07)
IMMATURE GRANULOCYTES: 0.5 %
LYMPHOCYTES # BLD: 15.8 %
LYMPHOCYTES ABSOLUTE: 1.2 THOU/MM3 (ref 1–4.8)
MCH RBC QN AUTO: 33.4 PG (ref 26–33)
MCHC RBC AUTO-ENTMCNC: 32.9 GM/DL (ref 32.2–35.5)
MCV RBC AUTO: 101.6 FL (ref 81–99)
MONOCYTES # BLD: 9.7 %
MONOCYTES ABSOLUTE: 0.7 THOU/MM3 (ref 0.4–1.3)
NUCLEATED RED BLOOD CELLS: 0 /100 WBC
PLATELET # BLD: 159 THOU/MM3 (ref 130–400)
PMV BLD AUTO: 10.8 FL (ref 9.4–12.4)
POTASSIUM SERPL-SCNC: 3.6 MEQ/L (ref 3.5–5.2)
RBC # BLD: 3.86 MILL/MM3 (ref 4.2–5.4)
SEG NEUTROPHILS: 70.8 %
SEGMENTED NEUTROPHILS ABSOLUTE COUNT: 5.3 THOU/MM3 (ref 1.8–7.7)
SODIUM BLD-SCNC: 138 MEQ/L (ref 135–145)
WBC # BLD: 7.5 THOU/MM3 (ref 4.8–10.8)

## 2021-01-28 PROCEDURE — 99232 SBSQ HOSP IP/OBS MODERATE 35: CPT | Performed by: INTERNAL MEDICINE

## 2021-01-28 PROCEDURE — 85025 COMPLETE CBC W/AUTO DIFF WBC: CPT

## 2021-01-28 PROCEDURE — 80048 BASIC METABOLIC PNL TOTAL CA: CPT

## 2021-01-28 PROCEDURE — 6370000000 HC RX 637 (ALT 250 FOR IP): Performed by: PHYSICIAN ASSISTANT

## 2021-01-28 PROCEDURE — 97530 THERAPEUTIC ACTIVITIES: CPT

## 2021-01-28 PROCEDURE — 36415 COLL VENOUS BLD VENIPUNCTURE: CPT

## 2021-01-28 PROCEDURE — 2580000003 HC RX 258: Performed by: PHYSICIAN ASSISTANT

## 2021-01-28 PROCEDURE — 6370000000 HC RX 637 (ALT 250 FOR IP): Performed by: INTERNAL MEDICINE

## 2021-01-28 PROCEDURE — 97116 GAIT TRAINING THERAPY: CPT

## 2021-01-28 PROCEDURE — 1200000000 HC SEMI PRIVATE

## 2021-01-28 RX ORDER — BUMETANIDE 1 MG/1
0.5 TABLET ORAL DAILY
Status: DISCONTINUED | OUTPATIENT
Start: 2021-01-29 | End: 2021-01-29 | Stop reason: HOSPADM

## 2021-01-28 RX ADMIN — ATORVASTATIN CALCIUM 40 MG: 40 TABLET, FILM COATED ORAL at 20:16

## 2021-01-28 RX ADMIN — RIVAROXABAN 15 MG: 15 TABLET, FILM COATED ORAL at 18:06

## 2021-01-28 RX ADMIN — VENLAFAXINE HYDROCHLORIDE 75 MG: 75 CAPSULE, EXTENDED RELEASE ORAL at 10:52

## 2021-01-28 RX ADMIN — MAGNESIUM GLUCONATE 500 MG ORAL TABLET 400 MG: 500 TABLET ORAL at 10:51

## 2021-01-28 RX ADMIN — MAGNESIUM GLUCONATE 500 MG ORAL TABLET 400 MG: 500 TABLET ORAL at 20:16

## 2021-01-28 RX ADMIN — ASPIRIN 81 MG: 81 TABLET, CHEWABLE ORAL at 10:52

## 2021-01-28 RX ADMIN — PANTOPRAZOLE SODIUM 40 MG: 40 TABLET, DELAYED RELEASE ORAL at 10:52

## 2021-01-28 RX ADMIN — ACETAMINOPHEN 650 MG: 325 TABLET ORAL at 01:36

## 2021-01-28 RX ADMIN — Medication 10 ML: at 10:52

## 2021-01-28 RX ADMIN — DIGOXIN 125 MCG: 125 TABLET ORAL at 10:52

## 2021-01-28 RX ADMIN — ACETAMINOPHEN 650 MG: 325 TABLET ORAL at 12:27

## 2021-01-28 RX ADMIN — Medication 10 ML: at 20:16

## 2021-01-28 ASSESSMENT — PAIN SCALES - GENERAL
PAINLEVEL_OUTOF10: 5
PAINLEVEL_OUTOF10: 0

## 2021-01-28 ASSESSMENT — PAIN - FUNCTIONAL ASSESSMENT: PAIN_FUNCTIONAL_ASSESSMENT: ACTIVITIES ARE NOT PREVENTED

## 2021-01-28 ASSESSMENT — PAIN DESCRIPTION - LOCATION: LOCATION: ABDOMEN

## 2021-01-28 ASSESSMENT — PAIN DESCRIPTION - ORIENTATION: ORIENTATION: RIGHT

## 2021-01-28 NOTE — PROGRESS NOTES
Hospitalist       Patient:  Rochel Lanes    Unit/Bed:5K-21/021-A  YOB: 1928  MRN: [id number]   Acct: [de-identified]     PCP: Russell Woodard MD  Date of Admission: 1/25/2021        Assessment and Plan:        1. Generalized weakness: Possibly to the COVID-19 vaccine, will check a digoxin level since the patient has abnormal EKG with accelerated junctional rhythm, await cardiology input,  2. Chronic diastolic congestive heart failure we will continue with Bumex/loop diuretic appears to be stable  3. Elevated troponin: Possibly due to demand ischemia cardiology was consulted  4. Hyperglycemia secondary to IV fluids hemoglobin A1c is 6.1  5. Chronic atrial fibrillation with rapid  ventricular response, on digoxin and rivaroxaban  1.28.2021 loop diuretic held will resume in a.m. possible discharge in a.m. either home or to [facility]. Physical therapy recommends skilled facility at this time  CC: Generalized weakness    HPI: 40-year-old female with past medical history of chronic atrial fibrillation presented to the ED on 1/25/2021 with symptoms of right upper quadrant pain and generalized weakness since her admission the abdominal pain is resolved the patient states she has been feeling weak since her ED visit on 1/19/2021 she needs assistance with her activities of daily living. Patient states that she received her COVID-19 vaccine on 1/20/2021. ROS (14 point review of systems completed. Pertinent positives noted.  Otherwise ROS is negative) : Weakness    PMH:  Per HPI and       Diagnosis Date    Atrial fibrillation ([address]     Blood circulation, collateral     [employer] single pacemaker 4/26/2016 5/5/2016    CAD (coronary artery disease)     cath and stent in right artery    Cancer [facility]) 1954    HX  Colon     Carpal tunnel syndrome     Fracture dislocation of ankle 1980    GERD (gastroesophageal reflux disease)     Hyperlipidemia     Hypertension  Kidney lesion, native, right     found on 5/2016    Osteoarthritis     knees    Osteopenia     Prolonged emergence from general anesthesia     Thyroid goiter 9/6/2016    Yersiniosis 2016     SHX:        Procedure Laterality Date    ABDOMEN SURGERY      ANKLE FRACTURE SURGERY  1709--2208    reconstruction in 2003 and 2007    APPENDECTOMY      BLADDER SURGERY      support bladder repair    CARDIAC SURGERY      heart stent 12-11-18, [place]    CARPAL TUNNEL RELEASE  7/2013    CARPAL TUNNEL RELEASE Right 08/19/2017    Revision    CATARACT REMOVAL Bilateral     CHOLECYSTECTOMY  1986    COLON SURGERY  1954    COLONOSCOPY      ENDOSCOPY, COLON, DIAGNOSTIC      EYE SURGERY      cataract     FRACTURE SURGERY      HYSTERECTOMY  1971    JOINT REPLACEMENT  1998    L knee    KNEE SURGERY Left     total replacement    PACEMAKER PLACEMENT      PTCA  01/15/2019    Successful PCI / Drug Eluting Stent of the proximal Left Anterior Descending Coronary Artery.  UPPER GASTROINTESTINAL ENDOSCOPY Left 11/28/2018    EGD BIOPSY performed by Stefany Quezada MD at [address] [place] [address] ENDOSCOPY  11/28/2018    EGD CONTROL HEMORRHAGE performed by Stefany Quezada MD at [facility] End[facility]     FHX:       Problem Relation Age of Onset    Heart Disease Mother     High Blood Pressure Mother     Heart Disease Father     High Blood Pressure Father     Heart Disease Brother     High Blood Pressure Brother     Heart Disease Son      [facility]:   Social History     Socioeconomic History    Marital status:       Spouse name: Leelee Zavala Number of children: 2    Years of education: None    Highest education level: None   Occupational History    None   Social Needs    Financial resource strain: None    Food insecurity     Worry: None     Inability: None    Transportation needs     Medical: None     Non-medical: None   Tobacco Use    Smoking status: Never Smoker    Smokeless tobacco: Never Used Substance and Sexual Activity    Alcohol use: No    Drug use: No    Sexual activity: None   Lifestyle    Physical activity     Days per week: None     Minutes per session: None    Stress: None   Relationships    Social connections     Talks on phone: None     Gets together: None     Attends Muslim service: None     Active member of club or organization: None     Attends meetings of clubs or organizations: None     Relationship status: None    Intimate partner violence     Fear of current or ex partner: None     Emotionally abused: None     Physically abused: None     Forced sexual activity: None   Other Topics Concern    None   Social History Narrative    None      Allergies: Methadone, Gabapentin, Lodine [etodolac], Lyrica [pregabalin], and Ultram [tramadol]  Medications:       magnesium oxide  400 mg Oral BID    aspirin  81 mg Oral Daily    atorvastatin  40 mg Oral Nightly    digoxin  125 mcg Oral Daily    pantoprazole  40 mg Oral Daily    rivaroxaban  15 mg Oral Dinner    venlafaxine  75 mg Oral Daily    sodium chloride flush  10 mL Intravenous 2 times per day     Prior to Admission medications    Medication Sig Start Date End Date Taking?  Authorizing Provider   Ascorbic Acid (VITAMIN C) 250 MG tablet Take 250 mg by mouth daily    Historical Provider, MD   zinc sulfate (ZINCATE) 220 (50 Zn) MG capsule Take 50 mg by mouth daily    Historical Provider, MD   guaiFENesin 400 MG tablet Take 400 mg by mouth 4 times daily as needed for Cough    Historical Provider, MD   docusate sodium (COLACE) 100 MG capsule Take 1 capsule by mouth daily  Patient taking differently: Take 100 mg by mouth every other day  10/13/20   Zay Granda MD   ferrous sulfate (IRON 325) 325 (65 Fe) MG tablet Take 1 tablet by mouth every other day 10/13/20   Zay Granda MD   pantoprazole (PROTONIX) 40 MG tablet Take 40 mg by mouth daily    Historical Provider, MD HEENT:  Normal cephalic, atraumatic without obvious deformity. Pupils equal, round, and reactive to light. Extra ocular muscles intact. Conjunctivae/corneas clear. Neck: Supple, with full range of motion. no jugular venous distention. Trachea midline. no carotid bruits  Respiratory:  Normal respiratory effort. Clear to auscultation, bilaterally without Rales/Wheezes/Rhonchi. Breath sounds equal bilaterally  Cardiovascular:  Regular rate and rhythm with normal S1/S2 without murmurs, rubs or gallops. PMI non displaced  Abdomen: Soft, non-tender, non-distended with normal bowel sounds. No guarding, rebound. Musculoskeletal:  No clubbing, cyanosis or edema bilaterally. Full range of motion without deformity. Skin: Skin color, texture, turgor normal.  No rashes or lesions, or suspicious lesions. Neurologic:  Neurovascularly intact without any focal sensory/motor deficits. Cranial nerves: II-XII intact, grossly non-focal.  Psychiatric:  Alert and oriented, thought content appropriate, normal insight  Capillary Refill: Brisk,< 2 seconds   Peripheral Pulses: +2 palpable, equal bilaterally upper and lower extremities  Lymphatics: no lymphadenopathy    Data: (All radiographs, tracings, PFTs, and imaging are personally viewed and interpreted unless otherwise noted). ? Labs are pending  ? Recent Labs     01/25/21  1728 01/26/21  0459 01/27/21  0430   WBC 6.9 7.1 5.9   HGB 14.1 12.5 12.5   HCT 42.7 39.4 37.9    147 131     Recent Labs     01/25/21  1728 01/26/21  0459 01/27/21  0430    137 136   K 3.5 3.8 3.5   CL 96* 100 98   CO2 31 24 27   BUN 14 16 17   CREATININE 0.8 0.4 0.5   CALCIUM  --  8.7 8.8     Recent Labs     01/25/21  1728   AST 18   ALT 15   BILITOT 2.8*   ALKPHOS 61     No results for input(s): INR in the last 72 hours.   Recent Labs     01/25/21  1728   TROPONINI 0.055       Radiology reports-images reviewed in PACS  Echo Complete 2d W Doppler W Color    Result Date: 1/26/2021 Transthoracic Echocardiography Report (TTE)  Demographics   Patient Name   Jose G Ibarra Gender              Female                 L   MR #           [id number]        Race                                                   Ethnicity   Account #      [de-identified]        Room Number         0021   Accession      [id number]       Date of Study       01/26/2021  Number   Date of Birth  04/04/1928       Referring Physician Jorge Armenta MD   Age            80 year(s)       Welford Denver, RDCS                                   Interpreting        Lynn Dsouza MD                                  Physician  Procedure Type of Study   TTE procedure:ECHOCARDIOGRAM COMPLETE 2D W DOPPLER W COLOR. Procedure Date Date: 01/26/2021 Start: 02:34 PM Study Location: Bedside Technical Quality: Adequate visualization Indications:Fatigue . Additional Medical History:Hypertension, hyperlipidemia, sick sinus syndrome, pacemaker, anemia, GERD, history of colon cancer Patient Status: Routine Height: 62.2 inches Weight: 169.77 pounds BSA: 1.79 m^2 BMI: 30.85 kg/m^2 BP: 127/59 mmHg Allergies   - Other allergy:(Methadone, Gabapentin, lyrica, ultram). - See Epic. - Other allergy:(methadone, gabapentin, lyrica). Conclusions   Summary  Left ventricular size and systolic function is normal. Ejection fraction  was estimated at 55-60%. LV wall thickness is within normal limits. Normal right ventricular size and function. Mild aortic regurgitation is noted. Mildly dilated left atrium. Mild mitral regurgitation is present.   Asc aorta 4.2 cm   Signature   ----------------------------------------------------------------  Electronically signed by Lynn Dsouza MD (Interpreting  physician) on 01/26/2021 at 04:41 PM  ---------------------------------------------------------------- Findings   Mitral Valve  Mild calcification of the posterior leaflet of the mitral valve. Mild mitral regurgitation is present. Aortic Valve  The aortic valve appears to be trileaflet with good leaflet separation. Aortic valve leaflets are mildly calcified. Mild aortic regurgitation is noted. Tricuspid Valve  Tricuspid valve is structurally normal.  Mild tricuspid regurgitation. Pulmonic Valve  The pulmonic valve was not well visualized . Left Atrium  Mildly dilated left atrium. Left Ventricle  Left ventricular size and systolic function is normal. Ejection fraction  was estimated at 55-60%. LV wall thickness is within normal limits. Right Atrium  The right atrium is of normal size. Right Ventricle  Normal right ventricular size and function. Pericardial Effusion  No evidence of any pericardial effusion. Pleural Effusion  No evidence of pleural effusion.    Aorta / Great Vessels  Asc aorta 4.2 cm  IVC is normal in size with normal respiratory phasic changes  M-Mode/2D Measurements & Calculations   LV Diastolic    LV Systolic Dimension: 3  AV Cusp Separation: 2 cmLA  Dimension: 4.2  cm                        Dimension: 3.3 cmAO Root  cm              LV Volume Diastolic: 07.9 Dimension: 3.5 cmLA Area: 19.4  LV FS:28.6 %    ml                        cm^2  LV PW           LV Volume Systolic: 35 ml  Diastolic: 1.1  LV EDV/LV EDV Index: 78.6  cm              ml/44 m^2LV ESV/LV ESV  Septum          Index: 35 ml/20 m^2       RV Diastolic Dimension: 2.6 cm  Diastolic: 1.4  EF Calculated: 55.5 %  cm                                        LA/Aorta: 0.94                                            Ascending Aorta: 4.2 cm                                            LA volume/Index: 52.5 ml /29m^2  Doppler Measurements & Calculations   MV Peak E-Wave: 134  AV Peak Velocity: 151 LVOT Peak Velocity: 122 cm/s  cm/s                 cm/s                  LVOT Mean Velocity: 87.9 cm/s  MV Peak A-Wave: 75.5 AV Peak PROCEDURE: CT ABDOMEN PELVIS W IV CONTRAST CLINICAL INFORMATION: RUQ abdominal pain. . COMPARISON: 11/26/2018 TECHNIQUE: 2-D multiplanar postcontrast images of the abdomen and pelvis Isovue-370 IV contrast. All CT scans at this facility use dose modulation, iterative reconstruction, and/or weight-based dosing when appropriate to reduce radiation dose to as low as reasonably achievable. FINDINGS: Lung bases The aorta is ectatic. No infiltrates or effusions seen. Moderate hiatal hernia. Cardiomegaly. Coronary artery atherosclerosis. AICD with leads in the ventricle and atrium. Abdomen pelvis Liver and spleen are within normal limits. Prior cholecystectomy. The pancreas is normal. Adrenal glands are normal. Kidneys enhance symmetrically. The left kidney is unremarkable. Right kidney demonstrates a 1.6 x 1.7 cm low-density lesion inferior pole this is stable in size compared to prior exam. Hounsfield units are not cystic. There is also a small stable exophytic cyst interpolar portion right kidney. Aorta is heavily atherosclerotic. Pelvis There is no bowel obstruction. There is mild constipation. Urinary bladder is distended. Uterus is surgically absent. No abnormal fluid collections are seen. Right-sided uncomplicated diverticulosis is noted. Bones There are no suspicious bone lesions. 1. Stable low-density mass lower pole right kidney. Differential would include a hemorrhagic cyst proteinaceous cyst or solid mass. Ultrasound may be beneficial for differentiation. 2. Uncomplicated colonic diverticulosis with mild constipation. 3. No acute abdominal or pelvic abnormalities. **This report has been created using voice recognition software. It may contain minor errors which are inherent in voice recognition technology. ** Final report electronically signed by Dr. Tracey Bartholomew on 1/19/2021 9:25 AM    Xr Chest Portable    Result Date: 1/26/2021 PROCEDURE: XR CHEST PORTABLE CLINICAL INFORMATION: Fever. COMPARISON: 1/25/2021. TECHNIQUE: AP portable chest radiograph performed. FINDINGS: POSTSURGICAL CHANGES: None. LINES/TUBES/MECHANICAL DEVICES: 1. The pacemaker is unchanged in position with the single lead overlying the right ventricle. TRACHEA/HEART/MEDIASTINUM/HILUM: 1. The cardiac silhouette is upper limits normal size and stable. 2. There is a stable calcified right azygos lymph node related to old granulomatous disease. LUNG FIELDS: 1. There is prominence of the pulmonary vasculature with cephalization suggesting pulmonary vascular congestion. There is stable mild elevation of the right hemidiaphragm. There is no focal consolidation. Given the clinical history of fever clinical correlation recommended to exclude a superimposed infectious etiology. There is no pleural effusion. Clinical management is recommended. Follow-up chest radiographs are also recommended to confirm complete resolution. 2. There is a stable calcified granuloma within the right mid chest. OTHER: None. PNEUMOTHORAX:  None. OSSEOUS STRUCTURES: 1. No acute osseous abnormality. 1. There is prominence of the pulmonary vasculature with cephalization suggesting pulmonary vascular congestion. There is stable mild elevation of the right hemidiaphragm. There is no focal consolidation. Given the clinical history of fever clinical correlation recommended to exclude a superimposed infectious etiology. There is no pleural effusion. Clinical management is recommended. Follow-up chest radiographs are also recommended to confirm complete resolution. **This report has been created using voice recognition software. It may contain minor errors which are inherent in voice recognition technology. ** Final report electronically signed by Dr. Karis Garcia on 1/26/2021 8:03 AM      Electronically signed by Vicenta Davila DO on 1/28/2021 at 9:26 AM

## 2021-01-28 NOTE — FLOWSHEET NOTE
arrived at [address] [place] [address] to provide spiritual and emotional support to the 80year old female patient. Patient is in the room laying in her bed as her son sat in the recliner chair next to the bed. Patient told me she is admitted due to weakness in leg and pain in her body. Patient told me she has taken her first Covid-19 vicine. Patient is a member of [employer]. Patient is calm and approachable. Patient asked for prayer for everything to work out good so she can go home. I offered active listening and emotional support. I also offered words of consolation and prayed for healing and strength. I thanked patient and her son for choosing [facility]. Patient and her son are very receptive to 's visit and engaged in conversation.  will follow up for continue emotional support.

## 2021-01-28 NOTE — PROGRESS NOTES
[facility]  INPATIENT PHYSICAL THERAPY  DAILY NOTE  STRZ ONC MED 5K - 5K-21/021-A  Time In: 1330  Time Out: 4462  Timed Code Treatment Minutes: 32 Minutes  Minutes: 31          Date: 2021  Patient Name: Efrain Rocha,  Gender:  female        MRN: [id number]  : 1928  (80 y.o.)     Referring Practitioner: Karen Woodall PA-C  Diagnosis: weakness generalized  Additional Pertinent Hx: Patient is a 80year old female who presented to the ED on  with a chief complaint of right upper quadrant pain and is now presenting to the ED with a chief complaint of generalized weakness and states that her abdominal pain has since resolved. Patient states she has been feeling weak since her ED visit on  and her symptoms were greatly exacerbated today. Patient also notes that she usually lives at home but is currently requiring care for all of her activities by her daughter who lives next door. Patient does note that her abdominal pain is aggravated by a cough, but seemed to be resting comfortably during the interview. Patient denies dizziness or LOC.      Prior Level of Function:  Lives With: (daughter lives in other side of duplex and has been staying on pt's side for the past week to help her with transfers and ADLs)  Type of Home: (Duplex, pt's daughter lives in other side)  Home Layout: One level  Home Equipment: (two wheeled walker)   Bathroom Shower/Tub: Tub/Shower unit, Shower chair with back  [employer]: Handicap height  Bathroom Equipment: Grab bars in shower, Grab bars around toilet  Bathroom Accessibility: Accessible    Receives Help From: Family  ADL Assistance: Needs assistance  [address]: (daughter has been doing almost all homemaking & ADLs for the pt)  Ambulation Assistance: Independent  Transfer Assistance: Independent  Active : No    Restrictions/Precautions:  Restrictions/Precautions: General Precautions, Fall Risk Seated marches and Long arc quads. Exercises were completed for increased independence with functional mobility. Functional Outcome Measures: Completed  AM-PAC Inpatient Mobility Raw Score : 17  AM-PAC Inpatient T-Scale Score : 42.13    ASSESSMENT:  Assessment: Patient progressing toward established goals. Pt has made improvements compared to first two therapy sessions. She has met ambulation distance goal (not assist level though), and is making progress towards other goals. Activity Tolerance:  Patient tolerance of  treatment: good. Pt slightly fatigued at end of session, but improved compared to yesterday. Equipment Recommendations:Equipment Needed: No  Discharge Recommendations: If pt continues progressing well, she will likely be safe to return home with home health and assist from her daughter. Continue to assess pending progress, Subacute/Skilled Nursing Facility, Home with assist PRN, Home with Home health PT    Plan: Times per week: 5x GM  Times per day: Daily  Current Treatment Recommendations: Strengthening, Functional Mobility Training, Neuromuscular Re-education, Home Exercise Program, Balance Training, Endurance Training, Patient/Caregiver Education & Training, Transfer Training, Gait Training, Safety Education & Training    Patient Education  Patient Education: Plan of Care, [employer] Mobility, Transfers, Reviewed Prior Education, Gait, Verbal Exercise Instruction,  - Patient Verbalized Understanding, - Patient Requires Continued Education    Goals:  Patient goals : get stronger  Short term goals  Time Frame for Short term goals: by hospital discharge  Short term goal 1: Bed mobility with modified independence for ease of bed mobility at discharge site. Short term goal 2: Supine to/from sit with modified independence for ease of transfers at discharge site. Short term goal 3: Sit to/from stand with supervision for ease of transfers at discharge site. Short term goal 4: Ambulate [address]' with supervision and RW for home-distance ambulation. Long term goals  Time Frame for Long term goals : N/A due to short ELOS. Following session, patient left in safe position with all fall risk precautions in place, call light in reach, chair alarm on. Daughter in room. RN updated on pt progress.     Eddi Wilkerson, PT, DPT

## 2021-01-28 NOTE — PROGRESS NOTES
Patient is alert and oriented x4, no complains of pain, nor no stated fears or concerns or worries. Speech is clear and appropriate. PERRL is equal bilaterally, with eyes going from a 4 to a 3. Patient has impaired vision and wears glasses. Patient has impaired hearing, wears hearing aids. Capillary refill and skin tugor is <3. Upper extremities are cool, pink, and dry to touch. Hand grasp is moderate bilaterally. IV in antecubital of left arm is clean, dry, intact, and capped. The site has no appearance of edema, heat ness, pallor, or swelling. Heart sounds are clear, Lung sounds are clear as well. Bowel sounds active x4. Abdomen is round, non distended, with no masses or tenderness. Lower extremities are cool, pink, and dry to touch. Edema is present bilaterally +2 pitting. Pedal, push, and pull is weak bilaterally. Both dorsalis pedis and tibialis anterior are present. Patient moves freely in bed with minimal assistance. Skin posteriorly is intact especially over bony prominences. Patient returned to semi fowlers position, call light in hand, bed in lowest position, bed alarm on, personal belongings in reach.  No new changes from previous assessment

## 2021-01-28 NOTE — PROGRESS NOTES
Alert an oriented x4. PERRLA. Neck veins flat. Respirations unlabored chest expands round and equal, clear through out. Heart sounds strong and regular. Hand strength bilaterally strong and equal.capillary refill less than 3 seconds. Skin turgor less than 3 seconds. Abdomen round and non distended. Bowel sounds active in all 4 quadrants. Pedal push and pull strong equal bilaterally, Patient denies pain. No skin breakdown noted.

## 2021-01-28 NOTE — CARE COORDINATION
DISCHARGE/PLANNING EVALUATION  1/28/21, 12:06 PM EST    Reason for Referral: Needs  Mental Status:  Alert and oriented  Decision Making: Makes own decisions has family support  Family/Social/Home Environment: Patient resides at home alone with her daughter living next door. Spoke with daughter Antonio Ayala and she stated that patient's home is one level with 2 small steps inside. She stated that that the patient has a walk in shower with a seat. Current Services including food security, transportation and housekeeping: Daughter drives and takes patient's to her appointments. Current Equipment: Walker, shower seat  Payment Source: Medicare, secondary  Concerns or Barriers to Discharge: NA  Post acute provider list with quality measures, geographic area and applicable managed care information provided. Questions regarding selection process answered: Declined at this time. Teach Back Method used with Keturah Brooks and Antonio Ayala regarding care plan and options for discharge. Patient and daughter verbalize understanding of the plan of care and contribute to goal setting. Patient goals, treatment preferences and discharge plan: Plans to take patient home with new home health. Daughter stated that patient did much better today getting around and is hopeful that she could take patient home with new home health through [facility]. She stated that if patient would need ECF she would prefers [address] [place] [address].      Electronically signed by KIRIT Ye on 1/28/2021 at 12:06 PM

## 2021-01-28 NOTE — PROGRESS NOTES
Alert x4. PERRLA. Neck veins flat.hand strength strong equal bilaterally. capilary refill less than 3 seconds. Skin turgor less than 3 seconds. Lung sounds clear through out. Heart tones strong and regular. Abdomen round and non distended, bowel sounds present in all 4 quadrants. Pedal push and pull bilaterally strong and equal. Skin turgor less than 3 seconds. Patient states\" she has a headache and her ears hurt\" Notified primary care nurse. Bed lowered call light in reach.

## 2021-01-28 NOTE — PROGRESS NOTES
Patient ambulated to bedside commode still complains of headache and earache. Informed primary care nurse.

## 2021-01-29 VITALS
SYSTOLIC BLOOD PRESSURE: 150 MMHG | TEMPERATURE: 99.3 F | DIASTOLIC BLOOD PRESSURE: 72 MMHG | OXYGEN SATURATION: 94 % | WEIGHT: 179.2 LBS | HEART RATE: 66 BPM | BODY MASS INDEX: 32.97 KG/M2 | RESPIRATION RATE: 17 BRPM | HEIGHT: 62 IN

## 2021-01-29 LAB
ANION GAP SERPL CALCULATED.3IONS-SCNC: 11 MEQ/L (ref 8–16)
BUN BLDV-MCNC: 16 MG/DL (ref 7–22)
CALCIUM SERPL-MCNC: 8.9 MG/DL (ref 8.5–10.5)
CHLORIDE BLD-SCNC: 100 MEQ/L (ref 98–111)
CO2: 26 MEQ/L (ref 23–33)
CREAT SERPL-MCNC: 0.4 MG/DL (ref 0.4–1.2)
GFR SERPL CREATININE-BSD FRML MDRD: > 90 ML/MIN/1.73M2
GLUCOSE BLD-MCNC: 129 MG/DL (ref 70–108)
POTASSIUM SERPL-SCNC: 3.6 MEQ/L (ref 3.5–5.2)
SARS-COV-2, NAAT: NOT DETECTED
SODIUM BLD-SCNC: 137 MEQ/L (ref 135–145)

## 2021-01-29 PROCEDURE — 6370000000 HC RX 637 (ALT 250 FOR IP): Performed by: INTERNAL MEDICINE

## 2021-01-29 PROCEDURE — 51798 US URINE CAPACITY MEASURE: CPT

## 2021-01-29 PROCEDURE — 36415 COLL VENOUS BLD VENIPUNCTURE: CPT

## 2021-01-29 PROCEDURE — 51701 INSERT BLADDER CATHETER: CPT

## 2021-01-29 PROCEDURE — 2580000003 HC RX 258: Performed by: PHYSICIAN ASSISTANT

## 2021-01-29 PROCEDURE — 97530 THERAPEUTIC ACTIVITIES: CPT

## 2021-01-29 PROCEDURE — 99232 SBSQ HOSP IP/OBS MODERATE 35: CPT | Performed by: INTERNAL MEDICINE

## 2021-01-29 PROCEDURE — 6370000000 HC RX 637 (ALT 250 FOR IP): Performed by: PHYSICIAN ASSISTANT

## 2021-01-29 PROCEDURE — 97116 GAIT TRAINING THERAPY: CPT

## 2021-01-29 PROCEDURE — 80048 BASIC METABOLIC PNL TOTAL CA: CPT

## 2021-01-29 PROCEDURE — U0002 COVID-19 LAB TEST NON-CDC: HCPCS

## 2021-01-29 RX ADMIN — VENLAFAXINE HYDROCHLORIDE 75 MG: 75 CAPSULE, EXTENDED RELEASE ORAL at 09:48

## 2021-01-29 RX ADMIN — MAGNESIUM GLUCONATE 500 MG ORAL TABLET 400 MG: 500 TABLET ORAL at 09:48

## 2021-01-29 RX ADMIN — DIGOXIN 125 MCG: 125 TABLET ORAL at 09:48

## 2021-01-29 RX ADMIN — Medication 10 ML: at 09:54

## 2021-01-29 RX ADMIN — PANTOPRAZOLE SODIUM 40 MG: 40 TABLET, DELAYED RELEASE ORAL at 09:47

## 2021-01-29 RX ADMIN — BUMETANIDE 0.5 MG: 1 TABLET ORAL at 09:48

## 2021-01-29 RX ADMIN — ASPIRIN 81 MG: 81 TABLET, CHEWABLE ORAL at 09:47

## 2021-01-29 RX ADMIN — ACETAMINOPHEN 650 MG: 325 TABLET ORAL at 09:47

## 2021-01-29 NOTE — PROGRESS NOTES
[address] [address]  INPATIENT PHYSICAL THERAPY  DAILY NOTE  STRZ ONC MED 5K - 5K-21/021-A  Time In: 6490  Time Out: 6346  Timed Code Treatment Minutes: 32 Minutes  Minutes: 27          Date: 2021  Patient Name: Yonathan Weiner,  Gender:  female        MRN: [id number]  : 1928  (80 y.o.)     Referring Practitioner: Ehsan Thompson PA-C  Diagnosis: weakness generalized  Additional Pertinent Hx: Patient is a 80year old female who presented to the ED on  with a chief complaint of right upper quadrant pain and is now presenting to the ED with a chief complaint of generalized weakness and states that her abdominal pain has since resolved. Patient states she has been feeling weak since her ED visit on  and her symptoms were greatly exacerbated today. Patient also notes that she usually lives at home but is currently requiring care for all of her activities by her daughter who lives next door. Patient does note that her abdominal pain is aggravated by a cough, but seemed to be resting comfortably during the interview. Patient denies dizziness or LOC.      Prior Level of Function:  Lives With: (daughter lives in other side of duplex and has been staying on pt's side for the past week to help her with transfers and ADLs)  Type of Home: (Duplex, pt's daughter lives in other side)  Home Layout: One level  Home Equipment: (two wheeled walker)   Bathroom Shower/Tub: Tub/Shower unit, Shower chair with back  [employer]: Handicap height  Bathroom Equipment: Grab bars in shower, Grab bars around toilet  Bathroom Accessibility: Accessible    Receives Help From: Family  ADL Assistance: Needs assistance  [address]: (daughter has been doing almost all homemaking & ADLs for the pt)  Ambulation Assistance: Independent  Transfer Assistance: Independent  Active : No    Restrictions/Precautions:  Restrictions/Precautions: General Precautions, Fall Risk SUBJECTIVE: RN approved PT tx session; RN states that pt has become much more lethargic compared to yesterday's PT session and had to be straight-cathed. Pt in bed, drowsy, agreeable to therapy with encouragement. Pt's daughter is present and assisted with keeping commode stable for transfer to commode. During session, pt states she wants to transfer by herself--demonstrating decreased insight into deficits. She was also resistive to assistance at times. PAIN: none stated    OBJECTIVE:  Bed Mobility:  Rolling to Right: Modified Independent   Supine to Sit: Moderate Assistance, X 1, with head of bed raised, with verbal cues , with increased time for completion  Sit to Supine:  Moderate Assistance, X 1, with verbal cues , with increased time for completion   Scooting: Maximum Assistance, X 1, with increased time for completion, to scoot hips forward at EOB, hercules to bring pt towards [facility]    Transfers:  Several STS attempts during session   Sit to Stand: Maximum Assistance, X 1, with increased time for completion, cues for hand placement, with verbal cues  Stand to Sit:Moderate Assistance, X 1, cues for hand placement, with verbal cues  Stand Pivot:Maximum Assistance, X 1, with increased time for completion, cues for hand placement, attempted this transfer due to increased weakness this date--difficulty standing to walker, and pt kept sitting back down and stating she wanted to transfer by herself  *posterior lean demonstrated with all STS transfers--education to lean forward: pt not receptive to education this date    Ambulation:  Maximum Assistance, X 1, with cues for safety, with verbal cues , with increased time for completion, several cues to move foot forward--pt sliding L foot with several verbal cues to step with L  Distance: from commode to bed--a few steps  Surface: Level Tile  Device:use of gait belt and encouraged pt to hold onto PT's arms Gait Deviations: Forward Flexed Posture, Slow Lauryn, Decreased Step Length Bilaterally, Decreased Gait Speed and Unsteady Gait, decreased foot clearance  *pt reaching for commode and bed with arms, attempting to pull self there. Encouraged pt to move her feet--minimal success    Balance:  Static Sitting Balance:  Stand By Assistance  Dynamic Sitting Balance: Contact Guard Assistance  Static Standing Balance: Moderate Assistance, Maximum Assistance, X 1  Dynamic Standing Balance: Maximum Assistance, X 1    Exercise:  Deferred due to increased fatigue with mobility, and pt closing eyes once getting in bed. Functional Outcome Measures: Completed  AM-PAC Inpatient Mobility Raw Score : 13  AM-PAC Inpatient T-Scale Score : 36.74    ASSESSMENT:  Assessment: Pt has declined significantly compared to yesterday's tx session. She requires heavy Max assist with transfers, and is not very receptive to communication this date. Activity Tolerance:  Patient tolerance of  treatment: poor. Very lethargic and fatigued. O2 saturation at 93% with spot checks. Equipment Recommendations:Equipment Needed: No  Discharge Recommendations:  Subacute/Skilled Nursing Facility--pt has not been consistent in assist level required. She had one day (1/28) where she had significantly improved, but pt has declined signficantly today. Her daughter is not able to provide this level of care and assist at home. Pt requires skilled stay for safety and to improve mobility.      Plan: Times per week: 5x GM  Times per day: Daily  Current Treatment Recommendations: Strengthening, Functional Mobility Training, Neuromuscular Re-education, Home Exercise Program, Balance Training, Endurance Training, Patient/Caregiver Education & Training, Transfer Training, Gait Training, Safety Education & Training    Patient Education  Patient Education: Plan of Care, [employer] Mobility, Transfers, Reviewed Prior Education, Gait, - Patient Requires Continued Education Goals:  Patient goals : get stronger  Short term goals  Time Frame for Short term goals: by hospital discharge  Short term goal 1: Bed mobility with modified independence for ease of bed mobility at discharge site. Short term goal 2: Supine to/from sit with modified independence for ease of transfers at discharge site. Short term goal 3: Sit to/from stand with supervision for ease of transfers at discharge site. Short term goal 4: Ambulate 21' with supervision and RW for home-distance ambulation. Long term goals  Time Frame for Long term goals : N/A due to short ELOS. Following session, patient left in safe position with all fall risk precautions in place, call light in reach, bed alarm on. Daughter in room. RN updated of pt's performance in therapy.     Nathan Pearson, PT, DPT

## 2021-01-29 NOTE — FLOWSHEET NOTE
01/29/21 0054   Provider Notification   Reason for Communication Evaluate; Review case   Provider Name Link Ego   Provider Notification Advance Practice Clinician (CNS, NP, CNM, CRNA, PA)   Method of Communication Secure Message   Response Waiting for response   Notification Time 0894   Secure message sent to Link Keanu MARK r/t patient not voiding since 1630. Attempt to get patient to commode and only able to urinate a small, un measurable amount. 0100 Instructed to obtain bladder scan. Orders placed  0115 Bladder scan complete. 493ml in bladder. Notified Link Keanu MARK of bladder scan results. 0143 Instructed to perform straight cath. Orders placed  [phone] Straight cath completed with 400ml out. Patient tolerated well. Provider notified.

## 2021-01-29 NOTE — DISCHARGE INSTR - COC
Continuity of Care Form    Patient Name: Abhijeet Lizama   :  1928  MRN:  [id number]    Admit date:  2021  Discharge date:  2021    Code Status Order: Limited   Advance Directives:   [facility] Documentation       Date/Time Healthcare Directive Type of Healthcare Directive Copy in [address] Agent's Name Healthcare Agent's Phone Number    [phone]  Yes, patient has an advance directive for healthcare treatment  Durable power of  for health care;Living will  Other (Comment)  Healthcare power of   Jamilah Ward or [place] [employer]            Admitting Physician:  Kaleigh Ny MD  PCP: Polly Olmos MD    Discharging Nurse: [facility] Unit/Room#: 5K-21/021-A  Discharging Unit Phone Number: [phone]    Emergency Contact:   Extended Emergency Contact Information  Primary Emergency Contact: [facility]  Address: [place] [address] [place] [address] Phone: [phone]  Mobile Phone: [phone]  Relation: Child  Secondary Emergency Contact: Alejandra Onelia           [address] Phone: [phone]  Mobile Phone: [phone]  Relation: Child    Past Surgical History:  Past Surgical History:   Procedure Laterality Date    ABDOMEN SURGERY      ANKLE FRACTURE SURGERY  0076--0398    reconstruction in  and     APPENDECTOMY      BLADDER SURGERY      support bladder repair    CARDIAC SURGERY      heart stent 18, [place]    CARPAL TUNNEL RELEASE  2013    CARPAL TUNNEL RELEASE Right 2017    Revision    CATARACT REMOVAL Bilateral     CHOLECYSTECTOMY  1986    COLON SURGERY      COLONOSCOPY      ENDOSCOPY, COLON, DIAGNOSTIC      EYE SURGERY      cataract     FRACTURE SURGERY      HYSTERECTOMY  1971    JOINT REPLACEMENT      L knee    KNEE SURGERY Left     total replacement  PACEMAKER PLACEMENT      PTCA  01/15/2019    Successful PCI / Drug Eluting Stent of the proximal Left Anterior Descending Coronary Artery.     UPPER GASTROINTESTINAL ENDOSCOPY Left 11/28/2018    EGD BIOPSY performed by rJ Cartagena MD at [address] [place] [address] ENDOSCOPY  11/28/2018    EGD CONTROL HEMORRHAGE performed by Jr Cartagena MD at [facility] End[facility]       Immunization History:   Immunization History   Administered Date(s) Administered    COVID-19, Giovany Sadia, 100mcg/0.5ml 01/20/2021    Influenza 10/23/2012    Influenza, High Dose (Fluzone 65 yrs and older) 09/19/2017, 10/23/2018, 10/04/2019, 09/30/2020    Influenza, Leanord Muck, IM, (6 mo and older Fluzone, Flulaval, Fluarix and 3 yrs and older Afluria) 10/07/2016    Pneumococcal Conjugate 13-valent ([id number]) 12/30/2014    Pneumococcal Polysaccharide ([id number]) 04/28/2017    Td vaccine (adult) 10/27/2005    Zoster Live (Zostavax) 08/06/2019    Zoster Recombinant (Shingrix) 05/22/2019, 08/06/2019       Active Problems:  Patient Active Problem List   Diagnosis Code    Hypertension I10    Fatigue R53.83    Osteoarthritis M19.90    Stress incontinence N39.3    History of colon cancer Z85.038    Hyperlipidemia E78.5    Osteopenia M85.80    Atrial fibrillation with rapid ventricular response ([address] I48.91    Near syncope R55    Urinary tract infection without hematuria N39.0    Sick sinus syndrome ([address] I49.5    [employer] single pacemaker 4/26/2016 Z95.0    VACA (dyspnea on exertion) R06.00    Mild anemia D64.9    Steatosis of liver K76.0    Pleural effusion J90    Chronic diastolic CHF (congestive heart failure) (HCC) I50.32    Iron deficiency anemia D50.9    Gastric polyps K31.7    Acute on chronic congestive heart failure with left ventricular diastolic dysfunction (HCC) I50.33    Elevated troponin R77.8    Coronary artery disease involving native heart with angina pectoris ([address] I25.119  Chronic atrial fibrillation with rapid ventricular response (HCC) I48.20    CAD (coronary artery disease) I25.10    GERD (gastroesophageal reflux disease) K21.9    Angina, class III (HCC) I20.9    Gastroenteritis K52.9    Diarrhea of presumed infectious origin R19.7    Bilious vomiting with nausea R11.14    Physical deconditioning R53.81    Occult blood in stools R19.5    Generalized weakness R53.1    RUQ abdominal pain R10.11    Hyperglycemia R73.9    Hypomagnesemia E83.42    Kidney cysts N28.1    Chronic depression F32.9       Isolation/Infection:   Isolation            No Isolation          Patient Infection Status       Infection Onset Added Last Indicated Last Indicated By Review Planned Expiration Resolved Resolved By    None active    Resolved    COVID-19 Rule Out 01/25/21 01/25/21 01/25/21 COVID-19 (Ordered)   01/25/21 Rule-Out Test Resulted            Nurse Assessment:  Last Vital Signs: BP (!) 150/72   Pulse 66   Temp 99.3 °F (37.4 °C) (Oral)   Resp 17   Ht 5' 2\" (1.575 m)   Wt 179 lb 3.2 oz (81.3 kg)   LMP  (LMP Unknown)   SpO2 94%   BMI 32.78 kg/m²     Last documented pain score (0-10 scale): Pain Level: (temp)  Last Weight:   Wt Readings from Last 1 Encounters:   01/29/21 179 lb 3.2 oz (81.3 kg)     Mental Status:  oriented and alert    IV Access:  - None    Nursing Mobility/ADLs:  Walking   Assisted  Transfer  Assisted  Bathing  Assisted  Dressing  Assisted  Toileting  Assisted  Feeding  Independent  Med Admin  Assisted  Med Delivery   whole and prefers mixed with applesauce    Wound Care Documentation and Therapy:        Elimination:  Continence:   · Bowel:  Yes  · Bladder: Yes  Urinary Catheter: None   Colostomy/Ileostomy/Ileal Conduit: No       Date of Last BM: 1/25/2021    Intake/Output Summary (Last 24 hours) at 1/29/2021 1008  Last data filed at 1/29/2021 0413  Gross per 24 hour   Intake 275 ml   Output 700 ml   Net -425 ml     I/O last 3 completed shifts: In: 275 [P.O.:265; I.V.:10]  Out: 700 [Urine:700]    Safety Concerns:     None    Impairments/Disabilities:      Hearing    Nutrition Therapy:  Current Nutrition Therapy:   - Oral Diet:  General    Routes of Feeding: Oral  Liquids: No Restrictions  Daily Fluid Restriction: no  Last Modified Barium Swallow with Video (Video Swallowing Test): not done    Treatments at the Time of Hospital Discharge:   Respiratory Treatments: N/A  Oxygen Therapy:  is not on home oxygen therapy. Ventilator:    - No ventilator support    Rehab Therapies: Physical Therapy and Occupational Therapy  Weight Bearing Status/Restrictions: No weight bearing restirctions  Other Medical Equipment (for information only, NOT a DME order):  walker  Other Treatments: N/A    Patient's personal belongings (please select all that are sent with patient):  Hearing Aides bilateral    RN SIGNATURE:  Electronically signed by Jaci Silver RN on 1/29/21 at 12:23 PM EST    CASE MANAGEMENT/SOCIAL WORK SECTION    Inpatient Status Date: 1/25/21    Readmission Risk Assessment Score:  Readmission Risk              Risk of Unplanned Readmission:        13           Discharging to Facility/ Agency   · Name: [facility]  · Address: [address]  · Phone: [phone]  · Fax: [phone]    Dialysis Facility (if applicable)   · Name:  · Address:  · Dialysis Schedule:  · Phone:  · Fax:    / signature: Electronically signed by KIRIT Salguero on 1/29/21 at 10:12 AM EST    PHYSICIAN SECTION    Prognosis: Fair    Condition at Discharge: Stable    Rehab Potential (if transferring to Rehab): Fair    Recommended Labs or Other Treatments After Discharge:     Physician Certification: I certify the above information and transfer of Mary Calderon  is necessary for the continuing treatment of the diagnosis listed and that she requires [facility] for greater 30 days. Update Admission H&P: No change in H&P    PHYSICIAN SIGNATURE:  Electronically signed by Kimo Pérez DO on 1/29/21 at 11:43 AM EST

## 2021-01-29 NOTE — PROCEDURES
A Bladder scan was performed at 1045 . The patient's last void was at 1040 . The residual amount was measured to be 200 ML. Report of results was given to [facility].

## 2021-01-29 NOTE — PROGRESS NOTES
[address]  OCCUPATIONAL THERAPY MISSED TREATMENT NOTE  [facility] 5K  5K-21/021-A      Date: 2021  Patient Name: Katia Warner        CSN: [id number]   : 1928  (80 y.o.)  Gender: female   Referring Practitioner: Babak Wilson PA-C  Diagnosis: Weakness Generalized         REASON FOR MISSED TREATMENT: Pt resting in bed upon arrival with daughter present. Pt's daughter requesting for therapist to return later this date d/t pt fatigue from trial up to chair this AM. Will attempt for next scheduled session.

## 2021-01-29 NOTE — PROGRESS NOTES
 GERD (gastroesophageal reflux disease)     Hyperlipidemia     Hypertension     Kidney lesion, native, right     found on 5/2016    Osteoarthritis     knees    Osteopenia     Prolonged emergence from general anesthesia     Thyroid goiter 9/6/2016    Yersiniosis 2016     SHX:        Procedure Laterality Date    ABDOMEN SURGERY      ANKLE FRACTURE SURGERY  [id number]    reconstruction in 2003 and 2007    APPENDECTOMY      BLADDER SURGERY      support bladder repair    CARDIAC SURGERY      heart stent 12-11-18, [place]    CARPAL TUNNEL RELEASE  7/2013    CARPAL TUNNEL RELEASE Right 08/19/2017    Revision    CATARACT REMOVAL Bilateral     CHOLECYSTECTOMY  1986    COLON SURGERY  1954    COLONOSCOPY      ENDOSCOPY, COLON, DIAGNOSTIC      EYE SURGERY      cataract     FRACTURE SURGERY      HYSTERECTOMY  1971    JOINT REPLACEMENT  1998    L knee    KNEE SURGERY Left     total replacement    PACEMAKER PLACEMENT      PTCA  01/15/2019    Successful PCI / Drug Eluting Stent of the proximal Left Anterior Descending Coronary Artery.  UPPER GASTROINTESTINAL ENDOSCOPY Left 11/28/2018    EGD BIOPSY performed by Siena Lin MD at [address] [place] [address] ENDOSCOPY  11/28/2018    EGD CONTROL HEMORRHAGE performed by Siena Lin MD at [facility] End[facility]     FHX:       Problem Relation Age of Onset    Heart Disease Mother     High Blood Pressure Mother     Heart Disease Father     High Blood Pressure Father     Heart Disease Brother     High Blood Pressure Brother     Heart Disease Son      [facility]:   Social History     Socioeconomic History    Marital status:       Spouse name: Mariana Forman Number of children: 2    Years of education: None    Highest education level: None   Occupational History    None   Social Needs    Financial resource strain: None    Food insecurity     Worry: None     Inability: None    Transportation needs     Medical: None Non-medical: None   Tobacco Use    Smoking status: Never Smoker    Smokeless tobacco: Never Used   Substance and Sexual Activity    Alcohol use: No    Drug use: No    Sexual activity: None   Lifestyle    Physical activity     Days per week: None     Minutes per session: None    Stress: None   Relationships    Social connections     Talks on phone: None     Gets together: None     Attends Anabaptist service: None     Active member of club or organization: None     Attends meetings of clubs or organizations: None     Relationship status: None    Intimate partner violence     Fear of current or ex partner: None     Emotionally abused: None     Physically abused: None     Forced sexual activity: None   Other Topics Concern    None   Social History Narrative    None      Allergies: Methadone, Gabapentin, Lodine [etodolac], Lyrica [pregabalin], and Ultram [tramadol]  Medications:       bumetanide  0.5 mg Oral Daily    magnesium oxide  400 mg Oral BID    aspirin  81 mg Oral Daily    atorvastatin  40 mg Oral Nightly    digoxin  125 mcg Oral Daily    pantoprazole  40 mg Oral Daily    rivaroxaban  15 mg Oral Dinner    venlafaxine  75 mg Oral Daily    sodium chloride flush  10 mL Intravenous 2 times per day     Prior to Admission medications    Medication Sig Start Date End Date Taking?  Authorizing Provider   Ascorbic Acid (VITAMIN C) 250 MG tablet Take 250 mg by mouth daily    Historical Provider, MD   zinc sulfate (ZINCATE) 220 (50 Zn) MG capsule Take 50 mg by mouth daily    Historical Provider, MD   guaiFENesin 400 MG tablet Take 400 mg by mouth 4 times daily as needed for Cough    Historical Provider, MD   docusate sodium (COLACE) 100 MG capsule Take 1 capsule by mouth daily  Patient taking differently: Take 100 mg by mouth every other day  10/13/20   Azul Gregory MD   ferrous sulfate (IRON 325) 325 (65 Fe) MG tablet Take 1 tablet by mouth every other day 10/13/20   Azul Gregory MD pantoprazole (PROTONIX) 40 MG tablet Take 40 mg by mouth daily    Historical Provider, MD   digoxin (LANOXIN) 125 MCG tablet Take 1 tablet by mouth daily 6/17/20   Krissy Chi MD   venlafaxine (EFFEXOR XR) 75 MG extended release capsule Take 1 capsule by mouth daily 6/17/20   Krissy Chi MD   atorvastatin (LIPITOR) 40 MG tablet Take 1 tablet by mouth nightly 6/17/20   Krissy Chi MD   bumetanide (BUMEX) 0.5 MG tablet Take 1 tablet by mouth daily 6/17/20   Krissy Chi MD   rivaroxaban (XARELTO) 15 MG TABS tablet Take 1 tablet by mouth Daily with supper 6/17/20   Krissy Chi MD   potassium chloride (KLOR-CON M) 20 MEQ extended release tablet Take 1 tablet by mouth once daily 5/26/20   Krissy Chi MD   sucralfate (CARAFATE) 1 GM tablet TAKE 1 TABLET BY MOUTH TWO  TIMES DAILY 5/15/20   Krissy Chi MD   nitroGLYCERIN (NITROSTAT) 0.4 MG SL tablet up to max of 3 total doses.  If no relief after 1 dose, call 911. 12/14/18   Saida Stratton MD   Magnesium Oxide 250 MG TABS Take 1 tablet by mouth daily 12/14/18   Saida Stratton MD   NONFORMULARY Perils probiotic  1 daily    Historical Provider, MD   Cyanocobalamin (VITAMIN B 12 PO) Take by mouth every other day     Historical Provider, MD   aspirin 81 MG chewable tablet Take 1 tablet by mouth daily 3/31/16   NOELLE Kennedy - CNP   vitamin D (CHOLECALCIFEROL) 1000 UNIT TABS tablet Take 1,000 Units by mouth daily    Historical Provider, MD   Calcium-Vitamin D-Vitamin K 500-500-40 MG-UNT-MCG CHEW Take 1 tablet by mouth daily    Historical Provider, MD   acetaminophen (TYLENOL) 500 MG tablet Take 1,000 mg by mouth 4 times daily     Historical Provider, MD   Biotin 1000 MCG TABS Take 1 tablet by mouth daily     Historical Provider, MD      PHYSICAL EXAM:    BP (!) 159/71   Pulse 67   Temp 98 °F (36.7 °C) (Oral)   Resp 18   Ht 5' 2\" (1.575 m)   Wt 179 lb 3.2 oz (81.3 kg)   LMP  (LMP Unknown)   SpO2 94%   BMI 32.78 kg/m² Echo Complete 2d W Doppler W Color    Result Date: 1/26/2021 Transthoracic Echocardiography Report (TTE)  Demographics   Patient Name   Tammy Hill Gender              Female                 L   MR #           [id number]        Race                                                   Ethnicity   Account #      [de-identified]        Room Number         0021   Accession      [id number]       Date of Study       01/26/2021  Number   Date of Birth  04/04/1928       Referring Physician Astrid Naranjo MD   Age            80 year(s)       Faye Garcia,                                                      [facility]                                   Interpreting        Mary Remy MD                                  Physician  Procedure Type of Study   TTE procedure:ECHOCARDIOGRAM COMPLETE 2D W DOPPLER W COLOR. Procedure Date Date: 01/26/2021 Start: 02:34 PM Study Location: Bedside Technical Quality: Adequate visualization Indications:Fatigue . Additional Medical History:Hypertension, hyperlipidemia, sick sinus syndrome, pacemaker, anemia, GERD, history of colon cancer Patient Status: Routine Height: 62.2 inches Weight: 169.77 pounds BSA: 1.79 m^2 BMI: 30.85 kg/m^2 BP: 127/59 mmHg Allergies   - Other allergy:(Methadone, Gabapentin, lyrica, ultram). - See Epic. - Other allergy:(methadone, gabapentin, lyrica). Conclusions   Summary  Left ventricular size and systolic function is normal. Ejection fraction  was estimated at 55-60%. LV wall thickness is within normal limits. Normal right ventricular size and function. Mild aortic regurgitation is noted. Mildly dilated left atrium. Mild mitral regurgitation is present.   Asc aorta 4.2 cm   Signature   ----------------------------------------------------------------  Electronically signed by Mary Remy MD (Interpreting  physician) on 01/26/2021 at 04:41 PM  ---------------------------------------------------------------- Findings   Mitral Valve  Mild calcification of the posterior leaflet of the mitral valve. Mild mitral regurgitation is present. Aortic Valve  The aortic valve appears to be trileaflet with good leaflet separation. Aortic valve leaflets are mildly calcified. Mild aortic regurgitation is noted. Tricuspid Valve  Tricuspid valve is structurally normal.  Mild tricuspid regurgitation. Pulmonic Valve  The pulmonic valve was not well visualized . Left Atrium  Mildly dilated left atrium. Left Ventricle  Left ventricular size and systolic function is normal. Ejection fraction  was estimated at 55-60%. LV wall thickness is within normal limits. Right Atrium  The right atrium is of normal size. Right Ventricle  Normal right ventricular size and function. Pericardial Effusion  No evidence of any pericardial effusion. Pleural Effusion  No evidence of pleural effusion.    Aorta / Great Vessels  Asc aorta 4.2 cm  IVC is normal in size with normal respiratory phasic changes  M-Mode/2D Measurements & Calculations   LV Diastolic    LV Systolic Dimension: 3  AV Cusp Separation: 2 cmLA  Dimension: 4.2  cm                        Dimension: 3.3 cmAO Root  cm              LV Volume Diastolic: 06.1 Dimension: 3.5 cmLA Area: 19.4  LV FS:28.6 %    ml                        cm^2  LV PW           LV Volume Systolic: 35 ml  Diastolic: 1.1  LV EDV/LV EDV Index: 78.6  cm              ml/44 m^2LV ESV/LV ESV  Septum          Index: 35 ml/20 m^2       RV Diastolic Dimension: 2.6 cm  Diastolic: 1.4  EF Calculated: 55.5 %  cm                                        LA/Aorta: 0.94                                            Ascending Aorta: 4.2 cm                                            LA volume/Index: 52.5 ml /29m^2  Doppler Measurements & Calculations   MV Peak E-Wave: 134  AV Peak Velocity: 151 LVOT Peak Velocity: 122 cm/s  cm/s                 cm/s                  LVOT Mean Velocity: 87.9 cm/s  MV Peak A-Wave: 75.5 AV Peak Gradient:     LVOT Peak Gradient: 6 mmHgLVOT  cm/s                 9.12 mmHg             Mean Gradient: 3 mmHg  MV E/A Ratio: 1.77   AV Mean Velocity: 123  MV Peak Gradient:    cm/s  7.18 mmHg            AV Mean Gradient: 6                       mmHg                  TR Velocity:221 cm/s  MV Deceleration      AV VTI: 27 cm         TR Gradient:19.54 mmHg  Time: 121 msec                             PV Peak Velocity: 91.3 cm/s  MV P1/2t: 36 msec                          PV Peak Gradient: 3.33 mmHg  MVA by PHT:6.11 cm^2 LVOT VTI: 20.5 cm                       AV P1/2t: 781 msec  MV E' Septal         IVRT: 58 msec  Velocity: 6.6 cm/s  MV A' Septal  Velocity: 5.7 cm/s   AV DVI (VTI): 0.76AV  MV E' Lateral        DVI (Vmax):0.81  Velocity: 7.9 cm/s  MV A' Lateral  Velocity: 7.4 cm/s  E/E' septal: 20.3  E/E' lateral: 16.96  MR Velocity: 504  cm/s  [email or web address]    Xr Acute Abd Series Chest 1 Vw    Result Date: 1/25/2021  PROCEDURE: XR ACUTE ABD SERIES CHEST 1 VW CLINICAL INFORMATION: abd pain, dyspnea on exertion . COMPARISON: October 16, 2020 TECHNIQUE: A PA upright view of the chest was obtained. AP and supine views of the abdomen were obtained. FINDINGS: Calcified right hilar lymph node. Permanent left chest wall pacer. Atherosclerotic changes aortic arch. Ectatic thoracic aorta. Elevated right hemidiaphragm. No lobar consolidation. Costophrenic recesses are preserved. No acute osseous findings. Few gas-filled bowel loops in the midabdomen. Phleboliths in the pelvis. Stool in the visualized colon. Degenerative changes thoracolumbar spine and pelvis. No discrete lobar consolidation. Nonspecific bowel gas pattern. **This report has been created using voice recognition software. It may contain minor errors which are inherent in voice recognition technology. ** Final report electronically signed by Dr. Valeri Shine on 1/25/2021 6:56 PM    Us Renal Complete    Result Date: 1/19/2021  PROCEDURE: US RENAL COMPLETE CLINICAL INFORMATION: Further evaluation of CT findings: Renal mass . COMPARISON: No prior study. TECHNIQUE: Grayscale and color Doppler ultrasound FINDINGS: Right: Right kidney is normal in size and echogenicity. There is no hydronephrosis. There is no solid mass identified. There is a 1.4 cm cyst interpolar portion. This is a Bosniak 1 cyst. There is a 1.7 x 1.8 cm cyst lower pole. Contour is slightly irregular along the inferior edge of the kidney. Consistent with a Bosniak 2 cyst. Resistive index is elevated. Left: Left kidney is normal in size and echogenicity. There is no solid or cystic mass identified. There is no hydronephrosis. There is no renal cortical thinning. Resistive index is elevated. RIGHT KIDNEY - 9.9 x 4.0 x 5.1 cm Resistive Index - 0.86 Cortical Thickness - 1.3 cm LEFT KIDNEY - 10.6 x 5.2 x 4.8 cm Resistive Index - 0.84 Cortical Thickness - 1.0 cm URINARY BLADDER Pre-Void - 85 mL     1. The right renal lesion of concern is a 1.8 cm Bosniak 2 cyst. 2. There is a Bosniak 1 cyst involving the right kidney as well. 3. Bilateral elevated resistive indices may indicate medical renal disease. **This report has been created using voice recognition software. It may contain minor errors which are inherent in voice recognition technology. ** Final report electronically signed by Dr. Sidney Guo on 1/19/2021 11:17 AM    Ct Abdomen Pelvis W Iv Contrast Additional Contrast? None    Result Date: 1/19/2021 PROCEDURE: CT ABDOMEN PELVIS W IV CONTRAST CLINICAL INFORMATION: RUQ abdominal pain. . COMPARISON: 11/26/2018 TECHNIQUE: 2-D multiplanar postcontrast images of the abdomen and pelvis Isovue-370 IV contrast. All CT scans at this facility use dose modulation, iterative reconstruction, and/or weight-based dosing when appropriate to reduce radiation dose to as low as reasonably achievable. FINDINGS: Lung bases The aorta is ectatic. No infiltrates or effusions seen. Moderate hiatal hernia. Cardiomegaly. Coronary artery atherosclerosis. AICD with leads in the ventricle and atrium. Abdomen pelvis Liver and spleen are within normal limits. Prior cholecystectomy. The pancreas is normal. Adrenal glands are normal. Kidneys enhance symmetrically. The left kidney is unremarkable. Right kidney demonstrates a 1.6 x 1.7 cm low-density lesion inferior pole this is stable in size compared to prior exam. Hounsfield units are not cystic. There is also a small stable exophytic cyst interpolar portion right kidney. Aorta is heavily atherosclerotic. Pelvis There is no bowel obstruction. There is mild constipation. Urinary bladder is distended. Uterus is surgically absent. No abnormal fluid collections are seen. Right-sided uncomplicated diverticulosis is noted. Bones There are no suspicious bone lesions. 1. Stable low-density mass lower pole right kidney. Differential would include a hemorrhagic cyst proteinaceous cyst or solid mass. Ultrasound may be beneficial for differentiation. 2. Uncomplicated colonic diverticulosis with mild constipation. 3. No acute abdominal or pelvic abnormalities. **This report has been created using voice recognition software. It may contain minor errors which are inherent in voice recognition technology. ** Final report electronically signed by Dr. Minda Peña on 1/19/2021 9:25 AM    Xr Chest Portable    Result Date: 1/26/2021 PROCEDURE: XR CHEST PORTABLE CLINICAL INFORMATION: Fever. COMPARISON: 1/25/2021. TECHNIQUE: AP portable chest radiograph performed. FINDINGS: POSTSURGICAL CHANGES: None. LINES/TUBES/MECHANICAL DEVICES: 1. The pacemaker is unchanged in position with the single lead overlying the right ventricle. TRACHEA/HEART/MEDIASTINUM/HILUM: 1. The cardiac silhouette is upper limits normal size and stable. 2. There is a stable calcified right azygos lymph node related to old granulomatous disease. LUNG FIELDS: 1. There is prominence of the pulmonary vasculature with cephalization suggesting pulmonary vascular congestion. There is stable mild elevation of the right hemidiaphragm. There is no focal consolidation. Given the clinical history of fever clinical correlation recommended to exclude a superimposed infectious etiology. There is no pleural effusion. Clinical management is recommended. Follow-up chest radiographs are also recommended to confirm complete resolution. 2. There is a stable calcified granuloma within the right mid chest. OTHER: None. PNEUMOTHORAX:  None. OSSEOUS STRUCTURES: 1. No acute osseous abnormality. 1. There is prominence of the pulmonary vasculature with cephalization suggesting pulmonary vascular congestion. There is stable mild elevation of the right hemidiaphragm. There is no focal consolidation. Given the clinical history of fever clinical correlation recommended to exclude a superimposed infectious etiology. There is no pleural effusion. Clinical management is recommended. Follow-up chest radiographs are also recommended to confirm complete resolution. **This report has been created using voice recognition software. It may contain minor errors which are inherent in voice recognition technology. ** Final report electronically signed by Dr. Bo Kirk on 1/26/2021 8:03 AM      Electronically signed by Hussein Lance DO on 1/29/2021 at 9:11 AM

## 2021-01-29 NOTE — PROCEDURES
Bladder scan was completed by RUSS Liang at 0115am. THe residual amount of 493 ml was resulted to [employer].

## 2021-02-01 ENCOUNTER — OUTSIDE SERVICES (OUTPATIENT)
Dept: FAMILY MEDICINE CLINIC | Age: 86
End: 2021-02-01
Payer: MEDICARE

## 2021-02-01 ENCOUNTER — TELEPHONE (OUTPATIENT)
Dept: FAMILY MEDICINE CLINIC | Age: 86
End: 2021-02-01

## 2021-02-01 VITALS
DIASTOLIC BLOOD PRESSURE: 57 MMHG | WEIGHT: 167.8 LBS | SYSTOLIC BLOOD PRESSURE: 132 MMHG | RESPIRATION RATE: 18 BRPM | BODY MASS INDEX: 30.69 KG/M2 | TEMPERATURE: 97.3 F | HEART RATE: 60 BPM | OXYGEN SATURATION: 96 %

## 2021-02-01 DIAGNOSIS — I50.32 CHRONIC DIASTOLIC CHF (CONGESTIVE HEART FAILURE) (HCC): ICD-10-CM

## 2021-02-01 DIAGNOSIS — I10 ESSENTIAL HYPERTENSION: ICD-10-CM

## 2021-02-01 DIAGNOSIS — I25.119 CORONARY ARTERY DISEASE INVOLVING NATIVE HEART WITH ANGINA PECTORIS, UNSPECIFIED VESSEL OR LESION TYPE (HCC): ICD-10-CM

## 2021-02-01 DIAGNOSIS — K21.9 GASTROESOPHAGEAL REFLUX DISEASE WITHOUT ESOPHAGITIS: ICD-10-CM

## 2021-02-01 DIAGNOSIS — N39.3 STRESS INCONTINENCE: ICD-10-CM

## 2021-02-01 DIAGNOSIS — I48.20 CHRONIC ATRIAL FIBRILLATION (HCC): ICD-10-CM

## 2021-02-01 DIAGNOSIS — E78.00 PURE HYPERCHOLESTEROLEMIA: ICD-10-CM

## 2021-02-01 DIAGNOSIS — I49.5 SICK SINUS SYNDROME (HCC): ICD-10-CM

## 2021-02-01 DIAGNOSIS — R73.01 ELEVATED FASTING GLUCOSE: ICD-10-CM

## 2021-02-01 DIAGNOSIS — N28.1 RENAL CYST, RIGHT: ICD-10-CM

## 2021-02-01 DIAGNOSIS — R53.1 WEAKNESS GENERALIZED: Primary | ICD-10-CM

## 2021-02-01 DIAGNOSIS — Z85.038 HISTORY OF COLON CANCER: ICD-10-CM

## 2021-02-01 DIAGNOSIS — M15.9 PRIMARY OSTEOARTHRITIS INVOLVING MULTIPLE JOINTS: ICD-10-CM

## 2021-02-01 DIAGNOSIS — Z95.0 PACEMAKER: ICD-10-CM

## 2021-02-01 DIAGNOSIS — F32.A CHRONIC DEPRESSION: ICD-10-CM

## 2021-02-01 PROCEDURE — 99305 1ST NF CARE MODERATE MDM 35: CPT | Performed by: NURSE PRACTITIONER

## 2021-02-01 ASSESSMENT — ENCOUNTER SYMPTOMS
COUGH: 0
WHEEZING: 0
RHINORRHEA: 0
VOMITING: 0
SORE THROAT: 0
EYE DISCHARGE: 0
ABDOMINAL PAIN: 0
NAUSEA: 0
TROUBLE SWALLOWING: 0
COLOR CHANGE: 0
EYE PAIN: 0
SHORTNESS OF BREATH: 1

## 2021-02-01 NOTE — TELEPHONE ENCOUNTER
Daughter called asking if patient should get second COVID vaccine on 2/17. Ingrid Bowen believes part of patient's reasoning for being hospitalized was due to side effects of the first vaccine. If patient should get the 2nd dose will patient get this at the [facility] or will patient need to go to the vaccine clinic.      Also see other notes

## 2021-02-01 NOTE — TELEPHONE ENCOUNTER
She may have to go to the vaccine clinic to get the second shot but will have close monitoring at the [facility]

## 2021-02-01 NOTE — TELEPHONE ENCOUNTER
[facility] Trans[facility] Initial Follow Up Call    Outreach made within 2 business days of discharge: Yes    Patient: Tania Lopez Patient : 1928   MRN: [id number]  Reason for Admission: There are no discharge diagnoses documented for the most recent discharge. Discharge Date: 21       Spoke with: NAVIN    Discharge department/facility: [place]    TCM Interactive Patient Contact:  Was patient able to fill all prescriptions:   Was patient instructed to bring all medications to the follow-up visit:   Is patient taking all medications as directed in the discharge summary?    Does patient understand their discharge instructions:   Does patient have questions or concerns that need addressed prior to 7-14 day follow up office visit:     Scheduled appointment with PCP within 7-14 days    Follow Up  Future Appointments   Date Time Provider Radha Hicks   2/10/2021 11:45 AM MD Jhonatan Brewer[address]   2021  1:30 PM MD Marcin Geller  [facility] [place] [facility]   2021 11:00 AM SCHEDULE, [facility] PACER NURSE N [facility] PACER [facility] - [address] LPN

## 2021-02-02 NOTE — PROGRESS NOTES
Soledad Berg is a 80 y.o. female who presents today for her medical conditions/complaints as noted below. Chief Complaint   Patient presents with    Other     admission           HPI:     Celestino Denny is seen today as an admission to the facility on 1/29/21. She was a patient at [facility] from 1/25/21 - 1/29/21 for generalized weakness and abdominal pain. She was also found to have CHF. She was seen in the ER on 1/19/21 for c/o abdominal pain, she was treated and released and instructed to f/u with her PCP. On 1/20/21 she received her first Covid vaccine, after that she states that she just became weaker and weaker, and feels it is due to the vaccine. She is alert and oriented. She lives alone and her daughter lives right next to her. He daughter helps her with pills and needed care. She has a PMX of A fib, CHF, CAD with pacemaker and stents, GERD, HTN, HLD, right kidney leision, osteoarthritis, hx of colon cancer, among others. She is seen while sitting in her recliner in her room. Celestino Denny states that her goal is to return home, but knows that if she cannot get stronger that will not be possible. She continues to work with therapy. She states that she is eating and sleeping well. Bowels are moving. She denies pain, cough, or shortness of breath. But states that she just feels very very tired and just not good. No distress noted. Nurses denies concerns. She is alert and oriented with occasional confusion noted.       Past Medical History:   Diagnosis Date    Atrial fibrillation ([address]     Blood circulation, collateral     [employer] single pacemaker 4/26/2016 5/5/2016    CAD (coronary artery disease)     cath and stent in right artery    Cancer [facility]) 1954    HX  Colon     Carpal tunnel syndrome     Fracture dislocation of ankle 1980    GERD (gastroesophageal reflux disease)     Hyperlipidemia     Hypertension     Kidney lesion, native, right     found on 5/2016    Osteoarthritis knees    Osteopenia     Prolonged emergence from general anesthesia     Thyroid goiter 9/6/2016    Yersiniosis 2016      Past Surgical History:   Procedure Laterality Date    ABDOMEN SURGERY      ANKLE FRACTURE SURGERY  8567--2385    reconstruction in 2003 and 2007    APPENDECTOMY      BLADDER SURGERY      support bladder repair    CARDIAC SURGERY      heart stent 12-11-18, [place]    CARPAL TUNNEL RELEASE  7/2013    CARPAL TUNNEL RELEASE Right 08/19/2017    Revision    CATARACT REMOVAL Bilateral     CHOLECYSTECTOMY  1986    COLON SURGERY  1954    COLONOSCOPY      ENDOSCOPY, COLON, DIAGNOSTIC      EYE SURGERY      cataract     FRACTURE SURGERY      HYSTERECTOMY  1971    JOINT REPLACEMENT  1998    L knee    KNEE SURGERY Left     total replacement    PACEMAKER PLACEMENT      PTCA  01/15/2019    Successful PCI / Drug Eluting Stent of the proximal Left Anterior Descending Coronary Artery.     UPPER GASTROINTESTINAL ENDOSCOPY Left 11/28/2018    EGD BIOPSY performed by Wade Finn MD [facility]    UPPER GASTROINTESTINAL ENDOSCOPY  11/28/2018    EGD CONTROL HEMORRHAGE performed by Wade Finn MD at [facility]       Family History   Problem Relation Age of Onset    Heart Disease Mother     High Blood Pressure Mother     Heart Disease Father     High Blood Pressure Father     Heart Disease Brother     High Blood Pressure Brother     Heart Disease Son        Social History     Tobacco Use    Smoking status: Never Smoker    Smokeless tobacco: Never Used   Substance Use Topics    Alcohol use: No      Allergies   Allergen Reactions    Methadone Shortness Of Breath     Shakes and nausea    Gabapentin     Lodine [Etodolac] Other (See Comments)     \"spacey, hot flashes, shaky\"    Lyrica [Pregabalin]     Ultram [Tramadol]      Nausea, pedal edema, SOB       Health Maintenance   Topic Date Due    Shingles Vaccine (2 of 2) 10/01/2019    Lipid screen  12/04/2019  DTaP/Tdap/Td vaccine (1 - Tdap) 09/06/2021 (Originally 4/4/1947)    COVID-19 Vaccine (2 of 2 - Nanine Karst series) 02/17/2021    Annual Wellness Visit (AWV)  04/09/2021    Potassium monitoring  01/29/2022    Creatinine monitoring  01/29/2022    Flu vaccine  Completed    Pneumococcal 65+ years Vaccine  Completed    Hepatitis A vaccine  Aged Out    Hepatitis B vaccine  Aged Out    Hib vaccine  Aged Out    Meningococcal (ACWY) vaccine  Aged Out       Subjective:      Review of Systems   Constitutional: Positive for activity change (due to ongoing weakness). Negative for appetite change and fever. HENT: Negative for congestion, ear pain, mouth sores, rhinorrhea, sore throat and trouble swallowing. Eyes: Negative for pain and discharge. Respiratory: Positive for shortness of breath (with activity). Negative for cough and wheezing. Cardiovascular: Negative for chest pain, palpitations and leg swelling. Gastrointestinal: Negative for abdominal pain, nausea and vomiting. Endocrine: Negative for cold intolerance. Genitourinary: Negative for difficulty urinating. Musculoskeletal: Positive for arthralgias and gait problem. Skin: Negative for color change. Neurological: Positive for weakness. Negative for dizziness, tremors, seizures, speech difficulty and headaches. Hematological: Negative for adenopathy. Psychiatric/Behavioral: Negative for agitation and sleep disturbance. The patient is not nervous/anxious. Objective:     Physical Exam  Vitals signs and nursing note reviewed. Constitutional:       General: She is not in acute distress. Appearance: Normal appearance. She is normal weight. She is not diaphoretic. HENT:      Head: Normocephalic and atraumatic. Right Ear: External ear normal.      Left Ear: External ear normal.      Nose: Nose normal. No congestion or rhinorrhea. Mouth/Throat:      Pharynx: No oropharyngeal exudate.    Eyes: General: No scleral icterus. Right eye: No discharge. Left eye: No discharge. Conjunctiva/sclera: Conjunctivae normal.   Neck:      Musculoskeletal: Normal range of motion and neck supple. Cardiovascular:      Rate and Rhythm: Normal rate. Rhythm irregular. Pulses: Normal pulses. Heart sounds: Normal heart sounds. No murmur. Pulmonary:      Effort: Pulmonary effort is normal. No respiratory distress. Breath sounds: Normal breath sounds. No wheezing, rhonchi or rales. Abdominal:      General: Bowel sounds are normal. There is no distension. Palpations: Abdomen is soft. Tenderness: There is no abdominal tenderness. Musculoskeletal:         General: No swelling or tenderness. Right lower leg: No edema. Left lower leg: No edema. Comments: Limited ROM due to weakness   Skin:     General: Skin is warm and dry. Coloration: Skin is not jaundiced or pale. Neurological:      Mental Status: She is alert and oriented to person, place, and time. Mental status is at baseline. Psychiatric:         Mood and Affect: Mood normal.         Behavior: Behavior normal.       BP (!) 132/57   Pulse 60   Temp 97.3 °F (36.3 °C)   Resp 18   Wt 167 lb 12.8 oz (76.1 kg)   LMP  (LMP Unknown)   SpO2 96%   BMI 30.69 kg/m²     Assessment/Plan      1. Weakness generalized   Continue therapy as able  Encourage activity as able   2. Chronic diastolic CHF (congestive heart failure) (HCC)   Currently stable - continue to monitor  Continue Bumex, Dig,    3. Chronic atrial fibrillation (HCC)   Rate controlled  Continue Xarelto and Dig. Continue to monitor   4. Renal cyst, right   Continue to monitor   5. Gastroesophageal reflux disease without esophagitis   Continue Protonix   6. Pure hypercholesterolemia   Follow labs  Continue atorvastatin   7.  Essential hypertension   Currently controlled - continue to monitor 8. Coronary artery disease involving native heart with angina pectoris, unspecified vessel or lesion type (HCC)   Continue ASA   9. Elevated fasting glucose   Monitor HgA1c   10. Sick sinus syndrome (HCC) - hx of   11. [employer] single pacemaker 4/26/2016    12. Primary osteoarthritis involving multiple joints   ChangeTylenol to 1000mg q6hr prn vs scheduled due to protecting liver  Start Tramadol 50 mg BID and q8hr prn   13. Stress incontinence    14. History of colon cancer    15. Chronic depression   Continue Effexor          Patient seen and examined. History partially obtained by chart review and nursing notes. I have reviewed patient's past medical, surgical, social, and family history and have made updates where appropriate.   See facility EMR for updated medication list.       Electronically signed by Jeanne Brunner, APRN - CNP on 2/1/2021 at 7:24 PM

## 2021-02-02 NOTE — DISCHARGE SUMMARY
Hospital Medicine Discharge Summary      Patient Identification:   Angel Hernandez   : 1928  MRN: [id number]   Account: [de-identified]      Patient's PCP: Sridhar Jaramillo MD    Admit Date: 2021     Discharge Date: 2021      Admitting Physician: Emelyn Manrique MD     Discharge Physician: Angelica Christian DO     Discharge Diagnoses: Active Hospital Problems    Diagnosis Date Noted    RUQ abdominal pain [R10.11]     Hyperglycemia [R73.9]     Hypomagnesemia [E83.42]     Kidney cysts [N28.1]     Chronic depression [F32.9]     Generalized weakness [R53.1] 2021    Chronic atrial fibrillation with rapid ventricular response (HCC) [I48.20] 2020    Elevated troponin [R77.8]     Chronic diastolic CHF (congestive heart failure) ([facility] [address] [I50.32] 2018       The patient was seen and examined on day of discharge and this discharge summary is in conjunction with any daily progress note from day of discharge. Hospital Course:   Angel Hernandez is a 80 y.o. female admitted to [facility] on 2021 for Generalized Weakness. 1. Generalized weakness: Possibly to the COVID-19 vaccine, will check a digoxin level since the patient has abnormal EKG with accelerated junctional rhythm, await cardiology input,  2. Chronic diastolic congestive heart failure we will continue with Bumex/loop diuretic appears to be stable  3. Elevated troponin: Possibly due to demand ischemia cardiology was consulted  4. Hyperglycemia secondary to IV fluids hemoglobin A1c is 6.1  5. Chronic atrial fibrillation with rapid  ventricular response, on digoxin and rivaroxaban  2021 loop diuretic held will resume in a.m. possible discharge in a.m. either home or to [facility]'s choice.   Physical therapy recommends skilled facility at this time     2021 Still weak due to covid vaccine will dc to [facility] for rehab     CC: Generalized weakness    HPI: 80-year-old female with past medical history of chronic atrial fibrillation presented to the ED on 1/25/2021 with symptoms of right upper quadrant pain and generalized weakness since her admission the abdominal pain is resolved the patient states she has been feeling weak since her ED visit on 1/19/2021 she needs assistance with her activities of daily living. Patient states that she received her COVID-19 vaccine on 1/20/2021. Exam:     Vitals:  Vitals:    01/28/21 1715 01/28/21 2011 01/29/21 0415 01/29/21 0934   BP: (!) 148/64 (!) 141/63 (!) 159/71 (!) 150/72   Pulse: 66 60 67 66   Resp: 18 18 18 17   Temp: 98.4 °F (36.9 °C) 98.2 °F (36.8 °C) 98 °F (36.7 °C) 99.3 °F (37.4 °C)   TempSrc: Oral Oral Oral Oral   SpO2: 96% 94% 94% 94%   Weight:   179 lb 3.2 oz (81.3 kg)    Height:         Weight: Weight: 179 lb 3.2 oz (81.3 kg)     24 hour intake/output:No intake or output data in the 24 hours ending 02/02/21 0751      General appearance:  No apparent distress, appears stated age and cooperative. HEENT:  Normal cephalic, atraumatic without obvious deformity. Pupils equal, round, and reactive to light. Extra ocular muscles intact. Conjunctivae/corneas clear. Neck: Supple, with full range of motion. No jugular venous distention. Trachea midline. Respiratory:  Normal respiratory effort. Clear to auscultation, bilaterally without Rales/Wheezes/Rhonchi. Cardiovascular:  Regular rate and rhythm with normal S1/S2 without murmurs, rubs or gallops. Abdomen: Soft, non-tender, non-distended with normal bowel sounds. Musculoskeletal:  No clubbing, cyanosis or edema bilaterally. Full range of motion without deformity. Skin: Skin color, texture, turgor normal.  No rashes or lesions. Neurologic:  Neurovascularly intact without any focal sensory/motor deficits.  Cranial nerves: II-XII intact, grossly non-focal.  Psychiatric:  Alert and oriented, thought content appropriate, normal insight Capillary Refill: Brisk,< 3 seconds   Peripheral Pulses: +2 palpable, equal bilaterally       Labs: For convenience and continuity at follow-up the following most recent labs are provided:      CBC:    Lab Results   Component Value Date    WBC 7.5 01/28/2021    HGB 12.9 01/28/2021    HCT 39.2 01/28/2021     01/28/2021       Renal:    Lab Results   Component Value Date     01/29/2021    K 3.6 01/29/2021    K 3.8 01/26/2021     01/29/2021    CO2 26 01/29/2021    BUN 16 01/29/2021    CREATININE 0.4 01/29/2021    CALCIUM 8.9 01/29/2021         Significant Diagnostic Studies    Radiology:   XR CHEST PORTABLE   Final Result   1. There is prominence of the pulmonary vasculature with cephalization suggesting pulmonary vascular congestion. There is stable mild elevation of the right hemidiaphragm. There is no focal consolidation. Given the clinical history of fever clinical    correlation recommended to exclude a superimposed infectious etiology. There is no pleural effusion. Clinical management is recommended. Follow-up chest radiographs are also recommended to confirm complete resolution. **This report has been created using voice recognition software. It may contain minor errors which are inherent in voice recognition technology. **      Final report electronically signed by Dr. Bo Kirk on 1/26/2021 8:03 AM      XR ACUTE ABD SERIES CHEST 1 VW   Final Result   No discrete lobar consolidation. Nonspecific bowel gas pattern. **This report has been created using voice recognition software. It may contain minor errors which are inherent in voice recognition technology. **      Final report electronically signed by Dr. Martine Barrett on 1/25/2021 6:56 PM             Consults:     PALLIATIVE CARE EVAL  IP CONSULT TO CARDIOLOGY  IP CONSULT TO SOCIAL WORK  IP CONSULT TO SOCIAL WORK    Disposition:    [] Home       [] TCU       [] Rehab       [] Psych       [x] SNF [] [facility]       [] Other-    Condition at Discharge: Stable    Code Status:  Prior     Patient Instructions:    Discharge lab work: Activity: activity as tolerated  Diet: No diet orders on file      Follow-up visits:   Jaye Medrano MD  [address]  [address] [address] [address]  [phone]             Discharge Medications:      Dl Ro   Home Medication Instructions ZNA:[id number]    Printed on:02/02/21 7536   Medication Information                      acetaminophen (TYLENOL) 500 MG tablet  Take 1,000 mg by mouth 4 times daily              aspirin 81 MG chewable tablet  Take 1 tablet by mouth daily             atorvastatin (LIPITOR) 40 MG tablet  Take 1 tablet by mouth nightly             bumetanide (BUMEX) 0.5 MG tablet  Take 1 tablet by mouth daily             digoxin (LANOXIN) 125 MCG tablet  Take 1 tablet by mouth daily             magnesium oxide (MAG-OX) 400 (241.3 Mg) MG TABS tablet  Take 1 tablet by mouth 2 times daily             pantoprazole (PROTONIX) 40 MG tablet  Take 40 mg by mouth daily             potassium chloride (KLOR-CON M) 20 MEQ extended release tablet  Take 1 tablet by mouth once daily             rivaroxaban (XARELTO) 15 MG TABS tablet  Take 1 tablet by mouth Daily with supper             venlafaxine (EFFEXOR XR) 75 MG extended release capsule  Take 1 capsule by mouth daily                 Time Spent on discharge is more than 45 minutes in the examination, evaluation, counseling and review of medications and discharge plan. 6. Generalized weakness: Possibly to the COVID-19 vaccine, will check a digoxin level since the patient has abnormal EKG with accelerated junctional rhythm, await cardiology input,  7.  Chronic diastolic congestive heart failure we will continue with Bumex/loop diuretic appears to be stable 8. Elevated troponin: Possibly due to demand ischemia cardiology was consulted  9. Hyperglycemia secondary to IV fluids hemoglobin A1c is 6.1  10. Chronic atrial fibrillation with rapid  ventricular response, on digoxin and rivaroxaban  1.28.2021 loop diuretic held will resume in a.m. possible discharge in a.m. either home or to [facility]'s choice. Physical therapy recommends skilled facility at this time     1.29.2021 Still weak due to covid vaccine will dc to [facility] for rehab     CC: Generalized weakness     HPI: 26-year-old female with past medical history of chronic atrial fibrillation presented to the ED on 1/25/2021 with symptoms of right upper quadrant pain and generalized weakness since her admission the abdominal pain is resolved the patient states she has been feeling weak since her ED visit on 1/19/2021 she needs assistance with her activities of daily living. Patient states that she received her COVID-19 vaccine on 1/20/2021. Signed: Thank you Evie Villanueva MD for the opportunity to be involved in this patient's care.     Electronically signed by Pat Mejía DO on 2/2/2021 at 7:51 AM

## 2021-02-03 ENCOUNTER — TELEPHONE (OUTPATIENT)
Dept: CARDIOLOGY CLINIC | Age: 86
End: 2021-02-03

## 2021-02-03 NOTE — TELEPHONE ENCOUNTER
Daughter called, states pt has a 6 mo follow up scheduled 02-10-21 at [facility] but dr priest just seen her while she was in the hospital 01-27-21 (probably Covid-19 vaccine reaction)  Pt is currently in nursing home to rebuild strength     Daughter wonders if necessary to keep appt 02-10-21 or if can reschedule another 3-6 months out?     (Call daughter [phone] annel with next appt date and time, request [facility])

## 2021-02-04 ENCOUNTER — OUTSIDE SERVICES (OUTPATIENT)
Dept: FAMILY MEDICINE CLINIC | Age: 86
End: 2021-02-04
Payer: MEDICARE

## 2021-02-04 VITALS
OXYGEN SATURATION: 93 % | BODY MASS INDEX: 30.51 KG/M2 | SYSTOLIC BLOOD PRESSURE: 145 MMHG | HEART RATE: 66 BPM | DIASTOLIC BLOOD PRESSURE: 82 MMHG | TEMPERATURE: 97.9 F | WEIGHT: 166.8 LBS | RESPIRATION RATE: 18 BRPM

## 2021-02-04 DIAGNOSIS — E78.00 PURE HYPERCHOLESTEROLEMIA: ICD-10-CM

## 2021-02-04 DIAGNOSIS — F32.A CHRONIC DEPRESSION: ICD-10-CM

## 2021-02-04 DIAGNOSIS — I25.119 CORONARY ARTERY DISEASE INVOLVING NATIVE HEART WITH ANGINA PECTORIS, UNSPECIFIED VESSEL OR LESION TYPE (HCC): ICD-10-CM

## 2021-02-04 DIAGNOSIS — R30.0 DYSURIA: Primary | ICD-10-CM

## 2021-02-04 DIAGNOSIS — M15.9 PRIMARY OSTEOARTHRITIS INVOLVING MULTIPLE JOINTS: ICD-10-CM

## 2021-02-04 DIAGNOSIS — I48.91 ATRIAL FIBRILLATION WITH RAPID VENTRICULAR RESPONSE (HCC): ICD-10-CM

## 2021-02-04 DIAGNOSIS — I49.5 SICK SINUS SYNDROME (HCC): ICD-10-CM

## 2021-02-04 DIAGNOSIS — I10 ESSENTIAL HYPERTENSION: ICD-10-CM

## 2021-02-04 DIAGNOSIS — M25.561 ACUTE PAIN OF RIGHT KNEE: ICD-10-CM

## 2021-02-04 DIAGNOSIS — K21.9 GASTROESOPHAGEAL REFLUX DISEASE WITHOUT ESOPHAGITIS: ICD-10-CM

## 2021-02-04 DIAGNOSIS — I50.32 CHRONIC DIASTOLIC CHF (CONGESTIVE HEART FAILURE) (HCC): ICD-10-CM

## 2021-02-04 DIAGNOSIS — R53.81 PHYSICAL DECONDITIONING: ICD-10-CM

## 2021-02-04 PROCEDURE — 99308 SBSQ NF CARE LOW MDM 20: CPT | Performed by: FAMILY MEDICINE

## 2021-02-04 ASSESSMENT — ENCOUNTER SYMPTOMS
SORE THROAT: 0
EYE DISCHARGE: 0
NAUSEA: 0
VOMITING: 0
ABDOMINAL PAIN: 0
RHINORRHEA: 0
WHEEZING: 0
SHORTNESS OF BREATH: 1
EYE PAIN: 0
TROUBLE SWALLOWING: 0
COLOR CHANGE: 0
COUGH: 0

## 2021-02-04 NOTE — PROGRESS NOTES
Guido Sanchez is a 80 y.o. female who presents today for her medical conditions/complaints as noted below. Chief Complaint   Patient presents with    Dysuria           HPI:     Fermin Valdez is seen today as an acute visit she was admitted to the facility on 1/29/21. She was a patient at [facility] from 1/25/21 - 1/29/21 for generalized weakness and abdominal pain. She was also found to have CHF. She was seen in the ER on 1/19/21 for c/o abdominal pain, she was treated and released and instructed to f/u with her PCP. On 1/20/21 she received her first Covid vaccine, after that she states that she just became weaker and weaker, and feels it is due to the vaccine. She is alert and oriented. She lives alone and her daughter lives right next to her. He daughter helps her with pills and needed care. She has a PMX of A fib, CHF, CAD with pacemaker and stents, GERD, HTN, HLD, right kidney leision, osteoarthritis, hx of colon cancer, among others. She is seen while sitting in her recliner in her room. Fermin Valdez states that her goal is to return home, but knows that if she cannot get stronger that will not be possible. She continues to work with therapy. She states that she is eating and sleeping well. Bowels are moving. She denies pain, cough, or shortness of breath. Still tired but she has a concern about right knee pain that happened just this morning as well as persistent dysuria. No distress noted. Nurses denies concerns. She is alert and oriented. Dysuria   Pertinent negatives include no nausea or vomiting.        Past Medical History:   Diagnosis Date    Atrial fibrillation ([address]     Blood circulation, collateral     [employer] single pacemaker 4/26/2016 5/5/2016    CAD (coronary artery disease)     cath and stent in right artery    Cancer [facility]) 1954    HX  Colon     Carpal tunnel syndrome     Fracture dislocation of ankle 1980    GERD (gastroesophageal reflux disease)     Hyperlipidemia Topic Date Due    Shingles Vaccine (2 of 2) 10/01/2019    Lipid screen  12/04/2019    DTaP/Tdap/Td vaccine (1 - Tdap) 09/06/2021 (Originally 4/4/1947)    COVID-19 Vaccine (2 of 2 - [place] series) 02/17/2021    Annual Wellness Visit (AWV)  04/09/2021    Potassium monitoring  01/29/2022    Creatinine monitoring  01/29/2022    Flu vaccine  Completed    Pneumococcal 65+ years Vaccine  Completed    Hepatitis A vaccine  Aged Out    Hepatitis B vaccine  Aged Out    Hib vaccine  Aged Out    Meningococcal (ACWY) vaccine  Aged Out       Subjective:      Review of Systems   Constitutional: Positive for activity change (due to ongoing weakness slowly improving with PT). Negative for appetite change and fever. HENT: Negative for congestion, ear pain, mouth sores, rhinorrhea, sore throat and trouble swallowing. Eyes: Negative for pain and discharge. Respiratory: Positive for shortness of breath (with activity). Negative for cough and wheezing. Cardiovascular: Negative for chest pain, palpitations and leg swelling. Gastrointestinal: Negative for abdominal pain, nausea and vomiting. Endocrine: Negative for cold intolerance. Genitourinary: Positive for dysuria. Negative for difficulty urinating. Musculoskeletal: Positive for arthralgias and gait problem. Skin: Negative for color change. Neurological: Positive for weakness. Negative for dizziness, tremors, seizures, speech difficulty and headaches. Hematological: Negative for adenopathy. Psychiatric/Behavioral: Negative for agitation and sleep disturbance. The patient is not nervous/anxious. Objective:     Physical Exam  Vitals signs and nursing note reviewed. Constitutional:       General: She is not in acute distress. Appearance: Normal appearance. She is normal weight. She is not diaphoretic. HENT:      Head: Normocephalic and atraumatic.       Right Ear: External ear normal.      Left Ear: External ear normal. Nose: Nose normal. No congestion or rhinorrhea. Mouth/Throat:      Pharynx: No oropharyngeal exudate. Eyes:      General: No scleral icterus. Right eye: No discharge. Left eye: No discharge. Conjunctiva/sclera: Conjunctivae normal.   Neck:      Musculoskeletal: Normal range of motion and neck supple. Cardiovascular:      Rate and Rhythm: Normal rate. Rhythm irregular. Pulses: Normal pulses. Heart sounds: Normal heart sounds. No murmur. Pulmonary:      Effort: Pulmonary effort is normal. No respiratory distress. Breath sounds: Normal breath sounds. No wheezing, rhonchi or rales. Abdominal:      General: Bowel sounds are normal. There is no distension. Palpations: Abdomen is soft. Tenderness: There is no abdominal tenderness. Musculoskeletal:         General: No swelling or tenderness. Right lower leg: No edema. Left lower leg: No edema. Comments: Limited ROM due to weakness   Skin:     General: Skin is warm and dry. Coloration: Skin is not jaundiced or pale. Neurological:      Mental Status: She is alert and oriented to person, place, and time. Mental status is at baseline. Psychiatric:         Mood and Affect: Mood normal.         Behavior: Behavior normal.       BP (!) 145/82   Pulse 66   Temp 97.9 °F (36.6 °C)   Resp 18   Wt 166 lb 12.8 oz (75.7 kg)   LMP  (LMP Unknown)   SpO2 93%   BMI 30.51 kg/m²     Assessment/Plan    Dysuria:  Recheck UA and C&S.      1. Weakness generalized   Continue therapy as able  Encourage activity as able   2. Chronic diastolic CHF (congestive heart failure) (HCC)   Currently stable - continue to monitor  Continue Bumex, Dig,    3. Chronic atrial fibrillation (HCC)   Rate controlled  Continue Xarelto and Dig. Continue to monitor   4. Renal cyst, right   Continue to monitor   5. Gastroesophageal reflux disease without esophagitis   Continue Protonix   6.  Pure hypercholesterolemia   Follow labs Continue atorvastatin   7. Essential hypertension   Currently controlled - continue to monitor   8. Coronary artery disease involving native heart with angina pectoris, unspecified vessel or lesion type (HCC)   Continue ASA   9. Elevated fasting glucose   Monitor HgA1c   10. Sick sinus syndrome (HCC) - hx of   11. [employer] single pacemaker 4/26/2016    12. Primary osteoarthritis involving multiple joints   ChangeTylenol to 1000mg q6hr prn vs scheduled due to protecting liver  Start Tramadol 50 mg BID and q8hr prn   13. Stress incontinence    14. History of colon cancer    15. Chronic depression   Continue Effexor      Continue healthy diet and exercise  anticoag with Afib    Recheck of lab this week: CBC , CMP, TSH AND DIGOXIN level    Patient seen and examined. History partially obtained by chart review and nursing notes. I have reviewed patient's past medical, surgical, social, and family history and have made updates where appropriate.   See facility EMR for updated medication list.       Electronically signed by Cassidy Shields MD on 2/4/2021 at 11:17 AM

## 2021-02-08 NOTE — TELEPHONE ENCOUNTER
Per dr priest, reschedule follow up 3-4 months out    Daughterannel notified, appt reschedule to 06-02-21 @ 2:15pm, pcacc

## 2021-02-15 ENCOUNTER — OUTSIDE SERVICES (OUTPATIENT)
Dept: FAMILY MEDICINE CLINIC | Age: 86
End: 2021-02-15
Payer: MEDICARE

## 2021-02-15 VITALS
SYSTOLIC BLOOD PRESSURE: 168 MMHG | WEIGHT: 165.2 LBS | OXYGEN SATURATION: 98 % | BODY MASS INDEX: 30.22 KG/M2 | DIASTOLIC BLOOD PRESSURE: 78 MMHG | HEART RATE: 88 BPM | RESPIRATION RATE: 16 BRPM | TEMPERATURE: 97.4 F

## 2021-02-15 DIAGNOSIS — L30.9 DERMATITIS: Primary | ICD-10-CM

## 2021-02-15 DIAGNOSIS — I10 ESSENTIAL HYPERTENSION: ICD-10-CM

## 2021-02-15 DIAGNOSIS — N30.00 ACUTE CYSTITIS WITHOUT HEMATURIA: ICD-10-CM

## 2021-02-15 DIAGNOSIS — B36.9 FUNGAL RASH OF TORSO: ICD-10-CM

## 2021-02-15 PROCEDURE — 99309 SBSQ NF CARE MODERATE MDM 30: CPT | Performed by: NURSE PRACTITIONER

## 2021-02-15 ASSESSMENT — ENCOUNTER SYMPTOMS
VOMITING: 0
COLOR CHANGE: 0
EYE DISCHARGE: 0
SHORTNESS OF BREATH: 0
EYE PAIN: 0
ABDOMINAL PAIN: 0
SORE THROAT: 0
COUGH: 0
NAUSEA: 0
RHINORRHEA: 0

## 2021-02-15 NOTE — PROGRESS NOTES
Pily Beltran is a 80 y.o. female who presents today for her medical conditions/complaints as noted below. Chief Complaint   Patient presents with    Other     rash           HPI:     Daxa Recinos is seen today for c/o rash on abdomen and under breasts. She was recently diagnosed with a UTI and was found to be sensitive to pcn. Pen V K was started on 2/12/21 for the UTI. She states that she feels better, but yesterday noticed a red rash under her breasts and then along a fold line on her abdomen. Both areas are red and warm to the touch, She does c/o some itching in the areas. During exam rash does not look to be allergic in nature, likely fungal due to moisture. Orders given. She denies shortness of breath, cough, or pain. She is alert and pleasant. No distress noted.         Past Medical History:   Diagnosis Date    Atrial fibrillation ([address]     Blood circulation, collateral     [employer] single pacemaker 4/26/2016 5/5/2016    CAD (coronary artery disease)     cath and stent in right artery    Cancer [facility]) 1954    HX  Colon     Carpal tunnel syndrome     Fracture dislocation of ankle 1980    GERD (gastroesophageal reflux disease)     Hyperlipidemia     Hypertension     Kidney lesion, native, right     found on 5/2016    Osteoarthritis     knees    Osteopenia     Prolonged emergence from general anesthesia     Thyroid goiter 9/6/2016    Yersiniosis 2016      Past Surgical History:   Procedure Laterality Date    ABDOMEN SURGERY      ANKLE FRACTURE SURGERY  0696--[id number]    reconstruction in 2003 and 2007    APPENDECTOMY      BLADDER SURGERY      support bladder repair    CARDIAC SURGERY      heart stent 12-11-18, [place]    CARPAL TUNNEL RELEASE  7/2013    CARPAL TUNNEL RELEASE Right 08/19/2017    Revision    CATARACT REMOVAL Bilateral     CHOLECYSTECTOMY  1986    COLON SURGERY  1954    COLONOSCOPY      ENDOSCOPY, COLON, DIAGNOSTIC      EYE SURGERY      cataract  FRACTURE SURGERY      HYSTERECTOMY  1971    JOINT REPLACEMENT  1998    L knee    KNEE SURGERY Left     total replacement    PACEMAKER PLACEMENT      PTCA  01/15/2019    Successful PCI / Drug Eluting Stent of the proximal Left Anterior Descending Coronary Artery.  UPPER GASTROINTESTINAL ENDOSCOPY Left 11/28/2018    EGD BIOPSY performed by [facility]am Patel [facility]    UPPER GASTROINTESTINAL ENDOSCOPY  11/28/2018    EGD CONTROL HEMORRHAGE performed by Selam Patel MD at [facility]       Family History   Problem Relation Age of Onset    Heart Disease Mother     High Blood Pressure Mother     Heart Disease Father     High Blood Pressure Father     Heart Disease Brother     High Blood Pressure Brother     Heart Disease Son        Social History     Tobacco Use    Smoking status: Never Smoker    Smokeless tobacco: Never Used   Substance Use Topics    Alcohol use: No      Allergies   Allergen Reactions    Methadone Shortness Of Breath     Shakes and nausea    Gabapentin     Lodine [Etodolac] Other (See Comments)     \"spacey, hot flashes, shaky\"    Lyrica [Pregabalin]     Ultram [Tramadol]      Nausea, pedal edema, SOB       Health Maintenance   Topic Date Due    Shingles Vaccine (2 of 2) 10/01/2019    Lipid screen  12/04/2019    COVID-19 Vaccine (2 of 2 - Moderna series) 02/17/2021    DTaP/Tdap/Td vaccine (1 - Tdap) 09/06/2021 (Originally 4/4/1947)    Annual Wellness Visit (AWV)  04/09/2021    Potassium monitoring  01/29/2022    Creatinine monitoring  01/29/2022    Flu vaccine  Completed    Pneumococcal 65+ years Vaccine  Completed    Hepatitis A vaccine  Aged Out    Hepatitis B vaccine  Aged Out    Hib vaccine  Aged Out    Meningococcal (ACWY) vaccine  Aged Out       Subjective:      Review of Systems   Constitutional: Negative for activity change and appetite change. HENT: Negative for congestion, rhinorrhea and sore throat. Eyes: Negative for pain and discharge. Respiratory: Negative for cough and shortness of breath. Cardiovascular: Negative for chest pain and leg swelling. Gastrointestinal: Negative for abdominal pain, nausea and vomiting. Endocrine: Negative for cold intolerance. Genitourinary: Negative for difficulty urinating, frequency and urgency. Musculoskeletal: Positive for arthralgias and gait problem. Skin: Negative for color change. Neurological: Negative for dizziness, speech difficulty and headaches. Hematological: Negative for adenopathy. Psychiatric/Behavioral: Negative for agitation. The patient is not nervous/anxious. Objective:     Physical Exam  Vitals signs and nursing note reviewed. Constitutional:       General: She is not in acute distress. Appearance: Normal appearance. She is normal weight. She is not diaphoretic. HENT:      Head: Normocephalic and atraumatic. Right Ear: External ear normal.      Left Ear: External ear normal.      Nose: Nose normal. No congestion or rhinorrhea. Mouth/Throat:      Pharynx: No oropharyngeal exudate. Eyes:      General: No scleral icterus. Right eye: No discharge. Left eye: No discharge. Conjunctiva/sclera: Conjunctivae normal.   Neck:      Musculoskeletal: Normal range of motion and neck supple. No neck rigidity or muscular tenderness. Cardiovascular:      Rate and Rhythm: Normal rate and regular rhythm. Pulses: Normal pulses. Heart sounds: Murmur present. Pulmonary:      Effort: Pulmonary effort is normal. No respiratory distress. Breath sounds: Normal breath sounds. No wheezing, rhonchi or rales. Abdominal:      General: Bowel sounds are normal. There is no distension. Palpations: Abdomen is soft. Tenderness: There is no abdominal tenderness. Musculoskeletal: Normal range of motion. General: No swelling or tenderness. Right lower leg: No edema. Left lower leg: No edema. Skin:     General: Skin is warm and dry. Coloration: Skin is not jaundiced or pale. Neurological:      Mental Status: She is alert. Mental status is at baseline. Psychiatric:         Mood and Affect: Mood normal.         Behavior: Behavior normal.       BP (!) 168/78   Pulse 88   Temp 97.4 °F (36.3 °C)   Resp 16   Wt 165 lb 3.2 oz (74.9 kg)   LMP  (LMP Unknown)   SpO2 98%   BMI 30.22 kg/m²     Assessment/Plan      1. Dermatitis vs #2   2. Fungal rash of torso   Start Nystatin to rash area TID  Does not look like allergic reaction   3. Acute cystitis without hematuria  Continue Pen V K  Continue to monitor rash area    4. Essential hypertension   Elevated today, but has been better  Continue meds and continue to monitor         Patient seen and examined. History partially obtained by chart review and nursing notes. I have reviewed patient's past medical, surgical, social, and family history and have made updates where appropriate.   See facility EMR for updated medication list.       Electronically signed by NOELLE Goldstein CNP on 2/15/2021 at 2:49 PM

## 2021-02-24 ENCOUNTER — TELEPHONE (OUTPATIENT)
Dept: FAMILY MEDICINE CLINIC | Age: 86
End: 2021-02-24

## 2021-02-24 NOTE — TELEPHONE ENCOUNTER
.Transition of Care visit scheduled.   3/11/2021  Patient is being discharged to - home  Date of discharge 03/02/2021  Discharge from facility [address] [place] [address]   Reason for admission cyst of kidney

## 2021-03-01 ENCOUNTER — OUTSIDE SERVICES (OUTPATIENT)
Dept: FAMILY MEDICINE CLINIC | Age: 86
End: 2021-03-01
Payer: MEDICARE

## 2021-03-01 VITALS
OXYGEN SATURATION: 98 % | TEMPERATURE: 98 F | DIASTOLIC BLOOD PRESSURE: 82 MMHG | WEIGHT: 163.4 LBS | HEART RATE: 65 BPM | BODY MASS INDEX: 29.89 KG/M2 | RESPIRATION RATE: 18 BRPM | SYSTOLIC BLOOD PRESSURE: 146 MMHG

## 2021-03-01 DIAGNOSIS — K76.0 STEATOSIS OF LIVER: ICD-10-CM

## 2021-03-01 DIAGNOSIS — E78.00 PURE HYPERCHOLESTEROLEMIA: ICD-10-CM

## 2021-03-01 DIAGNOSIS — L30.9 DERMATITIS: ICD-10-CM

## 2021-03-01 DIAGNOSIS — N39.3 STRESS INCONTINENCE: ICD-10-CM

## 2021-03-01 DIAGNOSIS — M15.9 PRIMARY OSTEOARTHRITIS INVOLVING MULTIPLE JOINTS: ICD-10-CM

## 2021-03-01 DIAGNOSIS — I25.119 CORONARY ARTERY DISEASE INVOLVING NATIVE HEART WITH ANGINA PECTORIS, UNSPECIFIED VESSEL OR LESION TYPE (HCC): ICD-10-CM

## 2021-03-01 DIAGNOSIS — B36.9 FUNGAL RASH OF TORSO: ICD-10-CM

## 2021-03-01 DIAGNOSIS — I50.32 CHRONIC DIASTOLIC CHF (CONGESTIVE HEART FAILURE) (HCC): ICD-10-CM

## 2021-03-01 DIAGNOSIS — K21.9 GASTROESOPHAGEAL REFLUX DISEASE WITHOUT ESOPHAGITIS: ICD-10-CM

## 2021-03-01 DIAGNOSIS — R53.1 GENERALIZED WEAKNESS: ICD-10-CM

## 2021-03-01 DIAGNOSIS — R30.0 DYSURIA: ICD-10-CM

## 2021-03-01 DIAGNOSIS — N30.00 ACUTE CYSTITIS WITHOUT HEMATURIA: Primary | ICD-10-CM

## 2021-03-01 DIAGNOSIS — I10 ESSENTIAL HYPERTENSION: ICD-10-CM

## 2021-03-01 DIAGNOSIS — I48.91 ATRIAL FIBRILLATION WITH RAPID VENTRICULAR RESPONSE (HCC): ICD-10-CM

## 2021-03-01 PROCEDURE — 99309 SBSQ NF CARE MODERATE MDM 30: CPT | Performed by: NURSE PRACTITIONER

## 2021-03-01 PROCEDURE — 99490 CHRNC CARE MGMT STAFF 1ST 20: CPT | Performed by: NURSE PRACTITIONER

## 2021-03-01 ASSESSMENT — ENCOUNTER SYMPTOMS
TROUBLE SWALLOWING: 0
SHORTNESS OF BREATH: 0
EYE DISCHARGE: 0
NAUSEA: 0
WHEEZING: 0
RHINORRHEA: 0
ABDOMINAL PAIN: 0
COUGH: 0
EYE PAIN: 0
SORE THROAT: 0
VOMITING: 0
COLOR CHANGE: 0

## 2021-03-01 NOTE — PROGRESS NOTES
Federica Lemus is a 80 y.o. female who presents today for her medical conditions/complaints as noted below. Chief Complaint   Patient presents with    1 Month Follow-Up           HPI:     Olamide Ghotra is seen today for her monthly chronic illness f/u. She has a PMX of A fib, CHF, CAD with pacemaker and stents, GERD, HTN, HLD, right kidney leision, osteoarthritis, hx of colon cancer, among others. She was admitted for rehab due to generalized weakness with UTI. She has improved with therapy and care and is likely being discharged home tomorrow. Orders given. She will follow up with Dr. Mary Kay Donato in her office. She has a appt on 3/11. She is alert and oriented. She denies pain, cough, or shortness of breath. She states that her daughter will be helping her at home. Bowels are moving, she is eating and sleeping well. No distress noted. Nursing denies concerns.         Past Medical History:   Diagnosis Date    Atrial fibrillation ([address]     Blood circulation, collateral     [employer] single pacemaker 4/26/2016 5/5/2016    CAD (coronary artery disease)     cath and stent in right artery    Cancer [facility]) 1954    HX  Colon     Carpal tunnel syndrome     Fracture dislocation of ankle 1980    GERD (gastroesophageal reflux disease)     Hyperlipidemia     Hypertension     Kidney lesion, native, right     found on 5/2016    Osteoarthritis     knees    Osteopenia     Prolonged emergence from general anesthesia     Thyroid goiter 9/6/2016    Yersiniosis 2016      Past Surgical History:   Procedure Laterality Date    ABDOMEN SURGERY      ANKLE FRACTURE SURGERY  [id number]    reconstruction in 2003 and 2007    APPENDECTOMY      BLADDER SURGERY      support bladder repair    CARDIAC SURGERY      heart stent 12-11-18, [place]    CARPAL TUNNEL RELEASE  7/2013    CARPAL TUNNEL RELEASE Right 08/19/2017    Revision    CATARACT REMOVAL Bilateral     CHOLECYSTECTOMY  1986   [address] [place] [address]  COLONOSCOPY      ENDOSCOPY, COLON, DIAGNOSTIC      EYE SURGERY      cataract     FRACTURE SURGERY      HYSTERECTOMY  1971    JOINT REPLACEMENT  1998    L knee    KNEE SURGERY Left     total replacement    PACEMAKER PLACEMENT      PTCA  01/15/2019    Successful PCI / Drug Eluting Stent of the proximal Left Anterior Descending Coronary Artery.     UPPER GASTROINTESTINAL ENDOSCOPY Left 11/28/2018    EGD BIOPSY performed [facility] Dewitt [facility]    UPPER GASTROINTESTINAL ENDOSCOPY  11/28/2018    EGD CONTROL HEMORRHAGE performed by Tresa Dewitt MD at [facility]       Family History   Problem Relation Age of Onset    Heart Disease Mother     High Blood Pressure Mother     Heart Disease Father     High Blood Pressure Father     Heart Disease Brother     High Blood Pressure Brother     Heart Disease Son        Social History     Tobacco Use    Smoking status: Never Smoker    Smokeless tobacco: Never Used   Substance Use Topics    Alcohol use: No      Allergies   Allergen Reactions    Methadone Shortness Of Breath     Shakes and nausea    Gabapentin     Lodine [Etodolac] Other (See Comments)     \"spacey, hot flashes, shaky\"    Lyrica [Pregabalin]     Ultram [Tramadol]      Nausea, pedal edema, SOB       Health Maintenance   Topic Date Due    Shingles Vaccine (2 of 2) 10/01/2019    Lipid screen  12/04/2019    COVID-19 Vaccine (2 of 2 - Moderna series) 02/17/2021    DTaP/Tdap/Td vaccine (1 - Tdap) 09/06/2021 (Originally 4/4/1947)    Annual Wellness Visit (AWV)  04/09/2021    Potassium monitoring  01/29/2022    Creatinine monitoring  01/29/2022    Flu vaccine  Completed    Pneumococcal 65+ years Vaccine  Completed    Hepatitis A vaccine  Aged Out    Hepatitis B vaccine  Aged Out    Hib vaccine  Aged Out    Meningococcal (ACWY) vaccine  Aged Out       Subjective:      Review of Systems Constitutional: Positive for activity change (moving more, not as tired). Negative for appetite change and fever. HENT: Negative for congestion, ear pain, mouth sores, rhinorrhea, sore throat and trouble swallowing. Eyes: Negative for pain and discharge. Respiratory: Negative for cough, shortness of breath and wheezing. Cardiovascular: Negative for chest pain, palpitations and leg swelling. Gastrointestinal: Negative for abdominal pain, nausea and vomiting. Endocrine: Negative for cold intolerance. Genitourinary: Negative for difficulty urinating, frequency and urgency. Musculoskeletal: Positive for arthralgias and gait problem. Skin: Negative for color change. Neurological: Positive for weakness (improved). Negative for dizziness, tremors, seizures, speech difficulty and headaches. Hematological: Negative for adenopathy. Psychiatric/Behavioral: Negative for agitation and sleep disturbance. The patient is not nervous/anxious. Objective:     Physical Exam  Vitals signs and nursing note reviewed. Constitutional:       General: She is not in acute distress. Appearance: Normal appearance. She is normal weight. She is not diaphoretic. HENT:      Head: Normocephalic and atraumatic. Right Ear: External ear normal.      Left Ear: External ear normal.      Nose: Nose normal. No congestion or rhinorrhea. Mouth/Throat:      Pharynx: No oropharyngeal exudate. Eyes:      General: No scleral icterus. Right eye: No discharge. Left eye: No discharge. Conjunctiva/sclera: Conjunctivae normal.   Neck:      Musculoskeletal: Normal range of motion and neck supple. No neck rigidity or muscular tenderness. Cardiovascular:      Rate and Rhythm: Normal rate. Rhythm irregular. Pulses: Normal pulses. Heart sounds: Normal heart sounds. No murmur. Pulmonary:      Effort: Pulmonary effort is normal. No respiratory distress. Breath sounds: Normal breath sounds. No wheezing, rhonchi or rales. Abdominal:      General: Bowel sounds are normal. There is no distension. Palpations: Abdomen is soft. Tenderness: There is no abdominal tenderness. Musculoskeletal: Normal range of motion. General: No swelling or tenderness. Right lower leg: No edema. Left lower leg: No edema. Comments: Limited ROM due to weakness   Skin:     General: Skin is warm and dry. Coloration: Skin is not jaundiced or pale. Neurological:      Mental Status: She is alert and oriented to person, place, and time. Mental status is at baseline. Psychiatric:         Mood and Affect: Mood normal.         Behavior: Behavior normal.       BP (!) 146/82   Pulse 65   Temp 98 °F (36.7 °C)   Resp 18   Wt 163 lb 6.4 oz (74.1 kg)   LMP  (LMP Unknown)   SpO2 98%   BMI 29.89 kg/m²     Assessment/Plan      1. Weakness generalized   Continue therapy at home  Encourage activity as able  Safety and fall precautions at home   2. Chronic diastolic CHF (congestive heart failure) (HCC)   Currently stable - continue to monitor  Continue Bumex, Dig,    3. Chronic atrial fibrillation (HCC)   Rate controlled  Continue Xarelto and Dig. Continue to monitor   4. Renal cyst, right   Continue to monitor   5. Gastroesophageal reflux disease without esophagitis   Continue Protonix   6. Pure hypercholesterolemia   Follow labs with Dr. Socorro Weiner  Continue atorvastatin   7. Essential hypertension   Currently controlled - continue to monitor   8. Coronary artery disease involving native heart with angina pectoris, unspecified vessel or lesion type (HCC)   Continue ASA   9. Elevated fasting glucose   Monitor HgA1c with Dr. Socorro Weiner   10. Sick sinus syndrome (HCC) - hx of   11. [employer] single pacemaker 4/26/2016    12. Primary osteoarthritis involving multiple joints   ContinueTylenol prn   13. Stress incontinence    14.  History of colon cancer 15. Chronic depression   Continue Effexor      OK to DC home, to f/u with Dr. Socorro Weiner in her office on 3/11    Patient seen and examined. History partially obtained by chart review and nursing notes. I have reviewed patient's past medical, surgical, social, and family history and have made updates where appropriate.   See facility EMR for updated medication list.       Electronically signed by NOELLE Ureña CNP on 3/1/2021 at 2:34 PM

## 2021-03-02 ENCOUNTER — CARE COORDINATION (OUTPATIENT)
Dept: CARE COORDINATION | Age: 86
End: 2021-03-02

## 2021-03-02 NOTE — CARE COORDINATION
Chart reviewed for Care Coordination enrollment s/p recent list referral for assistance with the management of her CHF and healthcare needs. Chart reviewed and patient is currently residing at the [facility] for rehab s/p recent hospital stay. ACM spoke with staff at [facility] and they verified patient is being discharged home today with [facility] services to resume. They stated patient has a walker and family denied any other DME needs. Medications to be filled by patient's pharmacy and she has PCP f/u scheduled for 3/11 @ 1300. Will attempt to f/u with patient to discuss Care Coordination enrollment in the next 1-2 days.

## 2021-03-04 ENCOUNTER — CARE COORDINATION (OUTPATIENT)
Dept: CARE COORDINATION | Age: 86
End: 2021-03-04

## 2021-03-04 NOTE — CARE COORDINATION
Sulema called and updated and verbalized understanding. Sulema denied any other questions, concerns, or needs.

## 2021-03-04 NOTE — CARE COORDINATION
Dr. Meredith Choi:  Please see note below. Daughter Lawson Garces continues to manage medications for patient and [facility] staff have also reviewed. Sulema plans to discuss digoxin with you at upcoming appointment as patient previously took daily and is now taking BID with orders to hold if HR is < 65.  Sulema also asked about Magnesium as patient previously took 250 mg daily and is now on 400 mg BID. Lawson Quintanauchard is asking if ok to use 2 - 250 mg tabs (500 mg) BID until she is out and then can  400 mg tabs? Please advise if not ok. Thank you!

## 2021-03-04 NOTE — CARE COORDINATION
Patient was called for Care Coordination enrollment s/p recent list referral for assistance with the management of her CHF, healthcare needs, and in f/u to her recent SNF discharge, 3/2/2021. Patient was not available at the time of my call, but I was able to speak with her daughter/HIPPA contact, Sulema. Mary Gundersonjairon reported patient has been doing well since returning home and she denied any questions re: patient's discharge instructions. Sulema stated [facility] was out for nursing and PT/OT yesterday. Mary Gundersonjairon stated she provides all transportation for patient and she assists with ADLs as needed. Sulema reported she also manages patient's medications for her and sets up her pill box. Med Rec was completed. Sulema denied any questions or concerns re: patient's medications or any interest in reviewing medications with a pharmacist.  Mary Christy was encouraged to call if she changes her mind. Mary Gundersonjairon is aware of upcoming PCP appointment information. Care Coordination program was reviewed with Mary Westley and she declines enrollment at this time as she feels patient has enough support at this time. Mary Christy was instructed to call ACM if she changes her mind and she verbalized understanding. Sulema denied any other questions, concerns, or needs and she was encouraged to call with any that may develop.

## 2021-03-11 ENCOUNTER — OFFICE VISIT (OUTPATIENT)
Dept: FAMILY MEDICINE CLINIC | Age: 86
End: 2021-03-11
Payer: MEDICARE

## 2021-03-11 VITALS
OXYGEN SATURATION: 95 % | WEIGHT: 164.4 LBS | RESPIRATION RATE: 16 BRPM | TEMPERATURE: 96.9 F | DIASTOLIC BLOOD PRESSURE: 74 MMHG | SYSTOLIC BLOOD PRESSURE: 128 MMHG | BODY MASS INDEX: 30.07 KG/M2 | HEART RATE: 65 BPM

## 2021-03-11 DIAGNOSIS — N28.1 RENAL CYST, RIGHT: ICD-10-CM

## 2021-03-11 DIAGNOSIS — E78.00 PURE HYPERCHOLESTEROLEMIA: ICD-10-CM

## 2021-03-11 DIAGNOSIS — I50.32 CHRONIC DIASTOLIC CHF (CONGESTIVE HEART FAILURE) (HCC): ICD-10-CM

## 2021-03-11 DIAGNOSIS — I25.119 CORONARY ARTERY DISEASE INVOLVING NATIVE HEART WITH ANGINA PECTORIS, UNSPECIFIED VESSEL OR LESION TYPE (HCC): ICD-10-CM

## 2021-03-11 DIAGNOSIS — M15.9 PRIMARY OSTEOARTHRITIS INVOLVING MULTIPLE JOINTS: ICD-10-CM

## 2021-03-11 DIAGNOSIS — E83.42 HYPOMAGNESEMIA: ICD-10-CM

## 2021-03-11 DIAGNOSIS — R53.1 GENERALIZED WEAKNESS: Primary | ICD-10-CM

## 2021-03-11 DIAGNOSIS — R91.8 LUNG INFILTRATE: ICD-10-CM

## 2021-03-11 DIAGNOSIS — I48.91 ATRIAL FIBRILLATION WITH RAPID VENTRICULAR RESPONSE (HCC): ICD-10-CM

## 2021-03-11 PROCEDURE — G8482 FLU IMMUNIZE ORDER/ADMIN: HCPCS | Performed by: FAMILY MEDICINE

## 2021-03-11 PROCEDURE — G8417 CALC BMI ABV UP PARAM F/U: HCPCS | Performed by: FAMILY MEDICINE

## 2021-03-11 PROCEDURE — 1123F ACP DISCUSS/DSCN MKR DOCD: CPT | Performed by: FAMILY MEDICINE

## 2021-03-11 PROCEDURE — G8427 DOCREV CUR MEDS BY ELIG CLIN: HCPCS | Performed by: FAMILY MEDICINE

## 2021-03-11 PROCEDURE — 99214 OFFICE O/P EST MOD 30 MIN: CPT | Performed by: FAMILY MEDICINE

## 2021-03-11 PROCEDURE — 4040F PNEUMOC VAC/ADMIN/RCVD: CPT | Performed by: FAMILY MEDICINE

## 2021-03-11 PROCEDURE — 1090F PRES/ABSN URINE INCON ASSESS: CPT | Performed by: FAMILY MEDICINE

## 2021-03-11 PROCEDURE — 1036F TOBACCO NON-USER: CPT | Performed by: FAMILY MEDICINE

## 2021-03-11 RX ORDER — BIOTIN 1000 MCG
1000 TABLET,CHEWABLE ORAL DAILY
COMMUNITY
End: 2022-07-20

## 2021-03-11 RX ORDER — SUCRALFATE 1 G/1
1 TABLET ORAL 2 TIMES DAILY
Qty: 180 TABLET | Refills: 11 | Status: SHIPPED | OUTPATIENT
Start: 2021-03-11 | End: 2022-04-12

## 2021-03-11 RX ORDER — DIGOXIN 125 MCG
125 TABLET ORAL 2 TIMES DAILY
Qty: 180 TABLET | Refills: 3 | Status: ON HOLD | OUTPATIENT
Start: 2021-03-11 | End: 2021-12-22 | Stop reason: HOSPADM

## 2021-03-11 RX ORDER — SUCRALFATE 1 G/1
1 TABLET ORAL 2 TIMES DAILY
COMMUNITY
End: 2021-03-11 | Stop reason: SDUPTHER

## 2021-03-11 RX ORDER — DOCUSATE SODIUM 100 MG/1
100 CAPSULE, LIQUID FILLED ORAL EVERY OTHER DAY
COMMUNITY
End: 2021-06-30

## 2021-03-11 NOTE — PROGRESS NOTES
[address] [place] [id number]  Dept: [phone]  Dept Fax: [phone]  Loc: [address] [facility] Tunde King is a 80 y.o. female who presents today for:  Chief Complaint   Patient presents with   Lucien Mckeon Other     f/u from nursing home- 1/29-3/2- cyst of kidney           HPI:     HPI  Here for recheck after discharge from the hospital.  She had an episode of weakness and confusion at home that seemed to start 5 days after her first covid vaccine. She was treated in the hospital and stabilized but had to rehab at the nursing home before going home. She is home now with [address] [place] [address] and PT. She has SOB but this is at her baseline, low appetite. She was found to have a few renal cysts that need recheck and in the ER the CXR showed inflammation. These will need recheck in the next 3-6 months. Reviewed chart forpast medical history , surgical history , allergies, social history , family history and medications. Health Maintenance   Topic Date Due    Shingles Vaccine (2 of 2) 10/01/2019    Lipid screen  12/04/2019    Annual Wellness Visit (AWV)  04/09/2021    DTaP/Tdap/Td vaccine (1 - Tdap) 09/06/2021 (Originally 4/4/1947)    Potassium monitoring  01/29/2022    Creatinine monitoring  01/29/2022    Flu vaccine  Completed    Pneumococcal 65+ years Vaccine  Completed    COVID-19 Vaccine  Completed    Hepatitis A vaccine  Aged Out    Hepatitis B vaccine  Aged Out    Hib vaccine  Aged Out    Meningococcal (ACWY) vaccine  Aged Out       Subjective:      Constitutional:Negative for fever, chills, diaphoresis, activity change, appetite change and fatigue. HENT: Negative for hearing loss, ear pain, congestion, sore throat, rhinorrhea, postnasal drip and ear discharge. Eyes: Negative for photophobia and visual disturbance. Respiratory: Negative for cough, chest tightness, shortness of breath and wheezing. Cardiovascular: Negative for chest pain and leg swelling. Gastrointestinal: Negative for nausea, vomiting, abdominal pain, diarrhea and constipation. Genitourinary: Negative for dysuria, urgency and frequency. Neurological: Negative for weakness, light-headedness and headaches. Psychiatric/Behavioral: Negative for sleep disturbance.      :     Vitals:    03/11/21 1259   BP: 128/74   Site: Left Wrist   Position: Sitting   Cuff Size: Medium Adult   Pulse: 65   Resp: 16   Temp: 96.9 °F (36.1 °C)   TempSrc: Temporal   SpO2: 95%   Weight: 164 lb 6.4 oz (74.6 kg)     Wt Readings from Last 3 Encounters:   03/11/21 164 lb 6.4 oz (74.6 kg)   03/01/21 163 lb 6.4 oz (74.1 kg)   02/15/21 165 lb 3.2 oz (74.9 kg)       Physical Exam  Constitutional: Vital signs are normal. She appears well-developed and well-nourished. She is active. HENT:   Head: Normocephalic and atraumatic. Right Ear: Tympanic membrane, external ear and ear canal normal. No drainage or tenderness. Left Ear: Tympanic membrane, external ear and ear canal normal. No drainage or tenderness. Nose: Nose normal. No mucosal edema or rhinorrhea. Mouth/Throat: Uvula is midline, oropharynx is clear and moist and mucous membranes are normal. Mucous membranes are not pale. Normal dentition. No posterior oropharyngeal edema or posterior oropharyngeal erythema. Eyes: Lids are normal. Right eye exhibits no chemosis and no discharge. Left eye exhibits no chemosis and no drainage. Right conjunctiva has no hemorrhage. Left conjunctiva has no hemorrhage. Right eye exhibits normal extraocular motion. Left eye exhibits normal extraocular motion. Right pupil is round and reactive. Left pupil is round and reactive. Pupils are equal.   Cardiovascular: Normal rate, regular rhythm, S1 normal, S2 normal and normal heart sounds. Exam reveals no gallop. No murmur heard. Pulmonary/Chest: Effort normal and breath sounds normal. No respiratory distress.  She has no wheezes. She has no rhonchi. She has no rales. Abdominal: Soft. Normal appearance and bowel sounds are normal. She exhibits no distension and no mass. There is no hepatosplenomegaly. No tenderness. She has no rigidity, no rebound and no guarding. No hernia. Musculoskeletal:        Right lower leg: She exhibits no edema. Left lower leg: She exhibits no edema. Neurological: She is alert. Assessment/Plan   Paty Gama was seen today for other. Diagnoses and all orders for this visit:    Generalized weakness    Atrial fibrillation with rapid ventricular response (HCC)  -     Digoxin Level; Future  -     TSH With Reflex Ft4; Future  -     digoxin (LANOXIN) 125 MCG tablet; Take 1 tablet by mouth 2 times daily    Pure hypercholesterolemia  -     Comprehensive Metabolic Panel, Fasting; Future    Primary osteoarthritis involving multiple joints    Chronic diastolic CHF (congestive heart failure) (HCC)  -     CBC; Future    Coronary artery disease involving native heart with angina pectoris, unspecified vessel or lesion type ([facility] [address] [address]    Renal cyst, right  -     US RENAL COMPLETE; Future    Lung infiltrate  -     XR CHEST STANDARD (2 VW); Future    Hypomagnesemia  -     Magnesium; Future    No change to medications   Continue healthy diet and exercise  Aspirin daily    Discussed use, benefit, and side effectsof prescribed medications. All patient questions answered. Pt voiced understanding. Reviewed health maintenance. Instructed to continue current medications, diet and exercise. Patient agreed with treatment plan. Followup as directed.      Electronically signed by Cyndie Choi MD

## 2021-05-17 ENCOUNTER — NURSE ONLY (OUTPATIENT)
Dept: FAMILY MEDICINE CLINIC | Age: 86
End: 2021-05-17
Payer: MEDICARE

## 2021-05-17 DIAGNOSIS — R42 DIZZINESS: Primary | ICD-10-CM

## 2021-05-17 LAB
BILIRUBIN URINE: ABNORMAL
BLOOD URINE, POC: ABNORMAL
CHARACTER, URINE: CLEAR
COLOR, URINE: YELLOW
GLUCOSE URINE: NEGATIVE MG/DL
KETONES, URINE: NEGATIVE
LEUKOCYTE CLUMPS, URINE: ABNORMAL
NITRITE, URINE: NEGATIVE
PH, URINE: 7 (ref 5–9)
PROTEIN, URINE: ABNORMAL MG/DL
SPECIFIC GRAVITY, URINE: 1.02 (ref 1–1.03)
UROBILINOGEN, URINE: 0.2 EU/DL (ref 0–1)

## 2021-05-17 PROCEDURE — 99211 OFF/OP EST MAY X REQ PHY/QHP: CPT | Performed by: FAMILY MEDICINE

## 2021-05-17 PROCEDURE — 81003 URINALYSIS AUTO W/O SCOPE: CPT | Performed by: FAMILY MEDICINE

## 2021-05-17 NOTE — PROGRESS NOTES
Chief Complaint   Patient presents with    Dizziness    Nausea       Results for POC orders placed in visit on 05/17/21   POCT Urinalysis No Micro (Auto)   Result Value Ref Range    Glucose, Ur Negative NEGATIVE mg/dl    Bilirubin Urine Small (A)     Ketones, Urine Negative NEGATIVE    Specific Gravity, Urine 1.020 1.002 - 1.030    Blood, UA POC Trace-intact NEGATIVE    pH, Urine 7.00 5.0 - 9.0    Protein, Urine Trace (A) NEGATIVE mg/dl    Urobilinogen, Urine 0.20 0.0 - 1.0 eu/dl    Nitrite, Urine Negative NEGATIVE    Leukocyte Clumps, Urine Trace (A) NEGATIVE    Color, Urine Yellow [id number]    Character, Urine Clear [id number]       Send for culture  Call patient

## 2021-05-17 NOTE — PROGRESS NOTES
Urine dip. Dizziness, nausea, occasional belly discomfort. Symptoms started on Friday. Call [facility].   [phone]

## 2021-05-19 ENCOUNTER — HOSPITAL ENCOUNTER (OUTPATIENT)
Dept: ULTRASOUND IMAGING | Age: 86
Discharge: HOME | End: 2021-05-19
Payer: MEDICARE

## 2021-05-19 ENCOUNTER — HOSPITAL ENCOUNTER (OUTPATIENT)
Dept: GENERAL RADIOLOGY | Age: 86
Discharge: HOME | End: 2021-05-19
Payer: MEDICARE

## 2021-05-19 ENCOUNTER — HOSPITAL ENCOUNTER (OUTPATIENT)
Age: 86
Discharge: HOME | End: 2021-05-19
Payer: MEDICARE

## 2021-05-19 DIAGNOSIS — N28.1 RENAL CYST, RIGHT: ICD-10-CM

## 2021-05-19 DIAGNOSIS — R91.8 LUNG INFILTRATE: ICD-10-CM

## 2021-05-19 LAB
ORGANISM: ABNORMAL
URINE CULTURE, ROUTINE: ABNORMAL

## 2021-05-19 PROCEDURE — 76770 US EXAM ABDO BACK WALL COMP: CPT

## 2021-05-19 PROCEDURE — 71046 X-RAY EXAM CHEST 2 VIEWS: CPT

## 2021-05-24 ENCOUNTER — NURSE ONLY (OUTPATIENT)
Dept: LAB | Age: 86
End: 2021-05-24

## 2021-05-24 DIAGNOSIS — E83.42 HYPOMAGNESEMIA: ICD-10-CM

## 2021-05-24 DIAGNOSIS — I50.32 CHRONIC DIASTOLIC CHF (CONGESTIVE HEART FAILURE) (HCC): ICD-10-CM

## 2021-05-24 DIAGNOSIS — E78.00 PURE HYPERCHOLESTEROLEMIA: ICD-10-CM

## 2021-05-24 DIAGNOSIS — I48.91 ATRIAL FIBRILLATION WITH RAPID VENTRICULAR RESPONSE (HCC): ICD-10-CM

## 2021-05-24 LAB
ALBUMIN SERPL-MCNC: 4 G/DL (ref 3.5–5.1)
ALP BLD-CCNC: 54 U/L (ref 38–126)
ALT SERPL-CCNC: 7 U/L (ref 11–66)
ANION GAP SERPL CALCULATED.3IONS-SCNC: 11 MEQ/L (ref 8–16)
AST SERPL-CCNC: 17 U/L (ref 5–40)
BILIRUB SERPL-MCNC: 1.4 MG/DL (ref 0.3–1.2)
BUN BLDV-MCNC: 16 MG/DL (ref 7–22)
CALCIUM SERPL-MCNC: 9.6 MG/DL (ref 8.5–10.5)
CHLORIDE BLD-SCNC: 99 MEQ/L (ref 98–111)
CO2: 29 MEQ/L (ref 23–33)
CREAT SERPL-MCNC: 0.7 MG/DL (ref 0.4–1.2)
DIGOXIN LEVEL: 1.5 NG/ML (ref 0.5–2)
ERYTHROCYTE [DISTWIDTH] IN BLOOD BY AUTOMATED COUNT: 14.2 % (ref 11.5–14.5)
ERYTHROCYTE [DISTWIDTH] IN BLOOD BY AUTOMATED COUNT: 48.2 FL (ref 35–45)
GFR SERPL CREATININE-BSD FRML MDRD: 78 ML/MIN/1.73M2
GLUCOSE FASTING: 142 MG/DL (ref 70–108)
HCT VFR BLD CALC: 36.8 % (ref 37–47)
HEMOGLOBIN: 11 GM/DL (ref 12–16)
MAGNESIUM: 2 MG/DL (ref 1.6–2.4)
MCH RBC QN AUTO: 28.1 PG (ref 26–33)
MCHC RBC AUTO-ENTMCNC: 29.9 GM/DL (ref 32.2–35.5)
MCV RBC AUTO: 93.9 FL (ref 81–99)
PLATELET # BLD: 183 THOU/MM3 (ref 130–400)
PMV BLD AUTO: 12 FL (ref 9.4–12.4)
POTASSIUM SERPL-SCNC: 4.2 MEQ/L (ref 3.5–5.2)
RBC # BLD: 3.92 MILL/MM3 (ref 4.2–5.4)
SODIUM BLD-SCNC: 139 MEQ/L (ref 135–145)
TOTAL PROTEIN: 6.7 G/DL (ref 6.1–8)
TSH SERPL DL<=0.05 MIU/L-ACNC: 2.88 UIU/ML (ref 0.4–4.2)
WBC # BLD: 5.2 THOU/MM3 (ref 4.8–10.8)

## 2021-06-02 ENCOUNTER — OFFICE VISIT (OUTPATIENT)
Dept: CARDIOLOGY CLINIC | Age: 86
End: 2021-06-02
Payer: MEDICARE

## 2021-06-02 VITALS
SYSTOLIC BLOOD PRESSURE: 134 MMHG | HEIGHT: 62 IN | WEIGHT: 162 LBS | DIASTOLIC BLOOD PRESSURE: 60 MMHG | BODY MASS INDEX: 29.81 KG/M2 | HEART RATE: 84 BPM

## 2021-06-02 DIAGNOSIS — I73.9 PVD (PERIPHERAL VASCULAR DISEASE) (HCC): ICD-10-CM

## 2021-06-02 DIAGNOSIS — I25.10 CORONARY ARTERY DISEASE DUE TO LIPID RICH PLAQUE: Primary | ICD-10-CM

## 2021-06-02 DIAGNOSIS — I25.83 CORONARY ARTERY DISEASE DUE TO LIPID RICH PLAQUE: Primary | ICD-10-CM

## 2021-06-02 PROCEDURE — G8427 DOCREV CUR MEDS BY ELIG CLIN: HCPCS | Performed by: INTERNAL MEDICINE

## 2021-06-02 PROCEDURE — G8417 CALC BMI ABV UP PARAM F/U: HCPCS | Performed by: INTERNAL MEDICINE

## 2021-06-02 PROCEDURE — 4040F PNEUMOC VAC/ADMIN/RCVD: CPT | Performed by: INTERNAL MEDICINE

## 2021-06-02 PROCEDURE — 1090F PRES/ABSN URINE INCON ASSESS: CPT | Performed by: INTERNAL MEDICINE

## 2021-06-02 PROCEDURE — 1036F TOBACCO NON-USER: CPT | Performed by: INTERNAL MEDICINE

## 2021-06-02 PROCEDURE — 99213 OFFICE O/P EST LOW 20 MIN: CPT | Performed by: INTERNAL MEDICINE

## 2021-06-02 PROCEDURE — 1123F ACP DISCUSS/DSCN MKR DOCD: CPT | Performed by: INTERNAL MEDICINE

## 2021-06-02 NOTE — PROGRESS NOTES
Blood circulation, collateral, [employer] single pacemaker 4/26/2016, CAD (coronary artery disease), Cancer ([address] Carpal tunnel syndrome, Fracture dislocation of ankle, GERD (gastroesophageal reflux disease), Hyperlipidemia, Hypertension, Kidney lesion, native, right, Osteoarthritis, Osteopenia, Prolonged emergence from general anesthesia, Thyroid goiter, and Yersiniosis. Social History  Mora Robbins  reports that she has never smoked. She has never used smokeless tobacco. She reports that she does not drink alcohol and does not use drugs. Family History  Mora Robbins family history includes Heart Disease in her brother, father, mother, and son; High Blood Pressure in her brother, father, and mother. Past Surgical History   Past Surgical History:   Procedure Laterality Date    ABDOMEN SURGERY      ANKLE FRACTURE SURGERY  [id number]--1953    reconstruction in 2003 and 2007    APPENDECTOMY      BLADDER SURGERY      support bladder repair    CARDIAC SURGERY      heart stent 12-11-18, [place]    CARPAL TUNNEL RELEASE  7/2013    CARPAL TUNNEL RELEASE Right 08/19/2017    Revision    CATARACT REMOVAL Bilateral     CHOLECYSTECTOMY  1986    COLON SURGERY  1954    COLONOSCOPY      ENDOSCOPY, COLON, DIAGNOSTIC      EYE SURGERY      cataract     FRACTURE SURGERY      HYSTERECTOMY  1971    JOINT REPLACEMENT  1998    L knee    KNEE SURGERY Left     total replacement    PACEMAKER PLACEMENT      PTCA  01/15/2019    Successful PCI / Drug Eluting Stent of the proximal Left Anterior Descending Coronary Artery.  UPPER GASTROINTESTINAL ENDOSCOPY Left 11/28/2018    EGD BIOPSY performed by Inez Crews MD at [address] [place] [address] ENDOSCOPY  11/28/2018    EGD CONTROL HEMORRHAGE performed by Inez Crews MD at [facility]       Subjective:     REVIEW OF SYSTEMS  Constitutional: denies sweats, chills and fever  HENT: denies  congestion, sinus pressure, sneezing and sore throat.     Eyes: denies  pain, discharge, redness and itching. Respiratory: denies apnea, cough  Gastrointestinal: denies blood in stool, constipation, diarrhea   Endocrine: denies cold intolerance, heat intolerance, polydipsia. Genitourinary: denies dysuria, enuresis, flank pain and hematuria. Musculoskeletal: denies arthralgias, joint swelling and neck pain. Neurological: denies numbness and headaches. Psychiatric/Behavioral: denies agitation, confusion, decreased concentration and dysphoric mood    All others reviewed and are negative. Objective:     /60   Pulse 84   Ht 5' 2\" (1.575 m)   Wt 162 lb (73.5 kg)   LMP  (LMP Unknown)   BMI 29.63 kg/m²     Wt Readings from Last 3 Encounters:   06/02/21 162 lb (73.5 kg)   03/11/21 164 lb 6.4 oz (74.6 kg)   03/01/21 163 lb 6.4 oz (74.1 kg)     BP Readings from Last 3 Encounters:   06/02/21 134/60   03/11/21 128/74   03/01/21 (!) 146/82       PHYSICAL EXAM  Constitutional: Oriented to person, place, and time. Appears well-developed and well-nourished. HENT:   Head: Normocephalic and atraumatic. Eyes: EOM are normal. Pupils are equal, round, and reactive to light. Neck: Normal range of motion. Neck supple. No JVD present. Cardiovascular: Normal rate , normal heart sounds and intact distal pulses. Pulmonary/Chest: Effort normal and breath sounds normal. No respiratory distress. No wheezes. No rales. Abdominal: Soft. Bowel sounds are normal. No distension. There is no tenderness. Musculoskeletal: Normal range of motion. No edema. Neurological: Alert and oriented to person, place, and time. No cranial nerve deficit. Coordination normal.   Skin: Skin is warm and dry. Psychiatric: Normal mood and affect.        No results found for: CKTOTAL, CKMB, CKMBINDEX    Lab Results   Component Value Date    WBC 5.2 05/24/2021    RBC 3.92 05/24/2021    RBC 4.17 08/30/2011    HGB 11.0 05/24/2021    HCT 36.8 05/24/2021    MCV 93.9 05/24/2021    MCH 28.1 05/24/2021 MCHC 29.9 05/24/2021    RDW 12.0 01/25/2021     05/24/2021    MPV 12.0 05/24/2021       Lab Results   Component Value Date     05/24/2021    K 4.2 05/24/2021    K 3.8 01/26/2021    CL 99 05/24/2021    CO2 29 05/24/2021    BUN 16 05/24/2021    LABALBU 4.0 05/24/2021    LABALBU 4.2 08/30/2011    CREATININE 0.7 05/24/2021    CALCIUM 9.6 05/24/2021    LABGLOM 78 05/24/2021    GLUCOSE 129 01/29/2021    GLUCOSE 116 08/30/2011       Lab Results   Component Value Date    ALKPHOS 54 05/24/2021    ALT 7 05/24/2021    AST 17 05/24/2021    PROT 6.7 05/24/2021    BILITOT 1.4 05/24/2021    BILIDIR <0.2 01/19/2021    LABALBU 4.0 05/24/2021    LABALBU 4.2 08/30/2011       Lab Results   Component Value Date    MG 2.0 05/24/2021       Lab Results   Component Value Date    INR 1.49 (H) 01/19/2021    INR 0.98 01/15/2019    INR 1.15 (H) 12/20/2018         Lab Results   Component Value Date    LABA1C 6.1 01/26/2021       Lab Results   Component Value Date    TRIG 145 12/04/2018    HDL 36 12/04/2018    LDLCALC 62 12/04/2018       Lab Results   Component Value Date    TSH 2.880 05/24/2021         Testing Reviewed:      I haveindividually reviewed the below cardiac tests    EKG:    ECHO:   Results for orders placed during the hospital encounter of 11/26/18   ECHO Complete 2D W Doppler W Color    Narrative Transthoracic Echocardiography Report (TTE)     Demographics      Patient Name   [address] [facility] Gender               Female                  L      MR #           [id number]        Race                                                       Ethnicity      Account #      [de-identified]        Room Number          5736      Accession      [id number]        Date of Study        11/26/2018   Number      Date of Birth  04/04/1928       Referring Physician  Love Bowman MD      Age            80 year(s)       [address] [facility], [address] [place] [address] Interpreting         Andrez Tello MD                                   Physician     Procedure    Type of Study      TTE procedure:ECHOCARDIOGRAM COMPLETE 2D W DOPPLER W COLOR. Procedure Date  Date: 11/26/2018 Start: 02:58 PM    Study Location: Bedside  Technical Quality: Adequate visualization    Indications:Shortness of breath. Additional Medical History:Pedal edema, hypertension, colon cancer,  hyperlipidemia, atrial fibrillation, GERD, pacemaker    Patient Status: Routine    Height: 66.14 inches Weight: 180.78 pounds BSA: 1.92 m^2 BMI: 29.05 kg/m^2    BP: 149/68 mmHg    Allergies    - Other allergy:(Methadone, Gabapentin, lyrica, ultram). Conclusions      Summary   Left ventricle size and systolic function is normal.   Normal left ventricular wall thickness. Ejection fraction is visually estimated at 55-60%. Mildly dilated left atrium. Aortic valve leaflets are mildly calcified. Mild aortic regurgitation is noted. Mild mitral regurgitation is present. Mild tricuspid regurgitation. Signature      ----------------------------------------------------------------   Electronically signed by Andrez Tello MD (Interpreting   physician) on 11/26/2018 at 04:17 PM   ----------------------------------------------------------------      Findings      Mitral Valve   Mild calcification of the posterior leaflet of the mitral valve. Mild mitral regurgitation is present. Aortic Valve   The aortic valve appears to be trileaflet with good leaflet separation. Aortic valve leaflets are mildly calcified. Mild aortic regurgitation is noted. Tricuspid Valve   Tricuspid valve is structurally normal.   Mild tricuspid regurgitation. Pulmonic Valve   The pulmonic valve was not well visualized . Left Atrium   Mildly dilated left atrium. Left Ventricle   Left ventricle size and systolic function is normal.   Normal left ventricular wall thickness. Ejection fraction is visually estimated at 55-60%. Right Atrium   The right atrium is of normal size. Right Ventricle   Normal right ventricular size and function. Pericardial Effusion   No evidence of any pericardial effusion. Aorta / Great Vessels   Aorta was not clearly visualized.    IVC is normal in size with normal respiratory phasic changes     M-Mode/2D Measurements & Calculations      LV Diastolic    LV Systolic Dimension: 3  AV Cusp Separation: 2.1 cmLA   Dimension: 4.2  cm                        Dimension: 3 cmAO Root   cm              LV Volume Diastolic: 56.1 Dimension: 3.3 cmLA Area: 20.7   LV FS:28.6 %    ml                        cm^2   LV PW           LV Volume Systolic: 35 ml   Diastolic: 0.8  LV EDV/LV EDV Index: 78.6   cm              ml/41 m^2LV ESV/LV ESV   Septum          Index: 35 ml/18 m^2       RV Diastolic Dimension: 2.2 cm   Diastolic: 0.9  EF Calculated: 55.5 %   cm                                        LA/Aorta: 0.91                                                LA volume/Index: 57.9 ml /30m^2     Doppler Measurements & Calculations      MV Peak E-Wave: 116 cm/s AV Peak Velocity: 163   LVOT Peak Velocity: 134                            cm/s                    cm/s   MV Peak Gradient: 5.38   AV Peak Gradient: 10.63 LVOT Peak Gradient: 7   mmHg                     mmHg                    mmHg      MV Deceleration Time:                            TV Peak E-Wave: 73.7 cm/s   254 msec                                         TV Peak A-Wave: 36.4 cm/s                               AV P1/2t: 544 msec      TV Peak Gradient: 2.17                                                    mmHg                                                    TR Velocity:273 cm/s                                                    TR Gradient:29.81 mmHg   MR Velocity: 366 cm/s    AV DVI (Vmax):0.82      PV Peak Velocity: 80.5                                                    cm/s PV Peak Gradient: 2.59                                                    mmHg     [email or web address]       STRESS:    CATH:    Assessment/Plan       Diagnosis Orders   1. Coronary artery disease due to lipid rich plaque     2. PVD (peripheral vascular disease) (HCC)         CAD s/p PCI  Afib on Xarelto  HTN  HLD  S/p PPM     Underwent PCI of LAD   Doing well  H/H improved  BP well controlled  Continue meds  Continue losartan, digoxin, , lasix, Aspirin  Will d/c metoprolol  Continue Xarelto  No bleeding issue  The patient is asked to make an attempt to improve diet and exercise patterns to aid in medical management of this problem. Advised patient to call office or seek immediate medical attention if there is any new onset of  any chest pain, sob, palpitations, lightheadedness, dizziness, orthopnea, PND or pedal edema.      All medication side effects were discussed in details.     Thank youfor allowing me to participate in the care of this patient. Please do not hesitate to contact me for any further questions. Return in about 1 year (around 6/2/2022), or if symptoms worsen or fail to improve, for Regular follow up, Review testing.        Electronically signed by Pili Jett MD [facility]  6/2/2021 at 11:15 AM

## 2021-06-16 ENCOUNTER — OFFICE VISIT (OUTPATIENT)
Dept: FAMILY MEDICINE CLINIC | Age: 86
End: 2021-06-16
Payer: MEDICARE

## 2021-06-16 VITALS
HEART RATE: 67 BPM | BODY MASS INDEX: 29.45 KG/M2 | DIASTOLIC BLOOD PRESSURE: 64 MMHG | RESPIRATION RATE: 14 BRPM | WEIGHT: 161 LBS | TEMPERATURE: 96.6 F | OXYGEN SATURATION: 97 % | SYSTOLIC BLOOD PRESSURE: 118 MMHG

## 2021-06-16 DIAGNOSIS — I25.119 CORONARY ARTERY DISEASE INVOLVING NATIVE HEART WITH ANGINA PECTORIS, UNSPECIFIED VESSEL OR LESION TYPE (HCC): ICD-10-CM

## 2021-06-16 DIAGNOSIS — K21.9 GASTROESOPHAGEAL REFLUX DISEASE WITHOUT ESOPHAGITIS: ICD-10-CM

## 2021-06-16 DIAGNOSIS — I48.91 ATRIAL FIBRILLATION WITH RAPID VENTRICULAR RESPONSE (HCC): ICD-10-CM

## 2021-06-16 DIAGNOSIS — E78.00 PURE HYPERCHOLESTEROLEMIA: ICD-10-CM

## 2021-06-16 DIAGNOSIS — M15.9 PRIMARY OSTEOARTHRITIS INVOLVING MULTIPLE JOINTS: ICD-10-CM

## 2021-06-16 DIAGNOSIS — D50.0 IRON DEFICIENCY ANEMIA DUE TO CHRONIC BLOOD LOSS: ICD-10-CM

## 2021-06-16 DIAGNOSIS — I50.33 ACUTE ON CHRONIC CONGESTIVE HEART FAILURE WITH LEFT VENTRICULAR DIASTOLIC DYSFUNCTION (HCC): ICD-10-CM

## 2021-06-16 DIAGNOSIS — E55.9 VITAMIN D DEFICIENCY: ICD-10-CM

## 2021-06-16 DIAGNOSIS — R53.81 PHYSICAL DECONDITIONING: ICD-10-CM

## 2021-06-16 DIAGNOSIS — I50.32 CHRONIC DIASTOLIC CHF (CONGESTIVE HEART FAILURE) (HCC): ICD-10-CM

## 2021-06-16 DIAGNOSIS — E83.42 HYPOMAGNESEMIA: ICD-10-CM

## 2021-06-16 DIAGNOSIS — N63.10 LUMP OF RIGHT BREAST: ICD-10-CM

## 2021-06-16 DIAGNOSIS — F33.42 RECURRENT MAJOR DEPRESSIVE DISORDER, IN FULL REMISSION (HCC): ICD-10-CM

## 2021-06-16 DIAGNOSIS — I10 ESSENTIAL HYPERTENSION: Primary | ICD-10-CM

## 2021-06-16 DIAGNOSIS — E53.8 B12 DEFICIENCY: ICD-10-CM

## 2021-06-16 PROCEDURE — 1090F PRES/ABSN URINE INCON ASSESS: CPT | Performed by: FAMILY MEDICINE

## 2021-06-16 PROCEDURE — 4040F PNEUMOC VAC/ADMIN/RCVD: CPT | Performed by: FAMILY MEDICINE

## 2021-06-16 PROCEDURE — 99214 OFFICE O/P EST MOD 30 MIN: CPT | Performed by: FAMILY MEDICINE

## 2021-06-16 PROCEDURE — G8417 CALC BMI ABV UP PARAM F/U: HCPCS | Performed by: FAMILY MEDICINE

## 2021-06-16 PROCEDURE — 1036F TOBACCO NON-USER: CPT | Performed by: FAMILY MEDICINE

## 2021-06-16 PROCEDURE — 1123F ACP DISCUSS/DSCN MKR DOCD: CPT | Performed by: FAMILY MEDICINE

## 2021-06-16 PROCEDURE — G8427 DOCREV CUR MEDS BY ELIG CLIN: HCPCS | Performed by: FAMILY MEDICINE

## 2021-06-16 RX ORDER — POTASSIUM CHLORIDE 20 MEQ/1
TABLET, EXTENDED RELEASE ORAL
Qty: 90 TABLET | Refills: 3 | Status: SHIPPED | OUTPATIENT
Start: 2021-06-16 | End: 2022-04-26 | Stop reason: SDUPTHER

## 2021-06-16 RX ORDER — PANTOPRAZOLE SODIUM 40 MG/1
40 TABLET, DELAYED RELEASE ORAL DAILY
Qty: 90 TABLET | Refills: 3 | Status: SHIPPED | OUTPATIENT
Start: 2021-06-16 | End: 2021-10-11 | Stop reason: SDUPTHER

## 2021-06-16 RX ORDER — VENLAFAXINE HYDROCHLORIDE 75 MG/1
75 CAPSULE, EXTENDED RELEASE ORAL DAILY
Qty: 90 CAPSULE | Refills: 3 | Status: SHIPPED | OUTPATIENT
Start: 2021-06-16 | End: 2022-07-18

## 2021-06-16 RX ORDER — BUMETANIDE 0.5 MG/1
0.5 TABLET ORAL DAILY
Qty: 90 TABLET | Refills: 3 | Status: ON HOLD | OUTPATIENT
Start: 2021-06-16 | End: 2021-12-22 | Stop reason: HOSPADM

## 2021-06-16 NOTE — PROGRESS NOTES
ground emesis. Medical therapy in the past has included proton pump inhibitors. Depression: Patient complains of depression. She complains of depressed mood, difficulty concentrating, psychomotor retardation and recurrent thoughts of death. Onset was approximately 5 years ago, gradually worsening since that time. She denies current suicidal and homicidal plan or intent. Family history significant for no psychiatric illness. Possible organic causes contributing are: none. Risk factors: negative life event aging and becoming less active and previous episode of depression Previous treatment includes no medication and none and psych therapy. She complains of the following side effects from the treatment: none. Osteoarthritis primary in multiple joints is not controlled on current medications. However, she does very little exercise at home and refuses PT. This is leading to unsteady gait and slowly limiting her ability to do ADLs. She had SOB with exertion. She is talking to her family about AL living but her daughter is helping and lives in the same [facility] building. A fib is rate controlled and taking xarelto. Vitamin b12 deficiency needs rechecked. Night sweats are unchanged. thyroid goiter in the past needs recheck of ultrasound last done in 4/2019 shows stable nodules and 2 new. One is recommended to have a biopsy. The night sweats are leading to her not feeling rested in the morning. Here for continued hot flashes and night sweats. They have been happening for 1-2 years but getting worse since June 2020. More fatigue and SOB with exertion as well. She is still not exercising at all despite many attempts to motivate her. She is off beta blockers due to dizziness in the past.  She did go to PT in June but did not finish and has not done any of the exercises at home. She has some skakiness at rest and worse at night. Constipation comes and goes.   Better off of iron but still needing colace. Not exercising or moving much at home. She has noticed a lump in the right axilla. First noticed this 9/2020. Not painful but not getting any smaller and she thinks it may be bigger. She and her daughter decided in fall 2020 to not go through with testing due to her unwillingness to do treatment if they find cancer. She is bring this up again and again wants to consider imaging and will call if she decides to go through with it. Reviewed chart forpast medical history , surgical history , allergies, social history , family history and medications. Health Maintenance   Topic Date Due    Shingles Vaccine (2 of 2) 10/01/2019    Annual Wellness Visit (AWV)  04/09/2021    DTaP/Tdap/Td vaccine (1 - Tdap) 09/06/2021 (Originally 4/4/1947)    Potassium monitoring  05/24/2022    Creatinine monitoring  05/24/2022    Flu vaccine  Completed    Pneumococcal 65+ years Vaccine  Completed    COVID-19 Vaccine  Completed    Hepatitis A vaccine  Aged Out    Hepatitis B vaccine  Aged Out    Hib vaccine  Aged Out    Meningococcal (ACWY) vaccine  Aged Out       Subjective:      Constitutional:Negative for fever, chills, diaphoresis, activity change, appetite change and fatigue. HENT: Negative for hearing loss, ear pain, congestion, sore throat, rhinorrhea, postnasal drip and ear discharge. Eyes: Negative for photophobia and visual disturbance. Respiratory: Negative for cough, chest tightness, shortness of breath and wheezing. Cardiovascular: Negative for chest pain and leg swelling. Gastrointestinal: Negative for nausea, vomiting, abdominal pain, diarrhea and constipation. Genitourinary: Negative for dysuria, urgency and frequency. Neurological: Negative for weakness, light-headedness and headaches.    Psychiatric/Behavioral: Negative for sleep disturbance.      :     Vitals:    06/16/21 1323   BP: 118/64   Site: Left Lower Arm   Position: Sitting   Cuff Size: Large Adult   Pulse: 67   Resp: 14   Temp: 96.6 °F (35.9 °C)   TempSrc: Temporal   SpO2: 97%   Weight: 161 lb (73 kg)     Wt Readings from Last 3 Encounters:   06/16/21 161 lb (73 kg)   06/02/21 162 lb (73.5 kg)   03/11/21 164 lb 6.4 oz (74.6 kg)       Physical Exam  Constitutional: Vital signs are normal. She appears well-developed and well-nourished. She is active. HENT:   Head: Normocephalic and atraumatic. Right Ear: Tympanic membrane, external ear and ear canal normal. No drainage or tenderness. Left Ear: Tympanic membrane, external ear and ear canal normal. No drainage or tenderness. Nose: Nose normal. No mucosal edema or rhinorrhea. Mouth/Throat: Uvula is midline, oropharynx is clear and moist and mucous membranes are normal. Mucous membranes are not pale. Normal dentition. No posterior oropharyngeal edema or posterior oropharyngeal erythema. Eyes: Lids are normal. Right eye exhibits no chemosis and no discharge. Left eye exhibits no chemosis and no drainage. Right conjunctiva has no hemorrhage. Left conjunctiva has no hemorrhage. Right eye exhibits normal extraocular motion. Left eye exhibits normal extraocular motion. Right pupil is round and reactive. Left pupil is round and reactive. Pupils are equal.   Cardiovascular: Normal rate, regular rhythm, S1 normal, S2 normal and normal heart sounds. Exam reveals no gallop. No murmur heard. Pulmonary/Chest: Effort normal and breath sounds normal. No respiratory distress. She has no wheezes. She has no rhonchi. She has no rales. Abdominal: Soft. Normal appearance and bowel sounds are normal. She exhibits no distension and no mass. There is no hepatosplenomegaly. No tenderness. She has no rigidity, no rebound and no guarding. No hernia. Musculoskeletal:        Right lower leg: She exhibits no edema. Left lower leg: She exhibits no edema. Neurological: She is alert.    Skin:   Lump in the skin of the right axilla with associated fullness in the right breast. No pain. Assessment/Plan   Job Phillips was seen today for 6 month follow-up. Diagnoses and all orders for this visit:    Essential hypertension  -     potassium chloride (KLOR-CON M) 20 MEQ extended release tablet; Take 1 tablet by mouth once daily    Atrial fibrillation with rapid ventricular response (HCC)  -     rivaroxaban (XARELTO) 15 MG TABS tablet; Take 1 tablet by mouth Daily with supper    Recurrent major depressive disorder, in full remission (HCC)  -     venlafaxine (EFFEXOR XR) 75 MG extended release capsule; Take 1 capsule by mouth daily    Acute on chronic congestive heart failure with left ventricular diastolic dysfunction (HCC)  -     bumetanide (BUMEX) 0.5 MG tablet; Take 1 tablet by mouth daily    Pure hypercholesterolemia    Primary osteoarthritis involving multiple joints    [facility]    Coronary artery disease involving native heart with angina pectoris, unspecified vessel or lesion type (HCC)    Hypomagnesemia    Gastroesophageal reflux disease without esophagitis  -     pantoprazole (PROTONIX) 40 MG tablet; Take 1 tablet by mouth daily    Iron deficiency anemia due to chronic blood loss  -     CBC; Future  -     Basic Metabolic Panel; Future  -     Iron Binding Capacity; Future  -     Ferritin; Future  -     Iron; Future  -     Vitamin D 25 Hydroxy; Future    Physical deconditioning    Vitamin D deficiency  -     Vitamin D 25 Hydroxy; Future    Lump of right breast    B12 deficiency    No change to medications   Continue healthy diet and exercise  Aspirin daily    Reeves foods  Small meals  No late meals    Will consider ultrasound and mammogram at home and call if she wants the breast lump imaged    Discussed use, benefit, and side effectsof prescribed medications. All patient questions answered. Pt voiced understanding. Reviewed health maintenance. Instructed to continue current medications, diet and exercise.   Patient agreed with treatment plan. Followup as directed.      Electronically signed by Enoc Sweeney MD

## 2021-06-30 RX ORDER — DOCUSATE SODIUM 100 MG/1
100 CAPSULE, LIQUID FILLED ORAL 2 TIMES DAILY
Qty: 60 CAPSULE | Refills: 5 | Status: SHIPPED | OUTPATIENT
Start: 2021-06-30 | End: 2021-07-30

## 2021-08-05 ENCOUNTER — HOSPITAL ENCOUNTER (OUTPATIENT)
Age: 86
Discharge: HOME | End: 2021-08-05
Payer: MEDICARE

## 2021-08-05 DIAGNOSIS — D50.0 IRON DEFICIENCY ANEMIA DUE TO CHRONIC BLOOD LOSS: ICD-10-CM

## 2021-08-05 DIAGNOSIS — E55.9 VITAMIN D DEFICIENCY: ICD-10-CM

## 2021-08-05 LAB
ERYTHROCYTE [DISTWIDTH] IN BLOOD BY AUTOMATED COUNT: 18.7 % (ref 11.5–14.5)
ERYTHROCYTE [DISTWIDTH] IN BLOOD BY AUTOMATED COUNT: 64.7 FL (ref 35–45)
HCT VFR BLD CALC: 40.2 % (ref 37–47)
HEMOGLOBIN: 11.8 GM/DL (ref 12–16)
MCH RBC QN AUTO: 27.8 PG (ref 26–33)
MCHC RBC AUTO-ENTMCNC: 29.4 GM/DL (ref 32.2–35.5)
MCV RBC AUTO: 94.6 FL (ref 81–99)
PLATELET # BLD: 200 THOU/MM3 (ref 130–400)
PMV BLD AUTO: 11.1 FL (ref 9.4–12.4)
RBC # BLD: 4.25 MILL/MM3 (ref 4.2–5.4)
WBC # BLD: 6.3 THOU/MM3 (ref 4.8–10.8)

## 2021-08-05 PROCEDURE — 82306 VITAMIN D 25 HYDROXY: CPT

## 2021-08-05 PROCEDURE — 80048 BASIC METABOLIC PNL TOTAL CA: CPT

## 2021-08-05 PROCEDURE — 83540 ASSAY OF IRON: CPT

## 2021-08-05 PROCEDURE — 83550 IRON BINDING TEST: CPT

## 2021-08-05 PROCEDURE — 85027 COMPLETE CBC AUTOMATED: CPT

## 2021-08-05 PROCEDURE — 36415 COLL VENOUS BLD VENIPUNCTURE: CPT

## 2021-08-05 PROCEDURE — 82728 ASSAY OF FERRITIN: CPT

## 2021-08-06 LAB
ANION GAP SERPL CALCULATED.3IONS-SCNC: 11 MEQ/L (ref 8–16)
BUN BLDV-MCNC: 11 MG/DL (ref 7–22)
CALCIUM SERPL-MCNC: 10.1 MG/DL (ref 8.5–10.5)
CHLORIDE BLD-SCNC: 100 MEQ/L (ref 98–111)
CO2: 30 MEQ/L (ref 23–33)
CREAT SERPL-MCNC: 0.6 MG/DL (ref 0.4–1.2)
FERRITIN: 43 NG/ML (ref 10–291)
GFR SERPL CREATININE-BSD FRML MDRD: > 90 ML/MIN/1.73M2
GLUCOSE BLD-MCNC: 144 MG/DL (ref 70–108)
IRON: 33 UG/DL (ref 50–170)
POTASSIUM SERPL-SCNC: 4.2 MEQ/L (ref 3.5–5.2)
SODIUM BLD-SCNC: 141 MEQ/L (ref 135–145)
TOTAL IRON BINDING CAPACITY: 321 UG/DL (ref 171–450)
VITAMIN D 25-HYDROXY: 68 NG/ML (ref 30–100)

## 2021-09-16 ENCOUNTER — NURSE ONLY (OUTPATIENT)
Dept: CARDIOLOGY CLINIC | Age: 86
End: 2021-09-16
Payer: MEDICARE

## 2021-09-16 DIAGNOSIS — Z95.0 PACEMAKER: Primary | ICD-10-CM

## 2021-09-16 PROCEDURE — 93279 PRGRMG DEV EVAL PM/LDLS PM: CPT | Performed by: INTERNAL MEDICINE

## 2021-09-16 NOTE — PROGRESS NOTES
jerri sci single pacer w/ safety switch     4.5 years on device   RV imped 338  Threshold 0.5 @ 0.4  R waves 11.4  Not dependent, occasional noise on lead   64% RV paced     Pace and sensing to unipolar with safety switch, Armin in to review interrogation.   Per Dante, ok to leave unipolar pace and sense     Latitude monitor paired to pt, aware to put by her bedside

## 2021-09-20 ENCOUNTER — TELEPHONE (OUTPATIENT)
Dept: CARDIOLOGY CLINIC | Age: 86
End: 2021-09-20

## 2021-09-20 NOTE — TELEPHONE ENCOUNTER
PT WAS SEEN IN OFICE BY ANGUS ON 9/16/21 FOR A DEVICE CHECK. SHE HAD A SAFETY SWITCH WARNING. TODAY  , I RECEIVED A DOWNLOAD TODAY WITH A WARNING THAT PACING LEAD IMPEDENCE IS OUT OF RANGE.  HER RV SENSING TODAY IS 2.9  AND HER RV IMPEDENCE  , RV THRESHOLD 0.7 @ 0.4/ DEVICE REMAINS IN UNIPOLAR     PREVIOUS RV WAVES WERE 11.3  AND VENT IMPEDENCE   RV THRESHOLD ON 9/16/21 WAS 0.5 @ 0.4    LOOKS LIKE SHE HAS SOME NOISE ON THE LEAD AT TIMES     MONITOR FOR NOW??????

## 2021-09-28 ENCOUNTER — NURSE ONLY (OUTPATIENT)
Dept: CARDIOLOGY CLINIC | Age: 86
End: 2021-09-28
Payer: MEDICARE

## 2021-09-28 ENCOUNTER — OFFICE VISIT (OUTPATIENT)
Dept: CARDIOLOGY CLINIC | Age: 86
End: 2021-09-28
Payer: MEDICARE

## 2021-09-28 VITALS
SYSTOLIC BLOOD PRESSURE: 165 MMHG | WEIGHT: 154.4 LBS | HEART RATE: 53 BPM | BODY MASS INDEX: 28.41 KG/M2 | HEIGHT: 62 IN | DIASTOLIC BLOOD PRESSURE: 78 MMHG

## 2021-09-28 DIAGNOSIS — Z95.0 PACEMAKER: Primary | ICD-10-CM

## 2021-09-28 DIAGNOSIS — T82.110D PACEMAKER LEAD MALFUNCTION, SUBSEQUENT ENCOUNTER: Primary | ICD-10-CM

## 2021-09-28 PROCEDURE — 1090F PRES/ABSN URINE INCON ASSESS: CPT | Performed by: INTERNAL MEDICINE

## 2021-09-28 PROCEDURE — 99204 OFFICE O/P NEW MOD 45 MIN: CPT | Performed by: INTERNAL MEDICINE

## 2021-09-28 PROCEDURE — G8427 DOCREV CUR MEDS BY ELIG CLIN: HCPCS | Performed by: INTERNAL MEDICINE

## 2021-09-28 PROCEDURE — G8417 CALC BMI ABV UP PARAM F/U: HCPCS | Performed by: INTERNAL MEDICINE

## 2021-09-28 PROCEDURE — 1036F TOBACCO NON-USER: CPT | Performed by: INTERNAL MEDICINE

## 2021-09-28 PROCEDURE — 4040F PNEUMOC VAC/ADMIN/RCVD: CPT | Performed by: INTERNAL MEDICINE

## 2021-09-28 PROCEDURE — 1123F ACP DISCUSS/DSCN MKR DOCD: CPT | Performed by: INTERNAL MEDICINE

## 2021-09-28 NOTE — PROGRESS NOTES
jerri sci single pacer w/ hakim appt safety switch/unipolar pace/sense    5.5 years on device   R waves 12  imped 350  Threshold 0.5 @ 0.4  63% paced

## 2021-09-28 NOTE — PROGRESS NOTES
[facility] [place]  Dept: [phone]    CARDIAC ELECTROPHYSIOLOGY: CONSULTATION NOTE  PATIENT DEMOGRAPHICS:  Date:   9/28/2021  Patient name:              Irvin Ge  YOB: 1928  Sex: female   MRN:   [id number]    PRIMARY CARE PHYSICIAN:   Aracelis Fernández MD    REFERRING PROVIDER:  Izzy Lr MD    REASON FOR CONSULTATION:  Abnormal pacemaker function. HISTORY OF PRESENT ILLNESS:  89/F with history of tachycardia-bradycardia syndrome and a single-chamber pacemaker implant in 2016 was noted to have abnormal electrical parameters on right ventricular lead during a remote check on 9/17/201 (R 2.9 mv and imp 201 ohms) triggering safety mode switch. She is here to discuss the same. Patient is chronic intermittent lightheadedness on assuming upright postures that has resulted in presyncope/syncope prior to the pacemaker implantation. She denies chest pain, shortness of breath or recent syncope. She walks with the help of walker. Denies lower extremity swelling or palpitations. Medical hx: tachy-mariola syndrome, single chamber PM Geovany Sci (4/26/2016), CAD/PCI (2018), HTN, and HPL    REVIEW OF SYSTEMS:    Constitutional: Negative for chills and fever  HENT: Negative for congestion, sinus pressure, sneezing and sore throat. Eyes: Negative for pain, discharge, redness and itching. Respiratory: Negative for apnea, cough  Gastrointestinal: Negative for blood in stool, constipation, diarrhea   Endocrine: Negative for cold intolerance, heat intolerance, polydipsia. Genitourinary: Negative for dysuria, enuresis, flank pain and hematuria. Musculoskeletal: Negative for arthralgias, joint swelling and neck pain. Neurological: Negative for numbness and headaches. Psychiatric/Behavioral: Negative for agitation, confusion, decreased concentration and dysphoric mood.       PAST MEDICAL HISTORY:  Past Medical History: Diagnosis Date    Atrial [facility] ([facility]     Blood circulation, collateral     [employer] single pacemaker 4/26/2016 5/5/2016    CAD (coronary artery disease)     cath and stent in right artery    Cancer [facility]) 1954    HX  Colon     Carpal tunnel syndrome     Fracture dislocation of ankle 1980    GERD (gastroesophageal reflux disease)     Hyperlipidemia     Hypertension     Kidney lesion, native, right     found on 5/2016    Osteoarthritis     knees    Osteopenia     Prolonged emergence from general anesthesia     Thyroid goiter 9/6/2016    Yersiniosis 2016       PSH:  Past Surgical History:   Procedure Laterality Date    ABDOMEN SURGERY      ANKLE FRACTURE SURGERY  [id number]    reconstruction in 2003 and 2007    APPENDECTOMY      BLADDER SURGERY      support bladder repair    CARDIAC SURGERY      heart stent 12-11-18, [place]    CARPAL TUNNEL RELEASE  7/2013    CARPAL TUNNEL RELEASE Right 08/19/2017    Revision    CATARACT REMOVAL Bilateral     CHOLECYSTECTOMY  1986    COLON SURGERY  1954    COLONOSCOPY      ENDOSCOPY, COLON, DIAGNOSTIC      EYE SURGERY      cataract     FRACTURE SURGERY      HYSTERECTOMY  1971    JOINT REPLACEMENT  1998    L knee    KNEE SURGERY Left     total replacement    PACEMAKER PLACEMENT      PTCA  01/15/2019    Successful PCI / Drug Eluting Stent of the proximal Left Anterior Descending Coronary Artery.     UPPER GASTROINTESTINAL ENDOSCOPY Left 11/28/2018    EGD BIOPSY performed by Holly Griggs MD at [address] [place] [address] ENDOSCOPY  11/28/2018    EGD CONTROL HEMORRHAGE performed by Holly Griggs MD at [facility]       FAMILY HISTORY:  Family History   Problem Relation Age of Onset    Heart Disease Mother     High Blood Pressure Mother     Heart Disease Father     High Blood Pressure Father     Heart Disease Brother     High Blood Pressure Brother     Heart Disease Son         SOCIAL HISTORY:   with 2 children. Lifelong non-smoker. Denies drinking alcohol. Lives by herself. Social History     Socioeconomic History    Marital status:      Spouse name: Katie Jamil Number of children: 2    Years of education: Not on file    Highest education level: Not on file   Occupational History    Not on file   Tobacco Use    Smoking status: Never Smoker    Smokeless tobacco: Never Used   Vaping Use    Vaping Use: Never used   Substance and Sexual Activity    Alcohol use: No    Drug use: No    Sexual activity: Not on file   Other Topics Concern    Not on file   Social History Narrative    Not on file     Social Determinants of Health     Financial Resource Strain:     Difficulty of Paying Living Expenses:    Food Insecurity:     Worried About Running Out of Food in the Last Year:     [address] in the Last Year:    Transportation Needs:     Lack of Transportation (Medical):  Lack of Transportation (Non-Medical):    Physical Activity:     Days of Exercise per Week:     Minutes of Exercise per Session:    Stress:     Feeling of Stress :    Social Connections:     Frequency of Communication with Friends and Family:     Frequency of Social Gatherings with Friends and Family:     Attends Hinduism Services:     Active Member of Clubs or Organizations:     Attends Club or Organization Meetings:     Marital Status:    Intimate Partner Violence:     Fear of Current or Ex-Partner:     Emotionally Abused:     Physically Abused:     Sexually Abused:          ALLERGY HISTORY:  Allergies   Allergen Reactions    Methadone Shortness Of Breath     Shakes and nausea    Gabapentin     Lodine [Etodolac] Other (See Comments)     \"spacey, hot flashes, shaky\"    Lyrica [Pregabalin]     Ultram [Tramadol]      Nausea, pedal edema, SOB        MEDICATIONS:  Current Outpatient Medications   Medication Sig Dispense Refill    rivaroxaban (XARELTO) 15 MG TABS tablet Take 1 tablet by mouth Daily with supper 90 tablet 3    venlafaxine (EFFEXOR XR) 75 MG extended release capsule Take 1 capsule by mouth daily 90 capsule 3    bumetanide (BUMEX) 0.5 MG tablet Take 1 tablet by mouth daily 90 tablet 3    pantoprazole (PROTONIX) 40 MG tablet Take 1 tablet by mouth daily 90 tablet 3    potassium chloride (KLOR-CON M) 20 MEQ extended release tablet Take 1 tablet by mouth once daily 90 tablet 3    Biotin 1000 MCG CHEW Take 1,000 mcg by mouth daily      Probiotic Product (PROBIOTIC DAILY PO) Take by mouth daily      digoxin (LANOXIN) 125 MCG tablet Take 1 tablet by mouth 2 times daily 180 tablet 3    sucralfate (CARAFATE) 1 GM tablet Take 1 tablet by mouth 2 times daily 180 tablet 11    magnesium oxide (MAG-OX) 400 (241.3 Mg) MG TABS tablet Take 1 tablet by mouth 2 times daily 30 tablet 0    aspirin 81 MG chewable tablet Take 1 tablet by mouth daily 30 tablet 3    acetaminophen (TYLENOL) 500 MG tablet Take 1,000 mg by mouth 4 times daily        No current facility-administered medications for this visit. PHYSICAL EXAM:  Vitals:    09/28/21 1054   BP: (!) 165/78   Pulse: 53     Body mass index is 28.24 kg/m². GENERAL: Alert and oriented. No distress. EYES: No pallor or icterus. ENT: No cyanosis. No thyromegaly or cervical LAP. VESSELS: No jugular venous distension or carotid bruits. HEART: Normal S1/S2. No murmur, rub or gallop. CHEST WALL: No erosions, discharge or swelling at device site. Prominent pacemaker pulse generator and extrathoracic portion of pacemaker lead. LUNGS: Clear to auscultation. ABDOMEN: Soft and non-tender. EXTREMITIES: No lower extremity edema. Feet are warm.    NEUROLOGICAL: Grossly normal.     LABORATORY DATA AND DIAGNOSTIC DATA:  I have personally reviewed and interpreted the results of the following diagnostic testing    Lab Results   Component Value Date    WBC 6.3 08/05/2021    HGB 11.8 (L) 08/05/2021    HCT 40.2 08/05/2021     08/05/2021    CHOL 127 12/04/2018 TRIG 145 12/04/2018    HDL 36 12/04/2018    ALT 7 (L) 05/24/2021    AST 17 05/24/2021     08/05/2021    K 4.2 08/05/2021     08/05/2021    CREATININE 0.6 08/05/2021    BUN 11 08/05/2021    CO2 30 08/05/2021    TSH 2.880 05/24/2021    INR 1.49 (H) 01/19/2021    GLUF 142 (H) 05/24/2021    LABA1C 6.1 01/26/2021    LABMICR 1.24 11/01/2018      Echocardiogram  LVEF 55-60%, LVWT 1.1 cm, LAD/LATOSHA 52,3. AA 4.2 cm No significant valvular abnormalities.  ECG sinus rhythm. Normal ECG.  Coronary angiogram PCI LAD (11/2018)   Pacemaker interrogation 9/16/2021: magnify360, single-chamber, longevity 4.5 years, R waves 11.5 mV, threshold 0.7 V at 0.4 ms and impedance 338 ohms. Remote transmission from 9/17/201 (R 2.9 mv and imp 201 ohms) triggering safety mode switch. IMPRESSION:  · Abnormal right ventricular electrode parameters noted on single remote transmission, normal electrical parameters from today's device check. · Tachycardia-bradycardia syndrome, on Xarelto. Low burden atrial fibrillation. · Single-chamber pacemaker in place, normal function. · CAD/PCI-LAD, 2018. On aspirin and statins. · Hypertension, blood pressure elevated today. · Chronic postural dizziness. ASSESSMENT AND RECOMMENDATIONS:  She has adequate battery left on her pulse generator. Normal electrical parameters on the RV lead from today's check. We will continue monitoring her closely. No active intervention needed at this time. Follow-up with Dr. Niko Carmichael. **This report has been created using voice recognition software. It may contain minor errors which are inherent in voice recognition technology. **     Cecil Bhatia MD, MRCP, [facility] [place], [facility] on 9/28/2021 at 11:30 AM

## 2021-09-29 PROCEDURE — 93280 PM DEVICE PROGR EVAL DUAL: CPT | Performed by: INTERNAL MEDICINE

## 2021-10-01 ENCOUNTER — APPOINTMENT (OUTPATIENT)
Dept: CT IMAGING | Age: 86
End: 2021-10-01
Payer: MEDICARE

## 2021-10-01 ENCOUNTER — HOSPITAL ENCOUNTER (EMERGENCY)
Age: 86
Discharge: HOME | End: 2021-10-01
Attending: EMERGENCY MEDICINE
Payer: MEDICARE

## 2021-10-01 ENCOUNTER — APPOINTMENT (OUTPATIENT)
Dept: GENERAL RADIOLOGY | Age: 86
End: 2021-10-01
Payer: MEDICARE

## 2021-10-01 VITALS
DIASTOLIC BLOOD PRESSURE: 57 MMHG | BODY MASS INDEX: 28.17 KG/M2 | WEIGHT: 154 LBS | SYSTOLIC BLOOD PRESSURE: 156 MMHG | HEART RATE: 44 BPM | OXYGEN SATURATION: 95 % | TEMPERATURE: 99.2 F | RESPIRATION RATE: 20 BRPM

## 2021-10-01 DIAGNOSIS — R10.9 RIGHT FLANK PAIN: Primary | ICD-10-CM

## 2021-10-01 LAB
ALBUMIN SERPL-MCNC: 3.3 GM/DL (ref 3.4–5)
ALP BLD-CCNC: 59 U/L (ref 46–116)
ALT SERPL-CCNC: 12 U/L (ref 14–63)
AMORPHOUS: ABNORMAL
ANION GAP: 8 MEQ/L (ref 8–16)
AST SERPL-CCNC: 17 U/L (ref 15–37)
BACTERIA: ABNORMAL
BASOPHILS # BLD: 0.6 % (ref 0–3)
BILIRUB SERPL-MCNC: 1.3 MG/DL (ref 0.2–1)
BILIRUBIN URINE: NEGATIVE
BLOOD, URINE: ABNORMAL
BUN BLDV-MCNC: 13 MG/DL (ref 7–18)
CASTS UA: ABNORMAL /LPF
CHARACTER, URINE: CLEAR
CHLORIDE BLD-SCNC: 100 MEQ/L (ref 98–107)
CO2: 31 MEQ/L (ref 21–32)
COLOR: YELLOW
CREAT SERPL-MCNC: 0.9 MG/DL (ref 0.6–1.3)
CRYSTALS, UA: ABNORMAL
EOSINOPHILS RELATIVE PERCENT: 2.4 % (ref 0–4)
EPITHELIAL CELLS, UA: ABNORMAL /HPF
GFR, ESTIMATED: 62 ML/MIN/1.73M2
GLUCOSE BLD-MCNC: 153 MG/DL (ref 74–106)
GLUCOSE, URINE: NEGATIVE MG/DL
HCT VFR BLD CALC: 40.9 % (ref 37–47)
HEMOGLOBIN: 13.2 GM/DL (ref 12–16)
KETONES, URINE: NEGATIVE
LEUKOCYTE ESTERASE, URINE: ABNORMAL
LYMPHOCYTES # BLD: 13.2 % (ref 15–47)
MCH RBC QN AUTO: 30.3 PG (ref 27–31)
MCHC RBC AUTO-ENTMCNC: 32.2 GM/DL (ref 33–37)
MCV RBC AUTO: 94 FL (ref 81–99)
MONOCYTES: 9.6 % (ref 0–12)
MUCUS: ABNORMAL
NITRITE, URINE: NEGATIVE
PDW BLD-RTO: 16 % (ref 11.5–14.5)
PH UA: 7 (ref 5–9)
PLATELET # BLD: 192 THOU/MM3 (ref 130–400)
PMV BLD AUTO: 7.9 FL (ref 7.4–10.4)
POC CALCIUM: 9.3 MG/DL (ref 8.5–10.1)
POTASSIUM SERPL-SCNC: 3.9 MEQ/L (ref 3.5–5.1)
PROTEIN UA: NEGATIVE MG/DL
RBC # BLD: 4.35 MILL/MM3 (ref 4.2–5.4)
RBC UA: ABNORMAL /HPF
REFLEX TO URINE C & S: ABNORMAL
SARS-COV-2, NAAT: NOT  DETECTED
SEGS: 74.2 % (ref 43–75)
SODIUM BLD-SCNC: 139 MEQ/L (ref 136–145)
SPECIFIC GRAVITY UA: 1.01 (ref 1–1.03)
TOTAL CK: 22 U/L (ref 26–308)
TOTAL PROTEIN: 7.3 GM/DL (ref 6.4–8.2)
UROBILINOGEN, URINE: 0.2 EU/DL (ref 0–1)
WBC # BLD: 8.2 THOU/MM3 (ref 4.8–10.8)
WBC UA: ABNORMAL /HPF

## 2021-10-01 PROCEDURE — 6370000000 HC RX 637 (ALT 250 FOR IP): Performed by: EMERGENCY MEDICINE

## 2021-10-01 PROCEDURE — 36415 COLL VENOUS BLD VENIPUNCTURE: CPT

## 2021-10-01 PROCEDURE — 81001 URINALYSIS AUTO W/SCOPE: CPT

## 2021-10-01 PROCEDURE — 99284 EMERGENCY DEPT VISIT MOD MDM: CPT

## 2021-10-01 PROCEDURE — 85025 COMPLETE CBC W/AUTO DIFF WBC: CPT

## 2021-10-01 PROCEDURE — 82550 ASSAY OF CK (CPK): CPT

## 2021-10-01 PROCEDURE — 74176 CT ABD & PELVIS W/O CONTRAST: CPT

## 2021-10-01 PROCEDURE — 80053 COMPREHEN METABOLIC PANEL: CPT

## 2021-10-01 PROCEDURE — 71046 X-RAY EXAM CHEST 2 VIEWS: CPT

## 2021-10-01 RX ORDER — ACETAMINOPHEN 325 MG/1
650 TABLET ORAL ONCE
Status: COMPLETED | OUTPATIENT
Start: 2021-10-01 | End: 2021-10-01

## 2021-10-01 RX ADMIN — ACETAMINOPHEN 650 MG: 325 TABLET ORAL at 18:28

## 2021-10-01 ASSESSMENT — PAIN SCALES - WONG BAKER: WONGBAKER_NUMERICALRESPONSE: 8

## 2021-10-01 ASSESSMENT — PAIN SCALES - GENERAL: PAINLEVEL_OUTOF10: 6

## 2021-10-01 ASSESSMENT — PAIN DESCRIPTION - ORIENTATION: ORIENTATION: RIGHT

## 2021-10-01 ASSESSMENT — PAIN DESCRIPTION - LOCATION: LOCATION: FLANK

## 2021-10-01 NOTE — ED PROVIDER NOTES
[address]  [address] [address]  Phone: [address]    Chief Complaint   Patient presents with    Dizziness    Flank Pain     rt       HPI    Ancil Guardian is a 80 y.o. female who presents complaint. Really asked patient is actually complains of body aches not feeling good some right flank pain. She can only qualify or quantify how long its been there. She states she cannot hurts all over. Maybe has a little bit of a cough but not specifically new. She is a history of pacemaker and had some pacemaker issues of late but that been doing fine.     PAST MEDICAL HISTORY    Past Medical History:   Diagnosis Date    Atrial fibrillation ([address]     Blood circulation, collateral     [employer] single pacemaker 4/26/2016 5/5/2016    CAD (coronary artery disease)     cath and stent in right artery    Cancer [facility]) 1954    HX  Colon     Carpal tunnel syndrome     Fracture dislocation of ankle 1980    GERD (gastroesophageal reflux disease)     Hyperlipidemia     Hypertension     Kidney lesion, native, right     found on 5/2016    Osteoarthritis     knees    Osteopenia     Prolonged emergence from general anesthesia     Thyroid goiter 9/6/2016    Yersiniosis 2016       SURGICAL HISTORY    Past Surgical History:   Procedure Laterality Date    ABDOMEN SURGERY      ANKLE FRACTURE SURGERY  [id number]    reconstruction in 2003 and 2007    APPENDECTOMY      BLADDER SURGERY      support bladder repair    CARDIAC SURGERY      heart stent 12-11-18, [place]    CARPAL TUNNEL RELEASE  7/2013    CARPAL TUNNEL RELEASE Right 08/19/2017    Revision    CATARACT REMOVAL Bilateral     CHOLECYSTECTOMY  1986    COLON SURGERY  1954    COLONOSCOPY      ENDOSCOPY, COLON, DIAGNOSTIC      EYE SURGERY      cataract     FRACTURE SURGERY      HYSTERECTOMY  1971    JOINT REPLACEMENT  1998    L knee    KNEE SURGERY Left     total replacement    PACEMAKER PLACEMENT      PTCA  01/15/2019    Successful PCI / Drug Eluting Stent of the proximal Left Anterior Descending Coronary Artery.     UPPER GASTROINTESTINAL ENDOSCOPY Left 11/28/2018    EGD BIOPSY performed by Anastasiya Wagner MD at [facility] ENDOSCOPY  11/28/2018    EGD CONTROL HEMORRHAGE performed by Anastasiya Wagner MD at [facility] Endoscopy       CURRENT MEDICATIONS    Current Outpatient Rx   Medication Sig Dispense Refill    rivaroxaban (XARELTO) 15 MG TABS tablet Take 1 tablet by mouth Daily with supper 90 tablet 3    venlafaxine (EFFEXOR XR) 75 MG extended release capsule Take 1 capsule by mouth daily 90 capsule 3    bumetanide (BUMEX) 0.5 MG tablet Take 1 tablet by mouth daily 90 tablet 3    pantoprazole (PROTONIX) 40 MG tablet Take 1 tablet by mouth daily 90 tablet 3    potassium chloride (KLOR-CON M) 20 MEQ extended release tablet Take 1 tablet by mouth once daily 90 tablet 3    Biotin 1000 MCG CHEW Take 1,000 mcg by mouth daily      Probiotic Product (PROBIOTIC DAILY PO) Take by mouth daily      digoxin (LANOXIN) 125 MCG tablet Take 1 tablet by mouth 2 times daily 180 tablet 3    sucralfate (CARAFATE) 1 GM tablet Take 1 tablet by mouth 2 times daily 180 tablet 11    magnesium oxide (MAG-OX) 400 (241.3 Mg) MG TABS tablet Take 1 tablet by mouth 2 times daily 30 tablet 0    aspirin 81 MG chewable tablet Take 1 tablet by mouth daily 30 tablet 3    acetaminophen (TYLENOL) 500 MG tablet Take 1,000 mg by mouth 4 times daily          ALLERGIES    Allergies   Allergen Reactions    Methadone Shortness Of Breath     Shakes and nausea    Gabapentin     Lodine [Etodolac] Other (See Comments)     \"spacey, hot flashes, shaky\"    Lyrica [Pregabalin]     Ultram [Tramadol]      Nausea, pedal edema, SOB       FAMILY HISTORY    Family History   Problem Relation Age of Onset    Heart Disease Mother     High Blood Pressure Mother     Heart Disease Father     High Blood Pressure Father     Heart Disease Brother     High Blood Pressure Brother     Heart Disease Son        SOCIAL HISTORY    Social History     Socioeconomic History    Marital status:      Spouse name: Adam Waterman Number of children: 2    Years of education: None    Highest education level: None   Occupational History    None   Tobacco Use    Smoking status: Never Smoker    Smokeless tobacco: Never Used   Vaping Use    Vaping Use: Never used   Substance and Sexual Activity    Alcohol use: No    Drug use: No    Sexual activity: None   Other Topics Concern    None   Social History Narrative    None     Social Determinants of Health     Financial Resource Strain:     Difficulty of Paying Living Expenses:    Food Insecurity:     Worried About Running Out of Food in the Last Year:     Ran Out of Food in the Last Year:    Transportation Needs:     Lack of Transportation (Medical):  Lack of Transportation (Non-Medical):    Physical Activity:     Days of Exercise per Week:     Minutes of Exercise per Session:    Stress:     Feeling of Stress :    Social Connections:     Frequency of Communication with Friends and Family:     Frequency of Social Gatherings with Friends and Family:     Attends [employer] Services:     Active Member of Clubs or Organizations:     Attends Club or Organization Meetings:     Marital Status:    Intimate Partner Violence:     Fear of Current or Ex-Partner:     Emotionally Abused:     Physically Abused:     Sexually Abused:        REVIEW OF SYSTEMS    Positive for right flank pain. No hematuria no melena hematochezia. No dysuria. No vomiting. No syncope. All systems negative except as marked.      PHYSICAL EXAM    VITAL SIGNS: BP (!) 150/78   Pulse 54   Temp 99.2 °F (37.3 °C) (Oral)   Resp 26   Wt 154 lb (69.9 kg)   LMP  (LMP Unknown)   SpO2 94%   BMI 28.17 kg/m²    Constitutional:  Alert not toxic or ill, notably pleasant  HENT: COVID mask protection in place normocephalic, Atraumatic, mask lowered to assess  Bilateral external ears normal, Oropharynx slightly dry,, No oral exudates, Nose normal.  Cervical Spine: Normal range of motion,  No stridor. No tenderness, Supple,  Eyes:  No discharge or  Swelling,Conjunctiva normal, PERRL, EOMI,  Respiratory: No respiratory distress, Normal breath sounds,  No wheezing, No chest tenderness. Cardiovascular:  Normal heart rate, Normal rhythm, No murmurs, No rubs, No gallops. GI: Slight right flank reproducible pain, Bowel sounds normal, Soft, No masses, No pulsatile masses. No tenderness  Musculoskeletal:  Intact distal pulses, No edema, No tenderness, No cyanosis, No clubbing. Good range of motion in all major joints. No tenderness to palpation or major deformities noted. Back:No CVA tenderness. Marginal right flank side paralumbar pain  Integument:  Warm, Dry, No erythema, No rash (on exposed areas)   Lymphatic:  No lymphadenopathy noted. Neurologic:  Alert & oriented x 3, Normal motor function, Normal sensory function, No focal deficits noted. Psychiatric:  Affect normal, Judgment normal, Mood normal.     EKG                      RADIOLOGY    CT ABDOMEN PELVIS WO CONTRAST Additional Contrast? None   Final Result       1. There is no acute process within the abdomen or pelvis. There is no evidence of hydronephrosis or other signs of obstructive uropathy. There are no renal stones. 2. Aneurysmal dilatation of the ascending aorta measuring 4.7 cm.   3. Splenomegaly. 4. Mild colonic diverticulosis without evidence of acute diverticulitis. 6. Exophytic lesion at the right kidney. This could represent a hemorrhagic cyst.               **This report has been created using voice recognition software. It may contain minor errors which are inherent in voice recognition technology. **      Final report electronically signed by Dr Joleen Vora on 10/1/2021 6:12 PM      XR CHEST (2 VW)   Final Result   There is no acute intrathoracic process. **This report has been created using voice recognition software. It may contain minor errors which are inherent in voice recognition technology. **      Final report electronically signed by Dr Criss Berger on 10/1/2021 5:26 PM          PROCEDURES    none      CONSULTS:  None      CRITICAL CARE:  None    SCREENINGS  BP (!) 150/78   Pulse 54   Temp 99.2 °F (37.3 °C) (Oral)   Resp 26   Wt 154 lb (69.9 kg)   LMP  (LMP Unknown)   SpO2 94%   BMI 28.17 kg/m²        Screening For Hypertension and Follow-up (#317)   previously diagnosed with hypertension and not applicable for screen      Screening For Tobacco Use and Cessation Intervention (#226):   reports that she has never smoked. She has never used smokeless tobacco.  Non-smoker not applicable for screen      ED COURSE & MEDICAL DECISION MAKING    Pertinent Labs & Imaging studies reviewed. (See chart for details)  Right side pain discomfort. Has a history of a cyst on that side or some type of abnormality. She is on blood thinners. She has no bruising or rash to suggest retroperitoneal bleed or hematoma though obviously in the differential she is on blood thinners. Vital signs are otherwise stable. She has a low-grade temp of 99.2. Could all be related to urinary tract infection or such. Checking blood work    REASSESSMENT  6:25 PM  Patient rechecked and updated on lab/xray status, progress and results. .  Patient was reassessed and condition was unchanged after tx. No further needs at this time. Continues to rest comfortably. Vital signs are otherwise stable. Little bradycardic every now and then and she is pacemaker. Given the right flank pain chest x-ray was obtained which is unremarkable. CT abdomen pelvis to rule out retroperitoneal bleed, kidney stone, kidney mass or pyelonephritis is essentially negative with exception of some chronic changes.   Counseled the patient and family regards to some of her pain. Could represent some other nonspecific pain myalgias. She is low-grade temp nine 9.2 here. Her Covid test is negative. Could even be just an early symptoms. Encourage fluids at home and Tylenol for pain or fever. No dyspnea or dyspnea on exertion, no tachycardia. She is already on a blood thinner and 1 would not expect PE. FINAL IMPRESSION    1.  Right flank pain         PATIENT REFERRED TO:  [address], [address] [address]  [phone]    Call in 2 days        DISCHARGE MEDICATIONS:  New Prescriptions    No medications on file           Frankie Tran MD  10/01/21 2185

## 2021-10-01 NOTE — ED NOTES
Pt still complains of rt flank discomfort. No increase in pain with movement. Pt released in stable condition per w/c. Resp easy, non-labored. Skin warm, dry. Color pink. AVS  reviewed. Daughter voiced understanding.      Tad Garcia RN  10/01/21 1914

## 2021-10-01 NOTE — ED NOTES
Pt assisted to room per w/c. Complains of lightheadedness and chilling all night, and rt flank pain. No other UTI symptoms. Pt pink, warm, dry. Cooperative. Alert.        Tiffanie Agee RN  10/01/21 [id number]

## 2021-10-08 ENCOUNTER — TELEPHONE (OUTPATIENT)
Dept: CARDIOLOGY CLINIC | Age: 86
End: 2021-10-08

## 2021-10-11 ENCOUNTER — OFFICE VISIT (OUTPATIENT)
Dept: FAMILY MEDICINE CLINIC | Age: 86
End: 2021-10-11
Payer: MEDICARE

## 2021-10-11 VITALS
BODY MASS INDEX: 27.69 KG/M2 | WEIGHT: 151.4 LBS | SYSTOLIC BLOOD PRESSURE: 124 MMHG | TEMPERATURE: 97.6 F | RESPIRATION RATE: 14 BRPM | HEART RATE: 92 BPM | DIASTOLIC BLOOD PRESSURE: 80 MMHG | OXYGEN SATURATION: 92 %

## 2021-10-11 DIAGNOSIS — I50.32 CHRONIC DIASTOLIC CHF (CONGESTIVE HEART FAILURE) (HCC): ICD-10-CM

## 2021-10-11 DIAGNOSIS — D50.8 OTHER IRON DEFICIENCY ANEMIA: ICD-10-CM

## 2021-10-11 DIAGNOSIS — I48.91 ATRIAL FIBRILLATION WITH RAPID VENTRICULAR RESPONSE (HCC): ICD-10-CM

## 2021-10-11 DIAGNOSIS — R10.9 RIGHT FLANK DISCOMFORT: ICD-10-CM

## 2021-10-11 DIAGNOSIS — I25.119 CORONARY ARTERY DISEASE INVOLVING NATIVE HEART WITH ANGINA PECTORIS, UNSPECIFIED VESSEL OR LESION TYPE (HCC): ICD-10-CM

## 2021-10-11 DIAGNOSIS — K21.9 GASTROESOPHAGEAL REFLUX DISEASE WITHOUT ESOPHAGITIS: ICD-10-CM

## 2021-10-11 DIAGNOSIS — R53.83 OTHER FATIGUE: Primary | ICD-10-CM

## 2021-10-11 DIAGNOSIS — R61 DIAPHORESIS: ICD-10-CM

## 2021-10-11 DIAGNOSIS — I50.33 ACUTE ON CHRONIC CONGESTIVE HEART FAILURE WITH LEFT VENTRICULAR DIASTOLIC DYSFUNCTION (HCC): ICD-10-CM

## 2021-10-11 PROCEDURE — 1036F TOBACCO NON-USER: CPT | Performed by: FAMILY MEDICINE

## 2021-10-11 PROCEDURE — 99214 OFFICE O/P EST MOD 30 MIN: CPT | Performed by: FAMILY MEDICINE

## 2021-10-11 PROCEDURE — 1090F PRES/ABSN URINE INCON ASSESS: CPT | Performed by: FAMILY MEDICINE

## 2021-10-11 PROCEDURE — 81003 URINALYSIS AUTO W/O SCOPE: CPT | Performed by: FAMILY MEDICINE

## 2021-10-11 PROCEDURE — G8417 CALC BMI ABV UP PARAM F/U: HCPCS | Performed by: FAMILY MEDICINE

## 2021-10-11 PROCEDURE — 1123F ACP DISCUSS/DSCN MKR DOCD: CPT | Performed by: FAMILY MEDICINE

## 2021-10-11 PROCEDURE — 4040F PNEUMOC VAC/ADMIN/RCVD: CPT | Performed by: FAMILY MEDICINE

## 2021-10-11 PROCEDURE — G8427 DOCREV CUR MEDS BY ELIG CLIN: HCPCS | Performed by: FAMILY MEDICINE

## 2021-10-11 PROCEDURE — G8482 FLU IMMUNIZE ORDER/ADMIN: HCPCS | Performed by: FAMILY MEDICINE

## 2021-10-11 RX ORDER — PANTOPRAZOLE SODIUM 40 MG/1
40 TABLET, DELAYED RELEASE ORAL 2 TIMES DAILY
Qty: 180 TABLET | Refills: 3 | Status: SHIPPED | OUTPATIENT
Start: 2021-10-11 | End: 2022-09-19

## 2021-10-11 SDOH — ECONOMIC STABILITY: FOOD INSECURITY: WITHIN THE PAST 12 MONTHS, THE FOOD YOU BOUGHT JUST DIDN'T LAST AND YOU DIDN'T HAVE MONEY TO GET MORE.: NEVER TRUE

## 2021-10-11 SDOH — ECONOMIC STABILITY: FOOD INSECURITY: WITHIN THE PAST 12 MONTHS, YOU WORRIED THAT YOUR FOOD WOULD RUN OUT BEFORE YOU GOT MONEY TO BUY MORE.: NEVER TRUE

## 2021-10-11 ASSESSMENT — SOCIAL DETERMINANTS OF HEALTH (SDOH): HOW HARD IS IT FOR YOU TO PAY FOR THE VERY BASICS LIKE FOOD, HOUSING, MEDICAL CARE, AND HEATING?: NOT HARD AT ALL

## 2021-10-11 NOTE — Clinical Note
To help further with sweating episodes:  Push water 40 oz daily  Compression stockings on am and off pm  Increase salt in the diet    If this does not help then will consider medications  Call Satinder Jules

## 2021-10-11 NOTE — PROGRESS NOTES
[place] [address] [address]  Dept: [phone]  Dept Fax: [phone]  [facility] is a 80 y.o. female who presents today for:  Chief Complaint   Patient presents with    Headache     patient was seen at [facility] 10/1/21    Chills    Sweats    Shortness of Breath    Fatigue           HPI:     HPI  Here for persistent weakness for a few months. Pacer clinic placed a monitor. She had to do a consult there on 9/28/21 but nothing was changed. Then developed sweats and chills, SOB, fatigue and headaches. Started tylenol 2 ES TID and occasional QID which helps but still has right side pain. 1 week ago she had the flu shot and got worse again. Now more and more fatigue. She has episodes of more SOB and diaphoresis which are most prominent after BM. She does have a history of vagal nerve issues. Reviewed chart forpast medical history , surgical history , allergies, social history , family history and medications. Health Maintenance   Topic Date Due    DTaP/Tdap/Td vaccine (1 - Tdap) 10/28/2005    Shingles Vaccine (2 of 2) 10/01/2019    Annual Wellness Visit (AWV)  04/09/2021    Potassium monitoring  10/01/2022    Creatinine monitoring  10/01/2022    Flu vaccine  Completed    Pneumococcal 65+ years Vaccine  Completed    COVID-19 Vaccine  Completed    Hepatitis A vaccine  Aged Out    Hepatitis B vaccine  Aged Out    Hib vaccine  Aged Out    Meningococcal (ACWY) vaccine  Aged Out       Subjective:      Constitutional:Negative for fever, chills, diaphoresis, activity change, appetite change and fatigue. HENT: Negative for hearing loss, ear pain, congestion, sore throat, rhinorrhea, postnasal drip and ear discharge. Eyes: Negative for photophobia and visual disturbance. Respiratory: Negative for cough, chest tightness, shortness of breath and wheezing. Cardiovascular: Negative for chest pain and leg swelling. Gastrointestinal: Negative for nausea, vomiting, abdominal pain, diarrhea and constipation. Genitourinary: Negative for dysuria, urgency and frequency. Neurological: Negative for weakness, light-headedness and headaches. Psychiatric/Behavioral: Negative for sleep disturbance.      :     Vitals:    10/11/21 1418   BP: 124/80   Site: Left Upper Arm   Position: Sitting   Cuff Size: Large Adult   Pulse: 92   Resp: 14   Temp: 97.6 °F (36.4 °C)   TempSrc: Temporal   SpO2: 92%   Weight: 151 lb 6.4 oz (68.7 kg)     Wt Readings from Last 3 Encounters:   10/11/21 151 lb 6.4 oz (68.7 kg)   10/01/21 154 lb (69.9 kg)   09/28/21 154 lb 6.4 oz (70 kg)       Physical Exam  Physical Exam   Constitutional: Vital signs are normal. She appears well-developed and well-nourished. She is active. HENT:   Head: Normocephalic and atraumatic. Right Ear: Tympanic membrane, external ear and ear canal normal. No drainage or tenderness. Left Ear: Tympanic membrane, external ear and ear canal normal. No drainage or tenderness. Nose: Nose normal. No mucosal edema or rhinorrhea. Mouth/Throat: Uvula is midline, oropharynx is clear and moist and mucous membranes are normal. Mucous membranes are not pale. Normal dentition. No posterior oropharyngeal edema or posterior oropharyngeal erythema. Eyes: Lids are normal. Right eye exhibits no chemosis and no discharge. Left eye exhibits no chemosis and no drainage. Right conjunctiva has no hemorrhage. Left conjunctiva has no hemorrhage. Right eye exhibits normal extraocular motion. Left eye exhibits normal extraocular motion. Right pupil is round and reactive. Left pupil is round and reactive. Pupils are equal.   Cardiovascular: Normal rate, regular rhythm, S1 normal, S2 normal and normal heart sounds. Exam reveals no gallop. No murmur heard. Pulmonary/Chest: Effort normal and breath sounds normal. No respiratory distress. She has no wheezes. She has no rhonchi. She has no rales. Abdominal: Soft. Normal appearance and bowel sounds are normal. She exhibits no distension and no mass. There is no hepatosplenomegaly. No tenderness. She has no rigidity, no rebound and no guarding. No hernia. Musculoskeletal:        Right lower leg: She exhibits no edema. Left lower leg: She exhibits no edema. Neurological: She is alert. When I first entered the room Blake Chaudhry was diaphoretic and pale but after she sat for 3-5 minutes, she started to feel better. She admits to having a BM in the restroom prior to my entering. No results found for this visit on 10/11/21. Assessment/Plan   Sterling Cowart was seen today for headache, chills, sweats, shortness of breath and fatigue. Diagnoses and all orders for this visit:    Other fatigue  -     [facility]ly[facility] Reyes [facility], [facility]  -     POCT Urinalysis No Micro (Auto)  -     Basic Metabolic Panel; Future  -     Hemoglobin A1C; Future  -     Insulin, Fasting; Future  -     CBC; Future  -     External Referral To Palliative Care    Atrial fibrillation with rapid ventricular response (HCC)  -     [facility]lyse Reyes [facility], [facility]  -     External Referral To Palliative Care    Acute on chronic congestive heart failure with left ventricular diastolic dysfunction ([facility]  -     [facility]ly[facility] Reyes [facility]  -     External Referral To Palliative Care    Chronic diastolic CHF (congestive heart failure) ([facility]  -     [facility]ly[facility] Reyes [facility]  -     External Referral To Palliative Care    Coronary artery disease involving native heart with angina pectoris, unspecified vessel or lesion type [facility])  -     Elyse Reyes MD, Cardiology, [place] [address]  -     External Referral To Brigida Gowers, [facility], [facility]  -     POCT Urinalysis No Micro (Auto)  -     Basic Metabolic Panel;  Future  - Hemoglobin A1C; Future  -     Insulin, Fasting; Future  -     CBC; Future  -     External Referral To Palliative Care    Right flank discomfort  -     POCT Urinalysis No Micro (Auto)  -     Basic Metabolic Panel; Future  -     Hemoglobin A1C; Future  -     Insulin, Fasting; Future  -     CBC; Future  -     External Referral To Palliative Care    Gastroesophageal reflux disease without esophagitis  -     pantoprazole (PROTONIX) 40 MG tablet; Take 1 tablet by mouth 2 times daily  -     External Referral To Palliative Care    Other iron deficiency anemia  -     Iron Binding Capacity; Future  -     Ferritin; Future  -     Iron; Future  -     External Referral To Palliative Care      Push water 40 oz daily  Compression stockings on am and off pm  Increase salt in the diet  Consider fludrocortisone or midodrine to help the pre-syncope due to BMs  Call or return to clinic prn if these symptoms worsen or fail to improve as anticipated. Discussed use, benefit, and side effectsof prescribed medications. All patient questions answered. Pt voiced understanding. Reviewed health maintenance. Instructed to continue current medications, diet and exercise. Patient agreed with treatment plan. Followup as directed.      Electronically signed by Jorge Zhou MD

## 2021-10-11 NOTE — PROGRESS NOTES
Oxygen testing to qualify for home Oxygen therapy. Oxygen saturation at REST on ROOM AIR =  97%(< or = 88%). Oxygen saturation on AMBULATION of 6 minute walk on ROOM AIR = 94%.  Report provided to Dr Yuri John

## 2021-10-12 ENCOUNTER — NURSE ONLY (OUTPATIENT)
Dept: LAB | Age: 86
End: 2021-10-12

## 2021-10-12 ENCOUNTER — TELEPHONE (OUTPATIENT)
Dept: FAMILY MEDICINE CLINIC | Age: 86
End: 2021-10-12

## 2021-10-12 DIAGNOSIS — R53.83 OTHER FATIGUE: ICD-10-CM

## 2021-10-12 DIAGNOSIS — R10.9 RIGHT FLANK DISCOMFORT: ICD-10-CM

## 2021-10-12 DIAGNOSIS — R61 DIAPHORESIS: ICD-10-CM

## 2021-10-12 DIAGNOSIS — D50.8 OTHER IRON DEFICIENCY ANEMIA: ICD-10-CM

## 2021-10-12 LAB
ANION GAP SERPL CALCULATED.3IONS-SCNC: 13 MEQ/L (ref 8–16)
AVERAGE GLUCOSE: 117 MG/DL (ref 70–126)
BILIRUBIN URINE: NEGATIVE
BLOOD URINE, POC: NEGATIVE
BUN BLDV-MCNC: 14 MG/DL (ref 7–22)
CALCIUM SERPL-MCNC: 9.4 MG/DL (ref 8.5–10.5)
CHARACTER, URINE: CLEAR
CHLORIDE BLD-SCNC: 100 MEQ/L (ref 98–111)
CO2: 26 MEQ/L (ref 23–33)
COLOR, URINE: YELLOW
CREAT SERPL-MCNC: 0.7 MG/DL (ref 0.4–1.2)
ERYTHROCYTE [DISTWIDTH] IN BLOOD BY AUTOMATED COUNT: 15.4 % (ref 11.5–14.5)
ERYTHROCYTE [DISTWIDTH] IN BLOOD BY AUTOMATED COUNT: 55.4 FL (ref 35–45)
FERRITIN: 83 NG/ML (ref 10–291)
GFR SERPL CREATININE-BSD FRML MDRD: 78 ML/MIN/1.73M2
GLUCOSE BLD-MCNC: 140 MG/DL (ref 70–108)
GLUCOSE URINE: NEGATIVE MG/DL
HBA1C MFR BLD: 5.9 % (ref 4.4–6.4)
HCT VFR BLD CALC: 41.5 % (ref 37–47)
HEMOGLOBIN: 13.1 GM/DL (ref 12–16)
IRON: 54 UG/DL (ref 50–170)
KETONES, URINE: NEGATIVE
LEUKOCYTE CLUMPS, URINE: NEGATIVE
MCH RBC QN AUTO: 31 PG (ref 26–33)
MCHC RBC AUTO-ENTMCNC: 31.6 GM/DL (ref 32.2–35.5)
MCV RBC AUTO: 98.3 FL (ref 81–99)
NITRITE, URINE: NEGATIVE
PH, URINE: 5.5 (ref 5–9)
PLATELET # BLD: 201 THOU/MM3 (ref 130–400)
PMV BLD AUTO: 10.8 FL (ref 9.4–12.4)
POTASSIUM SERPL-SCNC: 4 MEQ/L (ref 3.5–5.2)
PROTEIN, URINE: NEGATIVE MG/DL
RBC # BLD: 4.22 MILL/MM3 (ref 4.2–5.4)
SODIUM BLD-SCNC: 139 MEQ/L (ref 135–145)
SPECIFIC GRAVITY, URINE: 1.02 (ref 1–1.03)
TOTAL IRON BINDING CAPACITY: 287 UG/DL (ref 171–450)
UROBILINOGEN, URINE: 0.2 EU/DL (ref 0–1)
WBC # BLD: 6.4 THOU/MM3 (ref 4.8–10.8)

## 2021-10-12 NOTE — TELEPHONE ENCOUNTER
Patient's daughter brought in urine sample today 10/12/21    See POC urine results ordered at office visit 10/11/21

## 2021-10-13 LAB — INSULIN, RANDOM OR FASTING: 16.8 MU/L

## 2021-10-27 ENCOUNTER — OFFICE VISIT (OUTPATIENT)
Dept: CARDIOLOGY CLINIC | Age: 86
End: 2021-10-27
Payer: MEDICARE

## 2021-10-27 VITALS
WEIGHT: 153 LBS | HEART RATE: 76 BPM | BODY MASS INDEX: 28.16 KG/M2 | DIASTOLIC BLOOD PRESSURE: 64 MMHG | SYSTOLIC BLOOD PRESSURE: 130 MMHG | HEIGHT: 62 IN

## 2021-10-27 DIAGNOSIS — I25.83 CORONARY ARTERY DISEASE DUE TO LIPID RICH PLAQUE: Primary | ICD-10-CM

## 2021-10-27 DIAGNOSIS — I25.10 CORONARY ARTERY DISEASE DUE TO LIPID RICH PLAQUE: Primary | ICD-10-CM

## 2021-10-27 PROCEDURE — 4040F PNEUMOC VAC/ADMIN/RCVD: CPT | Performed by: INTERNAL MEDICINE

## 2021-10-27 PROCEDURE — 99213 OFFICE O/P EST LOW 20 MIN: CPT | Performed by: INTERNAL MEDICINE

## 2021-10-27 PROCEDURE — 1090F PRES/ABSN URINE INCON ASSESS: CPT | Performed by: INTERNAL MEDICINE

## 2021-10-27 PROCEDURE — 1123F ACP DISCUSS/DSCN MKR DOCD: CPT | Performed by: INTERNAL MEDICINE

## 2021-10-27 PROCEDURE — G8427 DOCREV CUR MEDS BY ELIG CLIN: HCPCS | Performed by: INTERNAL MEDICINE

## 2021-10-27 PROCEDURE — 1036F TOBACCO NON-USER: CPT | Performed by: INTERNAL MEDICINE

## 2021-10-27 PROCEDURE — G8482 FLU IMMUNIZE ORDER/ADMIN: HCPCS | Performed by: INTERNAL MEDICINE

## 2021-10-27 PROCEDURE — G8417 CALC BMI ABV UP PARAM F/U: HCPCS | Performed by: INTERNAL MEDICINE

## 2021-10-27 RX ORDER — DOCUSATE SODIUM 100 MG/1
100 CAPSULE, LIQUID FILLED ORAL DAILY PRN
COMMUNITY

## 2021-10-27 RX ORDER — FERROUS SULFATE 325(65) MG
325 TABLET ORAL WEEKLY
COMMUNITY

## 2021-10-27 NOTE — PROGRESS NOTES
[address] [place], [place] [address] [facility] [address]  Dept: [phone]  Dept Fax: [phone]  Loc: [phone]    Visit Date: 10/27/2021    Ms. Zhen Blankenship is a 80 y.o. female  who presented for:  Chief Complaint   Patient presents with    Check-Up    Coronary Artery Disease       HPI:   79 yo F c hx of Afib on Xarelto, HTN, HLD, CAD s/p PCI is here for a follow up.    Denies any chest pain, sob, palpitations, lightheadedness, dizziness, orthopnea, PND or pedal edema. Thinks she might have had a vagal reaction.        Current Outpatient Medications:     NONFORMULARY, daily Calcium chew, Disp: , Rfl:     ferrous sulfate (IRON 325) 325 (65 Fe) MG tablet, Take 325 mg by mouth daily (with breakfast), Disp: , Rfl:     docusate sodium (COLACE) 100 MG capsule, Take 100 mg by mouth daily, Disp: , Rfl:     Cyanocobalamin (VITAMIN B 12 PO), Take by mouth every other day, Disp: , Rfl:     pantoprazole (PROTONIX) 40 MG tablet, Take 1 tablet by mouth 2 times daily, Disp: 180 tablet, Rfl: 3    rivaroxaban (XARELTO) 15 MG TABS tablet, Take 1 tablet by mouth Daily with supper, Disp: 90 tablet, Rfl: 3    venlafaxine (EFFEXOR XR) 75 MG extended release capsule, Take 1 capsule by mouth daily, Disp: 90 capsule, Rfl: 3    bumetanide (BUMEX) 0.5 MG tablet, Take 1 tablet by mouth daily, Disp: 90 tablet, Rfl: 3    potassium chloride (KLOR-CON M) 20 MEQ extended release tablet, Take 1 tablet by mouth once daily, Disp: 90 tablet, Rfl: 3    Biotin 1000 MCG CHEW, Take 1,000 mcg by mouth daily, Disp: , Rfl:     Probiotic Product (PROBIOTIC DAILY PO), Take by mouth daily, Disp: , Rfl:     digoxin (LANOXIN) 125 MCG tablet, Take 1 tablet by mouth 2 times daily, Disp: 180 tablet, Rfl: 3    sucralfate (CARAFATE) 1 GM tablet, Take 1 tablet by mouth 2 times daily, Disp: 180 tablet, Rfl: 11    magnesium oxide (MAG-OX) 400 (241.3 Mg) MG TABS tablet, Take 1 tablet by mouth 2 times daily, Disp: 30 tablet, Rfl: 0    aspirin 81 MG chewable tablet, Take 1 tablet by mouth daily, Disp: 30 tablet, Rfl: 3    acetaminophen (TYLENOL) 500 MG tablet, Take 1,000 mg by mouth 4 times daily , Disp: , Rfl:     Past Medical History  Lissa Ch  has a past medical history of Atrial fibrillation ([facility] [address] Blood circulation, collateral, [employer] single pacemaker 4/26/2016, CAD (coronary artery disease), Cancer ([facility] [address] Carpal tunnel syndrome, Fracture dislocation of ankle, GERD (gastroesophageal reflux disease), Hyperlipidemia, Hypertension, Kidney lesion, native, right, Osteoarthritis, Osteopenia, Prolonged emergence from general anesthesia, Thyroid goiter, and Yersiniosis. Social History  Lissa Ch  reports that she has never smoked. She has never used smokeless tobacco. She reports that she does not drink alcohol and does not use drugs. Family History  Lissa Ch family history includes Heart Disease in her brother, father, mother, and son; High Blood Pressure in her brother, father, and mother. Past Surgical History   Past Surgical History:   Procedure Laterality Date    ABDOMEN SURGERY      ANKLE FRACTURE SURGERY  [id number]    reconstruction in 2003 and 2007    APPENDECTOMY      BLADDER SURGERY      support bladder repair    CARDIAC SURGERY      heart stent 12-11-18, [place]    CARPAL TUNNEL RELEASE  7/2013    CARPAL TUNNEL RELEASE Right 08/19/2017    Revision    CATARACT REMOVAL Bilateral     CHOLECYSTECTOMY  1986    COLON SURGERY  1954    COLONOSCOPY      ENDOSCOPY, COLON, DIAGNOSTIC      EYE SURGERY      cataract     FRACTURE SURGERY      HYSTERECTOMY  1971    JOINT REPLACEMENT  1998    L knee    KNEE SURGERY Left     total replacement    PACEMAKER PLACEMENT      PTCA  01/15/2019    Successful PCI / Drug Eluting Stent of the proximal Left Anterior Descending Coronary Artery.     UPPER GASTROINTESTINAL ENDOSCOPY Left 11/28/2018    EGD BIOPSY performed by Lala Vargas MD at [facility]    UPPER GASTROINTESTINAL ENDOSCOPY  11/28/2018    EGD CONTROL HEMORRHAGE performed by Halley Ambrocio MD at [address] [facility]       Subjective:     REVIEW OF SYSTEMS  Constitutional: denies sweats, chills and fever  HENT: denies  congestion, sinus pressure, sneezing and sore throat. Eyes: denies  pain, discharge, redness and itching. Respiratory: denies apnea, cough  Gastrointestinal: denies blood in stool, constipation, diarrhea   Endocrine: denies cold intolerance, heat intolerance, polydipsia. Genitourinary: denies dysuria, enuresis, flank pain and hematuria. Musculoskeletal: denies arthralgias, joint swelling and neck pain. Neurological: denies numbness and headaches. Psychiatric/Behavioral: denies agitation, confusion, decreased concentration and dysphoric mood    All others reviewed and are negative. Objective:     /64   Pulse 76   Ht 5' 2\" (1.575 m)   Wt 153 lb (69.4 kg)   LMP  (LMP Unknown)   BMI 27.98 kg/m²     Wt Readings from Last 3 Encounters:   10/27/21 153 lb (69.4 kg)   10/11/21 151 lb 6.4 oz (68.7 kg)   10/01/21 154 lb (69.9 kg)     BP Readings from Last 3 Encounters:   10/27/21 130/64   10/11/21 124/80   10/01/21 (!) 156/57       PHYSICAL EXAM  Constitutional: Oriented to person, place, and time. Appears well-developed and well-nourished. HENT:   Head: Normocephalic and atraumatic. Eyes: EOM are normal. Pupils are equal, round, and reactive to light. Neck: Normal range of motion. Neck supple. No JVD present. Cardiovascular: Normal rate , normal heart sounds and intact distal pulses. Pulmonary/Chest: Effort normal and breath sounds normal. No respiratory distress. No wheezes. No rales. Abdominal: Soft. Bowel sounds are normal. No distension. There is no tenderness. Musculoskeletal: Normal range of motion. No edema. Neurological: Alert and oriented to person, place, and time. No cranial nerve deficit.  Coordination normal.   Skin: Skin is warm and dry.   Psychiatric: Normal mood and affect.        Lab Results   Component Value Date    CKTOTAL 22 10/01/2021       Lab Results   Component Value Date    WBC 6.4 10/12/2021    RBC 4.22 10/12/2021    RBC 4.17 08/30/2011    HGB 13.1 10/12/2021    HCT 41.5 10/12/2021    MCV 98.3 10/12/2021    MCH 31.0 10/12/2021    MCHC 31.6 10/12/2021    RDW 16.0 10/01/2021     10/12/2021    MPV 10.8 10/12/2021       Lab Results   Component Value Date     10/12/2021    K 4.0 10/12/2021    K 3.8 01/26/2021     10/12/2021    CO2 26 10/12/2021    BUN 14 10/12/2021    LABALBU 3.3 10/01/2021    LABALBU 4.2 08/30/2011    CREATININE 0.7 10/12/2021    CALCIUM 9.4 10/12/2021    LABGLOM 78 10/12/2021    GLUCOSE 140 10/12/2021    GLUCOSE 116 08/30/2011       Lab Results   Component Value Date    ALKPHOS 59 10/01/2021    ALT 12 10/01/2021    AST 17 10/01/2021    PROT 7.3 10/01/2021    BILITOT 1.3 10/01/2021    BILIDIR <0.2 01/19/2021    LABALBU 3.3 10/01/2021    LABALBU 4.2 08/30/2011       Lab Results   Component Value Date    MG 2.0 05/24/2021       Lab Results   Component Value Date    INR 1.49 (H) 01/19/2021    INR 0.98 01/15/2019    INR 1.15 (H) 12/20/2018         Lab Results   Component Value Date    LABA1C 5.9 10/12/2021       Lab Results   Component Value Date    TRIG 145 12/04/2018    HDL 36 12/04/2018    LDLCALC 62 12/04/2018       Lab Results   Component Value Date    TSH 2.880 05/24/2021         Testing Reviewed:      I haveindividually reviewed the below cardiac tests    EKG:    ECHO:   Results for orders placed during the hospital encounter of 11/26/18   ECHO Complete 2D W Doppler W Color    Narrative Transthoracic Echocardiography Report (TTE)     Demographics      Patient Name   [facility] Gender               Female                  L      MR #           [id number]        Race                                                       Ethnicity      Account #      [de-identified]        Room Number 9755      Accession      [id number]        Date of Study        11/26/2018   Number      Date of Birth  04/04/1928       Referring Physician  Angela Petit MD      Age            80 year(s)       Sonographer          Luciana Head [facility]                                      Interpreting         Monica Petit MD                                   Physician     Procedure    Type of Study      TTE procedure:ECHOCARDIOGRAM COMPLETE 2D W DOPPLER W COLOR. Procedure Date  Date: 11/26/2018 Start: 02:58 PM    Study Location: Bedside  Technical Quality: Adequate visualization    Indications:Shortness of breath. Additional Medical History:Pedal edema, hypertension, colon cancer,  hyperlipidemia, atrial fibrillation, GERD, pacemaker    Patient Status: Routine    Height: 66.14 inches Weight: 180.78 pounds BSA: 1.92 m^2 BMI: 29.05 kg/m^2    BP: 149/68 mmHg    Allergies    - Other allergy:(Methadone, Gabapentin, lyrica, ultram). Conclusions      Summary   Left ventricle size and systolic function is normal.   Normal left ventricular wall thickness. Ejection fraction is visually estimated at 55-60%. Mildly dilated left atrium. Aortic valve leaflets are mildly calcified. Mild aortic regurgitation is noted. Mild mitral regurgitation is present. Mild tricuspid regurgitation. Signature      ----------------------------------------------------------------   Electronically signed by Monica Petit MD (Interpreting   physician) on 11/26/2018 at 04:17 PM   ----------------------------------------------------------------      Findings      Mitral Valve   Mild calcification of the posterior leaflet of the mitral valve. Mild mitral regurgitation is present. Aortic Valve   The aortic valve appears to be trileaflet with good leaflet separation. Aortic valve leaflets are mildly calcified. Mild aortic regurgitation is noted. Tricuspid Valve   Tricuspid valve is structurally normal.   Mild tricuspid regurgitation. Pulmonic Valve   The pulmonic valve was not well visualized . Left Atrium   Mildly dilated left atrium. Left Ventricle   Left ventricle size and systolic function is normal.   Normal left ventricular wall thickness. Ejection fraction is visually estimated at 55-60%. Right Atrium   The right atrium is of normal size. Right Ventricle   Normal right ventricular size and function. Pericardial Effusion   No evidence of any pericardial effusion. Aorta / Great Vessels   Aorta was not clearly visualized.    IVC is normal in size with normal respiratory phasic changes     M-Mode/2D Measurements & Calculations      LV Diastolic    LV Systolic Dimension: 3  AV Cusp Separation: 2.1 cmLA   Dimension: 4.2  cm                        Dimension: 3 cmAO Root   cm              LV Volume Diastolic: 44.5 Dimension: 3.3 cmLA Area: 20.7   LV FS:28.6 %    ml                        cm^2   LV PW           LV Volume Systolic: 35 ml   Diastolic: 0.8  LV EDV/LV EDV Index: 78.6   cm              ml/41 m^2LV ESV/LV ESV   Septum          Index: 35 ml/18 m^2       RV Diastolic Dimension: 2.2 cm   Diastolic: 0.9  EF Calculated: 55.5 %   cm                                        LA/Aorta: 0.91                                                LA volume/Index: 57.9 ml /30m^2     Doppler Measurements & Calculations      MV Peak E-Wave: 116 cm/s AV Peak Velocity: 163   LVOT Peak Velocity: 134                            cm/s                    cm/s   MV Peak Gradient: 5.38   AV Peak Gradient: 10.63 LVOT Peak Gradient: 7   mmHg                     mmHg                    mmHg      MV Deceleration Time:                            TV Peak E-Wave: 73.7 cm/s   254 msec                                         TV Peak A-Wave: 36.4 cm/s                               AV P1/2t: 544 msec      TV Peak Gradient: 2.17 mmHg                                                    TR Velocity:273 cm/s                                                    TR Gradient:29.81 mmHg   MR Velocity: 366 cm/s    AV DVI (Vmax):0.82      PV Peak Velocity: 80.5                                                    cm/s                                                    PV Peak Gradient: 2.59                                                    mmHg     [email or web address]       STRESS:    CATH:    Assessment/Plan       Diagnosis Orders   1. Coronary artery disease due to lipid rich plaque         CAD s/p PCI  Afib on Xarelto  HTN  HLD  S/p PPM     Underwent PCI of LAD   Doing well  H/H improved  BP well controlled  Continue meds  Continue losartan, digoxin, , lasix, Aspirin  Continue Xarelto  No bleeding issue  The patient is asked to make an attempt to improve diet and exercise patterns to aid in medical management of this problem. Advised patient to call office or seek immediate medical attention if there is any new onset of  any chest pain, sob, palpitations, lightheadedness, dizziness, orthopnea, PND or pedal edema.      All medication side effects were discussed in details.     Thank youfor allowing me to participate in the care of this patient. Please do not hesitate to contact me for any further questions. Return in about 6 months (around 4/27/2022), or if symptoms worsen or fail to improve, for Review testing, Regular follow up.        Electronically signed by Jhon Fisher MD [facility]  10/27/2021 at 11:15 AM

## 2021-10-27 NOTE — PROGRESS NOTES
Pt here for check up ref by Dr. Virgen Wood for CHF, CAD, fatigue      Pt had dizzy spell felt  like going to pass out sob noticed at last appt with PCP

## 2021-11-11 ENCOUNTER — OFFICE VISIT (OUTPATIENT)
Dept: FAMILY MEDICINE CLINIC | Age: 86
End: 2021-11-11
Payer: MEDICARE

## 2021-11-11 VITALS
SYSTOLIC BLOOD PRESSURE: 116 MMHG | TEMPERATURE: 96.9 F | DIASTOLIC BLOOD PRESSURE: 70 MMHG | HEART RATE: 56 BPM | RESPIRATION RATE: 16 BRPM | BODY MASS INDEX: 28.01 KG/M2 | WEIGHT: 152.2 LBS | HEIGHT: 62 IN | OXYGEN SATURATION: 95 %

## 2021-11-11 DIAGNOSIS — E78.00 PURE HYPERCHOLESTEROLEMIA: ICD-10-CM

## 2021-11-11 DIAGNOSIS — D50.8 OTHER IRON DEFICIENCY ANEMIA: ICD-10-CM

## 2021-11-11 DIAGNOSIS — E55.9 VITAMIN D DEFICIENCY: ICD-10-CM

## 2021-11-11 DIAGNOSIS — R73.09 ELEVATED GLUCOSE: ICD-10-CM

## 2021-11-11 DIAGNOSIS — R53.81 PHYSICAL DECONDITIONING: ICD-10-CM

## 2021-11-11 DIAGNOSIS — K76.0 STEATOSIS OF LIVER: ICD-10-CM

## 2021-11-11 DIAGNOSIS — R53.83 OTHER FATIGUE: ICD-10-CM

## 2021-11-11 DIAGNOSIS — I49.5 SICK SINUS SYNDROME (HCC): ICD-10-CM

## 2021-11-11 DIAGNOSIS — I48.91 ATRIAL FIBRILLATION WITH RAPID VENTRICULAR RESPONSE (HCC): ICD-10-CM

## 2021-11-11 DIAGNOSIS — I50.32 CHRONIC DIASTOLIC CHF (CONGESTIVE HEART FAILURE) (HCC): ICD-10-CM

## 2021-11-11 DIAGNOSIS — I25.119 CORONARY ARTERY DISEASE INVOLVING NATIVE HEART WITH ANGINA PECTORIS, UNSPECIFIED VESSEL OR LESION TYPE (HCC): ICD-10-CM

## 2021-11-11 DIAGNOSIS — K21.9 GASTROESOPHAGEAL REFLUX DISEASE WITHOUT ESOPHAGITIS: ICD-10-CM

## 2021-11-11 DIAGNOSIS — F33.42 RECURRENT MAJOR DEPRESSIVE DISORDER, IN FULL REMISSION (HCC): ICD-10-CM

## 2021-11-11 DIAGNOSIS — E53.8 B12 DEFICIENCY: ICD-10-CM

## 2021-11-11 DIAGNOSIS — D50.0 IRON DEFICIENCY ANEMIA DUE TO CHRONIC BLOOD LOSS: ICD-10-CM

## 2021-11-11 DIAGNOSIS — M15.9 PRIMARY OSTEOARTHRITIS INVOLVING MULTIPLE JOINTS: ICD-10-CM

## 2021-11-11 DIAGNOSIS — Z00.00 ROUTINE GENERAL MEDICAL EXAMINATION AT A HEALTH CARE FACILITY: Primary | ICD-10-CM

## 2021-11-11 DIAGNOSIS — E83.42 HYPOMAGNESEMIA: ICD-10-CM

## 2021-11-11 DIAGNOSIS — I10 ESSENTIAL HYPERTENSION: ICD-10-CM

## 2021-11-11 DIAGNOSIS — N39.3 STRESS INCONTINENCE: ICD-10-CM

## 2021-11-11 PROCEDURE — 4040F PNEUMOC VAC/ADMIN/RCVD: CPT | Performed by: FAMILY MEDICINE

## 2021-11-11 PROCEDURE — G0439 PPPS, SUBSEQ VISIT: HCPCS | Performed by: FAMILY MEDICINE

## 2021-11-11 PROCEDURE — 1123F ACP DISCUSS/DSCN MKR DOCD: CPT | Performed by: FAMILY MEDICINE

## 2021-11-11 PROCEDURE — G8482 FLU IMMUNIZE ORDER/ADMIN: HCPCS | Performed by: FAMILY MEDICINE

## 2021-11-11 ASSESSMENT — PATIENT HEALTH QUESTIONNAIRE - PHQ9
2. FEELING DOWN, DEPRESSED OR HOPELESS: 0
SUM OF ALL RESPONSES TO PHQ QUESTIONS 1-9: 0
1. LITTLE INTEREST OR PLEASURE IN DOING THINGS: 0
SUM OF ALL RESPONSES TO PHQ QUESTIONS 1-9: 0
SUM OF ALL RESPONSES TO PHQ QUESTIONS 1-9: 0
SUM OF ALL RESPONSES TO PHQ9 QUESTIONS 1 & 2: 0

## 2021-11-11 ASSESSMENT — LIFESTYLE VARIABLES: HOW OFTEN DO YOU HAVE A DRINK CONTAINING ALCOHOL: 0

## 2021-11-11 NOTE — PATIENT INSTRUCTIONS
Personalized Preventive Plan for Amparo Velasquez - 11/11/2021  Medicare offers a range of preventive health benefits. Some of the tests and screenings are paid in full while other may be subject to a deductible, co-insurance, and/or copay. Some of these benefits include a comprehensive review of your medical history including lifestyle, illnesses that may run in your family, and various assessments and screenings as appropriate. After reviewing your medical record and screening and assessments performed today your provider may have ordered immunizations, labs, imaging, and/or referrals for you. A list of these orders (if applicable) as well as your Preventive Care list are included within your After Visit Summary for your review. Other Preventive Recommendations:    · A preventive eye exam performed by an eye specialist is recommended every 1-2 years to screen for glaucoma; cataracts, macular degeneration, and other eye disorders. · A preventive dental visit is recommended every 6 months. · Try to get at least 150 minutes of exercise per week or 10,000 steps per day on a pedometer . · Order or download the FREE \"Exercise & Physical Activity: Your Everyday Guide\" from [employer]. Call 9-776.506.8091 or search [employer] online. · You need 6326-0554 mg of calcium and 8530-1267 IU of vitamin D per day. It is possible to meet your calcium requirement with diet alone, but a vitamin D supplement is usually necessary to meet this goal.  · When exposed to the sun, use a sunscreen that protects against both UVA and UVB radiation with an SPF of 30 or greater. Reapply every 2 to 3 hours or after sweating, drying off with a towel, or swimming. · Always wear a seat belt when traveling in a car. Always wear a helmet when riding a bicycle or motorcycle.

## 2021-11-11 NOTE — PROGRESS NOTES
Medicare Annual Wellness Visit  Name: Isabel Sullivan Date: 2021   MRN: [id number] Sex: Female   Age: 80 y.o. Ethnicity: Non- / Non    : 1928 Race: White (non-)      Kosta Mari is here for 1 Month Follow-Up and Medicare AWV    Screenings for behavioral, psychosocial and functional/safety risks, and cognitive dysfunction are all negative except as indicated below. These results, as well as other patient data from the [address] form, are documented in Flowsheets linked to this Encounter. Hypertension: Patient here for follow-up of elevated blood pressure. She is not exercising and is adherent to low salt diet. Blood pressure is well controlled at home. Cardiac symptoms none. Patient denies chest pain and palpitations. Cardiovascular risk factors: advanced age (older than 54 for men, 72 for women), dyslipidemia, hypertension, obesity (BMI >= 30 kg/m2) and sedentary lifestyle. Use of agents associated with hypertension: none. History of target organ damage: SSS and afib and CHF/CAD. Under the care of cardiology: Dr Chayito Muñoz. Hyperlipidemia: Patient presents with hyperlipidemia. She was tested because hypertension. Her last labs see labs charted. . There is a family history of hyperlipidemia. There is a family history of early ischemia heart disease. Lab Results   Component Value Date    CHOL 127 2018    CHOL 154 2018    CHOL 176 2013     Lab Results   Component Value Date    TRIG 145 2018    TRIG 167 2018    TRIG 157 2013     Lab Results   Component Value Date    HDL 36 2018    HDL 38 2018    HDL 46 2013     Lab Results   Component Value Date    LDLCALC 62 2018    [address] 83 2018    [address] 99 2013     No results found for: LABVLDL, VLDL  No results found for: CHOLHDLRATIO  Active statin treatment with history of stent placement.       GERD: Paitent complains of heartburn. This has been associated with heartburn. She denies no other symptoms. Symptoms have been present for 5 years. She denies dysphagia. She has not lost weight. She denies melena, hematochezia, hematemesis, and coffee ground emesis. Medical therapy in the past has included proton pump inhibitors. Depression: Patient complains of depression. She complains of depressed mood, difficulty concentrating, psychomotor retardation and recurrent thoughts of death. Onset was approximately 5 years ago, gradually worsening since that time. She denies current suicidal and homicidal plan or intent. Family history significant for no psychiatric illness. Possible organic causes contributing are: none. Risk factors: negative life event aging and becoming less active and previous episode of depression Previous treatment includes no medication and none and psych therapy. She complains of the following side effects from the treatment: none. Osteoarthritis primary in multiple joints is not controlled on current medications. However, she does very little exercise at home and refuses PT. This is leading to unsteady gait and slowly limiting her ability to do ADLs. She had SOB with exertion. She is talking to her family about AL living but her daughter is helping and lives in the same [facility] building. A fib is rate controlled and taking xarelto. Vitamin b12 deficiency needs rechecked. Night sweats are unchanged. thyroid goiter in the past needs recheck of ultrasound last done in 4/2019 shows stable nodules and 2 new. One is recommended to have a biopsy. The night sweats are leading to her not feeling rested in the morning. Here for continued hot flashes and night sweats. They have been happening for 1-2 years but getting worse since June 2020. More fatigue and SOB with exertion as well. She is still not exercising at all despite many attempts to motivate her.   She is off beta blockers due to dizziness in the past.  She did go to PT in June 2020 but did not finish and has not done any of the exercises at home. She has some skakiness at rest and worse at night. Constipation comes and goes. Better off of iron but still needing colace. Not exercising or moving much at home. She has noticed a lump in the right axilla. First noticed this 9/2020. Not painful but not getting any smaller and she thinks it may be bigger. She and her daughter decided in fall 2020 to not go through with testing due to her unwillingness to do treatment if they find cancer. She is bring this up again and again wants to consider imaging and will call if she decides to go through with it. Here for persistent weakness for a few months. Pacer clinic placed a monitor. She had to do a consult there on 9/28/21 but nothing was changed. Then developed sweats and chills, SOB, fatigue and headaches. Started tylenol 2 ES TID and occasional QID which helps but still has right side pain. 1 week ago she had the flu shot and got worse again. Now more and more fatigue. She has episodes of more SOB and diaphoresis which are most prominent after BM. She does have a history of vagal nerve issues. Cardiology agreed that the dizziness is likely vagal.        Allergies   Allergen Reactions    Methadone Shortness Of Breath     Shakes and nausea    Gabapentin     Lodine [Etodolac] Other (See Comments)     \"spacey, hot flashes, shaky\"    Lyrica [Pregabalin]     Ultram [Tramadol]      Nausea, pedal edema, SOB         Prior to Visit Medications    Medication Sig Taking?  Authorizing Provider   NONFORMULARY daily Calcium chew Yes Historical Provider, MD   ferrous sulfate (IRON 325) 325 (65 Fe) MG tablet Take 325 mg by mouth daily (with breakfast) Yes Historical Provider, MD   docusate sodium (COLACE) 100 MG capsule Take 100 mg by mouth daily Yes Historical Provider, MD   Cyanocobalamin (VITAMIN B 12 PO) Take by mouth every other day Yes Historical Provider, MD   pantoprazole (PROTONIX) 40 MG tablet Take 1 tablet by mouth 2 times daily Yes Obi Guerrier MD   rivaroxaban (XARELTO) 15 MG TABS tablet Take 1 tablet by mouth Daily with supper Yes Obi Guerrier MD   venlafaxine (EFFEXOR XR) 75 MG extended release capsule Take 1 capsule by mouth daily Yes Obi Guerrier MD   bumetanide (BUMEX) 0.5 MG tablet Take 1 tablet by mouth daily Yes Obi Guerrier MD   potassium chloride (KLOR-CON M) 20 MEQ extended release tablet Take 1 tablet by mouth once daily Yes Obi Guerrier MD   Biotin 1000 MCG CHEW Take 1,000 mcg by mouth daily Yes Historical Provider, MD   Probiotic Product (PROBIOTIC DAILY PO) Take by mouth daily Yes Historical Provider, MD   digoxin (LANOXIN) 125 MCG tablet Take 1 tablet by mouth 2 times daily Yes Obi Guerrier MD   sucralfate (CARAFATE) 1 GM tablet Take 1 tablet by mouth 2 times daily Yes Obi Guerrier MD   magnesium oxide (MAG-OX) 400 (241.3 Mg) MG TABS tablet Take 1 tablet by mouth 2 times daily Yes Estela Gallo DO   aspirin 81 MG chewable tablet Take 1 tablet by mouth daily Yes NOELLE Jaramillo CNP   acetaminophen (TYLENOL) 500 MG tablet Take 1,000 mg by mouth 4 times daily  Yes Historical Provider, MD         Past Medical History:   Diagnosis Date    Atrial fibrillation ([facility] [address]     Blood circulation, collateral     [employer] single pacemaker 4/26/2016 5/5/2016    CAD (coronary artery disease)     cath and stent in right artery    Cancer [facility]) 1954    HX  Colon     Carpal tunnel syndrome     Fracture dislocation of ankle 1980    GERD (gastroesophageal reflux disease)     Hyperlipidemia     Hypertension     Kidney lesion, native, right     found on 5/2016    Osteoarthritis     knees    Osteopenia     Prolonged emergence from general anesthesia     Thyroid goiter 9/6/2016    Yersiniosis 2016       Past Surgical History:   Procedure Laterality Date    ABDOMEN SURGERY      ANKLE FRACTURE SURGERY  0754--3003    reconstruction in 2003 and 2007    APPENDECTOMY      BLADDER SURGERY      support bladder repair    CARDIAC SURGERY      heart stent 12-11-18, Willa    CARPAL TUNNEL RELEASE  7/2013    CARPAL TUNNEL RELEASE Right 08/19/2017    Revision    CATARACT REMOVAL Bilateral     CHOLECYSTECTOMY  1986    COLON SURGERY  1954    COLONOSCOPY      ENDOSCOPY, COLON, DIAGNOSTIC      EYE SURGERY      cataract     FRACTURE SURGERY      HYSTERECTOMY  1971    JOINT REPLACEMENT  1998    L knee    KNEE SURGERY Left     total replacement    PACEMAKER PLACEMENT      PTCA  01/15/2019    Successful PCI / Drug Eluting Stent of the proximal Left Anterior Descending Coronary Artery.     UPPER GASTROINTESTINAL ENDOSCOPY Left 11/28/2018    EGD BIOPSY performed by Angie Coburn MD at [facility] ENDOSCOPY  11/28/2018    EGD CONTROL HEMORRHAGE performed by Angie Coburn MD at [facility] End[facility]         Family History   Problem Relation Age of Onset    Heart Disease Mother     High Blood Pressure Mother     Heart Disease Father     High Blood Pressure Father     Heart Disease Brother     High Blood Pressure Brother     Heart Disease Son        CareTeam (Including outside providers/suppliers regularly involved in providing care):   Patient Care Team:  Salinas Luong MD as PCP - General (Family Medicine)  Salinas Luong MD as PCP - [facility] Empaneled Provider  NOELLE Avitia - CNP as Nurse Practitioner (Nurse Practitioner)  Gian Humphreys MD as Consulting Physician (Cardiology)  Angie Coburn MD as Consulting Physician (Gastroenterology)    Wt Readings from Last 3 Encounters:   11/11/21 152 lb 3.2 oz (69 kg)   10/27/21 153 lb (69.4 kg)   10/11/21 151 lb 6.4 oz (68.7 kg)     Vitals:    11/11/21 1012   BP: 116/70   Site: Left Lower Arm   Position: Sitting   Cuff Size: Medium Adult   Pulse: 56   Resp: 16   Temp: 96.9 °F (36.1 °C)   TempSrc: Temporal   SpO2: 95% Weight: 152 lb 3.2 oz (69 kg)   Height: 5' 2\" (1.575 m)     Body mass index is 27.84 kg/m². Based upon direct observation of the patient, evaluation of cognition reveals global memory impairment noted. mild    Physical Exam   Constitutional: Vital signs are normal. She appears well-developed and well-nourished. She is active. HENT:   Head: Normocephalic and atraumatic. Right Ear: Tympanic membrane, external ear and ear canal normal. No drainage or tenderness. Left Ear: Tympanic membrane, external ear and ear canal normal. No drainage or tenderness. Nose: Nose normal. No mucosal edema or rhinorrhea. Mouth/Throat: Uvula is midline, oropharynx is clear and moist and mucous membranes are normal. Mucous membranes are not pale. Normal dentition. No posterior oropharyngeal edema or posterior oropharyngeal erythema. Eyes: Lids are normal. Right eye exhibits no chemosis and no discharge. Left eye exhibits no chemosis and no drainage. Right conjunctiva has no hemorrhage. Left conjunctiva has no hemorrhage. Right eye exhibits normal extraocular motion. Left eye exhibits normal extraocular motion. Right pupil is round and reactive. Left pupil is round and reactive. Pupils are equal.   Cardiovascular: Normal rate, regular rhythm, S1 normal, S2 normal and normal heart sounds. Exam reveals no gallop. No murmur heard. Pulmonary/Chest: Effort normal and breath sounds normal. No respiratory distress. She has no wheezes. She has no rhonchi. She has no rales. Abdominal: Soft. Normal appearance and bowel sounds are normal. She exhibits no distension and no mass. There is no hepatosplenomegaly. No tenderness. She has no rigidity, no rebound and no guarding. No hernia. Musculoskeletal:        Right lower leg: She exhibits no edema. Left lower leg: She exhibits no edema. Neurological: She is alert.        Patient's complete Health Risk Assessment and screening values have been reviewed and are found in Flowsheets. The following problems were reviewed today and where indicated follow up appointments were made and/or referrals ordered. Positive Risk Factor Screenings with Interventions:            General Health and ACP:  General  In general, how would you say your health is?: (!) Poor  In the past 7 days, have you experienced any of the following? New or Increased Pain, New or Increased Fatigue, Loneliness, Social Isolation, Stress or Anger?: None of These  Do you get the social and emotional support that you need?: Yes  Do you have a Living Will?: Yes  Advance Directives     Power of  Living Will ACP-Advance Directive ACP-Power of     Not on File Filed on 02/02/13 Filed Not on File        Health Habits/Nutrition:  Health Habits/Nutrition  Do you exercise for at least 20 minutes 2-3 times per week?: (!) No  Have you lost any weight without trying in the past 3 months?: (!) Yes  Do you eat only one meal per day?: No  Have you seen the dentist within the past year?: Yes  Body mass index: (!) 27.83      ADL:  ADLs  In the past 7 days, did you need help from others to perform any of the following everyday activities? Eating, dressing, grooming, bathing, toileting, or walking/balance?: (!) Dressing, Bathing, None  In the past 7 days, did you need help from others to take care of any of the following? Laundry, housekeeping, banking/finances, shopping, telephone use, food preparation, transportation, or taking medications?: [employer], Housekeeping, Banking/Finances, Shopping, Telephone Use, Food Preparation, Transportation, Taking Medications    No Positive Risk Factors identified today.     Personalized Preventive Plan   Current Health Maintenance Status  Immunization History   Administered Date(s) Administered    Jasmyn SEARS, Primary or Immunocompromised, PF, 100mcg/0.5mL 01/20/2021, 02/17/2021, 11/03/2021    Influenza 10/23/2012    Influenza, High Dose (Fluzone 65 yrs and older) 09/19/2017, 10/23/2018, 10/04/2019, 09/30/2020, 10/04/2021    Influenza, Quadv, IM, (6 mo and older Fluzone, Flulaval, Fluarix and 3 yrs and older Afluria) 10/07/2016    Pneumococcal Conjugate 13-valent (Fifagvh40) 12/30/2014    Pneumococcal Polysaccharide ([id number]) 04/28/2017    Td vaccine (adult) 10/27/2005    Zoster Live (Zostavax) 08/06/2019    Zoster Recombinant (Shingrix) 05/22/2019, 08/06/2019        Health Maintenance   Topic Date Due    DTaP/Tdap/Td vaccine (1 - Tdap) 10/28/2005    Shingles Vaccine (2 of 2) 10/01/2019    Annual Wellness Visit (AWV)  04/09/2021    Potassium monitoring  10/12/2022    Creatinine monitoring  10/12/2022    Flu vaccine  Completed    Pneumococcal 65+ years Vaccine  Completed    COVID-19 Vaccine  Completed    Hepatitis A vaccine  Aged Out    Hepatitis B vaccine  Aged Out    Hib vaccine  Aged Out    Meningococcal (ACWY) vaccine  Aged Out     Recommendations for [employer] Due: see orders and patient instructions/AVS.  . Recommended screening schedule for the next 5-10 years is provided to the patient in written form: see Patient Instructions/AVS.    Isaura Gtz was seen today for 1 month follow-up and medicare awv. Diagnoses and all orders for this visit:    Routine general medical examination at a health care facility    Atrial fibrillation with rapid ventricular response (HCC)  -     Digoxin Level; Future    Coronary artery disease involving native heart with angina pectoris, unspecified vessel or lesion type (HCC)    Essential hypertension  -     TSH With Reflex Ft4; Future    Pure hypercholesterolemia  -     Comprehensive Metabolic Panel, Fasting; Future  -     Lipid Panel; Future    Other iron deficiency anemia  -     CBC; Future  -     Iron Binding Capacity; Future  -     Iron; Future  -     Ferritin;  Future    Gastroesophageal reflux disease without esophagitis    Chronic diastolic CHF (congestive heart failure) (HCC)    Other fatigue    Recurrent major depressive disorder, in full remission ([facility] [address]    Primary osteoarthritis involving multiple joints    Hypomagnesemia    Iron deficiency anemia due to chronic blood loss    Physical deconditioning    Vitamin D deficiency    B12 deficiency    Stress incontinence    Steatosis of liver    Sick sinus syndrome (HCC)    Elevated glucose  -     Comprehensive Metabolic Panel, Fasting; Future  -     Hemoglobin A1C; Future  -     Microalbumin / Creatinine Urine Ratio;  Future           Use nasal drops 4-5 times daily  Use dry eye drops 4-5 times daily

## 2021-12-18 ENCOUNTER — HOSPITAL ENCOUNTER (INPATIENT)
Age: 86
LOS: 4 days | Discharge: NURSING, REHAB OR LONG-TERM CARE | DRG: 309 | End: 2021-12-22
Attending: EMERGENCY MEDICINE | Admitting: PEDIATRICS
Payer: MEDICARE

## 2021-12-18 ENCOUNTER — APPOINTMENT (OUTPATIENT)
Dept: GENERAL RADIOLOGY | Age: 86
DRG: 309 | End: 2021-12-18
Payer: MEDICARE

## 2021-12-18 DIAGNOSIS — I50.23 ACUTE ON CHRONIC SYSTOLIC CONGESTIVE HEART FAILURE (HCC): Primary | ICD-10-CM

## 2021-12-18 DIAGNOSIS — R00.1 BRADYCARDIA: ICD-10-CM

## 2021-12-18 PROBLEM — I50.9 CHF (CONGESTIVE HEART FAILURE), NYHA CLASS I, UNSPECIFIED FAILURE CHRONICITY, UNSPECIFIED TYPE (HCC): Status: ACTIVE | Noted: 2021-12-18

## 2021-12-18 LAB
ALBUMIN SERPL-MCNC: 3.1 GM/DL (ref 3.4–5)
ALP BLD-CCNC: 63 U/L (ref 46–116)
ALT SERPL-CCNC: 10 U/L (ref 14–63)
ANION GAP: 6 MEQ/L (ref 8–16)
AST SERPL-CCNC: 14 U/L (ref 15–37)
BASOPHILS # BLD: 0.5 % (ref 0–3)
BILIRUB SERPL-MCNC: 0.9 MG/DL (ref 0.2–1)
BUN BLDV-MCNC: 14 MG/DL (ref 7–18)
CALCIUM IONIZED: 1.15 MMOL/L (ref 1.12–1.32)
CHLORIDE BLD-SCNC: 103 MEQ/L (ref 98–107)
CO2: 34 MEQ/L (ref 21–32)
CREAT SERPL-MCNC: 0.9 MG/DL (ref 0.6–1.3)
DIGOXIN LEVEL: 1.4 NG/ML (ref 0.5–2)
EOSINOPHILS RELATIVE PERCENT: 2.1 % (ref 0–4)
FLU A ANTIGEN: NEGATIVE
FLU B ANTIGEN: NEGATIVE
GFR, ESTIMATED: 62 ML/MIN/1.73M2
GLUCOSE BLD-MCNC: 118 MG/DL (ref 74–106)
HCT VFR BLD CALC: 39.7 % (ref 37–47)
HEMOGLOBIN: 13.1 GM/DL (ref 12–16)
LYMPHOCYTES # BLD: 24.8 % (ref 15–47)
MAGNESIUM: 1.7 MG/DL (ref 1.8–2.4)
MCH RBC QN AUTO: 33.2 PG (ref 27–31)
MCHC RBC AUTO-ENTMCNC: 33.1 GM/DL (ref 33–37)
MCV RBC AUTO: 100.3 FL (ref 81–99)
MONOCYTES: 11.6 % (ref 0–12)
NT PRO BNP: 1631 PG/ML (ref 0–1800)
PDW BLD-RTO: 13.1 % (ref 11.5–14.5)
PLATELET # BLD: 201 THOU/MM3 (ref 130–400)
PMV BLD AUTO: 7.4 FL (ref 7.4–10.4)
POC CALCIUM: 8.8 MG/DL (ref 8.5–10.1)
POTASSIUM SERPL-SCNC: 4.1 MEQ/L (ref 3.5–5.1)
RBC # BLD: 3.96 MILL/MM3 (ref 4.2–5.4)
SARS-COV-2, NAAT: NOT  DETECTED
SEGS: 61 % (ref 43–75)
SODIUM BLD-SCNC: 143 MEQ/L (ref 136–145)
TOTAL PROTEIN: 6.6 GM/DL (ref 6.4–8.2)
TROPONIN I: 0.03 NG/ML (ref 0.02–0.06)
WBC # BLD: 6.6 THOU/MM3 (ref 4.8–10.8)

## 2021-12-18 PROCEDURE — 93005 ELECTROCARDIOGRAM TRACING: CPT | Performed by: EMERGENCY MEDICINE

## 2021-12-18 PROCEDURE — 1200000003 HC TELEMETRY R&B

## 2021-12-18 PROCEDURE — 36415 COLL VENOUS BLD VENIPUNCTURE: CPT

## 2021-12-18 PROCEDURE — 83880 ASSAY OF NATRIURETIC PEPTIDE: CPT

## 2021-12-18 PROCEDURE — 87804 INFLUENZA ASSAY W/OPTIC: CPT

## 2021-12-18 PROCEDURE — 83735 ASSAY OF MAGNESIUM: CPT

## 2021-12-18 PROCEDURE — 84443 ASSAY THYROID STIM HORMONE: CPT

## 2021-12-18 PROCEDURE — 80053 COMPREHEN METABOLIC PANEL: CPT

## 2021-12-18 PROCEDURE — 71045 X-RAY EXAM CHEST 1 VIEW: CPT

## 2021-12-18 PROCEDURE — 80162 ASSAY OF DIGOXIN TOTAL: CPT

## 2021-12-18 PROCEDURE — 84100 ASSAY OF PHOSPHORUS: CPT

## 2021-12-18 PROCEDURE — 82330 ASSAY OF CALCIUM: CPT

## 2021-12-18 PROCEDURE — 6360000002 HC RX W HCPCS: Performed by: EMERGENCY MEDICINE

## 2021-12-18 PROCEDURE — 85025 COMPLETE CBC W/AUTO DIFF WBC: CPT

## 2021-12-18 PROCEDURE — 87635 SARS-COV-2 COVID-19 AMP PRB: CPT

## 2021-12-18 PROCEDURE — 99283 EMERGENCY DEPT VISIT LOW MDM: CPT

## 2021-12-18 PROCEDURE — 84484 ASSAY OF TROPONIN QUANT: CPT

## 2021-12-18 RX ORDER — PANTOPRAZOLE SODIUM 40 MG/1
40 TABLET, DELAYED RELEASE ORAL 2 TIMES DAILY
Status: DISCONTINUED | OUTPATIENT
Start: 2021-12-19 | End: 2021-12-22 | Stop reason: HOSPADM

## 2021-12-18 RX ORDER — BIOTIN 1000 MCG
1000 TABLET,CHEWABLE ORAL DAILY
Status: DISCONTINUED | OUTPATIENT
Start: 2021-12-19 | End: 2021-12-19 | Stop reason: RX

## 2021-12-18 RX ORDER — FERROUS SULFATE 325(65) MG
325 TABLET ORAL
Status: DISCONTINUED | OUTPATIENT
Start: 2021-12-19 | End: 2021-12-22 | Stop reason: HOSPADM

## 2021-12-18 RX ORDER — POLYETHYLENE GLYCOL 3350 17 G/17G
17 POWDER, FOR SOLUTION ORAL DAILY PRN
Status: DISCONTINUED | OUTPATIENT
Start: 2021-12-18 | End: 2021-12-22 | Stop reason: HOSPADM

## 2021-12-18 RX ORDER — ASPIRIN 81 MG/1
81 TABLET, CHEWABLE ORAL DAILY
Status: DISCONTINUED | OUTPATIENT
Start: 2021-12-19 | End: 2021-12-22 | Stop reason: HOSPADM

## 2021-12-18 RX ORDER — VENLAFAXINE HYDROCHLORIDE 75 MG/1
75 CAPSULE, EXTENDED RELEASE ORAL DAILY
Status: DISCONTINUED | OUTPATIENT
Start: 2021-12-19 | End: 2021-12-22 | Stop reason: HOSPADM

## 2021-12-18 RX ORDER — BUMETANIDE 0.5 MG/1
0.5 TABLET ORAL DAILY
Status: DISCONTINUED | OUTPATIENT
Start: 2021-12-19 | End: 2021-12-22 | Stop reason: HOSPADM

## 2021-12-18 RX ORDER — POTASSIUM CHLORIDE 20 MEQ/1
20 TABLET, EXTENDED RELEASE ORAL
Status: DISCONTINUED | OUTPATIENT
Start: 2021-12-19 | End: 2021-12-22 | Stop reason: HOSPADM

## 2021-12-18 RX ORDER — ONDANSETRON 4 MG/1
4 TABLET, ORALLY DISINTEGRATING ORAL EVERY 8 HOURS PRN
Status: DISCONTINUED | OUTPATIENT
Start: 2021-12-18 | End: 2021-12-22 | Stop reason: HOSPADM

## 2021-12-18 RX ORDER — FUROSEMIDE 10 MG/ML
20 INJECTION INTRAMUSCULAR; INTRAVENOUS ONCE
Status: COMPLETED | OUTPATIENT
Start: 2021-12-18 | End: 2021-12-18

## 2021-12-18 RX ORDER — DIGOXIN 125 MCG
125 TABLET ORAL 2 TIMES DAILY
Status: DISCONTINUED | OUTPATIENT
Start: 2021-12-19 | End: 2021-12-19

## 2021-12-18 RX ORDER — SODIUM CHLORIDE 0.9 % (FLUSH) 0.9 %
5-40 SYRINGE (ML) INJECTION EVERY 12 HOURS SCHEDULED
Status: DISCONTINUED | OUTPATIENT
Start: 2021-12-18 | End: 2021-12-22 | Stop reason: HOSPADM

## 2021-12-18 RX ORDER — ACETAMINOPHEN 650 MG/1
650 SUPPOSITORY RECTAL EVERY 6 HOURS PRN
Status: DISCONTINUED | OUTPATIENT
Start: 2021-12-18 | End: 2021-12-22 | Stop reason: HOSPADM

## 2021-12-18 RX ORDER — SODIUM CHLORIDE 0.9 % (FLUSH) 0.9 %
5-40 SYRINGE (ML) INJECTION PRN
Status: DISCONTINUED | OUTPATIENT
Start: 2021-12-18 | End: 2021-12-22 | Stop reason: HOSPADM

## 2021-12-18 RX ORDER — ACETAMINOPHEN 325 MG/1
650 TABLET ORAL EVERY 6 HOURS PRN
Status: DISCONTINUED | OUTPATIENT
Start: 2021-12-18 | End: 2021-12-22 | Stop reason: HOSPADM

## 2021-12-18 RX ORDER — SODIUM CHLORIDE 9 MG/ML
25 INJECTION, SOLUTION INTRAVENOUS PRN
Status: DISCONTINUED | OUTPATIENT
Start: 2021-12-18 | End: 2021-12-22 | Stop reason: HOSPADM

## 2021-12-18 RX ORDER — ONDANSETRON 2 MG/ML
4 INJECTION INTRAMUSCULAR; INTRAVENOUS EVERY 6 HOURS PRN
Status: DISCONTINUED | OUTPATIENT
Start: 2021-12-18 | End: 2021-12-22 | Stop reason: HOSPADM

## 2021-12-18 RX ADMIN — FUROSEMIDE 20 MG: 40 INJECTION, SOLUTION INTRAMUSCULAR; INTRAVENOUS at 18:16

## 2021-12-18 ASSESSMENT — ENCOUNTER SYMPTOMS
SORE THROAT: 0
ABDOMINAL PAIN: 0
SHORTNESS OF BREATH: 1
COUGH: 0
NAUSEA: 1
VOMITING: 0
WHEEZING: 0

## 2021-12-18 NOTE — ED PROVIDER NOTES
[facility]  eMERGENCY dEPARTMENT eNCOUnter             Dm Hope 19 COMPLAINT    Chief Complaint   Patient presents with    Shortness of Breath       Nurses Notes reviewed and I agree except as noted in the HPI. HPI    Italo Brower is a 80 y.o. female who presents for 3 days she has had episodes of sweating with shortness of breath. These occur both with exertion and at rest.  She denies any chest pain or pressure. She is not coughing or congested. She has been immunized against influenza and COVID-19. Her daughter states she has had similar episodes in the past, but these seem worse and more frequent. She is followed by Dr. Deidra Vernon for cardiac care, and has a pacemaker in place. She has a past history of coronary artery disease and atrial fibrillation. She has been taking her medications as prescribed. REVIEW OF SYSTEMS      Review of Systems   Constitutional: Positive for diaphoresis and malaise/fatigue. Negative for fever. HENT: Negative for congestion and sore throat. Respiratory: Positive for shortness of breath. Negative for cough and wheezing. Cardiovascular: Positive for leg swelling (About like usual). Negative for chest pain and palpitations. Gastrointestinal: Positive for nausea. Negative for abdominal pain and vomiting. Genitourinary: Negative for dysuria. Musculoskeletal: Positive for myalgias. Neurological: Positive for weakness. Negative for focal weakness, loss of consciousness and headaches. All other systems reviewed and are negative.       PAST MEDICAL HISTORY     has a past medical history of Atrial fibrillation ([address] Blood circulation, collateral, [employer] single pacemaker 4/26/2016, CAD (coronary artery disease), Cancer ([address] Carpal tunnel syndrome, Fracture dislocation of ankle, GERD (gastroesophageal reflux disease), Hyperlipidemia, Hypertension, Kidney lesion, native, right, Osteoarthritis, Osteopenia, Prolonged emergence from general anesthesia, Thyroid goiter, and Yersiniosis. SURGICAL HISTORY     has a past surgical history that includes Cholecystectomy (1986); Ankle fracture surgery (9866--2501); Hysterectomy (1971); Colon surgery (1954); Bladder surgery; eye surgery; Abdomen surgery; fracture surgery; Appendectomy; Colonoscopy; Carpal tunnel release (7/2013); joint replacement (1998); Carpal tunnel release (Right, 08/19/2017); Upper gastrointestinal endoscopy (Left, 11/28/2018); Upper gastrointestinal endoscopy (11/28/2018); Endoscopy, colon, diagnostic; Cardiac surgery; pacemaker placement; Percutaneous Transluminal Coronary Angio (01/15/2019); knee surgery (Left); and Cataract removal (Bilateral).     CURRENT MEDICATIONS    Previous Medications    ACETAMINOPHEN (TYLENOL) 500 MG TABLET    Take 1,000 mg by mouth 4 times daily     ASPIRIN 81 MG CHEWABLE TABLET    Take 1 tablet by mouth daily    BIOTIN 1000 MCG CHEW    Take 1,000 mcg by mouth daily    BUMETANIDE (BUMEX) 0.5 MG TABLET    Take 1 tablet by mouth daily    CYANOCOBALAMIN (VITAMIN B 12 PO)    Take by mouth every other day    DIGOXIN (LANOXIN) 125 MCG TABLET    Take 1 tablet by mouth 2 times daily    DOCUSATE SODIUM (COLACE) 100 MG CAPSULE    Take 100 mg by mouth daily    FERROUS SULFATE (IRON 325) 325 (65 FE) MG TABLET    Take 325 mg by mouth daily (with breakfast)    MAGNESIUM OXIDE (MAG-OX) 400 (241.3 MG) MG TABS TABLET    Take 1 tablet by mouth 2 times daily    NONFORMULARY    daily Calcium chew    PANTOPRAZOLE (PROTONIX) 40 MG TABLET    Take 1 tablet by mouth 2 times daily    POTASSIUM CHLORIDE (KLOR-CON M) 20 MEQ EXTENDED RELEASE TABLET    Take 1 tablet by mouth once daily    PROBIOTIC PRODUCT (PROBIOTIC DAILY PO)    Take by mouth daily    RIVAROXABAN (XARELTO) 15 MG TABS TABLET    Take 1 tablet by mouth Daily with supper    SUCRALFATE (CARAFATE) 1 GM TABLET    Take 1 tablet by mouth 2 times daily    VENLAFAXINE (EFFEXOR XR) 75 MG place, and time. Psychiatric:         Behavior: Behavior normal.         EMERGENCY DEPARTMENT COURSE:    She was monitored, and remained in a bradycardic paced rhythm. Heart rate went down to 45 at times. She has occasional nonpaced beats. BNP is higher than previous, so she is given Lasix 20 mg IV. Troponin I 0.029. Chest x-ray does not show definite CHF, but she has signs and symptoms of CHF. I spoke with Dr. Dmitriy Caban concerning her symptoms and bradycardic rhythm. He directed that she be sent to [facility] and admitted there to the hospitalist service. Requested that the pacemaker be interrogated and adjusted as indicated by results of the interrogation. He will be glad to see her in consultation. FINAL IMPRESSION      1. Acute on chronic systolic congestive heart failure ([address]    2. Bradycardia        DISPOSITION/PLAN    DISPOSITION   admit to [facility], ambulance transport, Dr. Hardwick Points accepting. Ambulance transport.       (Please note that portions of this note were completed with a voice recognition program.  Efforts were made to edit the dictations but occasionally words are mis-transcribed.)      Maria Elena Galicia MD  12/18/21 0872

## 2021-12-18 NOTE — LETTER
Beneficiary Notification Letter  BPCI Advanced     Your Doctor or [address],    We wanted to let you know that your health care provider, Leanne, has volunteered to take part in our Centers for Medicare & Medicaid Services (CMS) Bundled Payments for [facility] (BPCI Advanced). This doesnt change your Medicare rights or benefits and you dont need to do anything. What are bundled payments? A bundled payment combines, or bundles together, payments that Medicare makes to your health care providers for the many different kinds of medical services you might get in a specific time period. In BPCI Advanced, this time period could include a hospital inpatient stay or outpatient procedure, plus 90 days. Why would Medicare bundle payments? Bundled payments are thought of as a value-based way to pay because health care providers are responsible for both the quality and cost of medical care they give. This is a relatively new way of paying health care providers compared to thefee-for-service way Medicare has traditionally paid, where providers are paid separately for each service they provide. Bundled payments encourage these providers to work together to provide better, more coordinated care during your hospital stay, or outpatient procedure, and through your recovery. What does BPCI Advance mean for me? Youre more likely to get even better care when hospitals, doctors, and other health care providers work together. In BPCI Advanced, hospitals, doctors, and other health care providers may be rewarded for providing better, more coordinated health care. Medicare will watch BPCI Advanced participants closely to make sure that you and other patients keep getting efficient, high quality care. What do I need to know about BPCI Advanced?   Whats most important for you to know is that your Medicare rights and benefits wont change because your health care provider is participating in [facility]. Medicare will keep covering all of your medically necessary services. Even though Medicare will pay your doctor in a different way under BPCI Advanced, how much you have to pay wont change. Health care providers and suppliers who are enrolled in Medicare will submit their Medicare claims like they always have. Youll have all the same Medicare rights and protections, including the right to choose which hospital, doctor, or other health care provider you see. If you dont want to get care from a health care provider whos participating in [facility], then youll have to choose a different health care provider whos not participating in the Model. How can I give feedback about my health care? Medicare might ask you to take a voluntary survey about the services and care you received from [facility] during your hospital stay or outpatient procedure and for a specific period of time afterwards. You can decide whether you want to take the voluntary survey, but if you do, itll help Medicare make BPCI Advanced and the care of other Medicare patients better. If you have concerns or complaints about your care, you can:   · Talk to your doctor or health care provider. · Contact your Beneficiary and Family Centered Care Quality Improvement   Organization [facility]). You can get your BF[facility]-QIOs phone number  at  Medicare.gov/contacts or by calling 1-800-MEDICARE. TTY users can call  8-415.623.3466. Where can I learn more about BPCI Advanced? Learn more about BPCI Advanced at https://innovation.cms.gov/initiatives/bpci-advanced/:  · A list of all the hospitals and physician group practices in the country participating in [address] [place] [address]. · All of the inpatient and outpatient Clinical Episodes that are currently included under BPCI Advanced.  A Clinical Episode is a grouping of medical conditions or diagnoses that are included in the [id number] [place] [address] Avenue.

## 2021-12-18 NOTE — ED NOTES
Dr. Caron Paul consulting w/ Dr. Raman Burkett, requests to admit pt to hospitalist service.      Carlos Mattson, JUSTIN  12/18/21 4400

## 2021-12-19 LAB
ANION GAP SERPL CALCULATED.3IONS-SCNC: 10 MEQ/L (ref 8–16)
BUN BLDV-MCNC: 14 MG/DL (ref 7–22)
CALCIUM SERPL-MCNC: 9.3 MG/DL (ref 8.5–10.5)
CHLORIDE BLD-SCNC: 100 MEQ/L (ref 98–111)
CO2: 31 MEQ/L (ref 23–33)
CREAT SERPL-MCNC: 0.7 MG/DL (ref 0.4–1.2)
EKG ATRIAL RATE: 49 BPM
EKG ATRIAL RATE: 56 BPM
EKG Q-T INTERVAL: 484 MS
EKG Q-T INTERVAL: 528 MS
EKG QRS DURATION: 144 MS
EKG QRS DURATION: 152 MS
EKG QTC CALCULATION (BAZETT): 441 MS
EKG QTC CALCULATION (BAZETT): 481 MS
EKG R AXIS: -82 DEGREES
EKG R AXIS: -96 DEGREES
EKG T AXIS: 100 DEGREES
EKG T AXIS: 101 DEGREES
EKG VENTRICULAR RATE: 50 BPM
EKG VENTRICULAR RATE: 50 BPM
ERYTHROCYTE [DISTWIDTH] IN BLOOD BY AUTOMATED COUNT: 14.8 % (ref 11.5–14.5)
ERYTHROCYTE [DISTWIDTH] IN BLOOD BY AUTOMATED COUNT: 54.3 FL (ref 35–45)
GFR SERPL CREATININE-BSD FRML MDRD: 78 ML/MIN/1.73M2
GLUCOSE BLD-MCNC: 144 MG/DL (ref 70–108)
HCT VFR BLD CALC: 41.3 % (ref 37–47)
HEMOGLOBIN: 13.1 GM/DL (ref 12–16)
MAGNESIUM: 1.8 MG/DL (ref 1.6–2.4)
MCH RBC QN AUTO: 31.7 PG (ref 26–33)
MCHC RBC AUTO-ENTMCNC: 31.7 GM/DL (ref 32.2–35.5)
MCV RBC AUTO: 100 FL (ref 81–99)
PHOSPHORUS: 3.3 MG/DL (ref 2.4–4.7)
PLATELET # BLD: 204 THOU/MM3 (ref 130–400)
PMV BLD AUTO: 10.3 FL (ref 9.4–12.4)
POTASSIUM SERPL-SCNC: 3.9 MEQ/L (ref 3.5–5.2)
RBC # BLD: 4.13 MILL/MM3 (ref 4.2–5.4)
SODIUM BLD-SCNC: 141 MEQ/L (ref 135–145)
TSH SERPL DL<=0.05 MIU/L-ACNC: 2.28 UIU/ML (ref 0.4–4.2)
WBC # BLD: 7 THOU/MM3 (ref 4.8–10.8)

## 2021-12-19 PROCEDURE — 2580000003 HC RX 258: Performed by: STUDENT IN AN ORGANIZED HEALTH CARE EDUCATION/TRAINING PROGRAM

## 2021-12-19 PROCEDURE — 6370000000 HC RX 637 (ALT 250 FOR IP): Performed by: STUDENT IN AN ORGANIZED HEALTH CARE EDUCATION/TRAINING PROGRAM

## 2021-12-19 PROCEDURE — 36415 COLL VENOUS BLD VENIPUNCTURE: CPT

## 2021-12-19 PROCEDURE — 1200000003 HC TELEMETRY R&B

## 2021-12-19 PROCEDURE — 99233 SBSQ HOSP IP/OBS HIGH 50: CPT | Performed by: INTERNAL MEDICINE

## 2021-12-19 PROCEDURE — 93005 ELECTROCARDIOGRAM TRACING: CPT | Performed by: STUDENT IN AN ORGANIZED HEALTH CARE EDUCATION/TRAINING PROGRAM

## 2021-12-19 PROCEDURE — 85027 COMPLETE CBC AUTOMATED: CPT

## 2021-12-19 PROCEDURE — 2580000003 HC RX 258: Performed by: INTERNAL MEDICINE

## 2021-12-19 PROCEDURE — 99223 1ST HOSP IP/OBS HIGH 75: CPT | Performed by: INTERNAL MEDICINE

## 2021-12-19 PROCEDURE — 83835 ASSAY OF METANEPHRINES: CPT

## 2021-12-19 RX ORDER — DIGOXIN 125 MCG
125 TABLET ORAL NIGHTLY
Status: ON HOLD | COMMUNITY
End: 2021-12-22 | Stop reason: HOSPADM

## 2021-12-19 RX ORDER — DIGOXIN 125 MCG
125 TABLET ORAL DAILY
Status: DISCONTINUED | OUTPATIENT
Start: 2021-12-20 | End: 2021-12-20

## 2021-12-19 RX ORDER — 0.9 % SODIUM CHLORIDE 0.9 %
250 INTRAVENOUS SOLUTION INTRAVENOUS ONCE
Status: COMPLETED | OUTPATIENT
Start: 2021-12-19 | End: 2021-12-19

## 2021-12-19 RX ORDER — MULTIVITAMIN WITH IRON
250 TABLET ORAL 2 TIMES DAILY
COMMUNITY

## 2021-12-19 RX ADMIN — VENLAFAXINE HYDROCHLORIDE 75 MG: 75 CAPSULE, EXTENDED RELEASE ORAL at 09:29

## 2021-12-19 RX ADMIN — PANTOPRAZOLE SODIUM 40 MG: 40 TABLET, DELAYED RELEASE ORAL at 20:54

## 2021-12-19 RX ADMIN — MAGNESIUM GLUCONATE 500 MG ORAL TABLET 400 MG: 500 TABLET ORAL at 09:29

## 2021-12-19 RX ADMIN — SODIUM CHLORIDE, PRESERVATIVE FREE 10 ML: 5 INJECTION INTRAVENOUS at 08:22

## 2021-12-19 RX ADMIN — RIVAROXABAN 15 MG: 15 TABLET, FILM COATED ORAL at 17:04

## 2021-12-19 RX ADMIN — SODIUM CHLORIDE 250 ML: 9 INJECTION, SOLUTION INTRAVENOUS at 16:48

## 2021-12-19 RX ADMIN — PANTOPRAZOLE SODIUM 40 MG: 40 TABLET, DELAYED RELEASE ORAL at 08:22

## 2021-12-19 RX ADMIN — SODIUM CHLORIDE, PRESERVATIVE FREE 10 ML: 5 INJECTION INTRAVENOUS at 20:54

## 2021-12-19 RX ADMIN — ASPIRIN 81 MG CHEWABLE TABLET 81 MG: 81 TABLET CHEWABLE at 08:22

## 2021-12-19 RX ADMIN — FERROUS SULFATE TAB 325 MG (65 MG ELEMENTAL FE) 325 MG: 325 (65 FE) TAB at 09:29

## 2021-12-19 RX ADMIN — POTASSIUM CHLORIDE 20 MEQ: 1500 TABLET, EXTENDED RELEASE ORAL at 08:22

## 2021-12-19 RX ADMIN — MAGNESIUM GLUCONATE 500 MG ORAL TABLET 400 MG: 500 TABLET ORAL at 20:54

## 2021-12-19 RX ADMIN — SODIUM CHLORIDE, PRESERVATIVE FREE 10 ML: 5 INJECTION INTRAVENOUS at 04:39

## 2021-12-19 NOTE — ED NOTES
Pt up to [facility]. Large healing bruise noted to right buttocks.  Daughter states that she fell on 12/1/21, but had recovered well from it w/o being seen by physician     Jim Canales RN  12/18/21 1936

## 2021-12-19 NOTE — H&P
Hospitalist - History & Physical      Patient: Giovanna De Los Santos    Unit/Bed:3B-30/030-A  YOB: 1928  MRN: [id number]   Acct: [de-identified]   PCP: Cassandra Sawant MD    Date of Service: Pt seen/examined on 12/19/21  and Admitted to Inpatient with expected LOS greater than two midnights due to medical therapy. Chief Complaint: intermittent dyspnea      Assessment and Plan:  Intermittent dyspnea: with associated diaphoresis. Unclear etiology. Progressively worsened x 2 days. No other associated prodromal symptoms. No LOC/falls. Occurs at rest as well as on activity. Presentation not consistent with presyncope/vasovagal but can not completely rule out. Pacemaker interrogation done after transfer to [facility]- no abnormality noted since last interrogation (09/2021) with 32% PM dependence, rate set at 50 bmp, and 5.5 yrs of battery life remaining. Therefore, can not related to arrhythmias. Mag low at 1.7 prior to arrival.    -Will check TSH with reflex, repeat BMP/Mag, orthostatics every shift. Monitor on Tele. -Resume home Mag-OX and KCl PO. Keep K>4 and Mag >2.   -Cardiology consult- Dr. Michoacano Ram notified by staff at [facility] prior to admission. Hx Tachy-mariola syndrome/PAF: S/p Single Chamber PM 2016. Currently ventricular paced rhythm on Tele. No arrhythmias on PM interrogation. [id number] 6. HASBLED 3. On Digoxin and Xarelto. CAD: s/p PCI and stents 12/11/2018 and NIKKO in proximal LAD . On aspirin. Last TTE 01/26/2021 with EF 55-60% with mild dilated LA and mild AR/MR. No evidence of HF. On Bumex 0.5 mg daily at home- hold for now due to suspicion for orthostatic hypotension. Resume when appropriate. Primary HTN: currently controlled (160s prior to arrival). Bumex held as above. Monitor vitals. Ascending aorta 4.2 cm on TTE 01/26/2021. Noted. Depression: on Venlafaxine. Hx thyroid goiter: 2016. Not on any thyroid medications.        Code Status: Limited    Tele:   [x] yes             [] no    Diet: ADULT DIET; Regular    DVT prophylaxis: [] Lovenox                                 [] SCDs                                 [] SQ Heparin                                 [] Encourage ambulation                                 [x] Already on Anticoagulation      Disposition:      [x] TBD                             [] Home                             [] TCU                             [] Rehab                             [] Psych                             [] SNF                             [] [place]                             [] Other-      History Of Present Illness:    79 yo female with history of CAD s/p stent, PAF, Tachy-mariola syndrome, s/p single chamber pacemaker implant 04/2016, chronic postural dizziness, HTN, colon cancer (remote history) transferred from [facility] for episodes of dyspnea and diaphoresis x 3 days that has been progressively worsening. Reportedly bradycardic paced rhythm with occasional nonpaced beats, HR down to 45 bpm. CXR unremarkable. Dr. Linda Carter contacted who recommended pacer interrogation. Troponin elevated at 0.029 but otherwise negative work up. Given Lasix 20 mg IV x 1 dose for concern of CHF (reported trace edema in BLE) and transferred to Psychiatric for further care. On arrival to Psychiatric, patient afebrile and hemodynamically stable. HR 50-71. Per chart review, recently seen by Dr. Linda Carter in the office with suspicion for possible vasovagal episode. Seen by Dr. Gómez Adjutant 09/28/2021 outpatient for abnormal pacemaker function due to abnormal electrical parameters on right ventricular lead during a remote check on 9/17/201 (R 2.9 mv and imp 201 ohms) triggering safety mode switch with reported presyncope. Reported to have adequate battery life with acute intervention needed. On evaluation, patient states she has intermittent episodes of dyspnea with diaphoresis. No chest pain, dizziness, nausea, abdominal pain, syncope associated.  Occurs usually when \"doing something\" but can occur at rest too. Last episode started in the morning of the day of presentation while she was in bed. Intermittently had episodes all day and was symptomatic even during transport to [facility] per patient. The last couple of days have been more frequent and longer lasting episodes. Has been present for \"few months\" but much less frequently. Reports a mechanical fall about 2 weeks ago but had no symptoms prior to or during the fall. Thinks she tripped on her walker. No syncope or recurrent falls. No weight changes. Lives alone but daughter is around [place] the time\". Currently no symptoms. Adequate appetite. No diarrhea/constipation issues.        Past Medical History:        Diagnosis Date    Atrial fibrillation ([address]     Blood circulation, collateral     [employer] single pacemaker 4/26/2016 5/5/2016    CAD (coronary artery disease)     cath and stent in right artery    Cancer [facility]) 1954    HX  Colon     Carpal tunnel syndrome     Fracture dislocation of ankle 1980    GERD (gastroesophageal reflux disease)     Hyperlipidemia     Hypertension     Kidney lesion, native, right     found on 5/2016    Osteoarthritis     knees    Osteopenia     Prolonged emergence from general anesthesia     Thyroid goiter 9/6/2016    Yersiniosis 2016       Past Surgical History:      Procedure Laterality Date    ABDOMEN SURGERY      ANKLE FRACTURE SURGERY  1528--5906    reconstruction in 2003 and 2007    APPENDECTOMY      BLADDER SURGERY      support bladder repair    CARDIAC SURGERY      heart stent 12-11-18, [place]    CARPAL TUNNEL RELEASE  7/2013    CARPAL TUNNEL RELEASE Right 08/19/2017    Revision    CATARACT REMOVAL Bilateral     CHOLECYSTECTOMY  1986    COLON SURGERY  1954    COLONOSCOPY      ENDOSCOPY, COLON, DIAGNOSTIC      EYE SURGERY      cataract     FRACTURE SURGERY      HYSTERECTOMY  1971    JOINT REPLACEMENT  1998    L knee    KNEE SURGERY Left     total replacement    PACEMAKER PLACEMENT      PTCA  01/15/2019    Successful PCI / Drug Eluting Stent of the proximal Left Anterior Descending Coronary Artery.  UPPER GASTROINTESTINAL ENDOSCOPY Left 11/28/2018    EGD BIOPSY performed by Lise Frankel MD at [address] [place] [address] ENDOSCOPY  11/28/2018    EGD CONTROL HEMORRHAGE performed by Lise Frankel MD at [facility] End[facility]       Home Medications:   No current facility-administered medications on file prior to encounter.      Current Outpatient Medications on File Prior to Encounter   Medication Sig Dispense Refill    NONFORMULARY daily Calcium chew      ferrous sulfate (IRON 325) 325 (65 Fe) MG tablet Take 325 mg by mouth daily (with breakfast)      docusate sodium (COLACE) 100 MG capsule Take 100 mg by mouth daily      Cyanocobalamin (VITAMIN B 12 PO) Take by mouth every other day      pantoprazole (PROTONIX) 40 MG tablet Take 1 tablet by mouth 2 times daily 180 tablet 3    rivaroxaban (XARELTO) 15 MG TABS tablet Take 1 tablet by mouth Daily with supper 90 tablet 3    venlafaxine (EFFEXOR XR) 75 MG extended release capsule Take 1 capsule by mouth daily 90 capsule 3    bumetanide (BUMEX) 0.5 MG tablet Take 1 tablet by mouth daily 90 tablet 3    potassium chloride (KLOR-CON M) 20 MEQ extended release tablet Take 1 tablet by mouth once daily 90 tablet 3    Biotin 1000 MCG CHEW Take 1,000 mcg by mouth daily      Probiotic Product (PROBIOTIC DAILY PO) Take by mouth daily      digoxin (LANOXIN) 125 MCG tablet Take 1 tablet by mouth 2 times daily 180 tablet 3    sucralfate (CARAFATE) 1 GM tablet Take 1 tablet by mouth 2 times daily 180 tablet 11    magnesium oxide (MAG-OX) 400 (241.3 Mg) MG TABS tablet Take 1 tablet by mouth 2 times daily 30 tablet 0    aspirin 81 MG chewable tablet Take 1 tablet by mouth daily 30 tablet 3    acetaminophen (TYLENOL) 500 MG tablet Take 1,000 mg by mouth 4 times daily          Allergies:    Methadone, Gabapentin, Lodine [etodolac], Lyrica [pregabalin], and Ultram [tramadol]    Social History:    reports that she has never smoked. She has never used smokeless tobacco. She reports that she does not drink alcohol and does not use drugs. Family History:       Problem Relation Age of Onset    Heart Disease Mother     High Blood Pressure Mother     Heart Disease Father     High Blood Pressure Father     Heart Disease Brother     High Blood Pressure Brother     Heart Disease Son        Review of systems:   Pertinent positives as noted in the HPI. All other systems reviewed and negative. EKG: pending    PHYSICAL EXAM:  BP (!) 127/52   Pulse 50   Temp 98.3 °F (36.8 °C) (Oral)   Resp 18   Ht 5' 2\" (1.575 m)   Wt 152 lb (68.9 kg)   LMP  (LMP Unknown)   SpO2 95%   BMI 27.80 kg/m²   General appearance: No apparent distress, appears stated age and cooperative. HEENT: Normal cephalic, atraumatic without obvious deformity. Extra ocular muscles intact. Conjunctivae/corneas clear. Neck: Supple, with full range of motion. No jugular venous distention. Respiratory:  Normal respiratory effort. Clear to auscultation, bilaterally without Rales/Wheezes/Rhonchi. Cardiovascular: Bradycardic, regular rhythm, normal S1/S2 without murmurs, rubs or gallops. Abdomen: Soft, non-tender, non-distended with normal bowel sounds. Musculoskeletal:  No clubbing, cyanosis or edema bilaterally. Skin: Skin color, texture, turgor normal.  No rashes or lesions. Neurologic:  Neurovascularly intact without any focal sensory/motor deficits.  Cranial nerves: II-XII intact, grossly non-focal.  Psychiatric: Alert and oriented, thought content appropriate, normal insight  Capillary Refill: Brisk,< 3 seconds   Peripheral Pulses: +2 palpable, equal bilaterally     Labs:   Recent Labs     12/18/21  1708   WBC 6.6   HGB 13.1   HCT 39.7        Recent Labs     12/18/21  1708 12/18/21  2326    141   K 4.1 3.9    100   CO2 34* 31   BUN 14 14   CREATININE 0.9 0.7   CALCIUM  --  9.3   PHOS  --  3.3     Recent Labs     12/18/21  1708   AST 14*   ALT 10*   BILITOT 0.9   ALKPHOS 63     No results for input(s): INR in the last 72 hours. Recent Labs     12/18/21  1708   TROPONINI 0.029       Urinalysis:    Lab Results   Component Value Date    NITRU Negative 10/12/2021    WBCUA 5-9 10/01/2021    BACTERIA FEW 10/01/2021    RBCUA 0-2 10/01/2021    RBCUA 3-5 03/31/2016    BLOODU Negative 10/12/2021    BLOODU TRACE 10/01/2021    SPECGRAV 1.015 10/01/2021    GLUCOSEU Negative 10/12/2021       Radiology:   XR CHEST PORTABLE   Final Result   There is no acute intrathoracic process. **This report has been created using voice recognition software. It may contain minor errors which are inherent in voice recognition technology. **      Final report electronically signed by Dr Palmer Romo on 12/18/2021 5:26 PM        XR CHEST PORTABLE    Result Date: 12/18/2021  PROCEDURE: XR CHEST PORTABLE CLINICAL INFORMATION: 80year-old female with shortness of breath. COMPARISON: Chest x-ray 10/1/2021. TECHNIQUE: AP upright view of the chest was obtained. FINDINGS: There is a left-sided cardiac device. There is a calcified granuloma in the right midlung. There is cardiomegaly. Pulmonary vasculature is within normal limits. Calcified hilar lymph nodes are present. There is no significant pleural effusion or pneumothorax. Visualized portions of the upper abdomen are within normal limits. The osseous structures are intact. There is generalized osteopenia with degenerative changes at the shoulders. No acute fractures or suspicious osseous lesions. There is no acute intrathoracic process. **This report has been created using voice recognition software. It may contain minor errors which are inherent in voice recognition technology. ** Final report electronically signed by Dr Palmer Romo on 12/18/2021 5:26 PM          Electronically signed by   Damian Kehr, MD   PGY2, Internal Medicine  12/19/2021

## 2021-12-19 NOTE — FLOWSHEET NOTE
12/19/21 1027   Provider Notification   Reason for Communication Review case   Provider Name Dr. Jason Koch   Provider Notification Physician   Method of Communication Secure Message   Response Waiting for response   Notification Time 60 [phone]   Hx tachy-mariola syndrome, s/p PM. Episodic symptomatic bradycardia  Orthostatics positive, can we add parameters to hold dig for heart rate <60?

## 2021-12-19 NOTE — CONSULTS
Kidney lesion, native, right     found on 5/2016    Osteoarthritis     knees    Osteopenia     Prolonged emergence from general anesthesia     Thyroid goiter 09/06/2016    Yersiniosis 2016       Past Surgical History:          Procedure Laterality Date    ABDOMEN SURGERY      ANKLE FRACTURE SURGERY  0739--2011    reconstruction in 2003 and 2007    APPENDECTOMY      BLADDER SURGERY      support bladder repair    CARDIAC SURGERY      heart stent 12-11-18, [place]    CARPAL TUNNEL RELEASE  7/2013    CARPAL TUNNEL RELEASE Right 08/19/2017    Revision    CATARACT REMOVAL Bilateral     CHOLECYSTECTOMY  1986    COLON SURGERY  1954    COLONOSCOPY      ENDOSCOPY, COLON, DIAGNOSTIC      EYE SURGERY      cataract     FRACTURE SURGERY      HYSTERECTOMY  1971    JOINT REPLACEMENT  1998    L knee    KNEE SURGERY Left     total replacement    PACEMAKER PLACEMENT      PTCA  01/15/2019    Successful PCI / Drug Eluting Stent of the proximal Left Anterior Descending Coronary Artery.  UPPER GASTROINTESTINAL ENDOSCOPY Left 11/28/2018    EGD BIOPSY performed by Jeremiah Tan MD at [address] [place] [address] ENDOSCOPY  11/28/2018    EGD CONTROL HEMORRHAGE performed by Jeremiah Tan MD at [facility]       Medications Prior to Admission:      Prior to Admission medications    Medication Sig Start Date End Date Taking?  Authorizing Provider   digoxin (LANOXIN) 125 MCG tablet Take 125 mcg by mouth nightly Indications: hold heart rate is below 60   Yes Historical Provider, MD   magnesium (MAGNESIUM-OXIDE) 250 MG TABS tablet Take 250 mg by mouth 2 times daily   Yes Historical Provider, MD   NONFORMULARY daily Calcium chew   Yes Historical Provider, MD   ferrous sulfate (IRON 325) 325 (65 Fe) MG tablet Take 325 mg by mouth daily (with breakfast)   Yes Historical Provider, MD   docusate sodium (COLACE) 100 MG capsule Take 100 mg by mouth daily   Yes Historical Provider, MD   Cyanocobalamin (VITAMIN B 12 PO) Take by mouth every other day   Yes Historical Provider, MD   pantoprazole (PROTONIX) 40 MG tablet Take 1 tablet by mouth 2 times daily 10/11/21  Yes Danae Stark MD   rivaroxaban (Kaleigh Foot) 15 MG TABS tablet Take 1 tablet by mouth Daily with supper 6/16/21  Yes Danae Stark MD   venlafaxine (EFFEXOR XR) 75 MG extended release capsule Take 1 capsule by mouth daily 6/16/21  Yes Danae Stark MD   bumetanide (BUMEX) 0.5 MG tablet Take 1 tablet by mouth daily 6/16/21  Yes Danae Stark MD   potassium chloride (KLOR-CON M) 20 MEQ extended release tablet Take 1 tablet by mouth once daily 6/16/21  Yes Danae Stark MD   Biotin 1000 MCG CHEW Take 1,000 mcg by mouth daily   Yes Historical Provider, MD   Probiotic Product (PROBIOTIC DAILY PO) Take by mouth daily   Yes Historical Provider, MD   sucralfate (CARAFATE) 1 GM tablet Take 1 tablet by mouth 2 times daily 3/11/21  Yes Danae Stark MD   aspirin 81 MG chewable tablet Take 1 tablet by mouth daily 3/31/16  Yes NOELLE Wallis CNP   acetaminophen (TYLENOL) 500 MG tablet Take 1,000 mg by mouth every 6 hours as needed for Pain    Yes Historical Provider, MD   digoxin (LANOXIN) 125 MCG tablet Take 1 tablet by mouth 2 times daily  Patient taking differently: Take 125 mcg by mouth daily (with breakfast)  3/11/21   Danae Stark MD       Current Facility-Administered Medications   Medication Dose Route Frequency Provider Last Rate Last Admin    0.9 % sodium chloride bolus  250 mL IntraVENous Once Pierre Burns  mL/hr at 12/19/21 1648 250 mL at 12/19/21 1648    [START ON 12/20/2021] digoxin (LANOXIN) tablet 125 mcg  125 mcg Oral Daily [facility], DO        sodium chloride flush 0.9 % injection 5-40 mL  5-40 mL IntraVENous 2 times per day Hi Villanueva MD   10 mL at 12/19/21 7696    sodium chloride flush 0.9 % injection 5-40 mL  5-40 mL IntraVENous PRN Hi Villanueva MD        0.9 % sodium chloride infusion  25 mL IntraVENous PRN Calvin Humphries MD        ondansetron (ZOFRAN-ODT) disintegrating tablet 4 mg  4 mg Oral Q8H PRN Calvin Humphries MD        Or    ondansetron (ZOFRAN) injection 4 mg  4 mg IntraVENous Q6H PRN Calvin Humphries MD        polyethylene glycol (GLYCOLAX) packet 17 g  17 g Oral Daily PRN Calvin Humphries MD        acetaminophen (TYLENOL) tablet 650 mg  650 mg Oral Q6H PRN Calvin Humphries MD        Or    acetaminophen (TYLENOL) suppository 650 mg  650 mg Rectal Q6H PRN Calvin Humphries MD        aspirin chewable tablet 81 mg  81 mg Oral Daily Karen Sullivan MD   81 mg at 12/19/21 2259    [Held by provider] bumetanide (BUMEX) tablet 0.5 mg  0.5 mg Oral Daily Calvin Humphries MD        ferrous sulfate (IRON 325) tablet 325 mg  325 mg Oral Daily with breakfast Calvin Humphries MD   325 mg at 12/19/21 0929    magnesium oxide (MAG-OX) tablet 400 mg  400 mg Oral BID Calvin Humphries MD   400 mg at 12/19/21 0929    pantoprazole (PROTONIX) tablet 40 mg  40 mg Oral BID Calvin Humphries MD   40 mg at 12/19/21 3032    potassium chloride (KLOR-CON M) extended release tablet 20 mEq  20 mEq Oral Daily with breakfast Calvin Humphries MD   20 mEq at 12/19/21 8316    rivaroxaban (XARELTO) tablet 15 mg  15 mg Oral Dinner Calvin Humphries MD   15 mg at 12/19/21 1704    venlafaxine (EFFEXOR XR) extended release capsule 75 mg  75 mg Oral Daily Calvin Humphries MD   75 mg at 12/19/21 1684       Allergies:  Methadone, Gabapentin, Lodine [etodolac], Lyrica [pregabalin], and Ultram [tramadol]    Social History:    TOBACCO:   reports that she has never smoked. She has never used smokeless tobacco.  ETOH:   reports no history of alcohol use.       Family History:        Problem Relation Age of Onset    Heart Disease Mother     High Blood Pressure Mother     Heart Disease Father     High Blood Pressure Father     Heart Disease Brother     High Blood Pressure Brother     Heart Disease Son          Review of Systems -   General ROS: negative  Psychological ROS: negative  Hematological and Lymphatic ROS: No history of blood clots or bleeding disorder. Respiratory ROS: no cough, shortness of breath, or wheezing  Cardiovascular ROS: As per HPI  Gastrointestinal ROS: negative  Genito-Urinary ROS: no dysuria, trouble voiding, or hematuria  Musculoskeletal ROS: negative  Neurological ROS: no TIA or stroke symptoms  Dermatological ROS: negative    All others reviewed and are negative. BP (!) 152/70   Pulse 50   Temp 98.7 °F (37.1 °C) (Oral)   Resp 18   Ht 5' 2\" (1.575 m)   Wt 144 lb 12.8 oz (65.7 kg)   LMP  (LMP Unknown)   SpO2 94%   BMI 26.48 kg/m²       Physical Examination:   General appearance - alert, in no distress  Mental status - alert, oriented to person, place, and time  Neck - supple, no significant adenopathy, no JVD, or carotid bruits  Chest - clear to auscultation, no wheezes, rales or rhonchi, symmetric air entry  Heart - normal rate, regular rhythm, normal S1, S2, no murmurs, rubs, clicks or gallops  Abdomen - soft, nontender, nondistended, no masses or organomegaly  Neurological - alert, oriented, normal speech, no focal findings or movement disorder noted  Musculoskeletal - no joint tenderness, deformity or swelling  Extremities - peripheral pulses normal, no pedal edema, no clubbing or cyanosis  Skin - normal coloration and turgor, no rashes, no suspicious skin lesions noted      LABS:    No results for input(s): CKTOTAL, CKMB, CKMBINDEX, TROPONINT in the last 72 hours.   CBC:   Lab Results   Component Value Date    WBC 7.0 12/19/2021    RBC 4.13 12/19/2021    RBC 4.17 08/30/2011    HGB 13.1 12/19/2021    HCT 41.3 12/19/2021    .0 12/19/2021    MCH 31.7 12/19/2021    MCHC 31.7 12/19/2021    RDW 13.1 12/18/2021     12/19/2021    MPV 10.3 12/19/2021     BMP:    Lab Results   Component Value Date     12/18/2021    K 3.9 12/18/2021    K 3.8 01/26/2021     12/18/2021    CO2 31 12/18/2021    BUN 14 12/18/2021    LABALBU 3.1 12/18/2021    LABALBU 4.2 08/30/2011    CREATININE 0.7 12/18/2021    CALCIUM 9.3 12/18/2021    LABGLOM 78 12/18/2021    GLUCOSE 144 12/18/2021    GLUCOSE 116 08/30/2011     Hepatic Function Panel:    Lab Results   Component Value Date    ALKPHOS 63 12/18/2021    ALT 10 12/18/2021    AST 14 12/18/2021    PROT 6.6 12/18/2021    BILITOT 0.9 12/18/2021    BILIDIR <0.2 01/19/2021    LABALBU 3.1 12/18/2021    LABALBU 4.2 08/30/2011     Magnesium:    Lab Results   Component Value Date    MG 1.8 12/18/2021     Warfarin PT/INR:  No components found for: PTPATWAR, PTINRWAR  HgBA1c:    Lab Results   Component Value Date    LABA1C 5.9 10/12/2021     FLP:    Lab Results   Component Value Date    TRIG 145 12/04/2018    HDL 36 12/04/2018    LDLCALC 62 12/04/2018     TSH:    Lab Results   Component Value Date    TSH 2.280 12/18/2021     BNP: No components found for: PRO-BNP      Assessment/Plan:    Patient Active Problem List   Diagnosis    Hypertension    Fatigue    Osteoarthritis    Stress incontinence    History of colon cancer    Hyperlipidemia    Osteopenia    Atrial fibrillation with rapid ventricular response (HCC)    Near syncope    Urinary tract infection without hematuria    Sick sinus syndrome (HCC)    Bradycardia   [employer] single pacemaker 4/26/2016    VACA (dyspnea on exertion)    Mild anemia    Steatosis of liver    Pleural effusion    Chronic diastolic CHF (congestive heart failure) (HCC)    Iron deficiency anemia    Gastric polyps    Acute on chronic congestive heart failure with left ventricular diastolic dysfunction (HCC)    Coronary artery disease involving native heart with angina pectoris (HCC)    Chronic atrial fibrillation with rapid ventricular response (HCC)    CAD (coronary artery disease)    GERD (gastroesophageal reflux disease)    Angina, class III (HCC)    Gastroenteritis    Diarrhea of presumed infectious origin    Bilious vomiting with nausea    Physical deconditioning    Occult blood in stools    Generalized weakness    RUQ abdominal pain    Hyperglycemia    Hypomagnesemia    Kidney cysts    Chronic depression    PVD (peripheral vascular disease) (HCC)    CHF (congestive heart failure), NYHA class I, unspecified failure chronicity, unspecified type (HCC)    Acute on chronic systolic congestive heart failure ([facility] [address]     Briefly this is a 25-year-old female with history of CAD status post PCI, paroxysmal A. 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 She was sent to [address] for symptomatic bradycardia. While in our ER her orthostatic blood pressures were positive for orthostatic hypotension. Telemetry  Needs pacemaker interrogation  We will increase the ventricular pacing rate to 70 bpm  We will give 250 cc of normal saline  Recheck orthostatics in the evening  Monitor symptoms      Please do note hesitate to contact me for any further questions. Thank you for the opportunity to be involved in this patient's care.     Code Status: Limited    Electronically signed by Keena Parikh MD on 12/19/2021 at 5:10 PM

## 2021-12-19 NOTE — PROGRESS NOTES
PROGRESS NOTE      Patient:  Joni Gillespei      Unit/Bed:3B-30/030-A    YOB: 1928    MRN: [id number]       Acct: [de-identified]     PCP: José Luis Matias MD    Date of Admission: 12/18/2021      Assessment/Plan:    Anticipated Discharge: pending further workup    Active Hospital Problems    Diagnosis Date Noted    CHF (congestive heart failure), NYHA class I, unspecified failure chronicity, unspecified type ([facility] [address] [I50.9] 12/18/2021       Intermittent dyspnea  - Progressively worsening over last 3 days, associated with diaphoresis and occurring with both rest and activity. No other prodromal symptoms. Etiology unclear. Denies LOC / falls. Pacemaker interrogation done after transfer to [facility] - no abnormality noted since last interrogation (09/2021) with 32% PM dependence, rate set at 50 bmp, and 5.5 yrs of battery life remaining. Therefore, can not related to arrhythmias.   - Mag low at 1.7 prior to arrival. TSH normal. Resume home magnesium oxide and potassium supplementation.   - Maintain telemetry. Check orthostatic vitals every shift.  - Cardiology consult- Dr. Debbie Chairez notified by staff at [facility] prior to admission.       History of Tachy-Kali Syndrome / Paroxysmal Atrial Fibrillation  - S/p Single Chamber PM 2016. Currently ventricular paced rhythm on telemetry at 50. No arrhythmias on PM interrogation. Dig level 1.4  - [id number] 6. HASBLED 3.   - Continue Digoxin and Xarelto.      Hypomagnesemia  - Replacement protocol in place    Coronary artery disease   - s/p PCI and stents 12/11/2018 and NIKKO in proximal LAD Last TTE 01/26/2021 with EF 55-60% with mild dilated LA and mild AR/MR. No evidence of HF.   - Trop level 0.026 at [facility]. 12-lead EKG V-paced at 50. Denies chest pain. - Continue Aspirin. Primary Hypertension  - Blood pressures labile. BP max 207/79 overnight. - Bumex held as above.  May need PRN Hydralazine.   - Continue to monitor.     Ascending aortic aneurysm  - Noted on TTE 01/26/2021 - 4.2 cm. Depression  - Continue Effexor     History of thyroid goiter (2016)   - TSH normal. Not on any thyroid medications. Chief Complaint: intermittent dyspnea    Hospital Course:   81 yo female with history of CAD s/p stent, PAF, Tachy-mariola syndrome, s/p single chamber pacemaker implant 04/2016, chronic postural dizziness, HTN, colon cancer (remote history) transferred from [facility] for episodes of dyspnea and diaphoresis x 3 days that has been progressively worsening. Reportedly bradycardic paced rhythm with occasional nonpaced beats, HR down to 45 bpm. CXR unremarkable. Dr. Yo Samson contacted who recommended pacer interrogation. Troponin elevated at 0.029 but otherwise negative work up. Given Lasix 20 mg IV x 1 dose for concern of CHF (reported trace edema in BLE) and transferred to [facility] for further care.      On arrival to [facility], patient afebrile and hemodynamically stable. HR 50-71. Per chart review, recently seen by Dr. Yo Samson in the office with suspicion for possible vasovagal episode. Seen by Dr. Elizabeth Jha 09/28/2021 outpatient for abnormal pacemaker function due to abnormal electrical parameters on right ventricular lead during a remote check on 9/17/201 (R 2.9 mv and imp 201 ohms) triggering safety mode switch with reported presyncope. Reported to have adequate battery life with acute intervention needed.      On evaluation, patient states she has intermittent episodes of dyspnea with diaphoresis. No chest pain, dizziness, nausea, abdominal pain, syncope associated. Occurs usually when \"doing something\" but can occur at rest too. Last episode started in the morning of the day of presentation while she was in bed. Intermittently had episodes all day and was symptomatic even during transport to [facility] per patient. The last couple of days have been more frequent and longer lasting episodes. Has been present for \"few months\" but much less frequently.  Reports a mechanical fall about 2 weeks ago but had no symptoms prior to or during the fall. Thinks she tripped on her walker. No syncope or recurrent falls. No weight changes. Lives alone but daughter is around [place] the time\". Currently no symptoms. Adequate appetite. No diarrhea/constipation issues. Subjective:  Patient seen and examined - admitted overnight to 3B. She reports feeling good this - improvement in shortness of breath. She endorses some lightheadedness, but overall feels well today. She denies fevers, chills, dizziness, chest pain, palpitations, cough, nausea/vomiting, constipation, diarrhea, leg pain. Medications:  Reviewed    Infusion Medications    sodium chloride       Scheduled Medications    sodium chloride flush  5-40 mL IntraVENous 2 times per day    aspirin  81 mg Oral Daily    [Held by provider] bumetanide  0.5 mg Oral Daily    digoxin  125 mcg Oral BID    ferrous sulfate  325 mg Oral Daily with breakfast    magnesium oxide  400 mg Oral BID    pantoprazole  40 mg Oral BID    potassium chloride  20 mEq Oral Daily with breakfast    rivaroxaban  15 mg Oral Dinner    venlafaxine  75 mg Oral Daily     PRN Meds: sodium chloride flush, sodium chloride, ondansetron **OR** ondansetron, polyethylene glycol, acetaminophen **OR** acetaminophen      Intake/Output Summary (Last 24 hours) at 12/19/2021 1219  Last data filed at 12/19/2021 9905  Gross per 24 hour   Intake 10 ml   Output --   Net 10 ml       Diet:  ADULT DIET; Regular    Exam:  BP (!) 152/70   Pulse 50   Temp 98.7 °F (37.1 °C) (Oral)   Resp 18   Ht 5' 2\" (1.575 m)   Wt 144 lb 12.8 oz (65.7 kg)   LMP  (LMP Unknown)   SpO2 94%   BMI 26.48 kg/m²     Physical Exam  General appearance: No apparent distress, appears stated age and cooperative. HEENT: Normal cephalic, atraumatic without obvious deformity. Extra ocular muscles intact. Conjunctivae/corneas clear. Neck: Supple, with full range of motion. No jugular venous distention.    Respiratory:  Normal respiratory effort. Clear to auscultation, bilaterally without Rales/Wheezes/Rhonchi. Cardiovascular: Bradycardic, regular rhythm, normal S1/S2 without murmurs, rubs or gallops. Abdomen: Soft, non-tender, non-distended with normal bowel sounds. Musculoskeletal:  No clubbing, cyanosis or edema bilaterally. Skin: Skin color, texture, turgor normal.  No rashes or lesions. Neurologic:  Neurovascularly intact without any focal sensory/motor deficits. Cranial nerves: II-XII intact, grossly non-focal.  Psychiatric: Alert and oriented, thought content appropriate, normal insight  Capillary Refill: Brisk,< 3 seconds   Peripheral Pulses: +2 palpable, equal bilaterally     Labs:   Recent Labs     12/18/21  1708 12/19/21  0405   WBC 6.6 7.0   HGB 13.1 13.1   HCT 39.7 41.3    204     Recent Labs     12/18/21  1708 12/18/21  2326    141   K 4.1 3.9    100   CO2 34* 31   BUN 14 14   CREATININE 0.9 0.7   CALCIUM  --  9.3   PHOS  --  3.3     Recent Labs     12/18/21  1708   AST 14*   ALT 10*   BILITOT 0.9   ALKPHOS 63     No results for input(s): INR in the last 72 hours. Recent Labs     12/18/21  1708   TROPONINI 0.029       Urinalysis:      Lab Results   Component Value Date    NITRU Negative 10/12/2021    WBCUA 5-9 10/01/2021    BACTERIA FEW 10/01/2021    RBCUA 0-2 10/01/2021    RBCUA 3-5 03/31/2016    BLOODU Negative 10/12/2021    BLOODU TRACE 10/01/2021    SPECGRAV 1.015 10/01/2021    GLUCOSEU Negative 10/12/2021       Radiology:  XR CHEST PORTABLE   Final Result   There is no acute intrathoracic process. **This report has been created using voice recognition software. It may contain minor errors which are inherent in voice recognition technology. **      Final report electronically signed by Dr Cr Palencia on 12/18/2021 5:26 PM          Diet: ADULT DIET;  Regular    DVT prophylaxis: [] Lovenox                                 [] SCDs                                 [] SQ Heparin [] Encourage ambulation           [x] Already on Anticoagulation     Disposition:    [] Home       [] TCU       [] Rehab       [] Psych       [] SNF       [] [place]       [x] Other- TBD    Code Status: Limited    PT/OT Eval Status: pending      Electronically signed by Sulema Zeng MD on 12/19/2021 at 12:19 PM    This note was electronically signed. Parts of this note may have been dictated by use of voice recognition software and electronically transcribed. The note may contain errors not detected in proofreading. Please refer to my supervising physician's documentation if my documentation differs.

## 2021-12-20 LAB — GLUCOSE BLD-MCNC: 136 MG/DL (ref 70–108)

## 2021-12-20 PROCEDURE — 1200000003 HC TELEMETRY R&B

## 2021-12-20 PROCEDURE — 6370000000 HC RX 637 (ALT 250 FOR IP): Performed by: INTERNAL MEDICINE

## 2021-12-20 PROCEDURE — 6370000000 HC RX 637 (ALT 250 FOR IP): Performed by: PHYSICIAN ASSISTANT

## 2021-12-20 PROCEDURE — 97530 THERAPEUTIC ACTIVITIES: CPT

## 2021-12-20 PROCEDURE — 82948 REAGENT STRIP/BLOOD GLUCOSE: CPT

## 2021-12-20 PROCEDURE — 97162 PT EVAL MOD COMPLEX 30 MIN: CPT

## 2021-12-20 PROCEDURE — 2580000003 HC RX 258: Performed by: STUDENT IN AN ORGANIZED HEALTH CARE EDUCATION/TRAINING PROGRAM

## 2021-12-20 PROCEDURE — 6360000002 HC RX W HCPCS: Performed by: STUDENT IN AN ORGANIZED HEALTH CARE EDUCATION/TRAINING PROGRAM

## 2021-12-20 PROCEDURE — 99232 SBSQ HOSP IP/OBS MODERATE 35: CPT | Performed by: FAMILY MEDICINE

## 2021-12-20 PROCEDURE — 99232 SBSQ HOSP IP/OBS MODERATE 35: CPT | Performed by: PHYSICIAN ASSISTANT

## 2021-12-20 PROCEDURE — 6370000000 HC RX 637 (ALT 250 FOR IP): Performed by: STUDENT IN AN ORGANIZED HEALTH CARE EDUCATION/TRAINING PROGRAM

## 2021-12-20 PROCEDURE — 93005 ELECTROCARDIOGRAM TRACING: CPT | Performed by: FAMILY MEDICINE

## 2021-12-20 RX ORDER — HYDRALAZINE HYDROCHLORIDE 20 MG/ML
10 INJECTION INTRAMUSCULAR; INTRAVENOUS EVERY 4 HOURS PRN
Status: DISCONTINUED | OUTPATIENT
Start: 2021-12-20 | End: 2021-12-22 | Stop reason: HOSPADM

## 2021-12-20 RX ORDER — HYDRALAZINE HYDROCHLORIDE 20 MG/ML
5 INJECTION INTRAMUSCULAR; INTRAVENOUS EVERY 6 HOURS PRN
Status: DISCONTINUED | OUTPATIENT
Start: 2021-12-20 | End: 2021-12-20

## 2021-12-20 RX ADMIN — FERROUS SULFATE TAB 325 MG (65 MG ELEMENTAL FE) 325 MG: 325 (65 FE) TAB at 07:48

## 2021-12-20 RX ADMIN — VENLAFAXINE HYDROCHLORIDE 75 MG: 75 CAPSULE, EXTENDED RELEASE ORAL at 07:48

## 2021-12-20 RX ADMIN — PANTOPRAZOLE SODIUM 40 MG: 40 TABLET, DELAYED RELEASE ORAL at 21:14

## 2021-12-20 RX ADMIN — RIVAROXABAN 15 MG: 15 TABLET, FILM COATED ORAL at 17:00

## 2021-12-20 RX ADMIN — PANTOPRAZOLE SODIUM 40 MG: 40 TABLET, DELAYED RELEASE ORAL at 07:59

## 2021-12-20 RX ADMIN — METOPROLOL TARTRATE 25 MG: 25 TABLET, FILM COATED ORAL at 14:59

## 2021-12-20 RX ADMIN — METOPROLOL TARTRATE 25 MG: 25 TABLET, FILM COATED ORAL at 22:08

## 2021-12-20 RX ADMIN — POTASSIUM CHLORIDE 20 MEQ: 1500 TABLET, EXTENDED RELEASE ORAL at 07:48

## 2021-12-20 RX ADMIN — SODIUM CHLORIDE, PRESERVATIVE FREE 10 ML: 5 INJECTION INTRAVENOUS at 21:14

## 2021-12-20 RX ADMIN — SODIUM CHLORIDE, PRESERVATIVE FREE 10 ML: 5 INJECTION INTRAVENOUS at 07:48

## 2021-12-20 RX ADMIN — MAGNESIUM GLUCONATE 500 MG ORAL TABLET 400 MG: 500 TABLET ORAL at 07:59

## 2021-12-20 RX ADMIN — HYDRALAZINE HYDROCHLORIDE 5 MG: 20 INJECTION INTRAMUSCULAR; INTRAVENOUS at 04:31

## 2021-12-20 RX ADMIN — ASPIRIN 81 MG CHEWABLE TABLET 81 MG: 81 TABLET CHEWABLE at 07:47

## 2021-12-20 RX ADMIN — DIGOXIN 125 MCG: 125 TABLET ORAL at 07:47

## 2021-12-20 RX ADMIN — MAGNESIUM GLUCONATE 500 MG ORAL TABLET 400 MG: 500 TABLET ORAL at 21:14

## 2021-12-20 NOTE — CARE COORDINATION
12/20/21, 3:25 PM EST    DISCHARGE PLANNING EVALUATION      LE maher went up to room to speak with patient and daughter, Belen Leone expressed concern about placement being in a facility too far away and only wants to take her to [facility], but would be willing to go to [address] [place] [address] if promised a transfer to [place] when a bed is open. She gave [facility] as an absolute third backup choice.  LE maher left message for Gilson at [facility] and asked for her to call LE Serrato back.    -Seen by social work student Susan Calvillo

## 2021-12-20 NOTE — PROGRESS NOTES
Hospitalist Progress Note    Patient:  Israel Records      Unit/Bed:3B-30/030-A    YOB: 1928    MRN: [id number]       Acct: [de-identified]     PCP: Vania Clark MD    Date of Admission: 12/18/2021    Assessment/Plan:    Anticipated Discharge in :     SLAVA/Jake Valentin 1106 Problems    Diagnosis Date Noted    Acute on chronic systolic congestive heart failure ([facility] [I50.23]     CHF (congestive heart failure), NYHA class I, unspecified failure chronicity, unspecified type ([facility] [I50.9] 12/18/2021    Bradycardia [R00.1] 04/26/2016     Intermittent dyspnea  - with hx of symptomatic bradycardia with positive orthostatics  - Progressively worsening over last 3 days, associated with diaphoresis and occurring with both rest and activity. No other prodromal symptoms. Etiology unclear. Denies LOC / falls. Pacemaker interrogation done after transfer to [facility] - no abnormality noted since last interrogation (09/2021) with 32% PM dependence, rate set at 50 bmp, and 5.5 yrs of battery life remaining. Therefore, can not related to arrhythmias.   - Mag low at 1.7 prior to arrival. TSH normal. Resume home magnesium oxide and potassium supplementation.   - Maintain telemetry. Check orthostatic vitals every shift.  - Cardiology consult- Dr. Marlene Espana notified by staff at [facility] prior to admission.    - Pacemaker intorrogation done, rate adjusted to 70's     History of Tachy-Kali Syndrome / Paroxysmal Atrial Fibrillation  - S/p Single Chamber PM 2016. Currently ventricular paced rhythm on telemetry at 50. No arrhythmias on PM interrogation. Dig level 1.4  - [id number] 6. HASBLED 3.   - stop Digoxin  - continue Xarelto. - start lopressor     Hypomagnesemia  - Replacement protocol in place     Coronary artery disease   - s/p PCI and stents 12/11/2018 and NIKKO in proximal LAD Last TTE 01/26/2021 with EF 55-60% with mild dilated LA and mild AR/MR. No evidence of HF.   - Trop level 0.026 at [facility].  12-lead EKG V-paced at started in the morning of the day of presentation while she was in bed. Intermittently had episodes all day and was symptomatic even during transport to [facility] per patient. The last couple of days have been more frequent and longer lasting episodes. Has been present for \"few months\" but much less frequently. Reports a mechanical fall about 2 weeks ago but had no symptoms prior to or during the fall. Thinks she tripped on her walker. No syncope or recurrent falls. No weight changes. Lives alone but daughter is around [place] the time\". Currently no symptoms. Adequate appetite. No diarrhea/constipation issues.        Subjective:   Patient seen and examined, appears comfortable in bed, without any signs of cardiorespiratory distress. Orthostatics positive, afebrile, saturating well on room air. Still complains of chronic dyspnea and being weak. Otherwise, denies chest pain, nausea or vomiting. Medications:  Reviewed    Infusion Medications    sodium chloride       Scheduled Medications    metoprolol tartrate  25 mg Oral BID    sodium chloride flush  5-40 mL IntraVENous 2 times per day    aspirin  81 mg Oral Daily    [Held by provider] bumetanide  0.5 mg Oral Daily    ferrous sulfate  325 mg Oral Daily with breakfast    magnesium oxide  400 mg Oral BID    pantoprazole  40 mg Oral BID    potassium chloride  20 mEq Oral Daily with breakfast    rivaroxaban  15 mg Oral Dinner    venlafaxine  75 mg Oral Daily     PRN Meds: hydrALAZINE, sodium chloride flush, sodium chloride, ondansetron **OR** ondansetron, polyethylene glycol, acetaminophen **OR** acetaminophen      Intake/Output Summary (Last 24 hours) at 12/20/2021 1518  Last data filed at 12/20/2021 1526  Gross per 24 hour   Intake 870.71 ml   Output 500 ml   Net 370.71 ml       Diet:  ADULT DIET;  Regular    Exam:  /63   Pulse 70   Temp 97.9 °F (36.6 °C) (Oral)   Resp 16   Ht 5' 2\" (1.575 m)   Wt 147 lb 12.8 oz (67 kg)   LMP  (LMP Unknown)   SpO2 94%   BMI 27.03 kg/m²     General appearance: No apparent distress, appears stated age and cooperative. HEENT: Pupils equal, round, and reactive to light. Conjunctivae/corneas clear. Neck: Supple, with full range of motion. No jugular venous distention. Trachea midline. Respiratory:  Normal respiratory effort. Clear to auscultation, bilaterally without Rales/Wheezes/Rhonchi. Cardiovascular: Regular rate and rhythm with normal S1/S2 without murmurs, rubs or gallops. Abdomen: Soft, non-tender, non-distended with normal bowel sounds. Musculoskeletal: passive and active ROM x 4 extremities. Skin: Skin color, texture, turgor normal.  No rashes or lesions. Neurologic:  Neurovascularly intact without any focal sensory/motor deficits. Cranial nerves: II-XII intact, grossly non-focal.  Psychiatric: Alert and oriented, thought content appropriate, normal insight  Capillary Refill: Brisk,< 3 seconds   Peripheral Pulses: +2 palpable, equal bilaterally       Labs:   Recent Labs     12/18/21  1708 12/19/21  0405   WBC 6.6 7.0   HGB 13.1 13.1   HCT 39.7 41.3    204     Recent Labs     12/18/21  1708 12/18/21  2326    141   K 4.1 3.9    100   CO2 34* 31   BUN 14 14   CREATININE 0.9 0.7   CALCIUM  --  9.3   PHOS  --  3.3     Recent Labs     12/18/21  1708   AST 14*   ALT 10*   BILITOT 0.9   ALKPHOS 63     No results for input(s): INR in the last 72 hours. Recent Labs     12/18/21  1708   TROPONINI 0.029       Urinalysis:      Lab Results   Component Value Date    NITRU Negative 10/12/2021    WBCUA 5-9 10/01/2021    BACTERIA FEW 10/01/2021    RBCUA 0-2 10/01/2021    RBCUA 3-5 03/31/2016    BLOODU Negative 10/12/2021    BLOODU TRACE 10/01/2021    SPECGRAV 1.015 10/01/2021    GLUCOSEU Negative 10/12/2021       Radiology:  XR CHEST PORTABLE   Final Result   There is no acute intrathoracic process. **This report has been created using voice recognition software.  It may contain minor errors which are inherent in voice recognition technology. **      Final report electronically signed by Dr Zeny Herrera on 12/18/2021 5:26 PM          Diet: ADULT DIET;  Regular    DVT prophylaxis: [] Lovenox                                 [] SCDs                                 [] SQ Heparin                                 [] Encourage ambulation           [] Already on Anticoagulation     Disposition:    [] Home       [] TCU       [] Rehab       [] Psych       [] SNF       [] [place]       [] Other-    Code Status: Limited    PT/OT Eval Status:         Electronically signed by Keara Lowe MD on 12/20/2021 at 5:38 PM

## 2021-12-20 NOTE — PLAN OF CARE
DISCHARGE/PLANNING EVALUATION  12/20/21, 1:24 PM EST    Reason for Referral: SNF placement  Mental Status: Answered questions appropriately  Decision Making: Independent but asked LE maher to talk to daughter about decisions as well  Family/Social/Home Environment: Michoacano Vila states that she lives at home alone. She lives in a duplex, where her daughter Estelle Acosta is in the other half. She reports that she has a son Gissell La who lives close but \"is always just so busy\". Current Services including food security, transportation and housekeeping: Michoacano Vila reports that her daughter does all the cooking, cleaning and driving for her. Current Equipment: Walker and wheelchair  Payment Source: Medicare, [employer]  Concerns or Barriers to Discharge: None  Post acute provider list with quality measures, geographic area and applicable managed care information provided. Questions regarding selection process answered: Patient would benefit from going to a facility short-term, attempted contact with daughter to discuss options. Teach Back Method used with Michoacano Vila regarding care plan and discharge planning. Patient verbalize understanding of the plan of care and contribute to goal setting. Patient goals, treatment preferences and discharge plan: Michoacano Vila would like to discharge to home and have home health come out, however she asked LE maher to call her daughter, Estelle Acosta about the option of an ECF. When talking to Michoacano Vila about therapy recommending ECF, she stated \"You'll just have to talk to my daughter about that\". LE maher left a message for daughterEstelle to call LE Serrato back to speak to her about ECF options.    -Seen by social work student Sandra Candelario    Electronically signed by Marian Cortez.  KIRIT Silva on 12/20/2021 at 1:24 PM

## 2021-12-20 NOTE — PROGRESS NOTES
Exam:  General Appearance: alert and oriented to person, place and time, in no acute distress  Cardiovascular: normal rate, regular rhythm, normal S1 and S2, no murmurs, rubs, clicks, or gallops, distal pulses intact, no carotid bruits, no JVD  Pulmonary/Chest: clear to auscultation bilaterally- no wheezes, rales or rhonchi, normal air movement, no respiratory distress  Abdomen: soft, non-tender, non-distended, normal bowel sounds, no masses Extremities: no cyanosis, clubbing or edema, pulse   Skin: warm and dry, no rash or erythema  Head: normocephalic and atraumatic  Eyes: pupils equal, round, and reactive to light  Neck: supple and non-tender without mass, no thyromegaly   Neurological: alert, oriented, normal speech, no focal findings or movement disorder noted    Medications:    digoxin  125 mcg Oral Daily    sodium chloride flush  5-40 mL IntraVENous 2 times per day    aspirin  81 mg Oral Daily    [Held by provider] bumetanide  0.5 mg Oral Daily    ferrous sulfate  325 mg Oral Daily with breakfast    magnesium oxide  400 mg Oral BID    pantoprazole  40 mg Oral BID    potassium chloride  20 mEq Oral Daily with breakfast    rivaroxaban  15 mg Oral Dinner    venlafaxine  75 mg Oral Daily      sodium chloride       hydrALAZINE, 5 mg, Q6H PRN  sodium chloride flush, 5-40 mL, PRN  sodium chloride, 25 mL, PRN  ondansetron, 4 mg, Q8H PRN   Or  ondansetron, 4 mg, Q6H PRN  polyethylene glycol, 17 g, Daily PRN  acetaminophen, 650 mg, Q6H PRN   Or  acetaminophen, 650 mg, Q6H PRN      Lab Data:    Cardiac Enzymes:  Recent Labs     12/18/21  1708   TROPONINI 0.029       CBC:   Lab Results   Component Value Date    WBC 7.0 12/19/2021    RBC 4.13 12/19/2021    RBC 4.17 08/30/2011    HGB 13.1 12/19/2021    HCT 41.3 12/19/2021     12/19/2021       CMP:    Lab Results   Component Value Date     12/18/2021    K 3.9 12/18/2021    K 3.8 01/26/2021     12/18/2021    CO2 31 12/18/2021    BUN 14 12/18/2021    CREATININE 0.7 12/18/2021    LABGLOM 78 12/18/2021    GLUCOSE 144 12/18/2021    GLUCOSE 116 08/30/2011    CALCIUM 9.3 12/18/2021       Hepatic Function Panel:    Lab Results   Component Value Date    ALKPHOS 63 12/18/2021    ALT 10 12/18/2021    AST 14 12/18/2021    PROT 6.6 12/18/2021    BILITOT 0.9 12/18/2021    BILIDIR <0.2 01/19/2021    LABALBU 3.1 12/18/2021    LABALBU 4.2 08/30/2011       Magnesium:    Lab Results   Component Value Date    MG 1.8 12/18/2021       PT/INR:    Lab Results   Component Value Date    INR 1.49 01/19/2021       HgBA1c:    Lab Results   Component Value Date    LABA1C 5.9 10/12/2021       FLP:    Lab Results   Component Value Date    TRIG 145 12/04/2018    HDL 36 12/04/2018    LDLCALC 62 12/04/2018       TSH:    Lab Results   Component Value Date    TSH 2.280 12/18/2021         Assessment:    Near syncope  Orthostatic hypotension  Bradycardia - pacer rate changed to 70  HTN  PAF   On xarelto prior to arrival   Hx tachybrady syndrome s/p PPM  CAD with prior PCI  Ef 55-60 per TTE 1/26/21      Plan:    PPM interrogation done- increased v-pacing rate to 70 bpm  Keep mag >2 and K >4  Normal TSH  Dig level 1.4  Stop digoxin  Start lopressor 25 bid  El mendez  Get up slowly  F/up dr priest 2-4 weeks  Will see prn     Electronically signed by Magali Rey PA-C on 12/20/2021 at 9:33 AM

## 2021-12-20 NOTE — CARE COORDINATION
12/20/21, 8:29 AM EST  DISCHARGE PLANNING EVALUATION:    Patty Hameed       Admitted: 12/18/2021/ [facility] day: 2   Location: [facility] [place]/030-A Reason for admit: Bradycardia [R00.1]  Acute on chronic systolic congestive heart failure (HCC) [I50.23]  CHF (congestive heart failure), NYHA class I, unspecified failure chronicity, unspecified type ([facility] [I50.9]   PMH:  has a past medical history of Atrial [facility] ([facility] Blood circulation, collateral, [employer] single pacemaker 4/26/2016, CAD (coronary artery disease), Cancer ([facility] Carpal tunnel syndrome, Fall at home, Fracture dislocation of ankle, GERD (gastroesophageal reflux disease), Hyperlipidemia, Hypertension, Kidney lesion, native, right, Osteoarthritis, Osteopenia, Prolonged emergence from general anesthesia, Thyroid goiter, and Yersiniosis. transferred from [facility] ER for shortness of breath diaphoresis, lightheadedness and presyncopal episodes. Patient was initially evaluated by [facility] ER team.  They noted that her heart rate was in the mid 45s. Electrocardiogram in the ER showed V paced rhythm at 50 bpm  Procedure: 12/19 pacer interrogation  Barriers to Discharge:  Cardiology consulted, orthostatic vitals, PT/OT evals. B/P 189/87. On Xeralto, Dig level 1.4, Stop digoxin, Start lopressor   MYRA mendez     PCP: Kelvin Vu MD  Readmission Risk Score: 12.1 ( )%    Patient Goals/Plan/Treatment Preferences: spoke with Giselle David and her daughter [facility]. She lives in a duplex and daughter is next door. She has RW and needed DME; she plans SNF for rehab. See SW note  Transportation/Food Security/Housekeeping Addressed:  No issues identified.

## 2021-12-20 NOTE — PROGRESS NOTES
[address] [address]  INPATIENT PHYSICAL THERAPY  EVALUATION  [facility]B-30/030-A    Time In: 5783  Time Out: 5331  Timed Code Treatment Minutes: 17 Minutes  Minutes: 25          Date: 2021  Patient Name: Lisa Messer,  Gender:  female        MRN: [id number]  : 1928  (80 y.o.)      Referring Practitioner: Ijeoma Amaya MD  Diagnosis: bradycardia  Additional Pertinent Hx: Per EMR \"80 y.o. pleasant female with history of CAD status post PCI, paroxysmal A. fib, tachybradycardia syndrome status post single-chamber pacemaker, chronic postural dizziness, hypertension who was transferred from [facility] ER for shortness of breath diaphoresis, lightheadedness and presyncopal episodes. Patient was initially evaluated by [facility] ER team.  They noted that her heart rate was in the mid 45s. Electrocardiogram in the ER showed V paced rhythm at 50 bpm.  According to the patient and her daughter patient has not had any energy since the last pacemaker adjustment. According to the daughter the pacemaker was adjusted. She was evaluated by  from EP for abnormal right ventricular electrode parameters on her RV lead. According to his note no adjustments were made. Patient was also positive for her orthostatic blood pressures. Denies any fevers chills sweats or diarrhea. Cardiology was consulted for possible symptomatic bradycardia. Her echocardiogram from 2021 showed ejection fraction of 55 to 60% with mild AI. Laboratory work-up was within normal limits with normal TSH\"     Restrictions/Precautions:  Restrictions/Precautions: General Precautions,Fall Risk  Position Activity Restriction  Other position/activity restrictions: pacemaker, + orthostatics    Subjective:  Chart Reviewed: Yes  Patient assessed for rehabilitation services?: Yes  Family / Caregiver Present: No  Subjective: OK to see pt per nursing.  Pt in bed when therapy arrived, nursing present to take orthostatics. Pt agreeable to PT session, requesting to use the restroom prior to sitting in chair. General:  Overall Orientation Status: Within Normal Limits  Follows Commands: Within Functional Limits    Vision: Within Functional Limits    Hearing: Exceptions to [facility]  Hearing Exceptions: Hard of hearing/hearing concerns,Bilateral hearing aid         Pain: denies    Vitals: Orthostatic Blood Pressure: Supine: 189/87, Sittin/79, Standin/72  Oxygen: >92% during session  Pt noted to have weakness and SOB during session  Social/Functional History:    Lives With: Alone  Type of Home: House (duplex)  Home Layout: One level  Entrance Stairs - Number of Steps: 2 NALINI  Entrance Stairs - Rails: Right  Home Equipment: Rolling walker,Wheelchair-manual,Lift chair,Cane     Bathroom Shower/Tub: Walk-in shower  Bathroom Equipment: Shower chair    Receives Help From: Family  ADL Assistance: Needs assistance  Homemaking Assistance: Needs assistance  Ambulation Assistance: Independent  Transfer Assistance: Independent    Active : No  Occupation: Retired  Additional Comments: Pt reports she requires assist with bathing tasks, her daughter helps assists. Pts daughter complete cooking and cleaning tasks, is retired and able to assist as needed. OBJECTIVE:  Range of Motion:  Bilateral Lower Extremity: WNL    Strength:  Bilateral Lower Extremity: Impaired - 3/5 hip, 3+/5 knee, 4/5 ankle    Balance:  Static Sitting Balance:  Supervision  Static Standing Balance: Contact Guard Assistance, X 1, with cues for safety, with verbal cues   Dynamic Standing Balance: Minimal Assistance, X 1, with cues for safety, with verbal cues   Pt completed hand washing hands at sink and completed donning and doffing brief and pants in standing with cues for safety.    Bed Mobility:  Rolling to Left: Stand By Assistance, X 1, with head of bed raised, with rail, with verbal cues , with increased time for completion   Supine to Sit: Stand By Assistance, X 1, with head of bed raised, with rail, with verbal cues , with increased time for completion  Cues for technique with weakness noted  Transfers:  Sit to Stand: Contact Guard Assistance, Minimal Assistance, X 1, with increased time for completion, cues for hand placement, with verbal cues  [facility], X 1, cues for hand placement, with verbal cues  Cues for maintaining RW in close proximity to self at all times, fair demo, decreased safety noted. Ambulation:  Contact Guard Assistance, X 1, with cues for safety, with verbal cues , with increased time for completion  Distance: 10 feet x2  Surface: Level Tile  Device:Rolling Walker  Gait Deviations: Forward Flexed Posture, Slow Lauryn, Decreased Step Length Bilaterally, Decreased Weight Shift Bilaterally, Decreased Gait Speed, Decreased Heel Strike Bilaterally, Mild Path Deviations, Unsteady Gait and Decreased Terminal Knee Extension  Cues for overall safety with fair demo, increased SOB noted, nursing in room and aware    Functional Outcome Measures: Completed  AM-PAC Inpatient Mobility Raw Score : 16  AM-PAC Inpatient T-Scale Score : 40.78    ASSESSMENT:  Activity Tolerance:  Patient tolerance of  treatment: fair. SOB and weakness reported      Treatment Initiated: Treatment and education initiated within context of evaluation. Evaluation time included review of current medical information, gathering information related to past medical, social and functional history, completion of standardized testing, formal and informal observation of tasks, assessment of data and development of plan of care and goals. Treatment time included skilled education and facilitation of tasks to increase safety and independence with functional mobility for improved independence and quality of life. Assessment:   Body structures, Functions, Activity limitations: Decreased functional mobility ,Decreased ADL status,Decreased balance,Decreased posture,Decreased strength,Decreased endurance  Assessment: Pt cont to require skilled PT services to increase strength, progress with balance and safety to increase IND with functional tasks to return home safely, decrease falls and decrease reliance on daughter for support. Prognosis: Fair    REQUIRES PT FOLLOW UP: Yes    Discharge Recommendations:  Discharge Recommendations: Continue to assess pending progress,Subacute/Skilled Nursing Facility,24 hour supervision or assist,Patient would benefit from continued therapy after discharge    Patient Education:  PT Education: General Safety,Gait Training,Goals,PT Role,Plan of Care,Functional Mobility Training,Equipment,Transfer Training    Equipment Recommendations:  Equipment Needed: No    Plan:  Times per week: 5x GM  Current Treatment Recommendations: Strengthening,Neuromuscular Re-education,Home Exercise Program,Safety Education & Training,Balance Training,Endurance Training,Patient/Caregiver Education & Training,Functional Mobility Training,Equipment Evaluation, Education, & procurement,Transfer Training,Gait Training,Stair training    Goals:  Patient goals : get better and go home  Short term goals  Time Frame for Short term goals: by discharge  Short term goal 1: Pt will amb with RW with S for >50 feet to progress towards PLOF. Short term goal 2: Pt will demo IND with transfers with good safety and RW for support to progress towards PLOF. Short term goal 3: Pt will demo S for stair negotiation with rail for support to return home safely. Short term goal 4: Pt will demo IND with bed mobility tasks with bed flat to return home safely and progress towards PLOf. Long term goals  Time Frame for Long term goals : NA due to short ELOS    Following session, patient left in safe position with all fall risk precautions in place. Pt left in bedside chair with all needs and call light in reach following session, chair alarm on, nursing aware.

## 2021-12-21 LAB
ANION GAP SERPL CALCULATED.3IONS-SCNC: 15 MEQ/L (ref 8–16)
BUN BLDV-MCNC: 15 MG/DL (ref 7–22)
CALCIUM SERPL-MCNC: 9.3 MG/DL (ref 8.5–10.5)
CHLORIDE BLD-SCNC: 98 MEQ/L (ref 98–111)
CO2: 24 MEQ/L (ref 23–33)
CREAT SERPL-MCNC: 0.6 MG/DL (ref 0.4–1.2)
EKG ATRIAL RATE: 77 BPM
EKG Q-T INTERVAL: 444 MS
EKG QRS DURATION: 142 MS
EKG QTC CALCULATION (BAZETT): 479 MS
EKG R AXIS: -52 DEGREES
EKG T AXIS: 97 DEGREES
EKG VENTRICULAR RATE: 70 BPM
ERYTHROCYTE [DISTWIDTH] IN BLOOD BY AUTOMATED COUNT: 14.9 % (ref 11.5–14.5)
ERYTHROCYTE [DISTWIDTH] IN BLOOD BY AUTOMATED COUNT: 55.8 FL (ref 35–45)
GFR SERPL CREATININE-BSD FRML MDRD: > 90 ML/MIN/1.73M2
GLUCOSE BLD-MCNC: 150 MG/DL (ref 70–108)
HCT VFR BLD CALC: 41.7 % (ref 37–47)
HEMOGLOBIN: 13.2 GM/DL (ref 12–16)
MAGNESIUM: 1.9 MG/DL (ref 1.6–2.4)
MCH RBC QN AUTO: 32 PG (ref 26–33)
MCHC RBC AUTO-ENTMCNC: 31.7 GM/DL (ref 32.2–35.5)
MCV RBC AUTO: 101 FL (ref 81–99)
PLATELET # BLD: 193 THOU/MM3 (ref 130–400)
PMV BLD AUTO: 10.4 FL (ref 9.4–12.4)
POTASSIUM SERPL-SCNC: 3.9 MEQ/L (ref 3.5–5.2)
RBC # BLD: 4.13 MILL/MM3 (ref 4.2–5.4)
SODIUM BLD-SCNC: 137 MEQ/L (ref 135–145)
WBC # BLD: 8.7 THOU/MM3 (ref 4.8–10.8)

## 2021-12-21 PROCEDURE — 85027 COMPLETE CBC AUTOMATED: CPT

## 2021-12-21 PROCEDURE — 97165 OT EVAL LOW COMPLEX 30 MIN: CPT

## 2021-12-21 PROCEDURE — 97535 SELF CARE MNGMENT TRAINING: CPT

## 2021-12-21 PROCEDURE — 36415 COLL VENOUS BLD VENIPUNCTURE: CPT

## 2021-12-21 PROCEDURE — 2580000003 HC RX 258: Performed by: STUDENT IN AN ORGANIZED HEALTH CARE EDUCATION/TRAINING PROGRAM

## 2021-12-21 PROCEDURE — 97530 THERAPEUTIC ACTIVITIES: CPT

## 2021-12-21 PROCEDURE — 99232 SBSQ HOSP IP/OBS MODERATE 35: CPT | Performed by: FAMILY MEDICINE

## 2021-12-21 PROCEDURE — 6360000002 HC RX W HCPCS: Performed by: PHYSICIAN ASSISTANT

## 2021-12-21 PROCEDURE — 6370000000 HC RX 637 (ALT 250 FOR IP): Performed by: PHYSICIAN ASSISTANT

## 2021-12-21 PROCEDURE — 1200000003 HC TELEMETRY R&B

## 2021-12-21 PROCEDURE — 83735 ASSAY OF MAGNESIUM: CPT

## 2021-12-21 PROCEDURE — 80048 BASIC METABOLIC PNL TOTAL CA: CPT

## 2021-12-21 PROCEDURE — 6370000000 HC RX 637 (ALT 250 FOR IP): Performed by: STUDENT IN AN ORGANIZED HEALTH CARE EDUCATION/TRAINING PROGRAM

## 2021-12-21 RX ADMIN — HYDRALAZINE HYDROCHLORIDE 10 MG: 20 INJECTION INTRAMUSCULAR; INTRAVENOUS at 00:01

## 2021-12-21 RX ADMIN — SODIUM CHLORIDE, PRESERVATIVE FREE 10 ML: 5 INJECTION INTRAVENOUS at 20:03

## 2021-12-21 RX ADMIN — FERROUS SULFATE TAB 325 MG (65 MG ELEMENTAL FE) 325 MG: 325 (65 FE) TAB at 09:20

## 2021-12-21 RX ADMIN — VENLAFAXINE HYDROCHLORIDE 75 MG: 75 CAPSULE, EXTENDED RELEASE ORAL at 09:19

## 2021-12-21 RX ADMIN — PANTOPRAZOLE SODIUM 40 MG: 40 TABLET, DELAYED RELEASE ORAL at 09:20

## 2021-12-21 RX ADMIN — PANTOPRAZOLE SODIUM 40 MG: 40 TABLET, DELAYED RELEASE ORAL at 20:03

## 2021-12-21 RX ADMIN — SODIUM CHLORIDE, PRESERVATIVE FREE 10 ML: 5 INJECTION INTRAVENOUS at 09:19

## 2021-12-21 RX ADMIN — RIVAROXABAN 15 MG: 15 TABLET, FILM COATED ORAL at 17:11

## 2021-12-21 RX ADMIN — ACETAMINOPHEN 650 MG: 325 TABLET ORAL at 17:11

## 2021-12-21 RX ADMIN — MAGNESIUM GLUCONATE 500 MG ORAL TABLET 400 MG: 500 TABLET ORAL at 20:02

## 2021-12-21 RX ADMIN — METOPROLOL TARTRATE 25 MG: 25 TABLET, FILM COATED ORAL at 20:02

## 2021-12-21 RX ADMIN — ASPIRIN 81 MG CHEWABLE TABLET 81 MG: 81 TABLET CHEWABLE at 09:20

## 2021-12-21 RX ADMIN — POTASSIUM CHLORIDE 20 MEQ: 1500 TABLET, EXTENDED RELEASE ORAL at 09:20

## 2021-12-21 RX ADMIN — MAGNESIUM GLUCONATE 500 MG ORAL TABLET 400 MG: 500 TABLET ORAL at 09:19

## 2021-12-21 RX ADMIN — METOPROLOL TARTRATE 25 MG: 25 TABLET, FILM COATED ORAL at 09:19

## 2021-12-21 RX ADMIN — ACETAMINOPHEN 650 MG: 325 TABLET ORAL at 06:15

## 2021-12-21 ASSESSMENT — PAIN DESCRIPTION - LOCATION: LOCATION: GENERALIZED

## 2021-12-21 ASSESSMENT — PAIN SCALES - GENERAL
PAINLEVEL_OUTOF10: 7
PAINLEVEL_OUTOF10: 0
PAINLEVEL_OUTOF10: 10
PAINLEVEL_OUTOF10: 0

## 2021-12-21 ASSESSMENT — PAIN DESCRIPTION - PAIN TYPE: TYPE: ACUTE PAIN

## 2021-12-21 ASSESSMENT — PAIN DESCRIPTION - ONSET: ONSET: AWAKENED FROM SLEEP

## 2021-12-21 ASSESSMENT — PAIN DESCRIPTION - DESCRIPTORS: DESCRIPTORS: DISCOMFORT

## 2021-12-21 ASSESSMENT — PAIN DESCRIPTION - FREQUENCY: FREQUENCY: CONTINUOUS

## 2021-12-21 NOTE — PROGRESS NOTES
Hospitalist Progress Note    Patient:  Lisa Messer      Unit/Bed:3B-30/030-A    YOB: 1928    MRN: [id number]       Acct: [de-identified]     PCP: Tana Osorio MD    Date of Admission: 12/18/2021    Assessment/Plan:    Anticipated Discharge in :     Pending sale to [facility] Problems    Diagnosis Date Noted    Acute on chronic systolic congestive heart failure ([facility] [I50.23]     CHF (congestive heart failure), NYHA class I, unspecified failure chronicity, unspecified type ([facility] [I50.9] 12/18/2021    Bradycardia [R00.1] 04/26/2016     Intermittent dyspnea  - with hx of symptomatic bradycardia with positive orthostatics  - Progressively worsening over last 3 days, associated with diaphoresis and occurring with both rest and activity. No other prodromal symptoms. Etiology unclear. Denies LOC / falls. Pacemaker interrogation done after transfer to [facility] - no abnormality noted since last interrogation (09/2021) with 32% PM dependence, rate set at 50 bmp, and 5.5 yrs of battery life remaining. Therefore, can not related to arrhythmias.   - Mag low at 1.7 prior to arrival. TSH normal. Resume home magnesium oxide and potassium supplementation.   - Maintain telemetry. Check orthostatic vitals every shift.  - Cardiology consult- Dr. Michelle Price notified by staff at [facility] prior to admission.    - Pacemaker intorrogation done, rate adjusted to 70's     History of Tachy-Kali Syndrome / Paroxysmal Atrial Fibrillation  - S/p Single Chamber PM 2016. Lidia Edge No arrhythmias on PM interrogation. Dig level 1.4. Pacer adjusted to 70  - [id number] 6. HASBLED 3.   - stop Digoxin  - continue Xarelto. - start lopressor    Orthostatic hypotension, resolved  Cardiology following for med adjustments     Hypomagnesemia  - Replacement protocol in place     Coronary artery disease   - s/p PCI and stents 12/11/2018 and NIKKO in proximal LAD Last TTE 01/26/2021 with EF 55-60% with mild dilated LA and mild AR/MR.  No evidence of HF.   - Trop level 0.026 at [facility]. 12-lead EKG V-paced at 50. Denies chest pain. - Continue Aspirin.      Primary Hypertension  - Blood pressures labile. BP max 207/79 overnight. - Bumex held as above. May need PRN Hydralazine.   - Continue to monitor.     Ascending aortic aneurysm  - Noted on TTE 01/26/2021 - 4.2 cm.     Depression  - Continue Effexor     History of thyroid goiter (2016)   - TSH normal. Not on any thyroid medications.     Disposition  Likely to be discharged tomorrow to SNF    Chief Complaint:   Intermittent dyspnea    Hospital Course:   Per admission H&P:    79 yo female with history of CAD s/p stent, PAF, Tachy-mariola syndrome, s/p single chamber pacemaker implant 04/2016, chronic postural dizziness, HTN, colon cancer (remote history) transferred from [facility] for episodes of dyspnea and diaphoresis x 3 days that has been progressively worsening. Reportedly bradycardic paced rhythm with occasional nonpaced beats, HR down to 45 bpm. CXR unremarkable. Dr. Marion Lay contacted who recommended pacer interrogation. Troponin elevated at 0.029 but otherwise negative work up. Given Lasix 20 mg IV x 1 dose for concern of CHF (reported trace edema in BLE) and transferred to [facility] for further care.      On arrival to [facility], patient afebrile and hemodynamically stable. HR 50-71. Per chart review, recently seen by Dr. Marion Lay in the office with suspicion for possible vasovagal episode. Seen by Dr. Madelin Martines 09/28/2021 outpatient for abnormal pacemaker function due to abnormal electrical parameters on right ventricular lead during a remote check on 9/17/201 (R 2.9 mv and imp 201 ohms) triggering safety mode switch with reported presyncope. Reported to have adequate battery life with acute intervention needed.      On evaluation, patient states she has intermittent episodes of dyspnea with diaphoresis. No chest pain, dizziness, nausea, abdominal pain, syncope associated. Occurs usually when \"doing something\" but can occur at rest too. Last episode started in the morning of the day of presentation while she was in bed. Intermittently had episodes all day and was symptomatic even during transport to [facility] per patient. The last couple of days have been more frequent and longer lasting episodes. Has been present for \"few months\" but much less frequently. Reports a mechanical fall about 2 weeks ago but had no symptoms prior to or during the fall. Thinks she tripped on her walker. No syncope or recurrent falls. No weight changes. Lives alone but daughter is around [place] the time\". Currently no symptoms. Adequate appetite. No diarrhea/constipation issues.        Subjective:   Patient seen and examined, appears comfortable in bed, without any signs of cardiorespiratory distress. Orthostatics Vitals negative, afebrile, saturating well on room air. Patient denies chest pain, nausea or vomiting. Pending placement. Medications:  Reviewed    Infusion Medications    sodium chloride       Scheduled Medications    metoprolol tartrate  25 mg Oral BID    sodium chloride flush  5-40 mL IntraVENous 2 times per day    aspirin  81 mg Oral Daily    [Held by provider] bumetanide  0.5 mg Oral Daily    ferrous sulfate  325 mg Oral Daily with breakfast    magnesium oxide  400 mg Oral BID    pantoprazole  40 mg Oral BID    potassium chloride  20 mEq Oral Daily with breakfast    rivaroxaban  15 mg Oral Dinner    venlafaxine  75 mg Oral Daily     PRN Meds: hydrALAZINE, sodium chloride flush, sodium chloride, ondansetron **OR** ondansetron, polyethylene glycol, acetaminophen **OR** acetaminophen      Intake/Output Summary (Last 24 hours) at 12/21/2021 1510  Last data filed at 12/21/2021 1019  Gross per 24 hour   Intake 420 ml   Output 200 ml   Net 220 ml       Diet:  ADULT DIET;  Regular    Exam:  /62   Pulse 70   Temp 97.6 °F (36.4 °C) (Oral)   Resp 14   Ht 5' 2\" (1.575 m)   Wt 149 lb (67.6 kg)   LMP  (LMP Unknown)   SpO2 95%   BMI 27.25 kg/m² General appearance: No apparent distress, appears stated age and cooperative. HEENT: Pupils equal, round, and reactive to light. Conjunctivae/corneas clear. Neck: Supple, with full range of motion. No jugular venous distention. Trachea midline. Respiratory:  Normal respiratory effort. Clear to auscultation, bilaterally without Rales/Wheezes/Rhonchi. Cardiovascular: Regular rate and rhythm with normal S1/S2 without murmurs, rubs or gallops. Abdomen: Soft, non-tender, non-distended with normal bowel sounds. Musculoskeletal: passive and active ROM x 4 extremities. Skin: Skin color, texture, turgor normal.  No rashes or lesions. Neurologic:  Neurovascularly intact without any focal sensory/motor deficits. Cranial nerves: II-XII intact, grossly non-focal.  Psychiatric: Alert and oriented, thought content appropriate, normal insight  Capillary Refill: Brisk,< 3 seconds   Peripheral Pulses: +2 palpable, equal bilaterally       Labs:   Recent Labs     12/18/21  1708 12/19/21  0405 12/21/21  0320   WBC 6.6 7.0 8.7   HGB 13.1 13.1 13.2   HCT 39.7 41.3 41.7    204 193     Recent Labs     12/18/21  1708 12/18/21  2326 12/21/21  0320    141 137   K 4.1 3.9 3.9    100 98   CO2 34* 31 24   BUN 14 14 15   CREATININE 0.9 0.7 0.6   CALCIUM  --  9.3 9.3   PHOS  --  3.3  --      Recent Labs     12/18/21  1708   AST 14*   ALT 10*   BILITOT 0.9   ALKPHOS 63     No results for input(s): INR in the last 72 hours. Recent Labs     12/18/21  1708   TROPONINI 0.029       Urinalysis:      Lab Results   Component Value Date    NITRU Negative 10/12/2021    WBCUA 5-9 10/01/2021    BACTERIA FEW 10/01/2021    RBCUA 0-2 10/01/2021    RBCUA 3-5 03/31/2016    BLOODU Negative 10/12/2021    BLOODU TRACE 10/01/2021    SPECGRAV 1.015 10/01/2021    GLUCOSEU Negative 10/12/2021       Radiology:  XR CHEST PORTABLE   Final Result   There is no acute intrathoracic process.                **This report has been created using voice

## 2021-12-21 NOTE — CARE COORDINATION
Discharge Planning Update: Following for bradycardia, CHF. Cardiology following. Has pacer. Interrogated. Increased Vent pacing rate to 70 bpm.  Currently VSS. HR 70. Room air. SW following for possible ECF placement for therapy.

## 2021-12-21 NOTE — CARE COORDINATION
12/21/21, 11:24 AM EST    DISCHARGE PLANNING EVALUATION    Have been working with Kathrine Rodriguez at [facility]. She told SW that they do not have a bed, but [facility] may have a bed. Called and spoke with Montrellmayo Sosa at The [address] [place] [address]. They will verify they can meet her needs at [facility]. If they can accept, it will not be until tomorrow. Update 1:55pm: Jun Best has been accepted to The [address] [place] [address]. The will be able to accept her tomorrow. Spoke with the patient in her room and daughter Wilberto De Leon over the phone. Both are agreeable to plan. Also updated Carlos Manuel ANDERSON.

## 2021-12-21 NOTE — PROGRESS NOTES
Nisa Whitehead 60  INPATIENT OCCUPATIONAL THERAPY  STRZ CCU-STEPDOWN 3B  EVALUATION    Time:   Time In: 1011  Time Out: 1051  Timed Code Treatment Minutes: 30 Minutes  Minutes: 40          Date: 2021  Patient Name: Zhen Sam,   Gender: female      MRN: [id number]  : 1928  (80 y.o.)  Referring Practitioner: Lima Young MD  Diagnosis: CHF  Additional Pertinent Hx: per chart review; 80 y.o. pleasant female with history of CAD status post PCI, paroxysmal A. fib, tachybradycardia syndrome status post single-chamber pacemaker, chronic postural dizziness, hypertension who was transferred from [facility] ER for shortness of breath diaphoresis, lightheadedness and presyncopal episodes. Patient was initially evaluated by [facility] ER team.  They noted that her heart rate was in the mid 45s. Electrocardiogram in the ER showed V paced rhythm at 50 bpm.  According to the patient and her daughter patient has not had any energy since the last pacemaker adjustment. According to the daughter the pacemaker was adjusted. She was evaluated by  from EP for abnormal right ventricular electrode parameters on her RV lead. According to his note no adjustments were made. Patient was also positive for her orthostatic blood pressures. Denies any fevers chills sweats or diarrhea. Cardiology was consulted for possible symptomatic bradycardia. Her echocardiogram from 2021 showed ejection fraction of 55 to 60% with mild AI. Laboratory work-up was within normal limits with normal TSH    Restrictions/Precautions:  Restrictions/Precautions: General Precautions,Fall Risk  Position Activity Restriction  Other position/activity restrictions: pacemaker, + orthostatics    Subjective  Chart Reviewed: Cj Ellsworth and Physical  Patient assessed for rehabilitation services?: Yes  Family / Caregiver Present: No    Subjective: RN approved session.  patient supine in bed and asking to use toilet. A & O x 4. appears to have mild cognitive deficits. Pain:  Pain Assessment  Patient Currently in Pain: Denies    Vitals: Vitals not assessed per clinical judgement, see nursing flowsheet    Social/Functional History:  Lives With: Alone  Type of Home: House  Home Layout: One level  Home Access: Stairs to enter with rails  Entrance Stairs - Number of Steps: 2  Entrance Stairs - Rails: Right  Home Equipment: Rolling walker,Wheelchair-manual,Lift chair,Cane   Bathroom Shower/Tub: Walk-in shower  Bathroom Toilet: Handicap height  Bathroom Equipment: Shower chair  Bathroom Accessibility: Walker accessible    Receives Help From: Family  ADL Assistance: Needs assistance (bathing and fastening bra)  Homemaking Assistance: Needs assistance  Ambulation Assistance: Independent  Transfer Assistance: Independent    Active : No  Patient's  Info: daughter provides transportation services  Occupation: Retired  Additional Comments: Pt reports she requires assist with bathing tasks, her daughter helps assists. Pts daughter complete cooking and cleaning tasks, is retired and able to assist as needed. VISION:Corrected    HEARING:  hard of hearing    COGNITION: Slow Processing, Decreased Recall, Decreased Insight and Decreased Safety Awareness    RANGE OF MOTION:  Right Upper Extremity: WFL  Left Upper Extremity:  WFL    STRENGTH:  Right Upper Extremity: shldr 4-/5 elbow flex 4/5 ext 4-/5  (F)  Left Upper Extremity:  shldr 4-/5 elbow flex and ext 4-/5  (F-)    SENSATION:   WFL    ADL:   Grooming: Contact Guard Assistance. to stand at sink to wash hands, s/u for applying deodorant following bathing   Bathing: Stand By Assistance. for UB, MIN A for back, CGA in standing with use of 2 w/w for support to bathe patrick area   Upper Extremity Dressing: Minimal Assistance. to change hospital gown   Lower Extremity Dressing: Stand By Assistance. for changing soiled depends and donning pants seated EOB. formal and informal observation of tasks, assessment of data and development of plan of care and goals. Treatment time included skilled education and facilitation of tasks to increase safety and independence with ADL's for improved functional independence and quality of life. Discharge Recommendations:  Continue to assess pending progress,Patient would benefit from continued therapy after discharge,Subacute/Skilled Nursing Facility,ECF with OT    Patient Education:  OT Education: OT Lupe Rg of Care,Energy Conservation,Precautions,ADL Adaptive Strategies  Patient Education: combining tasks during LB dressing to prevent fatigue  Barriers to Learning: variable cognition    Equipment Recommendations:  Equipment Needed: No    Plan:  Times per week: 3-5x  Times per day: Daily  Current Treatment Recommendations: Jessy Melendez Re-education,Patient/Caregiver Education & Training,Self-Care / ADL,Safety Education & Training. See long-term goal time frame for expected duration of plan of care. If no long-term goals established, a short length of stay is anticipated. Goals:  Patient goals : return home at [facility]  Short term goals  Time Frame for Short term goals: by discharge  Short term goal 1: patient will tolerate 6 min functional standing with two hand release with MOD I to increase ease with toileting and grooming. Short term goal 2: patient will tolerate moderate resistive UB exer to increase UB strength for self care routine. Short term goal 3: patient will complete ADL routine with MOD I except assist with bathing and donning bra as prior with 0-1 verbal cues for safety and sequencing. Short term goal 4: patient will increase activity tolerance to functionally ambulate house hold distances with MOD I with 0-1 verbal cues for safety and sequencing. Following session, patient left in safe position with all fall risk precautions in place.

## 2021-12-21 NOTE — DISCHARGE INSTR - COC
Continuity of Care Form    Patient Name: Esther Sánchez   :  1928  MRN:  [id number]    Admit date:  2021  Discharge date:  21    Code Status Order: Limited   Advance Directives:      Admitting Physician:  Tiffany Moore MD  PCP: Colette Guy MD    Discharging Nurse: [facility] Unit/Room#: 3B-30/030-A  Discharging Unit Phone Number: [phone]    Emergency Contact:   Extended Emergency Contact Information  Primary Emergency Contact: Lily Heck [facility]  Address: [place] [address]            [facility], [address] Phone: [phone]  Mobile Phone: [phone]  Relation: Child  Secondary Emergency Contact: Lily Heck ([address], [address] Phone: [phone]  Mobile Phone: [phone]  Relation: Child    Past Surgical History:  Past Surgical History:   Procedure Laterality Date    ABDOMEN SURGERY      ANKLE FRACTURE SURGERY  2206--8312    reconstruction in  and     APPENDECTOMY      BLADDER SURGERY      support bladder repair    CARDIAC SURGERY      heart stent 18, [place]    CARPAL TUNNEL RELEASE  2013    CARPAL TUNNEL RELEASE Right 2017    Revision    CATARACT REMOVAL Bilateral     CHOLECYSTECTOMY  1986    COLON SURGERY      COLONOSCOPY      ENDOSCOPY, COLON, DIAGNOSTIC      EYE SURGERY      cataract     FRACTURE SURGERY      HYSTERECTOMY  1971    JOINT REPLACEMENT      L knee    KNEE SURGERY Left     total replacement    PACEMAKER PLACEMENT      PTCA  01/15/2019    Successful PCI / Drug Eluting Stent of the proximal Left Anterior Descending Coronary Artery.     UPPER GASTROINTESTINAL ENDOSCOPY Left 2018    [facility]    EGD CONTROL HEMORRHAGE performed by Lise Frankel MD at [facility]       Immunization History:   Immunization History   Administered Date(s) Administered    COVID-19, Moderna, Primary or Immunocompromised, PF, 100mcg/0.5mL 01/20/2021, 02/17/2021, 11/03/2021    Influenza 10/23/2012    Influenza, High Dose (Fluzone 65 yrs and older) 09/19/2017, 10/23/2018, 10/04/2019, 09/30/2020, 10/04/2021    Influenza, Fatmata Miller, IM, (6 mo and older Fluzone, Flulaval, Fluarix and 3 yrs and older Afluria) 10/07/2016    Pneumococcal Conjugate 13-valent (Edvtmud19) 12/30/2014    Pneumococcal Polysaccharide (Njiwvjhlx21) 04/28/2017    Td vaccine (adult) 10/27/2005    Zoster Live (Zostavax) 08/06/2019    Zoster Recombinant (Shingrix) 05/22/2019, 08/06/2019       Active Problems:  Patient Active Problem List   Diagnosis Code    Hypertension I10    Fatigue R53.83    Osteoarthritis M19.90    Stress incontinence N39.3    History of colon cancer Z85.038    Hyperlipidemia E78.5    Osteopenia M85.80    Atrial fibrillation with rapid ventricular response (HCC) I48.91    Near syncope R55    Urinary tract infection without hematuria N39.0    Sick sinus syndrome ([facility]) I49.5    Bradycardia R00.1    [employer] single pacemaker 4/26/2016 Z95.0    VACA (dyspnea on exertion) R06.00    Mild anemia D64.9    Steatosis of liver K76.0    Pleural effusion J90    Chronic diastolic CHF (congestive heart failure) ([facility]) I50.32    Iron deficiency anemia D50.9    Gastric polyps K31.7    Acute on chronic congestive heart failure with left ventricular diastolic dysfunction (HCC) I50.33    Coronary artery disease involving native heart with angina pectoris ([facility]) I25.119    Chronic atrial fibrillation with rapid ventricular response (HCC) I48.20    CAD (coronary artery disease) I25.10    GERD (gastroesophageal reflux disease) K21.9    Angina, class III ([facility]) I20.9    Gastroenteritis K52.9    Diarrhea of presumed infectious origin R19.7    Bilious vomiting with nausea R11.14    Physical deconditioning R53.81    Occult blood in stools R19.5    Generalized weakness R53.1    RUQ abdominal pain R10.11    Hyperglycemia R73.9    Hypomagnesemia E83.42    Kidney cysts N28.1    Chronic depression F32. A    PVD (peripheral vascular disease) ([facility]) I73.9    CHF (congestive heart failure), NYHA class I, unspecified failure chronicity, unspecified type ([facility]) I50.9    Acute on chronic systolic congestive heart failure ([facility]) I50.23       Isolation/Infection:   Isolation            No Isolation          Patient Infection Status       Infection Onset Added Last Indicated Last Indicated By Review Planned Expiration Resolved Resolved By    None active    Resolved    COVID-19 (Rule Out) 12/18/21 12/18/21 12/18/21 COVID-19, Rapid (Ordered)   12/18/21 Rule-Out Test Resulted    COVID-19 (Rule Out) 10/01/21 10/01/21 10/01/21 COVID-19, Rapid (Ordered)   10/01/21 Rule-Out Test Resulted    COVID-19 (Rule Out) 01/29/21 01/29/21 01/29/21 COVID-19 (Ordered)   01/29/21 Rule-Out Test Resulted    COVID-19 (Rule Out) 01/25/21 01/25/21 01/25/21 COVID-19 (Ordered)   01/25/21 Rule-Out Test Resulted            Nurse Assessment:  Last Vital Signs: /65   Pulse 71   Temp 97.1 °F (36.2 °C) (Oral)   Resp 16   Ht 5' 2\" (1.575 m)   Wt 148 lb 14.4 oz (67.5 kg)   LMP  (LMP Unknown)   SpO2 95%   BMI 27.23 kg/m²     Last documented pain score (0-10 scale): Pain Level: 0  Last Weight:   Wt Readings from Last 1 Encounters:   12/22/21 148 lb 14.4 oz (67.5 kg)     Mental Status:  oriented and alert    IV Access:  - None    Nursing Mobility/ADLs:  Walking   Assisted  Transfer  Assisted  Bathing  Assisted  Dressing  Assisted  Toileting  Assisted  Feeding  Independent  Med Admin  Assisted  Med Delivery   whole and larger pills crush with applesauce    Wound Care Documentation and Therapy: none        Elimination:  Continence:    Bowel: Yes  Bladder: Yes  Urinary Catheter: None   Colostomy/Ileostomy/Ileal Conduit: No       Date of Last BM: 12/20/21    Intake/Output Summary (Last 24 hours) at 12/22/2021 1343  Last data filed at 12/22/2021 1320  Gross per 24 hour   Intake 640 ml   Output 650 ml   Net -10 ml     I/O last 3 completed shifts: In: 440 [P.O.:440]  Out: 300 [Urine:300]    Safety Concerns: At Risk for Falls    Impairments/Disabilities:      None    Nutrition Therapy:  Current Nutrition Therapy:   - Oral Diet:  Cardiac    Routes of Feeding: Oral  Liquids: Thin Liquids  Daily Fluid Restriction: no  Last Modified Barium Swallow with Video (Video Swallowing Test): not done    Treatments at the Time of Hospital Discharge:   Respiratory Treatments: see MAR  Oxygen Therapy:  is not on home oxygen therapy. Ventilator:    - No ventilator support    Rehab Therapies: Physical Therapy and Occupational Therapy  Weight Bearing Status/Restrictions: No weight bearing restirctions  Other Medical Equipment (for information only, NOT a DME order):  walker and hospital bed  Other Treatments: none    Patient's personal belongings (please select all that are sent with patient):  {[facility] DME Belongings:[id number]}    RN SIGNATURE:  Electronically signed by Apoorva Cevallos RN on 12/22/21 at 1:44 PM EST    CASE MANAGEMENT/SOCIAL WORK SECTION    Inpatient Status Date: 12/18/2021    Readmission Risk Assessment Score:  Readmission Risk              Risk of Unplanned Readmission:  16           Discharging to Facility/ Agency   Name: [facility]   C/ [facility], [place], [address]  KDISO:[phone]  Fax:[phone]    Dialysis Facility (if applicable)   Name:  Address:  Dialysis Schedule:  Phone:  Fax:    / signature: Electronically signed by KIRIT Wright on 12/22/21 at 11:16 AM EST    PHYSICIAN SECTION    Prognosis: Good    Condition at Discharge: Stable    Rehab Potential (if transferring to Rehab): Good    Recommended Labs or Other Treatments After Discharge:     Physician Certification: I certify the above information and transfer of Anjelica Claros  is necessary for the continuing treatment of the diagnosis listed and that she requires [place] for greater 30 days.      Update Admission H&P: No change in H&P    PHYSICIAN SIGNATURE:  Electronically signed by Trina Darby MD on 12/22/21 at 2:21 PM EST

## 2021-12-21 NOTE — PROGRESS NOTES
Physician Progress Note      PATIENT:               Roosevelt Garcia  CSN #:                  [id number]  :                       1928  ADMIT DATE:       2021 4:21 PM  [address] [place] DATE:  RESPONDING  PROVIDER #:        Dayanara Cai MD          QUERY TEXT:    Dr Ted Steiner, Dr Phani Lisa, Dr Lindsay Osorio,    Pt admitted with Orthostatic Hypotension and has CHF documented. If possible,   please document in progress notes and discharge summary further specificity   regarding the type and acuity of CHF:    The medical record reflects the following:  Risk Factors: CHF, HTN, AFIB  Clinical Indicators: Per HP:No evidence of HF. On Bumex 0.5 mg daily at home-   hold for now due to suspicion for orthostatic hypotension. Per Cardio Consult:   echocardiogram from 2021 showed ejection fraction of 55 to 60% with mild   AI. Laboratory work-up was within normal limits  Treatment: Cardio consulted.   Options provided:  -- Chronic Systolic and Diastolic CHF  -- Acute on Chronic Systolic and Diastolic CHF  -- Other - I will add my own diagnosis  -- Disagree - Not applicable / Not valid  -- Disagree - Clinically unable to determine / Unknown  -- Refer to Clinical Documentation Reviewer    PROVIDER RESPONSE TEXT:    Chronic diastolic CHF    Query created by: Deni Oro on 2021 8:03 AM      Electronically signed by:  Dayanara Cai MD 2021 8:33 AM

## 2021-12-22 VITALS
WEIGHT: 148.9 LBS | BODY MASS INDEX: 27.4 KG/M2 | DIASTOLIC BLOOD PRESSURE: 65 MMHG | HEART RATE: 71 BPM | TEMPERATURE: 97.1 F | OXYGEN SATURATION: 95 % | SYSTOLIC BLOOD PRESSURE: 129 MMHG | RESPIRATION RATE: 16 BRPM | HEIGHT: 62 IN

## 2021-12-22 LAB
ANION GAP SERPL CALCULATED.3IONS-SCNC: 11 MEQ/L (ref 8–16)
BUN BLDV-MCNC: 14 MG/DL (ref 7–22)
CALCIUM SERPL-MCNC: 9.3 MG/DL (ref 8.5–10.5)
CHLORIDE BLD-SCNC: 99 MEQ/L (ref 98–111)
CO2: 25 MEQ/L (ref 23–33)
CREAT SERPL-MCNC: 0.6 MG/DL (ref 0.4–1.2)
ERYTHROCYTE [DISTWIDTH] IN BLOOD BY AUTOMATED COUNT: 14.6 % (ref 11.5–14.5)
ERYTHROCYTE [DISTWIDTH] IN BLOOD BY AUTOMATED COUNT: 54.9 FL (ref 35–45)
GFR SERPL CREATININE-BSD FRML MDRD: > 90 ML/MIN/1.73M2
GLUCOSE BLD-MCNC: 134 MG/DL (ref 70–108)
HCT VFR BLD CALC: 40.6 % (ref 37–47)
HEMOGLOBIN: 13 GM/DL (ref 12–16)
MAGNESIUM: 2 MG/DL (ref 1.6–2.4)
MCH RBC QN AUTO: 32.5 PG (ref 26–33)
MCHC RBC AUTO-ENTMCNC: 32 GM/DL (ref 32.2–35.5)
MCV RBC AUTO: 101.5 FL (ref 81–99)
PLATELET # BLD: 181 THOU/MM3 (ref 130–400)
PMV BLD AUTO: 10.4 FL (ref 9.4–12.4)
POTASSIUM SERPL-SCNC: 4 MEQ/L (ref 3.5–5.2)
RBC # BLD: 4 MILL/MM3 (ref 4.2–5.4)
SARS-COV-2, NAAT: NOT DETECTED
SODIUM BLD-SCNC: 135 MEQ/L (ref 135–145)
WBC # BLD: 8 THOU/MM3 (ref 4.8–10.8)

## 2021-12-22 PROCEDURE — 36415 COLL VENOUS BLD VENIPUNCTURE: CPT

## 2021-12-22 PROCEDURE — 2580000003 HC RX 258: Performed by: STUDENT IN AN ORGANIZED HEALTH CARE EDUCATION/TRAINING PROGRAM

## 2021-12-22 PROCEDURE — 99239 HOSP IP/OBS DSCHRG MGMT >30: CPT | Performed by: FAMILY MEDICINE

## 2021-12-22 PROCEDURE — 97116 GAIT TRAINING THERAPY: CPT

## 2021-12-22 PROCEDURE — 80048 BASIC METABOLIC PNL TOTAL CA: CPT

## 2021-12-22 PROCEDURE — 6370000000 HC RX 637 (ALT 250 FOR IP): Performed by: STUDENT IN AN ORGANIZED HEALTH CARE EDUCATION/TRAINING PROGRAM

## 2021-12-22 PROCEDURE — 97110 THERAPEUTIC EXERCISES: CPT

## 2021-12-22 PROCEDURE — 83735 ASSAY OF MAGNESIUM: CPT

## 2021-12-22 PROCEDURE — 6370000000 HC RX 637 (ALT 250 FOR IP): Performed by: PHYSICIAN ASSISTANT

## 2021-12-22 PROCEDURE — 85027 COMPLETE CBC AUTOMATED: CPT

## 2021-12-22 PROCEDURE — 87635 SARS-COV-2 COVID-19 AMP PRB: CPT

## 2021-12-22 RX ORDER — HYDROXYZINE HYDROCHLORIDE 25 MG/1
25 TABLET, FILM COATED ORAL 3 TIMES DAILY PRN
DISCHARGE
Start: 2021-12-22 | End: 2022-01-01

## 2021-12-22 RX ORDER — HYDROXYZINE HYDROCHLORIDE 25 MG/1
25 TABLET, FILM COATED ORAL 3 TIMES DAILY PRN
Status: DISCONTINUED | OUTPATIENT
Start: 2021-12-22 | End: 2021-12-22 | Stop reason: HOSPADM

## 2021-12-22 RX ADMIN — PANTOPRAZOLE SODIUM 40 MG: 40 TABLET, DELAYED RELEASE ORAL at 09:54

## 2021-12-22 RX ADMIN — VENLAFAXINE HYDROCHLORIDE 75 MG: 75 CAPSULE, EXTENDED RELEASE ORAL at 09:56

## 2021-12-22 RX ADMIN — ACETAMINOPHEN 650 MG: 325 TABLET ORAL at 09:54

## 2021-12-22 RX ADMIN — ACETAMINOPHEN 650 MG: 325 TABLET ORAL at 01:28

## 2021-12-22 RX ADMIN — MAGNESIUM GLUCONATE 500 MG ORAL TABLET 400 MG: 500 TABLET ORAL at 09:56

## 2021-12-22 RX ADMIN — SODIUM CHLORIDE, PRESERVATIVE FREE 10 ML: 5 INJECTION INTRAVENOUS at 09:56

## 2021-12-22 RX ADMIN — METOPROLOL TARTRATE 25 MG: 25 TABLET, FILM COATED ORAL at 09:56

## 2021-12-22 RX ADMIN — POTASSIUM CHLORIDE 20 MEQ: 1500 TABLET, EXTENDED RELEASE ORAL at 10:09

## 2021-12-22 RX ADMIN — FERROUS SULFATE TAB 325 MG (65 MG ELEMENTAL FE) 325 MG: 325 (65 FE) TAB at 09:56

## 2021-12-22 RX ADMIN — ASPIRIN 81 MG CHEWABLE TABLET 81 MG: 81 TABLET CHEWABLE at 09:54

## 2021-12-22 ASSESSMENT — PAIN SCALES - GENERAL
PAINLEVEL_OUTOF10: 6
PAINLEVEL_OUTOF10: 6

## 2021-12-22 NOTE — CARE COORDINATION
12/22/21, 11:17 AM EST    DISCHARGE PLANNING EVALUATION    Spoke with Gisella Lauren at [facility] and they are able to accept. The [facility] is the patient and families first choice. Called daughter Mitzi Recinos to update her on change. Left a message for Rupinder ANDERSON to update her on the change.

## 2021-12-22 NOTE — PROGRESS NOTES
[address] [address]  INPATIENT PHYSICAL THERAPY  DAILY NOTE  NICKIE CCU-STEPDOWN 3B - 3B-30/030-A      Time In: 5884  Time Out: 1321  Timed Code Treatment Minutes: 23 Minutes  Minutes: 23          Date: 2021  Patient Name: Silverio Perez,  Gender:  female        MRN: [id number]  : 1928  (80 y.o.)     Referring Practitioner: Harley Ash MD  Diagnosis: bradycardia  Additional Pertinent Hx: Per EMR \"80 y.o. pleasant female with history of CAD status post PCI, paroxysmal A. 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 Patient was initially evaluated by [facility] ER team.  They noted that her heart rate was in the mid 45s. Electrocardiogram in the ER showed V paced rhythm at 50 bpm.  According to the patient and her daughter patient has not had any energy since the last pacemaker adjustment. According to the daughter the pacemaker was adjusted. She was evaluated by  from EP for abnormal right ventricular electrode parameters on her RV lead. According to his note no adjustments were made. Patient was also positive for her orthostatic blood pressures. Denies any fevers chills sweats or diarrhea. Cardiology was consulted for possible symptomatic bradycardia. Her echocardiogram from 2021 showed ejection fraction of 55 to 60% with mild AI.   Laboratory work-up was within normal limits with normal TSH\"     Prior Level of Function:  Lives With: Alone  Type of Home: House  Home Layout: One level  Home Access: Stairs to enter with rails  Entrance Stairs - Number of Steps: 2  Entrance Stairs - Rails: Right  Home Equipment: Rolling walker,Wheelchair-manual,Lift chair,Cane   Bathroom Shower/Tub: Walk-in shower  Bathroom Toilet: Handicap height  Bathroom Equipment: Shower chair  Bathroom Accessibility: Walker accessible    [employer] Help From: Family  ADL Assistance: Needs assistance (bathing and fastening bra)  Homemaking Assistance: Needs assistance  Ambulation Assistance: Independent  Transfer Assistance: Independent  Active : No  Additional Comments: Pt reports she requires assist with bathing tasks, her daughter helps assists. Pts daughter complete cooking and cleaning tasks, is retired and able to assist as needed. Restrictions/Precautions:  Restrictions/Precautions: General Precautions,Fall Risk  Position Activity Restriction  Other position/activity restrictions: pacemaker, + orthostatics       SUBJECTIVE: pt wanting to use bathroom- pt agreeable for therapy but declined increased walking distance -     PAIN: 0/10:       Vitals: Oxygen: on room air- O2 > 97%  Heart Rate: 71    OBJECTIVE:  Bed Mobility:  Not Tested    Transfers:  Sit to Stand: Minimal Assistance from toilet and CGA from arm chair   Stand to Sit:Contact Guard Assistance    Ambulation:  Contact Guard Assistance  Distance: 10x2  Surface: Level Tile  Device:Rolling Walker  Gait Deviations:  Slow rishabh with flexed posture, noted decreased step length and lacking heel strike clarisse at right vs left - pt declined increased walking distance     Balance:  dynamic balance activity w/ one to no UE at support with CGA and she required assist to complete patrick care along with her clothing management     Exercise:  Patient was guided in 1 set(s) 12 reps of exercise to both lower extremities. Glut sets. Hip abd/add, long arc quads, marches, hamstring curls, ankle pumps w/ limited mobility at right due to fused ankle Exercises were completed for increased independence with functional mobility. Functional Outcome Measures: Completed  AM-PAC Inpatient Mobility Raw Score : 16  AM-PAC Inpatient T-Scale Score : 40.78    ASSESSMENT:  Assessment: Patient progressing toward established goals.  and but cont to demonstrate generalized weakness along with decreased endurance. Pt would benefit from cont skilled therapy   Activity Tolerance:  Patient tolerance of  treatment: fair. Equipment Recommendations:Equipment Needed: No  Discharge Recommendations: Continue to assess pending progress, SNF   Plan: Times per week: 5x GM  Current Treatment Recommendations: Strengthening,Neuromuscular Re-education,Home Exercise Program,Safety Education & Radha Lamar Training,Patient/Caregiver Education & Training,Functional Mobility Training,Equipment Evaluation, Education, & procurement,Transfer Training,Gait Training,Stair training    Patient Education  Patient Education: Plan of Care    Goals:  Patient goals : get better and go home  Short term goals  Time Frame for Short term goals: by discharge  Short term goal 1: Pt will amb with RW with S for >50 feet to progress towards PLOF. Short term goal 2: Pt will demo IND with transfers with good safety and RW for support to progress towards PLOF. Short term goal 3: Pt will demo S for stair negotiation with rail for support to return home safely. Short term goal 4: Pt will demo IND with bed mobility tasks with bed flat to return home safely and progress towards PLOf. Long term goals  Time Frame for Long term goals : NA due to short ELOS    Following session, patient left in safe position with all fall risk precautions in place.

## 2021-12-22 NOTE — CARE COORDINATION
Patient/family seen: Yes       Informed patient/family of BPCI-A Medical Bundle Program with potential outreach by either Care Transitions Team or naviHealth Team based on hospital admission and location. BPCI-A Notification Letter given: Yes         Current discharge plan: Planning discharge to [address]

## 2021-12-22 NOTE — DISCHARGE SUMMARY
Hospital Medicine Discharge Summary      Patient Identification:   Cheli Almeida   : 1928  MRN: [id number]   Account: [de-identified]      Patient's PCP: Salinas Luong MD    Admit Date: 2021     Discharge Date:   2021    Admitting Physician: Rowdy Alarcon MD     Discharge Physician: Sloane Vera MD     Discharge Diagnoses: Active Hospital Problems    Diagnosis Date Noted    Acute on chronic systolic congestive heart failure (HCC) [I50.23]     CHF (congestive heart failure), NYHA class I, unspecified failure chronicity, unspecified type ([facility] [address] [I50.9] 2021    Bradycardia [R00.1] 2016   Intermittent dyspnea  Chronic Diastolic HF  History of Tachy-Kali Syndrome / Paroxysmal Atrial Fibrillation  Orthostatic hypotension, resolved  Hypomagnesemia  Coronary artery disease s/p PCI   Primary Hypertension  Hx of Ascending aortic aneurysm  Depression/Anxiety  History of thyroid goiter ()     The patient was seen and examined on day of discharge and this discharge summary is in conjunction with any daily progress note from day of discharge. Hospital Course:   Per admission H&P:     79 yo female with history of CAD s/p stent, PAF, Tachy-kali syndrome, s/p single chamber pacemaker implant 2016, chronic postural dizziness, HTN, colon cancer (remote history) transferred from [facility] for episodes of dyspnea and diaphoresis x 3 days that has been progressively worsening. Reportedly bradycardic paced rhythm with occasional nonpaced beats, HR down to 45 bpm. CXR unremarkable. Dr. Oliver Mendoza contacted who recommended pacer interrogation. Troponin elevated at 0.029 but otherwise negative work up. Given Lasix 20 mg IV x 1 dose for concern of CHF (reported trace edema in BLE) and transferred to [facility] for further care.      On arrival to [facility], patient afebrile and hemodynamically stable. HR 50-71.  Per chart review, recently seen by Dr. Oliver Mendoza in the office with suspicion for possible vasovagal episode. Seen by Dr. Ashanti Palencia 09/28/2021 outpatient for abnormal pacemaker function due to abnormal electrical parameters on right ventricular lead during a remote check on 9/17/201 (R 2.9 mv and imp 201 ohms) triggering safety mode switch with reported presyncope. Reported to have adequate battery life with acute intervention needed.      On evaluation, patient states she has intermittent episodes of dyspnea with diaphoresis. No chest pain, dizziness, nausea, abdominal pain, syncope associated. Occurs usually when \"doing something\" but can occur at rest too. Last episode started in the morning of the day of presentation while she was in bed. Intermittently had episodes all day and was symptomatic even during transport to [facility] per patient. The last couple of days have been more frequent and longer lasting episodes. Has been present for \"few months\" but much less frequently. Reports a mechanical fall about 2 weeks ago but had no symptoms prior to or during the fall. Thinks she tripped on her walker. No syncope or recurrent falls. No weight changes. Lives alone but daughter is around [place] the time\". Currently no symptoms. Adequate appetite. No diarrhea/constipation issues.     Patient was admitted under the hospitalist service. Cardiology was consulted. Pacemaker interrogation unremarkable. Pacer adjusted to 70's. Digoxin was stopped, and started on Metoprolol. No signs of fluid overload. Bumex was stopped due to orthostatic hypotension. On multiple occasions, patient stated she was feeling short of breath, but she was saturating well on RA, and lung exams were unremarkable. VS were also normal, without any arrhythmia and appropriately pacing on telemetry. Patient was started on hydroxyzine prn for possible anxiety/panic attacks. She was discharge stable to [facility].       Exam:     Vitals:  Vitals:    12/22/21 0322 12/22/21 0515 12/22/21 0742 12/22/21 1131   BP: (!) 157/72  (!) 155/73 129/65   Pulse: 69  70 71   Resp: 17  16 16   Temp: 98.2 °F (36.8 °C)  97.9 °F (36.6 °C) 97.1 °F (36.2 °C)   TempSrc: Oral  Oral Oral   SpO2: 95%  97% 95%   Weight:  148 lb 14.4 oz (67.5 kg)     Height:         Weight: Weight: 148 lb 14.4 oz (67.5 kg)     24 hour intake/output:    Intake/Output Summary (Last 24 hours) at 12/22/2021 1431  Last data filed at 12/22/2021 1320  Gross per 24 hour   Intake 640 ml   Output 650 ml   Net -10 ml         General appearance:  No apparent distress, appears stated age and cooperative. HEENT:  Normal cephalic, atraumatic without obvious deformity. Pupils equal, round, and reactive to light. Extra ocular muscles intact. Conjunctivae/corneas clear. Neck: Supple, with full range of motion. No jugular venous distention. Trachea midline. Respiratory:  Normal respiratory effort. Clear to auscultation, bilaterally without Rales/Wheezes/Rhonchi. Cardiovascular:  Regular rate and rhythm with normal S1/S2 without murmurs, rubs or gallops. Abdomen: Soft, non-tender, non-distended with normal bowel sounds. Musculoskeletal:  No clubbing, cyanosis or edema bilaterally. Full range of motion without deformity. Skin: Skin color, texture, turgor normal.  No rashes or lesions. Neurologic:  Neurovascularly intact without any focal sensory/motor deficits. Cranial nerves: II-XII intact, grossly non-focal.  Psychiatric:  Alert and oriented, thought content appropriate, normal insight  Capillary Refill: Brisk,< 3 seconds   Peripheral Pulses: +2 palpable, equal bilaterally       Labs:  For convenience and continuity at follow-up the following most recent labs are provided:      CBC:    Lab Results   Component Value Date    WBC 8.0 12/22/2021    HGB 13.0 12/22/2021    HCT 40.6 12/22/2021     12/22/2021       Renal:    Lab Results   Component Value Date     12/22/2021    K 4.0 12/22/2021    K 3.8 01/26/2021    CL 99 12/22/2021    CO2 25 12/22/2021    BUN 14 12/22/2021    CREATININE 0.6 12/22/2021 CALCIUM 9.3 12/22/2021    PHOS 3.3 12/18/2021         Significant Diagnostic Studies    Radiology:   XR CHEST PORTABLE   Final Result   There is no acute intrathoracic process. **This report has been created using voice recognition software. It may contain minor errors which are inherent in voice recognition technology. **      Final report electronically signed by Dr Lita Knight on 12/18/2021 5:26 PM             Consults:     IP CONSULT TO CARDIOLOGY  IP CONSULT TO SOCIAL WORK    Disposition:    [] Home       [] TCU       [] Rehab       [] Psych       [x] SNF       [] [place]       [] Other-    Condition at Discharge: Stable    Code Status:  Limited     Patient Instructions:    Discharge lab work: Activity: activity as tolerated  Diet: ADULT DIET; Regular      Follow-up visits:   CM [facility]  [id number]  [address] [place] [address], [address]  [address] [place] [address]  [phone]    Schedule an appointment as soon as possible for a visit  Follow up with nursing home physician within 1 week, Follow up with family physician 1 week after discharge from nursing home. Annette Wolfe PA-C  [address] [place] [address]  [phone]    On 1/12/2022  at 1:15p.m. Cardiology follow up.            F PHYSICIAN TO FOLLOW         Discharge Medications:        Medication List      START taking these medications    hydrOXYzine 25 MG tablet  Commonly known as: ATARAX  Take 1 tablet by mouth 3 times daily as needed for Anxiety     metoprolol tartrate 25 MG tablet  Commonly known as: LOPRESSOR  Take 1 tablet by mouth 2 times daily        CONTINUE taking these medications    aspirin 81 MG chewable tablet  Take 1 tablet by mouth daily     Biotin 1000 MCG Chew     docusate sodium 100 MG capsule  Commonly known as: COLACE     ferrous sulfate 325 (65 Fe) MG tablet  Commonly known as: IRON 325     magnesium 250 MG Tabs tablet  Commonly known as: MAGNESIUM-OXIDE     NONFORMULARY     pantoprazole 40 MG tablet  Commonly known as: PROTONIX  Take 1 tablet by mouth 2 times daily     potassium chloride 20 MEQ extended release tablet  Commonly known as: KLOR-CON M  Take 1 tablet by mouth once daily     PROBIOTIC DAILY PO     rivaroxaban 15 MG Tabs tablet  Commonly known as: XARELTO  Take 1 tablet by mouth Daily with supper     sucralfate 1 GM tablet  Commonly known as: CARAFATE  Take 1 tablet by mouth 2 times daily     TYLENOL 500 MG tablet  Generic drug: acetaminophen     venlafaxine 75 MG extended release capsule  Commonly known as: EFFEXOR XR  Take 1 capsule by mouth daily     VITAMIN B 12 PO        STOP taking these medications    bumetanide 0.5 MG tablet  Commonly known as: BUMEX     digoxin 125 MCG tablet  Commonly known as: LANOXIN           Where to Get Your Medications      Information about where to get these medications is not yet available    Ask your nurse or doctor about these medications  · hydrOXYzine 25 MG tablet  · metoprolol tartrate 25 MG tablet         Time Spent on discharge is more than 45 minutes in the examination, evaluation, counseling and review of medications and discharge plan. Signed: Thank you Joshua Desai MD for the opportunity to be involved in this patient's care.     Electronically signed by Mandie Vazquez MD on 12/22/2021 at 2:31 PM

## 2021-12-22 NOTE — PROGRESS NOTES
Patient left with [facility] for transport to [address] [place] [address] by stretcher. Patient's daughter aware and at the bedside. Report called to [address] LPN at the [address] [place] [address]. AVS faxed to the [facility].

## 2021-12-23 ENCOUNTER — OUTSIDE SERVICES (OUTPATIENT)
Dept: FAMILY MEDICINE CLINIC | Age: 86
End: 2021-12-23
Payer: MEDICARE

## 2021-12-23 VITALS
OXYGEN SATURATION: 93 % | BODY MASS INDEX: 26.81 KG/M2 | SYSTOLIC BLOOD PRESSURE: 146 MMHG | RESPIRATION RATE: 16 BRPM | TEMPERATURE: 97 F | DIASTOLIC BLOOD PRESSURE: 59 MMHG | HEART RATE: 60 BPM | WEIGHT: 146.6 LBS

## 2021-12-23 DIAGNOSIS — K21.9 GASTROESOPHAGEAL REFLUX DISEASE WITHOUT ESOPHAGITIS: ICD-10-CM

## 2021-12-23 DIAGNOSIS — I48.91 ATRIAL FIBRILLATION WITH RAPID VENTRICULAR RESPONSE (HCC): Primary | ICD-10-CM

## 2021-12-23 DIAGNOSIS — R53.1 GENERALIZED WEAKNESS: ICD-10-CM

## 2021-12-23 DIAGNOSIS — F32.A CHRONIC DEPRESSION: ICD-10-CM

## 2021-12-23 DIAGNOSIS — I25.119 CORONARY ARTERY DISEASE INVOLVING NATIVE HEART WITH ANGINA PECTORIS, UNSPECIFIED VESSEL OR LESION TYPE (HCC): ICD-10-CM

## 2021-12-23 DIAGNOSIS — F33.42 RECURRENT MAJOR DEPRESSIVE DISORDER, IN FULL REMISSION (HCC): ICD-10-CM

## 2021-12-23 DIAGNOSIS — I10 ESSENTIAL HYPERTENSION: ICD-10-CM

## 2021-12-23 DIAGNOSIS — I25.10 CORONARY ARTERY DISEASE INVOLVING NATIVE CORONARY ARTERY OF NATIVE HEART WITHOUT ANGINA PECTORIS: ICD-10-CM

## 2021-12-23 DIAGNOSIS — I50.32 CHRONIC DIASTOLIC CHF (CONGESTIVE HEART FAILURE) (HCC): ICD-10-CM

## 2021-12-23 DIAGNOSIS — Z95.0 PACEMAKER: ICD-10-CM

## 2021-12-23 DIAGNOSIS — K76.0 STEATOSIS OF LIVER: ICD-10-CM

## 2021-12-23 DIAGNOSIS — M15.9 PRIMARY OSTEOARTHRITIS INVOLVING MULTIPLE JOINTS: ICD-10-CM

## 2021-12-23 DIAGNOSIS — R53.83 OTHER FATIGUE: ICD-10-CM

## 2021-12-23 DIAGNOSIS — I50.9 CHF (CONGESTIVE HEART FAILURE), NYHA CLASS I, UNSPECIFIED FAILURE CHRONICITY, UNSPECIFIED TYPE (HCC): ICD-10-CM

## 2021-12-23 DIAGNOSIS — E78.00 PURE HYPERCHOLESTEROLEMIA: ICD-10-CM

## 2021-12-23 DIAGNOSIS — N39.3 STRESS INCONTINENCE: ICD-10-CM

## 2021-12-23 DIAGNOSIS — I73.9 PVD (PERIPHERAL VASCULAR DISEASE) (HCC): ICD-10-CM

## 2021-12-23 DIAGNOSIS — E83.42 HYPOMAGNESEMIA: ICD-10-CM

## 2021-12-23 DIAGNOSIS — Z85.038 HISTORY OF COLON CANCER: ICD-10-CM

## 2021-12-23 PROCEDURE — 99380 NURSING FAC CARE SUPERVISION: CPT | Performed by: NURSE PRACTITIONER

## 2021-12-23 PROCEDURE — 99310 SBSQ NF CARE HIGH MDM 45: CPT | Performed by: NURSE PRACTITIONER

## 2021-12-23 ASSESSMENT — ENCOUNTER SYMPTOMS
SORE THROAT: 0
COUGH: 0
CHEST TIGHTNESS: 0
COLOR CHANGE: 0
EYE PAIN: 0
EYE DISCHARGE: 0
RHINORRHEA: 0
TROUBLE SWALLOWING: 0
SHORTNESS OF BREATH: 0
EYE REDNESS: 0
NAUSEA: 0
ABDOMINAL PAIN: 0
VOMITING: 0

## 2021-12-23 NOTE — PROGRESS NOTES
Ashish Kelley is a 80 y.o. female who presents today for her medical conditions/complaints as noted below. Chief Complaint   Patient presents with    Other     Medication review           HPI:     Laurie Carmen was seen today for medication review. She was admitted to the facility on 12/22/21 from [facility] where she was admitted on 12/18/21 for CHF and bradycardia. 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 She had medications changed and her pacemaker interrogated while in the hospital.  Digoxin was stopped, and she was started on Metoprolol, her pacemaker rate was increased to 70. She is here for rehab with the goal of returning home. She is seen while sitting in her recliner in her room with her daughter present. She is alert and oriented, she is pleasant and follows commands. She denies pain, states that she does get short of breath at times, none noted at present. No distress noted on room air.       Past Medical History:   Diagnosis Date    Atrial fibrillation ([address]     Blood circulation, collateral     [employer] single pacemaker 4/26/2016 05/05/2016    CAD (coronary artery disease)     cath and stent in right artery    Cancer [facility]) 1954    HX  Colon     Carpal tunnel syndrome     Fall at home     12/7/21 -large bruise to R buttock    Fracture dislocation of ankle 1980    GERD (gastroesophageal reflux disease)     Hyperlipidemia     Hypertension     Kidney lesion, native, right     found on 5/2016    Osteoarthritis     knees    Osteopenia     Prolonged emergence from general anesthesia     Thyroid goiter 09/06/2016    Yersiniosis 2016      Past Surgical History:   Procedure Laterality Date    ABDOMEN SURGERY      ANKLE FRACTURE SURGERY  8504--1992    reconstruction in 2003 and 2007    APPENDECTOMY      BLADDER SURGERY      support bladder repair    CARDIAC SURGERY      heart stent 12-11-18, [place]    CARPAL TUNNEL RELEASE  7/2013    CARPAL TUNNEL RELEASE Right 08/19/2017    Revision    CATARACT REMOVAL Bilateral     CHOLECYSTECTOMY  1986    COLON SURGERY  1954    COLONOSCOPY      ENDOSCOPY, COLON, DIAGNOSTIC      EYE SURGERY      cataract     FRACTURE SURGERY      HYSTERECTOMY  1971    JOINT REPLACEMENT  1998    L knee    KNEE SURGERY Left     total replacement    PACEMAKER PLACEMENT      PTCA  01/15/2019    Successful PCI / Drug Eluting Stent of the proximal Left Anterior Descending Coronary Artery.     UPPER GASTROINTESTINAL ENDOSCOPY Left 11/28/2018    EGD BI[facility]SY [facility]an [facility]    UPPER GASTROINTESTINAL ENDOSCOPY  11/28/2018    EGD CONTROL HEMORRHAGE performed by Iker Payan MD at [facility]       Family History   Problem Relation Age of Onset    Heart Disease Mother     High Blood Pressure Mother     Heart Disease Father     High Blood Pressure Father     Heart Disease Brother     High Blood Pressure Brother     Heart Disease Son        Social History     Tobacco Use    Smoking status: Never Smoker    Smokeless tobacco: Never Used   Substance Use Topics    Alcohol use: No      Allergies   Allergen Reactions    Methadone Shortness Of Breath     Shakes and nausea    Gabapentin     Lodine [Etodolac] Other (See Comments)     \"spacey, hot flashes, shaky\"    Lyrica [Pregabalin]     Ultram [Tramadol]      Nausea, pedal edema, SOB       Health Maintenance   Topic Date Due    DTaP/Tdap/Td vaccine (1 - Tdap) 10/28/2005    Shingles Vaccine (2 of 2) 10/01/2019    Annual Wellness Visit (AWV)  11/12/2022    Flu vaccine  Completed    Pneumococcal 65+ years Vaccine  Completed    COVID-19 Vaccine  Completed    Hepatitis A vaccine  Aged Out    Hepatitis B vaccine  Aged Out    Hib vaccine  Aged Out    Meningococcal (ACWY) vaccine  Aged Out       Subjective:      Review of Systems Constitutional: Negative for activity change, appetite change and fever. HENT: Negative for congestion, rhinorrhea, sore throat and trouble swallowing. Eyes: Negative for pain, discharge and redness. Respiratory: Negative for cough, chest tightness and shortness of breath. Cardiovascular: Negative for chest pain, palpitations and leg swelling. Gastrointestinal: Negative for abdominal pain, nausea and vomiting. Endocrine: Negative for cold intolerance. Genitourinary: Negative for difficulty urinating. Musculoskeletal: Positive for gait problem (due to weakness). Negative for arthralgias. Skin: Negative for color change. Neurological: Positive for dizziness and weakness. Negative for tremors, speech difficulty and headaches. Psychiatric/Behavioral: Negative for agitation and sleep disturbance. Objective:     Physical Exam  Vitals and nursing note reviewed. Constitutional:       General: She is not in acute distress. Appearance: Normal appearance. She is normal weight. She is not diaphoretic. HENT:      Head: Normocephalic and atraumatic. Right Ear: External ear normal.      Left Ear: External ear normal.      Nose: Nose normal. No congestion or rhinorrhea. Mouth/Throat:      Mouth: Mucous membranes are moist.      Pharynx: No oropharyngeal exudate. Eyes:      General: No scleral icterus. Right eye: No discharge. Left eye: No discharge. Extraocular Movements: Extraocular movements intact. Conjunctiva/sclera: Conjunctivae normal.      Pupils: Pupils are equal, round, and reactive to light. Cardiovascular:      Rate and Rhythm: Normal rate and regular rhythm. Pulses: Normal pulses. Heart sounds: Normal heart sounds. Pulmonary:      Effort: Pulmonary effort is normal. No respiratory distress. Breath sounds: Normal breath sounds. No wheezing, rhonchi or rales.    Abdominal:      General: Bowel sounds are normal. There is no distension. Palpations: Abdomen is soft. Tenderness: There is no abdominal tenderness. There is no guarding. Musculoskeletal:         General: No swelling or tenderness. Normal range of motion. Cervical back: Normal range of motion and neck supple. No rigidity or tenderness. Right lower leg: No edema. Left lower leg: No edema. Skin:     General: Skin is warm and dry. Coloration: Skin is not jaundiced or pale. Neurological:      Mental Status: She is alert and oriented to person, place, and time. Psychiatric:         Mood and Affect: Mood normal.         Behavior: Behavior normal.       BP (!) 146/59   Pulse 60   Temp 97 °F (36.1 °C)   Resp 16   Wt 146 lb 9.6 oz (66.5 kg)   LMP  (LMP Unknown)   SpO2 93%   BMI 26.81 kg/m²     Assessment/Plan      1. Atrial fibrillation with rapid ventricular response (HCC)   Digoxin stopped while in hospital  Continue Metoprolol, Xarelto  Rate controlled  Has pacemaker    2. Coronary artery disease involving native heart with angina pectoris, unspecified vessel or lesion type (HCC)   Continue ASA   3. Essential hypertension   Continue Metoprolol  Controlled - continue to monitor   4. Pure hypercholesterolemia   Follow labs - on no meds   5. Gastroesophageal reflux disease without esophagitis   Denies s/s  Continue pantoprazole   6. Chronic diastolic CHF (congestive heart failure) (HCC)   Currently stable  Continue metoprolol  Not on lasix, but is on K - will check BMP on Friday   7. Recurrent major depressive disorder, in full remission ([facility] [address]   Continue venlafaxine   8. Other fatigue   Therapy to eval and treat   9. Primary osteoarthritis involving multiple joints   Tylenol prn   10. Hypomagnesemia - hx of   11. PVD (peripheral vascular disease) (HCC)   Continue ASA   12. [employer] single pacemaker 4/26/2016    13. Steatosis of liver - hx of   14. Stress incontinence    15. History of colon cancer    16.  Generalized weakness Therapy to eval and treat       Patient seen and examined. History partially obtained by chart review and nursing notes. I have reviewed patient's past medical, surgical, social, and family history and have made updates where appropriate. See facility EMR for updated medication list.      Resident has CAD with stents and pacemaker, HTN, A fib, HLD, depression, and these are expected to last 12 or more months. These chronic conditions place the resident at a significant risk of death, acute exacerbation, or functional decline. The patient's comprehensive plan was monitored today. I spent 20 minutes reviewing the plan. I spent >40 minutes in chart and medication review and discussion of plan of care with staff.      Electronically signed by NOELLE Prince CNP on 12/23/2021 at 3:29 PM

## 2021-12-27 ENCOUNTER — OUTSIDE SERVICES (OUTPATIENT)
Dept: FAMILY MEDICINE CLINIC | Age: 86
End: 2021-12-27
Payer: MEDICARE

## 2021-12-27 VITALS
HEART RATE: 74 BPM | DIASTOLIC BLOOD PRESSURE: 70 MMHG | SYSTOLIC BLOOD PRESSURE: 98 MMHG | WEIGHT: 148.8 LBS | TEMPERATURE: 97.4 F | RESPIRATION RATE: 16 BRPM | BODY MASS INDEX: 27.22 KG/M2 | OXYGEN SATURATION: 94 %

## 2021-12-27 DIAGNOSIS — Z95.0 PACEMAKER: ICD-10-CM

## 2021-12-27 DIAGNOSIS — I48.20 CHRONIC ATRIAL FIBRILLATION (HCC): Primary | ICD-10-CM

## 2021-12-27 DIAGNOSIS — I50.9 CHF (CONGESTIVE HEART FAILURE), NYHA CLASS I, UNSPECIFIED FAILURE CHRONICITY, UNSPECIFIED TYPE (HCC): ICD-10-CM

## 2021-12-27 DIAGNOSIS — R53.81 PHYSICAL DECONDITIONING: ICD-10-CM

## 2021-12-27 PROCEDURE — 99366 TEAM CONF W/PAT BY HC PROF: CPT | Performed by: NURSE PRACTITIONER

## 2021-12-27 PROCEDURE — 99309 SBSQ NF CARE MODERATE MDM 30: CPT | Performed by: NURSE PRACTITIONER

## 2021-12-27 ASSESSMENT — ENCOUNTER SYMPTOMS
COUGH: 0
ABDOMINAL PAIN: 0
CHEST TIGHTNESS: 0
NAUSEA: 0
COLOR CHANGE: 0
VOMITING: 0
RHINORRHEA: 0
SHORTNESS OF BREATH: 0
EYE DISCHARGE: 0
SORE THROAT: 0
EYE REDNESS: 0
EYE PAIN: 0

## 2021-12-27 NOTE — PROGRESS NOTES
Amarilys Felix is a 80 y.o. female who presents today for her medical conditions/complaints as noted below. Chief Complaint   Patient presents with    Other     Recheck -weakness           HPI:     Victor M Frankel is seen per daughter's request.  She is seen while in the bathroom. She states that she still feels weak, she is worried that her pacemaker wont work like it should. She states that she is also tired much of the time. She is on room air with no distress. She continues to work with therapy. Daughter, Madeline Espinoza had multiple questions about the pacemaker, her weakness, and what to expect. All questions and concerns addressed. she denied further questions.       Past Medical History:   Diagnosis Date    Atrial fibrillation ([address]     Blood circulation, collateral     [employer] single pacemaker 4/26/2016 05/05/2016    CAD (coronary artery disease)     cath and stent in right artery    Cancer [facility]) 1954    HX  Colon     Carpal tunnel syndrome     Fall at home     12/7/21 -large bruise to R buttock    Fracture dislocation of ankle 1980    GERD (gastroesophageal reflux disease)     Hyperlipidemia     Hypertension     Kidney lesion, native, right     found on 5/2016    Osteoarthritis     knees    Osteopenia     Prolonged emergence from general anesthesia     Thyroid goiter 09/06/2016    Yersiniosis 2016      Past Surgical History:   Procedure Laterality Date    ABDOMEN SURGERY      ANKLE FRACTURE SURGERY  8199--7571    reconstruction in 2003 and 2007    APPENDECTOMY      BLADDER SURGERY      support bladder repair    CARDIAC SURGERY      heart stent 12-11-18, [place]    CARPAL TUNNEL RELEASE  7/2013    CARPAL TUNNEL RELEASE Right 08/19/2017    Revision    CATARACT REMOVAL Bilateral     CHOLECYSTECTOMY  1986    COLON SURGERY  1954    COLONOSCOPY      ENDOSCOPY, COLON, DIAGNOSTIC      EYE SURGERY      cataract    [address] knee    KNEE SURGERY Left     total replacement    PACEMAKER PLACEMENT      PTCA  01/15/2019    Successful PCI / Drug Eluting Stent of the proximal Left Anterior Descending Coronary Artery.  UPPER GASTROINTESTINAL ENDOSCOPY Left 11/28/2018    EGD BIOPSY performed [facility] Lise [facility]kel [facility]    UPPER GASTROINTESTINAL ENDOSCOPY  11/28/2018    EGD CONTROL HEMORRHAGE performed by Lise Frankel MD at [facility]       Family History   Problem Relation Age of Onset    Heart Disease Mother     High Blood Pressure Mother     Heart Disease Father     High Blood Pressure Father     Heart Disease Brother     High Blood Pressure Brother     Heart Disease Son        Social History     Tobacco Use    Smoking status: Never Smoker    Smokeless tobacco: Never Used   Substance Use Topics    Alcohol use: No      Allergies   Allergen Reactions    Methadone Shortness Of Breath     Shakes and nausea    Gabapentin     Lodine [Etodolac] Other (See Comments)     \"spacey, hot flashes, shaky\"    Lyrica [Pregabalin]     Ultram [Tramadol]      Nausea, pedal edema, SOB       Health Maintenance   Topic Date Due    DTaP/Tdap/Td vaccine (1 - Tdap) 10/28/2005    Shingles Vaccine (2 of 2) 10/01/2019    Annual Wellness Visit (AWV)  11/12/2022    Flu vaccine  Completed    Pneumococcal 65+ years Vaccine  Completed    COVID-19 Vaccine  Completed    Hepatitis A vaccine  Aged Out    Hepatitis B vaccine  Aged Out    Hib vaccine  Aged Out    Meningococcal (ACWY) vaccine  Aged Out       Subjective:      Review of Systems   Constitutional: Positive for activity change (working with therapy). Negative for appetite change and fever. HENT: Negative for congestion, rhinorrhea and sore throat. Eyes: Negative for pain, discharge and redness. Respiratory: Negative for cough, chest tightness and shortness of breath. Cardiovascular: Negative for chest pain and leg swelling.    Gastrointestinal: Negative for abdominal pain, nausea and vomiting. Endocrine: Negative for cold intolerance. Genitourinary: Negative for difficulty urinating. Musculoskeletal: Positive for arthralgias. Skin: Negative for color change. Neurological: Positive for weakness. Negative for dizziness, speech difficulty and headaches. Psychiatric/Behavioral: Negative for sleep disturbance. The patient is not nervous/anxious. Objective:     Physical Exam  Vitals and nursing note reviewed. Constitutional:       General: She is not in acute distress. Appearance: Normal appearance. She is normal weight. She is not diaphoretic. HENT:      Head: Normocephalic and atraumatic. Right Ear: External ear normal.      Left Ear: External ear normal.      Nose: Nose normal. No congestion or rhinorrhea. Mouth/Throat:      Mouth: Mucous membranes are moist.      Pharynx: No oropharyngeal exudate. Eyes:      General: No scleral icterus. Right eye: No discharge. Left eye: No discharge. Extraocular Movements: Extraocular movements intact. Conjunctiva/sclera: Conjunctivae normal.   Cardiovascular:      Rate and Rhythm: Normal rate. Rhythm irregular. Pulses: Normal pulses. Heart sounds: Normal heart sounds. Pulmonary:      Effort: Pulmonary effort is normal. No respiratory distress. Breath sounds: Normal breath sounds. No wheezing, rhonchi or rales. Abdominal:      General: Bowel sounds are normal. There is no distension. Palpations: Abdomen is soft. Tenderness: There is no abdominal tenderness. There is no guarding. Musculoskeletal:         General: No swelling or tenderness. Normal range of motion. Cervical back: Normal range of motion and neck supple. No rigidity or tenderness. Right lower leg: No edema. Left lower leg: No edema. Skin:     General: Skin is warm and dry. Coloration: Skin is not jaundiced or pale.    Neurological:      Mental Status: She is alert and oriented to person, place, and time. Psychiatric:         Mood and Affect: Mood normal.         Behavior: Behavior normal.       BP 98/70   Pulse 74   Temp 97.4 °F (36.3 °C)   Resp 16   Wt 148 lb 12.8 oz (67.5 kg)   LMP  (LMP Unknown)   SpO2 94%   BMI 27.22 kg/m²     Assessment/Plan      1. Chronic atrial fibrillation (HCC)   Rate controlled - continue to monitor  Continue Xarelto, metoprolol   2. CHF (congestive heart failure), NYHA class I, unspecified failure chronicity, unspecified type ([facility] [address]  Currently stable - continue to monitor    3. [employer] single pacemaker 4/26/2016 - hx of  Per daughter still have  Yrs of battery life left   4. Physical deconditioning   Continue therapies       Patient seen and examined. History partially obtained by chart review and nursing notes. I have reviewed patient's past medical, surgical, social, and family history and have made updates where appropriate.   See facility EMR for updated medication list.       Electronically signed by NOELLE Garcia CNP on 12/27/2021 at 12:14 PM

## 2021-12-30 ENCOUNTER — PATIENT MESSAGE (OUTPATIENT)
Dept: CARDIOLOGY CLINIC | Age: 86
End: 2021-12-30

## 2021-12-30 ENCOUNTER — PROCEDURE VISIT (OUTPATIENT)
Dept: CARDIOLOGY CLINIC | Age: 86
End: 2021-12-30
Payer: MEDICARE

## 2021-12-30 DIAGNOSIS — Z95.0 PACEMAKER: Primary | ICD-10-CM

## 2021-12-30 PROCEDURE — 93296 REM INTERROG EVL PM/IDS: CPT | Performed by: INTERNAL MEDICINE

## 2021-12-30 PROCEDURE — 93294 REM INTERROG EVL PM/LDLS PM: CPT | Performed by: INTERNAL MEDICINE

## 2021-12-30 NOTE — PROGRESS NOTES
[facility] sci single pacemaker     4.5 years on device   RV imped 329  91% paced   R waves not measured

## 2021-12-30 NOTE — LETTER
[address] [place] [id number]  Phone: [phone]  Fax: [phone]        January 6, 2022    Dharmesh DormanGarza  [id number]      Dear Alis Yanes: We receive your Carelink/Lat[employer]/Merlin on December 30, 2021. It is on your physician's desk to read. Your next scheduled transmission from home is April 14, 22.     Your yearly pacemaker/defibrillator in office check is September 29, 2022 @ 11:00.        You have 4.5 years left on your battery      You pace the bottom part of your heart (ventricle) 91%        IF  YOU HAVE QUESTIONS REGARDING YOUR HOME MONITOR PLEASE CALL:        [facility] [phone]                 Thank You!   [address] [place] [address],[address]

## 2022-01-10 ENCOUNTER — OUTSIDE SERVICES (OUTPATIENT)
Dept: FAMILY MEDICINE CLINIC | Age: 87
End: 2022-01-10
Payer: MEDICARE

## 2022-01-10 VITALS
SYSTOLIC BLOOD PRESSURE: 100 MMHG | BODY MASS INDEX: 27.29 KG/M2 | DIASTOLIC BLOOD PRESSURE: 51 MMHG | WEIGHT: 149.2 LBS | HEART RATE: 72 BPM | TEMPERATURE: 96.2 F | RESPIRATION RATE: 16 BRPM | OXYGEN SATURATION: 96 %

## 2022-01-10 DIAGNOSIS — I50.23 ACUTE ON CHRONIC SYSTOLIC CONGESTIVE HEART FAILURE (HCC): Primary | ICD-10-CM

## 2022-01-10 DIAGNOSIS — I48.20 CHRONIC ATRIAL FIBRILLATION (HCC): ICD-10-CM

## 2022-01-10 DIAGNOSIS — N39.3 STRESS INCONTINENCE: ICD-10-CM

## 2022-01-10 DIAGNOSIS — M15.9 PRIMARY OSTEOARTHRITIS INVOLVING MULTIPLE JOINTS: ICD-10-CM

## 2022-01-10 DIAGNOSIS — R53.81 PHYSICAL DECONDITIONING: ICD-10-CM

## 2022-01-10 DIAGNOSIS — F33.42 RECURRENT MAJOR DEPRESSIVE DISORDER, IN FULL REMISSION (HCC): ICD-10-CM

## 2022-01-10 DIAGNOSIS — I25.10 CORONARY ARTERY DISEASE INVOLVING NATIVE CORONARY ARTERY OF NATIVE HEART WITHOUT ANGINA PECTORIS: ICD-10-CM

## 2022-01-10 DIAGNOSIS — R73.9 HYPERGLYCEMIA: ICD-10-CM

## 2022-01-10 DIAGNOSIS — I10 PRIMARY HYPERTENSION: ICD-10-CM

## 2022-01-10 DIAGNOSIS — I25.119 CORONARY ARTERY DISEASE INVOLVING NATIVE HEART WITH ANGINA PECTORIS, UNSPECIFIED VESSEL OR LESION TYPE (HCC): ICD-10-CM

## 2022-01-10 DIAGNOSIS — F32.A CHRONIC DEPRESSION: ICD-10-CM

## 2022-01-10 DIAGNOSIS — E83.42 HYPOMAGNESEMIA: ICD-10-CM

## 2022-01-10 DIAGNOSIS — E78.00 PURE HYPERCHOLESTEROLEMIA: ICD-10-CM

## 2022-01-10 DIAGNOSIS — R53.1 GENERALIZED WEAKNESS: ICD-10-CM

## 2022-01-10 DIAGNOSIS — I73.9 PVD (PERIPHERAL VASCULAR DISEASE) (HCC): ICD-10-CM

## 2022-01-10 DIAGNOSIS — K21.9 GASTROESOPHAGEAL REFLUX DISEASE WITHOUT ESOPHAGITIS: ICD-10-CM

## 2022-01-10 PROCEDURE — 99310 SBSQ NF CARE HIGH MDM 45: CPT | Performed by: FAMILY MEDICINE

## 2022-01-10 ASSESSMENT — ENCOUNTER SYMPTOMS
ABDOMINAL PAIN: 0
CHEST TIGHTNESS: 0
RHINORRHEA: 0
EYE DISCHARGE: 0
COLOR CHANGE: 0
VOMITING: 0
TROUBLE SWALLOWING: 0
NAUSEA: 0
COUGH: 0
SORE THROAT: 0
EYE REDNESS: 0
EYE PAIN: 0
SHORTNESS OF BREATH: 0

## 2022-01-10 NOTE — PROGRESS NOTES
Nadine Baez is a 80 y.o. female who presents today for her medical conditions/complaints as noted below. Chief Complaint   Patient presents with    Other     admission, visit date 1/6/22           HPI:     Richie Glass was seen today for admission to [facility]. She was admitted to the facility on 12/22/21 from [facility] where she was admitted on 12/18/21 for CHF and bradycardia. 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 She had medications changed and her pacemaker interrogated while in the hospital.  Digoxin was stopped, and she was started on Metoprolol, her pacemaker rate was increased to 70. She is here for rehab with the goal of returning home. She is seen while sitting in her recliner in her room. She is alert and oriented, she is pleasant and follows commands. She denies pain, states that she does get short of breath at times, none noted at present. No distress noted on room air. She and her family think she is doing much better with increased pacer settings and PT. Other  Associated symptoms include weakness. Pertinent negatives include no abdominal pain, arthralgias, chest pain, congestion, coughing, fever, headaches, nausea, sore throat or vomiting.        Past Medical History:   Diagnosis Date    Atrial fibrillation ([address]     Blood circulation, collateral     [employer] single pacemaker 4/26/2016 05/05/2016    CAD (coronary artery disease)     cath and stent in right artery    Cancer [facility]) 1954    HX  Colon     Carpal tunnel syndrome     Fall at home     12/7/21 -large bruise to R buttock    Fracture dislocation of ankle 1980    GERD (gastroesophageal reflux disease)     Hyperlipidemia     Hypertension     Kidney lesion, native, right     found on 5/2016    Osteoarthritis     knees    Osteopenia     Prolonged emergence from general anesthesia  Thyroid goiter 09/06/2016    Yersiniosis 2016      Past Surgical History:   Procedure Laterality Date    ABDOMEN SURGERY      ANKLE FRACTURE SURGERY  6606--1317    reconstruction in 2003 and 2007    APPENDECTOMY      BLADDER SURGERY      support bladder repair    CARDIAC SURGERY      heart stent 12-11-18, [place]u    CARPAL TUNNEL RELEASE  7/2013    CARPAL TUNNEL RELEASE Right 08/19/2017    Revision    CATARACT REMOVAL Bilateral     CHOLECYSTECTOMY  1986    COLON SURGERY  1954    COLONOSCOPY      ENDOSCOPY, COLON, DIAGNOSTIC      EYE SURGERY      cataract     FRACTURE SURGERY      HYSTERECTOMY  1971    JOINT REPLACEMENT  1998    L knee    KNEE SURGERY Left     total replacement    PACEMAKER PLACEMENT      PTCA  01/15/2019    Successful PCI / Drug Eluting Stent of the proximal Left Anterior Descending Coronary Artery.     UPPER GASTROINTESTINAL ENDOSCOPY Left 11/28/2018    EGD BIOPSY performed by Benedict Story MD at [facility]    UPPER GASTROINTESTINAL ENDOSCOPY  11/28/2018    EGD CONTROL HEMORRHAGE performed by Benedict Story MD at [facility]       Family History   Problem Relation Age of Onset    Heart Disease Mother     High Blood Pressure Mother     Heart Disease Father     High Blood Pressure Father     Heart Disease Brother     High Blood Pressure Brother     Heart Disease Son        Social History     Tobacco Use    Smoking status: Never Smoker    Smokeless tobacco: Never Used   Substance Use Topics    Alcohol use: No      Allergies   Allergen Reactions    Methadone Shortness Of Breath     Shakes and nausea    Gabapentin     Lodine [Etodolac] Other (See Comments)     \"spacey, hot flashes, shaky\"    Lyrica [Pregabalin]     Ultram [Tramadol]      Nausea, pedal edema, SOB       Health Maintenance   Topic Date Due    DTaP/Tdap/Td vaccine (1 - Tdap) 10/28/2005    Shingles Vaccine (2 of 2) 10/01/2019    Depression Monitoring  11/11/2022    Annual Wellness Visit (AWV)  11/12/2022    Flu vaccine  Completed    Pneumococcal 65+ years Vaccine  Completed    COVID-19 Vaccine  Completed    Hepatitis A vaccine  Aged Out    Hepatitis B vaccine  Aged Out    Hib vaccine  Aged Out    Meningococcal (ACWY) vaccine  Aged Out       Subjective:      Review of Systems   Constitutional: Negative for activity change, appetite change and fever. HENT: Negative for congestion, rhinorrhea, sore throat and trouble swallowing. Eyes: Negative for pain, discharge and redness. Respiratory: Negative for cough, chest tightness and shortness of breath. Cardiovascular: Negative for chest pain, palpitations and leg swelling. Gastrointestinal: Negative for abdominal pain, nausea and vomiting. Endocrine: Negative for cold intolerance. Genitourinary: Negative for difficulty urinating. Musculoskeletal: Positive for gait problem (due to weakness). Negative for arthralgias. Skin: Negative for color change. Neurological: Positive for weakness. Negative for dizziness, tremors, speech difficulty and headaches. Psychiatric/Behavioral: Negative for agitation and sleep disturbance. Objective:     Physical Exam  Vitals and nursing note reviewed. Constitutional:       General: She is not in acute distress. Appearance: Normal appearance. She is normal weight. She is not diaphoretic. HENT:      Head: Normocephalic and atraumatic. Right Ear: External ear normal.      Left Ear: External ear normal.      Nose: Nose normal. No congestion or rhinorrhea. Mouth/Throat:      Mouth: Mucous membranes are moist.      Pharynx: No oropharyngeal exudate. Eyes:      General: No scleral icterus. Right eye: No discharge. Left eye: No discharge. Extraocular Movements: Extraocular movements intact. Conjunctiva/sclera: Conjunctivae normal.      Pupils: Pupils are equal, round, and reactive to light.    Cardiovascular:      Rate and Rhythm: Normal rate and regular remission (HCC)   Continue venlafaxine   8. Other fatigue   Therapy to eval and treat   9. Primary osteoarthritis involving multiple joints   Tylenol prn   10. Hypomagnesemia - hx of   11. PVD (peripheral vascular disease) (HCC)   Continue ASA   12. [place] [employer] single pacemaker 4/26/2016    13. Steatosis of liver - hx of   14. Stress incontinence    15. History of colon cancer    16. Generalized weakness   Therapy to eval and treat  Highly encourage exercises at home on  discharge       Patient seen and examined. History partially obtained by chart review and nursing notes. I have reviewed patient's past medical, surgical, social, and family history and have made updates where appropriate. See facility EMR for updated medication list.      Resident has CAD with stents and pacemaker, HTN, A fib, HLD, depression, and these are expected to last 12 or more months. These chronic conditions place the resident at a significant risk of death, acute exacerbation, or functional decline. The patient's comprehensive plan was monitored today. I spent 20 minutes reviewing the plan. Over 25 minutes spent with patient with >50% spent in counseling and coordination of care, chart review, results review, and coordinating with the nursing and PT staff.        Electronically signed by Mykel Williamson MD on 1/10/2022 at 7:58 AM

## 2022-01-11 DIAGNOSIS — I50.33 ACUTE ON CHRONIC CONGESTIVE HEART FAILURE WITH LEFT VENTRICULAR DIASTOLIC DYSFUNCTION (HCC): ICD-10-CM

## 2022-01-11 RX ORDER — BUMETANIDE 0.5 MG/1
0.5 TABLET ORAL DAILY
Qty: 90 TABLET | Refills: 3 | Status: SHIPPED | OUTPATIENT
Start: 2022-01-11 | End: 2022-01-12

## 2022-01-12 ENCOUNTER — OFFICE VISIT (OUTPATIENT)
Dept: CARDIOLOGY CLINIC | Age: 87
End: 2022-01-12
Payer: MEDICARE

## 2022-01-12 VITALS
HEART RATE: 70 BPM | SYSTOLIC BLOOD PRESSURE: 134 MMHG | BODY MASS INDEX: 28.23 KG/M2 | DIASTOLIC BLOOD PRESSURE: 63 MMHG | WEIGHT: 153.4 LBS | HEIGHT: 62 IN

## 2022-01-12 DIAGNOSIS — Z95.0 PACEMAKER: ICD-10-CM

## 2022-01-12 DIAGNOSIS — I25.10 CORONARY ARTERY DISEASE INVOLVING NATIVE CORONARY ARTERY OF NATIVE HEART WITHOUT ANGINA PECTORIS: ICD-10-CM

## 2022-01-12 DIAGNOSIS — I50.32 CHRONIC DIASTOLIC CHF (CONGESTIVE HEART FAILURE) (HCC): Primary | ICD-10-CM

## 2022-01-12 DIAGNOSIS — I10 PRIMARY HYPERTENSION: ICD-10-CM

## 2022-01-12 DIAGNOSIS — I49.5 SICK SINUS SYNDROME (HCC): ICD-10-CM

## 2022-01-12 PROCEDURE — G8417 CALC BMI ABV UP PARAM F/U: HCPCS | Performed by: STUDENT IN AN ORGANIZED HEALTH CARE EDUCATION/TRAINING PROGRAM

## 2022-01-12 PROCEDURE — 1123F ACP DISCUSS/DSCN MKR DOCD: CPT | Performed by: STUDENT IN AN ORGANIZED HEALTH CARE EDUCATION/TRAINING PROGRAM

## 2022-01-12 PROCEDURE — 1111F DSCHRG MED/CURRENT MED MERGE: CPT | Performed by: STUDENT IN AN ORGANIZED HEALTH CARE EDUCATION/TRAINING PROGRAM

## 2022-01-12 PROCEDURE — G8482 FLU IMMUNIZE ORDER/ADMIN: HCPCS | Performed by: STUDENT IN AN ORGANIZED HEALTH CARE EDUCATION/TRAINING PROGRAM

## 2022-01-12 PROCEDURE — 1090F PRES/ABSN URINE INCON ASSESS: CPT | Performed by: STUDENT IN AN ORGANIZED HEALTH CARE EDUCATION/TRAINING PROGRAM

## 2022-01-12 PROCEDURE — 1036F TOBACCO NON-USER: CPT | Performed by: STUDENT IN AN ORGANIZED HEALTH CARE EDUCATION/TRAINING PROGRAM

## 2022-01-12 PROCEDURE — G8427 DOCREV CUR MEDS BY ELIG CLIN: HCPCS | Performed by: STUDENT IN AN ORGANIZED HEALTH CARE EDUCATION/TRAINING PROGRAM

## 2022-01-12 PROCEDURE — 99213 OFFICE O/P EST LOW 20 MIN: CPT | Performed by: STUDENT IN AN ORGANIZED HEALTH CARE EDUCATION/TRAINING PROGRAM

## 2022-01-12 PROCEDURE — 4040F PNEUMOC VAC/ADMIN/RCVD: CPT | Performed by: STUDENT IN AN ORGANIZED HEALTH CARE EDUCATION/TRAINING PROGRAM

## 2022-01-12 RX ORDER — BUMETANIDE 1 MG/1
0.5 TABLET ORAL EVERY OTHER DAY
Qty: 45 TABLET | Refills: 1 | Status: SHIPPED | OUTPATIENT
Start: 2022-01-12 | End: 2022-04-12 | Stop reason: SDUPTHER

## 2022-01-12 RX ORDER — HYDROXYZINE HYDROCHLORIDE 25 MG/1
25 TABLET, FILM COATED ORAL 3 TIMES DAILY PRN
COMMUNITY
End: 2022-07-20

## 2022-01-12 NOTE — PROGRESS NOTES
[facility] PROFESSIONAL SERVICES  [facility]   [address]   [address] [address]   Dept: [phone]   Dept Fax: [phone]: [phone]      Chief Complaint   Patient presents with    Follow-Up from Hospital     Cardiologist:  Dr. Hermosillo Friday  79 yo female presents for hfu for near syncope, OH. Hx of CAD s/p stent, PAF, tachy-mariola s/p PPM, AAA 4.2cm, HTN, OH, colon cancer previously. Has been feeling well since hospital visit. Weight is up about 5-7 lbs. Was taken off bumex at discharge, I assume d/t to [place]. Pacer was reprogrammed, no further issues. Has been tolerating meds well. No chest pain, angina, VACA, orthopnea, PND, sob at rest, palpitations, or syncope.         General:   No fever, no chills, no weight loss, no fatigue  Pulmonary:    No dyspnea, no wheezing  Cardiac:    Denies recent chest pain   GI:     No nausea or vomiting, no abdominal pain  Neuro:      No dizziness or light headedness  Musculoskeletal:  No recent active issues  Extremities:   + edema      Past Medical History:   Diagnosis Date    Atrial fibrillation ([facility] [address]     Blood circulation, collateral     [employer] single pacemaker 4/26/2016 05/05/2016    CAD (coronary artery disease)     cath and stent in right artery    Cancer [facility]) 1954    HX  Colon     Carpal tunnel syndrome     Fall at home     12/7/21 -large bruise to R buttock    Fracture dislocation of ankle 1980    GERD (gastroesophageal reflux disease)     Hyperlipidemia     Hypertension     Kidney lesion, native, right     found on 5/2016    Osteoarthritis     knees    Osteopenia     Prolonged emergence from general anesthesia     Thyroid goiter 09/06/2016    Yersiniosis 2016       Allergies   Allergen Reactions    Methadone Shortness Of Breath     Shakes and nausea    Gabapentin     Lodine [Etodolac] Other (See Comments)     \"spacey, hot flashes, shaky\"    Lyrica [Pregabalin]     Ultram [Tramadol] Nausea, pedal edema, SOB       Current Outpatient Medications   Medication Sig Dispense Refill    hydrOXYzine (ATARAX) 25 MG tablet Take 25 mg by mouth 3 times daily as needed for Itching      bumetanide (BUMEX) 1 MG tablet Take 0.5 tablets by mouth every other day 45 tablet 1    metoprolol tartrate (LOPRESSOR) 25 MG tablet Take 1 tablet by mouth 2 times daily 60 tablet 3    magnesium (MAGNESIUM-OXIDE) 250 MG TABS tablet Take 250 mg by mouth 2 times daily      NONFORMULARY daily Calcium chew      ferrous sulfate (IRON 325) 325 (65 Fe) MG tablet Take 325 mg by mouth daily (with breakfast)      docusate sodium (COLACE) 100 MG capsule Take 100 mg by mouth daily      Cyanocobalamin (VITAMIN B 12 PO) Take by mouth every other day      pantoprazole (PROTONIX) 40 MG tablet Take 1 tablet by mouth 2 times daily 180 tablet 3    rivaroxaban (XARELTO) 15 MG TABS tablet Take 1 tablet by mouth Daily with supper 90 tablet 3    venlafaxine (EFFEXOR XR) 75 MG extended release capsule Take 1 capsule by mouth daily 90 capsule 3    potassium chloride (KLOR-CON M) 20 MEQ extended release tablet Take 1 tablet by mouth once daily 90 tablet 3    Biotin 1000 MCG CHEW Take 1,000 mcg by mouth daily      Probiotic Product (PROBIOTIC DAILY PO) Take by mouth daily      sucralfate (CARAFATE) 1 GM tablet Take 1 tablet by mouth 2 times daily 180 tablet 11    aspirin 81 MG chewable tablet Take 1 tablet by mouth daily 30 tablet 3    acetaminophen (TYLENOL) 500 MG tablet Take 1,000 mg by mouth every 6 hours as needed for Pain        No current facility-administered medications for this visit. Social History     Socioeconomic History    Marital status:       Spouse name: Ryan Munoz Number of children: 2    Years of education: None    Highest education level: None   Occupational History    None   Tobacco Use    Smoking status: Never Smoker    Smokeless tobacco: Never Used   Vaping Use    Vaping Use: Never used Substance and Sexual Activity    Alcohol use: No    Drug use: No    Sexual activity: None   Other Topics Concern    None   Social History Narrative    None     Social Determinants of Health     Financial Resource Strain: Low Risk     Difficulty of Paying Living Expenses: Not hard at all   Food Insecurity: No Food Insecurity    Worried About Running Out of Food in the Last Year: Never true    Primitivo of Food in the Last Year: Never true   Transportation Needs:     Lack of Transportation (Medical): Not on file    Lack of Transportation (Non-Medical): Not on file   Physical Activity:     Days of Exercise per Week: Not on file    Minutes of Exercise per Session: Not on file   Stress:     Feeling of Stress : Not on file   Social Connections:     Frequency of Communication with Friends and Family: Not on file    Frequency of Social Gatherings with Friends and Family: Not on file    Attends Sikh Services: Not on file    Active Member of [address] [place] [address] [employer] or Organizations: Not on file    Attends Club or Organization Meetings: Not on file    Marital Status: Not on file   Intimate Partner Violence:     Fear of Current or Ex-Partner: Not on file    Emotionally Abused: Not on file    Physically Abused: Not on file    Sexually Abused: Not on file   Housing Stability:     Unable to Pay for Housing in the Last Year: Not on file    Number of [place] in the Last Year: Not on file    Unstable Housing in the Last Year: Not on file       Family History   Problem Relation Age of Onset    Heart Disease Mother     High Blood Pressure Mother     Heart Disease Father     High Blood Pressure Father     Heart Disease Brother     High Blood Pressure Brother     Heart Disease Son        Blood pressure 134/63, pulse 70, height 5' 1.5\" (1.562 m), weight 153 lb 6.4 oz (69.6 kg).     General:   Well developed, well nourished  Lungs:   Clear to auscultation  Heart:    Normal S1 S2, No murmur, rubs, or gallops  Abdomen:   Soft, non tender, no organomegalies, positive bowel sounds  Extremities:   1-2+ bilat LE edema, obvious pitting at the lines of the compression hose, no cyanosis, good peripheral pulses  Neurological:   Awake, alert, oriented. No obvious focal deficits  Musculoskeletal:  No obvious deformities  MYRA hose in place, up to the mid calf  EKG:          Diagnosis Orders   1. Chronic diastolic CHF (congestive heart failure) (HCC)  Basic Metabolic Panel   2. Sick sinus syndrome ([address]     3. [employer] single pacemaker 4/26/2016     4. Coronary artery disease involving native coronary artery of native heart without angina pectoris     5. Primary hypertension         Orders Placed This Encounter   Procedures    Basic Metabolic Panel     Standing Status:   Future     Standing Expiration Date:   1/12/2023       Assessment/Plan:   Chronic diastolic CHF-weight has been up about 6-7 lbs since being in the hospital. Restart bumex 0.5 mg every other day. Check BMP in 2-3 weeks. SSS/afib S/p PPM- no further issues since pacer reprogrammed. No syncope, no dizziness. HTN-well controlled  OH-no further dizziness, appears this is not likely her cause of near syncope    Patient doing well for the most part. Some MAK, weight is up 5-7 lbs in 3 weeks. Will restart bumex 0.5 mg every other day and check BMP in a couple weeks. F/u with Dr Jacob Fournier in March as scheduled.    Continue Dr Garcia [place] current treatment plan  Follow up with Dr Jacob Fournier as scheduled or sooner if needed

## 2022-01-26 ENCOUNTER — NURSE ONLY (OUTPATIENT)
Dept: LAB | Age: 87
End: 2022-01-26

## 2022-01-26 ENCOUNTER — OFFICE VISIT (OUTPATIENT)
Dept: FAMILY MEDICINE CLINIC | Age: 87
End: 2022-01-26
Payer: MEDICARE

## 2022-01-26 VITALS
SYSTOLIC BLOOD PRESSURE: 120 MMHG | TEMPERATURE: 98 F | DIASTOLIC BLOOD PRESSURE: 74 MMHG | BODY MASS INDEX: 28.22 KG/M2 | HEART RATE: 71 BPM | RESPIRATION RATE: 16 BRPM | WEIGHT: 151.8 LBS

## 2022-01-26 DIAGNOSIS — I50.9 CHF (CONGESTIVE HEART FAILURE), NYHA CLASS I, UNSPECIFIED FAILURE CHRONICITY, UNSPECIFIED TYPE (HCC): ICD-10-CM

## 2022-01-26 DIAGNOSIS — R53.83 OTHER FATIGUE: ICD-10-CM

## 2022-01-26 DIAGNOSIS — I73.9 PVD (PERIPHERAL VASCULAR DISEASE) (HCC): ICD-10-CM

## 2022-01-26 DIAGNOSIS — N28.1 KIDNEY CYSTS: ICD-10-CM

## 2022-01-26 DIAGNOSIS — K76.0 STEATOSIS OF LIVER: ICD-10-CM

## 2022-01-26 DIAGNOSIS — D64.9 MILD ANEMIA: ICD-10-CM

## 2022-01-26 DIAGNOSIS — M15.9 PRIMARY OSTEOARTHRITIS INVOLVING MULTIPLE JOINTS: ICD-10-CM

## 2022-01-26 DIAGNOSIS — I48.91 ATRIAL FIBRILLATION WITH RAPID VENTRICULAR RESPONSE (HCC): ICD-10-CM

## 2022-01-26 DIAGNOSIS — I25.10 CORONARY ARTERY DISEASE INVOLVING NATIVE CORONARY ARTERY OF NATIVE HEART WITHOUT ANGINA PECTORIS: ICD-10-CM

## 2022-01-26 DIAGNOSIS — I50.32 CHRONIC DIASTOLIC CHF (CONGESTIVE HEART FAILURE) (HCC): ICD-10-CM

## 2022-01-26 DIAGNOSIS — F33.42 RECURRENT MAJOR DEPRESSIVE DISORDER, IN FULL REMISSION (HCC): ICD-10-CM

## 2022-01-26 DIAGNOSIS — D50.0 IRON DEFICIENCY ANEMIA DUE TO CHRONIC BLOOD LOSS: ICD-10-CM

## 2022-01-26 DIAGNOSIS — I25.119 CORONARY ARTERY DISEASE INVOLVING NATIVE HEART WITH ANGINA PECTORIS, UNSPECIFIED VESSEL OR LESION TYPE (HCC): ICD-10-CM

## 2022-01-26 DIAGNOSIS — E83.42 HYPOMAGNESEMIA: ICD-10-CM

## 2022-01-26 DIAGNOSIS — R53.81 PHYSICAL DECONDITIONING: ICD-10-CM

## 2022-01-26 DIAGNOSIS — M85.89 OSTEOPENIA OF MULTIPLE SITES: ICD-10-CM

## 2022-01-26 DIAGNOSIS — N39.3 STRESS INCONTINENCE: ICD-10-CM

## 2022-01-26 DIAGNOSIS — Z95.0 PACEMAKER: ICD-10-CM

## 2022-01-26 DIAGNOSIS — I48.20 CHRONIC ATRIAL FIBRILLATION (HCC): Primary | ICD-10-CM

## 2022-01-26 DIAGNOSIS — I10 PRIMARY HYPERTENSION: ICD-10-CM

## 2022-01-26 DIAGNOSIS — I48.20 CHRONIC ATRIAL FIBRILLATION WITH RAPID VENTRICULAR RESPONSE (HCC): ICD-10-CM

## 2022-01-26 DIAGNOSIS — Z85.038 HISTORY OF COLON CANCER: ICD-10-CM

## 2022-01-26 DIAGNOSIS — I49.5 SICK SINUS SYNDROME (HCC): ICD-10-CM

## 2022-01-26 DIAGNOSIS — R53.1 GENERALIZED WEAKNESS: ICD-10-CM

## 2022-01-26 DIAGNOSIS — K21.9 GASTROESOPHAGEAL REFLUX DISEASE WITHOUT ESOPHAGITIS: ICD-10-CM

## 2022-01-26 DIAGNOSIS — I10 ESSENTIAL HYPERTENSION: ICD-10-CM

## 2022-01-26 DIAGNOSIS — E78.00 PURE HYPERCHOLESTEROLEMIA: ICD-10-CM

## 2022-01-26 LAB
ANION GAP SERPL CALCULATED.3IONS-SCNC: 11 MEQ/L (ref 8–16)
BUN BLDV-MCNC: 16 MG/DL (ref 7–22)
CALCIUM SERPL-MCNC: 9.5 MG/DL (ref 8.5–10.5)
CHLORIDE BLD-SCNC: 100 MEQ/L (ref 98–111)
CO2: 29 MEQ/L (ref 23–33)
CREAT SERPL-MCNC: 0.7 MG/DL (ref 0.4–1.2)
GFR SERPL CREATININE-BSD FRML MDRD: 78 ML/MIN/1.73M2
GLUCOSE BLD-MCNC: 122 MG/DL (ref 70–108)
POTASSIUM SERPL-SCNC: 4.2 MEQ/L (ref 3.5–5.2)
SODIUM BLD-SCNC: 140 MEQ/L (ref 135–145)

## 2022-01-26 PROCEDURE — 4040F PNEUMOC VAC/ADMIN/RCVD: CPT | Performed by: FAMILY MEDICINE

## 2022-01-26 PROCEDURE — G8417 CALC BMI ABV UP PARAM F/U: HCPCS | Performed by: FAMILY MEDICINE

## 2022-01-26 PROCEDURE — 1036F TOBACCO NON-USER: CPT | Performed by: FAMILY MEDICINE

## 2022-01-26 PROCEDURE — G8427 DOCREV CUR MEDS BY ELIG CLIN: HCPCS | Performed by: FAMILY MEDICINE

## 2022-01-26 PROCEDURE — 99214 OFFICE O/P EST MOD 30 MIN: CPT | Performed by: FAMILY MEDICINE

## 2022-01-26 PROCEDURE — G8482 FLU IMMUNIZE ORDER/ADMIN: HCPCS | Performed by: FAMILY MEDICINE

## 2022-01-26 PROCEDURE — 1123F ACP DISCUSS/DSCN MKR DOCD: CPT | Performed by: FAMILY MEDICINE

## 2022-01-26 PROCEDURE — 1090F PRES/ABSN URINE INCON ASSESS: CPT | Performed by: FAMILY MEDICINE

## 2022-01-26 NOTE — PROGRESS NOTES
[address] [place] [id number]  Dept: [phone]  Dept Fax: [phone]  Loc: [address] [facility] Mala Gramajo is a 80 y.o. female who presents today for:  Chief Complaint   Patient presents with    Follow-up     nursing home f/u    Medication Refill           HPI:     HPI     Hypertension: Patient here for follow-up of elevated blood pressure. She is not exercising and is adherent to low salt diet. Blood pressure is well controlled at home. Cardiac symptoms none. Patient denies chest pain and palpitations. Cardiovascular risk factors: advanced age (older than 54 for men, 72 for women), dyslipidemia, hypertension, obesity (BMI >= 30 kg/m2) and sedentary lifestyle. Use of agents associated with hypertension: none. History of target organ damage: SSS and afib and CHF/CAD. Under the care of cardiology: Dr Esther Nation. Hyperlipidemia: Patient presents with hyperlipidemia. She was tested because hypertension. Her last labs see labs charted. . There is a family history of hyperlipidemia. There is a family history of early ischemia heart disease. Lab Results   Component Value Date    CHOL 127 12/04/2018    CHOL 154 11/01/2018    CHOL 176 05/14/2013     Lab Results   Component Value Date    TRIG 145 12/04/2018    TRIG 167 11/01/2018    TRIG 157 05/14/2013     Lab Results   Component Value Date    HDL 36 12/04/2018    HDL 38 11/01/2018    HDL 46 05/14/2013     Lab Results   Component Value Date    LDLCALC 62 12/04/2018    [address] 11/01/2018    [address] 05/14/2013     No results found for: LABVLDL, VLDL  No results found for: CHOLHDLRATIO  Active statin treatment with history of stent placement. GERD: Alisha complains of heartburn. This has been associated with heartburn. She denies no other symptoms. Symptoms have been present for 5 years. She denies dysphagia. She has not lost weight.  She denies melena, hematochezia, hematemesis, and coffee ground emesis. Medical therapy in the past has included proton pump inhibitors. Depression: Patient complains of depression. She complains of depressed mood, difficulty concentrating, psychomotor retardation and recurrent thoughts of death. Onset was approximately 5 years ago, gradually worsening since that time. She denies current suicidal and homicidal plan or intent. Family history significant for no psychiatric illness. Possible organic causes contributing are: none. Risk factors: negative life event aging and becoming less active and previous episode of depression Previous treatment includes no medication and none and psych therapy. She complains of the following side effects from the treatment: none. Osteoarthritis primary in multiple joints is not controlled on current medications. However, she does very little exercise at home and refuses PT. This is leading to unsteady gait and slowly limiting her ability to do ADLs. She had SOB with exertion. She is talking to her family about AL living but her daughter is helping and lives in the same [facility] building. She is back at home after discharge from the SNF. A fib is rate controlled and taking xarelto. Vitamin b12 deficiency needs rechecked. Night sweats are unchanged. thyroid goiter in the past needs recheck of ultrasound last done in 4/2019 shows stable nodules and 2 new. One is recommended to have a biopsy. The night sweats are leading to her not feeling rested in the morning. Here for continued hot flashes and night sweats. They have been happening for 1-2 years but getting worse since June 2020. More fatigue and SOB with exertion as well. She is still only exercising a little since DC from [facility] despite many attempts to motivate her.   She is off beta blockers due to dizziness in the past.  She did go to PT in June 2020 but did not finish and has not done any of the exercises at home, she will be released from [facility] after 2 weeks. Constipation comes and goes. Better off of iron but still needing colace. Encouraged for more exercising at home. She has noticed a lump in the right axilla. First noticed this 9/2020. Not painful but not getting any smaller and she thinks it may be bigger. She and her daughter decided in fall 2020 to not go through with testing due to her unwillingness to do treatment if they find cancer. She is bring this up again and again wants to consider imaging and will call if she decides to go through with it. No new concerns today      Here for persistent weakness for a few months. Pacer clinic placed a monitor. She had to do a consult there on 9/28/21 but nothing was changed. Then developed sweats and chills, SOB, fatigue and headaches. Started tylenol 2 ES TID and occasional QID which helps but still has right side pain. After 2021 flu shot she got worse again. She has had episodes of more SOB and diaphoresis which are most prominent after BM. She does have a history of vagal nerve issues. Cardiology agreed that the dizziness is likely vagal.        SNF note 1-11-22:  Edwin Bumpers was seen today in preparation for DC to home on 1/13 with [facility]: OMEGA ELDER She was admitted to the facility on 12/22/21 from [facility] where she was admitted on 12/18/21 for CHF and bradycardia. 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 She has improved with therapy and states that she is ready to go back home. She denies pain or shortness of breath on room air. No distress noted. She is alert and oriented, she is pleasant and follows commands. She will have a f/u with Dr. Fior Tyler in the office. Reviewed chart forpast medical history , surgical history , allergies, social history , family history and medications.     Health Maintenance Topic Date Due    DTaP/Tdap/Td vaccine (1 - Tdap) 10/28/2005    Shingles Vaccine (2 of 2) 10/01/2019    Depression Monitoring  11/11/2022    Annual Wellness Visit (AWV)  11/12/2022    Potassium monitoring  12/22/2022    Creatinine monitoring  12/22/2022    Flu vaccine  Completed    Pneumococcal 65+ years Vaccine  Completed    COVID-19 Vaccine  Completed    Hepatitis A vaccine  Aged Out    Hepatitis B vaccine  Aged Out    Hib vaccine  Aged Out    Meningococcal (ACWY) vaccine  Aged Out       Subjective:      Constitutional:Negative for fever, chills, diaphoresis, activity change, appetite change and fatigue. HENT: Negative for hearing loss, ear pain, congestion, sore throat, rhinorrhea, postnasal drip and ear discharge. Eyes: Negative for photophobia and visual disturbance. Respiratory: Negative for cough, chest tightness, shortness of breath and wheezing. Cardiovascular: Negative for chest pain and leg swelling. Gastrointestinal: Negative for nausea, vomiting, abdominal pain, diarrhea and constipation. Genitourinary: Negative for dysuria, urgency and frequency. Neurological: Negative for weakness, light-headedness and headaches. Psychiatric/Behavioral: Negative for sleep disturbance.      :     Vitals:    01/26/22 1310   BP: 120/74   Site: Left Upper Arm   Position: Sitting   Cuff Size: Large Adult   Pulse: 71   Resp: 16   Temp: 98 °F (36.7 °C)   TempSrc: Temporal   Weight: 151 lb 12.8 oz (68.9 kg)     Wt Readings from Last 3 Encounters:   01/26/22 151 lb 12.8 oz (68.9 kg)   01/12/22 153 lb 6.4 oz (69.6 kg)   01/11/22 152 lb (68.9 kg)       Physical Exam  Constitutional: Vital signs are normal. She appears well-developed and well-nourished. She is active. HENT:   Head: Normocephalic and atraumatic. Right Ear: Tympanic membrane, external ear and ear canal normal. No drainage or tenderness. Left Ear: Tympanic membrane, external ear and ear canal normal. No drainage or tenderness. Nose: Nose normal. No mucosal edema or rhinorrhea. Mouth/Throat: Uvula is midline, oropharynx is clear and moist and mucous membranes are normal. Mucous membranes are not pale. Normal dentition. No posterior oropharyngeal edema or posterior oropharyngeal erythema. Eyes: Lids are normal. Right eye exhibits no chemosis and no discharge. Left eye exhibits no chemosis and no drainage. Right conjunctiva has no hemorrhage. Left conjunctiva has no hemorrhage. Right eye exhibits normal extraocular motion. Left eye exhibits normal extraocular motion. Right pupil is round and reactive. Left pupil is round and reactive. Pupils are equal.   Cardiovascular: Normal rate, regular rhythm, S1 normal, S2 normal and normal heart sounds. Exam reveals no gallop. No murmur heard. Pulmonary/Chest: Effort normal and breath sounds normal. No respiratory distress. She has no wheezes. She has no rhonchi. She has no rales. Abdominal: Soft. Normal appearance and bowel sounds are normal. She exhibits no distension and no mass. There is no hepatosplenomegaly. No tenderness. She has no rigidity, no rebound and no guarding. No hernia. Musculoskeletal:        Right lower leg: She exhibits no edema. Left lower leg: She exhibits no edema. Neurological: She is alert. Assessment/Plan   Richie Glass was seen today for follow-up and medication refill.     Diagnoses and all orders for this visit:    Chronic atrial fibrillation (HCC)    PVD (peripheral vascular disease) ([facility] [address]    Coronary artery disease involving native heart with angina pectoris, unspecified vessel or lesion type ([facility] [address]    Generalized weakness    Osteopenia of multiple sites    Primary hypertension    Gastroesophageal reflux disease without esophagitis    Atrial fibrillation with rapid ventricular response (HCC)    CHF (congestive heart failure), NYHA class I, unspecified failure chronicity, unspecified type (HCC)    Other fatigue    Chronic diastolic CHF (congestive heart failure) (HCC)    Coronary artery disease involving native coronary artery of native heart without angina pectoris    Hypomagnesemia    [employer] single pacemaker 4/26/2016    History of colon cancer    Iron deficiency anemia due to chronic blood loss    Steatosis of liver    Kidney cysts    Recurrent major depressive disorder, in full remission ([facility] [address]    Essential hypertension  -     metoprolol tartrate (LOPRESSOR) 25 MG tablet; Take 1 tablet by mouth 2 times daily    Physical deconditioning    Pure hypercholesterolemia    Primary osteoarthritis involving multiple joints    Stress incontinence    Sick sinus syndrome (HCC)    Chronic atrial fibrillation with rapid ventricular response (HCC)    Mild anemia    No change to medications   Continue healthy diet and exercise  Aspirin daily    Discussed use, benefit, and side effectsof prescribed medications. All patient questions answered. Pt voiced understanding. Reviewed health maintenance. Instructed to continue current medications, diet and exercise. Patient agreed with treatment plan. Followup as directed.      Electronically signed by Julian Easton MD

## 2022-01-27 ENCOUNTER — CARE COORDINATION (OUTPATIENT)
Dept: CARE COORDINATION | Age: 87
End: 2022-01-27

## 2022-01-27 NOTE — CARE COORDINATION
Patient was called for Care Coordination enrollment s/p recent list referral for assistance with the management of her CHF and healthcare needs. Care Coordination program was reviewed with patient and she shared she would prefer I speak with daughter/HIPPA contact, Tiffany Alan. Patient shared she is due for [place] therapy to come out in a little bit. Patient denied any questions re: her recent PCP visit. ACM spoke with patient's daughter/HIPPA contact, Tiffany Floro. Care Coordination program was reviewed with Tiffany Alan and she shared patient is current with home health and they will be discharging patient in the near future. Tiffany Alan stated patient was able to f/u with PCP yesterday and she denied any questions. Tiffany Alan does not feel patient would benefit from Care Coordination at this time. Tiffany Alan was instructed to call with any changes or new needs, and she verbalized understanding. No further f/u planned. PCP updated.

## 2022-01-28 ENCOUNTER — TELEPHONE (OUTPATIENT)
Dept: CARDIOLOGY CLINIC | Age: 87
End: 2022-01-28

## 2022-03-02 ENCOUNTER — OFFICE VISIT (OUTPATIENT)
Dept: CARDIOLOGY CLINIC | Age: 87
End: 2022-03-02
Payer: MEDICARE

## 2022-03-02 VITALS
SYSTOLIC BLOOD PRESSURE: 132 MMHG | WEIGHT: 157 LBS | HEART RATE: 72 BPM | HEIGHT: 62 IN | BODY MASS INDEX: 28.89 KG/M2 | DIASTOLIC BLOOD PRESSURE: 70 MMHG

## 2022-03-02 DIAGNOSIS — I50.30 DIASTOLIC CONGESTIVE HEART FAILURE, UNSPECIFIED HF CHRONICITY (HCC): Primary | ICD-10-CM

## 2022-03-02 PROCEDURE — G8428 CUR MEDS NOT DOCUMENT: HCPCS | Performed by: INTERNAL MEDICINE

## 2022-03-02 PROCEDURE — G8482 FLU IMMUNIZE ORDER/ADMIN: HCPCS | Performed by: INTERNAL MEDICINE

## 2022-03-02 PROCEDURE — 1123F ACP DISCUSS/DSCN MKR DOCD: CPT | Performed by: INTERNAL MEDICINE

## 2022-03-02 PROCEDURE — 1036F TOBACCO NON-USER: CPT | Performed by: INTERNAL MEDICINE

## 2022-03-02 PROCEDURE — 4040F PNEUMOC VAC/ADMIN/RCVD: CPT | Performed by: INTERNAL MEDICINE

## 2022-03-02 PROCEDURE — 99214 OFFICE O/P EST MOD 30 MIN: CPT | Performed by: INTERNAL MEDICINE

## 2022-03-02 PROCEDURE — 1090F PRES/ABSN URINE INCON ASSESS: CPT | Performed by: INTERNAL MEDICINE

## 2022-03-02 PROCEDURE — G8417 CALC BMI ABV UP PARAM F/U: HCPCS | Performed by: INTERNAL MEDICINE

## 2022-03-02 NOTE — PROGRESS NOTES
Pt here for 9 mo check up       Pt states med list is correct from PCP appt 1/26/22    Pt daughter has questions about the bumex     Pt weight is going up , sob is better

## 2022-03-02 NOTE — PROGRESS NOTES
[address] [place] [address] [facility] [address]  Dept: [phone]  Dept Fax: [phone]  Loc: [phone]    Visit Date: 3/2/2022    Ms. Josiah Marcus is a 80 y.o. female  who presented for:  Chief Complaint   Patient presents with    Check-Up    Coronary Artery Disease    Congestive Heart Failure       HPI:   81 yo F c hx of Afib on Xarelto, HTN, HLD, CAD s/p PCI is here for a follow up.    Denies any chest pain, sob, palpitations, lightheadedness, dizziness, orthopnea, PND or pedal edema. She comes today with complaints of increasing weight, has had 6 lbs weight gain.         Current Outpatient Medications:     metoprolol tartrate (LOPRESSOR) 25 MG tablet, Take 1 tablet by mouth 2 times daily, Disp: 180 tablet, Rfl: 3    CALCIUM PO, Take by mouth Calcium chew, Disp: , Rfl:     hydrOXYzine (ATARAX) 25 MG tablet, Take 25 mg by mouth 3 times daily as needed for Itching, Disp: , Rfl:     bumetanide (BUMEX) 1 MG tablet, Take 0.5 tablets by mouth every other day, Disp: 45 tablet, Rfl: 1    magnesium (MAGNESIUM-OXIDE) 250 MG TABS tablet, Take 250 mg by mouth 2 times daily, Disp: , Rfl:     NONFORMULARY, daily Calcium chew, Disp: , Rfl:     ferrous sulfate (IRON 325) 325 (65 Fe) MG tablet, Take 325 mg by mouth daily (with breakfast), Disp: , Rfl:     docusate sodium (COLACE) 100 MG capsule, Take 100 mg by mouth daily, Disp: , Rfl:     Cyanocobalamin (VITAMIN B 12 PO), Take by mouth every other day, Disp: , Rfl:     pantoprazole (PROTONIX) 40 MG tablet, Take 1 tablet by mouth 2 times daily, Disp: 180 tablet, Rfl: 3    rivaroxaban (XARELTO) 15 MG TABS tablet, Take 1 tablet by mouth Daily with supper, Disp: 90 tablet, Rfl: 3    venlafaxine (EFFEXOR XR) 75 MG extended release capsule, Take 1 capsule by mouth daily, Disp: 90 capsule, Rfl: 3    potassium chloride (KLOR-CON M) 20 MEQ extended release tablet, Take 1 tablet by mouth once daily, Disp: 90 tablet, Rfl: 3    Biotin 1000 MCG CHEW, Take 1,000 mcg by mouth daily, Disp: , Rfl:     Probiotic Product (PROBIOTIC DAILY PO), Take by mouth daily, Disp: , Rfl:     sucralfate (CARAFATE) 1 GM tablet, Take 1 tablet by mouth 2 times daily, Disp: 180 tablet, Rfl: 11    aspirin 81 MG chewable tablet, Take 1 tablet by mouth daily, Disp: 30 tablet, Rfl: 3    acetaminophen (TYLENOL) 500 MG tablet, Take 1,000 mg by mouth every 6 hours as needed for Pain , Disp: , Rfl:     Past Medical History  Steph Quinn  has a past medical history of Atrial fibrillation ([facility] [address] Blood circulation, collateral, [employer] single pacemaker 4/26/2016, CAD (coronary artery disease), Cancer ([facility] [address] Carpal tunnel syndrome, Fall at home, Fracture dislocation of ankle, GERD (gastroesophageal reflux disease), Hyperlipidemia, Hypertension, Kidney lesion, native, right, Osteoarthritis, Osteopenia, Prolonged emergence from general anesthesia, Thyroid goiter, and Yersiniosis. Social History  Steph Quinn  reports that she has never smoked. She has never used smokeless tobacco. She reports that she does not drink alcohol and does not use drugs. Family History  Steph Quinn family history includes Heart Disease in her brother, father, mother, and son; High Blood Pressure in her brother, father, and mother.     Past Surgical History   Past Surgical History:   Procedure Laterality Date    ABDOMEN SURGERY      ANKLE FRACTURE SURGERY  [id number]    reconstruction in 2003 and 2007    APPENDECTOMY      BLADDER SURGERY      support bladder repair    CARDIAC SURGERY      heart stent 12-11-18, [place]    CARPAL TUNNEL RELEASE  7/2013    CARPAL TUNNEL RELEASE Right 08/19/2017    Revision    CATARACT REMOVAL Bilateral     CHOLECYSTECTOMY  1986    COLON SURGERY  1954    COLONOSCOPY      ENDOSCOPY, COLON, DIAGNOSTIC      EYE SURGERY      cataract     FRACTURE SURGERY      HYSTERECTOMY  1971    JOINT REPLACEMENT  1998    L knee    KNEE SURGERY Left total replacement    PACEMAKER PLACEMENT      PTCA  01/15/2019    Successful PCI / Drug Eluting Stent of the proximal Left Anterior Descending Coronary Artery.  UPPER GASTROINTESTINAL ENDOSCOPY Left 11/28/2018    EGD BIOPSY performed by Antonia Juarez MD at [address] [facility] ENDOSCOPY  11/28/2018    EGD CONTROL HEMORRHAGE performed by Antonia Juarez MD at [facility]       Subjective:     REVIEW OF SYSTEMS  Constitutional: denies sweats, chills and fever  HENT: denies  congestion, sinus pressure, sneezing and sore throat. Eyes: denies  pain, discharge, redness and itching. Respiratory: denies apnea, cough  Gastrointestinal: denies blood in stool, constipation, diarrhea   Endocrine: denies cold intolerance, heat intolerance, polydipsia. Genitourinary: denies dysuria, enuresis, flank pain and hematuria. Musculoskeletal: denies arthralgias, joint swelling and neck pain. Neurological: denies numbness and headaches. Psychiatric/Behavioral: denies agitation, confusion, decreased concentration and dysphoric mood    All others reviewed and are negative. Objective:     /70   Pulse 72   Ht 5' 1.5\" (1.562 m)   Wt 157 lb (71.2 kg)   LMP  (LMP Unknown)   BMI 29.18 kg/m²     Wt Readings from Last 3 Encounters:   03/02/22 157 lb (71.2 kg)   01/26/22 151 lb 12.8 oz (68.9 kg)   01/12/22 153 lb 6.4 oz (69.6 kg)     BP Readings from Last 3 Encounters:   03/02/22 132/70   01/26/22 120/74   01/12/22 134/63       PHYSICAL EXAM  Constitutional: Oriented to person, place, and time. Appears well-developed and well-nourished. HENT:   Head: Normocephalic and atraumatic. Eyes: EOM are normal. Pupils are equal, round, and reactive to light. Neck: Normal range of motion. Neck supple. No JVD present. Cardiovascular: Normal rate , normal heart sounds and intact distal pulses. Pulmonary/Chest: Effort normal and breath sounds normal. No respiratory distress. No wheezes.  No rales.   Abdominal: Soft. Bowel sounds are normal. No distension. There is no tenderness. Musculoskeletal: Normal range of motion. No edema. Neurological: Alert and oriented to person, place, and time. No cranial nerve deficit. Coordination normal.   Skin: Skin is warm and dry. Psychiatric: Normal mood and affect.        Lab Results   Component Value Date    CKTOTAL 22 10/01/2021       Lab Results   Component Value Date    WBC 8.0 12/22/2021    RBC 4.00 12/22/2021    RBC 4.17 08/30/2011    HGB 13.0 12/22/2021    HCT 40.6 12/22/2021    .5 12/22/2021    MCH 32.5 12/22/2021    MCHC 32.0 12/22/2021    RDW 13.1 12/18/2021     12/22/2021    MPV 10.4 12/22/2021       Lab Results   Component Value Date     01/26/2022    K 4.2 01/26/2022    K 3.8 01/26/2021     01/26/2022    CO2 29 01/26/2022    BUN 16 01/26/2022    LABALBU 3.1 12/18/2021    LABALBU 4.2 08/30/2011    CREATININE 0.7 01/26/2022    CALCIUM 9.5 01/26/2022    LABGLOM 78 01/26/2022    GLUCOSE 122 01/26/2022    GLUCOSE 116 08/30/2011       Lab Results   Component Value Date    ALKPHOS 63 12/18/2021    ALT 10 12/18/2021    AST 14 12/18/2021    PROT 6.6 12/18/2021    BILITOT 0.9 12/18/2021    BILIDIR <0.2 01/19/2021    LABALBU 3.1 12/18/2021    LABALBU 4.2 08/30/2011       Lab Results   Component Value Date    MG 2.0 12/22/2021       Lab Results   Component Value Date    INR 1.49 (H) 01/19/2021    INR 0.98 01/15/2019    INR 1.15 (H) 12/20/2018         Lab Results   Component Value Date    LABA1C 5.9 10/12/2021       Lab Results   Component Value Date    TRIG 145 12/04/2018    HDL 36 12/04/2018    LDLCALC 62 12/04/2018       Lab Results   Component Value Date    TSH 2.280 12/18/2021         Testing Reviewed:      I haveindividually reviewed the below cardiac tests    EKG:    ECHO:   Results for orders placed during the hospital encounter of 11/26/18   ECHO Complete 2D W Doppler W Color    Narrative Transthoracic Echocardiography Report (TTE)     Demographics      Patient Name   Gildardo Haas Gender               Female                  [place]      MR #           [id number]        Race                                                       Ethnicity      Account #      [de-identified]        Room Number          2257      Accession      [id number]        Date of Study        11/26/2018   Number      Date of Birth  04/04/1928       Referring Physician  Cassidy Faulkner MD      Age            80 year(s)       Sonographer          Chad Quijano RDCS                                      Interpreting         Sonia Faulkner MD                                   Physician     Procedure    Type of Study      TTE procedure:ECHOCARDIOGRAM COMPLETE 2D W DOPPLER W COLOR. Procedure Date  Date: 11/26/2018 Start: 02:58 PM    Study Location: Bedside  Technical Quality: Adequate visualization    Indications:Shortness of breath. Additional Medical History:Pedal edema, hypertension, colon cancer,  hyperlipidemia, atrial fibrillation, GERD, pacemaker    Patient Status: Routine    Height: 66.14 inches Weight: 180.78 pounds BSA: 1.92 m^2 BMI: 29.05 kg/m^2    BP: 149/68 mmHg    Allergies    - Other allergy:(Methadone, Gabapentin, lyrica, ultram). Conclusions      Summary   Left ventricle size and systolic function is normal.   Normal left ventricular wall thickness. Ejection fraction is visually estimated at 55-60%. Mildly dilated left atrium. Aortic valve leaflets are mildly calcified. Mild aortic regurgitation is noted. Mild mitral regurgitation is present. Mild tricuspid regurgitation.       Signature      ----------------------------------------------------------------   Electronically signed by Sonia Faulkner MD (Interpreting   physician) on 11/26/2018 at 04:17 PM   ----------------------------------------------------------------      Findings      Mitral Valve   Mild calcification of the posterior leaflet of the mitral valve. Mild mitral regurgitation is present. Aortic Valve   The aortic valve appears to be trileaflet with good leaflet separation. Aortic valve leaflets are mildly calcified. Mild aortic regurgitation is noted. Tricuspid Valve   Tricuspid valve is structurally normal.   Mild tricuspid regurgitation. Pulmonic Valve   The pulmonic valve was not well visualized . Left Atrium   Mildly dilated left atrium. Left Ventricle   Left ventricle size and systolic function is normal.   Normal left ventricular wall thickness. Ejection fraction is visually estimated at 55-60%. Right Atrium   The right atrium is of normal size. Right Ventricle   Normal right ventricular size and function. Pericardial Effusion   No evidence of any pericardial effusion. Aorta / Great Vessels   Aorta was not clearly visualized.    IVC is normal in size with normal respiratory phasic changes     M-Mode/2D Measurements & Calculations      LV Diastolic    LV Systolic Dimension: 3  AV Cusp Separation: 2.1 cmLA   Dimension: 4.2  cm                        Dimension: 3 cmAO Root   cm              LV Volume Diastolic: 64.2 Dimension: 3.3 cmLA Area: 20.7   LV FS:28.6 %    ml                        cm^2   LV PW           LV Volume Systolic: 35 ml   Diastolic: 0.8  LV EDV/LV EDV Index: 78.6   cm              ml/41 m^2LV ESV/LV ESV   Septum          Index: 35 ml/18 m^2       RV Diastolic Dimension: 2.2 cm   Diastolic: 0.9  EF Calculated: 55.5 %   cm                                        LA/Aorta: 0.91                                                LA volume/Index: 57.9 ml /30m^2     Doppler Measurements & Calculations      MV Peak E-Wave: 116 cm/s AV Peak Velocity: 163   LVOT Peak Velocity: 134                            cm/s                    cm/s   MV Peak Gradient: 5.38   AV Peak Gradient: 10.63 LVOT Peak Gradient: 7   mmHg                     mmHg mmHg      MV Deceleration Time:                            TV Peak E-Wave: 73.7 cm/s   254 msec                                         TV Peak A-Wave: 36.4 cm/s                               AV P1/2t: 544 msec      TV Peak Gradient: 2.17                                                    mmHg                                                    TR Velocity:273 cm/s                                                    TR Gradient:29.81 mmHg   MR Velocity: 366 cm/s    AV DVI (Vmax):0.82      PV Peak Velocity: 80.5                                                    cm/s                                                    PV Peak Gradient: 2.59                                                    mmHg     [email or web address]       STRESS:    CATH:    Assessment/Plan       Diagnosis Orders   1. Diastolic congestive heart failure, unspecified HF chronicity (HCC)           CAD s/p PCI  Diastolic CHF  Afib on Xarelto  HTN  HLD  S/p PPM     Advised to take bumex 1mg x 3 days and go to 0.5mg daily  Underwent PCI of LAD   Doing well  H/H improved  BP well controlled  Continue meds  Continue losartan, digoxin, , lasix, Aspirin  Continue Xarelto  No bleeding issue  The patient is asked to make an attempt to improve diet and exercise patterns to aid in medical management of this problem. Advised patient to call office or seek immediate medical attention if there is any new onset of  any chest pain, sob, palpitations, lightheadedness, dizziness, orthopnea, PND or pedal edema.      All medication side effects were discussed in details.     Thank youfor allowing me to participate in the care of this patient. Please do not hesitate to contact me for any further questions. Return in about 8 weeks (around 4/27/2022), or if symptoms worsen or fail to improve, for Review testing, Regular follow up. Electronically signed by Abbie Jeans, MD [facility]  3/2/2022 at 11:15 AM

## 2022-03-10 ENCOUNTER — TELEPHONE (OUTPATIENT)
Dept: CARDIOLOGY CLINIC | Age: 87
End: 2022-03-10

## 2022-03-10 NOTE — TELEPHONE ENCOUNTER
Pt was seen by Dr. Bernabe Das 3/2/22. Advised to take bumex 1mg x 3 days and go to 0.5mg daily    Isabelle from home health calling to notify Dr. Bernabe Das pt has gained 3lbs. Asking if Bumex can be increased to 1MG daily?

## 2022-04-12 RX ORDER — BUMETANIDE 1 MG/1
1 TABLET ORAL EVERY OTHER DAY
Qty: 45 TABLET | Refills: 1 | Status: SHIPPED | OUTPATIENT
Start: 2022-04-12 | End: 2022-04-27 | Stop reason: SDUPTHER

## 2022-04-12 RX ORDER — SUCRALFATE 1 G/1
TABLET ORAL
Qty: 180 TABLET | Refills: 3 | Status: SHIPPED | OUTPATIENT
Start: 2022-04-12 | End: 2022-04-14

## 2022-04-12 NOTE — TELEPHONE ENCOUNTER
Last visit- 1/26/2022  Next visit- 5/11/2022    Requested Prescriptions     Pending Prescriptions Disp Refills    bumetanide (BUMEX) 1 MG tablet 45 tablet 1     Sig: Take 1 tablet by mouth every other day

## 2022-04-14 ENCOUNTER — PATIENT MESSAGE (OUTPATIENT)
Dept: CARDIOLOGY CLINIC | Age: 87
End: 2022-04-14

## 2022-04-14 ENCOUNTER — PROCEDURE VISIT (OUTPATIENT)
Dept: CARDIOLOGY CLINIC | Age: 87
End: 2022-04-14
Payer: MEDICARE

## 2022-04-14 DIAGNOSIS — Z95.0 PACEMAKER: Primary | ICD-10-CM

## 2022-04-14 PROCEDURE — 93296 REM INTERROG EVL PM/IDS: CPT | Performed by: INTERNAL MEDICINE

## 2022-04-14 PROCEDURE — 93294 REM INTERROG EVL PM/LDLS PM: CPT | Performed by: INTERNAL MEDICINE

## 2022-04-14 RX ORDER — SUCRALFATE 1 G/1
TABLET ORAL
Qty: 180 TABLET | Refills: 3 | Status: SHIPPED | OUTPATIENT
Start: 2022-04-14

## 2022-04-14 NOTE — LETTER
[address] [place] [id number]  Phone: [phone]  Fax: [phone]        April 21, 2022    Dharmesh Garza  [id number]      Dear [facility] Forward: Deana Castillo We receive your [employer] on  April 14, 2022. It is on your physician's desk to read.     Your next scheduled transmission from home is July 21, 2022.     Your yearly pacemaker/defibrillator in office check is September 25, 2022 @ 11:00.        You have 4 years left on your battery      You pace the bottom part of your heart (ventricle) 95%        IF  YOU HAVE QUESTIONS REGARDING YOUR HOME MONITOR PLEASE CALL:        [employer] [facility] [phone]        Valentino Navarro!         Thank You!   [address] [place] [address],[address]

## 2022-04-26 DIAGNOSIS — I10 ESSENTIAL HYPERTENSION: ICD-10-CM

## 2022-04-26 RX ORDER — POTASSIUM CHLORIDE 20 MEQ/1
TABLET, EXTENDED RELEASE ORAL
Qty: 90 TABLET | Refills: 3 | Status: ON HOLD | OUTPATIENT
Start: 2022-04-26 | End: 2022-08-02 | Stop reason: HOSPADM

## 2022-04-26 NOTE — TELEPHONE ENCOUNTER
Patient's daughter called stating Isaias Treviño needs a script of her potassium sent to [id number] [facility].  [address] has tried twice to get it from the mail order but they are telling her that they cant get it in    [address] will check pharmacy  Script pending

## 2022-04-27 ENCOUNTER — OFFICE VISIT (OUTPATIENT)
Dept: CARDIOLOGY CLINIC | Age: 87
End: 2022-04-27
Payer: MEDICARE

## 2022-04-27 VITALS
BODY MASS INDEX: 29.26 KG/M2 | WEIGHT: 159 LBS | SYSTOLIC BLOOD PRESSURE: 130 MMHG | HEART RATE: 68 BPM | DIASTOLIC BLOOD PRESSURE: 72 MMHG | HEIGHT: 62 IN

## 2022-04-27 DIAGNOSIS — I50.30 DIASTOLIC CONGESTIVE HEART FAILURE, UNSPECIFIED HF CHRONICITY (HCC): Primary | ICD-10-CM

## 2022-04-27 PROCEDURE — 4040F PNEUMOC VAC/ADMIN/RCVD: CPT | Performed by: INTERNAL MEDICINE

## 2022-04-27 PROCEDURE — 1036F TOBACCO NON-USER: CPT | Performed by: INTERNAL MEDICINE

## 2022-04-27 PROCEDURE — 1090F PRES/ABSN URINE INCON ASSESS: CPT | Performed by: INTERNAL MEDICINE

## 2022-04-27 PROCEDURE — G8417 CALC BMI ABV UP PARAM F/U: HCPCS | Performed by: INTERNAL MEDICINE

## 2022-04-27 PROCEDURE — G8428 CUR MEDS NOT DOCUMENT: HCPCS | Performed by: INTERNAL MEDICINE

## 2022-04-27 PROCEDURE — 1123F ACP DISCUSS/DSCN MKR DOCD: CPT | Performed by: INTERNAL MEDICINE

## 2022-04-27 PROCEDURE — 99213 OFFICE O/P EST LOW 20 MIN: CPT | Performed by: INTERNAL MEDICINE

## 2022-04-27 RX ORDER — BUMETANIDE 1 MG/1
1 TABLET ORAL DAILY
Qty: 90 TABLET | Refills: 3 | Status: SHIPPED | OUTPATIENT
Start: 2022-04-27

## 2022-04-27 NOTE — PROGRESS NOTES
[address] [place] [address] [facility] [address]  Dept: [phone]  Dept Fax: [phone]  Loc: [phone]    Visit Date: 4/27/2022    Ms. Barb Henderson is a 80 y.o. female  who presented for:  Chief Complaint   Patient presents with    Check-Up    Congestive Heart Failure    Coronary Artery Disease       HPI:   79 yo F c hx of Afib on Xarelto, HTN, HLD, CAD s/p PCI is here for a follow up.    Denies any chest pain, sob, palpitations, lightheadedness, dizziness, orthopnea, PND or pedal edema.    Weight has been going up with 0.5 mg bumex, and weight loss with 1mg bumex      Current Outpatient Medications:     potassium chloride (KLOR-CON M) 20 MEQ extended release tablet, Take 1 tablet by mouth once daily, Disp: 90 tablet, Rfl: 3    sucralfate (CARAFATE) 1 GM tablet, TAKE 1 TABLET BY MOUTH  TWICE DAILY, Disp: 180 tablet, Rfl: 3    bumetanide (BUMEX) 1 MG tablet, Take 1 tablet by mouth every other day, Disp: 45 tablet, Rfl: 1    metoprolol tartrate (LOPRESSOR) 25 MG tablet, Take 1 tablet by mouth 2 times daily, Disp: 180 tablet, Rfl: 3    CALCIUM PO, Take by mouth Calcium chew, Disp: , Rfl:     hydrOXYzine (ATARAX) 25 MG tablet, Take 25 mg by mouth 3 times daily as needed for Itching, Disp: , Rfl:     magnesium (MAGNESIUM-OXIDE) 250 MG TABS tablet, Take 250 mg by mouth 2 times daily, Disp: , Rfl:     NONFORMULARY, daily Calcium chew, Disp: , Rfl:     ferrous sulfate (IRON 325) 325 (65 Fe) MG tablet, Take 325 mg by mouth daily (with breakfast), Disp: , Rfl:     docusate sodium (COLACE) 100 MG capsule, Take 100 mg by mouth daily, Disp: , Rfl:     Cyanocobalamin (VITAMIN B 12 PO), Take by mouth every other day, Disp: , Rfl:     pantoprazole (PROTONIX) 40 MG tablet, Take 1 tablet by mouth 2 times daily, Disp: 180 tablet, Rfl: 3    rivaroxaban (XARELTO) 15 MG TABS tablet, Take 1 tablet by mouth Daily with supper, Disp: 90 tablet, Rfl: 3   venlafaxine (EFFEXOR XR) 75 MG extended release capsule, Take 1 capsule by mouth daily, Disp: 90 capsule, Rfl: 3    Biotin 1000 MCG CHEW, Take 1,000 mcg by mouth daily, Disp: , Rfl:     Probiotic Product (PROBIOTIC DAILY PO), Take by mouth daily, Disp: , Rfl:     aspirin 81 MG chewable tablet, Take 1 tablet by mouth daily, Disp: 30 tablet, Rfl: 3    acetaminophen (TYLENOL) 500 MG tablet, Take 1,000 mg by mouth every 6 hours as needed for Pain , Disp: , Rfl:     Past Medical History  Toby Padgett  has a past medical history of Atrial fibrillation ([facility] [address] Blood circulation, collateral, [employer] single pacemaker 4/26/2016, CAD (coronary artery disease), Cancer ([facility] [address] Carpal tunnel syndrome, Fall at home, Fracture dislocation of ankle, GERD (gastroesophageal reflux disease), Hyperlipidemia, Hypertension, Kidney lesion, native, right, Osteoarthritis, Osteopenia, Prolonged emergence from general anesthesia, Thyroid goiter, and Yersiniosis. Social History  Toby Padgett  reports that she has never smoked. She has never used smokeless tobacco. She reports that she does not drink alcohol and does not use drugs. Family History  Toby Padgett family history includes Heart Disease in her brother, father, mother, and son; High Blood Pressure in her brother, father, and mother.     Past Surgical History   Past Surgical History:   Procedure Laterality Date    ABDOMEN SURGERY      ANKLE FRACTURE SURGERY  8432--1576    reconstruction in 2003 and 2007    APPENDECTOMY      BLADDER SURGERY      support bladder repair    CARDIAC SURGERY      heart stent 12-11-18, [place]    CARPAL TUNNEL RELEASE  7/2013    CARPAL TUNNEL RELEASE Right 08/19/2017    Revision    CATARACT REMOVAL Bilateral     CHOLECYSTECTOMY  1986    COLON SURGERY  1954    COLONOSCOPY      ENDOSCOPY, COLON, DIAGNOSTIC      EYE SURGERY      cataract     FRACTURE SURGERY      HYSTERECTOMY  1971    JOINT REPLACEMENT  1998    L knee    KNEE SURGERY Left total replacement    PACEMAKER PLACEMENT      PTCA  01/15/2019    Successful PCI / Drug Eluting Stent of the proximal Left Anterior Descending Coronary Artery.  UPPER GASTROINTESTINAL ENDOSCOPY Left 11/28/2018    EGD BIOPSY performed by Rachele Sandhu MD at [address] [facility] ENDOSCOPY  11/28/2018    EGD CONTROL HEMORRHAGE performed by Rachele Sandhu MD at [address] Endoscopy       Subjective:     REVIEW OF SYSTEMS  Constitutional: denies sweats, chills and fever  HENT: denies  congestion, sinus pressure, sneezing and sore throat. Eyes: denies  pain, discharge, redness and itching. Respiratory: denies apnea, cough  Gastrointestinal: denies blood in stool, constipation, diarrhea   Endocrine: denies cold intolerance, heat intolerance, polydipsia. Genitourinary: denies dysuria, enuresis, flank pain and hematuria. Musculoskeletal: denies arthralgias, joint swelling and neck pain. Neurological: denies numbness and headaches. Psychiatric/Behavioral: denies agitation, confusion, decreased concentration and dysphoric mood    All others reviewed and are negative. Objective:     /72   Pulse 68   Ht 5' 1.5\" (1.562 m)   Wt 159 lb (72.1 kg)   LMP  (LMP Unknown)   BMI 29.56 kg/m²     Wt Readings from Last 3 Encounters:   04/27/22 159 lb (72.1 kg)   03/02/22 157 lb (71.2 kg)   01/26/22 151 lb 12.8 oz (68.9 kg)     BP Readings from Last 3 Encounters:   04/27/22 130/72   03/02/22 132/70   01/26/22 120/74       PHYSICAL EXAM  Constitutional: Oriented to person, place, and time. Appears well-developed and well-nourished. HENT:   Head: Normocephalic and atraumatic. Eyes: EOM are normal. Pupils are equal, round, and reactive to light. Neck: Normal range of motion. Neck supple. No JVD present. Cardiovascular: Normal rate , normal heart sounds and intact distal pulses. Pulmonary/Chest: Effort normal and breath sounds normal. No respiratory distress. No wheezes. No rales. Abdominal: Soft. Bowel sounds are normal. No distension. There is no tenderness. Musculoskeletal: Normal range of motion. No edema. Neurological: Alert and oriented to person, place, and time. No cranial nerve deficit. Coordination normal.   Skin: Skin is warm and dry. Psychiatric: Normal mood and affect.        Lab Results   Component Value Date    CKTOTAL 22 10/01/2021       Lab Results   Component Value Date    WBC 8.0 12/22/2021    RBC 4.00 12/22/2021    RBC 4.17 08/30/2011    HGB 13.0 12/22/2021    HCT 40.6 12/22/2021    .5 12/22/2021    MCH 32.5 12/22/2021    MCHC 32.0 12/22/2021    RDW 13.1 12/18/2021     12/22/2021    MPV 10.4 12/22/2021       Lab Results   Component Value Date     01/26/2022    K 4.2 01/26/2022    K 3.8 01/26/2021     01/26/2022    CO2 29 01/26/2022    BUN 16 01/26/2022    LABALBU 3.1 12/18/2021    LABALBU 4.2 08/30/2011    CREATININE 0.7 01/26/2022    CALCIUM 9.5 01/26/2022    LABGLOM 78 01/26/2022    GLUCOSE 122 01/26/2022    GLUCOSE 116 08/30/2011       Lab Results   Component Value Date    ALKPHOS 63 12/18/2021    ALT 10 12/18/2021    AST 14 12/18/2021    PROT 6.6 12/18/2021    BILITOT 0.9 12/18/2021    BILIDIR <0.2 01/19/2021    LABALBU 3.1 12/18/2021    LABALBU 4.2 08/30/2011       Lab Results   Component Value Date    MG 2.0 12/22/2021       Lab Results   Component Value Date    INR 1.49 (H) 01/19/2021    INR 0.98 01/15/2019    INR 1.15 (H) 12/20/2018         Lab Results   Component Value Date    LABA1C 5.9 10/12/2021       Lab Results   Component Value Date    TRIG 145 12/04/2018    HDL 36 12/04/2018    LDLCALC 62 12/04/2018       Lab Results   Component Value Date    TSH 2.280 12/18/2021         Testing Reviewed:      I haveindividually reviewed the below cardiac tests    EKG:    ECHO:   Results for orders placed during the hospital encounter of 11/26/18   ECHO Complete 2D W Doppler W Color    Narrative Transthoracic Echocardiography Report (TTE)     Demographics      Patient Name   Fady Marie Gender               Female                  L      MR #           [id number]        Race                                                       Ethnicity      Account #      [de-identified]        Room Number          8836      Accession      [id number]        Date of Study        11/26/2018   Number      Date of Birth  04/04/1928       Referring Physician  Inga Tai MD      Age            80 year(s)       Sonographer          Anne Viveros, [facility]                                      Interpreting         Meghan Tai MD                                   Physician     Procedure    Type of Study      TTE procedure:ECHOCARDIOGRAM COMPLETE 2D W DOPPLER W COLOR. Procedure Date  Date: 11/26/2018 Start: 02:58 PM    Study Location: Bedside  Technical Quality: Adequate visualization    Indications:Shortness of breath. Additional Medical History:Pedal edema, hypertension, colon cancer,  hyperlipidemia, atrial fibrillation, GERD, pacemaker    Patient Status: Routine    Height: 66.14 inches Weight: 180.78 pounds BSA: 1.92 m^2 BMI: 29.05 kg/m^2    BP: 149/68 mmHg    Allergies    - Other allergy:(Methadone, Gabapentin, lyrica, ultram). Conclusions      Summary   Left ventricle size and systolic function is normal.   Normal left ventricular wall thickness. Ejection fraction is visually estimated at 55-60%. Mildly dilated left atrium. Aortic valve leaflets are mildly calcified. Mild aortic regurgitation is noted. Mild mitral regurgitation is present. Mild tricuspid regurgitation.       Signature      ----------------------------------------------------------------   Electronically signed by Meghan Tai MD (Interpreting   physician) on 11/26/2018 at 04:17 PM   ----------------------------------------------------------------      Findings      Mitral Valve   Mild calcification of the posterior leaflet of the mitral valve. Mild mitral regurgitation is present. Aortic Valve   The aortic valve appears to be trileaflet with good leaflet separation. Aortic valve leaflets are mildly calcified. Mild aortic regurgitation is noted. Tricuspid Valve   Tricuspid valve is structurally normal.   Mild tricuspid regurgitation. Pulmonic Valve   The pulmonic valve was not well visualized . Left Atrium   Mildly dilated left atrium. Left Ventricle   Left ventricle size and systolic function is normal.   Normal left ventricular wall thickness. Ejection fraction is visually estimated at 55-60%. Right Atrium   The right atrium is of normal size. Right Ventricle   Normal right ventricular size and function. Pericardial Effusion   No evidence of any pericardial effusion. Aorta / Great Vessels   Aorta was not clearly visualized.    IVC is normal in size with normal respiratory phasic changes     M-Mode/2D Measurements & Calculations      LV Diastolic    LV Systolic Dimension: 3  AV Cusp Separation: 2.1 cmLA   Dimension: 4.2  cm                        Dimension: 3 cmAO Root   cm              LV Volume Diastolic: 17.0 Dimension: 3.3 cmLA Area: 20.7   LV FS:28.6 %    ml                        cm^2   LV PW           LV Volume Systolic: 35 ml   Diastolic: 0.8  LV EDV/LV EDV Index: 78.6   cm              ml/41 m^2LV ESV/LV ESV   Septum          Index: 35 ml/18 m^2       RV Diastolic Dimension: 2.2 cm   Diastolic: 0.9  EF Calculated: 55.5 %   cm                                        LA/Aorta: 0.91                                                LA volume/Index: 57.9 ml /30m^2     Doppler Measurements & Calculations      MV Peak E-Wave: 116 cm/s AV Peak Velocity: 163   LVOT Peak Velocity: 134                            cm/s                    cm/s   MV Peak Gradient: 5.38   AV Peak Gradient: 10.63 LVOT Peak Gradient: 7   mmHg                     mmHg mmHg      MV Deceleration Time:                            TV Peak E-Wave: 73.7 cm/s   254 msec                                         TV Peak A-Wave: 36.4 cm/s                               AV P1/2t: 544 msec      TV Peak Gradient: 2.17                                                    mmHg                                                    TR Velocity:273 cm/s                                                    TR Gradient:29.81 mmHg   MR Velocity: 366 cm/s    AV DVI (Vmax):0.82      PV Peak Velocity: 80.5                                                    cm/s                                                    PV Peak Gradient: 2.59                                                    mmHg     [email or web address]  [email or web address]       STRESS:    CATH:    Assessment/Plan       Diagnosis Orders   1. Diastolic congestive heart failure, unspecified HF chronicity (HCC)           CAD s/p PCI  Diastolic CHF  Afib on Xarelto  HTN  HLD  S/p PPM     Discussed diuretic dosage  Patients daughter to titrate dosage to keep weight at 153 (she felt best at that weight)  Underwent PCI of LAD   Doing well  H/H improved  BP well controlled  Continue meds  Continue losartan, digoxin, , lasix, Aspirin  Continue Xarelto  No bleeding issue  The patient is asked to make an attempt to improve diet and exercise patterns to aid in medical management of this problem. Advised patient to call office or seek immediate medical attention if there is any new onset of  any chest pain, sob, palpitations, lightheadedness, dizziness, orthopnea, PND or pedal edema.      All medication side effects were discussed in details.     Thank youfor allowing me to participate in the care of this patient. Please do not hesitate to contact me for any further questions.      Return in about 3 months (around 7/27/2022), or if symptoms worsen or fail to improve, for Review testing, Regular follow up.        Electronically signed by Allyssa Bach MD [facility]  4/27/2022 at 11:15 AM

## 2022-04-27 NOTE — PROGRESS NOTES
Pt here for 8 week check up     Pt states med list is correct from last appt    Pt daughter concerned about weight going up     Pt continues with sob,

## 2022-05-03 ENCOUNTER — NURSE ONLY (OUTPATIENT)
Dept: LAB | Age: 87
End: 2022-05-03

## 2022-05-03 DIAGNOSIS — E78.00 PURE HYPERCHOLESTEROLEMIA: ICD-10-CM

## 2022-05-03 DIAGNOSIS — D50.8 OTHER IRON DEFICIENCY ANEMIA: ICD-10-CM

## 2022-05-03 DIAGNOSIS — I48.91 ATRIAL FIBRILLATION WITH RAPID VENTRICULAR RESPONSE (HCC): ICD-10-CM

## 2022-05-03 DIAGNOSIS — I10 ESSENTIAL HYPERTENSION: ICD-10-CM

## 2022-05-03 DIAGNOSIS — R73.09 ELEVATED GLUCOSE: ICD-10-CM

## 2022-05-03 LAB
ALBUMIN SERPL-MCNC: 3.9 G/DL (ref 3.5–5.1)
ALP BLD-CCNC: 74 U/L (ref 38–126)
ALT SERPL-CCNC: 7 U/L (ref 11–66)
ANION GAP SERPL CALCULATED.3IONS-SCNC: 12 MEQ/L (ref 8–16)
AST SERPL-CCNC: 14 U/L (ref 5–40)
AVERAGE GLUCOSE: 117 MG/DL (ref 70–126)
BILIRUB SERPL-MCNC: 1.2 MG/DL (ref 0.3–1.2)
BUN BLDV-MCNC: 20 MG/DL (ref 7–22)
CALCIUM SERPL-MCNC: 9.6 MG/DL (ref 8.5–10.5)
CHLORIDE BLD-SCNC: 102 MEQ/L (ref 98–111)
CHOLESTEROL, TOTAL: 167 MG/DL (ref 100–199)
CO2: 27 MEQ/L (ref 23–33)
CREAT SERPL-MCNC: 0.7 MG/DL (ref 0.4–1.2)
CREATININE, URINE: 83.3 MG/DL
DIGOXIN LEVEL: < 0.3 NG/ML (ref 0.5–2)
ERYTHROCYTE [DISTWIDTH] IN BLOOD BY AUTOMATED COUNT: 13.6 % (ref 11.5–14.5)
ERYTHROCYTE [DISTWIDTH] IN BLOOD BY AUTOMATED COUNT: 50.3 FL (ref 35–45)
FERRITIN: 94 NG/ML (ref 10–291)
GFR SERPL CREATININE-BSD FRML MDRD: 78 ML/MIN/1.73M2
GLUCOSE FASTING: 130 MG/DL (ref 70–108)
HBA1C MFR BLD: 5.9 % (ref 4.4–6.4)
HCT VFR BLD CALC: 41.6 % (ref 37–47)
HDLC SERPL-MCNC: 48 MG/DL
HEMOGLOBIN: 13 GM/DL (ref 12–16)
IRON: 82 UG/DL (ref 50–170)
LDL CHOLESTEROL CALCULATED: 98 MG/DL
MCH RBC QN AUTO: 31.6 PG (ref 26–33)
MCHC RBC AUTO-ENTMCNC: 31.3 GM/DL (ref 32.2–35.5)
MCV RBC AUTO: 101 FL (ref 81–99)
MICROALBUMIN UR-MCNC: < 1.2 MG/DL
MICROALBUMIN/CREAT UR-RTO: 14 MG/G (ref 0–30)
PLATELET # BLD: 211 THOU/MM3 (ref 130–400)
PMV BLD AUTO: 10.5 FL (ref 9.4–12.4)
POTASSIUM SERPL-SCNC: 4.4 MEQ/L (ref 3.5–5.2)
RBC # BLD: 4.12 MILL/MM3 (ref 4.2–5.4)
SODIUM BLD-SCNC: 141 MEQ/L (ref 135–145)
TOTAL IRON BINDING CAPACITY: 297 UG/DL (ref 171–450)
TOTAL PROTEIN: 6.4 G/DL (ref 6.1–8)
TRIGL SERPL-MCNC: 104 MG/DL (ref 0–199)
TSH SERPL DL<=0.05 MIU/L-ACNC: 2.14 UIU/ML (ref 0.4–4.2)
WBC # BLD: 6.1 THOU/MM3 (ref 4.8–10.8)

## 2022-05-11 ENCOUNTER — OFFICE VISIT (OUTPATIENT)
Dept: FAMILY MEDICINE CLINIC | Age: 87
End: 2022-05-11
Payer: MEDICARE

## 2022-05-11 VITALS
BODY MASS INDEX: 29.64 KG/M2 | DIASTOLIC BLOOD PRESSURE: 62 MMHG | HEIGHT: 61 IN | SYSTOLIC BLOOD PRESSURE: 122 MMHG | OXYGEN SATURATION: 95 % | TEMPERATURE: 97.7 F | HEART RATE: 67 BPM | RESPIRATION RATE: 16 BRPM | WEIGHT: 157 LBS

## 2022-05-11 DIAGNOSIS — E78.00 PURE HYPERCHOLESTEROLEMIA: ICD-10-CM

## 2022-05-11 DIAGNOSIS — I50.32 CHRONIC DIASTOLIC CHF (CONGESTIVE HEART FAILURE) (HCC): ICD-10-CM

## 2022-05-11 DIAGNOSIS — D50.0 IRON DEFICIENCY ANEMIA DUE TO CHRONIC BLOOD LOSS: ICD-10-CM

## 2022-05-11 DIAGNOSIS — I49.5 SICK SINUS SYNDROME (HCC): ICD-10-CM

## 2022-05-11 DIAGNOSIS — R53.81 PHYSICAL DECONDITIONING: ICD-10-CM

## 2022-05-11 DIAGNOSIS — K76.0 STEATOSIS OF LIVER: ICD-10-CM

## 2022-05-11 DIAGNOSIS — Z85.038 HISTORY OF COLON CANCER: ICD-10-CM

## 2022-05-11 DIAGNOSIS — R53.83 OTHER FATIGUE: ICD-10-CM

## 2022-05-11 DIAGNOSIS — E83.42 HYPOMAGNESEMIA: ICD-10-CM

## 2022-05-11 DIAGNOSIS — D64.9 MILD ANEMIA: ICD-10-CM

## 2022-05-11 DIAGNOSIS — I48.20 CHRONIC ATRIAL FIBRILLATION (HCC): ICD-10-CM

## 2022-05-11 DIAGNOSIS — R53.1 GENERALIZED WEAKNESS: ICD-10-CM

## 2022-05-11 DIAGNOSIS — N39.3 STRESS INCONTINENCE: ICD-10-CM

## 2022-05-11 DIAGNOSIS — I73.9 PVD (PERIPHERAL VASCULAR DISEASE) (HCC): ICD-10-CM

## 2022-05-11 DIAGNOSIS — R73.09 ELEVATED GLUCOSE: ICD-10-CM

## 2022-05-11 DIAGNOSIS — F32.A CHRONIC DEPRESSION: ICD-10-CM

## 2022-05-11 DIAGNOSIS — I25.119 CORONARY ARTERY DISEASE INVOLVING NATIVE HEART WITH ANGINA PECTORIS, UNSPECIFIED VESSEL OR LESION TYPE (HCC): ICD-10-CM

## 2022-05-11 DIAGNOSIS — I25.10 CORONARY ARTERY DISEASE INVOLVING NATIVE CORONARY ARTERY OF NATIVE HEART WITHOUT ANGINA PECTORIS: ICD-10-CM

## 2022-05-11 DIAGNOSIS — M15.9 PRIMARY OSTEOARTHRITIS INVOLVING MULTIPLE JOINTS: ICD-10-CM

## 2022-05-11 DIAGNOSIS — Z95.0 PACEMAKER: ICD-10-CM

## 2022-05-11 DIAGNOSIS — I10 ESSENTIAL HYPERTENSION: Primary | ICD-10-CM

## 2022-05-11 DIAGNOSIS — K21.9 GASTROESOPHAGEAL REFLUX DISEASE WITHOUT ESOPHAGITIS: ICD-10-CM

## 2022-05-11 DIAGNOSIS — F33.42 RECURRENT MAJOR DEPRESSIVE DISORDER, IN FULL REMISSION (HCC): ICD-10-CM

## 2022-05-11 DIAGNOSIS — I48.91 ATRIAL FIBRILLATION WITH RAPID VENTRICULAR RESPONSE (HCC): ICD-10-CM

## 2022-05-11 PROCEDURE — G8427 DOCREV CUR MEDS BY ELIG CLIN: HCPCS | Performed by: FAMILY MEDICINE

## 2022-05-11 PROCEDURE — G8417 CALC BMI ABV UP PARAM F/U: HCPCS | Performed by: FAMILY MEDICINE

## 2022-05-11 PROCEDURE — 1036F TOBACCO NON-USER: CPT | Performed by: FAMILY MEDICINE

## 2022-05-11 PROCEDURE — 1123F ACP DISCUSS/DSCN MKR DOCD: CPT | Performed by: FAMILY MEDICINE

## 2022-05-11 PROCEDURE — 4040F PNEUMOC VAC/ADMIN/RCVD: CPT | Performed by: FAMILY MEDICINE

## 2022-05-11 PROCEDURE — 1090F PRES/ABSN URINE INCON ASSESS: CPT | Performed by: FAMILY MEDICINE

## 2022-05-11 PROCEDURE — 99214 OFFICE O/P EST MOD 30 MIN: CPT | Performed by: FAMILY MEDICINE

## 2022-05-11 NOTE — PROGRESS NOTES
[address] [place] [id number]  Dept: [phone]  Dept Fax: [phone]  Loc: [address] [facility] Serg Moreno is a 80 y.o. female who presents today for:  Chief Complaint   Patient presents with    Follow-up           HPI:     HPI    Hypertension: Patient here for follow-up of elevated blood pressure. She is not exercising and is adherent to low salt diet. Blood pressure is well controlled at home. Cardiac symptoms none. Patient denies chest pain and palpitations. Cardiovascular risk factors: advanced age (older than 54 for men, 72 for women), dyslipidemia, hypertension, obesity (BMI >= 30 kg/m2) and sedentary lifestyle. Use of agents associated with hypertension: none. History of target organ damage: SSS and afib and CHF/CAD. Under the care of cardiology: Dr Weston Gillespie. Hyperlipidemia: Patient presents with hyperlipidemia. She was tested because hypertension. Her last labs see labs charted. . There is a family history of hyperlipidemia. There is a family history of early ischemia heart disease. Lab Results   Component Value Date    CHOL 167 05/03/2022    CHOL 127 12/04/2018    CHOL 154 11/01/2018     Lab Results   Component Value Date    TRIG 104 05/03/2022    TRIG 145 12/04/2018    TRIG 167 11/01/2018     Lab Results   Component Value Date    HDL 48 05/03/2022    HDL 36 12/04/2018    HDL 38 11/01/2018     Lab Results   Component Value Date    LDLCALC 98 05/03/2022    LDLCALC 62 12/04/2018    [address] 11/01/2018     No results found for: LABVLDL, VLDL  No results found for: CHOLHDLRATIO  Active statin treatment with history of stent placement. GERD: Alisha complains of heartburn. This has been associated with heartburn. She denies no other symptoms. Symptoms have been present for 5 years. She denies dysphagia. She has not lost weight.  She denies melena, hematochezia, hematemesis, and coffee ground emesis. Medical therapy in the past has included proton pump inhibitors. Depression: Patient complains of depression. She complains of depressed mood, difficulty concentrating, psychomotor retardation and recurrent thoughts of death. Onset was approximately 5 years ago, gradually worsening since that time. She denies current suicidal and homicidal plan or intent. Family history significant for no psychiatric illness. Possible organic causes contributing are: none. Risk factors: negative life event aging and becoming less active and previous episode of depression Previous treatment includes no medication and none and psych therapy. She complains of the following side effects from the treatment: none. Osteoarthritis primary in multiple joints is not controlled on current medications. However, she does very little exercise at home and refuses PT. This is leading to unsteady gait and slowly limiting her ability to do ADLs. She had SOB with exertion. She is talking to her family about AL living but her daughter is helping and lives in the same [facility] building. She is back at home after discharge from the SNF. A fib is rate controlled and taking xarelto. Vitamin b12 deficiency needs rechecked. Night sweats are unchanged. thyroid goiter in the past needs recheck of ultrasound last done in 4/2019 shows stable nodules and 2 new. One is recommended to have a biopsy. The night sweats are leading to her not feeling rested in the morning. Here for continued hot flashes and night sweats. They have been happening for 1-2 years but getting worse since June 2020. More fatigue and SOB with exertion as well. She is still only exercising a little since DC from [facility] despite many attempts to motivate her. She is off beta blockers due to dizziness in the past.  She did go to PT in June 2020 but did not finish and has not done any of the exercises at home, she will be released from [facility] after 2 weeks. Constipation comes and goes. Better off of iron but still needing colace. Encouraged for more exercising at home. She has noticed a lump in the right axilla. First noticed this 9/2020. Not painful but not getting any smaller and she thinks it may be bigger. She and her daughter decided in fall 2020 to not go through with testing due to her unwillingness to do treatment if they find cancer. She is bring this up again and again wants to consider imaging and will call if she decides to go through with it. No new concerns today      Here for persistent weakness for a few months. Pacer clinic placed a monitor. She had to do a consult there on 9/28/21 but nothing was changed. Then developed sweats and chills, SOB, fatigue and headaches. Started tylenol 2 ES TID and occasional QID which helps but still has right side pain. After 2021 flu shot she got worse again. She has had episodes of more SOB and diaphoresis which are most prominent after BM. She does have a history of vagal nerve issues. Cardiology agreed that the dizziness is likely vagal.        SNF note 1-11-22:  Jamia Erwin was seen today in preparation for DC to home on 1/13 with [facility]: OMEGA ELDER She was admitted to the facility on 12/22/21 from [facility] where she was admitted on 12/18/21 for CHF and bradycardia. 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 She has improved with therapy and states that she is ready to go back home. She denies pain or shortness of breath on room air. No distress noted. She is alert and oriented, she is pleasant and follows commands. She will have a f/u with Dr. Kierra Wolfe in the office. Reviewed chart forpast medical history , surgical history , allergies, social history , family history and medications.     Health Maintenance   Topic Date Due    DTaP/Tdap/Td vaccine (1 - Tdap) 10/28/2005    Shingles vaccine (2 of 2) 10/01/2019    Depression Monitoring  11/11/2022    Annual Wellness Visit (AWV)  11/12/2022    Flu vaccine  Completed    Pneumococcal 65+ years Vaccine  Completed    COVID-19 Vaccine  Completed    Hepatitis A vaccine  Aged Out    Hepatitis B vaccine  Aged Out    Hib vaccine  Aged Out    Meningococcal (ACWY) vaccine  Aged Out       Subjective:      Constitutional:Negative for fever, chills, diaphoresis, activity change, appetite change and fatigue. HENT: Negative for hearing loss, ear pain, congestion, sore throat, rhinorrhea, postnasal drip and ear discharge. Eyes: Negative for photophobia and visual disturbance. Respiratory: Negative for cough, chest tightness, shortness of breath and wheezing. Cardiovascular: Negative for chest pain and leg swelling. Gastrointestinal: Negative for nausea, vomiting, abdominal pain, diarrhea and constipation. Genitourinary: Negative for dysuria, urgency and frequency. Neurological: Negative for weakness, light-headedness and headaches. Psychiatric/Behavioral: Negative for sleep disturbance.      :     Vitals:    05/11/22 1103   BP: 122/62   Site: Left Lower Arm   Position: Sitting   Cuff Size: Medium Adult   Pulse: 67   Resp: 16   Temp: 97.7 °F (36.5 °C)   TempSrc: Temporal   SpO2: 95%   Weight: 157 lb (71.2 kg)   Height: 5' 1.5\" (1.562 m)     Wt Readings from Last 3 Encounters:   05/11/22 157 lb (71.2 kg)   04/27/22 159 lb (72.1 kg)   03/02/22 157 lb (71.2 kg)       Physical Exam  Physical Exam   Constitutional: Vital signs are normal. She appears well-developed and well-nourished. She is active. HENT:   Head: Normocephalic and atraumatic. Right Ear: Tympanic membrane, external ear and ear canal normal. No drainage or tenderness. Left Ear: Tympanic membrane, external ear and ear canal normal. No drainage or tenderness. Nose: Nose normal. No mucosal edema or rhinorrhea.    Mouth/Throat: Uvula is midline, oropharynx is clear and moist and mucous membranes are normal. Mucous membranes are not pale. Normal dentition. No posterior oropharyngeal edema or posterior oropharyngeal erythema. Eyes: Lids are normal. Right eye exhibits no chemosis and no discharge. Left eye exhibits no chemosis and no drainage. Right conjunctiva has no hemorrhage. Left conjunctiva has no hemorrhage. Right eye exhibits normal extraocular motion. Left eye exhibits normal extraocular motion. Right pupil is round and reactive. Left pupil is round and reactive. Pupils are equal.   Cardiovascular: Normal rate, regular rhythm, S1 normal, S2 normal and normal heart sounds. Exam reveals no gallop. No murmur heard. Pulmonary/Chest: Effort normal and breath sounds normal. No respiratory distress. She has no wheezes. She has no rhonchi. She has no rales. Abdominal: Soft. Normal appearance and bowel sounds are normal. She exhibits no distension and no mass. There is no hepatosplenomegaly. No tenderness. She has no rigidity, no rebound and no guarding. No hernia. Musculoskeletal:        Right lower leg: She exhibits no edema. Left lower leg: She exhibits no edema. Neurological: She is alert. Assessment/Plan   Orion Falcon was seen today for follow-up. Diagnoses and all orders for this visit:    Essential hypertension    Chronic atrial fibrillation (HCC)    PVD (peripheral vascular disease) ([address]    Chronic diastolic CHF (congestive heart failure) ([address]    [employer] single pacemaker 4/26/2016    Steatosis of liver    Physical deconditioning    Stress incontinence    Mild anemia    Chronic depression    Primary osteoarthritis involving multiple joints    Recurrent major depressive disorder, in full remission ([address]    Iron deficiency anemia due to chronic blood loss  -     Iron Binding Capacity; Future  -     Iron; Future  -     Ferritin; Future  -     CBC;  Future  -     Vitamin B12 & Folate; Future    Hypomagnesemia    Other fatigue    Gastroesophageal reflux disease without esophagitis    Generalized weakness    Sick sinus syndrome (HCC)    Pure hypercholesterolemia    History of colon cancer    Coronary artery disease involving native coronary artery of native heart without angina pectoris    Coronary artery disease involving native heart with angina pectoris, unspecified vessel or lesion type (HCC)    Atrial fibrillation with rapid ventricular response (HCC)  -     rivaroxaban (XARELTO) 15 MG TABS tablet; Take 1 tablet by mouth Daily with supper    Elevated glucose  -     Basic Metabolic Panel; Future  -     Hemoglobin A1C; Future    No change to medications   Continue healthy diet and exercise  Aspirin daily    Discussed use, benefit, and side effectsof prescribed medications. All patient questions answered. Pt voiced understanding. Reviewed health maintenance. Instructed to continue current medications, diet and exercise. Patient agreed with treatment plan. Followup as directed.      Electronically signed by Bella Penn MD

## 2022-07-17 DIAGNOSIS — F33.42 RECURRENT MAJOR DEPRESSIVE DISORDER, IN FULL REMISSION (HCC): ICD-10-CM

## 2022-07-18 RX ORDER — VENLAFAXINE HYDROCHLORIDE 75 MG/1
75 CAPSULE, EXTENDED RELEASE ORAL DAILY
Qty: 90 CAPSULE | Refills: 3 | Status: SHIPPED | OUTPATIENT
Start: 2022-07-18

## 2022-07-20 ENCOUNTER — OFFICE VISIT (OUTPATIENT)
Dept: CARDIOLOGY CLINIC | Age: 87
End: 2022-07-20
Payer: MEDICARE

## 2022-07-20 VITALS
SYSTOLIC BLOOD PRESSURE: 136 MMHG | HEIGHT: 62 IN | DIASTOLIC BLOOD PRESSURE: 68 MMHG | WEIGHT: 162 LBS | HEART RATE: 76 BPM | BODY MASS INDEX: 29.81 KG/M2

## 2022-07-20 DIAGNOSIS — I50.22 CHRONIC SYSTOLIC CONGESTIVE HEART FAILURE (HCC): Primary | ICD-10-CM

## 2022-07-20 PROCEDURE — 99214 OFFICE O/P EST MOD 30 MIN: CPT | Performed by: INTERNAL MEDICINE

## 2022-07-20 PROCEDURE — 1123F ACP DISCUSS/DSCN MKR DOCD: CPT | Performed by: INTERNAL MEDICINE

## 2022-07-20 RX ORDER — METOLAZONE 5 MG/1
5 TABLET ORAL SEE ADMIN INSTRUCTIONS
Qty: 30 TABLET | Refills: 3 | Status: SHIPPED | OUTPATIENT
Start: 2022-07-20 | End: 2022-08-02

## 2022-07-20 NOTE — PROGRESS NOTES
Pt here for 3 mo check up     Pt concerned wt fluxuating brought wt log     Pt continues with sob  on exertion , dizziness at times

## 2022-07-20 NOTE — PROGRESS NOTES
[address] [place] [address] [address] [facility] [address]  Dept: [phone]  Dept Fax: [phone]  Loc: [phone]    Visit Date: 7/20/2022    Ms. May Alexandre is a 80 y.o. female  who presented for:  Chief Complaint   Patient presents with    Check-Up    Congestive Heart Failure       HPI:   81 yo F c hx of Afib on Xarelto, HTN, HLD, CAD s/p PCI is here for a follow up. Denies any chest pain, sob, palpitations, lightheadedness, dizziness, orthopnea, PND or pedal edema. Has been taking bumex 1 mg but gaining weight. Does not taek too much salt/water.   Will add metolazone for MWF        Current Outpatient Medications:     Multiple Vitamin (MULTIVITAMIN ADULT PO), Take by mouth daily, Disp: , Rfl:     venlafaxine (EFFEXOR XR) 75 MG extended release capsule, TAKE 1 CAPSULE BY MOUTH  DAILY, Disp: 90 capsule, Rfl: 3    rivaroxaban (XARELTO) 15 MG TABS tablet, Take 1 tablet by mouth Daily with supper, Disp: 90 tablet, Rfl: 3    bumetanide (BUMEX) 1 MG tablet, Take 1 tablet by mouth daily, Disp: 90 tablet, Rfl: 3    potassium chloride (KLOR-CON M) 20 MEQ extended release tablet, Take 1 tablet by mouth once daily, Disp: 90 tablet, Rfl: 3    sucralfate (CARAFATE) 1 GM tablet, TAKE 1 TABLET BY MOUTH  TWICE DAILY, Disp: 180 tablet, Rfl: 3    metoprolol tartrate (LOPRESSOR) 25 MG tablet, Take 1 tablet by mouth 2 times daily, Disp: 180 tablet, Rfl: 3    CALCIUM PO, Take by mouth Calcium chew, Disp: , Rfl:     magnesium (MAGNESIUM-OXIDE) 250 MG TABS tablet, Take 250 mg by mouth 2 times daily, Disp: , Rfl:     ferrous sulfate (IRON 325) 325 (65 Fe) MG tablet, Take 325 mg by mouth once a week, Disp: , Rfl:     docusate sodium (COLACE) 100 MG capsule, Take 100 mg by mouth daily as needed, Disp: , Rfl:     Cyanocobalamin (VITAMIN B 12 PO), Take by mouth every other day, Disp: , Rfl:     pantoprazole (PROTONIX) 40 MG tablet, Take 1 tablet by mouth 2 times daily, Disp: 180 tablet, Rfl: 3    Probiotic Product (PROBIOTIC DAILY PO), Take by mouth daily, Disp: , Rfl:     aspirin 81 MG chewable tablet, Take 1 tablet by mouth daily, Disp: 30 tablet, Rfl: 3    acetaminophen (TYLENOL) 500 MG tablet, Take 1,000 mg by mouth every 6 hours as needed for Pain , Disp: , Rfl:     Past Medical History  Radhika Dunne  has a past medical history of Atrial fibrillation ([facility] [address] Blood circulation, collateral, [employer] single pacemaker 4/26/2016, CAD (coronary artery disease), Cancer ([facility] [address] Carpal tunnel syndrome, Fall at home, Fracture dislocation of ankle, GERD (gastroesophageal reflux disease), Hyperlipidemia, Hypertension, Kidney lesion, native, right, Osteoarthritis, Osteopenia, Prolonged emergence from general anesthesia, Thyroid goiter, and Yersiniosis. Social History  Radhika Dunne  reports that she has never smoked. She has never used smokeless tobacco. She reports that she does not drink alcohol and does not use drugs. Family History  Radhika Dunne family history includes Heart Disease in her brother, father, mother, and son; High Blood Pressure in her brother, father, and mother. Past Surgical History   Past Surgical History:   Procedure Laterality Date    ABDOMEN SURGERY      ANKLE FRACTURE SURGERY  [id number]    reconstruction in 2003 and 2007    APPENDECTOMY      BLADDER SURGERY      support bladder repair    CARDIAC SURGERY      heart stent 12-11-18, [place]    CARPAL TUNNEL RELEASE  7/2013    CARPAL TUNNEL RELEASE Right 08/19/2017    Revision    CATARACT REMOVAL Bilateral     CHOLECYSTECTOMY  1986    COLON SURGERY  1954    COLONOSCOPY      ENDOSCOPY, COLON, DIAGNOSTIC      EYE SURGERY      cataract     FRACTURE SURGERY      HYSTERECTOMY (CERVIX STATUS UNKNOWN)  1971    JOINT REPLACEMENT  1998    L knee    KNEE SURGERY Left     total replacement    PACEMAKER PLACEMENT      PTCA  01/15/2019    Successful PCI / Drug Eluting Stent of the proximal Left Anterior Descending Coronary Artery.     UPPER GASTROINTESTINAL ENDOSCOPY Left 11/28/2018    EGD BIOPSY performed by Brian Strickland MD at [facility]  11/28/2018    EGD CONTROL HEMORRHAGE performed by Brian Strickland MD at [facility]       Subjective:     REVIEW OF SYSTEMS  Constitutional: denies sweats, chills and fever  HENT: denies  congestion, sinus pressure, sneezing and sore throat. Eyes: denies  pain, discharge, redness and itching. Respiratory: denies apnea, cough  Gastrointestinal: denies blood in stool, constipation, diarrhea   Endocrine: denies cold intolerance, heat intolerance, polydipsia. Genitourinary: denies dysuria, enuresis, flank pain and hematuria. Musculoskeletal: denies arthralgias, joint swelling and neck pain. Neurological: denies numbness and headaches. Psychiatric/Behavioral: denies agitation, confusion, decreased concentration and dysphoric mood    All others reviewed and are negative. Objective:     /68   Pulse 76   Ht 5' 1.5\" (1.562 m)   Wt 162 lb (73.5 kg)   LMP  (LMP Unknown)   BMI 30.11 kg/m²     Wt Readings from Last 3 Encounters:   07/20/22 162 lb (73.5 kg)   05/11/22 157 lb (71.2 kg)   04/27/22 159 lb (72.1 kg)     BP Readings from Last 3 Encounters:   07/20/22 136/68   05/11/22 122/62   04/27/22 130/72       PHYSICAL EXAM  Constitutional: Oriented to person, place, and time. Appears well-developed and well-nourished. HENT:   Head: Normocephalic and atraumatic. Eyes: EOM are normal. Pupils are equal, round, and reactive to light. Neck: Normal range of motion. Neck supple. No JVD present. Cardiovascular: Normal rate , normal heart sounds and intact distal pulses. Pulmonary/Chest: Effort normal and breath sounds normal. No respiratory distress. No wheezes. No rales. Abdominal: Soft. Bowel sounds are normal. No distension. There is no tenderness. Musculoskeletal: Normal range of motion. No edema.    Neurological: Alert and oriented to person, place, and time. No cranial nerve deficit. Coordination normal.   Skin: Skin is warm and dry. Psychiatric: Normal mood and affect.        Lab Results   Component Value Date/Time    CKTOTAL 22 10/01/2021 04:48 PM       Lab Results   Component Value Date/Time    WBC 6.1 05/03/2022 08:53 AM    RBC 4.12 05/03/2022 08:53 AM    RBC 4.17 08/30/2011 10:27 AM    HGB 13.0 05/03/2022 08:53 AM    HCT 41.6 05/03/2022 08:53 AM    .0 05/03/2022 08:53 AM    MCH 31.6 05/03/2022 08:53 AM    MCHC 31.3 05/03/2022 08:53 AM    RDW 13.1 12/18/2021 05:08 PM     05/03/2022 08:53 AM    MPV 10.5 05/03/2022 08:53 AM       Lab Results   Component Value Date/Time     05/03/2022 08:53 AM    K 4.4 05/03/2022 08:53 AM    K 3.8 01/26/2021 04:59 AM     05/03/2022 08:53 AM    CO2 27 05/03/2022 08:53 AM    BUN 20 05/03/2022 08:53 AM    LABALBU 3.9 05/03/2022 08:53 AM    LABALBU 4.2 08/30/2011 10:27 AM    CREATININE 0.7 05/03/2022 08:53 AM    CALCIUM 9.6 05/03/2022 08:53 AM    LABGLOM 78 05/03/2022 08:53 AM    GLUCOSE 122 01/26/2022 01:05 PM    GLUCOSE 116 08/30/2011 10:27 AM       Lab Results   Component Value Date/Time    ALKPHOS 74 05/03/2022 08:53 AM    ALT 7 05/03/2022 08:53 AM    AST 14 05/03/2022 08:53 AM    PROT 6.4 05/03/2022 08:53 AM    BILITOT 1.2 05/03/2022 08:53 AM    BILIDIR <0.2 01/19/2021 07:05 AM    LABALBU 3.9 05/03/2022 08:53 AM    LABALBU 4.2 08/30/2011 10:27 AM       Lab Results   Component Value Date/Time    MG 2.0 12/22/2021 04:05 AM       Lab Results   Component Value Date    INR 1.49 (H) 01/19/2021    INR 0.98 01/15/2019    INR 1.15 (H) 12/20/2018         Lab Results   Component Value Date/Time    LABA1C 5.9 05/03/2022 08:53 AM       Lab Results   Component Value Date/Time    TRIG 104 05/03/2022 08:53 AM    HDL 48 05/03/2022 08:53 AM    LDLCALC 98 05/03/2022 08:53 AM       Lab Results   Component Value Date/Time    TSH 2.140 05/03/2022 08:53 AM         Testing Reviewed:      I haveindividually reviewed the below cardiac tests    EKG:    ECHO:   Results for orders placed during the hospital encounter of 11/26/18   ECHO Complete 2D W Doppler W Color    Narrative Transthoracic Echocardiography Report (TTE)     Demographics      Patient Name   Shady Lutz Gender               Female                  L      MR #           [id number]        Race                                                       Ethnicity      Account #      [de-identified]        Room Number          6058      Accession      [id number]        Date of Study        11/26/2018   Number      Date of Birth  04/04/1928       Referring Physician  Cha Hogan MD      Age            80 year(s)       Sonographer          Ambreen Nunez, [facility]                                      Interpreting         Isra Hogan MD                                   Physician     Procedure    Type of Study      TTE procedure:ECHOCARDIOGRAM COMPLETE 2D W DOPPLER W COLOR. Procedure Date  Date: 11/26/2018 Start: 02:58 PM    Study Location: Bedside  Technical Quality: Adequate visualization    Indications:Shortness of breath. Additional Medical History:Pedal edema, hypertension, colon cancer,  hyperlipidemia, atrial fibrillation, GERD, pacemaker    Patient Status: Routine    Height: 66.14 inches Weight: 180.78 pounds BSA: 1.92 m^2 BMI: 29.05 kg/m^2    BP: 149/68 mmHg    Allergies    - Other allergy:(Methadone, Gabapentin, lyrica, ultram). Conclusions      Summary   Left ventricle size and systolic function is normal.   Normal left ventricular wall thickness. Ejection fraction is visually estimated at 55-60%. Mildly dilated left atrium. Aortic valve leaflets are mildly calcified. Mild aortic regurgitation is noted. Mild mitral regurgitation is present. Mild tricuspid regurgitation.       Signature      ---------------------------------------------------------------- Electronically signed by Isra Hogan MD (Interpreting   physician) on 11/26/2018 at 04:17 PM   ----------------------------------------------------------------      Findings      Mitral Valve   Mild calcification of the posterior leaflet of the mitral valve. Mild mitral regurgitation is present. Aortic Valve   The aortic valve appears to be trileaflet with good leaflet separation. Aortic valve leaflets are mildly calcified. Mild aortic regurgitation is noted. Tricuspid Valve   Tricuspid valve is structurally normal.   Mild tricuspid regurgitation. Pulmonic Valve   The pulmonic valve was not well visualized . Left Atrium   Mildly dilated left atrium. Left Ventricle   Left ventricle size and systolic function is normal.   Normal left ventricular wall thickness. Ejection fraction is visually estimated at 55-60%. Right Atrium   The right atrium is of normal size. Right Ventricle   Normal right ventricular size and function. Pericardial Effusion   No evidence of any pericardial effusion. Aorta / Great Vessels   Aorta was not clearly visualized.    IVC is normal in size with normal respiratory phasic changes     M-Mode/2D Measurements & Calculations      LV Diastolic    LV Systolic Dimension: 3  AV Cusp Separation: 2.1 cmLA   Dimension: 4.2  cm                        Dimension: 3 cmAO Root   cm              LV Volume Diastolic: 50.4 Dimension: 3.3 cmLA Area: 20.7   LV FS:28.6 %    ml                        cm^2   LV PW           LV Volume Systolic: 35 ml   Diastolic: 0.8  LV EDV/LV EDV Index: 78.6   cm              ml/41 m^2LV ESV/LV ESV   Septum          Index: 35 ml/18 m^2       RV Diastolic Dimension: 2.2 cm   Diastolic: 0.9  EF Calculated: 55.5 %   cm                                        LA/Aorta: 0.91                                                LA volume/Index: 57.9 ml /30m^2     Doppler Measurements & Calculations      MV Peak E-Wave: 116 cm/s AV Peak Velocity: 163   LVOT Peak Velocity: 134                            cm/s                    cm/s   MV Peak Gradient: 5.38   AV Peak Gradient: 10.63 LVOT Peak Gradient: 7   mmHg                     mmHg                    mmHg      MV Deceleration Time:                            TV Peak E-Wave: 73.7 cm/s   254 msec                                         TV Peak A-Wave: 36.4 cm/s                               AV P1/2t: 544 msec      TV Peak Gradient: 2.17                                                    mmHg                                                    TR Velocity:273 cm/s                                                    TR Gradient:29.81 mmHg   MR Velocity: 366 cm/s    AV DVI (Vmax):0.82      PV Peak Velocity: 80.5                                                    cm/s                                                    PV Peak Gradient: 2.59                                                    mmHg     [email or web address]       STRESS:    CATH:    Assessment/Plan       Diagnosis Orders   1. Chronic systolic congestive heart failure (HCC)                CAD s/p PCI  Diastolic CHF  Afib on Xarelto  HTN  HLD  S/p PPM     Discussed diuretic dosage  Patients daughter to titrate dosage to keep weight at 153 (she felt best at that weight)  Her weight from last visit has gone up by 3 lbs. Reports sob  Will give metolazone 5mg MWF  Underwent PCI of LAD   Doing well  H/H improved  BP well controlled  Continue meds  Continue losartan, digoxin, , lasix, Aspirin  Continue Xarelto  No bleeding issue  The patient is asked to make an attempt to improve diet and exercise patterns to aid in medical management of this problem.   Advised patient to call office or seek immediate medical attention if there is any new onset of  any chest pain, sob, palpitations, lightheadedness, dizziness, orthopnea, PND or pedal edema. All medication side effects were discussed in details. Thank youfor allowing me to participate in the care of this patient. Please do not hesitate to contact me for any further questions. Return in about 3 months (around 10/20/2022), or if symptoms worsen or fail to improve, for Regular follow up, Review testing.        Electronically signed by Sam Luis MD [facility]  7/20/2022 at 11:15 AM

## 2022-07-21 ENCOUNTER — PROCEDURE VISIT (OUTPATIENT)
Dept: CARDIOLOGY CLINIC | Age: 87
End: 2022-07-21
Payer: MEDICARE

## 2022-07-21 DIAGNOSIS — Z95.0 PACEMAKER: Primary | ICD-10-CM

## 2022-07-21 PROCEDURE — 93294 REM INTERROG EVL PM/LDLS PM: CPT | Performed by: INTERNAL MEDICINE

## 2022-07-21 PROCEDURE — 93296 REM INTERROG EVL PM/IDS: CPT | Performed by: INTERNAL MEDICINE

## 2022-07-31 ENCOUNTER — APPOINTMENT (OUTPATIENT)
Dept: GENERAL RADIOLOGY | Age: 87
DRG: 641 | End: 2022-07-31
Payer: MEDICARE

## 2022-07-31 ENCOUNTER — HOSPITAL ENCOUNTER (INPATIENT)
Age: 87
LOS: 2 days | Discharge: HOME | DRG: 641 | End: 2022-08-02
Attending: EMERGENCY MEDICINE | Admitting: INTERNAL MEDICINE
Payer: MEDICARE

## 2022-07-31 ENCOUNTER — APPOINTMENT (OUTPATIENT)
Dept: CT IMAGING | Age: 87
DRG: 641 | End: 2022-07-31
Payer: MEDICARE
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
## 2022-07-31 DIAGNOSIS — Z79.01 LONG TERM CURRENT USE OF ANTICOAGULANT: ICD-10-CM

## 2022-07-31 DIAGNOSIS — E87.6 HYPOKALEMIA: ICD-10-CM

## 2022-07-31 DIAGNOSIS — E86.0 DEHYDRATION: ICD-10-CM

## 2022-07-31 PROBLEM — R53.1 WEAKNESS: Status: ACTIVE | Noted: 2022-07-31

## 2022-07-31 LAB
ABSOLUTE RETIC #: 80 THOU/MM3 (ref 20–115)
ALBUMIN SERPL-MCNC: 3.6 GM/DL (ref 3.4–5)
ALP BLD-CCNC: 73 U/L (ref 46–116)
ALT SERPL-CCNC: 14 U/L (ref 14–63)
ANION GAP SERPL CALCULATED.3IONS-SCNC: 15 MEQ/L (ref 8–16)
ANION GAP: 8 MEQ/L (ref 8–16)
APTT: 34.8 SECONDS (ref 22–38)
AST SERPL-CCNC: 24 U/L (ref 15–37)
BASOPHILS # BLD: 0.5 %
BASOPHILS # BLD: 0.8 % (ref 0–3)
BASOPHILS ABSOLUTE: 0.1 THOU/MM3 (ref 0–0.1)
BILIRUB SERPL-MCNC: 1.3 MG/DL (ref 0.2–1)
BUN BLDV-MCNC: 27 MG/DL (ref 7–22)
BUN BLDV-MCNC: 31 MG/DL (ref 7–18)
CALCIUM SERPL-MCNC: 9.4 MG/DL (ref 8.5–10.5)
CHLORIDE BLD-SCNC: 93 MEQ/L (ref 98–107)
CHLORIDE BLD-SCNC: 93 MEQ/L (ref 98–111)
CO2: 29 MEQ/L (ref 23–33)
CO2: 37 MEQ/L (ref 21–32)
CREAT SERPL-MCNC: 0.7 MG/DL (ref 0.4–1.2)
CREAT SERPL-MCNC: 1.1 MG/DL (ref 0.6–1.3)
EKG ATRIAL RATE: 64 BPM
EKG Q-T INTERVAL: 476 MS
EKG QRS DURATION: 158 MS
EKG QTC CALCULATION (BAZETT): 514 MS
EKG R AXIS: -44 DEGREES
EKG T AXIS: 114 DEGREES
EKG VENTRICULAR RATE: 70 BPM
EOSINOPHIL # BLD: 1.5 %
EOSINOPHILS ABSOLUTE: 0.2 THOU/MM3 (ref 0–0.4)
EOSINOPHILS RELATIVE PERCENT: 2.6 % (ref 0–4)
ERYTHROCYTE [DISTWIDTH] IN BLOOD BY AUTOMATED COUNT: 13.5 % (ref 11.5–14.5)
ERYTHROCYTE [DISTWIDTH] IN BLOOD BY AUTOMATED COUNT: 47.5 FL (ref 35–45)
FERRITIN: 54 NG/ML (ref 10–291)
FLU A ANTIGEN: NEGATIVE
FLU B ANTIGEN: NEGATIVE
FOLATE: > 20 NG/ML (ref 4.8–24.2)
GFR SERPL CREATININE-BSD FRML MDRD: 78 ML/MIN/1.73M2
GFR, ESTIMATED: 49 ML/MIN/1.73M2
GLUCOSE BLD-MCNC: 156 MG/DL (ref 70–108)
GLUCOSE BLD-MCNC: 161 MG/DL (ref 74–106)
HCT VFR BLD CALC: 39.6 % (ref 37–47)
HCT VFR BLD CALC: 40.8 % (ref 37–47)
HEMOCCULT STL QL: POSITIVE
HEMOGLOBIN: 12.9 GM/DL (ref 12–16)
HEMOGLOBIN: 13.3 GM/DL (ref 12–16)
IMMATURE GRANS (ABS): 0.05 THOU/MM3 (ref 0–0.07)
IMMATURE GRANULOCYTES: 0.5 %
IMMATURE RETIC FRACT: 11.7 % (ref 3–15.9)
INR BLD: 1.17 (ref 0.85–1.13)
IRON SATURATION: 23 % (ref 20–50)
IRON: 72 UG/DL (ref 50–170)
LYMPHOCYTES # BLD: 23.7 % (ref 15–47)
LYMPHOCYTES # BLD: 24.9 %
LYMPHOCYTES ABSOLUTE: 2.7 THOU/MM3 (ref 1–4.8)
MAGNESIUM: 2 MG/DL (ref 1.8–2.4)
MCH RBC QN AUTO: 30.6 PG (ref 27–31)
MCH RBC QN AUTO: 31.2 PG (ref 26–33)
MCHC RBC AUTO-ENTMCNC: 32.6 GM/DL (ref 32.2–35.5)
MCHC RBC AUTO-ENTMCNC: 32.6 GM/DL (ref 33–37)
MCV RBC AUTO: 93.9 FL (ref 81–99)
MCV RBC AUTO: 95.9 FL (ref 81–99)
MONOCYTES # BLD: 10 %
MONOCYTES ABSOLUTE: 1.1 THOU/MM3 (ref 0.4–1.3)
MONOCYTES: 8.3 % (ref 0–12)
NT PRO BNP: 770 PG/ML (ref 0–1800)
NUCLEATED RED BLOOD CELLS: 0 /100 WBC
PDW BLD-RTO: 13.3 % (ref 11.5–14.5)
PHOSPHORUS: 3.2 MG/DL (ref 2.4–4.7)
PLATELET # BLD: 207 THOU/MM3 (ref 130–400)
PLATELET # BLD: 222 THOU/MM3 (ref 130–400)
PMV BLD AUTO: 10.6 FL (ref 9.4–12.4)
PMV BLD AUTO: 7.7 FL (ref 7.4–10.4)
POC CALCIUM: 9.3 MG/DL (ref 8.5–10.1)
POTASSIUM REFLEX MAGNESIUM: 3.6 MEQ/L (ref 3.5–5.2)
POTASSIUM SERPL-SCNC: 2.7 MEQ/L (ref 3.5–5.1)
RBC # BLD: 4.13 MILL/MM3 (ref 4.2–5.4)
RBC # BLD: 4.34 MILL/MM3 (ref 4.2–5.4)
RETIC HEMOGLOBIN: 31 PG (ref 28.2–35.7)
RETICULOCYTE ABSOLUTE COUNT: 2 % (ref 0.5–2)
SARS-COV-2, NAAT: NOT  DETECTED
SEG NEUTROPHILS: 62.6 %
SEGMENTED NEUTROPHILS ABSOLUTE COUNT: 6.7 THOU/MM3 (ref 1.8–7.7)
SEGS: 64.6 % (ref 43–75)
SODIUM BLD-SCNC: 137 MEQ/L (ref 135–145)
SODIUM BLD-SCNC: 138 MEQ/L (ref 136–145)
T4 FREE: 1.3 NG/DL (ref 0.93–1.76)
TOTAL IRON BINDING CAPACITY: 310 UG/DL (ref 171–450)
TOTAL PROTEIN: 7.7 GM/DL (ref 6.4–8.2)
TROPONIN T: < 0.01 NG/ML
TROPONIN T: < 0.01 NG/ML
TROPONIN, HIGH SENSITIVITY: 20.9 PG/ML (ref 0–51.3)
TSH SERPL DL<=0.05 MIU/L-ACNC: 2 UIU/ML (ref 0.4–4.2)
VITAMIN B-12: 606 PG/ML (ref 211–911)
WBC # BLD: 10.7 THOU/MM3 (ref 4.8–10.8)
WBC # BLD: 8.8 THOU/MM3 (ref 4.8–10.8)

## 2022-07-31 PROCEDURE — 82607 VITAMIN B-12: CPT

## 2022-07-31 PROCEDURE — 83735 ASSAY OF MAGNESIUM: CPT

## 2022-07-31 PROCEDURE — 80053 COMPREHEN METABOLIC PANEL: CPT

## 2022-07-31 PROCEDURE — 84439 ASSAY OF FREE THYROXINE: CPT

## 2022-07-31 PROCEDURE — 85046 RETICYTE/HGB CONCENTRATE: CPT

## 2022-07-31 PROCEDURE — 82728 ASSAY OF FERRITIN: CPT

## 2022-07-31 PROCEDURE — 99285 EMERGENCY DEPT VISIT HI MDM: CPT

## 2022-07-31 PROCEDURE — 87804 INFLUENZA ASSAY W/OPTIC: CPT

## 2022-07-31 PROCEDURE — 85025 COMPLETE CBC W/AUTO DIFF WBC: CPT

## 2022-07-31 PROCEDURE — 6370000000 HC RX 637 (ALT 250 FOR IP)

## 2022-07-31 PROCEDURE — 85730 THROMBOPLASTIN TIME PARTIAL: CPT

## 2022-07-31 PROCEDURE — 96365 THER/PROPH/DIAG IV INF INIT: CPT

## 2022-07-31 PROCEDURE — 84484 ASSAY OF TROPONIN QUANT: CPT

## 2022-07-31 PROCEDURE — 83540 ASSAY OF IRON: CPT

## 2022-07-31 PROCEDURE — 84100 ASSAY OF PHOSPHORUS: CPT

## 2022-07-31 PROCEDURE — 85610 PROTHROMBIN TIME: CPT

## 2022-07-31 PROCEDURE — 82272 OCCULT BLD FECES 1-3 TESTS: CPT

## 2022-07-31 PROCEDURE — 73030 X-RAY EXAM OF SHOULDER: CPT

## 2022-07-31 PROCEDURE — 87635 SARS-COV-2 COVID-19 AMP PRB: CPT

## 2022-07-31 PROCEDURE — 6370000000 HC RX 637 (ALT 250 FOR IP): Performed by: EMERGENCY MEDICINE

## 2022-07-31 PROCEDURE — 6360000002 HC RX W HCPCS: Performed by: EMERGENCY MEDICINE

## 2022-07-31 PROCEDURE — 36415 COLL VENOUS BLD VENIPUNCTURE: CPT

## 2022-07-31 PROCEDURE — 2580000003 HC RX 258

## 2022-07-31 PROCEDURE — 82746 ASSAY OF FOLIC ACID SERUM: CPT

## 2022-07-31 PROCEDURE — 1200000003 HC TELEMETRY R&B

## 2022-07-31 PROCEDURE — 70450 CT HEAD/BRAIN W/O DYE: CPT

## 2022-07-31 PROCEDURE — 93010 ELECTROCARDIOGRAM REPORT: CPT | Performed by: INTERNAL MEDICINE

## 2022-07-31 PROCEDURE — 2580000003 HC RX 258: Performed by: EMERGENCY MEDICINE

## 2022-07-31 PROCEDURE — 83880 ASSAY OF NATRIURETIC PEPTIDE: CPT

## 2022-07-31 PROCEDURE — 6360000002 HC RX W HCPCS

## 2022-07-31 PROCEDURE — 83550 IRON BINDING TEST: CPT

## 2022-07-31 PROCEDURE — 93005 ELECTROCARDIOGRAM TRACING: CPT | Performed by: EMERGENCY MEDICINE

## 2022-07-31 PROCEDURE — 71046 X-RAY EXAM CHEST 2 VIEWS: CPT

## 2022-07-31 PROCEDURE — 99223 1ST HOSP IP/OBS HIGH 75: CPT

## 2022-07-31 PROCEDURE — 84443 ASSAY THYROID STIM HORMONE: CPT

## 2022-07-31 RX ORDER — SODIUM CHLORIDE 9 MG/ML
INJECTION, SOLUTION INTRAVENOUS PRN
Status: DISCONTINUED | OUTPATIENT
Start: 2022-07-31 | End: 2022-08-02 | Stop reason: HOSPADM

## 2022-07-31 RX ORDER — ONDANSETRON 2 MG/ML
4 INJECTION INTRAMUSCULAR; INTRAVENOUS EVERY 6 HOURS PRN
Status: DISCONTINUED | OUTPATIENT
Start: 2022-07-31 | End: 2022-08-02 | Stop reason: HOSPADM

## 2022-07-31 RX ORDER — BUMETANIDE 1 MG/1
1 TABLET ORAL DAILY
Status: DISCONTINUED | OUTPATIENT
Start: 2022-07-31 | End: 2022-08-01

## 2022-07-31 RX ORDER — POTASSIUM CHLORIDE 20 MEQ/1
40 TABLET, EXTENDED RELEASE ORAL PRN
Status: DISCONTINUED | OUTPATIENT
Start: 2022-07-31 | End: 2022-08-02 | Stop reason: HOSPADM

## 2022-07-31 RX ORDER — ALBUTEROL SULFATE 2.5 MG/3ML
2.5 SOLUTION RESPIRATORY (INHALATION) EVERY 6 HOURS PRN
Status: DISCONTINUED | OUTPATIENT
Start: 2022-07-31 | End: 2022-08-02 | Stop reason: HOSPADM

## 2022-07-31 RX ORDER — PANTOPRAZOLE SODIUM 40 MG/1
40 TABLET, DELAYED RELEASE ORAL 2 TIMES DAILY
Status: DISCONTINUED | OUTPATIENT
Start: 2022-07-31 | End: 2022-08-02 | Stop reason: HOSPADM

## 2022-07-31 RX ORDER — LIDOCAINE 4 G/G
1 PATCH TOPICAL EVERY 24 HOURS
Status: DISCONTINUED | OUTPATIENT
Start: 2022-07-31 | End: 2022-08-02 | Stop reason: HOSPADM

## 2022-07-31 RX ORDER — ASPIRIN 81 MG/1
81 TABLET, CHEWABLE ORAL DAILY
Status: DISCONTINUED | OUTPATIENT
Start: 2022-07-31 | End: 2022-08-02 | Stop reason: HOSPADM

## 2022-07-31 RX ORDER — LACTOBACILLUS RHAMNOSUS GG 10B CELL
1 CAPSULE ORAL DAILY
Status: DISCONTINUED | OUTPATIENT
Start: 2022-07-31 | End: 2022-08-02 | Stop reason: HOSPADM

## 2022-07-31 RX ORDER — POTASSIUM CHLORIDE 7.45 MG/ML
10 INJECTION INTRAVENOUS PRN
Status: DISCONTINUED | OUTPATIENT
Start: 2022-07-31 | End: 2022-08-02 | Stop reason: HOSPADM

## 2022-07-31 RX ORDER — SODIUM CHLORIDE 0.9 % (FLUSH) 0.9 %
5-40 SYRINGE (ML) INJECTION PRN
Status: DISCONTINUED | OUTPATIENT
Start: 2022-07-31 | End: 2022-08-02 | Stop reason: HOSPADM

## 2022-07-31 RX ORDER — POTASSIUM CHLORIDE 7.45 MG/ML
10 INJECTION INTRAVENOUS ONCE
Status: COMPLETED | OUTPATIENT
Start: 2022-07-31 | End: 2022-07-31

## 2022-07-31 RX ORDER — POTASSIUM CHLORIDE 750 MG/1
20 TABLET, FILM COATED, EXTENDED RELEASE ORAL ONCE
Status: COMPLETED | OUTPATIENT
Start: 2022-07-31 | End: 2022-07-31

## 2022-07-31 RX ORDER — METOLAZONE 5 MG/1
5 TABLET ORAL SEE ADMIN INSTRUCTIONS
Status: DISCONTINUED | OUTPATIENT
Start: 2022-07-31 | End: 2022-08-02 | Stop reason: HOSPADM

## 2022-07-31 RX ORDER — PNV NO.95/FERROUS FUM/FOLIC AC 28MG-0.8MG
250 TABLET ORAL 2 TIMES DAILY
Status: DISCONTINUED | OUTPATIENT
Start: 2022-07-31 | End: 2022-08-02 | Stop reason: HOSPADM

## 2022-07-31 RX ORDER — MULTIVITAMIN WITH IRON
1 TABLET ORAL DAILY
Status: DISCONTINUED | OUTPATIENT
Start: 2022-07-31 | End: 2022-08-02 | Stop reason: HOSPADM

## 2022-07-31 RX ORDER — 0.9 % SODIUM CHLORIDE 0.9 %
1000 INTRAVENOUS SOLUTION INTRAVENOUS ONCE
Status: COMPLETED | OUTPATIENT
Start: 2022-07-31 | End: 2022-07-31

## 2022-07-31 RX ORDER — POTASSIUM CHLORIDE 20 MEQ/1
20 TABLET, EXTENDED RELEASE ORAL
Status: DISCONTINUED | OUTPATIENT
Start: 2022-08-01 | End: 2022-08-02 | Stop reason: HOSPADM

## 2022-07-31 RX ORDER — ACETAMINOPHEN 650 MG/1
650 SUPPOSITORY RECTAL EVERY 6 HOURS PRN
Status: DISCONTINUED | OUTPATIENT
Start: 2022-07-31 | End: 2022-08-02 | Stop reason: HOSPADM

## 2022-07-31 RX ORDER — FERROUS SULFATE 325(65) MG
325 TABLET ORAL WEEKLY
Status: DISCONTINUED | OUTPATIENT
Start: 2022-08-06 | End: 2022-08-02 | Stop reason: HOSPADM

## 2022-07-31 RX ORDER — SODIUM CHLORIDE, SODIUM LACTATE, POTASSIUM CHLORIDE, CALCIUM CHLORIDE 600; 310; 30; 20 MG/100ML; MG/100ML; MG/100ML; MG/100ML
INJECTION, SOLUTION INTRAVENOUS CONTINUOUS
Status: DISCONTINUED | OUTPATIENT
Start: 2022-07-31 | End: 2022-07-31

## 2022-07-31 RX ORDER — ONDANSETRON 4 MG/1
4 TABLET, ORALLY DISINTEGRATING ORAL EVERY 8 HOURS PRN
Status: DISCONTINUED | OUTPATIENT
Start: 2022-07-31 | End: 2022-08-02 | Stop reason: HOSPADM

## 2022-07-31 RX ORDER — SODIUM CHLORIDE 9 MG/ML
INJECTION, SOLUTION INTRAVENOUS CONTINUOUS
Status: DISCONTINUED | OUTPATIENT
Start: 2022-07-31 | End: 2022-08-02 | Stop reason: HOSPADM

## 2022-07-31 RX ORDER — POTASSIUM CHLORIDE 7.45 MG/ML
10 INJECTION INTRAVENOUS
Status: COMPLETED | OUTPATIENT
Start: 2022-07-31 | End: 2022-08-01

## 2022-07-31 RX ORDER — SUCRALFATE 1 G/1
1 TABLET ORAL EVERY 12 HOURS SCHEDULED
Status: DISCONTINUED | OUTPATIENT
Start: 2022-07-31 | End: 2022-08-02 | Stop reason: HOSPADM

## 2022-07-31 RX ORDER — VENLAFAXINE HYDROCHLORIDE 75 MG/1
75 CAPSULE, EXTENDED RELEASE ORAL DAILY
Status: DISCONTINUED | OUTPATIENT
Start: 2022-07-31 | End: 2022-08-02 | Stop reason: HOSPADM

## 2022-07-31 RX ORDER — SODIUM CHLORIDE 0.9 % (FLUSH) 0.9 %
5-40 SYRINGE (ML) INJECTION EVERY 12 HOURS SCHEDULED
Status: DISCONTINUED | OUTPATIENT
Start: 2022-07-31 | End: 2022-08-02 | Stop reason: HOSPADM

## 2022-07-31 RX ORDER — DOCUSATE SODIUM 100 MG/1
100 CAPSULE, LIQUID FILLED ORAL DAILY PRN
Status: DISCONTINUED | OUTPATIENT
Start: 2022-07-31 | End: 2022-08-02 | Stop reason: HOSPADM

## 2022-07-31 RX ORDER — MAGNESIUM SULFATE IN WATER 40 MG/ML
2000 INJECTION, SOLUTION INTRAVENOUS PRN
Status: DISCONTINUED | OUTPATIENT
Start: 2022-07-31 | End: 2022-08-02 | Stop reason: HOSPADM

## 2022-07-31 RX ORDER — ACETAMINOPHEN 325 MG/1
650 TABLET ORAL EVERY 6 HOURS PRN
Status: DISCONTINUED | OUTPATIENT
Start: 2022-07-31 | End: 2022-08-02 | Stop reason: HOSPADM

## 2022-07-31 RX ADMIN — SODIUM CHLORIDE: 9 INJECTION, SOLUTION INTRAVENOUS at 22:15

## 2022-07-31 RX ADMIN — POTASSIUM CHLORIDE 10 MEQ: 7.46 INJECTION, SOLUTION INTRAVENOUS at 20:18

## 2022-07-31 RX ADMIN — POTASSIUM CHLORIDE 10 MEQ: 7.46 INJECTION, SOLUTION INTRAVENOUS at 12:56

## 2022-07-31 RX ADMIN — METOPROLOL TARTRATE 25 MG: 25 TABLET, FILM COATED ORAL at 20:14

## 2022-07-31 RX ADMIN — SUCRALFATE 1 G: 1 TABLET ORAL at 20:14

## 2022-07-31 RX ADMIN — BUMETANIDE 1 MG: 1 TABLET ORAL at 19:15

## 2022-07-31 RX ADMIN — Medication 250 MG: at 20:14

## 2022-07-31 RX ADMIN — POTASSIUM CHLORIDE 10 MEQ: 7.46 INJECTION, SOLUTION INTRAVENOUS at 19:09

## 2022-07-31 RX ADMIN — Medication 1 TABLET: at 19:15

## 2022-07-31 RX ADMIN — PANTOPRAZOLE SODIUM 40 MG: 40 TABLET, DELAYED RELEASE ORAL at 20:14

## 2022-07-31 RX ADMIN — ASPIRIN 81 MG: 81 TABLET, CHEWABLE ORAL at 19:14

## 2022-07-31 RX ADMIN — Medication 1 CAPSULE: at 19:14

## 2022-07-31 RX ADMIN — POTASSIUM CHLORIDE 20 MEQ: 750 TABLET, EXTENDED RELEASE ORAL at 11:10

## 2022-07-31 RX ADMIN — VENLAFAXINE HYDROCHLORIDE 75 MG: 75 CAPSULE, EXTENDED RELEASE ORAL at 19:14

## 2022-07-31 RX ADMIN — POTASSIUM CHLORIDE 10 MEQ: 7.46 INJECTION, SOLUTION INTRAVENOUS at 10:56

## 2022-07-31 RX ADMIN — SODIUM CHLORIDE 1000 ML: 9 INJECTION, SOLUTION INTRAVENOUS at 11:10

## 2022-07-31 ASSESSMENT — ENCOUNTER SYMPTOMS
DIARRHEA: 0
WHEEZING: 0
BLOOD IN STOOL: 0
ABDOMINAL PAIN: 0
SORE THROAT: 0
COUGH: 0
SHORTNESS OF BREATH: 1
VOMITING: 0
BACK PAIN: 0
NAUSEA: 0

## 2022-07-31 ASSESSMENT — PAIN - FUNCTIONAL ASSESSMENT: PAIN_FUNCTIONAL_ASSESSMENT: NONE - DENIES PAIN

## 2022-07-31 NOTE — H&P
History & Physical        Patient:  Liyah Amato  YOB: 1928    MRN: [id number]     Acct: [de-identified]    PCP: Danae Stark MD    Date of Admission: 7/31/2022    Date of Service: Pt seen/examined on 07/31/22  and Admitted to Inpatient with expected LOS greater than two midnights due to medical therapy. ASSESSMENT/PLAN:    Dyspnea on exertion   -Patient reports that for the past week she has had increasing symptoms of dyspnea on exertion, extreme weakness, fatigue and decreased activity intolerance. She states that she is not able to walk distances like she used to, and gets very fatigued and short of breath and has to take many breaks in between. States ambulating from her bed to the bathroom causes her extreme fatigue and shortness of breath. States she gets very wheezy when exerting herself, and feels lightheaded at times. Reports she has difficulty getting herself out of her chair because of how weak she is. ECG notable for ventricular paced rhythm with prolonged QTC. CXR negative for acute intrathoracic process. Labs significant for potassium 2.7, chloride 93, CO2 37, bun 31, creatinine 1.1, EGFR 49, glucose 161, troponin normal, proBNP normal, H&H notable, fecal occult blood test positive. Etiologies of dyspnea on exertion could be multifactorial, including dehydration from overdiuresis, severe hypokalemia, worsening heart failure despite overall euvolemic appearance, mechanical cardiac changes, possible acute blood loss per rectum, hypothyroidism.   -Repeat BMP, CBC ordered. Iron studies ordered, coagulation studies ordered. TSH, with reflex ordered.   -Echo ordered   -Continuous telemetry   -Troponin x2 ordered   - Reached out to Dr. Kate Feliz via [employer] to update him about this patient and asked if he wanted to see her while she was here in the hospital, although mostly negative cardiac work up at OSH.   He informed me to place an inpatient consult to cardiology services given her advanced age and hx of HF. Severe hypokalemia   -Potassium noted to be 2.7 at outside facility. She was administered 2K riders, 20 mEq p.o. potassium. Likely secondary to overdiuresis with Bumex and metolazone. Repeat BMP ordered   -Potassium replacement protocol  - Daily lab    Positive FOBT   -tates that she has recently been having black stools but states \"they are always black because I take iron. \"  States she has not taken iron in the past 2 days. Patient's daughter is at bedside during time of obtaining history and informed me that her mother has had GI bleeding before in the past and confirmed on an EGD approximately 4 5 years ago.   -Hold ASA, Xarelto   - Monitor H&H   -GI consulted, appreciate recs    S/p fall at home 7/31   -She reports earlier this morning she tried to walk her self to the bathroom. States as she was walking back from the bathroom she was able to sit on the edge of her bed when she began to feel dizzy and faint, and slid off the edge of the bed, hitting her head and left shoulder on the ground. She states that she did not lose consciousness after hitting her head. She states she did not blackout or lose consciousness while sliding off the bed. Denies neck pain, back pain, left shoulder pain at this time. -CT head at [facility] negative for acute intracranial hemorrhage mass-effect or midline shift. -X-ray left shoulder at [facility] negative for acute fracture or dislocation   -Monitor for hematoma formation on left shoulder, delayed head bleed signs and symptoms. Neuro checks. Lidocaine patch for left shoulder as needed for pain. Metabolic alkalosis with mild hypochloremia  - CO2 37, chloride 93 at OSH. Likely multifactorial; Suspect likely contraction alkalosis secondary to diuresing with Bumex and metolazone, and d/t severe hypokalemia. Will hold diuretics at this time and replete potassium per protocol.   Will give small amount of normal saline at rate 50 mL/hr for 10 hours to help bring chloride level up. - Daily lab. Atrial fibrillation   -On Xarelto, hold given #3   -On metoprolol, rate controlled-continue   -Continuous telemetry    Chronic diastolic heart failure   -States her dry weight is normally 153 pounds. Patient currently 156 pounds 6.4 ounces. Appears grossly euvolemic, lungs CTAB, no LE edema  -Echo (1/26/2021) notable for EF estimated 55 to 60%, mild aortic regurg, mildly dilated left atrium, mild mitral regurg, mild TR,   -Continue BB, Bumex, statin   -Hold metolazone at this time given #2   -Hold ASA, Xarelto given #3   - Daily weights, strict I&O   - Cardiac diet    CAD s/p PCI and [employer] single pacemaker placement 4/26/2016   - Samantha Samson outpatient   - ECG notable for ventricular paced rhythm, prolonged Qtc secondary to ventricular pacing  - Continue BB   - Hold ASA, Xarelto given #3   -     Chronic DIMITRIS   -Takes oral iron at home.   - H&H stable. Transfuse for hemoglobin < 7 g/dL   - Daily lab    CKD stage II   -Baseline range ~ 0.5-0.9   - Cr stable in baseline range. - Daily lab    Hyperlipidemia:   - Statin    Essential hypertension   -Continue Bumex, BB    GERD   - Continue PPI, carafate    Depression   - Continue Effexor    Weakness secondary to physical deconditioning and chronic illness:   - PT/OT consulted, appreciate recs      Chief Complaint:  Weakness, VACA, fall      History Of Present Illness:    80 y.o. female with a past medical history of atrial fibrillation, on Xarelto, CAD, HTN, HLD, chronic diastolic heart failure, DIMITRIS, GERD, CKD stage II who presented to [address] [place] [address] as a direct admit from [facility] ambulatory care center for increased weakness, dyspnea on exertion, fall. Patient reports that she followed up with her cardiologist Dr. Kathleen Bass in office about a week and a half ago due to having increased weight gain and weighing approximately 162 pounds.  She was started on metolazone Monday Wednesday Friday in addition to continuing her Bumex at home to help her get back to her dry weight of 153 pounds. Patient reports that for the past week she has had increasing symptoms of dyspnea on exertion, extreme weakness, fatigue and decreased activity intolerance. She states that she is not able to walk distances like she used to, and gets very fatigued and short of breath and has to take many breaks in between. States ambulating from her bed to the bathroom causes her extreme fatigue and shortness of breath. States she gets very wheezy when exerting herself, and feels lightheaded at times. Reports she has difficulty getting herself out of her chair because of how weak she is. She reports earlier this morning she tried to walk her self to the bathroom. States as she was walking back from the bathroom she was able to sit on the edge of her bed when she began to feel dizzy and faint, and slid off the edge of the bed, hitting her head and left shoulder on the ground. She states that she did not lose consciousness after hitting her head. She states she did not blackout or lose consciousness while sliding off the bed. Denies neck pain, back pain, left shoulder pain at this time. She denies chest pain, palpitations, orthopnea, PND, shortness of breath at rest, leg swelling, headache, blurred vision, double vision, numbness or tingling in her extremities. She denies body aches, abdominal pain, nausea, vomiting, diarrhea, fever, chills, exposures to ill persons. Does report that she has a chronic cough that has not been productive. States that she is eating and drinking well. States that she has recently been having black stools but states \"they are always black because I take iron. \"  States she has not taken iron in the past 2 days.   Patient's daughter is at bedside during time of obtaining history and informed me that her mother has had GI bleeding before in the past and confirmed on an EGD approximately 4 5 years ago. ED work-up significant for: ECG notable for ventricular paced rhythm with prolonged QTC, CT head negative for acute intracranial hemorrhage, mass-effect or midline shift, x-ray left shoulder negative for acute fracture or dislocation chest x-ray showing no acute intrathoracic process. Significant for potassium 2.7, chloride 93, CO2 37, bun 31, creatinine 1.1, EGFR 49, glucose 161, troponin normal, proBNP normal, H&H notable, fecal occult blood test positive. At this time patient admitted to the hospital for further work-up and management.     Past Medical History:          Diagnosis Date    Atrial fibrillation ([address]     Blood circulation, collateral     [employer] single pacemaker 4/26/2016 05/05/2016    CAD (coronary artery disease)     cath and stent in right artery    Cancer [facility] 1954    HX  Colon     Carpal tunnel syndrome     Fall at home     12/7/21 -large bruise to R buttock    Fracture dislocation of ankle 1980    GERD (gastroesophageal reflux disease)     Hyperlipidemia     Hypertension     Kidney lesion, native, right     found on 5/2016    Osteoarthritis     knees    Osteopenia     Prolonged emergence from general anesthesia     Thyroid goiter 09/06/2016    Yersiniosis 2016       Past Surgical History:          Procedure Laterality Date    ABDOMEN SURGERY      ANKLE FRACTURE SURGERY  [id number]    reconstruction in 2003 and 2007    APPENDECTOMY      BLADDER SURGERY      support bladder repair    CARDIAC SURGERY      heart stent 12-11-18, [place]    CARPAL TUNNEL RELEASE  7/2013    CARPAL TUNNEL RELEASE Right 08/19/2017    Revision    CATARACT REMOVAL Bilateral     CHOLECYSTECTOMY  1986    COLON SURGERY  1954    COLONOSCOPY      ENDOSCOPY, COLON, DIAGNOSTIC      EYE SURGERY      cataract     FRACTURE SURGERY      HYSTERECTOMY (CERVIX STATUS UNKNOWN)  [address]    L knee    KNEE SURGERY Left     total replacement    PACEMAKER PLACEMENT      PTCA MD   Probiotic Product (PROBIOTIC DAILY PO) Take by mouth daily    Historical Provider, MD   aspirin 81 MG chewable tablet Take 1 tablet by mouth daily 3/31/16   ONELLE Gage CNP   acetaminophen (TYLENOL) 500 MG tablet Take 1,000 mg by mouth every 6 hours as needed for Pain     Historical Provider, MD       Allergies:  Methadone, Gabapentin, Lodine [etodolac], Lyrica [pregabalin], and Ultram [tramadol]    Social History:   reports that she has never smoked. She has never used smokeless tobacco. She reports that she does not drink alcohol and does not use drugs.     Family History:      Positive as follows:        Problem Relation Age of Onset    Heart Disease Mother     High Blood Pressure Mother     Heart Disease Father     High Blood Pressure Father     Heart Disease Brother     High Blood Pressure Brother     Heart Disease Son        REVIEW OF SYSTEMS:     Constitutional: ROS: positive for  - fatigue, malaise  negative for - chills, fever, malaise, or night sweats  Head: no headache, no head injury, no migraine   Eyes ROS: denies blurred/double vision  Ears ROS: no hearing difficulty, no tinnitus  Mouth and Throat ROS: no ulceration, dysphagia, dental caries  Psychological ROS: no depression, no anxiety, no panic attacks, denies suicide/homicide ideation  Endocrine ROS: denies polyuria, polydypsia, no heat or cold intolerance  Respiratory ROS: positive for - cough (chronic), shortness of breath, and wheezing  negative for - hemoptysis, orthopnea, pleuritic pain, sputum changes, or tachypnea  Cardiovascular ROS: positive for - dyspnea on exertion, shortness of breath, and dizziness/faint  negative for - chest pain, irregular heartbeat, loss of consciousness, orthopnea, paroxysmal nocturnal dyspnea, or rapid heart rate  Gastrointestinal ROS: positive for - melena  negative for - abdominal pain, appetite loss, constipation, diarrhea, gas/bloating, or nausea/vomiting  Genito-Urinary ROS: denies dysuria, frequency, urgency; denies hematuria  Musculoskeletal ROS: positive for - muscular weakness, falls  negative for - back pain, pain in left shoulder or swelling in bilateral ankle, bilateral foot, and bilateral leg  Neurological ROS: no syncope, no seizures, no numbness or tingling of hands, no numbness or tingling of feet, no paresis  Dermatology: no skin rash, no eczema  Hematology: reports bruising/bleeding easily, denies clotting disorders    PHYSICAL EXAM:    BP (!) 139/57   Pulse 70   Temp 97.7 °F (36.5 °C) (Oral)   Resp 16   Ht 5' 2\" (1.575 m)   Wt 156 lb 6.4 oz (70.9 kg)   LMP  (LMP Unknown)   SpO2 100%   BMI 28.61 kg/m²     General appearance:  No apparent distress, appears stated age and cooperative. HEENT:  Normal cephalic, atraumatic without obvious deformity. Pupils equal, round, and reactive to light. Conjunctivae/corneas clear. Oral mucosa moist.   Neck: Supple, with full range of motion. No jugular venous distention. Trachea midline. No C-spine point tenderness  Respiratory:  Normal respiratory effort, able to speak full clear sentences. Clear to auscultation, bilaterally without Rales/Wheezes/Rhonchi. Cardiovascular:  Regular rate and rhythm with normal S1/S2 without murmurs, rubs or gallops. No lower extremity edema. Abdomen: Soft, non-tender, non-distended with normal bowel sounds. Musculoskeletal:  No clubbing, cyanosis or edema bilaterally. Full range of motion without deformity. 5/5 strength throughout x 4 extremities. No hematoma formation over left shoulder  Skin: Skin color pale, texture, turgor normal.    Neurologic:  Neurovascularly intact without any focal sensory/motor deficits.  Cranial nerves: II-XII intact, grossly non-focal.  Psychiatric:  Alert and oriented, thought content appropriate  Capillary Refill: Brisk,< 3 seconds   Peripheral Pulses: +2 palpable, equal bilaterally       Labs:     Recent Labs     07/31/22  1016 07/31/22  1704   WBC 8.8 10.7   HGB 13.3 12.9   HCT 40.8 39.6    222     Recent Labs     07/31/22  1016      K 2.7*   CL 93*   CO2 37*   BUN 31*   CREATININE 1.1     Recent Labs     07/31/22  1016   AST 24   ALT 14   BILITOT 1.3*   ALKPHOS 73     No results for input(s): INR in the last 72 hours. No results for input(s): Magdalene Cervantes in the last 72 hours. Procalcitonin:  No results for input(s): PROCAL in the last 72 hours. Lactic Acid: No results for input(s): LACTA in the last 72 hours. Urinalysis:      Lab Results   Component Value Date/Time    NITRU Negative 10/12/2021 11:14 AM    WBCUA 5-9 10/01/2021 04:35 PM    BACTERIA FEW 10/01/2021 04:35 PM    RBCUA 0-2 10/01/2021 04:35 PM    RBCUA 3-5 03/31/2016 10:15 AM    BLOODU Negative 10/12/2021 11:14 AM    BLOODU TRACE 10/01/2021 04:35 PM    SPECGRAV 1.015 10/01/2021 04:35 PM    GLUCOSEU Negative 10/12/2021 11:14 AM       Radiology:     CXR: I have reviewed the CXR with the following interpretation: Negative for acute intrathoracic process    XR CHEST (2 VW)    Result Date: 7/31/2022  PROCEDURE: XR CHEST (2 VW) CLINICAL INFORMATION: 80year-old female with dyspnea on exertion. COMPARISON: Radiograph 12/18/2021. TECHNIQUE: PA and lateral views of the chest were obtained. FINDINGS: There is a large right hilar calcified lymph node. There is a calcified granuloma in the right midlung. There is a single lead cardiac device overlying the left side of the chest. The lungs are clear. The cardiac silhouette and pulmonary vasculature are within normal limits. There is no significant pleural effusion or pneumothorax. Visualized portions of the upper abdomen are within normal limits. The osseous structures are intact. No acute fractures or suspicious osseous lesions. There is no acute intrathoracic process. **This report has been created using voice recognition software. It may contain minor errors which are inherent in voice recognition technology. ** Final report electronically signed by Dr Nadia Jc on 7/31/2022 11:02 AM    CT HEAD WO CONTRAST    Result Date: 7/31/2022  PROCEDURE: CT HEAD WO CONTRAST CLINICAL INFORMATION: 69-year-old female who fell this morning. The patient is on anticoagulation. COMPARISON: Head CT 10/16/2020. TECHNIQUE: Noncontrast 5 mm axial images were obtained through the brain. All CT scans at this facility use dose modulation, iterative reconstruction, and/or weight-based dosing when appropriate to reduce radiation dose to as low as reasonably achievable. FINDINGS: There is generalized prominence of the sulci and gyri consistent with age-related volume loss. There is no hemorrhage. There are no intra-or extra-axial collections. There is no hydrocephalus, midline shift or mass effect. The gray-white matter differentiation is preserved. There is hypoattenuation in the periventricular white matter consistent with chronic microvascular ischemic disease. The paranasal sinuses and mastoid air cells are normally aerated. There is no suspicious calvarial abnormality. 1. No acute intracranial hemorrhage, mass effect or midline shift. 2. Chronic senescent changes of the brain including generalized atrophy and chronic microvascular ischemic disease in the white matter. **This report has been created using voice recognition software. It may contain minor errors which are inherent in voice recognition technology. ** Final report electronically signed by Dr Nadia cJ on 7/31/2022 10:58 AM    XR SHOULDER LEFT (MIN 2 VIEWS)    Result Date: 7/31/2022  PROCEDURE: XR SHOULDER LEFT (MIN 2 VIEWS) CLINICAL INFORMATION: 69-year-old female who fell. Left-sided shoulder bruising. COMPARISON: No prior study. TECHNIQUE: 4 views of the left shoulder were obtained. FINDINGS: There is narrowing of the acromioclavicular joint in the left glenohumeral joint. There is no acute fracture. There is no dislocation.  There is a single lead cardiac device overlying the upper left chest.     No acute fracture or dislocation at the left shoulder. **This report has been created using voice recognition software. It may contain minor errors which are inherent in voice recognition technology. ** Final report electronically signed by Dr Linwood Vegas on 7/31/2022 11:03 AM      EKG:  I have reviewed the EKG with the following interpretation: Ventricular paced rhythm, prolonged Qtc secondary to ventricular pacing. Thank you Kecia Harvey MD for the opportunity to be involved in this patient's care.     Electronically signed by NOELLE Marcus CNP on 7/31/2022 at 5:25 PM

## 2022-07-31 NOTE — ED NOTES
Transferred to [address] [place] [address] per [facility] in stable condition. IV of 0.9 NS at 250 and potassium at 100 ml per hour infusing well. No redness or swelling at site. Report to squad. Patient is alert and cooperative. Skin warm and dry. Color normal for ethnicity.      Altagracia Orellana RN  07/31/22 5500

## 2022-07-31 NOTE — PLAN OF CARE
Problem: Discharge Planning  Goal: Discharge to home or other facility with appropriate resources  Recent Flowsheet Documentation  Taken 7/31/2022 1527 by Vijaya Reyes RN  Discharge to home or other facility with appropriate resources: Identify barriers to discharge with patient and caregiver     Problem: Discharge Planning  Goal: Discharge to home or other facility with appropriate resources  Outcome: Progressing  Flowsheets (Taken 7/31/2022 1527)  Discharge to home or other facility with appropriate resources: Identify barriers to discharge with patient and caregiver     Problem: Safety - Adult  Goal: Free from fall injury  Outcome: Progressing     Problem: ABCDS Injury Assessment  Goal: Absence of physical injury  Outcome: Progressing

## 2022-07-31 NOTE — ED PROVIDER NOTES
[facility]  eMERGENCY dEPARTMENT eNCOUnter             Dm Hope 19 COMPLAINT    Chief Complaint   Patient presents with    Fatigue    Fall    Shortness of Breath       Nurses Notes reviewed and I agree except as noted in the HPI. HPI    Rupali Olmedo is a 80 y.o. female who presents with her daughter. For the last 5 days, the patient has had increasing dyspnea on exertion, extreme weakness, fatigue, generalized malaise. She is eating and drinking as normal.  Last night, she was walking back from the bathroom and collapsed on the floor next to her bed due to generalized weakness. She had no loss of consciousness. She thinks she fell onto the left side of her body, because she has a bruise on her left shoulder. She is not sure if she hit her head. Her head does not hurt. She does feel weak, lightheaded, and dizzy. Pain in her left shoulder is minimal.  She denies any pain in her back or hips or pelvis. No lower extremity injury. Current pain level is minimal.    She was seen by her cardiologist this last week, who put her on metolazone every other day. She has had about a 7 pound weight loss after starting that medication. Her \"dry weight \"is 153 pounds, weight this morning was 156 pounds. She feels that she is urinating fairly normal amounts. She is on long-term anticoagulant therapy with Eliquis, due to chronic atrial fibrillation. She denies any chest pains or palpitations. She is fully vaccinated and boosted for COVID-19 prevention. She has had no known recent contact with infected individuals. REVIEW OF SYSTEMS        Review of Systems   Constitutional:  Positive for malaise/fatigue and weight loss. Negative for diaphoresis and fever. HENT:  Negative for congestion and sore throat. Respiratory:  Positive for shortness of breath (With exertion only). Negative for cough and wheezing.     Cardiovascular:  Negative for chest pain, palpitations, leg swelling and PND. Gastrointestinal:  Negative for abdominal pain, blood in stool, diarrhea, nausea and vomiting. \"My bowel movements are black when I take that iron pill \". Last iron pill taken 2 days ago. Daughter states EGD about 4 years ago, Marisol Castaneda was bleeding somewhere \". Genitourinary:  Negative for dysuria. Musculoskeletal:  Positive for falls. Negative for back pain and neck pain. Neurological:  Positive for weakness (Generalized). Negative for focal weakness, loss of consciousness and headaches. Endo/Heme/Allergies:  Bruises/bleeds easily (On Eliquis). PAST MEDICAL HISTORY     has a past medical history of Atrial fibrillation ([facility] [address] Blood circulation, collateral, [employer] single pacemaker 4/26/2016, CAD (coronary artery disease), Cancer ([facility] [address] Carpal tunnel syndrome, Fall at home, Fracture dislocation of ankle, GERD (gastroesophageal reflux disease), Hyperlipidemia, Hypertension, Kidney lesion, native, right, Osteoarthritis, Osteopenia, Prolonged emergence from general anesthesia, Thyroid goiter, and Yersiniosis. SURGICAL HISTORY     has a past surgical history that includes Cholecystectomy (1986); Ankle fracture surgery (1739--7693); Hysterectomy (1971); Colon surgery (1954); Bladder surgery; eye surgery; Abdomen surgery; fracture surgery; Appendectomy; Colonoscopy; Carpal tunnel release (7/2013); joint replacement (1998); Carpal tunnel release (Right, 08/19/2017); Upper gastrointestinal endoscopy (Left, 11/28/2018); Upper gastrointestinal endoscopy (11/28/2018); Endoscopy, colon, diagnostic; Cardiac surgery; pacemaker placement; Percutaneous Transluminal Coronary Angio (01/15/2019); knee surgery (Left); and Cataract removal (Bilateral).     CURRENT MEDICATIONS    Previous Medications    ACETAMINOPHEN (TYLENOL) 500 MG TABLET    Take 1,000 mg by mouth every 6 hours as needed for Pain     ASPIRIN 81 MG CHEWABLE TABLET    Take 1 tablet by mouth daily BUMETANIDE (BUMEX) 1 MG TABLET    Take 1 tablet by mouth daily    CALCIUM PO    Take by mouth Calcium chew    CYANOCOBALAMIN (VITAMIN B 12 PO)    Take by mouth every other day    DOCUSATE SODIUM (COLACE) 100 MG CAPSULE    Take 100 mg by mouth daily as needed    FERROUS SULFATE (IRON 325) 325 (65 FE) MG TABLET    Take 325 mg by mouth once a week    MAGNESIUM (MAGNESIUM-OXIDE) 250 MG TABS TABLET    Take 250 mg by mouth 2 times daily    METOLAZONE (ZAROXOLYN) 5 MG TABLET    Take 1 tablet by mouth See Admin Instructions     METOPROLOL TARTRATE (LOPRESSOR) 25 MG TABLET    Take 1 tablet by mouth 2 times daily    MULTIPLE VITAMIN (MULTIVITAMIN ADULT PO)    Take by mouth daily    PANTOPRAZOLE (PROTONIX) 40 MG TABLET    Take 1 tablet by mouth 2 times daily    POTASSIUM CHLORIDE (KLOR-CON M) 20 MEQ EXTENDED RELEASE TABLET    Take 1 tablet by mouth once daily    PROBIOTIC PRODUCT (PROBIOTIC DAILY PO)    Take by mouth daily    RIVAROXABAN (XARELTO) 15 MG TABS TABLET    Take 1 tablet by mouth Daily with supper    SUCRALFATE (CARAFATE) 1 GM TABLET    TAKE 1 TABLET BY MOUTH  TWICE DAILY    VENLAFAXINE (EFFEXOR XR) 75 MG EXTENDED RELEASE CAPSULE    TAKE 1 CAPSULE BY MOUTH  DAILY       ALLERGIES    is allergic to methadone, gabapentin, lodine [etodolac], lyrica [pregabalin], and ultram [tramadol]. FAMILY HISTORY    She indicated that her mother is . She indicated that her father is . She indicated that both of her sisters are . She indicated that her brother is . She indicated that her son is alive. family history includes Heart Disease in her brother, father, mother, and son; High Blood Pressure in her brother, father, and mother. SOCIAL HISTORY     reports that she has never smoked. She has never used smokeless tobacco. She reports that she does not drink alcohol and does not use drugs.     PHYSICAL EXAM       INITIAL VITALS: /70   Pulse 70   Temp 97 °F (36.1 °C) (Temporal) Resp 16   Wt 156 lb 6.4 oz (70.9 kg)   LMP  (LMP Unknown)   SpO2 96%   BMI 29.07 kg/m²      Physical Exam  Vitals and nursing note reviewed. Constitutional:       General: She is not in acute distress. Appearance: She is not ill-appearing or diaphoretic. HENT:      Head: Atraumatic. Mouth/Throat:      Comments: Adequate oral moisture, no pharyngeal erythema  Eyes:      Pupils: Pupils are equal, round, and reactive to light. Neck:      Comments: Nontender, no pain with range of motion  Cardiovascular:      Rate and Rhythm: Normal rate and regular rhythm. Heart sounds: No murmur heard. Pulmonary:      Effort: Pulmonary effort is normal. No tachypnea or respiratory distress. Breath sounds: Normal breath sounds. Chest:      Chest wall: No tenderness. Abdominal:      General: Bowel sounds are normal.      Palpations: Abdomen is soft. There is no hepatomegaly, splenomegaly or mass. Tenderness: There is no abdominal tenderness. Genitourinary:     Comments: External hemorrhoidal tags, no acutely inflamed hemorrhoids, no bleeding noted. Smear of dark brown stool, sent for occult blood  Musculoskeletal:      Cervical back: Neck supple. Skin:     General: Skin is warm and dry. Coloration: Skin is pale. Comments: Poor turgor   Neurological:      General: No focal deficit present. Mental Status: She is alert and oriented to person, place, and time. Psychiatric:         Behavior: Behavior normal.              DIAGNOSTIC RESULTS    EKG     Ventricular paced rhythm, otherwise not interpretable. RADIOLOGY:      XR SHOULDER LEFT (MIN 2 VIEWS)   Final Result   No acute fracture or dislocation at the left shoulder. **This report has been created using voice recognition software. It may contain minor errors which are inherent in voice recognition technology. **      Final report electronically signed by Dr Karlo Mosley on 7/31/2022 11:03 AM      XR CHEST (2 VW)   Final Result   There is no acute intrathoracic process. **This report has been created using voice recognition software. It may contain minor errors which are inherent in voice recognition technology. **      Final report electronically signed by Dr Adele Mesa on 7/31/2022 11:02 AM      CT HEAD WO CONTRAST   Final Result      1. No acute intracranial hemorrhage, mass effect or midline shift. 2. Chronic senescent changes of the brain including generalized atrophy and chronic microvascular ischemic disease in the white matter. **This report has been created using voice recognition software. It may contain minor errors which are inherent in voice recognition technology. **      Final report electronically signed by Dr Adele Mesa on 7/31/2022 10:58 AM              LABS:     Labs Reviewed   CBC WITH AUTO DIFFERENTIAL - Abnormal; Notable for the following components:       Result Value    MCHC 32.6 (*)     All other components within normal limits   COMPREHENSIVE METABOLIC PANEL - Abnormal; Notable for the following components:    Glucose 161 (*)     BUN 31 (*)     Potassium 2.7 (*)     Chloride 93 (*)     CO2 37 (*)     Total Bilirubin 1.3 (*)     All other components within normal limits   GLOMERULAR FILTRATION RATE, ESTIMATED - Abnormal; Notable for the following components:    GFR, Estimated 49 (*)     All other components within normal limits   BLOOD OCCULT STOOL SCREEN #1   MAGNESIUM   TROPONIN   ANION GAP   BRAIN NATRIURETIC PEPTIDE   URINALYSIS WITH REFLEX TO CULTURE       Vitals:    Vitals:    07/31/22 0936 07/31/22 0939 07/31/22 0951 07/31/22 1016   BP: (!) 142/65  133/70    Pulse: 70  70    Resp: 18  14 16   Temp:  97 °F (36.1 °C)     TempSrc:  Temporal     SpO2: 96%  96% 96%   Weight: 156 lb 6.4 oz (70.9 kg)          EMERGENCY DEPARTMENT COURSE:    Orthostatic blood pressure and pulse measurements are not consistent with orthostatic hypotension.     Normal saline infusion

## 2022-07-31 NOTE — ED NOTES
Presents via wheelchair with daughter. History of falling from the edge of the bed during the night. Has had fatigue and shortness of breath since at least Tuesday. Her cardiologist but her on Metolazone 3 times a week this week. Weight has decreased this week as her doctor had planned. Feels short of breath with just walking to the bathroom. Patient is alert and cooperative. Skin warm and dry. Color normal for ethnicity.        Sherrie Pat RN  07/31/22 0220

## 2022-08-01 ENCOUNTER — TELEPHONE (OUTPATIENT)
Dept: FAMILY MEDICINE CLINIC | Age: 87
End: 2022-08-01

## 2022-08-01 LAB
ANION GAP SERPL CALCULATED.3IONS-SCNC: 14 MEQ/L (ref 8–16)
BACTERIA: ABNORMAL /HPF
BASE EXCESS MIXED: 7.2 MMOL/L (ref -2–3)
BASOPHILS # BLD: 0.9 %
BASOPHILS ABSOLUTE: 0.1 THOU/MM3 (ref 0–0.1)
BILIRUBIN URINE: NEGATIVE
BLOOD, URINE: NEGATIVE
BUN BLDV-MCNC: 25 MG/DL (ref 7–22)
CALCIUM SERPL-MCNC: 9.2 MG/DL (ref 8.5–10.5)
CASTS 2: ABNORMAL /LPF
CASTS UA: ABNORMAL /LPF
CHARACTER, URINE: CLEAR
CHLORIDE BLD-SCNC: 98 MEQ/L (ref 98–111)
CO2: 29 MEQ/L (ref 23–33)
COLLECTED BY:: ABNORMAL
COLOR: YELLOW
CREAT SERPL-MCNC: 0.8 MG/DL (ref 0.4–1.2)
CRYSTALS, UA: ABNORMAL
EOSINOPHIL # BLD: 4.9 %
EOSINOPHILS ABSOLUTE: 0.3 THOU/MM3 (ref 0–0.4)
EPITHELIAL CELLS, UA: ABNORMAL /HPF
ERYTHROCYTE [DISTWIDTH] IN BLOOD BY AUTOMATED COUNT: 13.5 % (ref 11.5–14.5)
ERYTHROCYTE [DISTWIDTH] IN BLOOD BY AUTOMATED COUNT: 48.4 FL (ref 35–45)
GFR SERPL CREATININE-BSD FRML MDRD: 67 ML/MIN/1.73M2
GLUCOSE BLD-MCNC: 120 MG/DL (ref 70–108)
GLUCOSE URINE: NEGATIVE MG/DL
HCO3, MIXED: 32 MMOL/L (ref 23–28)
HCT VFR BLD CALC: 37.5 % (ref 37–47)
HEMOGLOBIN: 11.9 GM/DL (ref 12–16)
IMMATURE GRANS (ABS): 0.03 THOU/MM3 (ref 0–0.07)
IMMATURE GRANULOCYTES: 0.4 %
KETONES, URINE: NEGATIVE
LEUKOCYTE ESTERASE, URINE: ABNORMAL
LV EF: 58 %
LVEF MODALITY: NORMAL
LYMPHOCYTES # BLD: 29.8 %
LYMPHOCYTES ABSOLUTE: 2 THOU/MM3 (ref 1–4.8)
MAGNESIUM: 2 MG/DL (ref 1.6–2.4)
MCH RBC QN AUTO: 31.1 PG (ref 26–33)
MCHC RBC AUTO-ENTMCNC: 31.7 GM/DL (ref 32.2–35.5)
MCV RBC AUTO: 97.9 FL (ref 81–99)
MISCELLANEOUS 2: ABNORMAL
MONOCYTES # BLD: 12.2 %
MONOCYTES ABSOLUTE: 0.8 THOU/MM3 (ref 0.4–1.3)
NITRITE, URINE: NEGATIVE
NUCLEATED RED BLOOD CELLS: 0 /100 WBC
O2 SAT, MIXED: 57 %
PCO2, MIXED VENOUS: 47 MMHG (ref 41–51)
PH UA: 7.5 (ref 5–9)
PH, MIXED: 7.45 (ref 7.31–7.41)
PLATELET # BLD: 183 THOU/MM3 (ref 130–400)
PMV BLD AUTO: 10.6 FL (ref 9.4–12.4)
PO2 MIXED: 29 MMHG (ref 25–40)
POTASSIUM REFLEX MAGNESIUM: 3 MEQ/L (ref 3.5–5.2)
POTASSIUM SERPL-SCNC: 3.9 MEQ/L (ref 3.5–5.2)
PROTEIN UA: NEGATIVE
RBC # BLD: 3.83 MILL/MM3 (ref 4.2–5.4)
RBC URINE: ABNORMAL /HPF
RENAL EPITHELIAL, UA: ABNORMAL
SEG NEUTROPHILS: 51.8 %
SEGMENTED NEUTROPHILS ABSOLUTE COUNT: 3.5 THOU/MM3 (ref 1.8–7.7)
SODIUM BLD-SCNC: 141 MEQ/L (ref 135–145)
SPECIFIC GRAVITY, URINE: 1.01 (ref 1–1.03)
UROBILINOGEN, URINE: 0.2 EU/DL (ref 0–1)
WBC # BLD: 6.8 THOU/MM3 (ref 4.8–10.8)
WBC UA: ABNORMAL /HPF
YEAST: ABNORMAL

## 2022-08-01 PROCEDURE — 36415 COLL VENOUS BLD VENIPUNCTURE: CPT

## 2022-08-01 PROCEDURE — 2580000003 HC RX 258

## 2022-08-01 PROCEDURE — 6360000002 HC RX W HCPCS

## 2022-08-01 PROCEDURE — 85025 COMPLETE CBC W/AUTO DIFF WBC: CPT

## 2022-08-01 PROCEDURE — 6370000000 HC RX 637 (ALT 250 FOR IP)

## 2022-08-01 PROCEDURE — 80048 BASIC METABOLIC PNL TOTAL CA: CPT

## 2022-08-01 PROCEDURE — 99232 SBSQ HOSP IP/OBS MODERATE 35: CPT

## 2022-08-01 PROCEDURE — 84132 ASSAY OF SERUM POTASSIUM: CPT

## 2022-08-01 PROCEDURE — 83735 ASSAY OF MAGNESIUM: CPT

## 2022-08-01 PROCEDURE — 82803 BLOOD GASES ANY COMBINATION: CPT

## 2022-08-01 PROCEDURE — 97116 GAIT TRAINING THERAPY: CPT

## 2022-08-01 PROCEDURE — 99223 1ST HOSP IP/OBS HIGH 75: CPT | Performed by: INTERNAL MEDICINE

## 2022-08-01 PROCEDURE — 81001 URINALYSIS AUTO W/SCOPE: CPT

## 2022-08-01 PROCEDURE — 93306 TTE W/DOPPLER COMPLETE: CPT

## 2022-08-01 PROCEDURE — 97166 OT EVAL MOD COMPLEX 45 MIN: CPT

## 2022-08-01 PROCEDURE — 1200000003 HC TELEMETRY R&B

## 2022-08-01 PROCEDURE — 97535 SELF CARE MNGMENT TRAINING: CPT

## 2022-08-01 PROCEDURE — 97162 PT EVAL MOD COMPLEX 30 MIN: CPT

## 2022-08-01 RX ORDER — BUMETANIDE 1 MG/1
1 TABLET ORAL DAILY
Status: DISCONTINUED | OUTPATIENT
Start: 2022-08-02 | End: 2022-08-02 | Stop reason: HOSPADM

## 2022-08-01 RX ADMIN — SODIUM CHLORIDE, PRESERVATIVE FREE 10 ML: 5 INJECTION INTRAVENOUS at 08:19

## 2022-08-01 RX ADMIN — RIVAROXABAN 15 MG: 15 TABLET, FILM COATED ORAL at 17:44

## 2022-08-01 RX ADMIN — SUCRALFATE 1 G: 1 TABLET ORAL at 08:19

## 2022-08-01 RX ADMIN — POTASSIUM CHLORIDE 10 MEQ: 7.46 INJECTION, SOLUTION INTRAVENOUS at 08:12

## 2022-08-01 RX ADMIN — Medication 250 MG: at 08:18

## 2022-08-01 RX ADMIN — POTASSIUM CHLORIDE 10 MEQ: 7.46 INJECTION, SOLUTION INTRAVENOUS at 10:31

## 2022-08-01 RX ADMIN — SUCRALFATE 1 G: 1 TABLET ORAL at 20:21

## 2022-08-01 RX ADMIN — PANTOPRAZOLE SODIUM 40 MG: 40 TABLET, DELAYED RELEASE ORAL at 08:19

## 2022-08-01 RX ADMIN — VENLAFAXINE HYDROCHLORIDE 75 MG: 75 CAPSULE, EXTENDED RELEASE ORAL at 08:19

## 2022-08-01 RX ADMIN — Medication 1 CAPSULE: at 08:18

## 2022-08-01 RX ADMIN — Medication 250 MG: at 21:10

## 2022-08-01 RX ADMIN — PANTOPRAZOLE SODIUM 40 MG: 40 TABLET, DELAYED RELEASE ORAL at 20:21

## 2022-08-01 RX ADMIN — Medication 1 TABLET: at 08:19

## 2022-08-01 RX ADMIN — POTASSIUM CHLORIDE 10 MEQ: 7.46 INJECTION, SOLUTION INTRAVENOUS at 10:23

## 2022-08-01 RX ADMIN — POTASSIUM CHLORIDE 10 MEQ: 7.46 INJECTION, SOLUTION INTRAVENOUS at 14:09

## 2022-08-01 RX ADMIN — SODIUM CHLORIDE, PRESERVATIVE FREE 10 ML: 5 INJECTION INTRAVENOUS at 20:21

## 2022-08-01 RX ADMIN — SODIUM CHLORIDE: 9 INJECTION, SOLUTION INTRAVENOUS at 03:26

## 2022-08-01 RX ADMIN — METOPROLOL TARTRATE 25 MG: 25 TABLET, FILM COATED ORAL at 08:18

## 2022-08-01 RX ADMIN — POTASSIUM CHLORIDE 20 MEQ: 1500 TABLET, EXTENDED RELEASE ORAL at 08:19

## 2022-08-01 RX ADMIN — POTASSIUM CHLORIDE 10 MEQ: 7.46 INJECTION, SOLUTION INTRAVENOUS at 15:10

## 2022-08-01 NOTE — FLOWSHEET NOTE
08/01/22 1135   Encounter Summary   Encounter Overview/Reason  Initial Encounter   Service Provided For: Patient and family together   Referral/Consult From: [address] Family members   Last Encounter  08/01/22   Complexity of Encounter Moderate   Begin Time 1125   End Time  1135   Total Time Calculated 10 min   Encounter    Type Initial Screen/Assessment   Spiritual/Emotional needs   Type Spiritual Support   Assessment/Intervention/Outcome   Assessment Calm   Intervention Nurtured Hope   Outcome Coping; Acceptance   Assessment: In my encounter with the 80 yr old patient the pt's family was supportively present. While rounding the unit 8B, I provided spiritual care to patient and their family through conversation, I also came to assess their spiritual needs present. The pt was admitted due to weakness. Interventions:  I provided, prayer, emotional support and words of comfort.  provided a listening presence and encouraged pt to share their beliefs and how they support him during their hospitalization. Outcomes: The patient was encouraged and didn't share any further spiritual needs at this time. The pt remains optimistic and hopeful. The pt shared that they were appreciative for the support. Plan:  Chaplains will follow-up at a later time for assessment of any spiritual care needs present.

## 2022-08-01 NOTE — PROGRESS NOTES
[address] [place] [address]  INPATIENT PHYSICAL THERAPY  EVALUATION  [facility] MED SURG 8B - 8B-21/021-A    Time In: 7999  Time Out: 4582  Timed Code Treatment Minutes: 18 Minutes  Minutes: 26          Date: 2022  Patient Name: oTni St,  Gender:  female        MRN: [id number]  : 1928  (80 y.o.)      Referring Practitioner: NOELLE Mendoza CNP  Diagnosis: dehydration  Additional Pertinent Hx: per EMR \"80 y.o. female with a past medical history of atrial fibrillation, on Xarelto, CAD, HTN, HLD, chronic diastolic heart failure, DIMITRIS, GERD, CKD stage II who presented to [address] [place] [address] as a direct admit from [facility] ambulatory care center for increased weakness, dyspnea on exertion, fall. Patient reports that she followed up with her cardiologist Dr. Sourav James in office about a week and a half ago due to having increased weight gain and weighing approximately 162 pounds. She was started on metolazone  in addition to continuing her Bumex at home to help her get back to her dry weight of 153 pounds. Patient reports that for the past week she has had increasing symptoms of dyspnea on exertion, extreme weakness, fatigue and decreased activity intolerance. She states that she is not able to walk distances like she used to, and gets very fatigued and short of breath and has to take many breaks in between. States ambulating from her bed to the bathroom causes her extreme fatigue and shortness of breath. States she gets very wheezy when exerting herself, and feels lightheaded at times. Reports she has difficulty getting herself out of her chair because of how weak she is. She reports earlier this morning she tried to walk her self to the bathroom. States as she was walking back from the bathroom she was able to sit on the edge of her bed when she began to feel dizzy and faint, and slid off the edge of the bed, hitting her head and left shoulder on the ground.   She states that she did not lose consciousness after hitting her head. She states she did not blackout or lose consciousness while sliding off the bed. Denies neck pain, back pain, left shoulder pain at this time. She denies chest pain, palpitations, orthopnea, PND, shortness of breath at rest, leg swelling, headache, blurred vision, double vision, numbness or tingling in her extremities. She denies body aches, abdominal pain, nausea, vomiting, diarrhea, fever, chills, exposures to ill persons. Does report that she has a chronic cough that has not been productive. States that she is eating and drinking well. States that she has recently been having black stools but states \"they are always black because I take iron. \" \"     Restrictions/Precautions:  Restrictions/Precautions: Fall Risk, General Precautions  Position Activity Restriction  Other position/activity restrictions: pacemaker    Subjective:  Chart Reviewed: Yes  Patient assessed for rehabilitation services?: Yes  Family / Caregiver Present: Yes (daughter)  Subjective: Heddy Devoid to see pt per nursing. Pt in bedside chair when PT arrived, agreeable to PT session. Pt requesting to use restroom and then get back in bed following session.     General:  Overall Orientation Status: Within Normal Limits  Orientation Level: Oriented X4  Vision: Impaired  Vision Exceptions: Wears glasses at all times  Hearing: Exceptions to [facility]  Hearing Exceptions: Bilateral hearing aid       Pain: R knee pain with transitions, did not quantify    Vitals: Vitals not assessed per clinical judgement, see nursing flowsheet    Social/Functional History:    Lives With: Alone  Type of Home: Apartment (duplex)  Home Layout: One level  Home Access: Stairs to enter with rails  Entrance Stairs - Number of Steps: 1+ 1 NALINI, uses walker and rail  Entrance Stairs - Rails: Right  Home Equipment: Walker, rolling, Rollator, Cane     Bathroom Shower/Tub: Walk-in shower  Bathroom Toilet: Handicap very slow gait speed during session    Functional Outcome Measures: Completed  AM-PAC Inpatient Mobility Raw Score : 18  AM-PAC Inpatient T-Scale Score : 43.63    ASSESSMENT:  Activity Tolerance:  Patient tolerance of  treatment: fair. Weakness and fatigue reported, decreased endurance      Treatment Initiated: Treatment and education initiated within context of evaluation. Evaluation time included review of current medical information, gathering information related to past medical, social and functional history, completion of standardized testing, formal and informal observation of tasks, assessment of data and development of plan of care and goals. Treatment time included skilled education and facilitation of tasks to increase safety and independence with functional mobility for improved independence and quality of life. Assessment: Body Structures, Functions, Activity Limitations Requiring Skilled Therapeutic Intervention: Decreased functional mobility , Decreased endurance, Decreased balance, Decreased strength, Decreased posture, Increased pain  Assessment: Pt presents with the deficits above, cont to require skilled PT services to increase IND with functional tasks, progress with strength and endurance to increase ease with functional tasks. Pt safe to return home with daughter to assist as needed. Therapy Prognosis: Good    Requires PT Follow-Up: Yes    Discharge Recommendations:  Discharge Recommendations: Continue to assess pending progress, Home with assist PRN, Home with Home health PT    Patient Education:      .     Patient Education  Education Given To: Patient, Family  Education Provided: Role of Therapy, Plan of Care  Education Method: Verbal  Education Outcome: Verbalized understanding, Continued education needed       Equipment Recommendations:  Equipment Needed: No    Plan:  Current Treatment Recommendations: Strengthening, Functional mobility training, Gait training, Stair training, Balance training, Endurance training, Transfer training, Neuromuscular re-education, Therapeutic activities, Patient/Caregiver education & training, Safety education & training, Home exercise program, Equipment evaluation, education, & procurement  Plan:  (5x GM)    Goals:  Patient goals : \" go home\"  Short Term Goals  Time Frame for Short term goals: by discharge  Short term goal 1: Pt will demo S for gait for >80 feet with use of RW for support to progress with mobility. Short term goal 2: Pt will demo S for transfers with RW for support and good safety to progress with mobility. Short term goal 3: Pt will demo IND with bed mobility tasks with bed flat to progress towards PLOF. Short term goal 4: Pt will demo SBA for stair negotiation for NALINI the home with rail/RW for support as needed to progress with mobility. Long Term Goals  Time Frame for Long term goals : NA due to short ELOS    Following session, patient left in safe position with all fall risk precautions in place. In bed following session, all needs in reach, alarm on.

## 2022-08-01 NOTE — CONSULTS
[facility]    Patient's Name/Date of Birth: Giovanna Ends / 4/4/1928 (79 y.o.)    Date: August 1, 2022     Referring Provider: Serafina Lundborg, APRN - *    CHIEF COMPLAINT:  Syncope/fall      HPI: This is a pleasant 80 y.o. female presents with Afib on Xarelto, HTN, HLD, hx of CAD s/p PCI. chronic diastolic CHF, GERD, CKD-stage 2 admitted after fall. Daughter provides a lot of history. Patient saw primary cardiologist due to weight gain, and sob. Was given Metolazone with continued Bumex. She apparently dropped 6 lbs quickly, and was supposed to try to get to her dry weight of 153 lbs. Significant fatigue and lightheadedness. Daughter states she was having difficulty ambulating. K 2.7. Cr 1.1, BUN 31, CL 93. She has no f/c/ns. She has had a GI bleed in the past but  nothing significant, still on [address] [place] [address] Road. No chest pain, angina, VACA, orthopnea, PND, sob at rest, palpitations, LE edema, or syncope. She has felt much better since being admitted. Trop negative. Hgb 11.9. ECG is V-paced. Gi consulted due to positive FOBT. Echo: Results for orders placed during the hospital encounter of 01/25/21    ECHO Complete 2D W Doppler W Color    Narrative  Transthoracic Echocardiography Report (TTE)    Demographics    Patient Name   Shanthi Lara Gender              Female  L    MR #           [id number]        Race                    Ethnicity    Account #      [de-identified]        Room Number         0021    Accession      [id number]       Date of Study       01/26/2021  Number    Date of Birth  04/04/1928       Referring Physician Eugene Choi MD    Age            80 year(s)       Tahmina Russo,  [facility]    Interpreting        Lisa Esparza MD  Physician    Procedure    Type of Study    TTE procedure:ECHOCARDIOGRAM COMPLETE 2D W DOPPLER W COLOR.     Procedure Date  Date: 01/26/2021 Start: 02:34 PM    Study Location: Bedside  Technical Quality: Adequate visualization    Indications:Fatigue . Additional Medical History:Hypertension, hyperlipidemia, sick sinus  syndrome, pacemaker, anemia, GERD, history of colon cancer    Patient Status: Routine    Height: 62.2 inches Weight: 169.77 pounds BSA: 1.79 m^2 BMI: 30.85 kg/m^2    BP: 127/59 mmHg    Allergies  - Other allergy:(Methadone, Gabapentin, lyrica, ultram). - See Epic. - Other allergy:(methadone, gabapentin, lyrica). Conclusions    Summary  Left ventricular size and systolic function is normal. Ejection fraction  was estimated at 55-60%. LV wall thickness is within normal limits. Normal right ventricular size and function. Mild aortic regurgitation is noted. Mildly dilated left atrium. Mild mitral regurgitation is present. Asc aorta 4.2 cm    Signature    ----------------------------------------------------------------  Electronically signed by Cooper Schmidt MD (Interpreting  physician) on 01/26/2021 at 04:41 PM  ----------------------------------------------------------------    Findings    Mitral Valve  Mild calcification of the posterior leaflet of the mitral valve. Mild mitral regurgitation is present. Aortic Valve  The aortic valve appears to be trileaflet with good leaflet separation. Aortic valve leaflets are mildly calcified. Mild aortic regurgitation is noted. Tricuspid Valve  Tricuspid valve is structurally normal.  Mild tricuspid regurgitation. Pulmonic Valve  The pulmonic valve was not well visualized . Left Atrium  Mildly dilated left atrium. Left Ventricle  Left ventricular size and systolic function is normal. Ejection fraction  was estimated at 55-60%. LV wall thickness is within normal limits. Right Atrium  The right atrium is of normal size. Right Ventricle  Normal right ventricular size and function. Pericardial Effusion  No evidence of any pericardial effusion.     Pleural Effusion  No evidence of pleural effusion. Aorta / Great Vessels  Asc aorta 4.2 cm  IVC is normal in size with normal respiratory phasic changes    M-Mode/2D Measurements & Calculations    LV Diastolic    LV Systolic Dimension: 3  AV Cusp Separation: 2 cmLA  Dimension: 4.2  cm                        Dimension: 3.3 cmAO Root  cm              LV Volume Diastolic: 52.9 Dimension: 3.5 cmLA Area: 19.4  LV FS:28.6 %    ml                        cm^2  LV PW           LV Volume Systolic: 35 ml  Diastolic: 1.1  LV EDV/LV EDV Index: 78.6  cm              ml/44 m^2LV ESV/LV ESV  Septum          Index: 35 ml/20 m^2       RV Diastolic Dimension: 2.6 cm  Diastolic: 1.4  EF Calculated: 55.5 %  cm                                        LA/Aorta: 0.94  Ascending Aorta: 4.2 cm  LA volume/Index: 52.5 ml /29m^2    Doppler Measurements & Calculations    MV Peak E-Wave: 134  AV Peak Velocity: 151 LVOT Peak Velocity: 122 cm/s  cm/s                 cm/s                  LVOT Mean Velocity: 87.9 cm/s  MV Peak A-Wave: 75.5 AV Peak Gradient:     LVOT Peak Gradient: 6 mmHgLVOT  cm/s                 9.12 mmHg             Mean Gradient: 3 mmHg  MV E/A Ratio: 1.77   AV Mean Velocity: 123  MV Peak Gradient:    cm/s  7.18 mmHg            AV Mean Gradient: 6  mmHg                  TR Velocity:221 cm/s  MV Deceleration      AV VTI: 27 cm         TR Gradient:19.54 mmHg  Time: 121 msec                             PV Peak Velocity: 91.3 cm/s  MV P1/2t: 36 msec                          PV Peak Gradient: 3.33 mmHg  MVA by PHT:6.11 cm^2 LVOT VTI: 20.5 cm  AV P1/2t: 781 msec  MV E' Septal         IVRT: 58 msec  Velocity: 6.6 cm/s  MV A' Septal  Velocity: 5.7 cm/s   AV DVI (VTI): 0.76AV  MV E' Lateral        DVI (Vmax):0.81  Velocity: 7.9 cm/s  MV A' Lateral  Velocity: 7.4 cm/s  E/E' septal: 20.3  E/E' lateral: 16.96  MR Velocity: 504  cm/s    [email or web address]       All labs, EKG's, diagnostic testing and images as well as cardiac cath, stress testing were reviewed during this encounter    Past Medical History:   Diagnosis Date    Atrial fibrillation ([address]     Blood circulation, collateral     [employer] single pacemaker 4/26/2016 05/05/2016    CAD (coronary artery disease)     cath and stent in right artery    Cancer [facility]) 1954    HX  Colon     Carpal tunnel syndrome     Fall at home     12/7/21 -large bruise to R buttock    Fracture dislocation of ankle 1980    GERD (gastroesophageal reflux disease)     Hyperlipidemia     Hypertension     Kidney lesion, native, right     found on 5/2016    Osteoarthritis     knees    Osteopenia     Prolonged emergence from general anesthesia     Thyroid goiter 09/06/2016    Yersiniosis 2016     Past Surgical History:   Procedure Laterality Date    ABDOMEN SURGERY      ANKLE FRACTURE SURGERY  7843--2145    reconstruction in 2003 and 2007    APPENDECTOMY      BLADDER SURGERY      support bladder repair    CARDIAC SURGERY      heart stent 12-11-18, [place]    CARPAL TUNNEL RELEASE  7/2013    CARPAL TUNNEL RELEASE Right 08/19/2017    Revision    CATARACT REMOVAL Bilateral     CHOLECYSTECTOMY  1986    COLON SURGERY  1954    COLONOSCOPY      ENDOSCOPY, COLON, DIAGNOSTIC      EYE SURGERY      cataract     FRACTURE SURGERY      HYSTERECTOMY (CERVIX STATUS UNKNOWN)  [address]    L knee    KNEE SURGERY Left     total replacement    PACEMAKER PLACEMENT      PTCA  01/15/2019    Successful PCI / Drug Eluting Stent of the proximal Left Anterior Descending Coronary Artery.     UPPER GASTROINTESTINAL ENDOSCOPY Left 11/28/2018    EGD BIOPSY performed by Ace Bearden MD at [facility] ENDOSCOPY  11/28/2018    EGD CONTROL HEMORRHAGE performed by Ace Bearden MD at [facility] Endoscopy     Current Facility-Administered Medications   Medication Dose Route Frequency Provider Last Rate Last Admin    [Held by provider] aspirin chewable tablet 81 mg  81 mg Oral Daily [facility], APRN - CNP   81 mg at 07/31/22 1914    [Held by provider] bumetanide (BUMEX) tablet 1 mg  1 mg Oral Daily [facility], APRN - CNP   1 mg at 07/31/22 8767    docusate sodium (COLACE) capsule 100 mg  100 mg Oral Daily PRN [facility], APRN - CNP        [Held by provider] ferrous sulfate (IRON 325) tablet 325 mg  325 mg Oral Weekly [facility], APRN - CNP        magnesium oxide (MAG-OX) tablet 250 mg  250 mg Oral BID [facility], APRN - CNP   250 mg at 08/01/22 0818    [Held by provider] metOLazone (ZAROXOLYN) tablet 5 mg  5 mg Oral See Admin Instructions [facility], APRN - CNP        metoprolol tartrate (LOPRESSOR) tablet 25 mg  25 mg Oral BID [facility], APRN - CNP   25 mg at 08/01/22 0818    multivitamin 1 tablet  1 tablet Oral Daily [facility], APRN - CNP   1 tablet at 08/01/22 3380    pantoprazole (PROTONIX) tablet 40 mg  40 mg Oral BID [facility], APRN - CNP   40 mg at 08/01/22 1698    potassium chloride (KLOR-CON M) extended release tablet 20 mEq  20 mEq Oral Daily with breakfast [facility], APRN - CNP   20 mEq at 08/01/22 0819    lactobacillus (CULTURELLE) capsule 1 capsule  1 capsule Oral Daily [facility], APRN - CNP   1 capsule at 08/01/22 0818    sucralfate (CARAFATE) tablet 1 g  1 g Oral 2 times per day [facility], APRN - CNP   1 g at 08/01/22 0819    venlafaxine (EFFEXOR XR) extended release capsule 75 mg  75 mg Oral Daily [facility], APRN - CNP   75 mg at 08/01/22 0819    sodium chloride flush 0.9 % injection 5-40 mL  5-40 mL IntraVENous 2 times per day [facility], APRN - CNP   10 mL at 08/01/22 0819    sodium chloride flush 0.9 % injection 5-40 mL  5-40 mL IntraVENous PRN [place] Bony, APRN - CNP        0.9 % sodium chloride infusion   IntraVENous PRN [place] Bony, APRN - CNP        ondansetron (ZOFRAN-ODT) disintegrating tablet 4 mg  4 mg Oral Q8H PRN Izabel Johnson [facility], APRN - CNP        Or    ondansetron (ZOFRAN) injection 4 mg  4 mg IntraVENous Q6H PRN Marilia Semen, APRN - CNP        acetaminophen (TYLENOL) tablet 650 mg  650 mg Oral Q6H PRN Marilia Semen, APRN - CNP        Or    acetaminophen (TYLENOL) suppository 650 mg  650 mg Rectal Q6H PRN Marilia Semen, APRN - CNP        perflutren lipid microspheres (DEFINITY) injection 1.65 mg  1.5 mL IntraVENous ONCE PRN Marilia Semen, APRN - CNP        potassium chloride (KLOR-CON M) extended release tablet 40 mEq  40 mEq Oral PRN Marilia Semen, APRN - CNP        Or    potassium bicarb-citric acid (EFFER-K) effervescent tablet 40 mEq  40 mEq Oral PRN Marilia Semen, APRN - CNP        Or    potassium chloride 10 mEq/100 mL IVPB (Peripheral Line)  10 mEq IntraVENous PRN Marilia Semen, APRN -  mL/hr at 08/01/22 0812 10 mEq at 08/01/22 4229    magnesium sulfate 2000 mg in 50 mL IVPB premix  2,000 mg IntraVENous PRN Marilia Semen, APRN - CNP        bisacodyl (DULCOLAX) EC tablet 5 mg  5 mg Oral Daily PRN Marilia Semen, APRN - CNP        lidocaine 4 % external patch 1 patch  1 patch TransDERmal Q24H Marilia Semen, APRN - CNP   1 patch at 07/31/22 1915    [Held by provider] rivaroxaban (XARELTO) tablet 15 mg  15 mg Oral Daily Marilia Semen, APRN - CNP        albuterol (PROVENTIL) nebulizer solution 2.5 mg  2.5 mg Nebulization Q6H PRN Marilia Semen, APRN - CNP        0.9 % sodium chloride infusion   IntraVENous Continuous Marilia Semen, APRN - CNP 50 mL/hr at 08/01/22 2898 Rate Verify at 08/01/22 0620     Prior to Admission medications    Medication Sig Start Date End Date Taking?  Authorizing Provider   Multiple Vitamin (MULTIVITAMIN ADULT PO) Take by mouth daily    Historical Provider, MD   metOLazone (ZAROXOLYN) 5 MG tablet Take 1 tablet by mouth See Admin Instructions Mon Wed Fri 7/20/22   Jarad Hussein MD   venlafaxine (EFFEXOR XR) 75 MG extended release capsule TAKE 1 CAPSULE BY MOUTH  DAILY 7/18/22   Pk Mejía MD   rivaroxaban (XARELTO) 15 MG TABS tablet Take 1 tablet by mouth Daily with supper 5/11/22   Pk Mejía MD   bumetanide (BUMEX) 1 MG tablet Take 1 tablet by mouth daily 4/27/22   Shanelle Nieves MD   potassium chloride (KLOR-CON M) 20 MEQ extended release tablet Take 1 tablet by mouth once daily 4/26/22   Pk Mejía MD   sucralfate (CARAFATE) 1 GM tablet TAKE 1 TABLET BY MOUTH  TWICE DAILY 4/14/22   Pk Mejía MD   metoprolol tartrate (LOPRESSOR) 25 MG tablet Take 1 tablet by mouth 2 times daily 1/26/22   Pk Mejía MD   CALCIUM PO Take by mouth Calcium chew    Historical Provider, MD   magnesium (MAGNESIUM-OXIDE) 250 MG TABS tablet Take 250 mg by mouth 2 times daily    Historical Provider, MD   ferrous sulfate (IRON 325) 325 (65 Fe) MG tablet Take 325 mg by mouth once a week    Historical Provider, MD   docusate sodium (COLACE) 100 MG capsule Take 100 mg by mouth daily as needed    Historical Provider, MD   Cyanocobalamin (VITAMIN B 12 PO) Take by mouth every other day    Historical Provider, MD   pantoprazole (PROTONIX) 40 MG tablet Take 1 tablet by mouth 2 times daily 10/11/21   Pk Mejía MD   Probiotic Product (PROBIOTIC DAILY PO) Take by mouth daily    Historical Provider, MD   aspirin 81 MG chewable tablet Take 1 tablet by mouth daily 3/31/16   [employer], APRN - CNP   acetaminophen (TYLENOL) 500 MG tablet Take 1,000 mg by mouth every 6 hours as needed for Pain     Historical Provider, MD   Scheduled Meds:   [Held by provider] aspirin  81 mg Oral Daily    [Held by provider] bumetanide  1 mg Oral Daily    [Held by provider] ferrous sulfate  325 mg Oral Weekly    magnesium oxide  250 mg Oral BID    [Held by provider] metOLazone  5 mg Oral See Admin Instructions    metoprolol tartrate  25 mg Oral BID    multivitamin  1 tablet Oral Daily    pantoprazole  40 mg Oral BID    potassium chloride  20 mEq Oral Daily with breakfast    lactobacillus  1 capsule Oral Daily    sucralfate  1 g Oral 2 times per day    venlafaxine  75 mg Oral Daily    sodium chloride flush  5-40 mL IntraVENous 2 times per day    lidocaine  1 patch TransDERmal Q24H    [Held by provider] rivaroxaban  15 mg Oral Daily     Continuous Infusions:   sodium chloride      sodium chloride 50 mL/hr at 08/01/22 0620     PRN Meds:.docusate sodium, sodium chloride flush, sodium chloride, ondansetron **OR** ondansetron, acetaminophen **OR** acetaminophen, perflutren lipid microspheres, potassium chloride **OR** potassium alternative oral replacement **OR** potassium chloride, magnesium sulfate, bisacodyl, albuterol    Allergies   Allergen Reactions    Methadone Shortness Of Breath     Shakes and nausea    Gabapentin     Lodine [Etodolac] Other (See Comments)     \"spacey, hot flashes, shaky\"    Lyrica [Pregabalin]     Ultram [Tramadol]      Nausea, pedal edema, SOB     Family History   Problem Relation Age of Onset    Heart Disease Mother     High Blood Pressure Mother     Heart Disease Father     High Blood Pressure Father     Heart Disease Brother     High Blood Pressure Brother     Heart Disease Son      Social History     Socioeconomic History    Marital status:       Spouse name: Jayde Ordaz    Number of children: 2    Years of education: Not on file    Highest education level: Not on file   Occupational History    Not on file   Tobacco Use    Smoking status: Never    Smokeless tobacco: Never   Vaping Use    Vaping Use: Never used   Substance and Sexual Activity    Alcohol use: No    Drug use: No    Sexual activity: Not on file   Other Topics Concern    Not on file   Social History Narrative    Not on file     Social Determinants of Health     Financial Resource Strain: Low Risk     Difficulty of Paying Living Expenses: Not hard at all   Food Insecurity: No Food Insecurity    Worried About Running Out of Food in the Last Year: Never true    [address] in the Last Year: Never true   Transportation Needs: Not on file   Physical Activity: Not on file   Stress: Not on file   Social Connections: Not on file   Intimate Partner Violence: Not on file   Housing Stability: Not on file     ROS:   Constitutional: Denies any recent wt change. Eyes:  Denies any blurring or double vision, no glaucoma  Ears/Nose/Mouth/Throat:  Denies any chronic sinus/rhinitis, bleeding gums  Cardiovascular:  As described above. Respiratory:  Denies any frequent cough, wheezing or coughing up blood  Genitourinary:  Denies difficulty with urination and kidney stones  Gastrointestinal:  Denies any chronic problems with abdominal pain, nausea, vomiting or diarrhea  Musculoskeletal:  Denies any joint pain, back pain, or difficulty walking  Integumentary:  Denies any rash  Neurological:  No numbness or tingling  Endocrine:  Denies any polydipsia. Hematologic/Lymphatic:  Denies any hemorrhage or lymphatic drainage problems. Labs:  CBC:   Recent Labs     07/31/22  1016 07/31/22  1704 08/01/22  0553   WBC 8.8 10.7 6.8   HGB 13.3 12.9 11.9*   HCT 40.8 39.6 37.5   MCV 93.9 95.9 97.9    222 183     BMP:   Recent Labs     07/31/22  1016 07/31/22  1704 08/01/22  0553    137 141   K 2.7* 3.6 3.0*   CL 93* 93* 98   CO2 37* 29 29   PHOS  --  3.2  --    BUN 31* 27* 25*   CREATININE 1.1 0.7 0.8   MG 2.0  --  2.0     Accucheck Glucoses: No results for input(s): POCGLU in the last 72 hours. Cardiac Enzymes: No results for input(s): CKTOTAL, CKMB, CKMBINDEX, TROPONINI in the last 72 hours.   PT/INR:   Recent Labs     07/31/22 2023   INR 1.17*     APTT:   Recent Labs     07/31/22 2023   APTT 34.8     Liver Profile:  Lab Results   Component Value Date/Time    AST 24 07/31/2022 10:16 AM    ALT 14 07/31/2022 10:16 AM    BILIDIR <0.2 01/19/2021 07:05 AM    BILITOT 1.3 07/31/2022 10:16 AM    ALKPHOS 73 07/31/2022 10:16 AM     Lab Results   Component Value Date/Time    CHOL 167 05/03/2022 08:53 AM    HDL 48 05/03/2022 08:53 AM    TRIG 104 05/03/2022 08:53 AM     TSH:   Lab Results   Component Value Date/Time    TSH 2.000 07/31/2022 05:04 PM     UA:   Lab Results   Component Value Date/Time    NITRITE trace 04/08/2016 09:42 AM    COLORU YELLOW 08/01/2022 12:01 AM    PHUR 7.5 08/01/2022 12:01 AM    LABCAST NONE SEEN 03/31/2016 10:15 AM    LABCAST NONE SEEN 03/31/2016 10:15 AM    WBCUA 2-4 08/01/2022 12:01 AM    RBCUA 0-2 08/01/2022 12:01 AM    MUCUS THREADS 10/01/2021 04:35 PM    YEAST NONE SEEN 08/01/2022 12:01 AM    BACTERIA NONE SEEN 08/01/2022 12:01 AM    CLARITYU cloudy 04/08/2016 09:42 AM    SPECGRAV 1.015 10/01/2021 04:35 PM    LEUKOCYTESUR TRACE 08/01/2022 12:01 AM    UROBILINOGEN 0.2 08/01/2022 12:01 AM    BILIRUBINUR NEGATIVE 08/01/2022 12:01 AM    BILIRUBINUR small 04/08/2016 09:42 AM    BLOODU NEGATIVE 08/01/2022 12:01 AM    GLUCOSEU NEGATIVE 08/01/2022 12:01 AM    AMORPHOUS NONE SEEN 10/01/2021 04:35 PM         Physical Exam:  Vitals:    08/01/22 0818   BP: (!) 117/46   Pulse: 70   Resp:    Temp:    SpO2:       Intake/Output Summary (Last 24 hours) at 8/1/2022 0289  Last data filed at 8/1/2022 0935  Gross per 24 hour   Intake 1952.53 ml   Output --   Net 1952.53 ml      General:  No acute distress  Neck: Supple, no JVD, no carotid bruits  Heart: Regular rhythm normal S1 and S2, no rubs, murmurs or gallops  Lungs: clear to ascultation no rales, wheezes, or rhonchi  Abdomen: positive bowel sounds, soft, non-tender, non-distended, no bruits, no masses  Extremities:no clubbing, cyanosis, 2+edema  Neurologic: alert and oriented x 3, cranial nerves 2-12 grossly intact, motor and sensory intact, moving all extremities  Skin: No rashes  Psych: AO x 3, no depression/yemi, no pressured speech, normal affect  Lymph: No obvious LAD      Assessment:  Weakness  Orthostasis 2/2 increased diuretic use  AFib on Xarelto  Hx of GI bleed  HTN  HLD  S/p PPM - chronically V-paced  Hx of Diastolic CHF, NYHA II    Plan:  Hold Metolazone  New dry weight is between 158-160 lbs  Continue Bumex  Await GI evaluation for positive FOBT, however, no over bleeding  CHF clinic as outpatient  Gently hydrate and monitor weights  She can drink to thirst PO  K >4, Mg > 2  Restart Xarelto once GI provides recs  Check orthostatics at least daily to ensure she is well-hydrated and back to baseline  Further recommendations based on results and clinical course    Thank you for allowing us to participate in the care of this patient. Please do not hesitate to call us with questions.     Electronically signed by Hillary Obregon MD on 8/1/2022 at 9:53 AM    Interventional Cardiology - [facility]

## 2022-08-01 NOTE — PROGRESS NOTES
Nisa Whitehead 60  INPATIENT OCCUPATIONAL THERAPY  STR MED SURG 8B  EVALUATION    Time:    Time In: 802  Time Out: 269  Timed Code Treatment Minutes: 45 Minutes  Minutes: 53          Date: 2022  Patient Name: Italo Brower,   Gender: female      MRN: [id number]  : 1928  (80 y.o.)  Referring Practitioner: NOELLE Dean CNP  Diagnosis: dehydration  Additional Pertinent Hx: Per ER note on 2022:89 y.o. female who presents with her daughter. For the last 5 days, the patient has had increasing dyspnea on exertion, extreme weakness, fatigue, generalized malaise. She is eating and drinking as normal.  Last night, she was walking back from the bathroom and collapsed on the floor next to her bed due to generalized weakness. She had no loss of consciousness. She thinks she fell onto the left side of her body, because she has a bruise on her left shoulder. She is not sure if she hit her head. Restrictions/Precautions:  Restrictions/Precautions: Fall Risk    Subjective  Chart Reviewed: Yes, Orders, Progress Notes, History and Physical  Patient assessed for rehabilitation services?: Yes  Family / Caregiver Present: Yes (daughter)    Subjective: cooperative    Pain: 0/10: no c/o pain during session    Vitals: Nurse checked vitals prior to session    Social/Functional History:  Lives With: Alone  Type of Home: Apartment (duplex)  Home Layout: One level  Home Access: Stairs to enter with rails (2)  Home Equipment: Walker, rolling   Bathroom Shower/Tub: Walk-in shower  Bathroom Toilet: Handicap height  Bathroom Equipment: Shower chair, Grab bars in shower, Grab bars around toilet       ADL Assistance: Independent  Homemaking Assistance: Needs assistance  Ambulation Assistance: Independent  Transfer Assistance: Independent    Active : No     Additional Comments: Daughter reports she is over at her duplex to A daily, daughter A with IADLs, Pt indep with ADLs.  Pt was using RW at home. Hx of [facility] March 2022    VISION:Corrected    HEARING:  Corrected-still some [place] with hearing aides in     COGNITION: WFL    RANGE OF MOTION:  Bilateral Upper Extremity:  WFL    STRENGTH:  Bilateral Upper Extremity:  WFL    SENSATION:   WFL    ADL:   Grooming: Stand By Assistance. Stood at sink x 2 min for brushing teeth  Upper Extremity Dressing: Maximum Assistance. For [facility] gown with multiple lines  Lower Extremity Dressing: Moderate Assistance. A for slipper socks, s/u for donning undergarment  Toileting: Contact Guard Assistance. Toilet Transfer: Contact Guard Assistance. With STS .  **Pt completed sponge bath while seated on toilet    BALANCE:  Sitting Balance:  Supervision. EOB in prep for t/f  Standing Balance: [address] BED MOBILITY:  Supine to Sit: Minimal Assistance      TRANSFERS:  Sit to Stand:  Contact Guard Assistance. Stand to Sit: Contact Guard Assistance. **vcs for aligning self up with chair better prior to sitting down      FUNCTIONAL MOBILITY:  Assistive Device: Rolling Walker  Assist Level:  Contact Guard Assistance. Distance: To and from bathroom      Activity Tolerance:  Patient tolerance of  treatment: fair. Assessment:  Assessment: Pt demo decreased ADL & functional mobility status over PLOF s/p admission with dehydration & hx of a fall. Continued OT recommended to faciliate improved balance & safety for increased indep & safety for completing ADLs at home. Performance deficits / Impairments: Decreased functional mobility , Decreased ADL status, Decreased endurance, Decreased balance  Prognosis: Good  REQUIRES OT FOLLOW-UP: Yes  Decision Making: Medium Complexity    Treatment Initiated: Treatment and education initiated within context of evaluation.   Evaluation time included review of current medical information, gathering information related to past medical, social and functional history, completion of standardized testing, formal and informal observation of tasks, assessment of data and development of plan of care and goals. Treatment time included skilled education and facilitation of tasks to increase safety and independence with ADL's for improved functional independence and quality of life. Discharge Recommendations:  Home with Home health OT, Home with assist PRN    Patient Education:     Patient Education  Education Given To: Patient, Family  Education Provided: Role of Therapy, Plan of Care, ADL Adaptive Strategies, Transfer Training  Education Method: Verbal  Barriers to Learning: Hearing  Education Outcome: Continued education needed    Equipment Recommendations:  Equipment Needed: No  Other: Pt has needed AE at home. Plan:  Times per Week: 5x  Current Treatment Recommendations: Functional mobility training, Endurance training, Balance training, Self-Care / ADL, Patient/Caregiver education & training. See long-term goal time frame for expected duration of plan of care. If no long-term goals established, a short length of stay is anticipated. Goals:  Patient goals : go home  Short Term Goals  Time Frame for Short term goals: until discharge  Short Term Goal 1: Pt will complete various t/fs including toilet with S & 0-1 vcs for safety  Short Term Goal 2: Pt will tolerate standing 4-5 min with S for increased ease of sinkside grooming  Short Term Goal 3: Pt will complete BADL routine with S & 0-1 vcs for safety  Short Term Goal 4: Pt will complete mobility to/from bathroom + with ADL item retrieval with RW, S, & 0-1 vcs for safety  Long Term Goals  Time Frame for Long term goals : No LTG set d/t short ELOS         Following session, patient left in safe position with all fall risk precautions in place.

## 2022-08-01 NOTE — PLAN OF CARE
Problem: Discharge Planning  Goal: Discharge to home or other facility with appropriate resources  Outcome: Progressing  Flowsheets (Taken 8/1/2022 1118)  Discharge to home or other facility with appropriate resources: Identify barriers to discharge with patient and caregiver  Note: Discharge barriers identified with patient. To be discharged home when medically stable. Problem: Safety - Adult  Goal: Free from fall injury  Outcome: Progressing  Flowsheets  Taken 8/1/2022 1118 by Justina Donahue RN  Free From Fall Injury: Instruct family/caregiver on patient safety  Taken 8/1/2022 1116 by Justina Donahue RN  Free From Fall Injury: Instruct family/caregiver on patient safety  Taken 7/31/2022 2222 by Kamilla Sosa RN  Free From Fall Injury: Instruct family/caregiver on patient safety  Note: Patient free from falls this shift. Call light within reach. Bed alarm on. Falling star program in place. Problem: ABCDS Injury Assessment  Goal: Absence of physical injury  Outcome: Progressing  Flowsheets  Taken 8/1/2022 1118 by Justina Donahue RN  Absence of Physical Injury: Implement safety measures based on patient assessment  Taken 8/1/2022 1116 by Justina Donahue RN  Absence of Physical Injury: Implement safety measures based on patient assessment  Taken 7/31/2022 2222 by Kamilla Sosa RN  Absence of Physical Injury: Implement safety measures based on patient assessment  Note: Patient free from physical injury. Problem: Chronic Conditions and Co-morbidities  Goal: Patient's chronic conditions and co-morbidity symptoms are monitored and maintained or improved  Outcome: Progressing  Flowsheets (Taken 8/1/2022 1118)  Care Plan - Patient's Chronic Conditions and Co-Morbidity Symptoms are Monitored and Maintained or Improved: Monitor and assess patient's chronic conditions and comorbid symptoms for stability, deterioration, or improvement  Note: Labs monitored. Vitals within normal limits. I/Os.  Daily weights. Slow hydration. Orthos negative. Problem: Pain  Goal: Verbalizes/displays adequate comfort level or baseline comfort level  8/1/2022 1118 by Nelda Tran RN  Outcome: Progressing  Flowsheets (Taken 8/1/2022 1118)  Verbalizes/displays adequate comfort level or baseline comfort level:   Encourage patient to monitor pain and request assistance   Assess pain using appropriate pain scale   Implement non-pharmacological measures as appropriate and evaluate response  Note: Patient denies pain at this time. Patient educated on pain rating scale. Care plan reviewed with patient. Patient verbalize understanding of the plan of care and contribute to goal setting.

## 2022-08-01 NOTE — PROGRESS NOTES
Hospitalist Progress Note    Patient:  Amarilys Starran      Unit/Bed:8B-21/021-A    YOB: 1928    MRN: [id number]       Acct: [de-identified]     PCP: Jorge Zhou MD    Date of Admission: 7/31/2022    Assessment/Plan:    Dyspnea on exertion, improving              - Secondary to over diuresis from metolazone and Bumex. - Cardiology want patient's new dry weight to be 158- 160 lbs   - Will need to f/u in CHF clinic within 1-2 weeks upon discharge   - Will need to f/u with Dr. Reyna Mohan within 1-2 weeks upon discharge. -TSH 2, free T4 1.3   - Covid negative   - Flu A/B negative    - BNP normal at OSH, troponin x 2 normal   - Echo ordered and pending result. Severe hypokalemia, improving              -Potassium 3, up from 2.7 (POA)              -Potassium replacement protocol  - Daily lab     Positive FOBT              -Continue PPI and carafate BID              - H&H remains stable, continue to monitor. -Iron studies notable for ferritin 54, iron 72, iron sat 23, TIBC 310, folate and vitamin B12 normal   - Continue to hold iron while admitted, may resume at discharge. - Nursing to monitor stool output and document              -Okay to resume ASA, Xarelto per GI              -GI consulted, appreciate recs: F/u with Gloria Chaparro in 4 weeks as outpatient     S/p fall at home 7/31              -She reports she tried to walk her self to the bathroom. States as she was walking back from the bathroom she was able to sit on the edge of her bed when she began to feel dizzy and faint, and slid off the edge of the bed, hitting her head and left shoulder on the ground. She states that she did not lose consciousness after hitting her head. She states she did not blackout or lose consciousness while sliding off the bed. Denies neck pain, back pain, left shoulder pain at this time.               -CT head at [facility] negative for acute intracranial hemorrhage mass-effect or midline shift. -X-ray left shoulder at [facility] negative for acute fracture or dislocation              -Monitor for hematoma formation on left shoulder, delayed head bleed signs and symptoms. Neuro checks. Lidocaine patch for left shoulder as needed for pain. Metabolic alkalosis with mild hypochloremia, improving  - CO2 37, chloride 93 at OSH (PTA). Likely multifactorial; Suspect likely contraction alkalosis secondary to diuresing with Bumex and metolazone, and d/t severe hypokalemia. - Will hold diuretics at this time and replete potassium per protocol.    - Will give small amount of normal saline at rate 50 mL/hr for 10 hours to help bring chloride level up. - Daily lab. Atrial fibrillation              -On Xarelto, okay to resume per GI              -On metoprolol, rate controlled-continue              -Continuous telemetry     Chronic diastolic heart failure              -States her dry weight is normally 153 pounds. Patient currently 156 pounds 6.4 ounces. Appears grossly euvolemic, lungs CTAB, no LE edema  -Echo (1/26/2021) notable for EF estimated 55 to 60%, mild aortic regurg, mildly dilated left atrium, mild mitral regurg, mild TR,              - Continue BB, Bumex, statin              -Stop metolazone per cardiology              -Okay to resume ASA and Xarelto per GI              - Daily weights, strict I&O              - Cardiac diet        CAD s/p PCI and [employer] single pacemaker placement 4/26/2016              - [address] [facility] outpatient              - ECG notable for ventricular paced rhythm, prolonged Qtc secondary to ventricular pacing  - Continue BB              - Okay to resume ASA and xarelto per GI     Chronic DIMITRIS              -Takes oral iron at home. Hold while admitted, may resume at discharge per GI (d/t #3)              - H&H stable.  Transfuse for hemoglobin < 7 g/dL              -Iron studies notable for ferritin 54, iron 72, iron sat 23, TIBC 310, folate and vitamin B12 normal  - Daily lab     CKD stage II              -Baseline range ~ 0.5-0.9              - Cr stable in baseline range. - Daily lab     Hyperlipidemia:   - Statin     Essential hypertension              -Continue Bumex, BB     GERD              - Continue PPI, carafate     Depression              - Continue Effexor     Weakness secondary to hypokalemia, dehydration due to adverse effect of metolazone dosage increase  - PT/OT consulted, appreciate recs      Expected discharge date:  Hopefully next 1-2 days    Disposition:    [x] Home       [] TCU       [] Rehab       [] Psych       [] SNF       [] [place]       [] Other-    Chief Complaint: Weakness, VACA, fall    Hospital Course: 80 y.o. female with a past medical history of atrial fibrillation, on Xarelto, CAD, HTN, HLD, chronic diastolic heart failure, DIMITRIS, GERD, CKD stage II who presented to [address] [place] [address] as a direct admit from [facility] ambulatory care center for increased weakness, dyspnea on exertion, fall. Patient reports that she followed up with her cardiologist Dr. Alen Simmons in office about a week and a half ago due to having increased weight gain and weighing approximately 162 pounds. She was started on metolazone Monday Wednesday Friday in addition to continuing her Bumex at home to help her get back to her dry weight of 153 pounds. Patient reports that for the past week she has had increasing symptoms of dyspnea on exertion, extreme weakness, fatigue and decreased activity intolerance. She states that she is not able to walk distances like she used to, and gets very fatigued and short of breath and has to take many breaks in between. States ambulating from her bed to the bathroom causes her extreme fatigue and shortness of breath. States she gets very wheezy when exerting herself, and feels lightheaded at times.   Reports she has difficulty getting herself out of her chair because of how weak she is. She reports earlier this morning she tried to walk her self to the bathroom. States as she was walking back from the bathroom she was able to sit on the edge of her bed when she began to feel dizzy and faint, and slid off the edge of the bed, hitting her head and left shoulder on the ground. She states that she did not lose consciousness after hitting her head. She states she did not blackout or lose consciousness while sliding off the bed. Denies neck pain, back pain, left shoulder pain at this time. She denies chest pain, palpitations, orthopnea, PND, shortness of breath at rest, leg swelling, headache, blurred vision, double vision, numbness or tingling in her extremities. She denies body aches, abdominal pain, nausea, vomiting, diarrhea, fever, chills, exposures to ill persons. Does report that she has a chronic cough that has not been productive. States that she is eating and drinking well. States that she has recently been having black stools but states \"they are always black because I take iron. \"  States she has not taken iron in the past 2 days. Patient's daughter is at bedside during time of obtaining history and informed me that her mother has had GI bleeding before in the past and confirmed on an EGD approximately 4 5 years ago. ED work-up significant for: ECG notable for ventricular paced rhythm with prolonged QTC, CT head negative for acute intracranial hemorrhage, mass-effect or midline shift, x-ray left shoulder negative for acute fracture or dislocation chest x-ray showing no acute intrathoracic process. Significant for potassium 2.7, chloride 93, CO2 37, bun 31, creatinine 1.1, EGFR 49, glucose 161, troponin normal, proBNP normal, H&H notable, fecal occult blood test positive. At this time patient admitted to the hospital for further work-up and management. 8/1/22: Patient sitting up in bedside recliner, well-appearing, no apparent distress.   Remains afebrile, with hemodynamically stable vital signs. Patient stating she is feeling better today, not as weak or tired. Still reporting some subjective shortness of breath with exertion but is moving short distances well. Patient evaluated by cardiology services who recommend holding her metolazone, continuing her Bumex. Recommended gentle hydration, daily orthostats and keeping her new dry weight between 158-160 pounds. Patient also evaluated by GI services for positive fecal occult blood test.  Stated okay to resume her PPI and Carafate twice daily, and that it was okay to resume her aspirin and Xarelto. Recommended that she follow-up within 5 weeks with NOELLE Aragon. Awaiting to get echo and PT/OT recommendations. Possible discharge tomorrow or the next day. Subjective (past 24 hours):     Denies chest pain, shortness of breath at rest, abdominal pain, nausea, vomiting, diarrhea, fever, chills. Denies dizziness, lightheadedness, palpitations. States that she still has some shortness of breath with exertion but it is much improved compared to days prior. States she does not feel as weak or fatigued. States she feels much better.     Medications:  Reviewed    Infusion Medications    sodium chloride      sodium chloride 50 mL/hr at 08/01/22 0620     Scheduled Medications    aspirin  81 mg Oral Daily    bumetanide  1 mg Oral Daily    [Held by provider] ferrous sulfate  325 mg Oral Weekly    magnesium oxide  250 mg Oral BID    [Held by provider] metOLazone  5 mg Oral See Admin Instructions    metoprolol tartrate  25 mg Oral BID    multivitamin  1 tablet Oral Daily    pantoprazole  40 mg Oral BID    potassium chloride  20 mEq Oral Daily with breakfast    lactobacillus  1 capsule Oral Daily    sucralfate  1 g Oral 2 times per day    venlafaxine  75 mg Oral Daily    sodium chloride flush  5-40 mL IntraVENous 2 times per day    lidocaine  1 patch TransDERmal Q24H    rivaroxaban  15 mg Oral Daily     PRN Meds: docusate sodium, sodium chloride flush, sodium chloride, ondansetron **OR** ondansetron, acetaminophen **OR** acetaminophen, perflutren lipid microspheres, potassium chloride **OR** potassium alternative oral replacement **OR** potassium chloride, magnesium sulfate, bisacodyl, albuterol      Intake/Output Summary (Last 24 hours) at 8/1/2022 1449  Last data filed at 8/1/2022 0935  Gross per 24 hour   Intake 1952.53 ml   Output --   Net 1952.53 ml       Diet:  ADULT DIET; Regular; Low Fat/Low Chol/High Fiber/2 gm Na    Exam:  BP (!) 123/54   Pulse 70   Temp 97.9 °F (36.6 °C) (Oral)   Resp 18   Ht 5' 2\" (1.575 m)   Wt 158 lb 14.4 oz (72.1 kg)   LMP  (LMP Unknown)   SpO2 98%   BMI 29.06 kg/m²     General appearance:  No apparent distress, appears stated age and cooperative. HEENT:  Normal cephalic, atraumatic without obvious deformity. Pupils equal, round, and reactive to light. Conjunctivae/corneas clear. Oral mucosa moist.   Neck: Supple, with full range of motion. No jugular venous distention. Trachea midline. No C-spine point tenderness  Respiratory:  Normal respiratory effort, able to speak full clear sentences. Clear to auscultation, bilaterally without Rales/Wheezes/Rhonchi. Cardiovascular:  Regular rate and rhythm with normal S1/S2 without murmurs, rubs or gallops. No lower extremity edema. Abdomen: Soft, non-tender, non-distended with normal bowel sounds. Musculoskeletal:  No clubbing, cyanosis or edema bilaterally. Full range of motion without deformity. 5/5 strength throughout x 4 extremities. No hematoma formation over left shoulder  Skin: Skin color pale, texture, turgor normal.    Neurologic:  Neurovascularly intact without any focal sensory/motor deficits.  Cranial nerves: II-XII intact, grossly non-focal.  Psychiatric:  Alert and oriented, thought content appropriate  Capillary Refill: Brisk,< 3 seconds  Peripheral Pulses: +2 palpable, equal bilaterally      Labs:   Recent Labs 07/31/22  1016 07/31/22  1704 08/01/22  0553   WBC 8.8 10.7 6.8   HGB 13.3 12.9 11.9*   HCT 40.8 39.6 37.5    222 183     Recent Labs     07/31/22  1016 07/31/22  1704 08/01/22  0553    137 141   K 2.7* 3.6 3.0*   CL 93* 93* 98   CO2 37* 29 29   BUN 31* 27* 25*   CREATININE 1.1 0.7 0.8   CALCIUM  --  9.4 9.2   PHOS  --  3.2  --      Recent Labs     07/31/22  1016   AST 24   ALT 14   BILITOT 1.3*   ALKPHOS 73     Recent Labs     07/31/22 2023   INR 1.17*     No results for input(s): [facility] in the last 72 hours. Microbiology:      Urinalysis:      Lab Results   Component Value Date/Time    NITRU NEGATIVE 08/01/2022 12:01 AM    WBCUA 2-4 08/01/2022 12:01 AM    BACTERIA NONE SEEN 08/01/2022 12:01 AM    RBCUA 0-2 08/01/2022 12:01 AM    BLOODU NEGATIVE 08/01/2022 12:01 AM    SPECGRAV 1.015 10/01/2021 04:35 PM    GLUCOSEU NEGATIVE 08/01/2022 12:01 AM       Radiology:  XR CHEST (2 VW)    Result Date: 7/31/2022  PROCEDURE: XR CHEST (2 VW) CLINICAL INFORMATION: 66-year-old female with dyspnea on exertion. COMPARISON: Radiograph 12/18/2021. TECHNIQUE: PA and lateral views of the chest were obtained. FINDINGS: There is a large right hilar calcified lymph node. There is a calcified granuloma in the right midlung. There is a single lead cardiac device overlying the left side of the chest. The lungs are clear. The cardiac silhouette and pulmonary vasculature are within normal limits. There is no significant pleural effusion or pneumothorax. Visualized portions of the upper abdomen are within normal limits. The osseous structures are intact. No acute fractures or suspicious osseous lesions. There is no acute intrathoracic process. **This report has been created using voice recognition software. It may contain minor errors which are inherent in voice recognition technology. ** Final report electronically signed by Dr Celia Clifton on 7/31/2022 11:02 AM    CT HEAD WO CONTRAST    Result Date: 7/31/2022  PROCEDURE: CT HEAD WO CONTRAST CLINICAL INFORMATION: 49-year-old female who fell this morning. The patient is on anticoagulation. COMPARISON: Head CT 10/16/2020. TECHNIQUE: Noncontrast 5 mm axial images were obtained through the brain. All CT scans at this facility use dose modulation, iterative reconstruction, and/or weight-based dosing when appropriate to reduce radiation dose to as low as reasonably achievable. FINDINGS: There is generalized prominence of the sulci and gyri consistent with age-related volume loss. There is no hemorrhage. There are no intra-or extra-axial collections. There is no hydrocephalus, midline shift or mass effect. The gray-white matter differentiation is preserved. There is hypoattenuation in the periventricular white matter consistent with chronic microvascular ischemic disease. The paranasal sinuses and mastoid air cells are normally aerated. There is no suspicious calvarial abnormality. 1. No acute intracranial hemorrhage, mass effect or midline shift. 2. Chronic senescent changes of the brain including generalized atrophy and chronic microvascular ischemic disease in the white matter. **This report has been created using voice recognition software. It may contain minor errors which are inherent in voice recognition technology. ** Final report electronically signed by Dr Chloe Pagan on 7/31/2022 10:58 AM    XR SHOULDER LEFT (MIN 2 VIEWS)    Result Date: 7/31/2022  PROCEDURE: XR SHOULDER LEFT (MIN 2 VIEWS) CLINICAL INFORMATION: 49-year-old female who fell. Left-sided shoulder bruising. COMPARISON: No prior study. TECHNIQUE: 4 views of the left shoulder were obtained. FINDINGS: There is narrowing of the acromioclavicular joint in the left glenohumeral joint. There is no acute fracture. There is no dislocation. There is a single lead cardiac device overlying the upper left chest.     No acute fracture or dislocation at the left shoulder.  **This report has been created using voice recognition software. It may contain minor errors which are inherent in voice recognition technology. ** Final report electronically signed by Dr Celia Clifton on 7/31/2022 11:03 AM      DVT prophylaxis: [] Lovenox                                 [x] SCDs                                 [] SQ Heparin                                 [] Encourage ambulation           [x] Already on Anticoagulation     Code Status: Limited    PT/OT Eval Status: Per PT note documented 8/1/2022: \"ASSESSMENT:  Activity Tolerance:  Patient tolerance of  treatment: fair. Weakness and fatigue reported, decreased endurance       Treatment Initiated: Treatment and education initiated within context of evaluation. Evaluation time included review of current medical information, gathering information related to past medical, social and functional history, completion of standardized testing, formal and informal observation of tasks, assessment of data and development of plan of care and goals. Treatment time included skilled education and facilitation of tasks to increase safety and independence with functional mobility for improved independence and quality of life. Assessment: Body Structures, Functions, Activity Limitations Requiring Skilled Therapeutic Intervention: Decreased functional mobility , Decreased endurance, Decreased balance, Decreased strength, Decreased posture, Increased pain  Assessment: Pt presents with the deficits above, cont to require skilled PT services to increase IND with functional tasks, progress with strength and endurance to increase ease with functional tasks. Pt safe to return home with daughter to assist as needed.   Therapy Prognosis: Good     Requires PT Follow-Up: Yes     Discharge Recommendations:  Discharge Recommendations: Continue to assess pending progress, Home with assist PRN, Home with Home health PT\"    Per OT note documented 8/1/2022: \"Assessment:  Assessment: Pt demo decreased ADL & functional mobility status over PLOF s/p admission with dehydration & hx of a fall. Continued OT recommended to faciliate improved balance & safety for increased indep & safety for completing ADLs at home. Performance deficits / Impairments: Decreased functional mobility , Decreased ADL status, Decreased endurance, Decreased balance  Prognosis: Good  REQUIRES OT FOLLOW-UP: Yes  Decision Making: Medium Complexity     Treatment Initiated: Treatment and education initiated within context of evaluation. Evaluation time included review of current medical information, gathering information related to past medical, social and functional history, completion of standardized testing, formal and informal observation of tasks, assessment of data and development of plan of care and goals. Treatment time included skilled education and facilitation of tasks to increase safety and independence with ADL's for improved functional independence and quality of life. Discharge Recommendations:  Home with Home health OT, Home with assist PRN     Patient Education:  Patient Education  Education Given To: Patient, Family  Education Provided: Role of Therapy, Plan of Care, ADL Adaptive Strategies, Transfer Training  Education Method: Verbal  Barriers to Learning: Hearing  Education Outcome: Continued education needed     Equipment Recommendations:  Equipment Needed: No  Other: Pt has needed AE at home. \"        Tele:   [x] yes             [] No    Ventricular paced rhythm, rate controlled    Active Hospital Problems    Diagnosis Date Noted    Weakness [R53.1] 07/31/2022     Priority: Medium       Electronically signed by NOELLE Clark CNP on 8/1/2022 at 2:49 PM

## 2022-08-01 NOTE — TELEPHONE ENCOUNTER
----- Message from Marine Schwab sent at 8/1/2022 10:17 AM EDT -----  Subject: Message to Provider    QUESTIONS  Information for Provider? Pt needs to schedule a Hospital f/u. Being   discharged today 08/01/22. Please contact [employer]. ---------------------------------------------------------------------------  --------------  Bridget OLIVO  [phone]; OK to leave message on voicemail  ---------------------------------------------------------------------------  --------------  SCRIPT ANSWERS  Relationship to Patient? Third Party  Third Party Type? Hospital?   Representative Name?  [place]/ [facility]

## 2022-08-01 NOTE — CONSULTS
Consult History & Physical      Patient:  Zhen Sam  YOB: 1928  MRN: [id number]     Acct: [de-identified]    Chief Complaint:  + FOBT    Date of Service: Pt seen/examined in consultation on 8/1/2022    History Of Present Illness:      80 y.o. female who we are asked to see/evaluate by NOELLE Villeda - * for medical management of +FOBT. She came to the ED yesterday for SOB, weakness, and fatigue. She was recently started on additional diuretics for her CHF and the symptoms seemed to start after starting this. She did have fall the night before and sustained bruising to the left side of her body. She says she has chronic SOB and denies this worsening. She has a history of afib and is on Xarelto. She was noted to have a positive FOBT. Denies abdominal pain, nausea, vomiting, diarrhea, constipation, and hematochezia. She has dark stools, but says they are \"always dark because she is on oral iron. \" She is wanting to \"get off the iron. \" She denies change in consistency of her stools. Denies NSAID use. Initial Hgb 12.9, today 11.9. Past Medical History:    Past Medical History:   Diagnosis Date    Atrial fibrillation ([address]     Blood circulation, collateral     [employer] single pacemaker 4/26/2016 05/05/2016    CAD (coronary artery disease)     cath and stent in right artery    Cancer [facility]) 1954    HX  Colon     Carpal tunnel syndrome     Fall at home     12/7/21 -large bruise to R buttock    Fracture dislocation of ankle 1980    GERD (gastroesophageal reflux disease)     Hyperlipidemia     Hypertension     Kidney lesion, native, right     found on 5/2016    Osteoarthritis     knees    Osteopenia     Prolonged emergence from general anesthesia     Thyroid goiter 09/06/2016    Yersiniosis 2016       Home Medications:  Prior to Admission medications    Medication Sig Start Date End Date Taking?  Authorizing Provider   Multiple Vitamin (MULTIVITAMIN ADULT PO) Take by mouth daily Historical Provider, MD   metOLazone (ZAROXOLYN) 5 MG tablet Take 1 tablet by mouth See Admin Instructions Mon Wed Fri 7/20/22   Magdy Correia MD   venlafaxine (EFFEXOR XR) 75 MG extended release capsule TAKE 1 CAPSULE BY MOUTH  DAILY 7/18/22   Jl Parker MD   rivaroxaban (XARELTO) 15 MG TABS tablet Take 1 tablet by mouth Daily with supper 5/11/22   Jl Parker MD   bumetanide (BUMEX) 1 MG tablet Take 1 tablet by mouth daily 4/27/22   Magdy Correia MD   potassium chloride (KLOR-CON M) 20 MEQ extended release tablet Take 1 tablet by mouth once daily 4/26/22   Jl Parker MD   sucralfate (CARAFATE) 1 GM tablet TAKE 1 TABLET BY MOUTH  TWICE DAILY 4/14/22   Jl Parker MD   metoprolol tartrate (LOPRESSOR) 25 MG tablet Take 1 tablet by mouth 2 times daily 1/26/22   Jl Parker MD   CALCIUM PO Take by mouth Calcium chew    Historical Provider, MD   magnesium (MAGNESIUM-OXIDE) 250 MG TABS tablet Take 250 mg by mouth 2 times daily    Historical Provider, MD   ferrous sulfate (IRON 325) 325 (65 Fe) MG tablet Take 325 mg by mouth once a week    Historical Provider, MD   docusate sodium (COLACE) 100 MG capsule Take 100 mg by mouth daily as needed    Historical Provider, MD   Cyanocobalamin (VITAMIN B 12 PO) Take by mouth every other day    Historical Provider, MD   pantoprazole (PROTONIX) 40 MG tablet Take 1 tablet by mouth 2 times daily 10/11/21   Jl Parker MD   Probiotic Product (PROBIOTIC DAILY PO) Take by mouth daily    Historical Provider, MD   aspirin 81 MG chewable tablet Take 1 tablet by mouth daily 3/31/16   NOELLE Quinn CNP   acetaminophen (TYLENOL) 500 MG tablet Take 1,000 mg by mouth every 6 hours as needed for Pain     Historical Provider, MD       Surgical History:  Past Surgical History:   Procedure Laterality Date    ABDOMEN SURGERY      ANKLE FRACTURE SURGERY  [id number]--[id number]    reconstruction in 2003 and 2007    APPENDECTOMY      BLADDER SURGERY      support bladder repair CARDIAC SURGERY      heart stent 12-11-18, [place]    CARPAL TUNNEL RELEASE  7/2013    CARPAL TUNNEL RELEASE Right 08/19/2017    Revision    CATARACT REMOVAL Bilateral     CHOLECYSTECTOMY  1986    COLON SURGERY  1954    COLONOSCOPY      ENDOSCOPY, COLON, DIAGNOSTIC      EYE SURGERY      cataract     FRACTURE SURGERY      HYSTERECTOMY (CERVIX STATUS UNKNOWN)  1971    JOINT REPLACEMENT  1998    L knee    KNEE SURGERY Left     total replacement    PACEMAKER PLACEMENT      PTCA  01/15/2019    Successful PCI / Drug Eluting Stent of the proximal Left Anterior Descending Coronary Artery. UPPER GASTROINTESTINAL ENDOSCOPY Left 11/28/2018    EGD BIOPSY performed by Ace Bearden MD at [facility] ENDOSCOPY  11/28/2018    EGD CONTROL HEMORRHAGE performed by Ace Bearden MD at [facility] Endoscopy       Family History:  Family History   Problem Relation Age of Onset    Heart Disease Mother     High Blood Pressure Mother     Heart Disease Father     High Blood Pressure Father     Heart Disease Brother     High Blood Pressure Brother     Heart Disease Son        Past GI History:  DIMITRIS on oral iron, colon CA s/p right hemicolectomy, PUD, EGD, colonoscopy, colon polyps, hemorrhoidal tags, GERD    Last EGD 2018- 4 gastric polyps with signs of small amount of recent bleeding    Allergies:  Methadone, Gabapentin, Lodine [etodolac], Lyrica [pregabalin], and Ultram [tramadol]    Social History:   TOBACCO:   reports that she has never smoked. She has never used smokeless tobacco.  ETOH:   reports no history of alcohol use. Review Of Systems  GENERAL: +fatigue  EYES:  No blurred vision, double vision   CARDIOVASCULAR: No chest pain or palpitations. RESPIRATORY:  +dyspnea    GI:  See HPI  MUSCULOSKELETAL: +generalized weakness    :   No dysuria or hematuria. SKIN:  +ecchymosis   NEUROLOGIC:  No headaches or seizures, numbness or tingling of arms, or legs. PSYCH:  No anxiety or depression. ENDOCRINE:  No polyuria or polydipsia. BLOOD:  +anemia    PHYSICAL EXAM:  BP (!) 117/46   Pulse 70   Temp 97.6 °F (36.4 °C) (Oral)   Resp 18   Ht 5' 2\" (1.575 m)   Wt 158 lb 14.4 oz (72.1 kg)   LMP  (LMP Unknown)   SpO2 96%   BMI 29.06 kg/m²     General appearance: Chronically ill appearing female  HEENT: Normal cephalic, atraumatic without obvious deformity. Pupils equal, round, and reactive to light. Neck: Supple, with full range of motion. No jugular venous distention. Trachea midline. Respiratory:  Normal respiratory effort. Clear to auscultation, bilaterally without Rales/Wheezes/Rhonchi. Cardiovascular: Regular rate and rhythm without murmurs, rubs or gallops. Abdomen: Soft, non-tender, non-distended with active bowel sounds. Musculoskeletal: No clubbing, cyanosis or edema bilaterally. Skin: Pink, warm, dry. No rashes or lesions. Scattered ecchymosis to left side of the body  Psychiatric: Alert and oriented, thought content appropriate, normal insight    Labs:   Recent Labs     08/01/22  0553   WBC 6.8   HGB 11.9*   HCT 37.5        Recent Labs     07/31/22  1704 08/01/22  0553    141   K 3.6 3.0*   CL 93* 98   CO2 29 29   BUN 27* 25*   CREATININE 0.7 0.8   CALCIUM 9.4 9.2   PHOS 3.2  --      Recent Labs     07/31/22  1016   AST 24   ALT 14   BILITOT 1.3*   ALKPHOS 73     Recent Labs     07/31/22  2023   INR 1.17*      Latest Reference Range & Units 7/31/22 20:23   Ferritin 10 - 291 ng/mL 54   Iron 50 - 170 ug/dL 72   Iron Saturation 20 - 50 % 23   TIBC 171 - 450 ug/dL 310   FOLATE, FOLAT 4.8 - 24.2 ng/mL > 20.0   Vitamin B-12 211 - 911 pg/mL 606   Immature Retic Fract 3.0 - 15.9 % 11.7   Retic Ct Abs 0.5 - 2.0 % 2.0   Absolute Retic # 20.0 - 115.0 thou/mm3 80.0   Retic Hemoglobin 28.2 - 35.7 pg 31.0       Radiology:   CT head WO contrast 07/31/22      Impression       1. No acute intracranial hemorrhage, mass effect or midline shift.    2. Chronic senescent changes of the brain including generalized atrophy and chronic microvascular ischemic disease in the white matter. XR shoulder left 07/31/22      Impression   No acute fracture or dislocation at the left shoulder. CXR 07/31/22      Impression   There is no acute intrathoracic process. Code Status: Limited    ASSESSMENT:  Generalized weakness & fatigue  Acute on chronic SOB  Hypokalemia  +FOBT- no overt GI bleeding noted  Mechanical fall at home  CHF  Afib- on Xarelto PTA  DIMITRIS- on oral iron  CAD s/p PCI- on ASA, Xarelto  Pacemaker  CKD stage II  GERD- on PPI PTA  H/O HTN  HLD  H/O depression  Normocytic anemia- no GI bleeding noted    PLAN:    Monitor H & H, transfuse prn  Continue to hold iron while admitted, may resume at discharge  Nursing to monitor stool output & document  Continue PPI BID, home dose  Continue Carafate BID, home dose  Okay to resume anticoagulation  No plan for emergent endoscopy at this time. If Hgb significantly trends down and/or overt GI bleeding noted, could consider endoscopy. RN updated  Cardiology on board  Supportive care per primary team  Okay to discharge from GI standpoint  Will need a 5 week follow-up with Tierney DRAKE  GI signing off       Case reviewed and impression/plan reviewed in collaboration with Dr. Meggan Patrick  Electronically signed by NOELLE Davalos - CNP on 8/1/2022 at 10:24 AM    Anoop Vazquez  Thank you for the consultation.    - Stool Guaiac is a colon cancer screening tool and not test for acute GI bleeding. It is an extremely insensitive test and false positives can be due to trauma (rectal exam), extraintestinal blood loss (epistaxis/hemotysis), medications (ASA/NSAIDs), and exogenous peroxidase activity from red meat consumption, fruits, and uncooked vegetables. - Rec obsevering patient for any hematochezia or melena and contacting GI if any active GI bleeding is noted.

## 2022-08-01 NOTE — PLAN OF CARE
Problem: Discharge Planning  Goal: Discharge to home or other facility with appropriate resources  Recent Flowsheet Documentation  Taken 7/31/2022 2008 by Joi Carr RN  Discharge to home or other facility with appropriate resources: Identify barriers to discharge with patient and caregiver  7/31/2022 1531 by Dayron Muller RN  Outcome: Progressing  Flowsheets  Taken 7/31/2022 1527  Discharge to home or other facility with appropriate resources: Identify barriers to discharge with patient and caregiver  Taken 7/31/2022 1442  Discharge to home or other facility with appropriate resources:   Identify barriers to discharge with patient and caregiver   Arrange for needed discharge resources and transportation as appropriate     Problem: Safety - Adult  Goal: Free from fall injury  7/31/2022 1531 by Dayron Muller RN  Outcome: Progressing     Problem: ABCDS Injury Assessment  Goal: Absence of physical injury  7/31/2022 1531 by Dayron Muller RN  Outcome: Progressing

## 2022-08-01 NOTE — PROGRESS NOTES
Physician Progress Note      PATIENT:               Meaghan Amin  CSN #:                  [id number]  :                       1928  ADMIT DATE:       2022 9:35 AM  DISCH DATE:  RESPONDING  PROVIDER #:        Bud MORENO          QUERY TEXT:    Pt admitted with weakness hypokalemia , dehydration . Pt noted to have recent   Metolazone dosage increase . If possible, please make selection below   ,document in progress notes and discharge summary the relationship, if any,   between weakness hypokalemia , dehydration and adverse effect of Metolazone   dosage increase. The medical record reflects the following:  Risk Factors: advanced age, ckd, chronic diastolic chf, recent medication   change  Clinical Indicators: reports of fatigue and shortness of breath since at least   Tuesday, medication change increased to  Metolazone 3 times a week this week,   Weakness, K 2.7 on admission  Weight has decreased . Treatment: admission , imaging, labs,  careful  hydration medication review   and adjustment    Thank you,  Stacey Sandhu RN, BSN, [facility]  Clinical   P: [phone]  F: [phone]  Options provided:  -- Weakness hypokalemia , dehydration due to adverse effect of Metolazone   dosage increase. -- Weakness hypokalemia , dehydration unrelated to adverse effect of   Metolazone dosage increase. -- Other - I will add my own diagnosis  -- Disagree - Not applicable / Not valid  -- Disagree - Clinically unable to determine / Unknown  -- Refer to Clinical Documentation Reviewer    PROVIDER RESPONSE TEXT:    This patient has weakness hypokalemia , dehydration due to adverse effect of   Metolazone dosage increase. .    Query created by: Jay Marquez on 2022 10:58 AM      Electronically signed by:  Francis Hutchison 2022 2:48 PM

## 2022-08-02 ENCOUNTER — TELEPHONE (OUTPATIENT)
Dept: CARDIOLOGY CLINIC | Age: 87
End: 2022-08-02

## 2022-08-02 VITALS
DIASTOLIC BLOOD PRESSURE: 63 MMHG | BODY MASS INDEX: 29 KG/M2 | HEIGHT: 62 IN | OXYGEN SATURATION: 95 % | HEART RATE: 70 BPM | RESPIRATION RATE: 18 BRPM | TEMPERATURE: 98.6 F | WEIGHT: 157.56 LBS | SYSTOLIC BLOOD PRESSURE: 133 MMHG
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
## 2022-08-02 LAB
ANION GAP SERPL CALCULATED.3IONS-SCNC: 12 MEQ/L (ref 8–16)
BASOPHILS # BLD: 0.9 %
BASOPHILS ABSOLUTE: 0 THOU/MM3 (ref 0–0.1)
BUN BLDV-MCNC: 18 MG/DL (ref 7–22)
CALCIUM SERPL-MCNC: 9.1 MG/DL (ref 8.5–10.5)
CHLORIDE BLD-SCNC: 101 MEQ/L (ref 98–111)
CO2: 26 MEQ/L (ref 23–33)
CREAT SERPL-MCNC: 0.7 MG/DL (ref 0.4–1.2)
EOSINOPHIL # BLD: 6.2 %
EOSINOPHILS ABSOLUTE: 0.3 THOU/MM3 (ref 0–0.4)
ERYTHROCYTE [DISTWIDTH] IN BLOOD BY AUTOMATED COUNT: 13.5 % (ref 11.5–14.5)
ERYTHROCYTE [DISTWIDTH] IN BLOOD BY AUTOMATED COUNT: 49.4 FL (ref 35–45)
GFR SERPL CREATININE-BSD FRML MDRD: 78 ML/MIN/1.73M2
GLUCOSE BLD-MCNC: 129 MG/DL (ref 70–108)
HCT VFR BLD CALC: 36.3 % (ref 37–47)
HEMOGLOBIN: 11.3 GM/DL (ref 12–16)
IMMATURE GRANS (ABS): 0.02 THOU/MM3 (ref 0–0.07)
IMMATURE GRANULOCYTES: 0.4 %
LYMPHOCYTES # BLD: 31.2 %
LYMPHOCYTES ABSOLUTE: 1.7 THOU/MM3 (ref 1–4.8)
MAGNESIUM: 2 MG/DL (ref 1.6–2.4)
MCH RBC QN AUTO: 31.2 PG (ref 26–33)
MCHC RBC AUTO-ENTMCNC: 31.1 GM/DL (ref 32.2–35.5)
MCV RBC AUTO: 100.3 FL (ref 81–99)
MONOCYTES # BLD: 9.9 %
MONOCYTES ABSOLUTE: 0.5 THOU/MM3 (ref 0.4–1.3)
NUCLEATED RED BLOOD CELLS: 0 /100 WBC
PLATELET # BLD: 150 THOU/MM3 (ref 130–400)
PMV BLD AUTO: 10.6 FL (ref 9.4–12.4)
POTASSIUM REFLEX MAGNESIUM: 3.5 MEQ/L (ref 3.5–5.2)
RBC # BLD: 3.62 MILL/MM3 (ref 4.2–5.4)
SEG NEUTROPHILS: 51.4 %
SEGMENTED NEUTROPHILS ABSOLUTE COUNT: 2.8 THOU/MM3 (ref 1.8–7.7)
SODIUM BLD-SCNC: 139 MEQ/L (ref 135–145)
WBC # BLD: 5.4 THOU/MM3 (ref 4.8–10.8)

## 2022-08-02 PROCEDURE — 83735 ASSAY OF MAGNESIUM: CPT

## 2022-08-02 PROCEDURE — 80048 BASIC METABOLIC PNL TOTAL CA: CPT

## 2022-08-02 PROCEDURE — 99232 SBSQ HOSP IP/OBS MODERATE 35: CPT | Performed by: PHYSICIAN ASSISTANT

## 2022-08-02 PROCEDURE — 99239 HOSP IP/OBS DSCHRG MGMT >30: CPT

## 2022-08-02 PROCEDURE — 85025 COMPLETE CBC W/AUTO DIFF WBC: CPT

## 2022-08-02 PROCEDURE — 36415 COLL VENOUS BLD VENIPUNCTURE: CPT

## 2022-08-02 PROCEDURE — 6370000000 HC RX 637 (ALT 250 FOR IP)

## 2022-08-02 PROCEDURE — 2580000003 HC RX 258

## 2022-08-02 RX ORDER — POTASSIUM CHLORIDE 20 MEQ/1
40 TABLET, EXTENDED RELEASE ORAL DAILY
Qty: 60 TABLET | Refills: 3 | Status: SHIPPED | OUTPATIENT
Start: 2022-08-02

## 2022-08-02 RX ORDER — POTASSIUM CHLORIDE 20 MEQ/1
20 TABLET, EXTENDED RELEASE ORAL
Qty: 60 TABLET | Refills: 3 | Status: CANCELLED | OUTPATIENT
Start: 2022-08-03

## 2022-08-02 RX ORDER — POTASSIUM CHLORIDE 20 MEQ/1
40 TABLET, EXTENDED RELEASE ORAL DAILY
Qty: 180 TABLET | Refills: 1 | Status: SHIPPED | OUTPATIENT
Start: 2022-08-02 | End: 2022-08-08

## 2022-08-02 RX ADMIN — Medication 1 TABLET: at 07:53

## 2022-08-02 RX ADMIN — METOPROLOL TARTRATE 25 MG: 25 TABLET, FILM COATED ORAL at 07:53

## 2022-08-02 RX ADMIN — SUCRALFATE 1 G: 1 TABLET ORAL at 07:53

## 2022-08-02 RX ADMIN — VENLAFAXINE HYDROCHLORIDE 75 MG: 75 CAPSULE, EXTENDED RELEASE ORAL at 07:53

## 2022-08-02 RX ADMIN — POTASSIUM CHLORIDE 20 MEQ: 1500 TABLET, EXTENDED RELEASE ORAL at 07:53

## 2022-08-02 RX ADMIN — Medication 1 CAPSULE: at 07:53

## 2022-08-02 RX ADMIN — POTASSIUM CHLORIDE 40 MEQ: 1500 TABLET, EXTENDED RELEASE ORAL at 07:59

## 2022-08-02 RX ADMIN — ASPIRIN 81 MG: 81 TABLET, CHEWABLE ORAL at 07:52

## 2022-08-02 RX ADMIN — PANTOPRAZOLE SODIUM 40 MG: 40 TABLET, DELAYED RELEASE ORAL at 07:53

## 2022-08-02 RX ADMIN — BUMETANIDE 1 MG: 1 TABLET ORAL at 07:52

## 2022-08-02 RX ADMIN — SODIUM CHLORIDE: 9 INJECTION, SOLUTION INTRAVENOUS at 00:23

## 2022-08-02 RX ADMIN — Medication 250 MG: at 07:53

## 2022-08-02 ASSESSMENT — PAIN SCALES - GENERAL: PAINLEVEL_OUTOF10: 0

## 2022-08-02 NOTE — PROGRESS NOTES
Cardiology Progress Note      Patient:  Anjelica Claros  YOB: 1928  MRN: [id number]   Acct: [de-identified]  Admit Date:  7/31/2022  Primary Cardiologist: Magdy Correia MD    Note per dr Melissa Terry:  Syncope/fall        HPI: This is a pleasant 80 y.o. female presents with Afib on Xarelto, HTN, HLD, hx of CAD s/p PCI. chronic diastolic CHF, GERD, CKD-stage 2 admitted after fall. Daughter provides a lot of history. Patient saw primary cardiologist due to weight gain, and sob. Was given Metolazone with continued Bumex. She apparently dropped 6 lbs quickly, and was supposed to try to get to her dry weight of 153 lbs. Significant fatigue and lightheadedness. Daughter states she was having difficulty ambulating. K 2.7. Cr 1.1, BUN 31, CL 93. She has no f/c/ns. She has had a GI bleed in the past but  nothing significant, still on [address] [place] [address] Road. No chest pain, angina, VACA, orthopnea, PND, sob at rest, palpitations, LE edema, or syncope. She has felt much better since being admitted. Trop negative. Hgb 11.9. ECG is V-paced. Gi consulted due to positive FOBT. \"    Subjective (Events in last 24 hours): pt awake and alert. NAD. No cp or sob. Feeling better.   No edema or orthopnea   On RA    Objective:   BP (!) 137/50   Pulse 70   Temp 98.3 °F (36.8 °C) (Oral)   Resp 18   Ht 5' 2\" (1.575 m)   Wt 157 lb 9 oz (71.5 kg)   LMP  (LMP Unknown)   SpO2 96%   BMI 28.82 kg/m²        TELEMETRY: v-paced    Physical Exam:  General Appearance: alert and oriented to person, place and time, in no acute distress  Cardiovascular: normal rate, regular rhythm, normal S1 and S2, no murmurs, rubs, clicks, or gallops, distal pulses intact, no carotid bruits, no JVD  Pulmonary/Chest: clear to auscultation bilaterally- no wheezes, rales or rhonchi, normal air movement, no respiratory distress  Abdomen: soft, non-tender, non-distended, normal bowel sounds, no masses Extremities: no cyanosis, clubbing or edema, pulse Skin: warm and dry, no rash or erythema  Head: normocephalic and atraumatic  Eyes: pupils equal, round, and reactive to light  Neck: supple and non-tender without mass, no thyromegaly   Neurological: alert, oriented, normal speech, no focal findings or movement disorder noted    Medications:    bumetanide  1 mg Oral Daily    aspirin  81 mg Oral Daily    [Held by provider] ferrous sulfate  325 mg Oral Weekly    magnesium oxide  250 mg Oral BID    [Held by provider] metOLazone  5 mg Oral See Admin Instructions    metoprolol tartrate  25 mg Oral BID    multivitamin  1 tablet Oral Daily    pantoprazole  40 mg Oral BID    potassium chloride  20 mEq Oral Daily with breakfast    lactobacillus  1 capsule Oral Daily    sucralfate  1 g Oral 2 times per day    venlafaxine  75 mg Oral Daily    sodium chloride flush  5-40 mL IntraVENous 2 times per day    lidocaine  1 patch TransDERmal Q24H    rivaroxaban  15 mg Oral Daily      sodium chloride      sodium chloride 50 mL/hr at 08/02/22 0023     docusate sodium, 100 mg, Daily PRN  sodium chloride flush, 5-40 mL, PRN  sodium chloride, , PRN  ondansetron, 4 mg, Q8H PRN   Or  ondansetron, 4 mg, Q6H PRN  acetaminophen, 650 mg, Q6H PRN   Or  acetaminophen, 650 mg, Q6H PRN  perflutren lipid microspheres, 1.5 mL, ONCE PRN  potassium chloride, 40 mEq, PRN   Or  potassium alternative oral replacement, 40 mEq, PRN   Or  potassium chloride, 10 mEq, PRN  magnesium sulfate, 2,000 mg, PRN  bisacodyl, 5 mg, Daily PRN  albuterol, 2.5 mg, Q6H PRN        Diagnostics:  TTE 8/2/22  Summary   Normal left ventricular size and systolic function. There were no regional wall motion abnormalities. Wall thickness was within normal limits. Ejection fraction was estimated at 55-60%. There was trace aortic regurgitation. There was trace mitral regurgitation. Device lead seen in the right heart. There was trace tricuspid regurgitation. Assuming RAP = 15 mmHg, the   estimated RVSP = 54 mmHg. IVC size is moderate to severely dilated with reduced respiratory   variation (CVP~10-15 mmHg). Signature      ----------------------------------------------------------------   Electronically signed by Isidro Cheng MD (Interpreting   physician) on 08/02/2022 at 02:21 PM      Lab Data:    Cardiac Enzymes:  No results for input(s): CKTOTAL, CKMB, CKMBINDEX, TROPONINI in the last 72 hours.     CBC:   Lab Results   Component Value Date/Time    WBC 5.4 08/02/2022 06:30 AM    RBC 3.62 08/02/2022 06:30 AM    RBC 4.17 08/30/2011 10:27 AM    HGB 11.3 08/02/2022 06:30 AM    HCT 36.3 08/02/2022 06:30 AM     08/02/2022 06:30 AM       CMP:    Lab Results   Component Value Date/Time     08/02/2022 06:30 AM    K 3.5 08/02/2022 06:30 AM     08/02/2022 06:30 AM    CO2 26 08/02/2022 06:30 AM    BUN 18 08/02/2022 06:30 AM    CREATININE 0.7 08/02/2022 06:30 AM    LABGLOM 78 08/02/2022 06:30 AM    GLUCOSE 129 08/02/2022 06:30 AM    GLUCOSE 116 08/30/2011 10:27 AM    CALCIUM 9.1 08/02/2022 06:30 AM       Hepatic Function Panel:    Lab Results   Component Value Date/Time    ALKPHOS 73 07/31/2022 10:16 AM    ALT 14 07/31/2022 10:16 AM    AST 24 07/31/2022 10:16 AM    PROT 7.7 07/31/2022 10:16 AM    BILITOT 1.3 07/31/2022 10:16 AM    BILIDIR <0.2 01/19/2021 07:05 AM    LABALBU 3.6 07/31/2022 10:16 AM    LABALBU 4.2 08/30/2011 10:27 AM       Magnesium:    Lab Results   Component Value Date/Time    MG 2.0 08/02/2022 06:30 AM       PT/INR:    Lab Results   Component Value Date/Time    INR 1.17 07/31/2022 08:23 PM       HgBA1c:    Lab Results   Component Value Date/Time    LABA1C 5.9 05/03/2022 08:53 AM       FLP:    Lab Results   Component Value Date/Time    TRIG 104 05/03/2022 08:53 AM    HDL 48 05/03/2022 08:53 AM    LDLCALC 98 05/03/2022 08:53 AM       TSH:    Lab Results   Component Value Date/Time    TSH 2.000 07/31/2022 05:04 PM         Assessment:    S/p fall  Near syncope  Weakness  Orthostasis secondary increased diuretic use, orthostatics negative today  Chronic diastolic CHF  Ef 19-44 per TTE 1/2021, ef 55-60 per TTE 8/2/22  Afib   On xarelto prior to admission  HTN  HLD  Hx PPM - chronically v-paced      Plan:    Daily I/o and weights  2 liter fluid restriction and 2gm sodium diet  Keep mag >2 and K >4  Cont asa//BB/bumex  Referral to chf clinic - f/up 1 week  F/up dr priest 4-6 weeks  Will follow prn        Electronically signed by Roseanna Acuna PA-C on 8/2/2022 at 10:19 AM

## 2022-08-02 NOTE — DISCHARGE INSTRUCTIONS
Follow-up with oIn Orellana in GI within 4 weeks. Please follow-up with the congestive heart failure clinic within 1 week. Follow-up with Dr. Marni Yousif in 2-4 weeks  Is okay for her to take your Bumex. I did increase her potassium to 40 mEq daily from 20 mEq daily. Take as directed. Please get repeat electrolyte panel collected within 1 week and follow-up outpatient with your PCP for reevaluation of your potassium level.   If you were to experience any new or worsening symptoms please seek immediate medical care or return back to the emergency department  Your new dry weight is 158-160 lbs

## 2022-08-02 NOTE — DISCHARGE SUMMARY
Hospital Medicine Discharge Summary      Patient Identification:   Calos Romeo   : 1928  MRN: [id number]   Account: [de-identified]      Patient's PCP: Stella Castillo MD    Admit Date: 2022     Discharge Date:   22     Admitting Physician: No admitting provider for patient encounter. Discharging Nurse Practitioner: NOELLE Nunn - CNP     Discharge Diagnoses with Assessment/Plan:    Dyspnea on exertion, resolved              - Secondary to over diuresis from metolazone and Bumex, compounded by hypokalemia. - Cardiology want patient's new dry weight to be 158- 160 lbs              - Will need to f/u in CHF clinic within 1-2 weeks upon discharge              - Will need to f/u with Dr. eJnn Eason within 1-2 weeks upon discharge. -TSH 2, free T4 1.3              - Covid negative              - Flu A/B negative              - BNP normal at OSH, troponin x 2 normal   - Daily ortho stats negative  - (all of them negative)              - Echo notable for EF 55-60%, trace aortic regurg, trace mitral regurg, trace fluid in the right heart, trace tricuspid regurg, RVSP 54 mmHg      Severe hypokalemia, improving              -Potassium 3.5 today, up from 2.7 (POA). Patient supplemented with 40 mEq of potassium prior to discharge. Rx written for 40 mEq Klor Con daily.              -Potassium replacement protocol in hospital, discontinued upon discharge  - Repeat BMP ordered to be collected in 1 week and f/u with PCP in 1 week. Positive FOBT              -Continue PPI and carafate BID              - H&H remains stable, continue to monitor.               -Iron studies notable for ferritin 54, iron 72, iron sat 23, TIBC 310, folate and vitamin B12 normal              - Okay to resume iron at discharge              - Nursing to monitor stool output and document              -Okay to resume ASA, Xarelto per GI              -GI consulted, appreciate recs: F/u with Tammy Acuna in 4 weeks as outpatient     S/p fall at home 7/31              -She reports she tried to walk her self to the bathroom. States as she was walking back from the bathroom she was able to sit on the edge of her bed when she began to feel dizzy and faint, and slid off the edge of the bed, hitting her head and left shoulder on the ground. She states that she did not lose consciousness after hitting her head. She states she did not blackout or lose consciousness while sliding off the bed. Denies neck pain, back pain, left shoulder pain at this time. -CT head at [facility] negative for acute intracranial hemorrhage mass-effect or midline shift. -X-ray left shoulder at [facility] negative for acute fracture or dislocation              -Monitor for hematoma formation on left shoulder, delayed head bleed signs and symptoms. Neuro checks. Lidocaine patch for left shoulder as needed for pain. Metabolic alkalosis with mild hypochloremia, resolved  - CO2 37, chloride 93 at OSH (PTA). Likely multifactorial; Suspect likely contraction alkalosis secondary to diuresing with Bumex and metolazone, and d/t severe hypokalemia.    - S/p gentle IVF rehydration and potassium repletion   - Okay to resume home Bumex. D/c home with Klor Con 40 mEq  -Repeat BMP ordered to be collected in 1 week and f/u with PCP in 1 week. Atrial fibrillation              -On Xarelto, okay to resume per GI              -On metoprolol, rate controlled-continue              -Continuous telemetry -discontinue at discharge     Chronic diastolic heart failure, compensated              -States her dry weight is normally 153 pounds. Patient currently 156 pounds 6.4 ounces.   Appears grossly euvolemic, lungs CTAB, no LE edema  -Echo (8/1/22) notable for EF estimated 55 to 60%, trace aortic regurg, trace mitral regurg, trace fluid in the right heart, trace tricuspid regurg, RVSP 54 mmHg              - Continue BB, Bumex, statin              -Stop metolazone per cardiology              - Okay to resume ASA and Xarelto per GI              - Daily weights, strict I&O              - Cardiac diet   - Per cardiology patient's new dry weight should be 158-160 lbse                   CAD s/p PCI and [employer] single pacemaker placement 4/26/2016              - Poncejoie Dobson outpatient              - ECG notable for ventricular paced rhythm, prolonged Qtc secondary to ventricular pacing  - Continue BB              - Okay to resume ASA and xarelto per GI     Chronic DIMITRIS              -Takes oral iron at home. Continue              - H&H stable. Transfuse for hemoglobin < 7 g/dL              -Iron studies notable for ferritin 54, iron 72, iron sat 23, TIBC 310, folate and vitamin B12 normal  - Daily lab     CKD stage II              -Baseline range ~ 0.5-0.9              - Cr stable in baseline range. - Daily lab     Hyperlipidemia:   - Statin     Essential hypertension              -Continue Bumex, BB     GERD              - Continue PPI, carafate     Depression              - Continue Effexor     Weakness secondary to hypokalemia, dehydration due to adverse effect of metolazone dosage increase  - PT/OT consulted, appreciate recs -> recommended HH. Offered to patient and she declined services at this time. The patient was seen and examined on day of discharge and this discharge summary is in conjunction with any daily progress note from day of discharge. Hospital Course:   80 y.o. female with a past medical history of atrial fibrillation, on Xarelto, CAD, HTN, HLD, chronic diastolic heart failure, DIMITRIS, GERD, CKD stage II who [facility] to [facility] as a direct admit from [facility] ambulatory care center for increased weakness, dyspnea on exertion, fall.   Patient reports that she followed up with her cardiologist Dr. Jacobo Phillips in office about a week and a half ago due to having increased weight gain and weighing approximately 162 pounds. She was started on metolazone Monday Wednesday Friday in addition to continuing her Bumex at home to help her get back to her dry weight of 153 pounds. Patient reports that for the past week she has had increasing symptoms of dyspnea on exertion, extreme weakness, fatigue and decreased activity intolerance. She states that she is not able to walk distances like she used to, and gets very fatigued and short of breath and has to take many breaks in between. States ambulating from her bed to the bathroom causes her extreme fatigue and shortness of breath. States she gets very wheezy when exerting herself, and feels lightheaded at times. Reports she has difficulty getting herself out of her chair because of how weak she is. She reports earlier this morning she tried to walk her self to the bathroom. States as she was walking back from the bathroom she was able to sit on the edge of her bed when she began to feel dizzy and faint, and slid off the edge of the bed, hitting her head and left shoulder on the ground. She states that she did not lose consciousness after hitting her head. She states she did not blackout or lose consciousness while sliding off the bed. Denies neck pain, back pain, left shoulder pain at this time. She denies chest pain, palpitations, orthopnea, PND, shortness of breath at rest, leg swelling, headache, blurred vision, double vision, numbness or tingling in her extremities. She denies body aches, abdominal pain, nausea, vomiting, diarrhea, fever, chills, exposures to ill persons. Does report that she has a chronic cough that has not been productive. States that she is eating and drinking well. States that she has recently been having black stools but states \"they are always black because I take iron. \"  States she has not taken iron in the past 2 days.   Patient's daughter is at bedside during time of obtaining history and informed me that her mother has had GI bleeding before in the past and confirmed on an EGD approximately 4 5 years ago. ED work-up significant for: ECG notable for ventricular paced rhythm with prolonged QTC, CT head negative for acute intracranial hemorrhage, mass-effect or midline shift, x-ray left shoulder negative for acute fracture or dislocation chest x-ray showing no acute intrathoracic process. Significant for potassium 2.7, chloride 93, CO2 37, bun 31, creatinine 1.1, EGFR 49, glucose 161, troponin normal, proBNP normal, H&H notable, fecal occult blood test positive. At this time patient admitted to the hospital for further work-up and management. 8/1/22: Patient sitting up in bedside recliner, well-appearing, no apparent distress. Remains afebrile, with hemodynamically stable vital signs. Patient stating she is feeling better today, not as weak or tired. Still reporting some subjective shortness of breath with exertion but is moving short distances well. Patient evaluated by cardiology services who recommend holding her metolazone, continuing her Bumex. Recommended gentle hydration, daily orthostats and keeping her new dry weight between 158-160 pounds. Patient also evaluated by GI services for positive fecal occult blood test.  Stated okay to resume her PPI and Carafate twice daily, and that it was okay to resume her aspirin and Xarelto. Recommended that she follow-up within 5 weeks with NOELLE Jackson. Awaiting to get echo and PT/OT recommendations. Possible discharge tomorrow or the next day. 8/2/22: Patient sitting up in bed, well-appearing, no apparent distress. Remains afebrile, satting 4% on room air, hemodynamically stable vital signs. No acute events overnight. Patient stating she is feeling better. States she is no longer having shortness of breath at rest or with exertion. States she feels the best she has in a couple days.   She denies chest pains, palpitations, dizziness, lightheadedness, abdominal pain, nausea, vomiting, diarrhea, fever, chills. States she is eating and drinking okay. Cardiac work-up negative. Echo showing shows a EF of 55-60%, no significant mechanical causes. Dyspnea on exertion and weakness likely secondary to overdiuresis from metolazone and Bumex, and hypokalemia from overdiuresis. Patient has been supplemented with multiple doses of IV and p.o. potassium. Her potassium this morning is 3.5. She was supplemented with 40 mEq prior to discharge. We will be sending her home with a prescription for 40 mill equivalents of potassium daily, this is increased from her previous prescription of 20 mEq. Patient okay to resume iron, aspirin, Xarelto. Patient instructed she needs to follow-up with GI services in 4 weeks due to her positive fecal occult blood test.  Patient instructed she needs to follow-up with Dr. Fadia Temple in cardiology in 2 to 4 weeks. Instructed she needs to follow-up at the congestive heart failure clinic within 1 week. Patient was evaluated by PT/OT services on this admission, home health was recommended. I discussed this with the patient and her daughter who was at bedside at time of my examination. Patient is declining additional home health services at this time. Patient instructed that if she change her mind and wanted home health services she can go to her primary care provider who can write the orders. Patient also instructed she needs to get repeat BMP collected within one week for reevaluation of her low potassium levels. Instructed her that she would benefit from following up with her PCP within 1 week to get this done. Prior to discharge patient and her daughter were offered opportunities to ask questions. At this time patient is stable for discharge home.       Exam:     Vitals:  Vitals:    08/02/22 0349 08/02/22 0426 08/02/22 0730 08/02/22 1151   BP: 123/62  (!) 137/50 133/63   Pulse: 70  70 70   Resp: 16  18 18   Temp: 97.7 °F (36.5 °C)  98.3 °F (36.8 °C) 98.6 °F (37 °C)   TempSrc: Oral  Oral Oral   SpO2: 97%  96% 95%   Weight:  157 lb 9 oz (71.5 kg)     Height:         Weight: Weight: 157 lb 9 oz (71.5 kg)     24 hour intake/output:  Intake/Output Summary (Last 24 hours) at 8/2/2022 1436  Last data filed at 8/2/2022 1345  Gross per 24 hour   Intake 2516.99 ml   Output 2 ml   Net 2514.99 ml       General appearance:  No apparent distress, appears stated age and cooperative. HEENT:  Normal cephalic, atraumatic without obvious deformity. Pupils equal, round, and reactive to light. Conjunctivae/corneas clear. Oral mucosa moist.   Neck: Supple, with full range of motion. No jugular venous distention. Trachea midline. Respiratory:  Normal respiratory effort, able to speak full clear sentences. Clear to auscultation, bilaterally without Rales/Wheezes/Rhonchi. Cardiovascular:  Regular rate and rhythm with normal S1/S2 without murmurs, rubs or gallops. No lower extremity edema. Abdomen: Soft, non-tender, non-distended with normal bowel sounds. Musculoskeletal:  No clubbing, cyanosis or edema bilaterally. Full range of motion without deformity. 5/5 strength throughout x 4 extremities. No hematoma formation over left shoulder  Skin: Skin color, texture, turgor normal.    Neurologic:  Neurovascularly intact without any focal sensory/motor deficits. Cranial nerves: II-XII intact, grossly non-focal.  Psychiatric:  Alert and oriented, thought content appropriate  Capillary Refill: Brisk,< 3 seconds  Peripheral Pulses: +2 palpable, equal bilaterally    Labs:  For convenience and continuity at follow-up the following most recent labs are provided:      CBC:    Lab Results   Component Value Date/Time    WBC 5.4 08/02/2022 06:30 AM    HGB 11.3 08/02/2022 06:30 AM    HCT 36.3 08/02/2022 06:30 AM     08/02/2022 06:30 AM       Renal:    Lab Results   Component Value Date/Time     08/02/2022 06:30 AM    K 3.5 08/02/2022 06:30 AM     08/02/2022 06:30 AM    CO2 26 08/02/2022 06:30 AM    BUN 18 08/02/2022 06:30 AM    CREATININE 0.7 08/02/2022 06:30 AM    CALCIUM 9.1 08/02/2022 06:30 AM    PHOS 3.2 07/31/2022 05:04 PM       Cardiac:   Recent Labs     07/31/22  1705 07/31/22 2023   TROPONINT < 0.010 < 0.010       Significant Diagnostic Studies    Radiology:   XR SHOULDER LEFT (MIN 2 VIEWS)   Final Result   No acute fracture or dislocation at the left shoulder. **This report has been created using voice recognition software. It may contain minor errors which are inherent in voice recognition technology. **      Final report electronically signed by Dr Sofia Rodriguez on 7/31/2022 11:03 AM      XR CHEST (2 VW)   Final Result   There is no acute intrathoracic process. **This report has been created using voice recognition software. It may contain minor errors which are inherent in voice recognition technology. **      Final report electronically signed by Dr Sofia Rodriguez on 7/31/2022 11:02 AM      CT HEAD WO CONTRAST   Final Result      1. No acute intracranial hemorrhage, mass effect or midline shift. 2. Chronic senescent changes of the brain including generalized atrophy and chronic microvascular ischemic disease in the white matter. **This report has been created using voice recognition software. It may contain minor errors which are inherent in voice recognition technology. **      Final report electronically signed by Dr Sofia Rodriguez on 7/31/2022 10:58 AM             Consults:     IP CONSULT TO GI  IP CONSULT TO CARDIOLOGY  IP CONSULT TO HEART FAILURE NURSE/COORDINATOR    Disposition:    [x] Home       [] TCU       [] Rehab       [] Psych       [] SNF       [] [place]       [] Other-    Condition at Discharge: Stable    Code Status:  Limited     Pending tests at discharge: None    Patient Instructions:    Discharge lab work: BMP  Activity: activity as tolerated  Diet: ADULT DIET; Regular;  Low Fat/Low Chol/High Fiber/2 gm Na      Follow-up visits:   Diana Reddy MD  [address], [address]  [address] [facility]    Follow up on 8/8/2022  Follow up appointment August 8, 2022 at 10:45am.    Birtha Husbands  [address], [address] [facility]  [phone]  Follow up on 9/8/2022  Follow up appointment September 8, 2022 at 1:30pm.    Dee Dailey PA-C  [address] [facility] [address]  [phone]    Follow up on 8/31/2022  Follow up appointment August 31, 2022 at 11:30am.    [facility]  [address]  [phone]  Follow up in 1 week(s)  Office will contact patient for a 1 week follow up appointment. [address]  [address] [place] [address]  [phone]    Follow up in 1 week(s)  Repeat lab work collected and follow up on [address]  [address] [place] [address]  [phone]               Discharge Medications:        Medication List        CHANGE how you take these medications      * potassium chloride 20 MEQ extended release tablet  Commonly known as: KLOR-CON M  Take 2 tablets by mouth in the morning. What changed:   how much to take  how to take this  when to take this  additional instructions     * potassium chloride 20 MEQ extended release tablet  Commonly known as: KLOR-CON M  Take 2 tablets by mouth in the morning. What changed: You were already taking a medication with the same name, and this prescription was added. Make sure you understand how and when to take each. * This list has 2 medication(s) that are the same as other medications prescribed for you. Read the directions carefully, and ask your doctor or other care provider to review them with you.                 CONTINUE taking these medications      acetaminophen 500 MG tablet  Commonly known as: TYLENOL     aspirin 81 MG chewable tablet  Take 1 tablet by mouth daily     bumetanide 1 MG tablet  Commonly known as: BUMEX  Take 1 tablet by mouth daily     CALCIUM PO     docusate sodium 100 MG capsule  Commonly known as: COLACE     ferrous sulfate 325 (65 Fe) MG tablet  Commonly known as: IRON 325     magnesium 250 MG Tabs tablet  Commonly known as: MAGNESIUM-OXIDE     metoprolol tartrate 25 MG tablet  Commonly known as: LOPRESSOR  Take 1 tablet by mouth 2 times daily     MULTIVITAMIN ADULT PO     pantoprazole 40 MG tablet  Commonly known as: PROTONIX  Take 1 tablet by mouth 2 times daily     PROBIOTIC DAILY PO     rivaroxaban 15 MG Tabs tablet  Commonly known as: XARELTO  Take 1 tablet by mouth Daily with supper     sucralfate 1 GM tablet  Commonly known as: CARAFATE  TAKE 1 TABLET BY MOUTH  TWICE DAILY     venlafaxine 75 MG extended release capsule  Commonly known as: EFFEXOR XR  TAKE 1 CAPSULE BY MOUTH  DAILY     VITAMIN B 12 PO            STOP taking these medications      metOLazone 5 MG tablet  Commonly known as: ZAROXOLYN               Where to Get Your Medications        These medications were sent to Fidencio Sahu, [facility] [place] [facility] [phone]  [address] [id number]      Phone: [phone]   potassium chloride 20 MEQ extended release tablet  potassium chloride 20 MEQ extended release tablet         Time Spent on discharge is more than 1.5 hours in the examination, evaluation, counseling and review of medications and discharge plan. Signed: Thank you Ene Smith MD for the opportunity to be involved in this patient's care.     Electronically signed by NOELLE Mendoza CNP on 8/2/2022 at 2:36 PM

## 2022-08-03 ENCOUNTER — CARE COORDINATION (OUTPATIENT)
Dept: CASE MANAGEMENT | Age: 87
End: 2022-08-03

## 2022-08-03 DIAGNOSIS — R53.1 WEAKNESS: Primary | ICD-10-CM

## 2022-08-03 PROCEDURE — 1111F DSCHRG MED/CURRENT MED MERGE: CPT | Performed by: FAMILY MEDICINE

## 2022-08-03 NOTE — TELEPHONE ENCOUNTER
[facility] Transitions Initial Follow Up Call    Outreach made within 2 business days of discharge: Yes    Patient: Calos Romeo Patient : 1928   MRN: [id number]  Reason for Admission: There are no discharge diagnoses documented for the most recent discharge. Discharge Date: 22       Spoke with: patient     Discharge department/facility: [facility] Interactive Patient Contact:  Was patient able to fill all prescriptions: no, pt POA going to call pharmacy  Was patient instructed to bring all medications to the follow-up visit: Yes  Is patient taking all medications as directed in the discharge summary?  Yes  Does patient understand their discharge instructions: Yes  Does patient have questions or concerns that need addressed prior to 7-14 day follow up office visit: no    Scheduled appointment with PCP within 7-14 days    Follow Up  Future Appointments   Date Time Provider Radha Hicks   2022 10:45 AM MD Marcin Tubbs  [address] [place] [address]   2022  1:00 PM NOELLE Mas - CNP N [facility] CHF [facility]   2022 11:30 AM Kennedy Caballero PA-C [address] [place] [facility]   2022 11:00 AM SCHEDULE, SRPS PACER NURSE N [facility] PACER [facility]   2022  9:45 AM MD Willa Thomas Hrt [facility]   2022 11:00 AM MD Beni Tubbs [facility] Jalil Alexander LPN

## 2022-08-03 NOTE — CARE COORDINATION
[facility] Transitions Initial Follow Up Call    Call within 2 business days of discharge: Yes    Patient: Jack Guerrero Patient : 1928   MRN: [id number]  Reason for Admission: Fall, SOB, Weakness, Fatigue  Discharge Date: 22 RARS: Readmission Risk Score: 12.8      Last Discharge [facility]       Date Complaint Diagnosis Description Type Department Provider    22 Fatigue; Fall; Shortness of Breath Fall, initial encounter . .. ED to Hosp-Admission (Discharged) (ADMITTED) NICKIE Diggs, NOELLE - CHERELLE; Tena Devine. .. Spoke with: Wilberto De Leon, patient's daughter [employer])    Scheduled appointment with PCP-has appointment on 22  Scheduled appointment with Specialist-has appointment at the CHF clinic on 22 and Cardiology on 22  Obtained and reviewed discharge summary and/or continuity of care documents    Contacted patient for initial care transition follow up. Spoke with patient's daughter Wilberto De Leon [employer]). She states her mother did fine overnight. She was able to get some sleep. Reports shortness of breath is \"much improved since last week\". No distress. Continues to have weakness and fatigue which has not worsened. Wilberto De Leon states she understands that it will take some time to get her strength and energy back. Denies having any falls since returning home. Patient resides alone in [facility] but daughter lives on the other side. Wilberto De Leon states that she will answer the phone if she is there with her mother but if she is not, her mother will answer. Jun Best is getting ready to eat her breakfast.      Reviewed medication list.  1111F completed. Reviewed changed and stopped medications. No questions regarding her medications. Jun Best has her appointment with PCP on 22, CHF clinic on 22 and cardiology on 22. Wilberto De Leon is aware of when to contact providers with any new or worsening symptoms. No questions or needs at this time.       Transitions of Care Initial Call    Was this an external facility discharge? No Discharge Facility: [facility] to be reviewed by the provider   Additional needs identified to be addressed with provider: No  none             Method of communication with provider : none    Advance Care Planning:   Does patient have an Advance Directive: On file . Care Transition Nurse contacted the family by telephone to perform post hospital discharge assessment. Verified name and  with family as identifiers. Provided introduction to self, and explanation of the CTN role. Medication reconciliation was performed with family, who verbalizes understanding of administration of home medications. Was patient discharged with a pulse oximeter? no    CTN provided contact information. Plan for follow-up call in 5-7 days based on severity of symptoms and risk factors.   Plan for next call: symptom management-increased weakness and fatigue, SOB, falls, swelling      Care Transitions 24 Hour Call    Do you have a copy of your discharge instructions?: Yes  Do you have all of your prescriptions and are they filled?: Yes  Have you been contacted by a [employer] [employer]?: No  Have you scheduled your follow up appointment?: Yes  How are you going to get to your appointment?: Car - family or friend to transport  [address]: [address] (Comment: [facility])  Patient DME: [place] you have support at home?: Alone  Do you feel like you have everything you need to keep you well at home?: Yes  Care Transitions Interventions         Follow Up  Future Appointments   Date Time Provider Radha Hicks   2022 10:45 AM MD Marcin Masterson  [address]   2022  1:00 PM NOELLE Amato - CNP N SRPX [facility] [address]   2022 11:30 AM Danielle Dupree PA-C [address] [facility]   2022 11:00 AM SCHEDULE, SRPS PACER NURSE N SRPX PACER [facility]   2022  9:45 AM Karolynn Gutting, MD Sharl Schaumann [facility] [address]   2022 11:00

## 2022-08-04 ENCOUNTER — NURSE ONLY (OUTPATIENT)
Dept: LAB | Age: 87
End: 2022-08-04

## 2022-08-05 LAB
ANION GAP SERPL CALCULATED.3IONS-SCNC: 11 MEQ/L (ref 8–16)
BUN BLDV-MCNC: 24 MG/DL (ref 7–22)
CALCIUM SERPL-MCNC: 9.8 MG/DL (ref 8.5–10.5)
CHLORIDE BLD-SCNC: 99 MEQ/L (ref 98–111)
CO2: 31 MEQ/L (ref 23–33)
CREAT SERPL-MCNC: 0.8 MG/DL (ref 0.4–1.2)
GFR SERPL CREATININE-BSD FRML MDRD: 67 ML/MIN/1.73M2
GLUCOSE BLD-MCNC: 183 MG/DL (ref 70–108)
POTASSIUM SERPL-SCNC: 3.9 MEQ/L (ref 3.5–5.2)
SODIUM BLD-SCNC: 141 MEQ/L (ref 135–145)

## 2022-08-08 ENCOUNTER — OFFICE VISIT (OUTPATIENT)
Dept: FAMILY MEDICINE CLINIC | Age: 87
End: 2022-08-08
Payer: MEDICARE

## 2022-08-08 VITALS
BODY MASS INDEX: 30.18 KG/M2 | RESPIRATION RATE: 17 BRPM | HEIGHT: 62 IN | TEMPERATURE: 97.3 F | DIASTOLIC BLOOD PRESSURE: 62 MMHG | HEART RATE: 71 BPM | OXYGEN SATURATION: 97 % | SYSTOLIC BLOOD PRESSURE: 115 MMHG | WEIGHT: 164 LBS

## 2022-08-08 DIAGNOSIS — Z09 HOSPITAL DISCHARGE FOLLOW-UP: Primary | ICD-10-CM

## 2022-08-08 DIAGNOSIS — R53.81 PHYSICAL DECONDITIONING: ICD-10-CM

## 2022-08-08 DIAGNOSIS — D50.0 IRON DEFICIENCY ANEMIA DUE TO CHRONIC BLOOD LOSS: ICD-10-CM

## 2022-08-08 DIAGNOSIS — I50.32 CHRONIC DIASTOLIC CHF (CONGESTIVE HEART FAILURE) (HCC): ICD-10-CM

## 2022-08-08 DIAGNOSIS — N18.31 STAGE 3A CHRONIC KIDNEY DISEASE (HCC): ICD-10-CM

## 2022-08-08 PROBLEM — N18.30 CHRONIC RENAL DISEASE, STAGE III (HCC): Status: ACTIVE | Noted: 2022-08-08

## 2022-08-08 PROCEDURE — 99495 TRANSJ CARE MGMT MOD F2F 14D: CPT | Performed by: FAMILY MEDICINE

## 2022-08-08 ASSESSMENT — PATIENT HEALTH QUESTIONNAIRE - PHQ9
7. TROUBLE CONCENTRATING ON THINGS, SUCH AS READING THE NEWSPAPER OR WATCHING TELEVISION: 0
SUM OF ALL RESPONSES TO PHQ QUESTIONS 1-9: 0
5. POOR APPETITE OR OVEREATING: 0
SUM OF ALL RESPONSES TO PHQ9 QUESTIONS 1 & 2: 0
SUM OF ALL RESPONSES TO PHQ QUESTIONS 1-9: 0
9. THOUGHTS THAT YOU WOULD BE BETTER OFF DEAD, OR OF HURTING YOURSELF: 0
3. TROUBLE FALLING OR STAYING ASLEEP: 0
1. LITTLE INTEREST OR PLEASURE IN DOING THINGS: 0
10. IF YOU CHECKED OFF ANY PROBLEMS, HOW DIFFICULT HAVE THESE PROBLEMS MADE IT FOR YOU TO DO YOUR WORK, TAKE CARE OF THINGS AT HOME, OR GET ALONG WITH OTHER PEOPLE: 0
SUM OF ALL RESPONSES TO PHQ QUESTIONS 1-9: 0
8. MOVING OR SPEAKING SO SLOWLY THAT OTHER PEOPLE COULD HAVE NOTICED. OR THE OPPOSITE, BEING SO FIGETY OR RESTLESS THAT YOU HAVE BEEN MOVING AROUND A LOT MORE THAN USUAL: 0
4. FEELING TIRED OR HAVING LITTLE ENERGY: 0
SUM OF ALL RESPONSES TO PHQ QUESTIONS 1-9: 0
2. FEELING DOWN, DEPRESSED OR HOPELESS: 0
6. FEELING BAD ABOUT YOURSELF - OR THAT YOU ARE A FAILURE OR HAVE LET YOURSELF OR YOUR FAMILY DOWN: 0

## 2022-08-08 NOTE — PROGRESS NOTES
Post-Discharge Transitional Care  Follow Up      Liyah Amato   YOB: 1928    Date of Office Visit:  8/8/2022  Date of Hospital Admission: 7/31/22  Date of Hospital Discharge: 8/2/22  Risk of hospital readmission (high >=14%. Medium >=10%) :Readmission Risk Score: 12.8      Care management risk score Rising risk (score 2-5) and Complex Care (Scores >=6): No Risk Score On File     Non face to face  following discharge, date last encounter closed (first attempt may have been earlier): 08/03/2022    Call initiated 2 business days of discharge: Yes    ASSESSMENT/PLAN:   Hospital discharge follow-up    Medical Decision Making: high complexity  Return if symptoms worsen or fail to improve. On this date 8/8/2022 I have spent 30 minutes reviewing previous notes, test results and face to face with the patient discussing the diagnosis and importance of compliance with the treatment plan as well as documenting on the day of the visit. Subjective:   HPI:  Follow up of Hospital problems/diagnosis(es): syncope due to dehydration and hypokalemia and hyponatremia. Inpatient course: Discharge summary reviewed- see chart.     Interval history/Current status: improved, fair    Patient Active Problem List   Diagnosis    Hypertension    Fatigue    Osteoarthritis    Stress incontinence    History of colon cancer    Hyperlipidemia    Osteopenia    Atrial fibrillation with rapid ventricular response (HCC)    Near syncope    Urinary tract infection without hematuria    Sick sinus syndrome (HCC)    Bradycardia    [employer] single pacemaker 4/26/2016    VACA (dyspnea on exertion)    Mild anemia    Steatosis of liver    Pleural effusion    Chronic diastolic CHF (congestive heart failure) (HCC)    Iron deficiency anemia    Gastric polyps    Acute on chronic congestive heart failure with left ventricular diastolic dysfunction (HCC)    Coronary artery disease involving native heart with angina pectoris ([address] Chronic atrial fibrillation with rapid ventricular response ([facility])    CAD (coronary artery disease)    GERD (gastroesophageal reflux disease)    Angina, class III ([facility])    Gastroenteritis    Diarrhea of presumed infectious origin    Bilious vomiting with nausea    Physical deconditioning    Occult blood in stools    Generalized weakness    RUQ abdominal pain    Hyperglycemia    Hypomagnesemia    Kidney cysts    Chronic depression    PVD (peripheral vascular disease) ([facility])    CHF (congestive heart failure), NYHA class I, unspecified failure chronicity, unspecified type ([facility] [address]    Acute on chronic systolic congestive heart failure ([facility])    Recurrent major depressive disorder, in full remission ([facility] [address]    Weakness    Fall       Medications listed as ordered at the time of discharge from hospital     Medication List            Accurate as of August 8, 2022 11:12 AM. If you have any questions, ask your nurse or doctor. CONTINUE taking these medications      acetaminophen 500 MG tablet  Commonly known as: TYLENOL     aspirin 81 MG chewable tablet  Take 1 tablet by mouth daily     bumetanide 1 MG tablet  Commonly known as: BUMEX  Take 1 tablet by mouth daily     CALCIUM PO     docusate sodium 100 MG capsule  Commonly known as: COLACE     ferrous sulfate 325 (65 Fe) MG tablet  Commonly known as: IRON 325     magnesium 250 MG Tabs tablet  Commonly known as: MAGNESIUM-OXIDE     metoprolol tartrate 25 MG tablet  Commonly known as: LOPRESSOR  Take 1 tablet by mouth 2 times daily     MULTIVITAMIN ADULT PO     pantoprazole 40 MG tablet  Commonly known as: PROTONIX  Take 1 tablet by mouth 2 times daily     potassium chloride 20 MEQ extended release tablet  Commonly known as: KLOR-CON M  Take 2 tablets by mouth in the morning.      PROBIOTIC DAILY PO     rivaroxaban 15 MG Tabs tablet  Commonly known as: XARELTO  Take 1 tablet by mouth Daily with supper     sucralfate 1 GM tablet  Commonly known as: CARAFATE  TAKE 1 TABLET BY MOUTH  TWICE DAILY     venlafaxine 75 MG extended release capsule  Commonly known as: EFFEXOR XR  TAKE 1 CAPSULE BY MOUTH  DAILY     VITAMIN B 12 PO                Medications marked \"taking\" at this time  Outpatient Medications Marked as Taking for the 8/8/22 encounter (Office Visit) with Kay Knapp MD   Medication Sig Dispense Refill    potassium chloride (KLOR-CON M) 20 MEQ extended release tablet Take 2 tablets by mouth in the morning. 60 tablet 3    Multiple Vitamin (MULTIVITAMIN ADULT PO) Take by mouth daily      venlafaxine (EFFEXOR XR) 75 MG extended release capsule TAKE 1 CAPSULE BY MOUTH  DAILY 90 capsule 3    rivaroxaban (XARELTO) 15 MG TABS tablet Take 1 tablet by mouth Daily with supper 90 tablet 3    bumetanide (BUMEX) 1 MG tablet Take 1 tablet by mouth daily 90 tablet 3    sucralfate (CARAFATE) 1 GM tablet TAKE 1 TABLET BY MOUTH  TWICE DAILY 180 tablet 3    metoprolol tartrate (LOPRESSOR) 25 MG tablet Take 1 tablet by mouth 2 times daily 180 tablet 3    CALCIUM PO Take by mouth Calcium chew      magnesium (MAGNESIUM-OXIDE) 250 MG TABS tablet Take 250 mg by mouth 2 times daily      ferrous sulfate (IRON 325) 325 (65 Fe) MG tablet Take 325 mg by mouth once a week      docusate sodium (COLACE) 100 MG capsule Take 100 mg by mouth daily as needed      Cyanocobalamin (VITAMIN B 12 PO) Take by mouth every other day      pantoprazole (PROTONIX) 40 MG tablet Take 1 tablet by mouth 2 times daily 180 tablet 3    Probiotic Product (PROBIOTIC DAILY PO) Take by mouth daily      aspirin 81 MG chewable tablet Take 1 tablet by mouth daily 30 tablet 3    acetaminophen (TYLENOL) 500 MG tablet Take 1,000 mg by mouth every 6 hours as needed for Pain           Medications patient taking as of now reconciled against medications ordered at time of hospital discharge: Yes    A comprehensive review of systems was negative except for what was noted in the HPI.     Objective:    /62 (Site: Left Lower Arm, Position: Sitting, Cuff Size: Medium Adult)   Pulse 71   Temp 97.3 °F (36.3 °C) (Temporal)   Resp 17   Ht 5' 2.01\" (1.575 m)   Wt 164 lb (74.4 kg)   LMP  (LMP Unknown)   SpO2 97%   BMI 29.99 kg/m²     Wt Readings from Last 3 Encounters:   08/08/22 164 lb (74.4 kg)   08/02/22 157 lb 9 oz (71.5 kg)   07/20/22 162 lb (73.5 kg)       Physical Exam   Constitutional: Vital signs are normal. She appears well-developed and well-nourished. She is active. HENT:   Head: Normocephalic and atraumatic. Right Ear: Tympanic membrane, external ear and ear canal normal. No drainage or tenderness. Left Ear: Tympanic membrane, external ear and ear canal normal. No drainage or tenderness. Nose: Nose normal. No mucosal edema or rhinorrhea. Mouth/Throat: Uvula is midline, oropharynx is clear and moist and mucous membranes are normal. Mucous membranes are not pale. Normal dentition. No posterior oropharyngeal edema or posterior oropharyngeal erythema. Eyes: Lids are normal. Right eye exhibits no chemosis and no discharge. Left eye exhibits no chemosis and no drainage. Right conjunctiva has no hemorrhage. Left conjunctiva has no hemorrhage. Right eye exhibits normal extraocular motion. Left eye exhibits normal extraocular motion. Right pupil is round and reactive. Left pupil is round and reactive. Pupils are equal.   Cardiovascular: Normal rate, regular rhythm, S1 normal, S2 normal and normal heart sounds. Exam reveals no gallop. No murmur heard. Pulmonary/Chest: Effort normal and breath sounds normal. No respiratory distress. She has no wheezes. She has no rhonchi. She has no rales. Abdominal: Soft. Normal appearance and bowel sounds are normal. She exhibits no distension and no mass. There is no hepatosplenomegaly. No tenderness. She has no rigidity, no rebound and no guarding. No hernia. Musculoskeletal:        Right lower leg: She exhibits no edema. Left lower leg: She exhibits no edema.

## 2022-08-09 ENCOUNTER — CARE COORDINATION (OUTPATIENT)
Dept: CASE MANAGEMENT | Age: 87
End: 2022-08-09

## 2022-08-09 NOTE — CARE COORDINATION
[facility] Transitions Follow Up Call    2022    Patient: Sheyla Failing  Patient : 1928   MRN: [id number]  Reason for Admission:  Fall, SOB, Weakness, Fatigue  Discharge Date: 22 RARS: Readmission Risk Score: 12.8         Spoke with: Tadeo, patient's daughter    Contacted patient for care transition follow up. Spoke briefly with patient's daughter Tadeo. She states her mother is doing just fine. Reports she had a follow up with PCP yesterday. Will be starting home health care for PT. Referral made to [facility]. Tadeo states her mother is getting around. Weakness has improved. Denies having any c/o chest pain/discomfort, increased shortness of breath, swelling. She is eating and drinking fluids w/o issues. She is aware of when to contact providers. No questions or needs at this time. CTN contacted [facility]. Spoke with Michelle Desai who confirmed they did receive the referral for patient. Care Transitions Follow Up Call    Needs to be reviewed by the provider   Additional needs identified to be addressed with provider: No  none             Method of communication with provider : none      Care Transition Nurse contacted the family by telephone to follow up after admission on 22. Verified name and  with family as identifiers. Addressed changes since last contact: home health care-Will be starting [place] [id number] for PT  Discussed follow-up appointments. If no appointment was previously scheduled, appointment scheduling offered: Yes. Is follow up appointment scheduled within 7 days of discharge? Yes. Patients top risk factors for readmission: medical condition-Falls, Chronic Renal Disease Stg III, CHF  Interventions to address risk factors: Education of patient/family/caregiver/guardian to support self-management-aware of when to contact providers    CTN provided contact information for future needs.  Plan for follow-up call in 7-10 days based on severity of symptoms and risk factors. Plan for next call: symptom management-weakness, fatigue, SOB, Swelling    Care Transitions Subsequent and Final Call    Subsequent and Final Calls  Do you have any ongoing symptoms?: No  Have your medications changed?: No  Do you have any questions related to your medications?: No  Do you currently have any active services?: No  Are you currently active with any services?: Home Health  Do you have any needs or concerns that I can assist you with?: No  Care Transitions Interventions  Other Interventions:              Follow Up  Future Appointments   Date Time Provider Radha Hicks   8/17/2022  1:00 PM NOELLE William - CNP N [facility]X CH[place]   8/31/2022 11:30 AM Krystle Muniz PA-C [address] [facility] [place]   9/29/2022 11:00 AM SCHEDULE, SRPS PACER NURSE N SRPX PACER [facility] [place]   11/9/2022  9:45 AM MD Michael Gomez [facility] [place]   11/16/2022 11:00 AM MD Lawyer Demetrio Rockwell [facility] - [place] RN

## 2022-08-17 ENCOUNTER — OFFICE VISIT (OUTPATIENT)
Dept: CARDIOLOGY CLINIC | Age: 87
End: 2022-08-17
Payer: MEDICARE

## 2022-08-17 ENCOUNTER — CARE COORDINATION (OUTPATIENT)
Dept: CASE MANAGEMENT | Age: 87
End: 2022-08-17

## 2022-08-17 VITALS
HEART RATE: 70 BPM | SYSTOLIC BLOOD PRESSURE: 120 MMHG | HEIGHT: 62 IN | OXYGEN SATURATION: 95 % | BODY MASS INDEX: 32.02 KG/M2 | DIASTOLIC BLOOD PRESSURE: 66 MMHG | WEIGHT: 174 LBS

## 2022-08-17 DIAGNOSIS — I50.32 CHRONIC DIASTOLIC CONGESTIVE HEART FAILURE, NYHA CLASS 2 (HCC): Primary | ICD-10-CM

## 2022-08-17 PROCEDURE — 99215 OFFICE O/P EST HI 40 MIN: CPT | Performed by: NURSE PRACTITIONER

## 2022-08-17 PROCEDURE — 1090F PRES/ABSN URINE INCON ASSESS: CPT | Performed by: NURSE PRACTITIONER

## 2022-08-17 PROCEDURE — G8427 DOCREV CUR MEDS BY ELIG CLIN: HCPCS | Performed by: NURSE PRACTITIONER

## 2022-08-17 PROCEDURE — 1036F TOBACCO NON-USER: CPT | Performed by: NURSE PRACTITIONER

## 2022-08-17 PROCEDURE — G8417 CALC BMI ABV UP PARAM F/U: HCPCS | Performed by: NURSE PRACTITIONER

## 2022-08-17 PROCEDURE — 1123F ACP DISCUSS/DSCN MKR DOCD: CPT | Performed by: NURSE PRACTITIONER

## 2022-08-17 PROCEDURE — 1111F DSCHRG MED/CURRENT MED MERGE: CPT | Performed by: NURSE PRACTITIONER

## 2022-08-17 ASSESSMENT — ENCOUNTER SYMPTOMS
WHEEZING: 0
SHORTNESS OF BREATH: 1
ABDOMINAL PAIN: 0
ABDOMINAL DISTENTION: 0
COUGH: 0

## 2022-08-17 NOTE — PATIENT INSTRUCTIONS
You may receive a survey regarding the care you received during your visit. Your input is valuable to us. We encourage you to complete and return your survey. We hope you will choose us in the future for your healthcare needs. Continue:  Continue current medications  Daily weights and record  Fluid restriction of 2 Liters per day  Limit sodium in diet to around 1998-3565 mg/day  Monitor BP  Activity as tolerated     Call the Heart Failure Clinic for any of the following symptoms: [phone]  Weight gain of 2-3 pounds in 1 day or 5 pounds in 1 week  Increased shortness of breath  Shortness of breath while laying down  Cough  Chest pain  Swelling in feet, ankles or legs  Tenderness or bloating in the abdomen  Fatigue   Decreased appetite or nausea   Confusion        Repeat blood work within in the next 2 weeks    No med changes today     Continue diet/fluid adherence  Continue daily wts.   F/U w/ Cardiology  F/U in clinic in 6 months

## 2022-08-17 NOTE — PROGRESS NOTES
Heart Failure Clinic       Visit Date: 8/17/2022  Cardiologist:  Dr. Maru Acosta  Primary Care Physician: Dr. Katherine Urrutia MD    Gil Moore is a 80 y.o. female who presents today for:  Chief Complaint   Patient presents with    Congestive Heart Failure       HPI:     TYPE HF: HFpEF 55-60%   Cause:   Device: Pacemaker  HX: atrial fibrillation, on Xarelto, CAD, HTN, HLD, chronic diastolic heart failure, DIMITRIS, GERD, CKD stage II, CAD s/p PCI (2 of RCA 2018, 1 of LAD 2019)  Dry Wt:  174 on 8/17/22      Gil Moore is a 80 y.o. female who presents to the office for a new patient visit in the heart failure clinic. Concerns today: Pt is here as a new pt but a patient back in 2020. Was discharged to [facility] but recently have a CHF exacerbation and was treated w/ metolazone causing dehydration and hypokalemia w/o JAZZY. Since discharge she has been taking same dose of bumex with no weight gain, did increase maintenance dose potassium. Urinating well, no worsening SOB, fatigue, bloating or swelling. Patient follows:      Hospitalization:      Admit Date: 7/31/2022   Discharge Date:   08/02/22      Dyspnea on exertion, resolved              - Secondary to over diuresis from metolazone and Bumex, compounded by hypokalemia. - Cardiology want patient's new dry weight to be 158- 160 lbs              - Will need to f/u in CHF clinic within 1-2 weeks upon discharge              - Will need to f/u with Dr. Maru Acosta within 1-2 weeks upon discharge. -TSH 2, free T4 1.3              - Covid negative              - Flu A/B negative              - BNP normal at OSH, troponin x 2 normal              - Daily ortho stats negative 7/31 -8/2 (all of them negative)              - Echo notable for EF 55-60%, trace aortic regurg, trace mitral regurg, trace fluid in the right heart, trace tricuspid regurg, RVSP 54 mmHg      Severe hypokalemia, improving              -Potassium 3.5 today, up from 2.7 (POA).  Patient supplemented with 40 mEq of potassium prior to discharge. Rx written for 40 mEq Klor Con daily.              -Potassium replacement protocol in hospital, discontinued upon discharge  - Repeat BMP ordered to be collected in 1 week and f/u with PCP in 1 week.     Activity: ADLs performed with some help  Diet: follows low sodium low fluid diet    Patient has:  Chest Pain: no  SOB: chronic VACA  Orthopnea/PND: no  PIPPA: no history  Edema: no  Fatigue: chronic  Abdominal bloating: no  Cough: good  Appetite: good      Past Medical History:   Diagnosis Date    Atrial fibrillation ([address]     Blood circulation, collateral     [employer] single pacemaker 4/26/2016 05/05/2016    CAD (coronary artery disease)     cath and stent in right artery    Cancer [facility]) 1954    HX  Colon     Carpal tunnel syndrome     Fall at home     12/7/21 -large bruise to R buttock    Fracture dislocation of ankle 1980    GERD (gastroesophageal reflux disease)     Hyperlipidemia     Hypertension     Kidney lesion, native, right     found on 5/2016    Osteoarthritis     knees    Osteopenia     Prolonged emergence from general anesthesia     Thyroid goiter 09/06/2016    Yersiniosis 2016     Past Surgical History:   Procedure Laterality Date    ABDOMEN SURGERY      ANKLE FRACTURE SURGERY  [id number]    reconstruction in 2003 and 2007    APPENDECTOMY      BLADDER SURGERY      support bladder repair    CARDIAC SURGERY      heart stent 12-11-18, [place]    CARPAL TUNNEL RELEASE  7/2013    CARPAL TUNNEL RELEASE Right 08/19/2017    Revision    CATARACT REMOVAL Bilateral     CHOLECYSTECTOMY  1986    COLON SURGERY  1954    COLONOSCOPY      ENDOSCOPY, COLON, DIAGNOSTIC      EYE SURGERY      cataract     FRACTURE SURGERY      HYSTERECTOMY (CERVIX STATUS UNKNOWN)  [address]    L knee    KNEE SURGERY Left     total replacement    PACEMAKER PLACEMENT      PTCA  01/15/2019    Successful PCI / Drug Eluting Stent of the proximal Left Anterior Descending Coronary Artery. UPPER GASTROINTESTINAL ENDOSCOPY Left 11/28/2018    EGD BIOPSY performed by Barbara Esteban MD at [address] ENDOSCOPY  11/28/2018    EGD CONTROL HEMORRHAGE performed by Barbara Esteban MD at [facility] Endoscopy     Family History   Problem Relation Age of Onset    Heart Disease Mother     High Blood Pressure Mother     Heart Disease Father     High Blood Pressure Father     Heart Disease Brother     High Blood Pressure Brother     Heart Disease Son      Social History     Tobacco Use    Smoking status: Never    Smokeless tobacco: Never   Substance Use Topics    Alcohol use: No     Current Outpatient Medications   Medication Sig Dispense Refill    Cholecalciferol (VITAMIN D-3 PO) Take by mouth daily      potassium chloride (KLOR-CON M) 20 MEQ extended release tablet Take 2 tablets by mouth in the morning.  60 tablet 3    Multiple Vitamin (MULTIVITAMIN ADULT PO) Take by mouth daily      venlafaxine (EFFEXOR XR) 75 MG extended release capsule TAKE 1 CAPSULE BY MOUTH  DAILY 90 capsule 3    rivaroxaban (XARELTO) 15 MG TABS tablet Take 1 tablet by mouth Daily with supper 90 tablet 3    bumetanide (BUMEX) 1 MG tablet Take 1 tablet by mouth daily 90 tablet 3    sucralfate (CARAFATE) 1 GM tablet TAKE 1 TABLET BY MOUTH  TWICE DAILY 180 tablet 3    metoprolol tartrate (LOPRESSOR) 25 MG tablet Take 1 tablet by mouth 2 times daily 180 tablet 3    CALCIUM PO Take by mouth Calcium chew      magnesium (MAGNESIUM-OXIDE) 250 MG TABS tablet Take 250 mg by mouth 2 times daily      ferrous sulfate (IRON 325) 325 (65 Fe) MG tablet Take 325 mg by mouth once a week      docusate sodium (COLACE) 100 MG capsule Take 100 mg by mouth daily as needed      Cyanocobalamin (VITAMIN B 12 PO) Take by mouth every other day      pantoprazole (PROTONIX) 40 MG tablet Take 1 tablet by mouth 2 times daily 180 tablet 3    Probiotic Product (PROBIOTIC DAILY PO) Take by mouth daily      aspirin 81 MG chewable tablet Take 1 tablet by mouth daily 30 tablet 3    acetaminophen (TYLENOL) 500 MG tablet Take 1,000 mg by mouth every 6 hours as needed for Pain        No current facility-administered medications for this visit. Allergies   Allergen Reactions    Methadone Shortness Of Breath     Shakes and nausea    Gabapentin     Lodine [Etodolac] Other (See Comments)     \"spacey, hot flashes, shaky\"    Lyrica [Pregabalin]     Ultram [Tramadol]      Nausea, pedal edema, SOB       SUBJECTIVE:   Review of Systems   Constitutional:  Positive for fatigue. Negative for activity change and appetite change. Respiratory:  Positive for shortness of breath (Chronic). Negative for cough and wheezing. Cardiovascular:  Negative for chest pain, palpitations and leg swelling. Gastrointestinal:  Negative for abdominal distention and abdominal pain. Musculoskeletal:  Negative for gait problem. Neurological:  Negative for weakness, light-headedness and headaches. Psychiatric/Behavioral:  Negative for sleep disturbance. OBJECTIVE:   Today's Vitals:  /66   Pulse 70   Ht 5' 2\" (1.575 m)   Wt 174 lb (78.9 kg)   LMP  (LMP Unknown)   SpO2 95%   BMI 31.83 kg/m²     Physical Exam  Vitals reviewed. Constitutional:       General: She is not in acute distress. Appearance: Normal appearance. She is well-developed. She is not diaphoretic. HENT:      Head: Normocephalic and atraumatic. Eyes:      Conjunctiva/sclera: Conjunctivae normal.   Cardiovascular:      Rate and Rhythm: Normal rate and regular rhythm. Heart sounds: Normal heart sounds. No murmur heard. Pulmonary:      Effort: Pulmonary effort is normal. No respiratory distress. Breath sounds: Normal breath sounds. No wheezing, rhonchi or rales. Abdominal:      General: Bowel sounds are normal. There is no distension. Palpations: Abdomen is soft. Tenderness: There is no abdominal tenderness.    Musculoskeletal:         General: Normal range of motion. Cervical back: Normal range of motion and neck supple. Right lower leg: No edema. Left lower leg: No edema. Skin:     General: Skin is warm and dry. Capillary Refill: Capillary refill takes less than 2 seconds. Neurological:      Mental Status: She is alert and oriented to person, place, and time. Coordination: Coordination normal.   Psychiatric:         Behavior: Behavior normal.       Wt Readings from Last 3 Encounters:   08/17/22 174 lb (78.9 kg)   08/08/22 164 lb (74.4 kg)   08/02/22 157 lb 9 oz (71.5 kg)     BP Readings from Last 3 Encounters:   08/17/22 120/66   08/08/22 115/62   08/02/22 133/63     Pulse Readings from Last 3 Encounters:   08/17/22 70   08/08/22 71   08/02/22 70     Body mass index is 31.83 kg/m². ECHO:    Summary   Normal left ventricular size and systolic function. There were no regional wall motion abnormalities. Wall thickness was within normal limits. Ejection fraction was estimated at 55-60%. There was trace aortic regurgitation. There was trace mitral regurgitation. Device lead seen in the right heart. There was trace tricuspid regurgitation. Assuming RAP = 15 mmHg, the   estimated RVSP = 54 mmHg. IVC size is moderate to severely dilated with reduced respiratory   variation (CVP~10-15 mmHg).       Signature      ----------------------------------------------------------------   Electronically signed by vIone Salinas MD (Interpreting   physician) on 08/02/2022 at 02:21 PM   ----------------------------------------------------------------       CATH/STRESS:       2019 and 2018 s/p PCI      Results reviewed:  BNP: No results found for: BNP  CBC:   Lab Results   Component Value Date/Time    WBC 5.4 08/02/2022 06:30 AM    RBC 3.62 08/02/2022 06:30 AM    RBC 4.17 08/30/2011 10:27 AM    HGB 11.3 08/02/2022 06:30 AM    HCT 36.3 08/02/2022 06:30 AM     08/02/2022 06:30 AM     CMP:    Lab Results   Component Value Date/Time     08/04/2022 10:48 AM    K 3.9 08/04/2022 10:48 AM    K 3.5 08/02/2022 06:30 AM    CL 99 08/04/2022 10:48 AM    CO2 31 08/04/2022 10:48 AM    BUN 24 08/04/2022 10:48 AM    CREATININE 0.8 08/04/2022 10:48 AM    LABGLOM 67 08/04/2022 10:48 AM    GLUCOSE 183 08/04/2022 10:48 AM    GLUCOSE 116 08/30/2011 10:27 AM    CALCIUM 9.8 08/04/2022 10:48 AM     Hepatic Function Panel:    Lab Results   Component Value Date/Time    ALKPHOS 73 07/31/2022 10:16 AM    ALT 14 07/31/2022 10:16 AM    AST 24 07/31/2022 10:16 AM    PROT 7.7 07/31/2022 10:16 AM    BILITOT 1.3 07/31/2022 10:16 AM    BILIDIR <0.2 01/19/2021 07:05 AM    LABALBU 3.6 07/31/2022 10:16 AM    LABALBU 4.2 08/30/2011 10:27 AM     Magnesium:    Lab Results   Component Value Date/Time    MG 2.0 08/02/2022 06:30 AM     PT/INR:    Lab Results   Component Value Date/Time    INR 1.17 07/31/2022 08:23 PM     Lipids:    Lab Results   Component Value Date/Time    TRIG 104 05/03/2022 08:53 AM    HDL 48 05/03/2022 08:53 AM    LDLCALC 98 05/03/2022 08:53 AM       ASSESSMENT AND PLAN:   The patient's condition/symptoms are stable        Diagnosis Orders   1. Chronic diastolic congestive heart failure, NYHA class 2 (HCC)  Basic Metabolic Panel        Continue:  GDMT:   ACE/ARB/ARNI - none   BB - Lopressor 25 BID   Diuretic - Bumex 1mg daily  AA - none  SGLT2 -  none  Vasodilator - none  Other - Iron, ASA, xarelto Kcl 40 daily      HFpEF 55-60%  Stable, no fluid on exam, doing well. Urinating well, happy weight 162. Lab reviewed - K 3.9, Cr 0.8 Mag 2.0  ECHO 2022: no significant valvular disease RVSP 54  CATH 2019: s/p PCI to LAD    Repeat blood work within in the next 2 weeks    No med changes today     Continue diet/fluid adherence  Continue daily wts. F/U w/ Cardiology  F/U in clinic in 6 months      Tolerating above noted HF meds, no ill side effects noted. Will continue to monitor kidney function and electrolytes. Will optimize as tolerated.    Pt is compliant w/ medications. Total visit time of 55  minutes has been spent with patient on education of symptoms, management, medication, and plan of care; as well as review of chart: labs, ECHO, radiology reports, etc.   I personally spent more then 50% of the appt time face to face with the patient. Daily weights  Fluid restriction of 2 Liters per day  Limit sodium in diet to around 8786-6380 mg/day  Monitor BP  Activity as tolerated     Patient was instructed to call the [facility] for any changes in symptoms as noted in AVS.      Return in about 6 months (around 2/17/2023). or sooner if needed     Patient given educational materials - see patient instructions. We discussed the importance of weighing oneself and recording daily. We also discussed the importance of a low sodium diet, higher sodium foods to avoid and better low sodium food options. Patient verbalizes understanding of plan of care using teach back method, and is agreeable to the treatment plan.        Electronically signed by NOELLE Grant CNP on 8/17/2022 at 1:45 PM

## 2022-08-17 NOTE — CARE COORDINATION
[facility] Transitions Follow Up Call    2022    Patient: Raimundo Etienne  Patient : 1928   MRN: [id number]  Reason for Admission:  Fall, SOB, Weakness, Fatigue  Discharge Date: 22 RARS: Readmission Risk Score: 12.8         Spoke with: Jesse Valdez, patient's daughter    Contacted patient for final care transition follow up. Spoke briefly with patient's daughter. She states that her mother is doing just fine. States that she had an appointment at the CHF clinic today and will not have to return for 6 months. Reports she is working with PT to get some of her strength back. Denies having any c/o chest pain/discomfort, increased shortness of breath. She is eating and drinking w/o issues. Discussed when to contact provider with any new or worsening symptoms. She verbalized understanding. No questions or needs at this time. No further outreach. Care Transitions Follow Up Call    Needs to be reviewed by the provider   Additional needs identified to be addressed with provider: No  none             Method of communication with provider : none      Care Transition Nurse contacted the family by telephone to follow up after admission on 22. Verified name and  with family as identifiers. Addressed changes since last contact: none  Discussed follow-up appointments. If no appointment was previously scheduled, appointment scheduling offered: Yes. Is follow up appointment scheduled within 7 days of discharge? Yes. Patients top risk factors for readmission: Chronic Renal Disease Stg III, CHF  Interventions to address risk factors: Education of patient/family/caregiver/guardian to support self-management-when to contact providers    CTN provided contact information for future needs. No further follow-up call indicated based on severity of symptoms and risk factors.       Care Transitions Subsequent and Final Call    Subsequent and Final Calls  Do you have any questions related to your medications?: No  Do you currently have any active services?: Yes  Are you currently active with any services?: Home Health  Do you have any needs or concerns that I can assist you with?: No  Care Transitions Interventions  Other Interventions:              Follow Up  Future Appointments   Date Time Provider Radha Hicks   8/31/2022 11:30 AM Elena Will Heart [facility] [place]   9/29/2022 11:00 AM SCHEDULE, LEWIS KINSEY [facility]   11/9/2022  9:45 AM MD Beryl Pino Halina Hrt [facility] [place]   11/16/2022 11:00 AM [facility] [place]   2/20/2023  2:00 PM Yazmin Templeton APRN - CNP N SRPX CHF [facility] - Samm Stark RN

## 2022-08-31 ENCOUNTER — OFFICE VISIT (OUTPATIENT)
Dept: CARDIOLOGY CLINIC | Age: 87
End: 2022-08-31
Payer: MEDICARE

## 2022-08-31 VITALS
BODY MASS INDEX: 30.22 KG/M2 | HEIGHT: 62 IN | DIASTOLIC BLOOD PRESSURE: 74 MMHG | HEART RATE: 70 BPM | SYSTOLIC BLOOD PRESSURE: 121 MMHG | WEIGHT: 164.2 LBS

## 2022-08-31 DIAGNOSIS — I50.32 CHRONIC DIASTOLIC CHF (CONGESTIVE HEART FAILURE) (HCC): Primary | ICD-10-CM

## 2022-08-31 DIAGNOSIS — Z95.0 PACEMAKER: ICD-10-CM

## 2022-08-31 DIAGNOSIS — I10 PRIMARY HYPERTENSION: ICD-10-CM

## 2022-08-31 DIAGNOSIS — I49.5 SICK SINUS SYNDROME (HCC): ICD-10-CM

## 2022-08-31 PROCEDURE — G8417 CALC BMI ABV UP PARAM F/U: HCPCS | Performed by: STUDENT IN AN ORGANIZED HEALTH CARE EDUCATION/TRAINING PROGRAM

## 2022-08-31 PROCEDURE — 1090F PRES/ABSN URINE INCON ASSESS: CPT | Performed by: STUDENT IN AN ORGANIZED HEALTH CARE EDUCATION/TRAINING PROGRAM

## 2022-08-31 PROCEDURE — 1123F ACP DISCUSS/DSCN MKR DOCD: CPT | Performed by: STUDENT IN AN ORGANIZED HEALTH CARE EDUCATION/TRAINING PROGRAM

## 2022-08-31 PROCEDURE — G8427 DOCREV CUR MEDS BY ELIG CLIN: HCPCS | Performed by: STUDENT IN AN ORGANIZED HEALTH CARE EDUCATION/TRAINING PROGRAM

## 2022-08-31 PROCEDURE — 1111F DSCHRG MED/CURRENT MED MERGE: CPT | Performed by: STUDENT IN AN ORGANIZED HEALTH CARE EDUCATION/TRAINING PROGRAM

## 2022-08-31 PROCEDURE — 1036F TOBACCO NON-USER: CPT | Performed by: STUDENT IN AN ORGANIZED HEALTH CARE EDUCATION/TRAINING PROGRAM

## 2022-08-31 PROCEDURE — 99214 OFFICE O/P EST MOD 30 MIN: CPT | Performed by: STUDENT IN AN ORGANIZED HEALTH CARE EDUCATION/TRAINING PROGRAM

## 2022-08-31 NOTE — PROGRESS NOTES
[facility] PROFESSIONAL SERVICES  [facility]   [address]    [address] [address]   Dept: [phone]   Dept Fax: [phone]: [phone]      Chief Complaint   Patient presents with    Follow-Up from Hospital       Cardiologist:  Dr. Ángel Davis  81 yo female presents for hfu for CHF. Hx of afib on xarelto, pacemaker, CAD s/p 2 stents 2018/19, HTN, HLD, DIMITRIS, GERD, CKD2. SOB with exertion, none with rest. Swelling has been minimal, no weight gain, fluctuates less than 1/2 pound daily. Energy levels are low. No dizziness. No chest pain.      General:   No fever, no chills, no weight loss, + fatigue  Pulmonary:    + dyspneaOE, no wheezing  Cardiac:    Denies recent chest pain   GI:     No nausea or vomiting, no abdominal pain  Neuro:      No dizziness or light headedness  Musculoskeletal:  No recent active issues  Extremities:   minimal edema      Past Medical History:   Diagnosis Date    Atrial fibrillation ([address]     Blood circulation, collateral     [employer] single pacemaker 4/26/2016 05/05/2016    CAD (coronary artery disease)     cath and stent in right artery    Cancer [facility]) 1954    HX  Colon     Carpal tunnel syndrome     Fall at home     12/7/21 -large bruise to R buttock    Fracture dislocation of ankle 1980    GERD (gastroesophageal reflux disease)     Hyperlipidemia     Hypertension     Kidney lesion, native, right     found on 5/2016    Osteoarthritis     knees    Osteopenia     Prolonged emergence from general anesthesia     Thyroid goiter 09/06/2016    Yersiniosis 2016       Allergies   Allergen Reactions    Methadone Shortness Of Breath     Shakes and nausea    Gabapentin     Lodine [Etodolac] Other (See Comments)     \"spacey, hot flashes, shaky\"    Lyrica [Pregabalin]     Ultram [Tramadol]      Nausea, pedal edema, SOB       Current Outpatient Medications   Medication Sig Dispense Refill    Cholecalciferol (VITAMIN D-3 PO) Take by mouth daily potassium chloride (KLOR-CON M) 20 MEQ extended release tablet Take 2 tablets by mouth in the morning. 60 tablet 3    Multiple Vitamin (MULTIVITAMIN ADULT PO) Take by mouth daily      venlafaxine (EFFEXOR XR) 75 MG extended release capsule TAKE 1 CAPSULE BY MOUTH  DAILY 90 capsule 3    rivaroxaban (XARELTO) 15 MG TABS tablet Take 1 tablet by mouth Daily with supper 90 tablet 3    bumetanide (BUMEX) 1 MG tablet Take 1 tablet by mouth daily 90 tablet 3    sucralfate (CARAFATE) 1 GM tablet TAKE 1 TABLET BY MOUTH  TWICE DAILY 180 tablet 3    metoprolol tartrate (LOPRESSOR) 25 MG tablet Take 1 tablet by mouth 2 times daily 180 tablet 3    CALCIUM PO Take by mouth Calcium chew      magnesium (MAGNESIUM-OXIDE) 250 MG TABS tablet Take 250 mg by mouth 2 times daily      ferrous sulfate (IRON 325) 325 (65 Fe) MG tablet Take 325 mg by mouth once a week      docusate sodium (COLACE) 100 MG capsule Take 100 mg by mouth daily as needed      Cyanocobalamin (VITAMIN B 12 PO) Take by mouth every other day      pantoprazole (PROTONIX) 40 MG tablet Take 1 tablet by mouth 2 times daily 180 tablet 3    Probiotic Product (PROBIOTIC DAILY PO) Take by mouth daily      aspirin 81 MG chewable tablet Take 1 tablet by mouth daily 30 tablet 3    acetaminophen (TYLENOL) 500 MG tablet Take 1,000 mg by mouth every 6 hours as needed for Pain        No current facility-administered medications for this visit. Social History     Socioeconomic History    Marital status:       Spouse name: Mauri Juarez    Number of children: 2    Years of education: None    Highest education level: None   Tobacco Use    Smoking status: Never    Smokeless tobacco: Never   Vaping Use    Vaping Use: Never used   Substance and Sexual Activity    Alcohol use: No    Drug use: No     Social Determinants of Health     Financial Resource Strain: Low Risk     Difficulty of Paying Living Expenses: Not hard at all   Food Insecurity: No Food Insecurity    Worried About Running Out of Food in the Last Year: Never true    Ran Out of Food in the Last Year: Never true       Family History   Problem Relation Age of Onset    Heart Disease Mother     High Blood Pressure Mother     Heart Disease Father     High Blood Pressure Father     Heart Disease Brother     High Blood Pressure Brother     Heart Disease Son        Blood pressure 121/74, pulse 70, height 5' 2\" (1.575 m), weight 164 lb 3.2 oz (74.5 kg). General:   Well developed, well nourished  Lungs:    Clear to auscultation, no rales  Heart:    RRR, Normal S1 S2, No murmur, rubs, or gallops  Abdomen:   Soft, non tender, no organomegalies, positive bowel sounds  Extremities:   Very mild edema, no cyanosis, good peripheral pulses  Neurological:   Awake, alert, oriented. No obvious focal deficits  Musculoskeletal:  No obvious deformities    EKG:          Diagnosis Orders   1. Chronic diastolic CHF (congestive heart failure) ([address]        2. Primary hypertension        3. Sick sinus syndrome ([address]        4. [employer] single pacemaker 4/26/2016            No orders of the defined types were placed in this encounter. Assessment/Plan:   Chronic diastolic CHF- well compensated currently. Taking bumex 1 daily and Kcl 20 BID. Continue as before. HTN-well controlled. Continue current management. SSS s/p PPM-no issues with pacemaker at presetn. Disposition:   F/u with Dr Brittani Mccall as scheduled.    Continue Dr Don Burns current treatment plan  Follow up with Dr Brittani Mccall as scheduled or sooner if needed
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
## 2022-09-12 ENCOUNTER — NURSE ONLY (OUTPATIENT)
Dept: LAB | Age: 87
End: 2022-09-12

## 2022-09-12 DIAGNOSIS — D50.0 IRON DEFICIENCY ANEMIA DUE TO CHRONIC BLOOD LOSS: ICD-10-CM

## 2022-09-12 LAB
ANION GAP SERPL CALCULATED.3IONS-SCNC: 10 MEQ/L (ref 8–16)
BUN BLDV-MCNC: 20 MG/DL (ref 7–22)
CALCIUM SERPL-MCNC: 9.7 MG/DL (ref 8.5–10.5)
CHLORIDE BLD-SCNC: 99 MEQ/L (ref 98–111)
CO2: 31 MEQ/L (ref 23–33)
CREAT SERPL-MCNC: 0.9 MG/DL (ref 0.4–1.2)
ERYTHROCYTE [DISTWIDTH] IN BLOOD BY AUTOMATED COUNT: 13.5 % (ref 11.5–14.5)
ERYTHROCYTE [DISTWIDTH] IN BLOOD BY AUTOMATED COUNT: 46.9 FL (ref 35–45)
FERRITIN: 35 NG/ML (ref 10–291)
FOLATE: > 20 NG/ML (ref 4.8–24.2)
GFR SERPL CREATININE-BSD FRML MDRD: 58 ML/MIN/1.73M2
GLUCOSE BLD-MCNC: 135 MG/DL (ref 70–108)
HCT VFR BLD CALC: 37.8 % (ref 37–47)
HEMOGLOBIN: 12.1 GM/DL (ref 12–16)
IRON: 51 UG/DL (ref 50–170)
MCH RBC QN AUTO: 30.6 PG (ref 26–33)
MCHC RBC AUTO-ENTMCNC: 32 GM/DL (ref 32.2–35.5)
MCV RBC AUTO: 95.5 FL (ref 81–99)
PLATELET # BLD: 202 THOU/MM3 (ref 130–400)
PMV BLD AUTO: 11.3 FL (ref 9.4–12.4)
POTASSIUM SERPL-SCNC: 4.1 MEQ/L (ref 3.5–5.2)
RBC # BLD: 3.96 MILL/MM3 (ref 4.2–5.4)
SODIUM BLD-SCNC: 140 MEQ/L (ref 135–145)
TOTAL IRON BINDING CAPACITY: 356 UG/DL (ref 171–450)
VITAMIN B-12: 564 PG/ML (ref 211–911)
WBC # BLD: 5.1 THOU/MM3 (ref 4.8–10.8)

## 2022-09-17 DIAGNOSIS — K21.9 GASTROESOPHAGEAL REFLUX DISEASE WITHOUT ESOPHAGITIS: ICD-10-CM

## 2022-09-19 RX ORDER — PANTOPRAZOLE SODIUM 40 MG/1
TABLET, DELAYED RELEASE ORAL
Qty: 180 TABLET | Refills: 3 | Status: SHIPPED | OUTPATIENT
Start: 2022-09-19

## 2022-09-29 ENCOUNTER — NURSE ONLY (OUTPATIENT)
Dept: CARDIOLOGY CLINIC | Age: 87
End: 2022-09-29
Payer: MEDICARE

## 2022-09-29 DIAGNOSIS — Z95.0 PACEMAKER: Primary | ICD-10-CM

## 2022-09-29 PROCEDURE — 93279 PRGRMG DEV EVAL PM/LDLS PM: CPT | Performed by: INTERNAL MEDICINE

## 2022-09-29 NOTE — PROGRESS NOTES
Dr priest pt  [facility] single pacer check in office     Hx of safety switch/ unipolor paced and sensed       Presents in  70  Underlying dependent in ventricles     Noise noted on vent lead  Vvir     Rv waves paced  Rv impedence 352  Vent threshold 0.7 @ 0.4  Vent amplitude 2.5 @ 0.4

## 2022-11-03 ENCOUNTER — HOSPITAL ENCOUNTER (EMERGENCY)
Age: 87
Discharge: HOME | End: 2022-11-03
Attending: EMERGENCY MEDICINE
Payer: MEDICARE

## 2022-11-03 ENCOUNTER — APPOINTMENT (OUTPATIENT)
Dept: GENERAL RADIOLOGY | Age: 87
End: 2022-11-03
Payer: MEDICARE

## 2022-11-03 VITALS
BODY MASS INDEX: 30.22 KG/M2 | RESPIRATION RATE: 24 BRPM | WEIGHT: 164.2 LBS | SYSTOLIC BLOOD PRESSURE: 123 MMHG | HEART RATE: 70 BPM | HEIGHT: 62 IN | DIASTOLIC BLOOD PRESSURE: 54 MMHG | TEMPERATURE: 98 F | OXYGEN SATURATION: 98 %

## 2022-11-03 DIAGNOSIS — R53.83 FATIGUE, UNSPECIFIED TYPE: ICD-10-CM

## 2022-11-03 DIAGNOSIS — J20.9 ACUTE BRONCHITIS, UNSPECIFIED ORGANISM: Primary | ICD-10-CM

## 2022-11-03 LAB
ALBUMIN SERPL-MCNC: 3.3 GM/DL (ref 3.4–5)
ALP BLD-CCNC: 70 U/L (ref 46–116)
ALT SERPL-CCNC: 12 U/L (ref 14–63)
AMORPHOUS: ABNORMAL
ANION GAP: 9 MEQ/L (ref 8–16)
AST SERPL-CCNC: 10 U/L (ref 15–37)
BACTERIA: ABNORMAL
BASOPHILS # BLD: 0.4 % (ref 0–3)
BASOPHILS ABSOLUTE: 0 THOU/MM3 (ref 0–0.1)
BILIRUB SERPL-MCNC: 1.4 MG/DL (ref 0.2–1)
BILIRUBIN URINE: NEGATIVE
BLOOD, URINE: NEGATIVE
BUN BLDV-MCNC: 15 MG/DL (ref 7–18)
CASTS UA: ABNORMAL /LPF
CHARACTER, URINE: CLEAR
CHLORIDE BLD-SCNC: 101 MEQ/L (ref 98–107)
CO2: 29 MEQ/L (ref 21–32)
COLOR: YELLOW
CREAT SERPL-MCNC: 0.9 MG/DL (ref 0.6–1.3)
CRYSTALS, UA: ABNORMAL
EOSINOPHILS ABSOLUTE: 0.1 THOU/MM3 (ref 0–0.5)
EOSINOPHILS RELATIVE PERCENT: 1.8 % (ref 0–4)
EPITHELIAL CELLS, UA: ABNORMAL /HPF
FLU A ANTIGEN: NEGATIVE
FLU B ANTIGEN: NEGATIVE
GFR, ESTIMATED: 59 ML/MIN/1.73M2
GLUCOSE BLD-MCNC: 99 MG/DL (ref 74–106)
GLUCOSE, URINE: NEGATIVE MG/DL
HCT VFR BLD CALC: 34.3 % (ref 37–47)
HEMOGLOBIN: 10.7 GM/DL (ref 12–16)
IMMATURE GRANS (ABS): 0.01 THOU/MM3 (ref 0–0.07)
IMMATURE GRANULOCYTES: 0 %
KETONES, URINE: NEGATIVE
LEUKOCYTE ESTERASE, URINE: ABNORMAL
LYMPHOCYTES # BLD: 16.4 % (ref 15–47)
LYMPHOCYTES ABSOLUTE: 1.2 THOU/MM3 (ref 1–4.8)
MAGNESIUM: 2 MG/DL (ref 1.8–2.4)
MCH RBC QN AUTO: 28.6 PG (ref 26–32)
MCHC RBC AUTO-ENTMCNC: 31.2 GM/DL (ref 31–35)
MCV RBC AUTO: 91.7 FL (ref 81–99)
MONOCYTES: 0.8 THOU/MM3 (ref 0.3–1.3)
MONOCYTES: 10.3 % (ref 0–12)
MUCUS: ABNORMAL
NITRITE, URINE: NEGATIVE
PDW BLD-RTO: 15.2 % (ref 11.5–14.9)
PH UA: 7 (ref 5–9)
PLATELET # BLD: 175 THOU/MM3 (ref 130–400)
PMV BLD AUTO: 10.3 FL (ref 9.4–12.4)
POC CALCIUM: 8.9 MG/DL (ref 8.5–10.1)
POTASSIUM SERPL-SCNC: 3.9 MEQ/L (ref 3.5–5.1)
PROTEIN UA: NEGATIVE MG/DL
RBC # BLD: 3.74 MILL/MM3 (ref 4.1–5.3)
RBC UA: ABNORMAL /HPF
REFLEX TO URINE C & S: ABNORMAL
SARS-COV-2, NAAT: NOT  DETECTED
SEG NEUTROPHILS: 71 % (ref 43–75)
SEGMENTED NEUTROPHILS ABSOLUTE COUNT: 5.2 THOU/MM3 (ref 1.8–7.7)
SODIUM BLD-SCNC: 139 MEQ/L (ref 136–145)
SPECIFIC GRAVITY UA: 1.02 (ref 1–1.03)
TOTAL PROTEIN: 7.3 GM/DL (ref 6.4–8.2)
TROPONIN, HIGH SENSITIVITY: 13 PG/ML (ref 0–51.3)
UROBILINOGEN, URINE: 0.2 EU/DL (ref 0–1)
WBC # BLD: 7.4 THOU/MM3 (ref 4.8–10.8)
WBC UA: ABNORMAL /HPF

## 2022-11-03 PROCEDURE — 93005 ELECTROCARDIOGRAM TRACING: CPT | Performed by: EMERGENCY MEDICINE

## 2022-11-03 PROCEDURE — 2580000003 HC RX 258: Performed by: EMERGENCY MEDICINE

## 2022-11-03 PROCEDURE — 87635 SARS-COV-2 COVID-19 AMP PRB: CPT

## 2022-11-03 PROCEDURE — 87804 INFLUENZA ASSAY W/OPTIC: CPT

## 2022-11-03 PROCEDURE — 80053 COMPREHEN METABOLIC PANEL: CPT

## 2022-11-03 PROCEDURE — 71045 X-RAY EXAM CHEST 1 VIEW: CPT

## 2022-11-03 PROCEDURE — 85025 COMPLETE CBC W/AUTO DIFF WBC: CPT

## 2022-11-03 PROCEDURE — 81001 URINALYSIS AUTO W/SCOPE: CPT

## 2022-11-03 PROCEDURE — 84484 ASSAY OF TROPONIN QUANT: CPT

## 2022-11-03 PROCEDURE — 99285 EMERGENCY DEPT VISIT HI MDM: CPT

## 2022-11-03 PROCEDURE — 83735 ASSAY OF MAGNESIUM: CPT

## 2022-11-03 PROCEDURE — 6370000000 HC RX 637 (ALT 250 FOR IP): Performed by: EMERGENCY MEDICINE

## 2022-11-03 RX ORDER — BENZONATATE 100 MG/1
200 CAPSULE ORAL ONCE
Status: COMPLETED | OUTPATIENT
Start: 2022-11-03 | End: 2022-11-03

## 2022-11-03 RX ORDER — AMOXICILLIN 250 MG/1
500 CAPSULE ORAL ONCE
Status: COMPLETED | OUTPATIENT
Start: 2022-11-03 | End: 2022-11-03

## 2022-11-03 RX ORDER — SODIUM CHLORIDE 9 MG/ML
INJECTION, SOLUTION INTRAVENOUS CONTINUOUS
Status: DISCONTINUED | OUTPATIENT
Start: 2022-11-03 | End: 2022-11-03 | Stop reason: HOSPADM

## 2022-11-03 RX ORDER — AMOXICILLIN 500 MG/1
500 CAPSULE ORAL 3 TIMES DAILY
Qty: 30 CAPSULE | Refills: 0 | Status: SHIPPED | OUTPATIENT
Start: 2022-11-03 | End: 2022-11-13

## 2022-11-03 RX ORDER — BENZONATATE 200 MG/1
200 CAPSULE ORAL 3 TIMES DAILY PRN
Qty: 30 CAPSULE | Refills: 0 | Status: SHIPPED | OUTPATIENT
Start: 2022-11-03 | End: 2022-11-13

## 2022-11-03 RX ADMIN — AMOXICILLIN 500 MG: 250 CAPSULE ORAL at 19:21

## 2022-11-03 RX ADMIN — SODIUM CHLORIDE: 9 INJECTION, SOLUTION INTRAVENOUS at 17:45

## 2022-11-03 RX ADMIN — BENZONATATE 200 MG: 100 CAPSULE ORAL at 19:20

## 2022-11-03 ASSESSMENT — PAIN - FUNCTIONAL ASSESSMENT
PAIN_FUNCTIONAL_ASSESSMENT: NONE - DENIES PAIN
PAIN_FUNCTIONAL_ASSESSMENT: NONE - DENIES PAIN

## 2022-11-03 NOTE — ED NOTES
Pt. Presents to ED via passenger side of private auto, pt. Assisted into w/c and taken to Tr[address] and placed on continous cardiac monitor. Pt's family voices pt. Has been, \"listless, weak and cough\" since Sunday, pt. Pale warm and dry, alert and oriented, c/o dyspnea with exertion, denies CP. Covid and Influenza swabs obtained and taken to lab. Pt. Denies any vomiting or dark stools. Abd. Soft with ABS.       Tabby Ayala RN  11/03/22 0097

## 2022-11-03 NOTE — ED NOTES
Pt stable A&O x 3 given discharge and follow up info. Pt voiced no concerns and discharged from ER to self to home. Pt ambulated out of ER with no complications .        Cris Newman RN  11/03/22 1942

## 2022-11-03 NOTE — ED PROVIDER NOTES
[facility]  eMERGENCY dEPARTMENT eNCOUnter             Dm Hope 19 COMPLAINT    Chief Complaint   Patient presents with    Fatigue    Cough       Nurses Notes reviewed and I agree except as noted in the HPI. HPI    Daniel Sahni is a 80 y.o. female who presents  via private vehicle with complaint of weakness, fatigue, and cough for 5 days. Not eating and drinking as much as usual. No vomiting or diarrhea. No chest pain. No dysuria. REVIEW OF SYSTEMS      Review of Systems   Constitutional:  Positive for malaise/fatigue. Negative for fever. HENT:  Positive for congestion. Negative for ear pain and sore throat. Respiratory:  Positive for cough. Negative for shortness of breath and wheezing. Cardiovascular:  Negative for chest pain and palpitations. Gastrointestinal:  Negative for abdominal pain and vomiting. Genitourinary:  Negative for dysuria and flank pain. Musculoskeletal:  Positive for myalgias. Neurological:  Positive for weakness. Negative for dizziness, loss of consciousness and headaches. All other systems reviewed and are negative. PAST MEDICAL HISTORY     has a past medical history of Atrial fibrillation ([address] Blood circulation, collateral, [employer] single pacemaker 4/26/2016, CAD (coronary artery disease), Cancer ([address] Carpal tunnel syndrome, Fall at home, Fracture dislocation of ankle, GERD (gastroesophageal reflux disease), Hyperlipidemia, Hypertension, Kidney lesion, native, right, Osteoarthritis, Osteopenia, Prolonged emergence from general anesthesia, Thyroid goiter, and Yersiniosis. SURGICAL HISTORY     has a past surgical history that includes Cholecystectomy (1986); Ankle fracture surgery (0409--2700); Hysterectomy (1971); Colon surgery (1954); Bladder surgery; eye surgery; Abdomen surgery; fracture surgery; Appendectomy; Colonoscopy; Carpal tunnel release (7/2013); joint replacement (1998);  Carpal tunnel release (Right, 08/19/2017); Upper gastrointestinal endoscopy (Left, 11/28/2018); Upper gastrointestinal endoscopy (11/28/2018); Endoscopy, colon, diagnostic; Cardiac surgery; pacemaker placement; Percutaneous Transluminal Coronary Angio (01/15/2019); knee surgery (Left); and Cataract removal (Bilateral).     CURRENT MEDICATIONS    Discharge Medication List as of 11/3/2022  7:16 PM        CONTINUE these medications which have NOT CHANGED    Details   pantoprazole (PROTONIX) 40 MG tablet TAKE 1 TABLET BY MOUTH  TWICE DAILY, Disp-180 tablet, R-3Requesting 1 year supplyNormal      Cholecalciferol (VITAMIN D-3 PO) Take by mouth dailyHistorical Med      potassium chloride (KLOR-CON M) 20 MEQ extended release tablet Take 2 tablets by mouth in the morning., Disp-60 tablet, R-3Normal      Multiple Vitamin (MULTIVITAMIN ADULT PO) Take by mouth dailyHistorical Med      venlafaxine (EFFEXOR XR) 75 MG extended release capsule TAKE 1 CAPSULE BY MOUTH  DAILY, Disp-90 capsule, R-3Requesting 1 year supplyNormal      rivaroxaban (XARELTO) 15 MG TABS tablet Take 1 tablet by mouth Daily with supper, Disp-90 tablet, R-3Normal      bumetanide (BUMEX) 1 MG tablet Take 1 tablet by mouth daily, Disp-90 tablet, R-3Normal      sucralfate (CARAFATE) 1 GM tablet TAKE 1 TABLET BY MOUTH  TWICE DAILY, Disp-180 tablet, R-3Requesting 1 year supplyNormal      metoprolol tartrate (LOPRESSOR) 25 MG tablet Take 1 tablet by mouth 2 times daily, Disp-180 tablet, R-3Normal      CALCIUM PO Take by mouth Calcium chewHistorical Med      magnesium (MAGNESIUM-OXIDE) 250 MG TABS tablet Take 250 mg by mouth 2 times dailyHistorical Med      ferrous sulfate (IRON 325) 325 (65 Fe) MG tablet Take 325 mg by mouth once a weekHistorical Med      docusate sodium (COLACE) 100 MG capsule Take 100 mg by mouth daily as neededHistorical Med      Cyanocobalamin (VITAMIN B 12 PO) Take by mouth every other dayHistorical Med      Probiotic Product (PROBIOTIC DAILY PO) Take by mouth dailyHistorical Med      aspirin 81 MG chewable tablet Take 1 tablet by mouth daily, Disp-30 tablet, R-3      acetaminophen (TYLENOL) 500 MG tablet Take 1,000 mg by mouth every 6 hours as needed for Pain Historical Med             ALLERGIES    is allergic to methadone, gabapentin, lodine [etodolac], lyrica [pregabalin], and ultram [tramadol]. FAMILY HISTORY    She indicated that her mother is . She indicated that her father is . She indicated that both of her sisters are . She indicated that her brother is . She indicated that her son is alive. family history includes Heart Disease in her brother, father, mother, and son; High Blood Pressure in her brother, father, and mother. SOCIAL HISTORY     reports that she has never smoked. She has never used smokeless tobacco. She reports that she does not drink alcohol and does not use drugs. PHYSICAL EXAM       INITIAL VITALS: BP (!) 123/54   Pulse 70   Temp 98 °F (36.7 °C) (Temporal)   Resp 24   Ht 5' 2\" (1.575 m)   Wt 164 lb 3.2 oz (74.5 kg)   LMP  (LMP Unknown)   SpO2 98%   BMI 30.03 kg/m²      Physical Exam  Vitals and nursing note reviewed. Exam conducted with a chaperone present. Constitutional:       General: She is not in acute distress. Appearance: She is not toxic-appearing. HENT:      Right Ear: Tympanic membrane normal.      Left Ear: Tympanic membrane normal.      Nose: Nose normal. No congestion or rhinorrhea. Mouth/Throat:      Mouth: Mucous membranes are moist.      Pharynx: No oropharyngeal exudate or posterior oropharyngeal erythema. Eyes:      Conjunctiva/sclera: Conjunctivae normal.      Pupils: Pupils are equal, round, and reactive to light. Cardiovascular:      Rate and Rhythm: Normal rate and regular rhythm. Heart sounds: No murmur heard. Pulmonary:      Effort: Pulmonary effort is normal. No respiratory distress. Breath sounds: Normal breath sounds. No wheezing. Comments: Moist cough  Abdominal:      General: Bowel sounds are normal.      Palpations: Abdomen is soft. There is no mass. Tenderness: There is no abdominal tenderness. Musculoskeletal:         General: No swelling or tenderness. Cervical back: Neck supple. Lymphadenopathy:      Cervical: No cervical adenopathy. Skin:     General: Skin is warm and dry. Findings: No rash. Neurological:      General: No focal deficit present. Mental Status: She is alert and oriented to person, place, and time. Motor: No weakness. Psychiatric:         Behavior: Behavior normal.          DIAGNOSTIC RESULTS    EKG     Ventricular-paced rhythm  Abnormal ECG  When compared with ECG of 31-JUL-2022 09:24,  No significant change was found      RADIOLOGY:    XR CHEST PORTABLE   Final Result   1. There is peribronchial thickening.    2. Prior granulomatous disease of the chest.      This document has been electronically signed by: Ree Robison MD on    11/03/2022 06:05 PM              LABS:     Labs Reviewed   CBC WITH AUTO DIFFERENTIAL - Abnormal; Notable for the following components:       Result Value    RBC 3.74 (*)     Hemoglobin 10.7 (*)     Hematocrit 34.3 (*)     RDW 15.2 (*)     All other components within normal limits   COMPREHENSIVE METABOLIC PANEL - Abnormal; Notable for the following components:    AST 10 (*)     Albumin 3.3 (*)     Total Bilirubin 1.4 (*)     ALT 12 (*)     All other components within normal limits   URINALYSIS WITH REFLEX TO CULTURE - Abnormal; Notable for the following components:    Leukocyte Esterase, Urine TRACE (*)     All other components within normal limits   GLOMERULAR FILTRATION RATE, ESTIMATED - Abnormal; Notable for the following components:    GFR, Estimated 59 (*)     All other components within normal limits   RAPID INFLUENZA A/B ANTIGENS   COVID-19, RAPID   MAGNESIUM   TROPONIN   ANION GAP     COVID and influenza negative  Vitals:    Vitals:    11/03/22 1810 11/03/22 1825 11/03/22 1856 11/03/22 1910   BP: (!) 121/53 (!) 114/58 (!) 114/59 (!) 123/54   Pulse: 70 70 70 70   Resp: 18 25 18 24   Temp:       TempSrc:       SpO2: 97% 95% 95% 98%   Weight:       Height:           EMERGENCY DEPARTMENT COURSE:    Test results and plan of care discussed. First doses of Amoxicillin and Benzonatate given. FINAL IMPRESSION      1. Acute bronchitis, unspecified organism    2.  Fatigue, unspecified type        DISPOSITION/PLAN    DISPOSITION Decision To Discharge 11/03/2022 07:11:50 PM      PATIENT REFERRED TO:    Farhad Huston MD  [place] [address]  [phone]    Schedule an appointment as soon as possible for a visit       DISCHARGE MEDICATIONS:    Discharge Medication List as of 11/3/2022  7:16 PM        START taking these medications    Details   benzonatate (TESSALON) 200 MG capsule Take 1 capsule by mouth 3 times daily as needed for Cough, Disp-30 capsule, R-0Normal      amoxicillin (AMOXIL) 500 MG capsule Take 1 capsule by mouth 3 times daily for 10 days, Disp-30 capsule, R-0Normal                (Please note that portions of this note were completed with a voice recognition program.  Efforts were made to edit the dictations but occasionally words are mis-transcribed.)      Wilfrido Osei MD  11/04/22 0012

## 2022-11-04 LAB
EKG ATRIAL RATE: 70 BPM
EKG Q-T INTERVAL: 458 MS
EKG QRS DURATION: 150 MS
EKG QTC CALCULATION (BAZETT): 494 MS
EKG R AXIS: -48 DEGREES
EKG T AXIS: 99 DEGREES
EKG VENTRICULAR RATE: 70 BPM

## 2022-11-04 PROCEDURE — 93010 ELECTROCARDIOGRAM REPORT: CPT | Performed by: INTERNAL MEDICINE

## 2022-11-04 ASSESSMENT — ENCOUNTER SYMPTOMS
COUGH: 1
WHEEZING: 0
ABDOMINAL PAIN: 0
SHORTNESS OF BREATH: 0
SORE THROAT: 0
VOMITING: 0

## 2022-11-09 ENCOUNTER — OFFICE VISIT (OUTPATIENT)
Dept: CARDIOLOGY CLINIC | Age: 87
End: 2022-11-09
Payer: MEDICARE

## 2022-11-09 VITALS
WEIGHT: 168 LBS | BODY MASS INDEX: 30.73 KG/M2 | SYSTOLIC BLOOD PRESSURE: 136 MMHG | HEART RATE: 70 BPM | DIASTOLIC BLOOD PRESSURE: 70 MMHG

## 2022-11-09 DIAGNOSIS — I25.83 CORONARY ARTERY DISEASE DUE TO LIPID RICH PLAQUE: Primary | ICD-10-CM

## 2022-11-09 DIAGNOSIS — I25.10 CORONARY ARTERY DISEASE DUE TO LIPID RICH PLAQUE: Primary | ICD-10-CM

## 2022-11-09 PROCEDURE — 1123F ACP DISCUSS/DSCN MKR DOCD: CPT | Performed by: INTERNAL MEDICINE

## 2022-11-09 PROCEDURE — 99213 OFFICE O/P EST LOW 20 MIN: CPT | Performed by: INTERNAL MEDICINE

## 2022-11-09 NOTE — PROGRESS NOTES
[address] [place] [address] [address] [facility] [address]  Dept: [phone]  Dept Fax: [phone]  Loc: [phone]    Visit Date: 11/9/2022    Ms. Rita Zeng is a 80 y.o. female  who presented for:  Chief Complaint   Patient presents with    Follow-up       HPI:   79 yo F c hx of Afib on Xarelto, HTN, HLD, CAD s/p PCI is here for a follow up. Denies any chest pain, sob, palpitations, lightheadedness, dizziness, orthopnea, PND or pedal edema. Has been taking bumex 1 mg but gaining weight. Does not take too much salt/water.           Current Outpatient Medications:     psyllium (KONSYL) 28.3 % PACK, Take 1 packet by mouth daily, Disp: , Rfl:     benzonatate (TESSALON) 200 MG capsule, Take 1 capsule by mouth 3 times daily as needed for Cough, Disp: 30 capsule, Rfl: 0    amoxicillin (AMOXIL) 500 MG capsule, Take 1 capsule by mouth 3 times daily for 10 days, Disp: 30 capsule, Rfl: 0    pantoprazole (PROTONIX) 40 MG tablet, TAKE 1 TABLET BY MOUTH  TWICE DAILY, Disp: 180 tablet, Rfl: 3    Cholecalciferol (VITAMIN D-3 PO), Take by mouth daily, Disp: , Rfl:     potassium chloride (KLOR-CON M) 20 MEQ extended release tablet, Take 2 tablets by mouth in the morning., Disp: 60 tablet, Rfl: 3    Multiple Vitamin (MULTIVITAMIN ADULT PO), Take by mouth daily, Disp: , Rfl:     venlafaxine (EFFEXOR XR) 75 MG extended release capsule, TAKE 1 CAPSULE BY MOUTH  DAILY, Disp: 90 capsule, Rfl: 3    rivaroxaban (XARELTO) 15 MG TABS tablet, Take 1 tablet by mouth Daily with supper, Disp: 90 tablet, Rfl: 3    bumetanide (BUMEX) 1 MG tablet, Take 1 tablet by mouth daily, Disp: 90 tablet, Rfl: 3    sucralfate (CARAFATE) 1 GM tablet, TAKE 1 TABLET BY MOUTH  TWICE DAILY, Disp: 180 tablet, Rfl: 3    metoprolol tartrate (LOPRESSOR) 25 MG tablet, Take 1 tablet by mouth 2 times daily, Disp: 180 tablet, Rfl: 3    CALCIUM PO, Take by mouth Calcium chew, Disp: , Rfl:     magnesium (MAGNESIUM-OXIDE) 250 MG TABS tablet, Take 250 mg by mouth 2 times daily, Disp: , Rfl:     ferrous sulfate (IRON 325) 325 (65 Fe) MG tablet, Take 325 mg by mouth once a week, Disp: , Rfl:     docusate sodium (COLACE) 100 MG capsule, Take 100 mg by mouth daily as needed, Disp: , Rfl:     Cyanocobalamin (VITAMIN B 12 PO), Take by mouth every other day, Disp: , Rfl:     Probiotic Product (PROBIOTIC DAILY PO), Take by mouth daily, Disp: , Rfl:     aspirin 81 MG chewable tablet, Take 1 tablet by mouth daily, Disp: 30 tablet, Rfl: 3    acetaminophen (TYLENOL) 500 MG tablet, Take 1,000 mg by mouth every 6 hours as needed for Pain , Disp: , Rfl:     Past Medical History  Luz Elena Rocha  has a past medical history of Atrial fibrillation ([facility] [address] Blood circulation, collateral, [employer] single pacemaker 4/26/2016, CAD (coronary artery disease), Cancer ([facility] [address] Carpal tunnel syndrome, Fall at home, Fracture dislocation of ankle, GERD (gastroesophageal reflux disease), Hyperlipidemia, Hypertension, Kidney lesion, native, right, Osteoarthritis, Osteopenia, Prolonged emergence from general anesthesia, Thyroid goiter, and Yersiniosis. Social History  Luz Elena Rocha  reports that she has never smoked. She has never used smokeless tobacco. She reports that she does not drink alcohol and does not use drugs. Family History  Luz Elena Rocha family history includes Heart Disease in her brother, father, mother, and son; High Blood Pressure in her brother, father, and mother.     Past Surgical History   Past Surgical History:   Procedure Laterality Date    ABDOMEN SURGERY      ANKLE FRACTURE SURGERY  0116--4259    reconstruction in 2003 and 2007    APPENDECTOMY      BLADDER SURGERY      support bladder repair    CARDIAC SURGERY      heart stent 12-11-18, [place]    CARPAL TUNNEL RELEASE  7/2013    CARPAL TUNNEL RELEASE Right 08/19/2017    Revision    CATARACT REMOVAL Bilateral     CHOLECYSTECTOMY  1986    COLON SURGERY  1954    COLONOSCOPY      ENDOSCOPY, COLON, DIAGNOSTIC      EYE SURGERY      cataract     FRACTURE SURGERY      HYSTERECTOMY (CERVIX STATUS UNKNOWN)  1971    JOINT REPLACEMENT  1998    L knee    KNEE SURGERY Left     total replacement    PACEMAKER PLACEMENT      PTCA  01/15/2019    Successful PCI / Drug Eluting Stent of the proximal Left Anterior Descending Coronary Artery. UPPER GASTROINTESTINAL ENDOSCOPY Left 11/28/2018    EGD BIOPSY performed by Geo Henderson MD at [address]  11/28/2018    EGD CONTROL HEMORRHAGE performed by Geo Henderson MD at [address] [facility]       Subjective:     REVIEW OF SYSTEMS  Constitutional: denies sweats, chills and fever  HENT: denies  congestion, sinus pressure, sneezing and sore throat. Eyes: denies  pain, discharge, redness and itching. Respiratory: denies apnea, cough  Gastrointestinal: denies blood in stool, constipation, diarrhea   Endocrine: denies cold intolerance, heat intolerance, polydipsia. Genitourinary: denies dysuria, enuresis, flank pain and hematuria. Musculoskeletal: denies arthralgias, joint swelling and neck pain. Neurological: denies numbness and headaches. Psychiatric/Behavioral: denies agitation, confusion, decreased concentration and dysphoric mood    All others reviewed and are negative. Objective:     /70   Pulse 70   Wt 168 lb (76.2 kg)   LMP  (LMP Unknown)   BMI 30.73 kg/m²     Wt Readings from Last 3 Encounters:   11/09/22 168 lb (76.2 kg)   11/03/22 164 lb 3.2 oz (74.5 kg)   08/31/22 164 lb 3.2 oz (74.5 kg)     BP Readings from Last 3 Encounters:   11/09/22 136/70   11/03/22 (!) 123/54   08/31/22 121/74       PHYSICAL EXAM  Constitutional: Oriented to person, place, and time. Appears well-developed and well-nourished. HENT:   Head: Normocephalic and atraumatic. Eyes: EOM are normal. Pupils are equal, round, and reactive to light. Neck: Normal range of motion. Neck supple. No JVD present.    Cardiovascular: Normal rate , normal heart sounds and intact distal pulses. Pulmonary/Chest: Effort normal and breath sounds normal. No respiratory distress. No wheezes. No rales. Abdominal: Soft. Bowel sounds are normal. No distension. There is no tenderness. Musculoskeletal: Normal range of motion. No edema. Neurological: Alert and oriented to person, place, and time. No cranial nerve deficit. Coordination normal.   Skin: Skin is warm and dry. Psychiatric: Normal mood and affect.        Lab Results   Component Value Date/Time    CKTOTAL 22 10/01/2021 04:48 PM       Lab Results   Component Value Date/Time    WBC 7.4 11/03/2022 05:51 PM    RBC 3.74 11/03/2022 05:51 PM    RBC 4.17 08/30/2011 10:27 AM    HGB 10.7 11/03/2022 05:51 PM    HCT 34.3 11/03/2022 05:51 PM    MCV 91.7 11/03/2022 05:51 PM    MCH 28.6 11/03/2022 05:51 PM    MCHC 31.2 11/03/2022 05:51 PM    RDW 15.2 11/03/2022 05:51 PM     11/03/2022 05:51 PM    MPV 10.3 11/03/2022 05:51 PM       Lab Results   Component Value Date/Time     11/03/2022 05:51 PM    K 3.9 11/03/2022 05:51 PM    K 3.5 08/02/2022 06:30 AM     11/03/2022 05:51 PM    CO2 29 11/03/2022 05:51 PM    BUN 15 11/03/2022 05:51 PM    LABALBU 3.3 11/03/2022 05:51 PM    LABALBU 4.2 08/30/2011 10:27 AM    CREATININE 0.9 11/03/2022 05:51 PM    CALCIUM 9.7 09/12/2022 08:19 AM    LABGLOM 58 09/12/2022 08:19 AM    GLUCOSE 99 11/03/2022 05:51 PM    GLUCOSE 116 08/30/2011 10:27 AM       Lab Results   Component Value Date/Time    ALKPHOS 70 11/03/2022 05:51 PM    ALT 12 11/03/2022 05:51 PM    AST 10 11/03/2022 05:51 PM    PROT 7.3 11/03/2022 05:51 PM    BILITOT 1.4 11/03/2022 05:51 PM    BILIDIR <0.2 01/19/2021 07:05 AM    LABALBU 3.3 11/03/2022 05:51 PM    LABALBU 4.2 08/30/2011 10:27 AM       Lab Results   Component Value Date/Time    MG 2.0 11/03/2022 05:51 PM       Lab Results   Component Value Date    INR 1.17 (H) 07/31/2022    INR 1.49 (H) 01/19/2021    INR 0.98 01/15/2019         Lab Results Component Value Date/Time    LABA1C 5.9 05/03/2022 08:53 AM       Lab Results   Component Value Date/Time    TRIG 104 05/03/2022 08:53 AM    HDL 48 05/03/2022 08:53 AM    LDLCALC 98 05/03/2022 08:53 AM       Lab Results   Component Value Date/Time    TSH 2.000 07/31/2022 05:04 PM         Testing Reviewed:      I haveindividually reviewed the below cardiac tests    EKG:    ECHO:   Results for orders placed during the hospital encounter of 11/26/18   ECHO Complete 2D W Doppler W Color    Narrative Transthoracic Echocardiography Report (TTE)     Demographics      Patient Name   [address] [facility] Gender               Female                  L      MR #           [id number]        Race                                                       Ethnicity      Account #      [de-identified]        Room Number          1472      Accession      [id number]        Date of Study        11/26/2018   Number      Date of Birth  04/04/1928       Referring Physician  Bowen Hackett MD      Age            80 year(s)       Sonographer          Neema Mcdonnell, CS                                      Interpreting         Bridger Hackett MD                                   Physician     Procedure    Type of Study      TTE procedure:ECHOCARDIOGRAM COMPLETE 2D W DOPPLER W COLOR. Procedure Date  Date: 11/26/2018 Start: 02:58 PM    Study Location: Bedside  Technical Quality: Adequate visualization    Indications:Shortness of breath. Additional Medical History:Pedal edema, hypertension, colon cancer,  hyperlipidemia, atrial fibrillation, GERD, pacemaker    Patient Status: Routine    Height: 66.14 inches Weight: 180.78 pounds BSA: 1.92 m^2 BMI: 29.05 kg/m^2    BP: 149/68 mmHg    Allergies    - Other allergy:(Methadone, Gabapentin, lyrica, ultram).      Conclusions      Summary   Left ventricle size and systolic function is normal.   Normal left ventricular wall thickness. Ejection fraction is visually estimated at 55-60%. Mildly dilated left atrium. Aortic valve leaflets are mildly calcified. Mild aortic regurgitation is noted. Mild mitral regurgitation is present. Mild tricuspid regurgitation. Signature      ----------------------------------------------------------------   Electronically signed by Kelly Pierson MD (Interpreting   physician) on 11/26/2018 at 04:17 PM   ----------------------------------------------------------------      Findings      Mitral Valve   Mild calcification of the posterior leaflet of the mitral valve. Mild mitral regurgitation is present. Aortic Valve   The aortic valve appears to be trileaflet with good leaflet separation. Aortic valve leaflets are mildly calcified. Mild aortic regurgitation is noted. Tricuspid Valve   Tricuspid valve is structurally normal.   Mild tricuspid regurgitation. Pulmonic Valve   The pulmonic valve was not well visualized . Left Atrium   Mildly dilated left atrium. Left Ventricle   Left ventricle size and systolic function is normal.   Normal left ventricular wall thickness. Ejection fraction is visually estimated at 55-60%. Right Atrium   The right atrium is of normal size. Right Ventricle   Normal right ventricular size and function. Pericardial Effusion   No evidence of any pericardial effusion. Aorta / Great Vessels   Aorta was not clearly visualized.    IVC is normal in size with normal respiratory phasic changes     M-Mode/2D Measurements & Calculations      LV Diastolic    LV Systolic Dimension: 3  AV Cusp Separation: 2.1 cmLA   Dimension: 4.2  cm                        Dimension: 3 cmAO Root   cm              LV Volume Diastolic: 84.9 Dimension: 3.3 cmLA Area: 20.7   LV FS:28.6 %    ml                        cm^2   LV PW           LV Volume Systolic: 35 ml   Diastolic: 0.8  LV EDV/LV EDV Index: 78.6   cm              ml/41 m^2LV ESV/LV ESV   Septum          Index: 35 ml/18 m^2       RV Diastolic Dimension: 2.2 cm   Diastolic: 0.9  EF Calculated: 55.5 %   cm                                        LA/Aorta: 0.91                                                LA volume/Index: 57.9 ml /30m^2     Doppler Measurements & Calculations      MV Peak E-Wave: 116 cm/s AV Peak Velocity: 163   LVOT Peak Velocity: 134                            cm/s                    cm/s   MV Peak Gradient: 5.38   AV Peak Gradient: 10.63 LVOT Peak Gradient: 7   mmHg                     mmHg                    mmHg      MV Deceleration Time:                            TV Peak E-Wave: 73.7 cm/s   254 msec                                         TV Peak A-Wave: 36.4 cm/s                               AV P1/2t: 544 msec      TV Peak Gradient: 2.17                                                    mmHg                                                    TR Velocity:273 cm/s                                                    TR Gradient:29.81 mmHg   MR Velocity: 366 cm/s    AV DVI (Vmax):0.82      PV Peak Velocity: 80.5                                                    cm/s                                                    PV Peak Gradient: 2.59                                                    mmHg     [email or web address]       STRESS:    CATH:    Assessment/Plan       Diagnosis Orders   1.  Coronary artery disease due to lipid rich plaque          CAD s/p PCI  Diastolic CHF  Afib on Xarelto  HTN  HLD  S/p PPM    Was dx with acute bronchitis and is currently on amoxicillin  Discussed diuretic dosage  Patients daughter to titrate dosage to keep weight at 153 (she felt best at that weight)  On Bumex 1 mg daily and potassium  Had issues with low potassium, this was doubled up  Continue losartan, digoxin, , lasix, Aspirin  Continue Xarelto  No bleeding issue  The patient is asked to make an attempt to improve diet and exercise patterns to aid in medical management of this problem. Advised patient to call office or seek immediate medical attention if there is any new onset of  any chest pain, sob, palpitations, lightheadedness, dizziness, orthopnea, PND or pedal edema. All medication side effects were discussed in details. Thank youfor allowing me to participate in the care of this patient. Please do not hesitate to contact me for any further questions. Return in about 6 months (around 5/9/2023), or if symptoms worsen or fail to improve, for Review testing, Regular follow up.        Electronically signed by Rolanda Reid MD [facility]  11/9/2022 at 11:15 AM

## 2022-11-10 ENCOUNTER — NURSE ONLY (OUTPATIENT)
Dept: LAB | Age: 87
End: 2022-11-10

## 2022-11-10 DIAGNOSIS — D50.0 IRON DEFICIENCY ANEMIA DUE TO CHRONIC BLOOD LOSS: ICD-10-CM

## 2022-11-10 DIAGNOSIS — R73.09 ELEVATED GLUCOSE: ICD-10-CM

## 2022-11-10 LAB
ANION GAP SERPL CALCULATED.3IONS-SCNC: 13 MEQ/L (ref 8–16)
AVERAGE GLUCOSE: 114 MG/DL (ref 70–126)
BUN BLDV-MCNC: 19 MG/DL (ref 7–22)
CALCIUM SERPL-MCNC: 9.8 MG/DL (ref 8.5–10.5)
CHLORIDE BLD-SCNC: 97 MEQ/L (ref 98–111)
CO2: 28 MEQ/L (ref 23–33)
CREAT SERPL-MCNC: 0.8 MG/DL (ref 0.4–1.2)
ERYTHROCYTE [DISTWIDTH] IN BLOOD BY AUTOMATED COUNT: 15.5 % (ref 11.5–14.5)
ERYTHROCYTE [DISTWIDTH] IN BLOOD BY AUTOMATED COUNT: 52.1 FL (ref 35–45)
FERRITIN: 49 NG/ML (ref 10–291)
FOLATE: > 20 NG/ML (ref 4.8–24.2)
GFR SERPL CREATININE-BSD FRML MDRD: > 60 ML/MIN/1.73M2
GLUCOSE BLD-MCNC: 156 MG/DL (ref 70–108)
HBA1C MFR BLD: 5.8 % (ref 4.4–6.4)
HCT VFR BLD CALC: 35.5 % (ref 37–47)
HEMOGLOBIN: 10.9 GM/DL (ref 12–16)
IRON: 44 UG/DL (ref 50–170)
MCH RBC QN AUTO: 28.5 PG (ref 26–33)
MCHC RBC AUTO-ENTMCNC: 30.7 GM/DL (ref 32.2–35.5)
MCV RBC AUTO: 92.9 FL (ref 81–99)
PLATELET # BLD: 245 THOU/MM3 (ref 130–400)
PMV BLD AUTO: 11 FL (ref 9.4–12.4)
POTASSIUM SERPL-SCNC: 4.3 MEQ/L (ref 3.5–5.2)
RBC # BLD: 3.82 MILL/MM3 (ref 4.2–5.4)
SODIUM BLD-SCNC: 138 MEQ/L (ref 135–145)
TOTAL IRON BINDING CAPACITY: 319 UG/DL (ref 171–450)
VITAMIN B-12: 826 PG/ML (ref 211–911)
WBC # BLD: 6.6 THOU/MM3 (ref 4.8–10.8)

## 2022-11-16 ENCOUNTER — OFFICE VISIT (OUTPATIENT)
Dept: FAMILY MEDICINE CLINIC | Age: 87
End: 2022-11-16
Payer: MEDICARE

## 2022-11-16 VITALS
RESPIRATION RATE: 20 BRPM | SYSTOLIC BLOOD PRESSURE: 98 MMHG | HEART RATE: 70 BPM | BODY MASS INDEX: 30.4 KG/M2 | WEIGHT: 165.2 LBS | DIASTOLIC BLOOD PRESSURE: 66 MMHG | HEIGHT: 62 IN | TEMPERATURE: 97.8 F | OXYGEN SATURATION: 96 %

## 2022-11-16 DIAGNOSIS — I73.9 PVD (PERIPHERAL VASCULAR DISEASE) (HCC): ICD-10-CM

## 2022-11-16 DIAGNOSIS — Z95.0 PACEMAKER: ICD-10-CM

## 2022-11-16 DIAGNOSIS — N28.1 KIDNEY CYSTS: ICD-10-CM

## 2022-11-16 DIAGNOSIS — I49.5 SICK SINUS SYNDROME (HCC): ICD-10-CM

## 2022-11-16 DIAGNOSIS — K76.0 STEATOSIS OF LIVER: ICD-10-CM

## 2022-11-16 DIAGNOSIS — Z00.00 MEDICARE ANNUAL WELLNESS VISIT, SUBSEQUENT: Primary | ICD-10-CM

## 2022-11-16 DIAGNOSIS — J40 BRONCHITIS: ICD-10-CM

## 2022-11-16 DIAGNOSIS — E83.42 HYPOMAGNESEMIA: ICD-10-CM

## 2022-11-16 DIAGNOSIS — I50.32 CHRONIC DIASTOLIC CHF (CONGESTIVE HEART FAILURE) (HCC): ICD-10-CM

## 2022-11-16 DIAGNOSIS — I48.20 CHRONIC ATRIAL FIBRILLATION (HCC): ICD-10-CM

## 2022-11-16 DIAGNOSIS — I10 ESSENTIAL HYPERTENSION: ICD-10-CM

## 2022-11-16 DIAGNOSIS — F33.42 RECURRENT MAJOR DEPRESSIVE DISORDER, IN FULL REMISSION (HCC): ICD-10-CM

## 2022-11-16 DIAGNOSIS — K21.9 GASTROESOPHAGEAL REFLUX DISEASE WITHOUT ESOPHAGITIS: ICD-10-CM

## 2022-11-16 DIAGNOSIS — M15.9 PRIMARY OSTEOARTHRITIS INVOLVING MULTIPLE JOINTS: ICD-10-CM

## 2022-11-16 DIAGNOSIS — M85.89 OSTEOPENIA OF MULTIPLE SITES: ICD-10-CM

## 2022-11-16 DIAGNOSIS — I50.9 CHF (CONGESTIVE HEART FAILURE), NYHA CLASS I, UNSPECIFIED FAILURE CHRONICITY, UNSPECIFIED TYPE (HCC): ICD-10-CM

## 2022-11-16 DIAGNOSIS — D50.0 IRON DEFICIENCY ANEMIA DUE TO CHRONIC BLOOD LOSS: ICD-10-CM

## 2022-11-16 DIAGNOSIS — N39.3 STRESS INCONTINENCE: ICD-10-CM

## 2022-11-16 DIAGNOSIS — E78.00 PURE HYPERCHOLESTEROLEMIA: ICD-10-CM

## 2022-11-16 DIAGNOSIS — E88.81 INSULIN RESISTANCE: ICD-10-CM

## 2022-11-16 DIAGNOSIS — I25.119 CORONARY ARTERY DISEASE INVOLVING NATIVE HEART WITH ANGINA PECTORIS, UNSPECIFIED VESSEL OR LESION TYPE (HCC): ICD-10-CM

## 2022-11-16 DIAGNOSIS — N18.31 STAGE 3A CHRONIC KIDNEY DISEASE (HCC): ICD-10-CM

## 2022-11-16 PROCEDURE — G8484 FLU IMMUNIZE NO ADMIN: HCPCS | Performed by: FAMILY MEDICINE

## 2022-11-16 PROCEDURE — G0439 PPPS, SUBSEQ VISIT: HCPCS | Performed by: FAMILY MEDICINE

## 2022-11-16 PROCEDURE — 1036F TOBACCO NON-USER: CPT | Performed by: FAMILY MEDICINE

## 2022-11-16 PROCEDURE — G8417 CALC BMI ABV UP PARAM F/U: HCPCS | Performed by: FAMILY MEDICINE

## 2022-11-16 PROCEDURE — G8427 DOCREV CUR MEDS BY ELIG CLIN: HCPCS | Performed by: FAMILY MEDICINE

## 2022-11-16 PROCEDURE — 1123F ACP DISCUSS/DSCN MKR DOCD: CPT | Performed by: FAMILY MEDICINE

## 2022-11-16 PROCEDURE — 99213 OFFICE O/P EST LOW 20 MIN: CPT | Performed by: FAMILY MEDICINE

## 2022-11-16 PROCEDURE — 1090F PRES/ABSN URINE INCON ASSESS: CPT | Performed by: FAMILY MEDICINE

## 2022-11-16 RX ORDER — DOXYCYCLINE HYCLATE 100 MG
100 TABLET ORAL 2 TIMES DAILY
Qty: 20 TABLET | Refills: 0 | Status: SHIPPED | OUTPATIENT
Start: 2022-11-16 | End: 2022-11-26

## 2022-11-16 RX ORDER — POTASSIUM CHLORIDE 20 MEQ/1
40 TABLET, EXTENDED RELEASE ORAL DAILY
Qty: 180 TABLET | Refills: 3 | Status: SHIPPED | OUTPATIENT
Start: 2022-11-16

## 2022-11-16 SDOH — ECONOMIC STABILITY: FOOD INSECURITY: WITHIN THE PAST 12 MONTHS, THE FOOD YOU BOUGHT JUST DIDN'T LAST AND YOU DIDN'T HAVE MONEY TO GET MORE.: NEVER TRUE

## 2022-11-16 SDOH — ECONOMIC STABILITY: FOOD INSECURITY: WITHIN THE PAST 12 MONTHS, YOU WORRIED THAT YOUR FOOD WOULD RUN OUT BEFORE YOU GOT MONEY TO BUY MORE.: NEVER TRUE

## 2022-11-16 ASSESSMENT — PATIENT HEALTH QUESTIONNAIRE - PHQ9
SUM OF ALL RESPONSES TO PHQ QUESTIONS 1-9: 6
SUM OF ALL RESPONSES TO PHQ QUESTIONS 1-9: 6
9. THOUGHTS THAT YOU WOULD BE BETTER OFF DEAD, OR OF HURTING YOURSELF: 0
7. TROUBLE CONCENTRATING ON THINGS, SUCH AS READING THE NEWSPAPER OR WATCHING TELEVISION: 0
1. LITTLE INTEREST OR PLEASURE IN DOING THINGS: 0
SUM OF ALL RESPONSES TO PHQ QUESTIONS 1-9: 6
3. TROUBLE FALLING OR STAYING ASLEEP: 0
10. IF YOU CHECKED OFF ANY PROBLEMS, HOW DIFFICULT HAVE THESE PROBLEMS MADE IT FOR YOU TO DO YOUR WORK, TAKE CARE OF THINGS AT HOME, OR GET ALONG WITH OTHER PEOPLE: 0
5. POOR APPETITE OR OVEREATING: 0
SUM OF ALL RESPONSES TO PHQ9 QUESTIONS 1 & 2: 0
2. FEELING DOWN, DEPRESSED OR HOPELESS: 0
SUM OF ALL RESPONSES TO PHQ QUESTIONS 1-9: 6
4. FEELING TIRED OR HAVING LITTLE ENERGY: 3
6. FEELING BAD ABOUT YOURSELF - OR THAT YOU ARE A FAILURE OR HAVE LET YOURSELF OR YOUR FAMILY DOWN: 3
8. MOVING OR SPEAKING SO SLOWLY THAT OTHER PEOPLE COULD HAVE NOTICED. OR THE OPPOSITE, BEING SO FIGETY OR RESTLESS THAT YOU HAVE BEEN MOVING AROUND A LOT MORE THAN USUAL: 0

## 2022-11-16 ASSESSMENT — LIFESTYLE VARIABLES: HOW OFTEN DO YOU HAVE A DRINK CONTAINING ALCOHOL: NEVER

## 2022-11-16 ASSESSMENT — SOCIAL DETERMINANTS OF HEALTH (SDOH): HOW HARD IS IT FOR YOU TO PAY FOR THE VERY BASICS LIKE FOOD, HOUSING, MEDICAL CARE, AND HEATING?: NOT HARD AT ALL

## 2022-11-16 NOTE — PROGRESS NOTES
[address] [place] [id number]  Dept: [phone]  Dept Fax: [phone]  Loc: [address] [place] [address] Efe Conway is a 80 y.o. female who presents today for:  No chief complaint on file. HPI:     HPI    Hypertension: Patient here for follow-up of elevated blood pressure. She is not exercising and is adherent to low salt diet. Blood pressure is well controlled at home. Cardiac symptoms none. Patient denies chest pain and palpitations. Cardiovascular risk factors: advanced age (older than 54 for men, 72 for women), dyslipidemia, hypertension, obesity (BMI >= 30 kg/m2) and sedentary lifestyle. Use of agents associated with hypertension: none. History of target organ damage: SSS and afib and CHF/CAD. Under the care of cardiology: Dr Rodney Aquino. Hyperlipidemia: Patient presents with hyperlipidemia. She was tested because hypertension. Her last labs see labs charted. . There is a family history of hyperlipidemia. There is a family history of early ischemia heart disease. Lab Results   Component Value Date    CHOL 167 05/03/2022    CHOL 127 12/04/2018    CHOL 154 11/01/2018     Lab Results   Component Value Date    TRIG 104 05/03/2022    TRIG 145 12/04/2018    TRIG 167 11/01/2018     Lab Results   Component Value Date    HDL 48 05/03/2022    HDL 36 12/04/2018    HDL 38 11/01/2018     Lab Results   Component Value Date    LDLCALC 98 05/03/2022    LDLCALC 62 12/04/2018    [address] 11/01/2018     No results found for: LABVLDL, VLDL  No results found for: CHOLHDLRATIO  Active statin treatment with history of stent placement. GERD: Alisha complains of heartburn. This has been associated with heartburn. She denies no other symptoms. Symptoms have been present for 5 years. She denies dysphagia. She has not lost weight. She denies melena, hematochezia, hematemesis, and coffee ground emesis.  Medical therapy in the past has included proton pump inhibitors. Depression: Patient complains of depression. She complains of depressed mood, difficulty concentrating, psychomotor retardation and recurrent thoughts of death. Onset was approximately 5 years ago, gradually worsening since that time. She denies current suicidal and homicidal plan or intent. Family history significant for no psychiatric illness. Possible organic causes contributing are: none. Risk factors: negative life event aging and becoming less active and previous episode of depression Previous treatment includes no medication and none and psych therapy. She complains of the following side effects from the treatment: none. ***    Osteoarthritis primary in multiple joints is not controlled on current medications. However, she does very little exercise at home and refuses PT. This is leading to unsteady gait and slowly limiting her ability to do ADLs. She had SOB with exertion. She is talking to her family about AL living but her daughter is helping and lives in the same [facility] building. She is back at home after discharge from the SNF. ***    A fib is rate controlled and taking xarelto. ***    Vitamin b12 deficiency needs rechecked. ***    Night sweats are unchanged. thyroid goiter in the past needs recheck of ultrasound last done in 4/2019 shows stable nodules and 2 new. One is recommended to have a biopsy. The night sweats are leading to her not feeling rested in the morning. Here for continued hot flashes and night sweats. They have been happening for 1-2 years but getting worse since June 2020. More fatigue and SOB with exertion as well. She is still only exercising a little since DC from [facility] despite many attempts to motivate her. She is off beta blockers due to dizziness in the past.  She did go to PT in June 2020 but did not finish and has not done any of the exercises at home, she will be released from [facility] after 2 weeks.    ***    Constipation comes and goes. Better off of iron but still needing colace. Encouraged for more exercising at home. ***    She has noticed a lump in the right axilla. First noticed this 9/2020. Not painful but not getting any smaller and she thinks it may be bigger. She and her daughter decided in fall 2020 to not go through with testing due to her unwillingness to do treatment if they find cancer. She is bring this up again and again wants to consider imaging and will call if she decides to go through with it. No new concerns today. ***      Here for persistent weakness for a few months. Pacer clinic placed a monitor. She had to do a consult there on 9/28/21 but nothing was changed. Then developed sweats and chills, SOB, fatigue and headaches. Started tylenol 2 ES TID and occasional QID which helps but still has right side pain. After 2021 flu shot she got worse again. She has had episodes of more SOB and diaphoresis which are most prominent after BM. She does have a history of vagal nerve issues. Cardiology agreed that the dizziness is likely vagal.***        SNF note 1-11-22:  Reginaldo Gonzáles was seen today in preparation for DC to home on 1/13 with [facility]: OMEGA ELDER She was admitted to the facility on 12/22/21 from [facility] where she was admitted on 12/18/21 for CHF and bradycardia. 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 She has improved with therapy and states that she is ready to go back home. She denies pain or shortness of breath on room air. No distress noted. She is alert and oriented, she is pleasant and follows commands. She will have a f/u with Dr. Candida Garcia in the office. Reviewed chart forpast medical history , surgical history , allergies, social history , family history and medications.     Health Maintenance   Topic Date Due    DTaP/Tdap/Td vaccine (1 - Tdap) 10/28/2005    Shingles vaccine (2 of 2) 10/01/2019    Flu vaccine (1) 08/01/2022    Annual Wellness Visit (AWV)  11/12/2022    Depression Monitoring  08/08/2023    Pneumococcal 65+ years Vaccine  Completed    COVID-19 Vaccine  Completed    Hepatitis A vaccine  Aged Out    Hib vaccine  Aged Out    Meningococcal (ACWY) vaccine  Aged Out       Subjective:      Constitutional:Negative for fever, chills, diaphoresis, activity change, appetite change and fatigue. HENT: Negative for hearing loss, ear pain, congestion, sore throat, rhinorrhea, postnasal drip and ear discharge. Eyes: Negative for photophobia and visual disturbance. Respiratory: Negative for cough, chest tightness, shortness of breath and wheezing. Cardiovascular: Negative for chest pain and leg swelling. Gastrointestinal: Negative for nausea, vomiting, abdominal pain, diarrhea and constipation. Genitourinary: Negative for dysuria, urgency and frequency. Neurological: Negative for weakness, light-headedness and headaches. Psychiatric/Behavioral: Negative for sleep disturbance.      :     There were no vitals filed for this visit. Wt Readings from Last 3 Encounters:   11/09/22 168 lb (76.2 kg)   11/03/22 164 lb 3.2 oz (74.5 kg)   08/31/22 164 lb 3.2 oz (74.5 kg)       Physical Exam  Physical Exam   Constitutional: Vital signs are normal. She appears well-developed and well-nourished. She is active. HENT:   Head: Normocephalic and atraumatic. Right Ear: Tympanic membrane, external ear and ear canal normal. No drainage or tenderness. Left Ear: Tympanic membrane, external ear and ear canal normal. No drainage or tenderness. Nose: Nose normal. No mucosal edema or rhinorrhea. Mouth/Throat: Uvula is midline, oropharynx is clear and moist and mucous membranes are normal. Mucous membranes are not pale. Normal dentition. No posterior oropharyngeal edema or posterior oropharyngeal erythema.    Eyes: Lids are normal. Right eye exhibits no chemosis and no discharge. Left eye exhibits no chemosis and no drainage. Right conjunctiva has no hemorrhage. Left conjunctiva has no hemorrhage. Right eye exhibits normal extraocular motion. Left eye exhibits normal extraocular motion. Right pupil is round and reactive. Left pupil is round and reactive. Pupils are equal.   Cardiovascular: Normal rate, regular rhythm, S1 normal, S2 normal and normal heart sounds. Exam reveals no gallop. No murmur heard. Pulmonary/Chest: Effort normal and breath sounds normal. No respiratory distress. She has no wheezes. She has no rhonchi. She has no rales. Abdominal: Soft. Normal appearance and bowel sounds are normal. She exhibits no distension and no mass. There is no hepatosplenomegaly. No tenderness. She has no rigidity, no rebound and no guarding. No hernia. Musculoskeletal:        Right lower leg: She exhibits no edema. Left lower leg: She exhibits no edema. Neurological: She is alert.          Assessment/Plan   Diagnoses and all orders for this visit:    Essential hypertension    Chronic atrial fibrillation (HCC)    Stage 3a chronic kidney disease (HCC)    Iron deficiency anemia due to chronic blood loss    Chronic diastolic CHF (congestive heart failure) (HCC)    Gastroesophageal reflux disease without esophagitis    Recurrent major depressive disorder, in full remission ([address]    PVD (peripheral vascular disease) (HCC)    Primary osteoarthritis involving multiple joints    Hypomagnesemia    Sick sinus syndrome ([address]    Pure hypercholesterolemia    Coronary artery disease involving native heart with angina pectoris, unspecified vessel or lesion type (HCC)    Osteopenia of multiple sites    CHF (congestive heart failure), NYHA class I, unspecified failure chronicity, unspecified type ([address]    Kidney cysts    Steatosis of liver    [employer] single pacemaker 4/26/2016    Stress incontinence  No change to medications   Continue healthy diet and exercise  Aspirin daily    Discussed use, benefit, and side effectsof prescribed medications. All patient questions answered. Pt voiced understanding. Reviewed health maintenance. Instructed to continue current medications, diet and exercise. Patient agreed with treatment plan. Followup as directed.      Electronically signed by Ginny Hammond MD Detail Level: Detailed Hide Additional Notes?: No

## 2022-11-16 NOTE — PROGRESS NOTES
Medicare Annual Wellness Visit    Andrzej Mariaernesto is here for Medicare AWV    Assessment & Plan   Medicare annual wellness visit, subsequent  Essential hypertension  -     metoprolol tartrate (LOPRESSOR) 25 MG tablet; Take 1 tablet by mouth 2 times daily, Disp-180 tablet, R-3Normal  Chronic atrial fibrillation (HCC)  Stage 3a chronic kidney disease (HCC)  Iron deficiency anemia due to chronic blood loss  -     Iron Binding Capacity; Future  -     Iron; Future  -     Ferritin; Future  -     CBC; Future  -     Vitamin B12 & Folate; Future  -     Basic Metabolic Panel; Future  Chronic diastolic CHF (congestive heart failure) (HCC)  -     potassium chloride (KLOR-CON M) 20 MEQ extended release tablet; Take 2 tablets by mouth daily, Disp-180 tablet, R-3Normal  Gastroesophageal reflux disease without esophagitis  Recurrent major depressive disorder, in full remission ([address]  PVD (peripheral vascular disease) ([facility])  Primary osteoarthritis involving multiple joints  Hypomagnesemia  Sick sinus syndrome ([address]  Pure hypercholesterolemia  Coronary artery disease involving native heart with angina pectoris, unspecified vessel or lesion type ([facility])  Osteopenia of multiple sites  CHF (congestive heart failure), NYHA class I, unspecified failure chronicity, unspecified type ([address]  Kidney cysts  Steatosis of liver  [employer] single pacemaker 4/26/2016  Stress incontinence  Bronchitis  -     XR CHEST STANDARD (2 VW); Future  -     doxycycline hyclate (VIBRA-TABS) 100 MG tablet; Take 1 tablet by mouth 2 times daily for 10 days, Disp-20 tablet, R-0Normal  Insulin resistance  -     Basic Metabolic Panel;  Future  -     Hemoglobin A1C; Future    Recommendations for Preventive Services Due: see orders and patient instructions/AVS.  Recommended screening schedule for the next 5-10 years is provided to the patient in written form: see Patient Instructions/AVS.     Return in 6 months (on 5/16/2023) for Medicare Annual Wellness Visit in 1 year. Subjective     Hypertension: Patient here for follow-up of elevated blood pressure. She is not exercising and is adherent to low salt diet. Blood pressure is well controlled at home. Cardiac symptoms none. Patient denies chest pain and palpitations. Cardiovascular risk factors: advanced age (older than 54 for men, 72 for women), dyslipidemia, hypertension, obesity (BMI >= 30 kg/m2) and sedentary lifestyle. Use of agents associated with hypertension: none. History of target organ damage: SSS and afib and CHF/CAD. Under the care of cardiology: Dr Jim Strong. Hyperlipidemia: Patient presents with hyperlipidemia. She was tested because hypertension. Her last labs see labs charted. . There is a family history of hyperlipidemia. There is a family history of early ischemia heart disease. Lab Results   Component Value Date    CHOL 167 05/03/2022    CHOL 127 12/04/2018    CHOL 154 11/01/2018     Lab Results   Component Value Date    TRIG 104 05/03/2022    TRIG 145 12/04/2018    TRIG 167 11/01/2018     Lab Results   Component Value Date    HDL 48 05/03/2022    HDL 36 12/04/2018    HDL 38 11/01/2018     Lab Results   Component Value Date    LDLCALC 98 05/03/2022    LDLCALC 62 12/04/2018    [address] 11/01/2018     No results found for: LABVLDL, VLDL  No results found for: CHOLHDLRATIO  Active statin treatment with history of stent placement. GERD: Alisha complains of heartburn. This has been associated with heartburn. She denies no other symptoms. Symptoms have been present for 5 years. She denies dysphagia. She has not lost weight. She denies melena, hematochezia, hematemesis, and coffee ground emesis. Medical therapy in the past has included proton pump inhibitors. Depression: Patient complains of depression. She complains of depressed mood, difficulty concentrating, psychomotor retardation and recurrent thoughts of death.  Onset was approximately 5 years ago, gradually worsening since that time.  She denies current suicidal and homicidal plan or intent. Family history significant for no psychiatric illness. Possible organic causes contributing are: none. Risk factors: negative life event aging and becoming less active and previous episode of depression Previous treatment includes no medication and none and psych therapy. She complains of the following side effects from the treatment: none. Osteoarthritis primary in multiple joints is not controlled on current medications. However, she does very little exercise at home and refuses PT. This is leading to unsteady gait and slowly limiting her ability to do ADLs. She had SOB with exertion. She is talking to her family about AL living but her daughter is helping and lives in the same [facility] building. She is back at home after discharge from the SNF. A fib is rate controlled and taking xarelto. Vitamin b12 deficiency needs rechecked. Night sweats are unchanged. thyroid goiter in the past needs recheck of ultrasound last done in 4/2019 shows stable nodules and 2 new. One is recommended to have a biopsy. The night sweats are leading to her not feeling rested in the morning. Here for continued hot flashes and night sweats. They have been happening for 1-2 years but getting worse since June 2020. More fatigue and SOB with exertion as well. She is still only exercising a little since DC from [facility] despite many attempts to motivate her. She is off beta blockers due to dizziness in the past.  She did go to PT in June 2020 but did not finish and has not done any of the exercises at home, she is released from [facility] after 2 weeks. Constipation comes and goes. Better off of iron but still needing colace. Encouraged for more exercising at home. She has noticed a lump in the right axilla. First noticed this 9/2020. Not painful but not getting any smaller and she thinks it may be bigger.    She and her daughter decided in fall 2020 to not go through with testing due to her unwillingness to do treatment if they find cancer. She is bring this up again and again wants to consider imaging and will call if she decides to go through with it. No new concerns today. Here for persistent weakness for a few months. Pacer clinic placed a monitor. She had to do a consult there on 9/28/21 but nothing was changed. Then developed sweats and chills, SOB, fatigue and headaches. Started tylenol 2 ES TID and occasional QID which helps but still has right side pain. After 2021 flu shot she got worse again. She has had episodes of more SOB and diaphoresis which are most prominent after BM. She does have a history of vagal nerve issues. Cardiology agreed that the dizziness is likely vagal.    Bronchitis 2 weeks ago in the ER and treated with amoxicillin for 10 days. Tessalon helping with the cough prn with cough drops. Mucinex and coricidin HBP was no help. It was getting better but 4 days ago it took a little dip, slowly getting better again. Now with, mild headache, sore throat, productive of phlegm with the cough. Tried:  tylenol prn helps the achyness. SNF note 1-11-22:  Sanjay Chi was seen today in preparation for DC to home on 1/13 with [facility]: OMEGA LEON. She was admitted to the facility on 12/22/21 from [address] [place] [address] where she was admitted on 12/18/21 for CHF and bradycardia. 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 She has improved with therapy and states that she is ready to go back home. She denies pain or shortness of breath on room air. No distress noted. She is alert and oriented, she is pleasant and follows commands. She will have a f/u with Dr. Donato Walsh in the office.         The following acute and/or chronic problems were also addressed today:  See above    Patient's complete Health Risk Assessment and screening values have been reviewed and are found in Flowsheets. The following problems were reviewed today and where indicated follow up appointments were made and/or referrals ordered. Positive Risk Factor Screenings with Interventions:    Fall Risk:  Do you feel unsteady or are you worried about falling? : (!) yes  2 or more falls in past year?: (!) yes  Fall with injury in past year?: no   Fall Risk Interventions:    PT prn     Depression:  PHQ-2 Score: 0  PHQ-9 Total Score: 6    Severity:1-4 = minimal depression, 5-9 = mild depression, 10-14 = moderate depression, 15-19 = moderately severe depression, 20-27 = severe depression  Depression Interventions:  Regular exercise recommended- 3-5 times per week, 30-45 minutes per session           Health Habits/Nutrition:  Physical Activity: Unknown    Days of Exercise per Week: 0 days    Minutes of Exercise per Session: Not on file        Body mass index: (!) 30.2  Have you seen the dentist within the past year?: Yes  Health Habits/Nutrition Interventions:  Inadequate physical activity:  patient agrees to exercise for at least 150 minutes/week  Dental exam overdue:  patient encouraged to make appointment with his/her dentist    Hearing/Vision:  Do you or your family notice any trouble with your hearing that hasn't been managed with hearing aids?: (!) Yes (Has hearing aids)  Do you have difficulty driving, watching TV, or doing any of your daily activities because of your eyesight?: No  Have you had an eye exam within the past year?: Yes  No results found.   Hearing/Vision Interventions:  Vision concerns:  patient encouraged to make appointment with his/her eye specialist     ADLs:  In the past 7 days, did you need help from others to perform any of the following everyday activities: Eating, dressing, grooming, bathing, toileting, or walking/balance?: (!) Yes  Select all that apply: (!) Dressing, Grooming, Bathing, Walking/Balance  In the past 7 days, did you need help from others to take care of any of the following: Laundry, housekeeping, banking/finances, shopping, telephone use, food preparation, transportation, or taking medications?: (!) Yes  Select all that apply: [employer], Housekeeping, Banking/Finances, Shopping, Food Preparation, Transportation, Taking Medications  ADL Interventions:  OT prn          Objective   Vitals:    11/16/22 1118   BP: 98/66   Site: Left Lower Arm   Position: Sitting   Cuff Size: Medium Adult   Pulse: 70   Resp: 20   Temp: 97.8 °F (36.6 °C)   TempSrc: Temporal   SpO2: 96%   Weight: 165 lb 3.2 oz (74.9 kg)   Height: 5' 2.01\" (1.575 m)      Body mass index is 30.21 kg/m². Physical Exam   Constitutional: Vital signs are normal. She appears well-developed and well-nourished. She is active. HENT:   Head: Normocephalic and atraumatic. Right Ear: Tympanic membrane, external ear and ear canal normal. No drainage or tenderness. Left Ear: Tympanic membrane, external ear and ear canal normal. No drainage or tenderness. Nose: Nose normal. No mucosal edema or rhinorrhea. Mouth/Throat: Uvula is midline, oropharynx is clear and moist and mucous membranes are normal. Mucous membranes are not pale. Normal dentition. No posterior oropharyngeal edema or posterior oropharyngeal erythema. Eyes: Lids are normal. Right eye exhibits no chemosis and no discharge. Left eye exhibits no chemosis and no drainage. Right conjunctiva has no hemorrhage. Left conjunctiva has no hemorrhage. Right eye exhibits normal extraocular motion. Left eye exhibits normal extraocular motion. Right pupil is round and reactive. Left pupil is round and reactive. Pupils are equal.   Cardiovascular: Normal rate, regular rhythm, S1 normal, S2 normal and normal heart sounds. Exam reveals no gallop. No murmur heard. Pulmonary/Chest: Effort normal and breath sounds normal. No respiratory distress. She has no wheezes. She has no rhonchi. She has no rales. Abdominal: Soft. Normal appearance and bowel sounds are normal. She exhibits no distension and no mass. There is no hepatosplenomegaly. No tenderness. She has no rigidity, no rebound and no guarding. No hernia. Musculoskeletal:        Right lower leg: She exhibits no edema. Left lower leg: She exhibits no edema. Neurological: She is alert. Allergies   Allergen Reactions    Methadone Shortness Of Breath     Shakes and nausea    Gabapentin     Lodine [Etodolac] Other (See Comments)     \"spacey, hot flashes, shaky\"    Lyrica [Pregabalin]     Ultram [Tramadol]      Nausea, pedal edema, SOB     Prior to Visit Medications    Medication Sig Taking?  Authorizing Provider   potassium chloride (KLOR-CON M) 20 MEQ extended release tablet Take 2 tablets by mouth daily Yes Claudy Dickerson MD   metoprolol tartrate (LOPRESSOR) 25 MG tablet Take 1 tablet by mouth 2 times daily Yes Claudy Dickerson MD   doxycycline hyclate (VIBRA-TABS) 100 MG tablet Take 1 tablet by mouth 2 times daily for 10 days Yes Claudy Dickerson MD   psyllium (KONSYL) 28.3 % PACK Take 1 packet by mouth daily Yes Historical Provider, MD   pantoprazole (PROTONIX) 40 MG tablet TAKE 1 TABLET BY MOUTH  TWICE DAILY Yes Claudy Dickerson MD   Cholecalciferol (VITAMIN D-3 PO) Take by mouth daily Yes Historical Provider, MD   Multiple Vitamin (MULTIVITAMIN ADULT PO) Take by mouth daily Yes Historical Provider, MD   venlafaxine (EFFEXOR XR) 75 MG extended release capsule TAKE 1 CAPSULE BY MOUTH  DAILY Yes Claudy Dickerson MD   rivaroxaban (XARELTO) 15 MG TABS tablet Take 1 tablet by mouth Daily with supper Yes Claudy Dickerson MD   bumetanide (BUMEX) 1 MG tablet Take 1 tablet by mouth daily Yes Cyril Crouch MD   sucralfate (CARAFATE) 1 GM tablet TAKE 1 TABLET BY MOUTH  TWICE DAILY Yes Claudy Dickerson MD   CALCIUM PO Take by mouth Calcium chew Yes Historical Provider, MD   magnesium (MAGNESIUM-OXIDE) 250 MG TABS tablet Take 250 mg by mouth 2 times daily Yes Historical Provider, MD   Cyanocobalamin (VITAMIN B 12 PO) Take by mouth every other day Yes Historical Provider, MD   Probiotic Product (PROBIOTIC DAILY PO) Take by mouth daily Yes Historical Provider, MD   aspirin 81 MG chewable tablet Take 1 tablet by mouth daily Yes NOELLE Del Toro CNP   acetaminophen (TYLENOL) 500 MG tablet Take 1,000 mg by mouth every 6 hours as needed for Pain  Yes Historical Provider, MD   ferrous sulfate (IRON 325) 325 (65 Fe) MG tablet Take 325 mg by mouth once a week  Historical Provider, MD   docusate sodium (COLACE) 100 MG capsule Take 100 mg by mouth daily as needed  Historical Provider, MD Ashley (Including outside providers/suppliers regularly involved in providing care):   Patient Care Team:  Ginny Hammond MD as PCP - General (Family Medicine)  Ginny Hammond MD as PCP - [facility] Empaneled Provider  Milly Halsted, APRN - CNP as Nurse Practitioner (Nurse Practitioner)  Sarah Perez MD as Cardiologist (Cardiology)  Augie Rodriguez MD as Consulting Physician (Gastroenterology)  Dallin Rudolph RN as Ambulatory Care Manager     Reviewed and updated this visit:  Tobacco  Allergies  Meds  Med Hx  Surg Hx  Soc Hx  Fam Hx

## 2022-11-16 NOTE — PATIENT INSTRUCTIONS
Personalized Preventive Plan for Inez Ramirez - 11/16/2022  Medicare offers a range of preventive health benefits. Some of the tests and screenings are paid in full while other may be subject to a deductible, co-insurance, and/or copay. Some of these benefits include a comprehensive review of your medical history including lifestyle, illnesses that may run in your family, and various assessments and screenings as appropriate. After reviewing your medical record and screening and assessments performed today your provider may have ordered immunizations, labs, imaging, and/or referrals for you. A list of these orders (if applicable) as well as your Preventive Care list are included within your After Visit Summary for your review. Other Preventive Recommendations:    A preventive eye exam performed by an eye specialist is recommended every 1-2 years to screen for glaucoma; cataracts, macular degeneration, and other eye disorders. A preventive dental visit is recommended every 6 months. Try to get at least 150 minutes of exercise per week or 10,000 steps per day on a pedometer . Order or download the FREE \"Exercise & Physical Activity: Your Everyday Guide\" from [employer]. Call 8-700.289.7640 or search [employer] online. You need 2630-5597 mg of calcium and 5153-6414 IU of vitamin D per day. It is possible to meet your calcium requirement with diet alone, but a vitamin D supplement is usually necessary to meet this goal.  When exposed to the sun, use a sunscreen that protects against both UVA and UVB radiation with an SPF of 30 or greater. Reapply every 2 to 3 hours or after sweating, drying off with a towel, or swimming. Always wear a seat belt when traveling in a car. Always wear a helmet when riding a bicycle or motorcycle.

## 2022-11-17 ENCOUNTER — CARE COORDINATION (OUTPATIENT)
Dept: CARE COORDINATION | Age: 87
End: 2022-11-17

## 2022-11-17 NOTE — CARE COORDINATION
Patient was called for Care Coordination enrollment s/p recent list referral for assistance with the management of her CHF and healthcare needs. Care Coordination program was briefly reviewed with patient and daughter. Patient shared she is feeling a little better s/p recent PCP visit and she is unsure of any questions as her daughter is not at her home at this time and she assists her with her care. AC then spoke with patient's daughter/HIPPA contact, Teddy Palmer. Teddy May reported patient has started antibiotic as directed and she feels she is doing better today. Sulema denied any questions re: patient's recent appointment. Care Coordination program was again reviewed with Teddy Palmer and she declines enrollment at this time. Daughter shared she assists patient as needed and lives next door. Teddy Palmer reported she helps patient with her healthcare management and monitors patient's weight daily. CHF education and what to report/when to follow up was reviewed with daughter and she verbalized understanding. Sulema shared she does not feel they are interested in Care Coordination at this time as patient is current with CHF clinic, cardiology, [address] [place] [address], and PCP and family assists patient as needed. Teddy Palmer was instructed to call if she changes her mind or has any additional questions/concerns. No further f/u planned. PCP updated.

## 2022-11-29 NOTE — PROGRESS NOTES
[address] [place] [address] [address] [facility] [address]  Dept: [phone]  Dept Fax: [phone]  Loc: [phone]    Visit Date: 8/14/2019    Ms. Jenniffer Abraham is a 80 y.o. female  who presented for:  Chief Complaint   Patient presents with    Check-Up    Coronary Artery Disease       HPI:   79 yo F c hx of Afib on Xarelto, HTN, HLD, CAD s/p PCI is here for a follow up. She underwent staged PCI of LAD and is here for a follow up. Denies any chest pain, sob, palpitations, lightheadedness, dizziness, orthopnea, PND or pedal edema. Current Outpatient Medications:     sucralfate (CARAFATE) 1 GM tablet, Take 1 tablet by mouth 2 times daily, Disp: 180 tablet, Rfl: 3    metoprolol tartrate (LOPRESSOR) 50 MG tablet, Take 1 tablet by mouth 2 times daily, Disp: 180 tablet, Rfl: 3    ferrous sulfate 325 (65 Fe) MG tablet, Take 1 tablet by mouth 2 times daily, Disp: 180 tablet, Rfl: 3    docusate (COLACE, DULCOLAX) 100 MG CAPS, Take 100 mg by mouth 2 times daily as needed (PRN), Disp: 180 capsule, Rfl: 3    potassium chloride (KLOR-CON M) 20 MEQ extended release tablet, Take 1 tablet by mouth daily, Disp: 90 tablet, Rfl: 3    bumetanide (BUMEX) 0.5 MG tablet, Take 1 tablet by mouth daily, Disp: 90 tablet, Rfl: 3    atorvastatin (LIPITOR) 40 MG tablet, Take 1 tablet by mouth nightly, Disp: 90 tablet, Rfl: 3    rivaroxaban (XARELTO) 15 MG TABS tablet, Take 15 mg by mouth Daily with supper, Disp: , Rfl:     pantoprazole (PROTONIX) 40 MG tablet, Take 1 tablet by mouth 2 times daily (Patient taking differently: Take 40 mg by mouth daily ), Disp: 180 tablet, Rfl: 3    nitroGLYCERIN (NITROSTAT) 0.4 MG SL tablet, up to max of 3 total doses.  If no relief after 1 dose, call 911., Disp: 25 tablet, Rfl: 3    Magnesium Oxide 250 MG TABS, Take 1 tablet by mouth daily, Disp: 30 tablet, Rfl: 1    venlafaxine (EFFEXOR XR) 75 MG extended release capsule, TAKE 1 CAPSULE BY MOUTH  DAILY, Disp: 90 capsule, Rfl: 3    digoxin (LANOXIN) 125 MCG tablet, TAKE 1 TABLET BY MOUTH  DAILY, Disp: 90 tablet, Rfl: 3    NONFORMULARY, Perils probiotic  1 daily, Disp: , Rfl:     Cyanocobalamin (VITAMIN B 12 PO), Take by mouth every other day , Disp: , Rfl:     aspirin 81 MG chewable tablet, Take 1 tablet by mouth daily, Disp: 30 tablet, Rfl: 3    vitamin D (CHOLECALCIFEROL) 1000 UNIT TABS tablet, Take 1,000 Units by mouth daily, Disp: , Rfl:     Calcium-Vitamin D-Vitamin K 500-500-40 MG-UNT-MCG CHEW, Take 1 tablet by mouth daily, Disp: , Rfl:     acetaminophen (TYLENOL) 500 MG tablet,  Take 1,000 mg by mouth 2 times daily , Disp: , Rfl:     Biotin 1000 MCG TABS, Take 1 tablet by mouth daily , Disp: , Rfl:     Past Medical History  Jessenia Barrios  has a past medical history of Atrial fibrillation ([facility] [address] Blood circulation, collateral, [employer] single pacemaker 4/26/2016, CAD (coronary artery disease), Cancer ([facility] [address] Carpal tunnel syndrome, Fracture dislocation of ankle, GERD (gastroesophageal reflux disease), Hyperlipidemia, Hypertension, Kidney lesion, native, right, Osteoarthritis, Osteopenia, Prolonged emergence from general anesthesia, Thyroid goiter, and Yersiniosis. Social History  Jessenia Barrios  reports that she has never smoked. She has never used smokeless tobacco. She reports that she does not drink alcohol or use drugs. Family History  Jessenia Barrios family history includes Heart Disease in her brother, father, mother, and son; High Blood Pressure in her brother, father, and mother.     Past Surgical History   Past Surgical History:   Procedure Laterality Date    ABDOMEN SURGERY      ANKLE FRACTURE SURGERY  [id number]    reconstruction in 2003 and 2007    APPENDECTOMY      BLADDER SURGERY      support bladder repair    CARDIAC SURGERY      heart stent 12-11-18, [facility]    CARPAL TUNNEL RELEASE  7/2013    CARPAL TUNNEL RELEASE Right 08/19/2017    Revision    CATARACT REMOVAL Normal range of motion. Neck supple. No JVD present. Cardiovascular: Normal rate , normal heart sounds and intact distal pulses. Pulmonary/Chest: Effort normal and breath sounds normal. No respiratory distress. No wheezes. No rales. Abdominal: Soft. Bowel sounds are normal. No distension. There is no tenderness. Musculoskeletal: Normal range of motion. No edema. Neurological: Alert and oriented to person, place, and time. No cranial nerve deficit. Coordination normal.   Skin: Skin is warm and dry. Psychiatric: Normal mood and affect.        No results found for: CKTOTAL, CKMB, CKMBINDEX    Lab Results   Component Value Date    WBC 6.3 08/01/2019    RBC 3.72 08/01/2019    RBC 4.17 08/30/2011    HGB 11.6 08/01/2019    HCT 37.7 08/01/2019    .3 08/01/2019    MCH 31.2 08/01/2019    MCHC 30.8 08/01/2019    RDW 14.5 09/14/2017     08/01/2019    MPV 10.9 08/01/2019       Lab Results   Component Value Date     05/11/2019    K 3.7 05/11/2019    K 3.7 05/04/2019    CL 99 05/11/2019    CO2 30 05/11/2019    BUN 10 05/11/2019    LABALBU 3.4 05/04/2019    LABALBU 4.2 08/30/2011    CREATININE 0.6 05/11/2019    CALCIUM 9.5 05/11/2019    LABGLOM >90 05/11/2019    GLUCOSE 142 05/11/2019    GLUCOSE 116 08/30/2011       Lab Results   Component Value Date    ALKPHOS 45 05/04/2019    ALT 12 05/04/2019    AST 18 05/04/2019    PROT 6.2 05/04/2019    BILITOT 3.7 05/04/2019    BILIDIR 0.3 05/04/2019    LABALBU 3.4 05/04/2019    LABALBU 4.2 08/30/2011       Lab Results   Component Value Date    MG 1.7 05/03/2019       Lab Results   Component Value Date    INR 0.98 01/15/2019    INR 1.15 (H) 12/20/2018    INR 1.06 12/11/2018         Lab Results   Component Value Date    LABA1C 6.4 11/01/2018       Lab Results   Component Value Date    TRIG 145 12/04/2018    HDL 36 12/04/2018    LDLCALC 62 12/04/2018       Lab Results   Component Value Date    TSH 2.020 01/02/2019         Testing Reviewed:      I haveindividually Electronically signed by Mark Anthony Toth MD (Interpreting   physician) on 11/26/2018 at 04:17 PM   ----------------------------------------------------------------      Findings      Mitral Valve   Mild calcification of the posterior leaflet of the mitral valve. Mild mitral regurgitation is present. Aortic Valve   The aortic valve appears to be trileaflet with good leaflet separation. Aortic valve leaflets are mildly calcified. Mild aortic regurgitation is noted. Tricuspid Valve   Tricuspid valve is structurally normal.   Mild tricuspid regurgitation. Pulmonic Valve   The pulmonic valve was not well visualized . Left Atrium   Mildly dilated left atrium. Left Ventricle   Left ventricle size and systolic function is normal.   Normal left ventricular wall thickness. Ejection fraction is visually estimated at 55-60%. Right Atrium   The right atrium is of normal size. Right Ventricle   Normal right ventricular size and function. Pericardial Effusion   No evidence of any pericardial effusion. Aorta / Great Vessels   Aorta was not clearly visualized.    IVC is normal in size with normal respiratory phasic changes     M-Mode/2D Measurements & Calculations      LV Diastolic    LV Systolic Dimension: 3  AV Cusp Separation: 2.1 cmLA   Dimension: 4.2  cm                        Dimension: 3 cmAO Root   cm              LV Volume Diastolic: 40.0 Dimension: 3.3 cmLA Area: 20.7   LV FS:28.6 %    ml                        cm^2   LV PW           LV Volume Systolic: 35 ml   Diastolic: 0.8  LV EDV/LV EDV Index: 78.6   cm              ml/41 m^2LV ESV/LV ESV   Septum          Index: 35 ml/18 m^2       RV Diastolic Dimension: 2.2 cm   Diastolic: 0.9  EF Calculated: 55.5 %   cm                                        LA/Aorta: 0.91                                                LA volume/Index: 57.9 ml /30m^2     Doppler Measurements & Calculations      MV Peak E-Wave: 116 cm/s AV Peak 3

## 2022-12-30 ENCOUNTER — HOSPITAL ENCOUNTER (OUTPATIENT)
Dept: GENERAL RADIOLOGY | Age: 87
Discharge: HOME | End: 2022-12-30
Payer: MEDICARE

## 2022-12-30 ENCOUNTER — HOSPITAL ENCOUNTER (OUTPATIENT)
Age: 87
Discharge: HOME | End: 2022-12-30
Payer: MEDICARE

## 2022-12-30 DIAGNOSIS — J40 BRONCHITIS: ICD-10-CM

## 2022-12-30 PROCEDURE — 71046 X-RAY EXAM CHEST 2 VIEWS: CPT

## 2023-01-05 ENCOUNTER — PROCEDURE VISIT (OUTPATIENT)
Dept: CARDIOLOGY CLINIC | Age: 88
End: 2023-01-05
Payer: MEDICARE

## 2023-01-05 DIAGNOSIS — Z95.0 PACEMAKER: Primary | ICD-10-CM

## 2023-01-05 PROCEDURE — 93296 REM INTERROG EVL PM/IDS: CPT | Performed by: INTERNAL MEDICINE

## 2023-01-05 PROCEDURE — 93294 REM INTERROG EVL PM/LDLS PM: CPT | Performed by: INTERNAL MEDICINE

## 2023-01-05 NOTE — PROGRESS NOTES
[employer] single pacer     Known unipolar safety switch     3 years on device   RV imped 230  97% paced   Not dependent, known noise on lead

## 2023-02-02 ENCOUNTER — NURSE ONLY (OUTPATIENT)
Dept: LAB | Age: 88
End: 2023-02-02

## 2023-02-02 DIAGNOSIS — E88.81 INSULIN RESISTANCE: ICD-10-CM

## 2023-02-02 DIAGNOSIS — D50.0 IRON DEFICIENCY ANEMIA DUE TO CHRONIC BLOOD LOSS: ICD-10-CM

## 2023-02-02 LAB
DEPRECATED RDW RBC AUTO: 55 FL (ref 35–45)
ERYTHROCYTE [DISTWIDTH] IN BLOOD BY AUTOMATED COUNT: 16.9 % (ref 11.5–14.5)
HCT VFR BLD AUTO: 36.2 % (ref 37–47)
HGB BLD-MCNC: 10.8 GM/DL (ref 12–16)
MCH RBC QN AUTO: 26.9 PG (ref 26–33)
MCHC RBC AUTO-ENTMCNC: 29.8 GM/DL (ref 32.2–35.5)
MCV RBC AUTO: 90 FL (ref 81–99)
PLATELET # BLD AUTO: 236 THOU/MM3 (ref 130–400)
PMV BLD AUTO: 11.3 FL (ref 9.4–12.4)
RBC # BLD AUTO: 4.02 MILL/MM3 (ref 4.2–5.4)
WBC # BLD AUTO: 6.8 THOU/MM3 (ref 4.8–10.8)

## 2023-02-03 LAB
ANION GAP SERPL CALC-SCNC: 12 MEQ/L (ref 8–16)
BUN SERPL-MCNC: 17 MG/DL (ref 7–22)
CALCIUM SERPL-MCNC: 9.1 MG/DL (ref 8.5–10.5)
CHLORIDE SERPL-SCNC: 101 MEQ/L (ref 98–111)
CO2 SERPL-SCNC: 28 MEQ/L (ref 23–33)
CREAT SERPL-MCNC: 0.8 MG/DL (ref 0.4–1.2)
DEPRECATED MEAN GLUCOSE BLD GHB EST-ACNC: 117 MG/DL (ref 70–126)
FERRITIN SERPL IA-MCNC: 31 NG/ML (ref 10–291)
FOLATE SERPL-MCNC: > 20 NG/ML (ref 4.8–24.2)
GFR SERPL CREATININE-BSD FRML MDRD: > 60 ML/MIN/1.73M2
GLUCOSE SERPL-MCNC: 94 MG/DL (ref 70–108)
HBA1C MFR BLD HPLC: 5.9 % (ref 4.4–6.4)
IRON SERPL-MCNC: 95 UG/DL (ref 50–170)
POTASSIUM SERPL-SCNC: 4 MEQ/L (ref 3.5–5.2)
SODIUM SERPL-SCNC: 141 MEQ/L (ref 135–145)
TIBC SERPL-MCNC: 337 UG/DL (ref 171–450)
VIT B12 SERPL-MCNC: 592 PG/ML (ref 211–911)

## 2023-02-20 ENCOUNTER — OFFICE VISIT (OUTPATIENT)
Dept: CARDIOLOGY CLINIC | Age: 88
End: 2023-02-20
Payer: MEDICARE

## 2023-02-20 VITALS
BODY MASS INDEX: 31.1 KG/M2 | HEART RATE: 68 BPM | SYSTOLIC BLOOD PRESSURE: 110 MMHG | OXYGEN SATURATION: 98 % | DIASTOLIC BLOOD PRESSURE: 64 MMHG | WEIGHT: 169 LBS | HEIGHT: 62 IN

## 2023-02-20 DIAGNOSIS — I48.91 ATRIAL FIBRILLATION WITH RAPID VENTRICULAR RESPONSE (HCC): ICD-10-CM

## 2023-02-20 DIAGNOSIS — I50.30 HEART FAILURE WITH PRESERVED EJECTION FRACTION, CLASS III (HCC): Primary | ICD-10-CM

## 2023-02-20 DIAGNOSIS — Z91.89 AT RISK FOR FLUID VOLUME OVERLOAD: ICD-10-CM

## 2023-02-20 PROCEDURE — 1090F PRES/ABSN URINE INCON ASSESS: CPT | Performed by: NURSE PRACTITIONER

## 2023-02-20 PROCEDURE — G8417 CALC BMI ABV UP PARAM F/U: HCPCS | Performed by: NURSE PRACTITIONER

## 2023-02-20 PROCEDURE — 99214 OFFICE O/P EST MOD 30 MIN: CPT | Performed by: NURSE PRACTITIONER

## 2023-02-20 PROCEDURE — 1036F TOBACCO NON-USER: CPT | Performed by: NURSE PRACTITIONER

## 2023-02-20 PROCEDURE — G8427 DOCREV CUR MEDS BY ELIG CLIN: HCPCS | Performed by: NURSE PRACTITIONER

## 2023-02-20 PROCEDURE — 1123F ACP DISCUSS/DSCN MKR DOCD: CPT | Performed by: NURSE PRACTITIONER

## 2023-02-20 PROCEDURE — G8484 FLU IMMUNIZE NO ADMIN: HCPCS | Performed by: NURSE PRACTITIONER

## 2023-02-20 ASSESSMENT — ENCOUNTER SYMPTOMS
ABDOMINAL DISTENTION: 0
COUGH: 0
SHORTNESS OF BREATH: 1
ABDOMINAL PAIN: 0
WHEEZING: 0

## 2023-02-20 NOTE — PROGRESS NOTES
Heart Failure Clinic       Visit Date: 2/20/2023  Cardiologist:  Dr. Charlie Shaikh  Primary Care Physician: Dr. Richardson Soni MD    Roberth Blanco is a 80 y.o. female who presents today for:  Chief Complaint   Patient presents with    Congestive Heart Failure       HPI:     TYPE HF: HFpEF 55-60%   Cause:   Device: Pacemaker  HX: atrial fibrillation, on Xarelto, CAD, HTN, HLD, chronic diastolic heart failure, DIMITRIS, GERD, CKD stage II, CAD s/p PCI (2 of RCA 2018, 1 of LAD 2019)  Dry Wt:  174 on 8/17/22, 169 on 2/20/23      Roberth Blanco is a 80 y.o. female who presents to the office for a f/u patient visit in the heart failure clinic. Concerns today: here today for her 6 month f/u. Pt reports weight gain of about 6lbs since last visit with worsening SOB since last visit. She is urinating well on her Bumex. She has taken metolazone a couple of times with weight loss after two days but then return. Denies leg swelling or bloating. Visit on 8/17/22: Pt is here as a new pt but a patient back in 2020. Was discharged to [facility] but recently have a CHF exacerbation and was treated w/ metolazone causing dehydration and hypokalemia w/o JAZZY. Since discharge she has been taking same dose of bumex with no weight gain, did increase maintenance dose potassium. Urinating well, no worsening SOB, fatigue, bloating or swelling. Patient follows:      Hospitalization:      Admit Date: 7/31/2022   Discharge Date:   08/02/22      Dyspnea on exertion, resolved              - Secondary to over diuresis from metolazone and Bumex, compounded by hypokalemia. - Cardiology want patient's new dry weight to be 158- 160 lbs              - Will need to f/u in CHF clinic within 1-2 weeks upon discharge              - Will need to f/u with Dr. Charlie Shaikh within 1-2 weeks upon discharge.               -TSH 2, free T4 1.3              - Covid negative              - Flu A/B negative              - BNP normal at OSH, troponin x 2 normal - Daily ortho stats negative 7/31 -8/2 (all of them negative)              - Echo notable for EF 55-60%, trace aortic regurg, trace mitral regurg, trace fluid in the right heart, trace tricuspid regurg, RVSP 54 mmHg      Severe hypokalemia, improving              -Potassium 3.5 today, up from 2.7 (POA). Patient supplemented with 40 mEq of potassium prior to discharge. Rx written for 40 mEq Klor Con daily.              -Potassium replacement protocol in hospital, discontinued upon discharge  - Repeat BMP ordered to be collected in 1 week and f/u with PCP in 1 week.     Activity: ADLs performed with some help  Diet: follows low sodium low fluid diet    Patient has:  Chest Pain: no  SOB: chronic VACA  Orthopnea/PND: no  PIPPA: no history  Edema: no  Fatigue: chronic  Abdominal bloating: no  Cough: good  Appetite: good      Past Medical History:   Diagnosis Date    Atrial fibrillation ([address]     Blood circulation, collateral     [employer] single pacemaker 4/26/2016 05/05/2016    CAD (coronary artery disease)     cath and stent in right artery    Cancer [facility]) 1954    HX  Colon     Carpal tunnel syndrome     Fall at home     12/7/21 -large bruise to R buttock    Fracture dislocation of ankle 1980    GERD (gastroesophageal reflux disease)     Hyperlipidemia     Hypertension     Kidney lesion, native, right     found on 5/2016    Osteoarthritis     knees    Osteopenia     Prolonged emergence from general anesthesia     Thyroid goiter 09/06/2016    Yersiniosis 2016     Past Surgical History:   Procedure Laterality Date    ABDOMEN SURGERY      ANKLE FRACTURE SURGERY  [id number]    reconstruction in 2003 and 2007    APPENDECTOMY      BLADDER SURGERY      support bladder repair    CARDIAC SURGERY      heart stent 12-11-18, [facility]    CARPAL TUNNEL RELEASE  7/2013    CARPAL TUNNEL RELEASE Right 08/19/2017    Revision    CATARACT REMOVAL Bilateral     [facility]    COLONOSCOPY ENDOSCOPY, COLON, DIAGNOSTIC      EYE SURGERY      cataract     FRACTURE SURGERY      HYSTERECTOMY (CERVIX STATUS UNKNOWN)  1971    JOINT REPLACEMENT  1998    L knee    KNEE SURGERY Left     total replacement    PACEMAKER PLACEMENT      PTCA  01/15/2019    Successful PCI / Drug Eluting Stent of the proximal Left Anterior Descending Coronary Artery.     UPPER GASTROINTESTINAL ENDOSCOPY Left 11/28/2018    EGD BIOPSY performed by Cooper Pete MD at [facility]    UPPER GASTROINTESTINAL ENDOSCOPY  11/28/2018    EGD CONTROL HEMORRHAGE performed by Cooper Pete MD at [facility]     Family History   Problem Relation Age of Onset    Heart Disease Mother     High Blood Pressure Mother     Heart Disease Father     High Blood Pressure Father     Heart Disease Brother     High Blood Pressure Brother     Heart Disease Son      Social History     Tobacco Use    Smoking status: Never    Smokeless tobacco: Never   Substance Use Topics    Alcohol use: No     Current Outpatient Medications   Medication Sig Dispense Refill    potassium chloride (KLOR-CON M) 20 MEQ extended release tablet Take 2 tablets by mouth daily 180 tablet 3    metoprolol tartrate (LOPRESSOR) 25 MG tablet Take 1 tablet by mouth 2 times daily 180 tablet 3    psyllium (KONSYL) 28.3 % PACK Take 1 packet by mouth daily      pantoprazole (PROTONIX) 40 MG tablet TAKE 1 TABLET BY MOUTH  TWICE DAILY 180 tablet 3    Cholecalciferol (VITAMIN D-3 PO) Take by mouth daily      Multiple Vitamin (MULTIVITAMIN ADULT PO) Take by mouth daily      venlafaxine (EFFEXOR XR) 75 MG extended release capsule TAKE 1 CAPSULE BY MOUTH  DAILY 90 capsule 3    rivaroxaban (XARELTO) 15 MG TABS tablet Take 1 tablet by mouth Daily with supper 90 tablet 3    bumetanide (BUMEX) 1 MG tablet Take 1 tablet by mouth daily 90 tablet 3    sucralfate (CARAFATE) 1 GM tablet TAKE 1 TABLET BY MOUTH  TWICE DAILY 180 tablet 3    CALCIUM PO Take by mouth Calcium chew      magnesium (MAGNESIUM-OXIDE) 250 MG TABS tablet Take 250 mg by mouth 2 times daily      Cyanocobalamin (VITAMIN B 12 PO) Take by mouth every other day      Probiotic Product (PROBIOTIC DAILY PO) Take by mouth daily      aspirin 81 MG chewable tablet Take 1 tablet by mouth daily 30 tablet 3    acetaminophen (TYLENOL) 500 MG tablet Take 1,000 mg by mouth every 6 hours as needed for Pain        No current facility-administered medications for this visit. Allergies   Allergen Reactions    Methadone Shortness Of Breath     Shakes and nausea    Gabapentin     Lodine [Etodolac] Other (See Comments)     \"spacey, hot flashes, shaky\"    Lyrica [Pregabalin]     Ultram [Tramadol]      Nausea, pedal edema, SOB       SUBJECTIVE:   Review of Systems   Constitutional:  Positive for fatigue. Negative for activity change and appetite change. Respiratory:  Positive for shortness of breath (Chronic). Negative for cough and wheezing. Cardiovascular:  Negative for chest pain, palpitations and leg swelling. Gastrointestinal:  Negative for abdominal distention and abdominal pain. Musculoskeletal:  Negative for gait problem. Neurological:  Negative for weakness, light-headedness and headaches. Psychiatric/Behavioral:  Negative for sleep disturbance. OBJECTIVE:   Today's Vitals:  /64   Pulse 68   Ht 5' 2\" (1.575 m)   Wt 169 lb (76.7 kg)   LMP  (LMP Unknown)   SpO2 98%   BMI 30.91 kg/m²     Physical Exam  Vitals reviewed. Constitutional:       General: She is not in acute distress. Appearance: Normal appearance. She is well-developed. She is not diaphoretic. HENT:      Head: Normocephalic and atraumatic. Eyes:      Conjunctiva/sclera: Conjunctivae normal.   Cardiovascular:      Rate and Rhythm: Normal rate and regular rhythm. Heart sounds: Normal heart sounds. No murmur heard. Pulmonary:      Effort: Pulmonary effort is normal. No respiratory distress. Breath sounds: Normal breath sounds.  No wheezing, rhonchi or rales. Abdominal:      General: Bowel sounds are normal. There is no distension. Palpations: Abdomen is soft. Tenderness: There is no abdominal tenderness. Musculoskeletal:         General: Normal range of motion. Cervical back: Normal range of motion and neck supple. Right lower leg: No edema. Left lower leg: No edema. Skin:     General: Skin is warm and dry. Capillary Refill: Capillary refill takes less than 2 seconds. Neurological:      Mental Status: She is alert and oriented to person, place, and time. Coordination: Coordination normal.   Psychiatric:         Behavior: Behavior normal.       Wt Readings from Last 3 Encounters:   02/20/23 169 lb (76.7 kg)   11/16/22 165 lb 3.2 oz (74.9 kg)   11/09/22 168 lb (76.2 kg)     BP Readings from Last 3 Encounters:   02/20/23 110/64   11/16/22 98/66   11/09/22 136/70     Pulse Readings from Last 3 Encounters:   02/20/23 68   11/16/22 70   11/09/22 70     Body mass index is 30.91 kg/m². ECHO:    Summary   Normal left ventricular size and systolic function. There were no regional wall motion abnormalities. Wall thickness was within normal limits. Ejection fraction was estimated at 55-60%. There was trace aortic regurgitation. There was trace mitral regurgitation. Device lead seen in the right heart. There was trace tricuspid regurgitation. Assuming RAP = 15 mmHg, the   estimated RVSP = 54 mmHg. IVC size is moderate to severely dilated with reduced respiratory   variation (CVP~10-15 mmHg).       Signature      ----------------------------------------------------------------   Electronically signed by Be Allen MD (Interpreting   physician) on 08/02/2022 at 02:21 PM   ----------------------------------------------------------------       CATH/STRESS:       2019 and 2018 s/p PCI      Results reviewed:  BNP: No results found for: BNP  CBC:   Lab Results   Component Value Date/Time    WBC 6.8 02/02/2023 12:14 PM    RBC 4.02 02/02/2023 12:14 PM    RBC 4.17 08/30/2011 10:27 AM    HGB 10.8 02/02/2023 12:14 PM    HCT 36.2 02/02/2023 12:14 PM     02/02/2023 12:14 PM     CMP:    Lab Results   Component Value Date/Time     02/02/2023 12:14 PM    K 4.0 02/02/2023 12:14 PM    K 3.5 08/02/2022 06:30 AM     02/02/2023 12:14 PM    CO2 28 02/02/2023 12:14 PM    BUN 17 02/02/2023 12:14 PM    CREATININE 0.8 02/02/2023 12:14 PM    LABGLOM >60 02/02/2023 12:14 PM    GLUCOSE 94 02/02/2023 12:14 PM    GLUCOSE 116 08/30/2011 10:27 AM    CALCIUM 9.1 02/02/2023 12:14 PM     Hepatic Function Panel:    Lab Results   Component Value Date/Time    ALKPHOS 70 11/03/2022 05:51 PM    ALT 12 11/03/2022 05:51 PM    AST 10 11/03/2022 05:51 PM    PROT 7.3 11/03/2022 05:51 PM    BILITOT 1.4 11/03/2022 05:51 PM    BILIDIR <0.2 01/19/2021 07:05 AM    LABALBU 3.3 11/03/2022 05:51 PM    LABALBU 4.2 08/30/2011 10:27 AM     Magnesium:    Lab Results   Component Value Date/Time    MG 2.0 11/03/2022 05:51 PM     PT/INR:    Lab Results   Component Value Date/Time    INR 1.17 07/31/2022 08:23 PM     Lipids:    Lab Results   Component Value Date/Time    TRIG 104 05/03/2022 08:53 AM    HDL 48 05/03/2022 08:53 AM    LDLCALC 98 05/03/2022 08:53 AM       ASSESSMENT AND PLAN:   The patient's condition/symptoms are stable        Diagnosis Orders   1. Heart failure with preserved ejection fraction, class III ([address]        2. At risk for fluid volume overload        3. Atrial fibrillation with rapid ventricular response (HCC)          Continue:  GDMT:   ACE/ARB/ARNI - none   BB - Lopressor 25 BID   Diuretic - Bumex 1mg daily  AA - none  SGLT2 -  none  Vasodilator - none  Other - Iron, ASA, xarelto Kcl 40 daily, mag 250 daily      HFpEF 55-60%  Afib on xarelto  Stable, no fluid on exam, doing well. Urinating well, happy weight 162 was 169 this morning at home.      Lab reviewed - K 4.0, Cr 0.8 Mag 2.0, Hgb 10.8  ECHO 2022: no significant valvular disease RVSP 54  CATH 2019: s/p PCI to LAD    Going to increase her bumex to 2mg daily with potassium 40meq twice a day for 5 days    Please call after the 5 days to let us know if SOB/weights have improved: if they have improved I will make permanent change and order blood work     Continue diet/fluid adherence  Continue daily wts. F/U w/ Cardiology  F/U in clinic in 6 months      Tolerating above noted HF meds, no ill side effects noted. Will continue to monitor kidney function and electrolytes. Will optimize as tolerated. Pt is compliant w/ medications. Total visit time of 25  minutes has been spent with patient on education of symptoms, management, medication, and plan of care; as well as review of chart: labs, ECHO, radiology reports, etc.   I personally spent more then 50% of the appt time face to face with the patient. Daily weights  Fluid restriction of 2 Liters per day  Limit sodium in diet to around 9340-0653 mg/day  Monitor BP  Activity as tolerated     Patient was instructed to call the [facility] for any changes in symptoms as noted in AVS.      Return in about 6 months (around 8/20/2023). or sooner if needed     Patient given educational materials - see patient instructions. We discussed the importance of weighing oneself and recording daily. We also discussed the importance of a low sodium diet, higher sodium foods to avoid and better low sodium food options. Patient verbalizes understanding of plan of care using teach back method, and is agreeable to the treatment plan.        Electronically signed by NOELLE Mcmahon CNP on 2/20/2023 at 2:30 PM

## 2023-02-20 NOTE — PATIENT INSTRUCTIONS
You may receive a survey regarding the care you received during your visit. Your input is valuable to us. We encourage you to complete and return your survey. We hope you will choose us in the future for your healthcare needs. Continue:  Continue current medications  Daily weights and record  Fluid restriction of 2 Liters per day  Limit sodium in diet to around 2959-4378 mg/day  Monitor BP  Activity as tolerated     Call the Heart Failure Clinic for any of the following symptoms: [phone]  Weight gain of 2-3 pounds in 1 day or 5 pounds in 1 week  Increased shortness of breath  Shortness of breath while laying down  Cough  Chest pain  Swelling in feet, ankles or legs  Tenderness or bloating in the abdomen  Fatigue   Decreased appetite or nausea   Confusion      Going to increase her bumex to 2mg daily with potassium 40meq twice a day for 5 days    Please call after the 5 days to let us know if SOB/weights have improved: if they have improved I will make permanent change and order blood work     Continue diet/fluid adherence  Continue daily wts.   F/U w/ Cardiology  F/U in clinic in 6 months

## 2023-02-28 ENCOUNTER — TELEPHONE (OUTPATIENT)
Dept: CARDIOLOGY CLINIC | Age: 88
End: 2023-02-28

## 2023-02-28 NOTE — TELEPHONE ENCOUNTER
Daughter called in with wts  She does not think patient can tolerate 2 tabs that many days in a row  2/21 169 2 bumex  2/22 167 2 bumex  2/23 165.4 2 bumex  2/24 167 1 bumex due to feeling weak  2/25 167.4 1 bumex  2/28 167 1 bumex

## 2023-02-28 NOTE — TELEPHONE ENCOUNTER
Navdeep Race can try EOD if it seemed to be helping w/ the SOB. If this seems to work we will make it a permanent change and order blood work. Will also need the additional Kcl every other day as well.

## 2023-03-27 RX ORDER — BUMETANIDE 1 MG/1
1 TABLET ORAL DAILY
Qty: 90 TABLET | Refills: 0 | Status: SHIPPED | OUTPATIENT
Start: 2023-03-27

## 2023-03-30 RX ORDER — SUCRALFATE 1 G/1
TABLET ORAL
Qty: 180 TABLET | Refills: 3 | Status: SHIPPED | OUTPATIENT
Start: 2023-03-30

## 2023-04-06 ENCOUNTER — PROCEDURE VISIT (OUTPATIENT)
Dept: CARDIOLOGY CLINIC | Age: 88
End: 2023-04-06
Payer: MEDICARE

## 2023-04-06 DIAGNOSIS — Z95.0 PACEMAKER: Primary | ICD-10-CM

## 2023-04-06 PROCEDURE — 93296 REM INTERROG EVL PM/IDS: CPT | Performed by: INTERNAL MEDICINE

## 2023-04-06 PROCEDURE — 93294 REM INTERROG EVL PM/LDLS PM: CPT | Performed by: INTERNAL MEDICINE

## 2023-05-16 ENCOUNTER — OFFICE VISIT (OUTPATIENT)
Dept: FAMILY MEDICINE CLINIC | Age: 88
End: 2023-05-16
Payer: MEDICARE

## 2023-05-16 VITALS
TEMPERATURE: 97 F | BODY MASS INDEX: 31.64 KG/M2 | WEIGHT: 173 LBS | HEART RATE: 70 BPM | RESPIRATION RATE: 18 BRPM | OXYGEN SATURATION: 98 % | SYSTOLIC BLOOD PRESSURE: 110 MMHG | DIASTOLIC BLOOD PRESSURE: 62 MMHG

## 2023-05-16 DIAGNOSIS — I50.32 CHRONIC DIASTOLIC CHF (CONGESTIVE HEART FAILURE) (HCC): ICD-10-CM

## 2023-05-16 DIAGNOSIS — R53.83 OTHER FATIGUE: ICD-10-CM

## 2023-05-16 DIAGNOSIS — D50.0 IRON DEFICIENCY ANEMIA DUE TO CHRONIC BLOOD LOSS: ICD-10-CM

## 2023-05-16 DIAGNOSIS — N18.31 STAGE 3A CHRONIC KIDNEY DISEASE (HCC): ICD-10-CM

## 2023-05-16 DIAGNOSIS — R53.81 PHYSICAL DECONDITIONING: ICD-10-CM

## 2023-05-16 DIAGNOSIS — I48.20 CHRONIC ATRIAL FIBRILLATION (HCC): ICD-10-CM

## 2023-05-16 DIAGNOSIS — Z95.0 PACEMAKER: ICD-10-CM

## 2023-05-16 DIAGNOSIS — I48.91 ATRIAL FIBRILLATION WITH RAPID VENTRICULAR RESPONSE (HCC): ICD-10-CM

## 2023-05-16 DIAGNOSIS — N39.3 STRESS INCONTINENCE: ICD-10-CM

## 2023-05-16 DIAGNOSIS — E83.42 HYPOMAGNESEMIA: ICD-10-CM

## 2023-05-16 DIAGNOSIS — R53.1 GENERALIZED WEAKNESS: ICD-10-CM

## 2023-05-16 DIAGNOSIS — D64.9 MILD ANEMIA: ICD-10-CM

## 2023-05-16 DIAGNOSIS — I49.5 SICK SINUS SYNDROME (HCC): ICD-10-CM

## 2023-05-16 DIAGNOSIS — M15.9 PRIMARY OSTEOARTHRITIS INVOLVING MULTIPLE JOINTS: ICD-10-CM

## 2023-05-16 DIAGNOSIS — I50.9 CHF (CONGESTIVE HEART FAILURE), NYHA CLASS I, UNSPECIFIED FAILURE CHRONICITY, UNSPECIFIED TYPE (HCC): ICD-10-CM

## 2023-05-16 DIAGNOSIS — N28.1 KIDNEY CYSTS: ICD-10-CM

## 2023-05-16 DIAGNOSIS — I25.119 CORONARY ARTERY DISEASE INVOLVING NATIVE HEART WITH ANGINA PECTORIS, UNSPECIFIED VESSEL OR LESION TYPE (HCC): ICD-10-CM

## 2023-05-16 DIAGNOSIS — F33.42 RECURRENT MAJOR DEPRESSIVE DISORDER, IN FULL REMISSION (HCC): ICD-10-CM

## 2023-05-16 DIAGNOSIS — M85.89 OSTEOPENIA OF MULTIPLE SITES: ICD-10-CM

## 2023-05-16 DIAGNOSIS — I10 ESSENTIAL HYPERTENSION: Primary | ICD-10-CM

## 2023-05-16 DIAGNOSIS — E88.81 INSULIN RESISTANCE: ICD-10-CM

## 2023-05-16 DIAGNOSIS — I73.9 PVD (PERIPHERAL VASCULAR DISEASE) (HCC): ICD-10-CM

## 2023-05-16 DIAGNOSIS — K21.9 GASTROESOPHAGEAL REFLUX DISEASE WITHOUT ESOPHAGITIS: ICD-10-CM

## 2023-05-16 DIAGNOSIS — Z85.038 HISTORY OF COLON CANCER: ICD-10-CM

## 2023-05-16 DIAGNOSIS — I25.10 CORONARY ARTERY DISEASE INVOLVING NATIVE CORONARY ARTERY OF NATIVE HEART WITHOUT ANGINA PECTORIS: ICD-10-CM

## 2023-05-16 DIAGNOSIS — K76.0 STEATOSIS OF LIVER: ICD-10-CM

## 2023-05-16 DIAGNOSIS — E78.00 PURE HYPERCHOLESTEROLEMIA: ICD-10-CM

## 2023-05-16 PROCEDURE — G8417 CALC BMI ABV UP PARAM F/U: HCPCS | Performed by: FAMILY MEDICINE

## 2023-05-16 PROCEDURE — 1123F ACP DISCUSS/DSCN MKR DOCD: CPT | Performed by: FAMILY MEDICINE

## 2023-05-16 PROCEDURE — 99214 OFFICE O/P EST MOD 30 MIN: CPT | Performed by: FAMILY MEDICINE

## 2023-05-16 PROCEDURE — 1090F PRES/ABSN URINE INCON ASSESS: CPT | Performed by: FAMILY MEDICINE

## 2023-05-16 PROCEDURE — 1036F TOBACCO NON-USER: CPT | Performed by: FAMILY MEDICINE

## 2023-05-16 PROCEDURE — G8427 DOCREV CUR MEDS BY ELIG CLIN: HCPCS | Performed by: FAMILY MEDICINE

## 2023-05-16 RX ORDER — BUMETANIDE 1 MG/1
2 TABLET ORAL DAILY
Qty: 180 TABLET | Refills: 3 | Status: SHIPPED | OUTPATIENT
Start: 2023-05-16

## 2023-05-16 RX ORDER — PANTOPRAZOLE SODIUM 40 MG/1
40 TABLET, DELAYED RELEASE ORAL 2 TIMES DAILY
Qty: 180 TABLET | Refills: 3 | Status: SHIPPED | OUTPATIENT
Start: 2023-05-16

## 2023-05-16 RX ORDER — VENLAFAXINE HYDROCHLORIDE 150 MG/1
150 CAPSULE, EXTENDED RELEASE ORAL DAILY
Qty: 180 CAPSULE | Refills: 3 | Status: SHIPPED | OUTPATIENT
Start: 2023-05-16

## 2023-05-16 RX ORDER — SUCRALFATE 1 G/1
1 TABLET ORAL 2 TIMES DAILY
Qty: 180 TABLET | Refills: 3 | Status: SHIPPED | OUTPATIENT
Start: 2023-05-16

## 2023-05-16 RX ORDER — POTASSIUM CHLORIDE 20 MEQ/1
80 TABLET, EXTENDED RELEASE ORAL DAILY
Qty: 360 TABLET | Refills: 3 | Status: SHIPPED | OUTPATIENT
Start: 2023-05-16

## 2023-05-16 SDOH — ECONOMIC STABILITY: INCOME INSECURITY: HOW HARD IS IT FOR YOU TO PAY FOR THE VERY BASICS LIKE FOOD, HOUSING, MEDICAL CARE, AND HEATING?: NOT HARD AT ALL

## 2023-05-16 SDOH — ECONOMIC STABILITY: FOOD INSECURITY: WITHIN THE PAST 12 MONTHS, YOU WORRIED THAT YOUR FOOD WOULD RUN OUT BEFORE YOU GOT MONEY TO BUY MORE.: NEVER TRUE

## 2023-05-16 SDOH — ECONOMIC STABILITY: HOUSING INSECURITY
IN THE LAST 12 MONTHS, WAS THERE A TIME WHEN YOU DID NOT HAVE A STEADY PLACE TO SLEEP OR SLEPT IN A SHELTER (INCLUDING NOW)?: NO

## 2023-05-16 SDOH — ECONOMIC STABILITY: FOOD INSECURITY: WITHIN THE PAST 12 MONTHS, THE FOOD YOU BOUGHT JUST DIDN'T LAST AND YOU DIDN'T HAVE MONEY TO GET MORE.: NEVER TRUE

## 2023-05-16 ASSESSMENT — PATIENT HEALTH QUESTIONNAIRE - PHQ9
SUM OF ALL RESPONSES TO PHQ QUESTIONS 1-9: 0
7. TROUBLE CONCENTRATING ON THINGS, SUCH AS READING THE NEWSPAPER OR WATCHING TELEVISION: 0
SUM OF ALL RESPONSES TO PHQ QUESTIONS 1-9: 0
4. FEELING TIRED OR HAVING LITTLE ENERGY: 0
SUM OF ALL RESPONSES TO PHQ9 QUESTIONS 1 & 2: 0
1. LITTLE INTEREST OR PLEASURE IN DOING THINGS: 0
5. POOR APPETITE OR OVEREATING: 0
2. FEELING DOWN, DEPRESSED OR HOPELESS: 0
6. FEELING BAD ABOUT YOURSELF - OR THAT YOU ARE A FAILURE OR HAVE LET YOURSELF OR YOUR FAMILY DOWN: 0
3. TROUBLE FALLING OR STAYING ASLEEP: 0
9. THOUGHTS THAT YOU WOULD BE BETTER OFF DEAD, OR OF HURTING YOURSELF: 0
SUM OF ALL RESPONSES TO PHQ QUESTIONS 1-9: 0
8. MOVING OR SPEAKING SO SLOWLY THAT OTHER PEOPLE COULD HAVE NOTICED. OR THE OPPOSITE, BEING SO FIGETY OR RESTLESS THAT YOU HAVE BEEN MOVING AROUND A LOT MORE THAN USUAL: 0
SUM OF ALL RESPONSES TO PHQ QUESTIONS 1-9: 0
10. IF YOU CHECKED OFF ANY PROBLEMS, HOW DIFFICULT HAVE THESE PROBLEMS MADE IT FOR YOU TO DO YOUR WORK, TAKE CARE OF THINGS AT HOME, OR GET ALONG WITH OTHER PEOPLE: 0

## 2023-05-16 NOTE — PROGRESS NOTES
([facility]    Chronic diastolic CHF (congestive heart failure) (HCC)  -     bumetanide (BUMEX) 1 MG tablet; Take 2 tablets by mouth daily  -     potassium chloride (KLOR-CON M) 20 MEQ extended release tablet; Take 4 tablets by mouth daily    Iron deficiency anemia due to chronic blood loss  -     CBC; Future  -     Iron Binding Capacity; Future  -     Iron; Future  -     Ferritin; Future  -     CBC; Future  -     Vitamin B12 & Folate; Future    Gastroesophageal reflux disease without esophagitis  -     pantoprazole (PROTONIX) 40 MG tablet; Take 1 tablet by mouth 2 times daily  -     sucralfate (CARAFATE) 1 GM tablet; Take 1 tablet by mouth 2 times daily    Primary osteoarthritis involving multiple joints    Pure hypercholesterolemia    CHF (congestive heart failure), NYHA class I, unspecified failure chronicity, unspecified type [facility])    Sphere 3d single pacemaker 4/26/2016    Insulin resistance  -     Comprehensive Metabolic Panel, Fasting; Future  -     Hemoglobin A1C; Future  -     Microalbumin / Creatinine Urine Ratio; Future    Physical deconditioning    Other fatigue  -     TSH with Reflex; Future    Coronary artery disease involving native coronary artery of native heart without angina pectoris    History of colon cancer    Steatosis of liver    Sick sinus syndrome (HCC)    Osteopenia of multiple sites    Generalized weakness    Mild anemia    Stress incontinence    Kidney cysts    Hypomagnesemia    Atrial fibrillation with rapid ventricular response (HCC)  -     rivaroxaban (XARELTO) 15 MG TABS tablet; Take 1 tablet by mouth Daily with supper      No change to medications   Continue healthy diet and exercise  Aspirin daily    Discussed use, benefit, and side effectsof prescribed medications. All patient questions answered. Pt voiced understanding. Reviewed health maintenance. Instructed to continue current medications, diet and exercise. Patient agreed with treatment plan. Followup as directed.

## 2023-05-31 ENCOUNTER — OFFICE VISIT (OUTPATIENT)
Dept: CARDIOLOGY CLINIC | Age: 88
End: 2023-05-31
Payer: MEDICARE

## 2023-05-31 VITALS
BODY MASS INDEX: 31.93 KG/M2 | HEIGHT: 62 IN | SYSTOLIC BLOOD PRESSURE: 108 MMHG | WEIGHT: 173.5 LBS | DIASTOLIC BLOOD PRESSURE: 65 MMHG | HEART RATE: 60 BPM

## 2023-05-31 DIAGNOSIS — R06.02 SHORTNESS OF BREATH: Primary | ICD-10-CM

## 2023-05-31 PROCEDURE — 99214 OFFICE O/P EST MOD 30 MIN: CPT | Performed by: INTERNAL MEDICINE

## 2023-05-31 PROCEDURE — 1090F PRES/ABSN URINE INCON ASSESS: CPT | Performed by: INTERNAL MEDICINE

## 2023-05-31 PROCEDURE — 1036F TOBACCO NON-USER: CPT | Performed by: INTERNAL MEDICINE

## 2023-05-31 PROCEDURE — 1123F ACP DISCUSS/DSCN MKR DOCD: CPT | Performed by: INTERNAL MEDICINE

## 2023-05-31 PROCEDURE — G8417 CALC BMI ABV UP PARAM F/U: HCPCS | Performed by: INTERNAL MEDICINE

## 2023-05-31 PROCEDURE — G8428 CUR MEDS NOT DOCUMENT: HCPCS | Performed by: INTERNAL MEDICINE

## 2023-05-31 RX ORDER — ISOSORBIDE MONONITRATE 30 MG/1
30 TABLET, EXTENDED RELEASE ORAL DAILY
COMMUNITY
End: 2023-05-31 | Stop reason: SDUPTHER

## 2023-05-31 RX ORDER — ISOSORBIDE MONONITRATE 30 MG/1
30 TABLET, EXTENDED RELEASE ORAL DAILY
Qty: 30 TABLET | Refills: 0 | OUTPATIENT
Start: 2023-05-31

## 2023-05-31 NOTE — PROGRESS NOTES
Pt here for 6 mo check up     Pt c/o more fatigue, increased sob,     Pt states med list is correct from PCP appt
dizziness, orthopnea, PND or pedal edema. All medication side effects were discussed in details. Thank youfor allowing me to participate in the care of this patient. Please do not hesitate to contact me for any further questions. Return in about 3 months (around 8/31/2023), or if symptoms worsen or fail to improve, for Review testing, Regular follow up.        Electronically signed by Jay Nazario MD [facility]  5/31/2023 at 11:15 AM

## 2023-06-06 ENCOUNTER — TELEPHONE (OUTPATIENT)
Dept: CARDIOLOGY CLINIC | Age: 88
End: 2023-06-06

## 2023-06-06 RX ORDER — ISOSORBIDE MONONITRATE 30 MG/1
30 TABLET, EXTENDED RELEASE ORAL DAILY
Qty: 30 TABLET | Refills: 3 | Status: CANCELLED | OUTPATIENT
Start: 2023-06-06

## 2023-07-13 ENCOUNTER — PROCEDURE VISIT (OUTPATIENT)
Dept: CARDIOLOGY CLINIC | Age: 88
End: 2023-07-13

## 2023-07-13 DIAGNOSIS — Z95.0 PACEMAKER: Primary | ICD-10-CM

## 2023-07-13 NOTE — PROGRESS NOTES
Dr priest pt   Nxt [employer] sci single pacer remote   Battery 2.5 yrs remaining      Vvir     Rv impedence 242    No sensing and no thresholds obtained       V paced 98%

## 2023-08-15 ENCOUNTER — NURSE ONLY (OUTPATIENT)
Dept: LAB | Age: 88
End: 2023-08-15

## 2023-08-15 DIAGNOSIS — I25.119 CORONARY ARTERY DISEASE INVOLVING NATIVE HEART WITH ANGINA PECTORIS, UNSPECIFIED VESSEL OR LESION TYPE (HCC): ICD-10-CM

## 2023-08-15 DIAGNOSIS — D50.0 IRON DEFICIENCY ANEMIA DUE TO CHRONIC BLOOD LOSS: ICD-10-CM

## 2023-08-15 DIAGNOSIS — E88.81 INSULIN RESISTANCE: ICD-10-CM

## 2023-08-15 DIAGNOSIS — R53.83 OTHER FATIGUE: ICD-10-CM

## 2023-08-15 LAB
ALBUMIN SERPL BCG-MCNC: 3.7 G/DL (ref 3.5–5.1)
ALP SERPL-CCNC: 67 U/L (ref 38–126)
ALT SERPL W/O P-5'-P-CCNC: 7 U/L (ref 11–66)
ANION GAP SERPL CALC-SCNC: 10 MEQ/L (ref 8–16)
AST SERPL-CCNC: 14 U/L (ref 5–40)
BILIRUB SERPL-MCNC: 1.2 MG/DL (ref 0.3–1.2)
BUN SERPL-MCNC: 20 MG/DL (ref 7–22)
CALCIUM SERPL-MCNC: 9.7 MG/DL (ref 8.5–10.5)
CHLORIDE SERPL-SCNC: 99 MEQ/L (ref 98–111)
CHOLEST SERPL-MCNC: 195 MG/DL (ref 100–199)
CO2 SERPL-SCNC: 29 MEQ/L (ref 23–33)
CREAT SERPL-MCNC: 0.9 MG/DL (ref 0.4–1.2)
CREAT UR-MCNC: 110.2 MG/DL
DEPRECATED MEAN GLUCOSE BLD GHB EST-ACNC: 120 MG/DL (ref 70–126)
DEPRECATED RDW RBC AUTO: 54.9 FL (ref 35–45)
ERYTHROCYTE [DISTWIDTH] IN BLOOD BY AUTOMATED COUNT: 16.1 % (ref 11.5–14.5)
FERRITIN SERPL IA-MCNC: 41 NG/ML (ref 10–291)
FOLATE SERPL-MCNC: > 20 NG/ML (ref 4.8–24.2)
GFR SERPL CREATININE-BSD FRML MDRD: 59 ML/MIN/1.73M2
GLUCOSE FASTING: 118 MG/DL (ref 70–108)
HBA1C MFR BLD HPLC: 6 % (ref 4.4–6.4)
HCT VFR BLD AUTO: 38.4 % (ref 37–47)
HDLC SERPL-MCNC: 44 MG/DL
HGB BLD-MCNC: 12.1 GM/DL (ref 12–16)
IRON SERPL-MCNC: 59 UG/DL (ref 50–170)
LDLC SERPL CALC-MCNC: 117 MG/DL
MCH RBC QN AUTO: 29.6 PG (ref 26–33)
MCHC RBC AUTO-ENTMCNC: 31.5 GM/DL (ref 32.2–35.5)
MCV RBC AUTO: 93.9 FL (ref 81–99)
MICROALBUMIN UR-MCNC: < 1.2 MG/DL
MICROALBUMIN/CREAT RATIO PNL UR: 11 MG/G (ref 0–30)
PLATELET # BLD AUTO: 178 THOU/MM3 (ref 130–400)
PMV BLD AUTO: 10.8 FL (ref 9.4–12.4)
POTASSIUM SERPL-SCNC: 3.8 MEQ/L (ref 3.5–5.2)
PROT SERPL-MCNC: 6.8 G/DL (ref 6.1–8)
RBC # BLD AUTO: 4.09 MILL/MM3 (ref 4.2–5.4)
SODIUM SERPL-SCNC: 138 MEQ/L (ref 135–145)
TIBC SERPL-MCNC: 336 UG/DL (ref 171–450)
TRIGL SERPL-MCNC: 169 MG/DL (ref 0–199)
TSH SERPL DL<=0.005 MIU/L-ACNC: 2.53 UIU/ML (ref 0.4–4.2)
VIT B12 SERPL-MCNC: 597 PG/ML (ref 211–911)
WBC # BLD AUTO: 5.1 THOU/MM3 (ref 4.8–10.8)

## 2023-08-24 ENCOUNTER — TELEPHONE (OUTPATIENT)
Dept: CARDIOLOGY CLINIC | Age: 88
End: 2023-08-24

## 2023-08-24 NOTE — TELEPHONE ENCOUNTER
Dtr Robi Barton, called to cancel CHF appt Monday  Does not wish to r/s   Sees Dr Wilfrido George Wednesday

## 2023-08-30 ENCOUNTER — OFFICE VISIT (OUTPATIENT)
Dept: CARDIOLOGY CLINIC | Age: 88
End: 2023-08-30
Payer: MEDICARE

## 2023-08-30 VITALS
WEIGHT: 175 LBS | HEIGHT: 62 IN | SYSTOLIC BLOOD PRESSURE: 128 MMHG | BODY MASS INDEX: 32.2 KG/M2 | DIASTOLIC BLOOD PRESSURE: 76 MMHG | HEART RATE: 76 BPM

## 2023-08-30 DIAGNOSIS — I25.83 CORONARY ARTERY DISEASE DUE TO LIPID RICH PLAQUE: Primary | ICD-10-CM

## 2023-08-30 DIAGNOSIS — R06.02 SHORTNESS OF BREATH: ICD-10-CM

## 2023-08-30 DIAGNOSIS — I50.30 HEART FAILURE WITH PRESERVED EJECTION FRACTION, CLASS III (HCC): ICD-10-CM

## 2023-08-30 DIAGNOSIS — I25.10 CORONARY ARTERY DISEASE DUE TO LIPID RICH PLAQUE: Primary | ICD-10-CM

## 2023-08-30 PROCEDURE — 1123F ACP DISCUSS/DSCN MKR DOCD: CPT | Performed by: INTERNAL MEDICINE

## 2023-08-30 PROCEDURE — G8427 DOCREV CUR MEDS BY ELIG CLIN: HCPCS | Performed by: INTERNAL MEDICINE

## 2023-08-30 PROCEDURE — 1036F TOBACCO NON-USER: CPT | Performed by: INTERNAL MEDICINE

## 2023-08-30 PROCEDURE — G8417 CALC BMI ABV UP PARAM F/U: HCPCS | Performed by: INTERNAL MEDICINE

## 2023-08-30 PROCEDURE — 1090F PRES/ABSN URINE INCON ASSESS: CPT | Performed by: INTERNAL MEDICINE

## 2023-08-30 PROCEDURE — 99214 OFFICE O/P EST MOD 30 MIN: CPT | Performed by: INTERNAL MEDICINE

## 2023-08-30 RX ORDER — SPIRONOLACTONE 25 MG/1
TABLET ORAL
Qty: 90 TABLET | Refills: 0 | Status: SHIPPED | OUTPATIENT
Start: 2023-08-30

## 2023-09-11 ENCOUNTER — NURSE ONLY (OUTPATIENT)
Dept: LAB | Age: 88
End: 2023-09-11

## 2023-09-11 DIAGNOSIS — I50.30 HEART FAILURE WITH PRESERVED EJECTION FRACTION, CLASS III (HCC): ICD-10-CM

## 2023-09-11 DIAGNOSIS — I25.10 CORONARY ARTERY DISEASE DUE TO LIPID RICH PLAQUE: ICD-10-CM

## 2023-09-11 DIAGNOSIS — I25.83 CORONARY ARTERY DISEASE DUE TO LIPID RICH PLAQUE: ICD-10-CM

## 2023-09-11 DIAGNOSIS — R06.02 SHORTNESS OF BREATH: ICD-10-CM

## 2023-09-11 LAB
ANION GAP SERPL CALC-SCNC: 10 MEQ/L (ref 8–16)
BUN SERPL-MCNC: 23 MG/DL (ref 7–22)
CALCIUM SERPL-MCNC: 9.6 MG/DL (ref 8.5–10.5)
CHLORIDE SERPL-SCNC: 101 MEQ/L (ref 98–111)
CO2 SERPL-SCNC: 28 MEQ/L (ref 23–33)
CREAT SERPL-MCNC: 0.9 MG/DL (ref 0.4–1.2)
GFR SERPL CREATININE-BSD FRML MDRD: 59 ML/MIN/1.73M2
GLUCOSE SERPL-MCNC: 134 MG/DL (ref 70–108)
NT-PROBNP SERPL IA-MCNC: 508.6 PG/ML (ref 0–449)
POTASSIUM SERPL-SCNC: 5 MEQ/L (ref 3.5–5.2)
SODIUM SERPL-SCNC: 139 MEQ/L (ref 135–145)

## 2023-09-13 ENCOUNTER — OFFICE VISIT (OUTPATIENT)
Dept: CARDIOLOGY CLINIC | Age: 88
End: 2023-09-13
Payer: MEDICARE

## 2023-09-13 VITALS
HEART RATE: 76 BPM | HEIGHT: 62 IN | WEIGHT: 170 LBS | BODY MASS INDEX: 31.28 KG/M2 | SYSTOLIC BLOOD PRESSURE: 124 MMHG | DIASTOLIC BLOOD PRESSURE: 70 MMHG

## 2023-09-13 DIAGNOSIS — I50.32 CHRONIC DIASTOLIC CHF (CONGESTIVE HEART FAILURE) (HCC): ICD-10-CM

## 2023-09-13 PROCEDURE — G8428 CUR MEDS NOT DOCUMENT: HCPCS | Performed by: INTERNAL MEDICINE

## 2023-09-13 PROCEDURE — 99214 OFFICE O/P EST MOD 30 MIN: CPT | Performed by: INTERNAL MEDICINE

## 2023-09-13 PROCEDURE — 1090F PRES/ABSN URINE INCON ASSESS: CPT | Performed by: INTERNAL MEDICINE

## 2023-09-13 PROCEDURE — 1123F ACP DISCUSS/DSCN MKR DOCD: CPT | Performed by: INTERNAL MEDICINE

## 2023-09-13 PROCEDURE — G8417 CALC BMI ABV UP PARAM F/U: HCPCS | Performed by: INTERNAL MEDICINE

## 2023-09-13 PROCEDURE — 1036F TOBACCO NON-USER: CPT | Performed by: INTERNAL MEDICINE

## 2023-09-13 RX ORDER — POTASSIUM CHLORIDE 20 MEQ/1
40 TABLET, EXTENDED RELEASE ORAL DAILY
Qty: 360 TABLET | Refills: 3 | Status: SHIPPED | OUTPATIENT
Start: 2023-09-13

## 2023-09-13 NOTE — PROGRESS NOTES
Pt here for 2 week fu     Pt had to stop aldactone was weak so had about 5 doses ,    Pt is better after stopping the aldactone    Pt daughter asking if  spironolactone could be taken less often     Pt states med list is correct from last appt 8/30/23    Pt continues with sob with any activity , swelling is down some
Velocity: 163   LVOT Peak Velocity: 134                            cm/s                    cm/s   MV Peak Gradient: 5.38   AV Peak Gradient: 10.63 LVOT Peak Gradient: 7   mmHg                     mmHg                    mmHg      MV Deceleration Time:                            TV Peak E-Wave: 73.7 cm/s   254 msec                                         TV Peak A-Wave: 36.4 cm/s                               AV P1/2t: 544 msec      TV Peak Gradient: 2.17                                                    mmHg                                                    TR Velocity:273 cm/s                                                    TR Gradient:29.81 mmHg   MR Velocity: 366 cm/s    AV DVI (Vmax):0.82      PV Peak Velocity: 80.5                                                    cm/s                                                    PV Peak Gradient: 2.59                                                    mmHg     [email or web address]       STRESS:    CATH:    Assessment/Plan       Diagnosis Orders   1. Chronic diastolic CHF (congestive heart failure) (HCC)  potassium chloride (KLOR-CON M) 20 MEQ extended release tablet              CAD s/p PCI  Diastolic CHF  Afib on Xarelto  HTN  HLD  S/p PPM    Tried imdur but unable to tolerate   Discussed diuretic dosage  Patients daughter to titrate dosage to keep weight   Apparently went down on weight   On Bumex 2 mg daily and potassium  Will reduce potassium to 40meq  Recheck BMP in 2 weeks  Continue losartan, digoxin, , lasix, Aspirin  Continue Xarelto  No bleeding issue  The patient is asked to make an attempt to improve diet and exercise patterns to aid in medical management of this problem.   Advised patient to call office or seek immediate medical attention if there is any new onset of  any chest pain, sob, palpitations, lightheadedness, dizziness,

## 2023-09-19 RX ORDER — NYSTATIN 100000 [USP'U]/G
POWDER TOPICAL
Qty: 60 G | Refills: 3 | Status: SHIPPED | OUTPATIENT
Start: 2023-09-19

## 2023-10-02 ENCOUNTER — NURSE ONLY (OUTPATIENT)
Dept: LAB | Age: 88
End: 2023-10-02

## 2023-10-02 DIAGNOSIS — I50.32 CHRONIC DIASTOLIC CHF (CONGESTIVE HEART FAILURE) (HCC): ICD-10-CM

## 2023-10-02 LAB
ANION GAP SERPL CALC-SCNC: 11 MEQ/L (ref 8–16)
BUN SERPL-MCNC: 18 MG/DL (ref 7–22)
CALCIUM SERPL-MCNC: 9.3 MG/DL (ref 8.5–10.5)
CHLORIDE SERPL-SCNC: 100 MEQ/L (ref 98–111)
CO2 SERPL-SCNC: 29 MEQ/L (ref 23–33)
CREAT SERPL-MCNC: 0.8 MG/DL (ref 0.4–1.2)
GFR SERPL CREATININE-BSD FRML MDRD: > 60 ML/MIN/1.73M2
GLUCOSE SERPL-MCNC: 186 MG/DL (ref 70–108)
POTASSIUM SERPL-SCNC: 3.9 MEQ/L (ref 3.5–5.2)
SODIUM SERPL-SCNC: 140 MEQ/L (ref 135–145)

## 2023-10-04 ENCOUNTER — OFFICE VISIT (OUTPATIENT)
Dept: CARDIOLOGY CLINIC | Age: 88
End: 2023-10-04
Payer: MEDICARE

## 2023-10-04 VITALS
SYSTOLIC BLOOD PRESSURE: 110 MMHG | HEART RATE: 64 BPM | WEIGHT: 172 LBS | DIASTOLIC BLOOD PRESSURE: 72 MMHG | BODY MASS INDEX: 31.65 KG/M2 | HEIGHT: 62 IN

## 2023-10-04 DIAGNOSIS — I50.32 CHRONIC DIASTOLIC HEART FAILURE (HCC): Primary | ICD-10-CM

## 2023-10-04 PROCEDURE — G8484 FLU IMMUNIZE NO ADMIN: HCPCS | Performed by: INTERNAL MEDICINE

## 2023-10-04 PROCEDURE — 1123F ACP DISCUSS/DSCN MKR DOCD: CPT | Performed by: INTERNAL MEDICINE

## 2023-10-04 PROCEDURE — G8417 CALC BMI ABV UP PARAM F/U: HCPCS | Performed by: INTERNAL MEDICINE

## 2023-10-04 PROCEDURE — 1090F PRES/ABSN URINE INCON ASSESS: CPT | Performed by: INTERNAL MEDICINE

## 2023-10-04 PROCEDURE — G8428 CUR MEDS NOT DOCUMENT: HCPCS | Performed by: INTERNAL MEDICINE

## 2023-10-04 PROCEDURE — 99214 OFFICE O/P EST MOD 30 MIN: CPT | Performed by: INTERNAL MEDICINE

## 2023-10-04 PROCEDURE — 1036F TOBACCO NON-USER: CPT | Performed by: INTERNAL MEDICINE

## 2023-10-04 NOTE — PROGRESS NOTES
[address] [place] [address]  Dept: [phone]  Dept Fax: [phone]  Loc: [phone]    Visit Date: 10/4/2023    Ms. Laura Collins is a 80 y.o. female  who presented for:    HPI:   79 yo F c hx of Afib on Xarelto, HTN, HLD, CAD s/p PCI is here for a follow up. Denies any chest pain, sob, palpitations, lightheadedness, dizziness, orthopnea, PND or pedal edema. Has been taking bumex 2 mg. On potassium 80meq. Was given aldactone. Felt unwell so she stopped. Her potassium was dropped to 40meq          Current Outpatient Medications:     nystatin (MYCOSTATIN) 277140 UNIT/GM powder, Apply 3 times daily. , Disp: 60 g, Rfl: 3    potassium chloride (KLOR-CON M) 20 MEQ extended release tablet, Take 2 tablets by mouth daily, Disp: 360 tablet, Rfl: 3    rivaroxaban (XARELTO) 15 MG TABS tablet, Take 1 tablet by mouth Daily with supper, Disp: 90 tablet, Rfl: 3    venlafaxine (EFFEXOR XR) 150 MG extended release capsule, Take 1 capsule by mouth daily, Disp: 180 capsule, Rfl: 3    bumetanide (BUMEX) 1 MG tablet, Take 2 tablets by mouth daily, Disp: 180 tablet, Rfl: 3    pantoprazole (PROTONIX) 40 MG tablet, Take 1 tablet by mouth 2 times daily, Disp: 180 tablet, Rfl: 3    metoprolol tartrate (LOPRESSOR) 25 MG tablet, Take 1 tablet by mouth 2 times daily, Disp: 180 tablet, Rfl: 3    sucralfate (CARAFATE) 1 GM tablet, Take 1 tablet by mouth 2 times daily, Disp: 180 tablet, Rfl: 3    psyllium (KONSYL) 28.3 % PACK, Take 1 packet by mouth daily, Disp: , Rfl:     Cholecalciferol (VITAMIN D-3 PO), Take by mouth daily, Disp: , Rfl:     Multiple Vitamin (MULTIVITAMIN ADULT PO), Take by mouth daily, Disp: , Rfl:     CALCIUM PO, Take by mouth Calcium chew, Disp: , Rfl:     magnesium (MAGNESIUM-OXIDE) 250 MG TABS tablet, Take 1 tablet by mouth 2 times daily, Disp: , Rfl:     Cyanocobalamin (VITAMIN B 12 PO), Take by mouth every other day, Disp: , Rfl:

## 2023-10-04 NOTE — PROGRESS NOTES
Pt here for 3 week check up --    Pt continues with all over aches,sob , swelling     Pt states no changes in meds since last appt

## 2023-10-05 ENCOUNTER — NURSE ONLY (OUTPATIENT)
Dept: CARDIOLOGY CLINIC | Age: 88
End: 2023-10-05

## 2023-10-05 DIAGNOSIS — Z95.0 PACEMAKER: Primary | ICD-10-CM

## 2023-10-05 NOTE — PROGRESS NOTES
[employer] sci single pacemaker in office     2 years on device  RV imped 224  R waves 5.8  Threshold 1.2 @0.4  99% paced

## 2023-10-23 ENCOUNTER — NURSE ONLY (OUTPATIENT)
Dept: LAB | Age: 88
End: 2023-10-23

## 2023-10-23 DIAGNOSIS — I50.32 CHRONIC DIASTOLIC HEART FAILURE (HCC): ICD-10-CM

## 2023-10-24 LAB
ANION GAP SERPL CALC-SCNC: 10 MEQ/L (ref 8–16)
BUN SERPL-MCNC: 19 MG/DL (ref 7–22)
CALCIUM SERPL-MCNC: 9.4 MG/DL (ref 8.5–10.5)
CHLORIDE SERPL-SCNC: 99 MEQ/L (ref 98–111)
CO2 SERPL-SCNC: 29 MEQ/L (ref 23–33)
CREAT SERPL-MCNC: 0.8 MG/DL (ref 0.4–1.2)
GFR SERPL CREATININE-BSD FRML MDRD: > 60 ML/MIN/1.73M2
GLUCOSE SERPL-MCNC: 178 MG/DL (ref 70–108)
POTASSIUM SERPL-SCNC: 3.7 MEQ/L (ref 3.5–5.2)
SODIUM SERPL-SCNC: 138 MEQ/L (ref 135–145)

## 2023-11-07 ENCOUNTER — TELEPHONE (OUTPATIENT)
Dept: FAMILY MEDICINE CLINIC | Age: 88
End: 2023-11-07

## 2023-11-07 DIAGNOSIS — I50.9 CHF (CONGESTIVE HEART FAILURE), NYHA CLASS I, UNSPECIFIED FAILURE CHRONICITY, UNSPECIFIED TYPE (HCC): Primary | ICD-10-CM

## 2023-11-07 DIAGNOSIS — R53.1 GENERALIZED WEAKNESS: ICD-10-CM

## 2023-11-07 NOTE — TELEPHONE ENCOUNTER
Nicholas Carpenter with [facility] palliative care called stating that she has noticed more of a decline. Pt is needing 24/7 care. Struggling with walking and more SOB  Pt has a room open at [facility], move in day is TBD.    After she is in the [facility] family wants to transition in hospice   If ok with dr Bj Steele is needing hospice referral sent to [facility]    An question  hudson [phone]

## 2023-11-13 ENCOUNTER — TELEPHONE (OUTPATIENT)
Dept: FAMILY MEDICINE CLINIC | Age: 88
End: 2023-11-13

## 2023-11-13 NOTE — TELEPHONE ENCOUNTER
[facility] for same day thursday afternoon unless we can do a video visit for the 3 day waiver

## 2023-11-13 NOTE — TELEPHONE ENCOUNTER
Noted. Patient will need an appointment with office note sharing rehab potential to be considered for waiver admission. I will send initial email today but they will need documentation prior to making decision. Please let me know if you have questions. Thank you!

## 2023-11-13 NOTE — TELEPHONE ENCOUNTER
Geri from [facility] called stating that the pt's family was wanting to admit her using the waiver? The pt was hoping to get her admitted this week. Geri wanted to know how you wanted to proceed? Wendie's # [id number].

## 2023-11-16 ENCOUNTER — OFFICE VISIT (OUTPATIENT)
Dept: FAMILY MEDICINE CLINIC | Age: 88
End: 2023-11-16

## 2023-11-16 ENCOUNTER — TELEPHONE (OUTPATIENT)
Dept: FAMILY MEDICINE CLINIC | Age: 88
End: 2023-11-16

## 2023-11-16 VITALS
DIASTOLIC BLOOD PRESSURE: 70 MMHG | SYSTOLIC BLOOD PRESSURE: 122 MMHG | RESPIRATION RATE: 14 BRPM | WEIGHT: 173.5 LBS | HEART RATE: 70 BPM | TEMPERATURE: 97.6 F | BODY MASS INDEX: 31.73 KG/M2

## 2023-11-16 DIAGNOSIS — R53.81 PHYSICAL DECONDITIONING: ICD-10-CM

## 2023-11-16 DIAGNOSIS — I49.5 SICK SINUS SYNDROME (HCC): ICD-10-CM

## 2023-11-16 DIAGNOSIS — Z85.038 HISTORY OF COLON CANCER: ICD-10-CM

## 2023-11-16 DIAGNOSIS — I25.10 CORONARY ARTERY DISEASE INVOLVING NATIVE CORONARY ARTERY OF NATIVE HEART WITHOUT ANGINA PECTORIS: ICD-10-CM

## 2023-11-16 DIAGNOSIS — N28.1 KIDNEY CYSTS: ICD-10-CM

## 2023-11-16 DIAGNOSIS — I10 ESSENTIAL HYPERTENSION: ICD-10-CM

## 2023-11-16 DIAGNOSIS — N39.3 STRESS INCONTINENCE: ICD-10-CM

## 2023-11-16 DIAGNOSIS — F33.42 RECURRENT MAJOR DEPRESSIVE DISORDER, IN FULL REMISSION (HCC): ICD-10-CM

## 2023-11-16 DIAGNOSIS — R53.83 OTHER FATIGUE: ICD-10-CM

## 2023-11-16 DIAGNOSIS — I50.32 CHRONIC DIASTOLIC CHF (CONGESTIVE HEART FAILURE) (HCC): ICD-10-CM

## 2023-11-16 DIAGNOSIS — D50.0 IRON DEFICIENCY ANEMIA DUE TO CHRONIC BLOOD LOSS: ICD-10-CM

## 2023-11-16 DIAGNOSIS — E88.819 INSULIN RESISTANCE: ICD-10-CM

## 2023-11-16 DIAGNOSIS — K76.0 STEATOSIS OF LIVER: ICD-10-CM

## 2023-11-16 DIAGNOSIS — D64.9 MILD ANEMIA: ICD-10-CM

## 2023-11-16 DIAGNOSIS — M15.9 PRIMARY OSTEOARTHRITIS INVOLVING MULTIPLE JOINTS: ICD-10-CM

## 2023-11-16 DIAGNOSIS — K21.9 GASTROESOPHAGEAL REFLUX DISEASE WITHOUT ESOPHAGITIS: ICD-10-CM

## 2023-11-16 DIAGNOSIS — I50.9 CHF (CONGESTIVE HEART FAILURE), NYHA CLASS I, UNSPECIFIED FAILURE CHRONICITY, UNSPECIFIED TYPE (HCC): Primary | ICD-10-CM

## 2023-11-16 DIAGNOSIS — M85.89 OSTEOPENIA OF MULTIPLE SITES: ICD-10-CM

## 2023-11-16 DIAGNOSIS — N18.31 STAGE 3A CHRONIC KIDNEY DISEASE (HCC): ICD-10-CM

## 2023-11-16 DIAGNOSIS — I73.9 PVD (PERIPHERAL VASCULAR DISEASE) (HCC): ICD-10-CM

## 2023-11-16 DIAGNOSIS — I48.20 CHRONIC ATRIAL FIBRILLATION (HCC): ICD-10-CM

## 2023-11-16 DIAGNOSIS — E78.00 PURE HYPERCHOLESTEROLEMIA: ICD-10-CM

## 2023-11-16 DIAGNOSIS — I25.119 CORONARY ARTERY DISEASE INVOLVING NATIVE HEART WITH ANGINA PECTORIS, UNSPECIFIED VESSEL OR LESION TYPE (HCC): ICD-10-CM

## 2023-11-16 NOTE — TELEPHONE ENCOUNTER
Patient was seen today for a face to face to be admitted to the [facility]. Francis Handler started a 3 day waiver for the admission and was waiting on the F2F note. Family would like to move patient to the SNF tomorrow 11/17/23. Tomas Araya is currently out of the office. Left voicemail and sent an email to Charla Lange who is covering for Tomas Araya. See TE 11/13/23.

## 2023-11-16 NOTE — PROGRESS NOTES
[facility] 3 day waiver process initiated. Email sent to [facility] services team for review.

## 2023-11-17 ENCOUNTER — CARE COORDINATION (OUTPATIENT)
Dept: CARE COORDINATION | Age: 88
End: 2023-11-17

## 2023-11-17 NOTE — CARE COORDINATION
Flakito Shelby in admissions at [facility]. Informed her that pt was seen by PCP yesterday and PAC [facility] team has reviewed to make sure pt has met criteria for 3 day waiver admission. Florencia Nguyen has been working with the family. She will notify pt/family them that pt can be admitted to [facility] today.

## 2023-12-07 ENCOUNTER — OUTSIDE SERVICES (OUTPATIENT)
Dept: FAMILY MEDICINE CLINIC | Age: 88
End: 2023-12-07
Payer: COMMERCIAL

## 2023-12-07 VITALS
OXYGEN SATURATION: 96 % | BODY MASS INDEX: 31.35 KG/M2 | DIASTOLIC BLOOD PRESSURE: 67 MMHG | RESPIRATION RATE: 18 BRPM | TEMPERATURE: 98 F | SYSTOLIC BLOOD PRESSURE: 123 MMHG | WEIGHT: 171.4 LBS | HEART RATE: 70 BPM

## 2023-12-07 DIAGNOSIS — I25.10 CORONARY ARTERY DISEASE INVOLVING NATIVE CORONARY ARTERY OF NATIVE HEART WITHOUT ANGINA PECTORIS: ICD-10-CM

## 2023-12-07 DIAGNOSIS — R53.1 WEAKNESS: ICD-10-CM

## 2023-12-07 DIAGNOSIS — E78.00 PURE HYPERCHOLESTEROLEMIA: ICD-10-CM

## 2023-12-07 DIAGNOSIS — I50.9 CHF (CONGESTIVE HEART FAILURE), NYHA CLASS I, UNSPECIFIED FAILURE CHRONICITY, UNSPECIFIED TYPE (HCC): ICD-10-CM

## 2023-12-07 DIAGNOSIS — R53.81 PHYSICAL DECONDITIONING: ICD-10-CM

## 2023-12-07 DIAGNOSIS — E88.819 INSULIN RESISTANCE: ICD-10-CM

## 2023-12-07 DIAGNOSIS — F33.42 RECURRENT MAJOR DEPRESSIVE DISORDER, IN FULL REMISSION (HCC): ICD-10-CM

## 2023-12-07 DIAGNOSIS — K21.9 GASTROESOPHAGEAL REFLUX DISEASE WITHOUT ESOPHAGITIS: ICD-10-CM

## 2023-12-07 DIAGNOSIS — D50.0 IRON DEFICIENCY ANEMIA DUE TO CHRONIC BLOOD LOSS: ICD-10-CM

## 2023-12-07 DIAGNOSIS — R06.09 DOE (DYSPNEA ON EXERTION): ICD-10-CM

## 2023-12-07 DIAGNOSIS — Z95.0 PACEMAKER: ICD-10-CM

## 2023-12-07 DIAGNOSIS — Z85.038 HISTORY OF COLON CANCER: ICD-10-CM

## 2023-12-07 DIAGNOSIS — J06.9 VIRAL URI: ICD-10-CM

## 2023-12-07 DIAGNOSIS — I48.20 CHRONIC ATRIAL FIBRILLATION (HCC): ICD-10-CM

## 2023-12-07 DIAGNOSIS — K76.0 STEATOSIS OF LIVER: ICD-10-CM

## 2023-12-07 DIAGNOSIS — N39.3 STRESS INCONTINENCE: ICD-10-CM

## 2023-12-07 DIAGNOSIS — I49.5 SICK SINUS SYNDROME (HCC): ICD-10-CM

## 2023-12-07 DIAGNOSIS — R53.83 OTHER FATIGUE: ICD-10-CM

## 2023-12-07 DIAGNOSIS — I25.119 CORONARY ARTERY DISEASE INVOLVING NATIVE HEART WITH ANGINA PECTORIS, UNSPECIFIED VESSEL OR LESION TYPE (HCC): ICD-10-CM

## 2023-12-07 DIAGNOSIS — M15.9 PRIMARY OSTEOARTHRITIS INVOLVING MULTIPLE JOINTS: ICD-10-CM

## 2023-12-07 DIAGNOSIS — I73.9 PVD (PERIPHERAL VASCULAR DISEASE) (HCC): ICD-10-CM

## 2023-12-07 DIAGNOSIS — I10 ESSENTIAL HYPERTENSION: Primary | ICD-10-CM

## 2023-12-07 DIAGNOSIS — N18.31 STAGE 3A CHRONIC KIDNEY DISEASE (HCC): ICD-10-CM

## 2023-12-07 DIAGNOSIS — I50.32 CHRONIC DIASTOLIC CHF (CONGESTIVE HEART FAILURE) (HCC): ICD-10-CM

## 2023-12-07 PROCEDURE — 99309 SBSQ NF CARE MODERATE MDM 30: CPT | Performed by: FAMILY MEDICINE

## 2023-12-07 ASSESSMENT — ENCOUNTER SYMPTOMS
DIARRHEA: 0
SHORTNESS OF BREATH: 1
RHINORRHEA: 0
NAUSEA: 0
SORE THROAT: 0
TROUBLE SWALLOWING: 0
CONSTIPATION: 0
CHEST TIGHTNESS: 0
ABDOMINAL PAIN: 0
VOMITING: 0
EYE PAIN: 0
EYE DISCHARGE: 0
COUGH: 0
ALLERGIC/IMMUNOLOGIC NEGATIVE: 1

## 2024-01-01 ENCOUNTER — TELEPHONE (OUTPATIENT)
Dept: FAMILY MEDICINE CLINIC | Age: 89
End: 2024-01-01

## 2024-01-09 PROBLEM — R11.14 BILIOUS VOMITING WITH NAUSEA: Status: RESOLVED | Noted: 2019-05-04 | Resolved: 2024-01-09

## 2024-01-09 PROBLEM — I48.20 CHRONIC ATRIAL FIBRILLATION WITH RAPID VENTRICULAR RESPONSE (HCC): Status: RESOLVED | Noted: 2020-11-03 | Resolved: 2024-01-09

## 2024-01-09 PROBLEM — R19.5 OCCULT BLOOD IN STOOLS: Status: RESOLVED | Noted: 2019-05-04 | Resolved: 2024-01-09

## 2024-01-09 PROBLEM — R06.09 DOE (DYSPNEA ON EXERTION): Status: RESOLVED | Noted: 2018-11-26 | Resolved: 2024-01-09

## 2024-01-09 PROBLEM — K52.9 GASTROENTERITIS: Status: RESOLVED | Noted: 2019-05-04 | Resolved: 2024-01-09

## 2024-01-09 PROBLEM — R53.1 GENERALIZED WEAKNESS: Status: RESOLVED | Noted: 2021-01-25 | Resolved: 2024-01-09

## 2024-01-09 PROBLEM — R19.7 DIARRHEA OF PRESUMED INFECTIOUS ORIGIN: Status: RESOLVED | Noted: 2019-05-04 | Resolved: 2024-01-09

## 2024-01-11 ENCOUNTER — TELEPHONE (OUTPATIENT)
Dept: CARDIOLOGY CLINIC | Age: 89
End: 2024-01-11

## 2024-01-11 NOTE — TELEPHONE ENCOUNTER
BERTA Crouch pt   She's due in office in October     RV imped 209 today~ we have been watching it   10/5/23 imped was 224   April 6, 2023    217  April 14, 23   344   9/10/20   572     She's dependent     She is 97% paced   It's a [place] thresholds are not measured on downloads   1.5 years on device

## 2024-01-16 ENCOUNTER — PROCEDURE VISIT (OUTPATIENT)
Dept: CARDIOLOGY CLINIC | Age: 89
End: 2024-01-16
Payer: MEDICARE

## 2024-01-16 DIAGNOSIS — I50.32 CHRONIC DIASTOLIC CHF (CONGESTIVE HEART FAILURE) (HCC): Primary | ICD-10-CM

## 2024-01-16 DIAGNOSIS — M85.89 OSTEOPENIA OF MULTIPLE SITES: ICD-10-CM

## 2024-01-16 DIAGNOSIS — I25.10 CORONARY ARTERY DISEASE INVOLVING NATIVE CORONARY ARTERY OF NATIVE HEART WITHOUT ANGINA PECTORIS: ICD-10-CM

## 2024-01-16 DIAGNOSIS — Z95.0 PACEMAKER: Primary | ICD-10-CM

## 2024-01-16 PROCEDURE — 93294 REM INTERROG EVL PM/LDLS PM: CPT | Performed by: INTERNAL MEDICINE

## 2024-01-16 PROCEDURE — 93296 REM INTERROG EVL PM/IDS: CPT | Performed by: INTERNAL MEDICINE

## 2024-01-16 NOTE — PROGRESS NOTES
[place] [employer] Remote Single Pacer  Patient of Nallu    Battery 1.5 years    Presenting rhythm     RV impedance 215 - known/we are watching lead    RV paced 96%    V Thresholds -@-    Episodes none

## 2024-01-17 ENCOUNTER — OFFICE VISIT (OUTPATIENT)
Dept: CARDIOLOGY CLINIC | Age: 89
End: 2024-01-17

## 2024-01-17 VITALS
DIASTOLIC BLOOD PRESSURE: 72 MMHG | BODY MASS INDEX: 31.83 KG/M2 | HEIGHT: 62 IN | WEIGHT: 173 LBS | HEART RATE: 71 BPM | SYSTOLIC BLOOD PRESSURE: 118 MMHG

## 2024-01-17 DIAGNOSIS — R06.02 SHORTNESS OF BREATH: Primary | ICD-10-CM

## 2024-01-17 RX ORDER — LORATADINE 10 MG/1
10 CAPSULE, LIQUID FILLED ORAL NIGHTLY
COMMUNITY

## 2024-01-17 NOTE — PROGRESS NOTES
[facility] CARDIOLOGY  [address]  [facility] [address]  Dept: [phone]  Dept Fax: [phone]  Loc: [phone]    Visit Date: 1/17/2024    Ms. Russell is a 89 y.o. female  who presented for:    HPI:   89 yo F c hx of Afib on Xarelto, HTN, HLD, CAD s/p PCI is here for a follow up.  Has been taking bumex 2 mg.  On potassium 80meq. Was given aldactone.  Felt unwell so she stopped.   Her potassium was dropped to 40meq  EKG today is paced rhythm          Current Outpatient Medications:     loratadine (CLARITIN) 10 MG capsule, Take 1 capsule by mouth at bedtime, Disp: , Rfl:     Handicap Placard MISC, by Does not apply route Dx:  arthritis, length: lifetime, Disp: 1 each, Rfl: 0    nystatin (MYCOSTATIN) 805486 UNIT/GM powder, Apply 3 times daily., Disp: 60 g, Rfl: 3    potassium chloride (KLOR-CON M) 20 MEQ extended release tablet, Take 2 tablets by mouth daily, Disp: 360 tablet, Rfl: 3    rivaroxaban (XARELTO) 15 MG TABS tablet, Take 1 tablet by mouth Daily with supper, Disp: 90 tablet, Rfl: 3    venlafaxine (EFFEXOR XR) 150 MG extended release capsule, Take 1 capsule by mouth daily, Disp: 180 capsule, Rfl: 3    bumetanide (BUMEX) 1 MG tablet, Take 2 tablets by mouth daily, Disp: 180 tablet, Rfl: 3    pantoprazole (PROTONIX) 40 MG tablet, Take 1 tablet by mouth 2 times daily, Disp: 180 tablet, Rfl: 3    metoprolol tartrate (LOPRESSOR) 25 MG tablet, Take 1 tablet by mouth 2 times daily, Disp: 180 tablet, Rfl: 3    sucralfate (CARAFATE) 1 GM tablet, Take 1 tablet by mouth 2 times daily, Disp: 180 tablet, Rfl: 3    psyllium (KONSYL) 28.3 % PACK, Take 1 packet by mouth daily, Disp: , Rfl:     Cholecalciferol (VITAMIN D-3 PO), Take by mouth daily, Disp: , Rfl:     Multiple Vitamin (MULTIVITAMIN ADULT PO), Take by mouth daily, Disp: , Rfl:     CALCIUM PO, Take by mouth Calcium chew, Disp: , Rfl:     magnesium (MAGNESIUM-OXIDE) 250 MG TABS tablet, Take 1 tablet by mouth

## 2024-02-13 VITALS
SYSTOLIC BLOOD PRESSURE: 128 MMHG | DIASTOLIC BLOOD PRESSURE: 77 MMHG | HEART RATE: 72 BPM | OXYGEN SATURATION: 94 % | BODY MASS INDEX: 30.54 KG/M2 | WEIGHT: 167 LBS | TEMPERATURE: 97.4 F | RESPIRATION RATE: 16 BRPM

## 2024-02-13 NOTE — PROGRESS NOTES
Heidi Russell is a 89 y.o. female who presents today for her medical conditions/complaints as noted below.   Chief Complaint   Patient presents with    1 Month Follow-Up           HPI:     Heidi is seen for her monthly chronic illness f/u.  PMX includes CHF, HTN, HLD, CKD, A fib on Xarelto, hx colon cancer at age 26, GERD, osteoarthritis, thyroid nodule, shattered right ankle with reconstruction, among others.  She continues to work with therapy, but this makes her very tired.  She states that she is not feeling good.  She is more tired then usual.  Family and she are looking at transitioning to hospice.  She denies pain or shortness of breath.  She walks with the aid of a walker.  She is eating and sleeping well.  She denies concerns.  She is alert and oriented.  Weight stable.  Nursing denies concerns.        Past Medical History:   Diagnosis Date    Atrial fibrillation (HCC)     Blood circulation, collateral     [employer] single pacemaker 4/26/2016 05/05/2016    CAD (coronary artery disease)     cath and stent in right artery    Cancer (HCC) 1954    HX  Colon     Carpal tunnel syndrome     Fall at home     12/7/21 -large bruise to R buttock    Fracture dislocation of ankle 1980    GERD (gastroesophageal reflux disease)     Hyperlipidemia     Hypertension     Kidney lesion, native, right     found on 5/2016    Osteoarthritis     knees    Osteopenia     Prolonged emergence from general anesthesia     Thyroid goiter 09/06/2016    Yersiniosis 2016      Past Surgical History:   Procedure Laterality Date    ABDOMEN SURGERY      ANKLE FRACTURE SURGERY  1980--1982    reconstruction in 2003 and 2007    APPENDECTOMY      BLADDER SURGERY      support bladder repair    CARDIAC SURGERY      heart stent 12-11-18, [facility]    CARPAL TUNNEL RELEASE  7/2013    CARPAL TUNNEL RELEASE Right 08/19/2017    Revision    CATARACT REMOVAL Bilateral     CHOLECYSTECTOMY  1986    COLON SURGERY  1954    COLONOSCOPY      ENDOSCOPY,

## 2024-02-15 ENCOUNTER — OUTSIDE SERVICES (OUTPATIENT)
Dept: FAMILY MEDICINE CLINIC | Age: 89
End: 2024-02-15

## 2024-02-15 DIAGNOSIS — Z95.0 PACEMAKER: ICD-10-CM

## 2024-02-15 DIAGNOSIS — I25.119 CORONARY ARTERY DISEASE INVOLVING NATIVE HEART WITH ANGINA PECTORIS, UNSPECIFIED VESSEL OR LESION TYPE (HCC): ICD-10-CM

## 2024-02-15 DIAGNOSIS — I10 ESSENTIAL HYPERTENSION: ICD-10-CM

## 2024-02-15 DIAGNOSIS — I73.9 PVD (PERIPHERAL VASCULAR DISEASE) (HCC): ICD-10-CM

## 2024-02-15 DIAGNOSIS — R53.1 WEAKNESS: ICD-10-CM

## 2024-02-15 DIAGNOSIS — I49.5 SICK SINUS SYNDROME (HCC): ICD-10-CM

## 2024-02-15 DIAGNOSIS — I25.10 CORONARY ARTERY DISEASE INVOLVING NATIVE CORONARY ARTERY OF NATIVE HEART WITHOUT ANGINA PECTORIS: ICD-10-CM

## 2024-02-15 DIAGNOSIS — I50.32 CHRONIC DIASTOLIC CHF (CONGESTIVE HEART FAILURE) (HCC): Primary | ICD-10-CM

## 2024-02-15 DIAGNOSIS — E78.00 PURE HYPERCHOLESTEROLEMIA: ICD-10-CM

## 2024-02-15 DIAGNOSIS — D50.0 IRON DEFICIENCY ANEMIA DUE TO CHRONIC BLOOD LOSS: ICD-10-CM

## 2024-02-15 DIAGNOSIS — D64.9 MILD ANEMIA: ICD-10-CM

## 2024-02-15 DIAGNOSIS — R06.09 DOE (DYSPNEA ON EXERTION): ICD-10-CM

## 2024-02-15 DIAGNOSIS — Z85.038 HISTORY OF COLON CANCER: ICD-10-CM

## 2024-02-15 DIAGNOSIS — E88.819 INSULIN RESISTANCE: ICD-10-CM

## 2024-02-15 DIAGNOSIS — N18.31 STAGE 3A CHRONIC KIDNEY DISEASE (HCC): ICD-10-CM

## 2024-02-15 DIAGNOSIS — M85.89 OSTEOPENIA OF MULTIPLE SITES: ICD-10-CM

## 2024-02-15 DIAGNOSIS — I20.9 ANGINA, CLASS III (HCC): ICD-10-CM

## 2024-02-15 DIAGNOSIS — K76.0 STEATOSIS OF LIVER: ICD-10-CM

## 2024-02-15 DIAGNOSIS — I48.20 CHRONIC ATRIAL FIBRILLATION (HCC): ICD-10-CM

## 2024-02-15 DIAGNOSIS — I50.9 CHF (CONGESTIVE HEART FAILURE), NYHA CLASS I, UNSPECIFIED FAILURE CHRONICITY, UNSPECIFIED TYPE (HCC): ICD-10-CM

## 2024-02-15 DIAGNOSIS — N39.3 STRESS INCONTINENCE: ICD-10-CM

## 2024-02-15 DIAGNOSIS — N28.1 KIDNEY CYSTS: ICD-10-CM

## 2024-02-15 DIAGNOSIS — M15.9 PRIMARY OSTEOARTHRITIS INVOLVING MULTIPLE JOINTS: ICD-10-CM

## 2024-02-15 DIAGNOSIS — F33.42 RECURRENT MAJOR DEPRESSIVE DISORDER, IN FULL REMISSION (HCC): ICD-10-CM

## 2024-02-15 DIAGNOSIS — R53.83 OTHER FATIGUE: ICD-10-CM

## 2024-02-15 DIAGNOSIS — K21.9 GASTROESOPHAGEAL REFLUX DISEASE WITHOUT ESOPHAGITIS: ICD-10-CM

## 2024-02-27 PROBLEM — J96.01 ACUTE RESPIRATORY FAILURE WITH HYPOXIA (HCC): Status: ACTIVE | Noted: 2024-02-27

## 2024-04-17 VITALS
DIASTOLIC BLOOD PRESSURE: 69 MMHG | TEMPERATURE: 98.3 F | SYSTOLIC BLOOD PRESSURE: 131 MMHG | OXYGEN SATURATION: 94 % | HEART RATE: 70 BPM | WEIGHT: 162 LBS | RESPIRATION RATE: 18 BRPM | BODY MASS INDEX: 29.63 KG/M2

## 2024-04-17 ASSESSMENT — ENCOUNTER SYMPTOMS
EYE DISCHARGE: 0
VOMITING: 0
RHINORRHEA: 0
NAUSEA: 0
EYE PAIN: 0
ABDOMINAL PAIN: 0
CHEST TIGHTNESS: 0
ALLERGIC/IMMUNOLOGIC NEGATIVE: 1
COUGH: 0
SORE THROAT: 0
SHORTNESS OF BREATH: 0

## 2024-04-17 NOTE — PROGRESS NOTES
Revision    CATARACT REMOVAL Bilateral     CHOLECYSTECTOMY  1986    COLON SURGERY  1954    COLONOSCOPY      ENDOSCOPY, COLON, DIAGNOSTIC      EYE SURGERY      cataract     FRACTURE SURGERY      HYSTERECTOMY (CERVIX STATUS UNKNOWN)  1971    JOINT REPLACEMENT  1998    L knee    KNEE SURGERY Left     total replacement    PACEMAKER PLACEMENT      PTCA  01/15/2019    Successful PCI / Drug Eluting Stent of the proximal Left Anterior Descending Coronary Artery.    UPPER GASTROINTESTINAL ENDOSCOPY Left 11/28/2018    EGD BIOPSY performed by Marylin Sousa MD at [facility]    UPPER GASTROINTESTINAL ENDOSCOPY  11/28/2018    EGD CONTROL HEMORRHAGE performed by Marylin Sousa MD at [facility]       Family History   Problem Relation Age of Onset    Heart Disease Mother     High Blood Pressure Mother     Heart Disease Father     High Blood Pressure Father     Heart Disease Brother     High Blood Pressure Brother     Heart Disease Son        Social History     Tobacco Use    Smoking status: Never    Smokeless tobacco: Never   Substance Use Topics    Alcohol use: No      Allergies   Allergen Reactions    Methadone Shortness Of Breath     Shakes and nausea    Gabapentin     Lodine [Etodolac] Other (See Comments)     \"spacey, hot flashes, shaky\"    Lyrica [Pregabalin]     Ultram [Tramadol]      Nausea, pedal edema, SOB       Health Maintenance   Topic Date Due    Respiratory Syncytial Virus (RSV) Pregnant or age 60 yrs+ (1 - 1-dose 60+ series) Never done    DTaP/Tdap/Td vaccine (1 - Tdap) 10/28/2005    Shingles vaccine (3 of 3) 10/01/2019    Annual Wellness Visit (Medicare)  11/27/2023    COVID-19 Vaccine (7 - 2023-24 season) 12/26/2023    Depression Monitoring  05/16/2024    Flu vaccine  Completed    Pneumococcal 65+ years Vaccine  Completed    Hepatitis A vaccine  Aged Out    Hepatitis B vaccine  Aged Out    Hib vaccine  Aged Out    Polio vaccine  Aged Out    Meningococcal (ACWY) vaccine  Aged Out    Lipids  Discontinued

## 2024-04-18 ENCOUNTER — OUTSIDE SERVICES (OUTPATIENT)
Dept: FAMILY MEDICINE CLINIC | Age: 89
End: 2024-04-18

## 2024-04-18 DIAGNOSIS — I73.9 PVD (PERIPHERAL VASCULAR DISEASE) (HCC): ICD-10-CM

## 2024-04-18 DIAGNOSIS — N39.3 STRESS INCONTINENCE: ICD-10-CM

## 2024-04-18 DIAGNOSIS — J90 PLEURAL EFFUSION: ICD-10-CM

## 2024-04-18 DIAGNOSIS — Z85.038 HISTORY OF COLON CANCER: ICD-10-CM

## 2024-04-18 DIAGNOSIS — M15.9 PRIMARY OSTEOARTHRITIS INVOLVING MULTIPLE JOINTS: ICD-10-CM

## 2024-04-18 DIAGNOSIS — F33.42 RECURRENT MAJOR DEPRESSIVE DISORDER, IN FULL REMISSION (HCC): ICD-10-CM

## 2024-04-18 DIAGNOSIS — Z95.0 PACEMAKER: ICD-10-CM

## 2024-04-18 DIAGNOSIS — I49.5 SICK SINUS SYNDROME (HCC): ICD-10-CM

## 2024-04-18 DIAGNOSIS — I25.119 CORONARY ARTERY DISEASE INVOLVING NATIVE HEART WITH ANGINA PECTORIS, UNSPECIFIED VESSEL OR LESION TYPE (HCC): ICD-10-CM

## 2024-04-18 DIAGNOSIS — N18.31 STAGE 3A CHRONIC KIDNEY DISEASE (HCC): ICD-10-CM

## 2024-04-18 DIAGNOSIS — Z51.5 HOSPICE CARE: ICD-10-CM

## 2024-04-18 DIAGNOSIS — I50.32 CHRONIC DIASTOLIC CHF (CONGESTIVE HEART FAILURE) (HCC): Primary | ICD-10-CM

## 2024-04-18 DIAGNOSIS — I10 ESSENTIAL HYPERTENSION: ICD-10-CM

## 2024-04-18 DIAGNOSIS — K76.0 STEATOSIS OF LIVER: ICD-10-CM

## 2024-04-18 DIAGNOSIS — D50.0 IRON DEFICIENCY ANEMIA DUE TO CHRONIC BLOOD LOSS: ICD-10-CM

## 2024-04-18 DIAGNOSIS — E78.00 PURE HYPERCHOLESTEROLEMIA: ICD-10-CM

## 2024-04-18 DIAGNOSIS — K21.9 GASTROESOPHAGEAL REFLUX DISEASE WITHOUT ESOPHAGITIS: ICD-10-CM

## 2024-04-18 DIAGNOSIS — I20.9 ANGINA, CLASS III (HCC): ICD-10-CM

## 2024-04-18 DIAGNOSIS — I48.20 CHRONIC ATRIAL FIBRILLATION (HCC): ICD-10-CM

## 2024-04-25 ENCOUNTER — PROCEDURE VISIT (OUTPATIENT)
Dept: CARDIOLOGY CLINIC | Age: 89
End: 2024-04-25
Payer: MEDICARE

## 2024-04-25 DIAGNOSIS — Z95.0 PACEMAKER: Primary | ICD-10-CM

## 2024-04-25 PROCEDURE — 93294 REM INTERROG EVL PM/LDLS PM: CPT | Performed by: INTERNAL MEDICINE

## 2024-04-25 PROCEDURE — 93296 REM INTERROG EVL PM/IDS: CPT | Performed by: INTERNAL MEDICINE

## 2024-04-25 NOTE — PROGRESS NOTES
[employer] sci single pacer     1 years on device     RV imped 225  R waves 10.2  94% paced

## 2024-06-18 VITALS
HEART RATE: 70 BPM | OXYGEN SATURATION: 96 % | TEMPERATURE: 98.3 F | SYSTOLIC BLOOD PRESSURE: 123 MMHG | BODY MASS INDEX: 29.63 KG/M2 | DIASTOLIC BLOOD PRESSURE: 69 MMHG | WEIGHT: 162 LBS | RESPIRATION RATE: 18 BRPM

## 2024-06-19 NOTE — PROGRESS NOTES
[facility] Monthly Progress Note  1 month routine follow up of chronic illnesses.    NAME: Heidi Russell  DATE: 24  : 1928  Code Status:[facility]  Date of admission:    History obtained from chart review, staff and the patient.    SUBJECTIVE:  HPI:   Heidi Russell is a 89 y.o. female. Pt seen and examined at bedside.   Being seen for 1 month follow up of chronic conditions; CHF, HTN, HLD, CKD, A fib on Xarelto, hx colon cancer at age 26, GERD, osteoarthritis, thyroid nodule, shattered right ankle with reconstruction, among others. She started to work with therapy, but this made her very tired. She states that she is not feeling good. She is now under the care of hospice. She denies pain or shortness of breath. She walks with the aid of a walker. She has SOB when overexerting like in the shower. Better if she gets a dose of morphine prior.     Asking again to stop some medications.  Will review with her daughter.    She denies any other acute issues today.     Allergies and Medications were reviewed through the [facility] EMR. All medications reviewed and reconciled, including OTC and herbal medications.       I have reviewed the patient's past medical history, past surgical history, allergies,medications, social and family history and I have made updates where appropriate.    Patient Active Problem List    Diagnosis Date Noted    Chronic renal disease, stage III ([facility]) [092509] 2022     Priority: Medium    Weakness 2022     Priority: Medium    Acute respiratory failure with hypoxia ([facility]) 2024    Recurrent major depressive disorder, in full remission ([facility]) 01/10/2022    PVD (peripheral vascular disease) ([facility]) 2021    Kidney cysts     Chronic depression     Physical deconditioning 2019    Angina, class III ([facility])     GERD (gastroesophageal reflux disease)     Coronary artery disease involving native heart with angina pectoris ([facility])     Gastric polyps 2018    Iron

## 2024-06-20 ENCOUNTER — OUTSIDE SERVICES (OUTPATIENT)
Dept: FAMILY MEDICINE CLINIC | Age: 89
End: 2024-06-20
Payer: MEDICARE

## 2024-06-20 DIAGNOSIS — I49.5 SICK SINUS SYNDROME (HCC): ICD-10-CM

## 2024-06-20 DIAGNOSIS — I73.9 PVD (PERIPHERAL VASCULAR DISEASE) (HCC): ICD-10-CM

## 2024-06-20 DIAGNOSIS — E04.1 THYROID NODULE: ICD-10-CM

## 2024-06-20 DIAGNOSIS — N39.3 STRESS INCONTINENCE: ICD-10-CM

## 2024-06-20 DIAGNOSIS — Z85.038 HISTORY OF COLON CANCER: ICD-10-CM

## 2024-06-20 DIAGNOSIS — I10 ESSENTIAL HYPERTENSION: ICD-10-CM

## 2024-06-20 DIAGNOSIS — J90 PLEURAL EFFUSION: ICD-10-CM

## 2024-06-20 DIAGNOSIS — M85.89 OSTEOPENIA OF MULTIPLE SITES: ICD-10-CM

## 2024-06-20 DIAGNOSIS — I48.20 CHRONIC ATRIAL FIBRILLATION (HCC): ICD-10-CM

## 2024-06-20 DIAGNOSIS — E78.00 PURE HYPERCHOLESTEROLEMIA: ICD-10-CM

## 2024-06-20 DIAGNOSIS — Z51.5 HOSPICE CARE: ICD-10-CM

## 2024-06-20 DIAGNOSIS — R53.1 WEAKNESS: ICD-10-CM

## 2024-06-20 DIAGNOSIS — D50.0 IRON DEFICIENCY ANEMIA DUE TO CHRONIC BLOOD LOSS: ICD-10-CM

## 2024-06-20 DIAGNOSIS — F33.42 RECURRENT MAJOR DEPRESSIVE DISORDER, IN FULL REMISSION (HCC): ICD-10-CM

## 2024-06-20 DIAGNOSIS — I25.119 CORONARY ARTERY DISEASE INVOLVING NATIVE HEART WITH ANGINA PECTORIS, UNSPECIFIED VESSEL OR LESION TYPE (HCC): ICD-10-CM

## 2024-06-20 DIAGNOSIS — E88.819 INSULIN RESISTANCE: ICD-10-CM

## 2024-06-20 DIAGNOSIS — K21.9 GASTROESOPHAGEAL REFLUX DISEASE WITHOUT ESOPHAGITIS: ICD-10-CM

## 2024-06-20 DIAGNOSIS — R00.1 BRADYCARDIA: ICD-10-CM

## 2024-06-20 DIAGNOSIS — R06.09 DOE (DYSPNEA ON EXERTION): ICD-10-CM

## 2024-06-20 DIAGNOSIS — M15.9 PRIMARY OSTEOARTHRITIS INVOLVING MULTIPLE JOINTS: ICD-10-CM

## 2024-06-20 DIAGNOSIS — K76.0 STEATOSIS OF LIVER: ICD-10-CM

## 2024-06-20 DIAGNOSIS — I20.9 ANGINA, CLASS III (HCC): ICD-10-CM

## 2024-06-20 DIAGNOSIS — N18.31 STAGE 3A CHRONIC KIDNEY DISEASE (HCC): ICD-10-CM

## 2024-06-20 DIAGNOSIS — I50.32 CHRONIC DIASTOLIC CHF (CONGESTIVE HEART FAILURE) (HCC): Primary | ICD-10-CM

## 2024-06-20 PROCEDURE — 99490 CHRNC CARE MGMT STAFF 1ST 20: CPT | Performed by: FAMILY MEDICINE

## 2024-06-20 PROCEDURE — 99308 SBSQ NF CARE LOW MDM 20: CPT | Performed by: FAMILY MEDICINE

## 2024-06-20 RX ORDER — MORPHINE SULFATE 100 MG/5ML
5 SOLUTION ORAL
Qty: 90 ML | Refills: 0 | Status: SHIPPED | OUTPATIENT
Start: 2024-06-20 | End: 2024-07-20

## 2024-07-16 ENCOUNTER — TELEPHONE (OUTPATIENT)
Dept: FAMILY MEDICINE CLINIC | Age: 89
End: 2024-07-16

## 2024-07-16 DIAGNOSIS — I25.119 CORONARY ARTERY DISEASE INVOLVING NATIVE HEART WITH ANGINA PECTORIS, UNSPECIFIED VESSEL OR LESION TYPE (HCC): Primary | ICD-10-CM

## 2024-07-16 DIAGNOSIS — K21.9 GASTROESOPHAGEAL REFLUX DISEASE WITHOUT ESOPHAGITIS: ICD-10-CM

## 2024-07-16 DIAGNOSIS — M15.9 PRIMARY OSTEOARTHRITIS INVOLVING MULTIPLE JOINTS: ICD-10-CM

## 2024-07-24 ENCOUNTER — TELEPHONE (OUTPATIENT)
Dept: CARDIOLOGY CLINIC | Age: 89
End: 2024-07-24

## 2024-07-24 NOTE — TELEPHONE ENCOUNTER
Patient's daughter Sandra called requesting we monitor pacemaker remotely; upcoming appt canceled and most recent download results and next future download mailed per her request.

## 2024-07-31 VITALS
OXYGEN SATURATION: 97 % | BODY MASS INDEX: 29.15 KG/M2 | TEMPERATURE: 97.2 F | HEART RATE: 86 BPM | WEIGHT: 159.4 LBS | DIASTOLIC BLOOD PRESSURE: 86 MMHG | SYSTOLIC BLOOD PRESSURE: 127 MMHG | RESPIRATION RATE: 18 BRPM

## 2024-07-31 NOTE — PROGRESS NOTES
[facility] Monthly Progress Note  1 month routine follow up of chronic illnesses.    NAME: Heidi Russell  DATE: 24  : 1928  Code Status:[facility]  Date of admission:    History obtained from chart review, staff and the patient.    SUBJECTIVE:  HPI:   Heidi Russell is a 89 y.o. female. Pt seen and examined at bedside.   Being seen for 1 month follow up of chronic conditions; CHF, HTN, HLD, CKD, A fib on Xarelto, hx colon cancer at age 26, GERD, osteoarthritis, thyroid nodule, shattered right ankle with reconstruction, among others. She started to work with therapy, but this made her very tired. She stated that she is not feeling good. She is now under the care of hospice. She denies pain or shortness of breath. She walks with the aid of a walker. She has SOB when overexerting like in the shower. Better if she gets a dose of morphine prior and using oxygen more.     She denies any acute issues today.     Allergies and Medications were reviewed through the [facility] EMR. All medications reviewed and reconciled, including OTC and herbal medications.     I have reviewed the patient's past medical history, past surgical history, allergies,medications, social and family history and I have made updates where appropriate.    Patient Active Problem List    Diagnosis Date Noted    Chronic renal disease, stage III ([facility]) [082339] 2022     Priority: Medium    Weakness 2022     Priority: Medium    Acute respiratory failure with hypoxia ([facility]) 2024    Recurrent major depressive disorder, in full remission ([facility]) 01/10/2022    PVD (peripheral vascular disease) ([facility]) 2021    Kidney cysts     Chronic depression     Physical deconditioning 2019    Angina, class III ([facility])     GERD (gastroesophageal reflux disease)     Coronary artery disease involving native heart with angina pectoris (HCC)     Gastric polyps 2018    Iron deficiency anemia     Steatosis of liver 2018    Pleural

## 2024-08-01 ENCOUNTER — OUTSIDE SERVICES (OUTPATIENT)
Dept: FAMILY MEDICINE CLINIC | Age: 89
End: 2024-08-01

## 2024-08-01 DIAGNOSIS — D50.0 IRON DEFICIENCY ANEMIA DUE TO CHRONIC BLOOD LOSS: ICD-10-CM

## 2024-08-01 DIAGNOSIS — M15.9 PRIMARY OSTEOARTHRITIS INVOLVING MULTIPLE JOINTS: ICD-10-CM

## 2024-08-01 DIAGNOSIS — I49.5 SICK SINUS SYNDROME (HCC): ICD-10-CM

## 2024-08-01 DIAGNOSIS — I50.32 CHRONIC DIASTOLIC CHF (CONGESTIVE HEART FAILURE) (HCC): ICD-10-CM

## 2024-08-01 DIAGNOSIS — R53.1 WEAKNESS: ICD-10-CM

## 2024-08-01 DIAGNOSIS — J90 PLEURAL EFFUSION: ICD-10-CM

## 2024-08-01 DIAGNOSIS — K08.89 PAIN, DENTAL: ICD-10-CM

## 2024-08-01 DIAGNOSIS — M85.89 OSTEOPENIA OF MULTIPLE SITES: ICD-10-CM

## 2024-08-01 DIAGNOSIS — K21.9 GASTROESOPHAGEAL REFLUX DISEASE WITHOUT ESOPHAGITIS: ICD-10-CM

## 2024-08-01 DIAGNOSIS — N39.3 STRESS INCONTINENCE: ICD-10-CM

## 2024-08-01 DIAGNOSIS — I48.20 CHRONIC ATRIAL FIBRILLATION (HCC): ICD-10-CM

## 2024-08-01 DIAGNOSIS — K76.0 STEATOSIS OF LIVER: ICD-10-CM

## 2024-08-01 DIAGNOSIS — E04.1 THYROID NODULE: ICD-10-CM

## 2024-08-01 DIAGNOSIS — E78.00 PURE HYPERCHOLESTEROLEMIA: ICD-10-CM

## 2024-08-01 DIAGNOSIS — I25.119 CORONARY ARTERY DISEASE INVOLVING NATIVE HEART WITH ANGINA PECTORIS, UNSPECIFIED VESSEL OR LESION TYPE (HCC): Primary | ICD-10-CM

## 2024-08-01 DIAGNOSIS — I20.9 ANGINA, CLASS III (HCC): ICD-10-CM

## 2024-08-01 DIAGNOSIS — Z95.0 PACEMAKER: ICD-10-CM

## 2024-08-01 DIAGNOSIS — I10 ESSENTIAL HYPERTENSION: ICD-10-CM

## 2024-08-01 DIAGNOSIS — F33.42 RECURRENT MAJOR DEPRESSIVE DISORDER, IN FULL REMISSION (HCC): ICD-10-CM

## 2024-08-01 DIAGNOSIS — E88.819 INSULIN RESISTANCE: ICD-10-CM

## 2024-08-01 DIAGNOSIS — R06.09 DOE (DYSPNEA ON EXERTION): ICD-10-CM

## 2024-08-01 DIAGNOSIS — Z51.5 HOSPICE CARE: ICD-10-CM

## 2024-08-01 DIAGNOSIS — I73.9 PVD (PERIPHERAL VASCULAR DISEASE) (HCC): ICD-10-CM

## 2024-08-01 DIAGNOSIS — Z85.038 HISTORY OF COLON CANCER: ICD-10-CM

## 2024-08-01 DIAGNOSIS — R00.1 BRADYCARDIA: ICD-10-CM

## 2024-08-01 DIAGNOSIS — N18.31 STAGE 3A CHRONIC KIDNEY DISEASE (HCC): ICD-10-CM

## 2024-08-02 ENCOUNTER — PROCEDURE VISIT (OUTPATIENT)
Dept: CARDIOLOGY CLINIC | Age: 89
End: 2024-08-02

## 2024-08-02 DIAGNOSIS — Z95.0 PACEMAKER: Primary | ICD-10-CM

## 2024-08-02 NOTE — PROGRESS NOTES
[employer] single pacemaker   Patient of Nallu    Battery 11 months    Presenting rhythm     RV impedance 226    RV sense not recorded    RV paced 96%    V Thresholds not recorded  V Amplitude 2.5 @ 0.4    Episodes none

## 2024-09-18 ENCOUNTER — TELEPHONE (OUTPATIENT)
Dept: CARDIOLOGY CLINIC | Age: 89
End: 2024-09-18

## 2024-10-01 NOTE — TELEPHONE ENCOUNTER
If she is home with home health then schedule next week in same day  Call patient Treatment completed. pt tolerated 3.5hrs HD using Right catheter, both ports functioned well, no complications noted. Pt hypertensive, primary Rn notified, nephrology aware, Goal increased. NET UF 3.5L removed. Treatment stopped blood returned caps changed replaced and secured gauze and tape applied access secured Pt stabel and alert returned to room via stretcher per transport.

## (undated) DEVICE — LINER SUCT CANSTR 1500CC SEMI RIG W/ POR HYDROPHOBIC SHUT

## (undated) DEVICE — SET LNR RED GRN W/ BASE CLEANASCOPE

## (undated) DEVICE — CONMED SCOPE SAVER BITE BLOCK, 20X27 MM: Brand: SCOPE SAVER

## (undated) DEVICE — CONNECTOR TBNG AUX H2O JET DISP FOR OLY 160/180 SER

## (undated) DEVICE — ENDO KIT: Brand: MEDLINE INDUSTRIES, INC.

## (undated) DEVICE — FORCEP RAD JAW W/NEEDLE 160CM